# Patient Record
Sex: FEMALE | Race: BLACK OR AFRICAN AMERICAN | Employment: FULL TIME
[De-identification: names, ages, dates, MRNs, and addresses within clinical notes are randomized per-mention and may not be internally consistent; named-entity substitution may affect disease eponyms.]

---

## 2017-01-01 ENCOUNTER — SURGERY (OUTPATIENT)
Age: 67
End: 2017-01-01

## 2017-01-01 ENCOUNTER — INFUSION THERAPY VISIT (OUTPATIENT)
Dept: INFUSION THERAPY | Facility: CLINIC | Age: 67
End: 2017-01-01
Attending: INTERNAL MEDICINE
Payer: COMMERCIAL

## 2017-01-01 ENCOUNTER — OFFICE VISIT (OUTPATIENT)
Dept: SURGERY | Facility: PHYSICIAN GROUP | Age: 67
End: 2017-01-01
Payer: COMMERCIAL

## 2017-01-01 ENCOUNTER — APPOINTMENT (OUTPATIENT)
Dept: CT IMAGING | Facility: CLINIC | Age: 67
End: 2017-01-01
Attending: EMERGENCY MEDICINE
Payer: COMMERCIAL

## 2017-01-01 ENCOUNTER — HOME INFUSION (PRE-WILLOW HOME INFUSION) (OUTPATIENT)
Dept: PHARMACY | Facility: CLINIC | Age: 67
End: 2017-01-01

## 2017-01-01 ENCOUNTER — HOSPITAL ENCOUNTER (OUTPATIENT)
Dept: CT IMAGING | Facility: CLINIC | Age: 67
End: 2017-11-27
Attending: INTERNAL MEDICINE | Admitting: RADIOLOGY
Payer: COMMERCIAL

## 2017-01-01 ENCOUNTER — HOSPITAL ENCOUNTER (OUTPATIENT)
Facility: CLINIC | Age: 67
Setting detail: SPECIMEN
Discharge: HOME | End: 2017-12-27
Attending: INTERNAL MEDICINE | Admitting: INTERNAL MEDICINE
Payer: COMMERCIAL

## 2017-01-01 ENCOUNTER — APPOINTMENT (OUTPATIENT)
Dept: ULTRASOUND IMAGING | Facility: CLINIC | Age: 67
End: 2017-01-01
Attending: INTERNAL MEDICINE
Payer: COMMERCIAL

## 2017-01-01 ENCOUNTER — ONCOLOGY VISIT (OUTPATIENT)
Dept: ONCOLOGY | Facility: CLINIC | Age: 67
End: 2017-01-01
Attending: INTERNAL MEDICINE
Payer: COMMERCIAL

## 2017-01-01 ENCOUNTER — ALLIED HEALTH/NURSE VISIT (OUTPATIENT)
Dept: ONCOLOGY | Facility: CLINIC | Age: 67
End: 2017-01-01

## 2017-01-01 ENCOUNTER — TELEPHONE (OUTPATIENT)
Dept: ONCOLOGY | Facility: CLINIC | Age: 67
End: 2017-01-01

## 2017-01-01 ENCOUNTER — TELEPHONE (OUTPATIENT)
Dept: PHARMACY | Facility: CLINIC | Age: 67
End: 2017-01-01

## 2017-01-01 ENCOUNTER — ANESTHESIA EVENT (OUTPATIENT)
Dept: SURGERY | Facility: CLINIC | Age: 67
End: 2017-01-01
Payer: COMMERCIAL

## 2017-01-01 ENCOUNTER — APPOINTMENT (OUTPATIENT)
Dept: ULTRASOUND IMAGING | Facility: CLINIC | Age: 67
End: 2017-01-01
Attending: EMERGENCY MEDICINE
Payer: COMMERCIAL

## 2017-01-01 ENCOUNTER — HOSPITAL ENCOUNTER (OUTPATIENT)
Facility: CLINIC | Age: 67
Setting detail: OBSERVATION
Discharge: HOME | End: 2017-10-21
Attending: EMERGENCY MEDICINE | Admitting: INTERNAL MEDICINE
Payer: COMMERCIAL

## 2017-01-01 ENCOUNTER — HOSPITAL ENCOUNTER (OUTPATIENT)
Facility: CLINIC | Age: 67
Setting detail: SPECIMEN
Discharge: HOME | End: 2017-11-22
Attending: INTERNAL MEDICINE | Admitting: INTERNAL MEDICINE
Payer: COMMERCIAL

## 2017-01-01 ENCOUNTER — HOSPITAL ENCOUNTER (OUTPATIENT)
Dept: ULTRASOUND IMAGING | Facility: CLINIC | Age: 67
Discharge: HOME | End: 2017-11-29
Attending: INTERNAL MEDICINE | Admitting: INTERNAL MEDICINE
Payer: COMMERCIAL

## 2017-01-01 ENCOUNTER — HOSPITAL ENCOUNTER (OUTPATIENT)
Facility: CLINIC | Age: 67
Discharge: HOME | End: 2017-10-09
Attending: SURGERY | Admitting: SURGERY
Payer: COMMERCIAL

## 2017-01-01 ENCOUNTER — ANESTHESIA (OUTPATIENT)
Dept: SURGERY | Facility: CLINIC | Age: 67
End: 2017-01-01
Payer: COMMERCIAL

## 2017-01-01 ENCOUNTER — HOSPITAL ENCOUNTER (INPATIENT)
Facility: CLINIC | Age: 67
LOS: 3 days | Discharge: HOME | End: 2017-10-02
Attending: EMERGENCY MEDICINE | Admitting: INTERNAL MEDICINE
Payer: COMMERCIAL

## 2017-01-01 ENCOUNTER — HOSPITAL ENCOUNTER (OUTPATIENT)
Facility: CLINIC | Age: 67
Setting detail: SPECIMEN
Discharge: HOME | End: 2017-10-24
Attending: INTERNAL MEDICINE | Admitting: INTERNAL MEDICINE
Payer: COMMERCIAL

## 2017-01-01 ENCOUNTER — MEDICAL CORRESPONDENCE (OUTPATIENT)
Dept: HEALTH INFORMATION MANAGEMENT | Facility: CLINIC | Age: 67
End: 2017-01-01

## 2017-01-01 ENCOUNTER — APPOINTMENT (OUTPATIENT)
Dept: GENERAL RADIOLOGY | Facility: CLINIC | Age: 67
End: 2017-01-01
Attending: SURGERY
Payer: COMMERCIAL

## 2017-01-01 ENCOUNTER — HOSPITAL ENCOUNTER (OUTPATIENT)
Facility: CLINIC | Age: 67
Setting detail: SPECIMEN
Discharge: HOME | End: 2017-11-29
Attending: INTERNAL MEDICINE | Admitting: INTERNAL MEDICINE
Payer: COMMERCIAL

## 2017-01-01 ENCOUNTER — APPOINTMENT (OUTPATIENT)
Dept: GENERAL RADIOLOGY | Facility: CLINIC | Age: 67
End: 2017-01-01
Attending: EMERGENCY MEDICINE
Payer: COMMERCIAL

## 2017-01-01 ENCOUNTER — HOSPITAL ENCOUNTER (OUTPATIENT)
Facility: CLINIC | Age: 67
Discharge: HOME | End: 2017-11-27
Attending: RADIOLOGY | Admitting: RADIOLOGY
Payer: COMMERCIAL

## 2017-01-01 ENCOUNTER — HOSPITAL ENCOUNTER (OUTPATIENT)
Facility: CLINIC | Age: 67
Setting detail: SPECIMEN
Discharge: HOME | End: 2017-12-13
Attending: INTERNAL MEDICINE | Admitting: INTERNAL MEDICINE
Payer: COMMERCIAL

## 2017-01-01 ENCOUNTER — HOSPITAL ENCOUNTER (INPATIENT)
Facility: CLINIC | Age: 67
LOS: 3 days | Discharge: HOME | End: 2017-12-18
Attending: EMERGENCY MEDICINE | Admitting: INTERNAL MEDICINE
Payer: COMMERCIAL

## 2017-01-01 ENCOUNTER — HOSPITAL ENCOUNTER (OUTPATIENT)
Facility: CLINIC | Age: 67
Setting detail: SPECIMEN
Discharge: HOME | End: 2017-10-25
Attending: INTERNAL MEDICINE | Admitting: INTERNAL MEDICINE
Payer: COMMERCIAL

## 2017-01-01 ENCOUNTER — HOSPITAL ENCOUNTER (OUTPATIENT)
Facility: CLINIC | Age: 67
Setting detail: SPECIMEN
Discharge: HOME | End: 2017-10-04
Attending: INTERNAL MEDICINE | Admitting: INTERNAL MEDICINE
Payer: COMMERCIAL

## 2017-01-01 ENCOUNTER — HOSPITAL ENCOUNTER (OUTPATIENT)
Facility: CLINIC | Age: 67
Setting detail: SPECIMEN
Discharge: HOME | End: 2017-12-29
Attending: INTERNAL MEDICINE | Admitting: INTERNAL MEDICINE
Payer: COMMERCIAL

## 2017-01-01 ENCOUNTER — HOSPITAL ENCOUNTER (OUTPATIENT)
Facility: CLINIC | Age: 67
Setting detail: SPECIMEN
Discharge: HOME | End: 2017-10-11
Attending: INTERNAL MEDICINE | Admitting: INTERNAL MEDICINE
Payer: COMMERCIAL

## 2017-01-01 ENCOUNTER — HOSPITAL ENCOUNTER (EMERGENCY)
Facility: CLINIC | Age: 67
Discharge: HOME | End: 2017-10-29
Attending: EMERGENCY MEDICINE | Admitting: EMERGENCY MEDICINE
Payer: COMMERCIAL

## 2017-01-01 ENCOUNTER — HOSPITAL ENCOUNTER (OUTPATIENT)
Dept: CT IMAGING | Facility: CLINIC | Age: 67
Discharge: HOME | End: 2017-10-05
Attending: INTERNAL MEDICINE | Admitting: INTERNAL MEDICINE
Payer: COMMERCIAL

## 2017-01-01 ENCOUNTER — HOSPITAL ENCOUNTER (OUTPATIENT)
Facility: CLINIC | Age: 67
Setting detail: SPECIMEN
Discharge: HOME | End: 2017-11-08
Attending: INTERNAL MEDICINE | Admitting: INTERNAL MEDICINE
Payer: COMMERCIAL

## 2017-01-01 ENCOUNTER — DOCUMENTATION ONLY (OUTPATIENT)
Dept: PHARMACY | Facility: CLINIC | Age: 67
End: 2017-01-01

## 2017-01-01 VITALS
OXYGEN SATURATION: 99 % | HEART RATE: 86 BPM | DIASTOLIC BLOOD PRESSURE: 71 MMHG | SYSTOLIC BLOOD PRESSURE: 151 MMHG | TEMPERATURE: 97.6 F | RESPIRATION RATE: 16 BRPM

## 2017-01-01 VITALS
HEART RATE: 98 BPM | TEMPERATURE: 98.5 F | SYSTOLIC BLOOD PRESSURE: 160 MMHG | DIASTOLIC BLOOD PRESSURE: 83 MMHG | OXYGEN SATURATION: 99 % | RESPIRATION RATE: 16 BRPM

## 2017-01-01 VITALS
WEIGHT: 239.2 LBS | BODY MASS INDEX: 35.43 KG/M2 | TEMPERATURE: 98.1 F | RESPIRATION RATE: 18 BRPM | HEART RATE: 77 BPM | OXYGEN SATURATION: 99 % | DIASTOLIC BLOOD PRESSURE: 70 MMHG | HEIGHT: 69 IN | SYSTOLIC BLOOD PRESSURE: 144 MMHG

## 2017-01-01 VITALS
HEART RATE: 54 BPM | RESPIRATION RATE: 16 BRPM | TEMPERATURE: 98.7 F | DIASTOLIC BLOOD PRESSURE: 85 MMHG | WEIGHT: 224 LBS | BODY MASS INDEX: 33.06 KG/M2 | SYSTOLIC BLOOD PRESSURE: 132 MMHG | OXYGEN SATURATION: 98 %

## 2017-01-01 VITALS
OXYGEN SATURATION: 99 % | TEMPERATURE: 98.2 F | RESPIRATION RATE: 16 BRPM | HEART RATE: 110 BPM | WEIGHT: 229.94 LBS | SYSTOLIC BLOOD PRESSURE: 109 MMHG | DIASTOLIC BLOOD PRESSURE: 69 MMHG | BODY MASS INDEX: 33.94 KG/M2

## 2017-01-01 VITALS
SYSTOLIC BLOOD PRESSURE: 128 MMHG | RESPIRATION RATE: 20 BRPM | BODY MASS INDEX: 34.45 KG/M2 | WEIGHT: 233.4 LBS | TEMPERATURE: 97.5 F | HEART RATE: 97 BPM | DIASTOLIC BLOOD PRESSURE: 69 MMHG | OXYGEN SATURATION: 98 %

## 2017-01-01 VITALS
WEIGHT: 245.4 LBS | RESPIRATION RATE: 16 BRPM | SYSTOLIC BLOOD PRESSURE: 154 MMHG | HEART RATE: 71 BPM | DIASTOLIC BLOOD PRESSURE: 65 MMHG | HEIGHT: 70 IN | TEMPERATURE: 97.4 F | BODY MASS INDEX: 35.13 KG/M2 | OXYGEN SATURATION: 91 %

## 2017-01-01 VITALS
HEIGHT: 70 IN | BODY MASS INDEX: 34.56 KG/M2 | TEMPERATURE: 98.3 F | RESPIRATION RATE: 18 BRPM | SYSTOLIC BLOOD PRESSURE: 117 MMHG | WEIGHT: 241.4 LBS | OXYGEN SATURATION: 98 % | DIASTOLIC BLOOD PRESSURE: 60 MMHG

## 2017-01-01 VITALS
RESPIRATION RATE: 16 BRPM | HEART RATE: 93 BPM | HEIGHT: 69 IN | BODY MASS INDEX: 35.66 KG/M2 | SYSTOLIC BLOOD PRESSURE: 151 MMHG | OXYGEN SATURATION: 96 % | DIASTOLIC BLOOD PRESSURE: 67 MMHG | WEIGHT: 240.8 LBS | TEMPERATURE: 99 F

## 2017-01-01 VITALS
WEIGHT: 239.2 LBS | OXYGEN SATURATION: 99 % | DIASTOLIC BLOOD PRESSURE: 77 MMHG | HEART RATE: 85 BPM | BODY MASS INDEX: 35.43 KG/M2 | TEMPERATURE: 98.6 F | SYSTOLIC BLOOD PRESSURE: 130 MMHG | RESPIRATION RATE: 20 BRPM | HEIGHT: 69 IN

## 2017-01-01 VITALS
TEMPERATURE: 98.3 F | BODY MASS INDEX: 34.84 KG/M2 | HEART RATE: 115 BPM | RESPIRATION RATE: 18 BRPM | WEIGHT: 242.8 LBS | OXYGEN SATURATION: 95 % | SYSTOLIC BLOOD PRESSURE: 132 MMHG | DIASTOLIC BLOOD PRESSURE: 77 MMHG

## 2017-01-01 VITALS
SYSTOLIC BLOOD PRESSURE: 171 MMHG | OXYGEN SATURATION: 100 % | HEART RATE: 100 BPM | TEMPERATURE: 99.4 F | RESPIRATION RATE: 22 BRPM | DIASTOLIC BLOOD PRESSURE: 89 MMHG

## 2017-01-01 VITALS
HEIGHT: 69 IN | TEMPERATURE: 98.6 F | RESPIRATION RATE: 20 BRPM | DIASTOLIC BLOOD PRESSURE: 77 MMHG | OXYGEN SATURATION: 99 % | SYSTOLIC BLOOD PRESSURE: 130 MMHG | HEART RATE: 85 BPM | BODY MASS INDEX: 35.43 KG/M2 | WEIGHT: 239.2 LBS

## 2017-01-01 VITALS
SYSTOLIC BLOOD PRESSURE: 137 MMHG | WEIGHT: 241.4 LBS | RESPIRATION RATE: 18 BRPM | TEMPERATURE: 98.3 F | BODY MASS INDEX: 34.56 KG/M2 | OXYGEN SATURATION: 98 % | HEIGHT: 70 IN | DIASTOLIC BLOOD PRESSURE: 61 MMHG

## 2017-01-01 VITALS
TEMPERATURE: 98.2 F | DIASTOLIC BLOOD PRESSURE: 85 MMHG | RESPIRATION RATE: 20 BRPM | HEART RATE: 69 BPM | HEIGHT: 69 IN | SYSTOLIC BLOOD PRESSURE: 154 MMHG | BODY MASS INDEX: 36.08 KG/M2 | OXYGEN SATURATION: 98 % | WEIGHT: 243.6 LBS

## 2017-01-01 VITALS
HEART RATE: 97 BPM | RESPIRATION RATE: 20 BRPM | TEMPERATURE: 97.5 F | OXYGEN SATURATION: 98 % | SYSTOLIC BLOOD PRESSURE: 128 MMHG | DIASTOLIC BLOOD PRESSURE: 69 MMHG

## 2017-01-01 VITALS
RESPIRATION RATE: 20 BRPM | TEMPERATURE: 98.1 F | WEIGHT: 234.2 LBS | HEART RATE: 85 BPM | BODY MASS INDEX: 34.69 KG/M2 | DIASTOLIC BLOOD PRESSURE: 85 MMHG | HEIGHT: 69 IN | HEIGHT: 69 IN | BODY MASS INDEX: 36.08 KG/M2 | OXYGEN SATURATION: 98 % | WEIGHT: 243.6 LBS | TEMPERATURE: 98.2 F | DIASTOLIC BLOOD PRESSURE: 64 MMHG | HEART RATE: 69 BPM | SYSTOLIC BLOOD PRESSURE: 154 MMHG | SYSTOLIC BLOOD PRESSURE: 119 MMHG

## 2017-01-01 VITALS
SYSTOLIC BLOOD PRESSURE: 119 MMHG | OXYGEN SATURATION: 100 % | HEART RATE: 94 BPM | TEMPERATURE: 99 F | WEIGHT: 227.4 LBS | BODY MASS INDEX: 33.57 KG/M2 | DIASTOLIC BLOOD PRESSURE: 67 MMHG

## 2017-01-01 VITALS
RESPIRATION RATE: 22 BRPM | TEMPERATURE: 98.2 F | BODY MASS INDEX: 34.07 KG/M2 | SYSTOLIC BLOOD PRESSURE: 134 MMHG | HEART RATE: 79 BPM | WEIGHT: 230 LBS | DIASTOLIC BLOOD PRESSURE: 88 MMHG | HEIGHT: 69 IN

## 2017-01-01 VITALS
HEART RATE: 104 BPM | TEMPERATURE: 98.8 F | SYSTOLIC BLOOD PRESSURE: 132 MMHG | DIASTOLIC BLOOD PRESSURE: 66 MMHG | RESPIRATION RATE: 18 BRPM

## 2017-01-01 VITALS
RESPIRATION RATE: 24 BRPM | BODY MASS INDEX: 34.06 KG/M2 | WEIGHT: 237.9 LBS | HEIGHT: 70 IN | DIASTOLIC BLOOD PRESSURE: 77 MMHG | OXYGEN SATURATION: 97 % | SYSTOLIC BLOOD PRESSURE: 153 MMHG | TEMPERATURE: 97.7 F

## 2017-01-01 DIAGNOSIS — Z95.828 PORTACATH IN PLACE: ICD-10-CM

## 2017-01-01 DIAGNOSIS — C18.3 MALIGNANT NEOPLASM OF HEPATIC FLEXURE (H): Primary | ICD-10-CM

## 2017-01-01 DIAGNOSIS — K64.5 THROMBOSED EXTERNAL HEMORRHOIDS: ICD-10-CM

## 2017-01-01 DIAGNOSIS — T40.2X5A CONSTIPATION DUE TO OPIOID THERAPY: ICD-10-CM

## 2017-01-01 DIAGNOSIS — I26.99 PE (PULMONARY THROMBOEMBOLISM) (H): Primary | ICD-10-CM

## 2017-01-01 DIAGNOSIS — Z71.9 COUNSELING NOS(V65.40): Primary | ICD-10-CM

## 2017-01-01 DIAGNOSIS — C18.3 MALIGNANT NEOPLASM OF HEPATIC FLEXURE (H): ICD-10-CM

## 2017-01-01 DIAGNOSIS — K59.03 CONSTIPATION DUE TO OPIOID THERAPY: ICD-10-CM

## 2017-01-01 DIAGNOSIS — R10.11 RUQ ABDOMINAL PAIN: ICD-10-CM

## 2017-01-01 DIAGNOSIS — I26.99 PE (PULMONARY THROMBOEMBOLISM) (H): ICD-10-CM

## 2017-01-01 DIAGNOSIS — D50.0 IRON DEFICIENCY ANEMIA DUE TO CHRONIC BLOOD LOSS: Primary | ICD-10-CM

## 2017-01-01 DIAGNOSIS — R00.0 TACHYCARDIA: ICD-10-CM

## 2017-01-01 DIAGNOSIS — R53.0 NEOPLASTIC MALIGNANT RELATED FATIGUE: ICD-10-CM

## 2017-01-01 DIAGNOSIS — D50.0 IRON DEFICIENCY ANEMIA DUE TO CHRONIC BLOOD LOSS: ICD-10-CM

## 2017-01-01 DIAGNOSIS — I82.90 VTE (VENOUS THROMBOEMBOLISM): Primary | ICD-10-CM

## 2017-01-01 DIAGNOSIS — I26.99 OTHER ACUTE PULMONARY EMBOLISM WITHOUT ACUTE COR PULMONALE (H): ICD-10-CM

## 2017-01-01 DIAGNOSIS — Z79.899 MEDICATION MANAGEMENT: ICD-10-CM

## 2017-01-01 DIAGNOSIS — K63.89 COLONIC MASS: ICD-10-CM

## 2017-01-01 DIAGNOSIS — E87.6 HYPOKALEMIA: Primary | ICD-10-CM

## 2017-01-01 DIAGNOSIS — Z95.828 PORT CATHETER IN PLACE: ICD-10-CM

## 2017-01-01 DIAGNOSIS — R53.0 NEOPLASTIC MALIGNANT RELATED FATIGUE: Primary | ICD-10-CM

## 2017-01-01 DIAGNOSIS — R11.0 NAUSEA: ICD-10-CM

## 2017-01-01 DIAGNOSIS — J45.909 REACTIVE AIRWAY DISEASE WITHOUT COMPLICATION, UNSPECIFIED ASTHMA SEVERITY, UNSPECIFIED WHETHER PERSISTENT: ICD-10-CM

## 2017-01-01 DIAGNOSIS — I26.99 OTHER ACUTE PULMONARY EMBOLISM WITHOUT ACUTE COR PULMONALE (H): Primary | ICD-10-CM

## 2017-01-01 DIAGNOSIS — Z51.5 ENCOUNTER FOR PALLIATIVE CARE: ICD-10-CM

## 2017-01-01 DIAGNOSIS — I26.99 PULMONARY EMBOLISM (H): ICD-10-CM

## 2017-01-01 DIAGNOSIS — Z65.9 OTHER SOCIAL STRESSOR: ICD-10-CM

## 2017-01-01 DIAGNOSIS — C19 COLORECTAL CANCER (H): ICD-10-CM

## 2017-01-01 DIAGNOSIS — I26.99 OTHER PULMONARY EMBOLISM WITHOUT ACUTE COR PULMONALE, UNSPECIFIED CHRONICITY (H): ICD-10-CM

## 2017-01-01 DIAGNOSIS — R16.0 LIVER MASSES: Primary | ICD-10-CM

## 2017-01-01 LAB
ABO + RH BLD: NORMAL
ABO + RH BLD: NORMAL
ALBUMIN SERPL-MCNC: 2.4 G/DL (ref 3.4–5)
ALBUMIN SERPL-MCNC: 2.5 G/DL (ref 3.4–5)
ALBUMIN SERPL-MCNC: 2.5 G/DL (ref 3.4–5)
ALBUMIN SERPL-MCNC: 2.7 G/DL (ref 3.4–5)
ALBUMIN SERPL-MCNC: 2.8 G/DL (ref 3.4–5)
ALBUMIN SERPL-MCNC: 2.9 G/DL (ref 3.4–5)
ALBUMIN SERPL-MCNC: 3.3 G/DL (ref 3.4–5)
ALBUMIN SERPL-MCNC: 3.3 G/DL (ref 3.4–5)
ALBUMIN SERPL-MCNC: 3.5 G/DL (ref 3.4–5)
ALBUMIN UR-MCNC: NEGATIVE MG/DL
ALP SERPL-CCNC: 104 U/L (ref 40–150)
ALP SERPL-CCNC: 105 U/L (ref 40–150)
ALP SERPL-CCNC: 107 U/L (ref 40–150)
ALP SERPL-CCNC: 112 U/L (ref 40–150)
ALP SERPL-CCNC: 120 U/L (ref 40–150)
ALP SERPL-CCNC: 139 U/L (ref 40–150)
ALP SERPL-CCNC: 145 U/L (ref 40–150)
ALP SERPL-CCNC: 182 U/L (ref 40–150)
ALP SERPL-CCNC: 185 U/L (ref 40–150)
ALP SERPL-CCNC: 76 U/L (ref 40–150)
ALP SERPL-CCNC: 81 U/L (ref 40–150)
ALP SERPL-CCNC: 87 U/L (ref 40–150)
ALT SERPL W P-5'-P-CCNC: 18 U/L (ref 0–50)
ALT SERPL W P-5'-P-CCNC: 23 U/L (ref 0–50)
ALT SERPL W P-5'-P-CCNC: 24 U/L (ref 0–50)
ALT SERPL W P-5'-P-CCNC: 24 U/L (ref 0–50)
ALT SERPL W P-5'-P-CCNC: 26 U/L (ref 0–50)
ALT SERPL W P-5'-P-CCNC: 28 U/L (ref 0–50)
ALT SERPL W P-5'-P-CCNC: 30 U/L (ref 0–50)
ALT SERPL W P-5'-P-CCNC: 36 U/L (ref 0–50)
ALT SERPL W P-5'-P-CCNC: 37 U/L (ref 0–50)
ALT SERPL W P-5'-P-CCNC: 41 U/L (ref 0–50)
ANION GAP SERPL CALCULATED.3IONS-SCNC: 10 MMOL/L (ref 3–14)
ANION GAP SERPL CALCULATED.3IONS-SCNC: 5 MMOL/L (ref 3–14)
ANION GAP SERPL CALCULATED.3IONS-SCNC: 6 MMOL/L (ref 3–14)
ANION GAP SERPL CALCULATED.3IONS-SCNC: 8 MMOL/L (ref 3–14)
ANION GAP SERPL CALCULATED.3IONS-SCNC: 9 MMOL/L (ref 3–14)
APAP SERPL-MCNC: <2 MG/L (ref 10–20)
APPEARANCE UR: CLEAR
APTT PPP: 29 SEC (ref 22–37)
AST SERPL W P-5'-P-CCNC: 19 U/L (ref 0–45)
AST SERPL W P-5'-P-CCNC: 23 U/L (ref 0–45)
AST SERPL W P-5'-P-CCNC: 23 U/L (ref 0–45)
AST SERPL W P-5'-P-CCNC: 27 U/L (ref 0–45)
AST SERPL W P-5'-P-CCNC: 33 U/L (ref 0–45)
AST SERPL W P-5'-P-CCNC: 35 U/L (ref 0–45)
AST SERPL W P-5'-P-CCNC: 38 U/L (ref 0–45)
AST SERPL W P-5'-P-CCNC: 41 U/L (ref 0–45)
AST SERPL W P-5'-P-CCNC: 44 U/L (ref 0–45)
AST SERPL W P-5'-P-CCNC: 54 U/L (ref 0–45)
AST SERPL W P-5'-P-CCNC: 65 U/L (ref 0–45)
AST SERPL W P-5'-P-CCNC: 70 U/L (ref 0–45)
BACTERIA SPEC CULT: NO GROWTH
BACTERIA SPEC CULT: NO GROWTH
BASOPHILS # BLD AUTO: 0 10E9/L (ref 0–0.2)
BASOPHILS NFR BLD AUTO: 0.2 %
BASOPHILS NFR BLD AUTO: 0.3 %
BASOPHILS NFR BLD AUTO: 0.4 %
BASOPHILS NFR BLD AUTO: 0.4 %
BASOPHILS NFR BLD AUTO: 0.5 %
BASOPHILS NFR BLD AUTO: 0.7 %
BILIRUB DIRECT SERPL-MCNC: 0.2 MG/DL (ref 0–0.2)
BILIRUB SERPL-MCNC: 0.2 MG/DL (ref 0.2–1.3)
BILIRUB SERPL-MCNC: 0.3 MG/DL (ref 0.2–1.3)
BILIRUB SERPL-MCNC: 0.4 MG/DL (ref 0.2–1.3)
BILIRUB SERPL-MCNC: 0.4 MG/DL (ref 0.2–1.3)
BILIRUB SERPL-MCNC: 0.5 MG/DL (ref 0.2–1.3)
BILIRUB SERPL-MCNC: 0.5 MG/DL (ref 0.2–1.3)
BILIRUB SERPL-MCNC: 0.6 MG/DL (ref 0.2–1.3)
BILIRUB UR QL STRIP: NEGATIVE
BLD GP AB SCN SERPL QL: NORMAL
BLD PROD TYP BPU: NORMAL
BLD PROD TYP BPU: NORMAL
BLD UNIT ID BPU: 0
BLOOD BANK CMNT PATIENT-IMP: NORMAL
BLOOD PRODUCT CODE: NORMAL
BPU ID: NORMAL
BUN SERPL-MCNC: 10 MG/DL (ref 7–30)
BUN SERPL-MCNC: 10 MG/DL (ref 7–30)
BUN SERPL-MCNC: 12 MG/DL (ref 7–30)
BUN SERPL-MCNC: 12 MG/DL (ref 7–30)
BUN SERPL-MCNC: 13 MG/DL (ref 7–30)
BUN SERPL-MCNC: 14 MG/DL (ref 7–30)
BUN SERPL-MCNC: 16 MG/DL (ref 7–30)
BUN SERPL-MCNC: 18 MG/DL (ref 7–30)
BUN SERPL-MCNC: 18 MG/DL (ref 7–30)
BUN SERPL-MCNC: 19 MG/DL (ref 7–30)
BUN SERPL-MCNC: 19 MG/DL (ref 7–30)
BUN SERPL-MCNC: 9 MG/DL (ref 7–30)
BUN SERPL-MCNC: 9 MG/DL (ref 7–30)
CALCIUM SERPL-MCNC: 8.4 MG/DL (ref 8.5–10.1)
CALCIUM SERPL-MCNC: 8.5 MG/DL (ref 8.5–10.1)
CALCIUM SERPL-MCNC: 8.6 MG/DL (ref 8.5–10.1)
CALCIUM SERPL-MCNC: 8.7 MG/DL (ref 8.5–10.1)
CALCIUM SERPL-MCNC: 8.9 MG/DL (ref 8.5–10.1)
CALCIUM SERPL-MCNC: 9 MG/DL (ref 8.5–10.1)
CALCIUM SERPL-MCNC: 9 MG/DL (ref 8.5–10.1)
CALCIUM SERPL-MCNC: 9.1 MG/DL (ref 8.5–10.1)
CALCIUM SERPL-MCNC: 9.1 MG/DL (ref 8.5–10.1)
CALCIUM SERPL-MCNC: 9.4 MG/DL (ref 8.5–10.1)
CALCIUM SERPL-MCNC: 9.7 MG/DL (ref 8.5–10.1)
CALCIUM SERPL-MCNC: 9.7 MG/DL (ref 8.5–10.1)
CALCIUM SERPL-MCNC: 9.8 MG/DL (ref 8.5–10.1)
CANCER AG125 SERPL-ACNC: 42 U/ML (ref 0–30)
CEA SERPL-MCNC: 143.8 UG/L (ref 0–2.5)
CEA SERPL-MCNC: 95.5 UG/L (ref 0–2.5)
CHLORIDE SERPL-SCNC: 101 MMOL/L (ref 94–109)
CHLORIDE SERPL-SCNC: 101 MMOL/L (ref 94–109)
CHLORIDE SERPL-SCNC: 102 MMOL/L (ref 94–109)
CHLORIDE SERPL-SCNC: 102 MMOL/L (ref 94–109)
CHLORIDE SERPL-SCNC: 103 MMOL/L (ref 94–109)
CHLORIDE SERPL-SCNC: 104 MMOL/L (ref 94–109)
CHLORIDE SERPL-SCNC: 105 MMOL/L (ref 94–109)
CHLORIDE SERPL-SCNC: 106 MMOL/L (ref 94–109)
CHLORIDE SERPL-SCNC: 107 MMOL/L (ref 94–109)
CHLORIDE SERPL-SCNC: 109 MMOL/L (ref 94–109)
CO2 SERPL-SCNC: 26 MMOL/L (ref 20–32)
CO2 SERPL-SCNC: 26 MMOL/L (ref 20–32)
CO2 SERPL-SCNC: 27 MMOL/L (ref 20–32)
CO2 SERPL-SCNC: 28 MMOL/L (ref 20–32)
CO2 SERPL-SCNC: 28 MMOL/L (ref 20–32)
CO2 SERPL-SCNC: 29 MMOL/L (ref 20–32)
CO2 SERPL-SCNC: 29 MMOL/L (ref 20–32)
CO2 SERPL-SCNC: 33 MMOL/L (ref 20–32)
COLONOSCOPY: NORMAL
COLOR UR AUTO: ABNORMAL
COPATH REPORT: NORMAL
COPATH REPORT: NORMAL
CREAT SERPL-MCNC: 0.54 MG/DL (ref 0.52–1.04)
CREAT SERPL-MCNC: 0.58 MG/DL (ref 0.52–1.04)
CREAT SERPL-MCNC: 0.6 MG/DL (ref 0.52–1.04)
CREAT SERPL-MCNC: 0.61 MG/DL (ref 0.52–1.04)
CREAT SERPL-MCNC: 0.61 MG/DL (ref 0.52–1.04)
CREAT SERPL-MCNC: 0.66 MG/DL (ref 0.52–1.04)
CREAT SERPL-MCNC: 0.67 MG/DL (ref 0.52–1.04)
CREAT SERPL-MCNC: 0.68 MG/DL (ref 0.52–1.04)
CREAT SERPL-MCNC: 0.73 MG/DL (ref 0.52–1.04)
CREAT SERPL-MCNC: 0.75 MG/DL (ref 0.52–1.04)
CREAT SERPL-MCNC: 0.76 MG/DL (ref 0.52–1.04)
CREAT SERPL-MCNC: 0.78 MG/DL (ref 0.52–1.04)
CREAT SERPL-MCNC: 0.78 MG/DL (ref 0.52–1.04)
CREAT SERPL-MCNC: 0.9 MG/DL (ref 0.52–1.04)
D DIMER PPP FEU-MCNC: 8.3 UG/ML FEU (ref 0–0.5)
DIFFERENTIAL METHOD BLD: ABNORMAL
EOSINOPHIL # BLD AUTO: 0.1 10E9/L (ref 0–0.7)
EOSINOPHIL NFR BLD AUTO: 0.6 %
EOSINOPHIL NFR BLD AUTO: 1 %
EOSINOPHIL NFR BLD AUTO: 1.1 %
EOSINOPHIL NFR BLD AUTO: 1.2 %
EOSINOPHIL NFR BLD AUTO: 1.6 %
EOSINOPHIL NFR BLD AUTO: 1.8 %
EOSINOPHIL NFR BLD AUTO: 2.8 %
EOSINOPHIL NFR BLD AUTO: 2.9 %
EOSINOPHIL NFR BLD AUTO: 3.6 %
ERYTHROCYTE [DISTWIDTH] IN BLOOD BY AUTOMATED COUNT: 17.4 % (ref 10–15)
ERYTHROCYTE [DISTWIDTH] IN BLOOD BY AUTOMATED COUNT: 17.5 % (ref 10–15)
ERYTHROCYTE [DISTWIDTH] IN BLOOD BY AUTOMATED COUNT: 17.6 % (ref 10–15)
ERYTHROCYTE [DISTWIDTH] IN BLOOD BY AUTOMATED COUNT: 18.1 % (ref 10–15)
ERYTHROCYTE [DISTWIDTH] IN BLOOD BY AUTOMATED COUNT: 18.2 % (ref 10–15)
ERYTHROCYTE [DISTWIDTH] IN BLOOD BY AUTOMATED COUNT: 18.4 % (ref 10–15)
ERYTHROCYTE [DISTWIDTH] IN BLOOD BY AUTOMATED COUNT: 19.8 % (ref 10–15)
ERYTHROCYTE [DISTWIDTH] IN BLOOD BY AUTOMATED COUNT: 25.7 % (ref 10–15)
ERYTHROCYTE [DISTWIDTH] IN BLOOD BY AUTOMATED COUNT: 26.7 % (ref 10–15)
ERYTHROCYTE [DISTWIDTH] IN BLOOD BY AUTOMATED COUNT: 27 % (ref 10–15)
ERYTHROCYTE [DISTWIDTH] IN BLOOD BY AUTOMATED COUNT: 27.1 % (ref 10–15)
ERYTHROCYTE [DISTWIDTH] IN BLOOD BY AUTOMATED COUNT: 28.1 % (ref 10–15)
ERYTHROCYTE [DISTWIDTH] IN BLOOD BY AUTOMATED COUNT: 28.2 % (ref 10–15)
ERYTHROCYTE [DISTWIDTH] IN BLOOD BY AUTOMATED COUNT: 29.6 % (ref 10–15)
ERYTHROCYTE [DISTWIDTH] IN BLOOD BY AUTOMATED COUNT: ABNORMAL % (ref 10–15)
GFR SERPL CREATININE-BSD FRML MDRD: 62 ML/MIN/1.7M2
GFR SERPL CREATININE-BSD FRML MDRD: 73 ML/MIN/1.7M2
GFR SERPL CREATININE-BSD FRML MDRD: 74 ML/MIN/1.7M2
GFR SERPL CREATININE-BSD FRML MDRD: 76 ML/MIN/1.7M2
GFR SERPL CREATININE-BSD FRML MDRD: 76 ML/MIN/1.7M2
GFR SERPL CREATININE-BSD FRML MDRD: 79 ML/MIN/1.7M2
GFR SERPL CREATININE-BSD FRML MDRD: 86 ML/MIN/1.7M2
GFR SERPL CREATININE-BSD FRML MDRD: 88 ML/MIN/1.7M2
GFR SERPL CREATININE-BSD FRML MDRD: 88 ML/MIN/1.7M2
GFR SERPL CREATININE-BSD FRML MDRD: >90 ML/MIN/1.7M2
GLUCOSE SERPL-MCNC: 105 MG/DL (ref 70–99)
GLUCOSE SERPL-MCNC: 107 MG/DL (ref 70–99)
GLUCOSE SERPL-MCNC: 108 MG/DL (ref 70–99)
GLUCOSE SERPL-MCNC: 109 MG/DL (ref 70–99)
GLUCOSE SERPL-MCNC: 113 MG/DL (ref 70–99)
GLUCOSE SERPL-MCNC: 116 MG/DL (ref 70–99)
GLUCOSE SERPL-MCNC: 119 MG/DL (ref 70–99)
GLUCOSE SERPL-MCNC: 128 MG/DL (ref 70–99)
GLUCOSE SERPL-MCNC: 132 MG/DL (ref 70–99)
GLUCOSE SERPL-MCNC: 162 MG/DL (ref 70–99)
GLUCOSE SERPL-MCNC: 84 MG/DL (ref 70–99)
GLUCOSE SERPL-MCNC: 88 MG/DL (ref 70–99)
GLUCOSE SERPL-MCNC: 94 MG/DL (ref 70–99)
GLUCOSE UR STRIP-MCNC: NEGATIVE MG/DL
HCT VFR BLD AUTO: 22.7 % (ref 35–47)
HCT VFR BLD AUTO: 25 % (ref 35–47)
HCT VFR BLD AUTO: 25.4 % (ref 35–47)
HCT VFR BLD AUTO: 25.8 % (ref 35–47)
HCT VFR BLD AUTO: 26.6 % (ref 35–47)
HCT VFR BLD AUTO: 27.4 % (ref 35–47)
HCT VFR BLD AUTO: 28.3 % (ref 35–47)
HCT VFR BLD AUTO: 30.8 % (ref 35–47)
HCT VFR BLD AUTO: 31.5 % (ref 35–47)
HCT VFR BLD AUTO: 32.9 % (ref 35–47)
HCT VFR BLD AUTO: 33 % (ref 35–47)
HCT VFR BLD AUTO: 34.7 % (ref 35–47)
HCT VFR BLD AUTO: 35.3 % (ref 35–47)
HCT VFR BLD AUTO: 35.9 % (ref 35–47)
HCT VFR BLD AUTO: 39.3 % (ref 35–47)
HGB BLD-MCNC: 10.5 G/DL (ref 11.7–15.7)
HGB BLD-MCNC: 11 G/DL (ref 11.7–15.7)
HGB BLD-MCNC: 11.3 G/DL (ref 11.7–15.7)
HGB BLD-MCNC: 11.5 G/DL (ref 11.7–15.7)
HGB BLD-MCNC: 11.6 G/DL (ref 11.7–15.7)
HGB BLD-MCNC: 13 G/DL (ref 11.7–15.7)
HGB BLD-MCNC: 7.2 G/DL (ref 11.7–15.7)
HGB BLD-MCNC: 7.4 G/DL (ref 11.7–15.7)
HGB BLD-MCNC: 8 G/DL (ref 11.7–15.7)
HGB BLD-MCNC: 8.1 G/DL (ref 11.7–15.7)
HGB BLD-MCNC: 8.5 G/DL (ref 11.7–15.7)
HGB BLD-MCNC: 8.5 G/DL (ref 11.7–15.7)
HGB BLD-MCNC: 8.8 G/DL (ref 11.7–15.7)
HGB BLD-MCNC: 9.3 G/DL (ref 11.7–15.7)
HGB BLD-MCNC: 9.9 G/DL (ref 11.7–15.7)
HGB BLD-MCNC: 9.9 G/DL (ref 11.7–15.7)
HGB UR QL STRIP: ABNORMAL
IMM GRANULOCYTES # BLD: 0 10E9/L (ref 0–0.4)
IMM GRANULOCYTES NFR BLD: 0 %
IMM GRANULOCYTES NFR BLD: 0 %
IMM GRANULOCYTES NFR BLD: 0.2 %
IMM GRANULOCYTES NFR BLD: 0.3 %
IMM GRANULOCYTES NFR BLD: 0.3 %
IMM GRANULOCYTES NFR BLD: 0.4 %
IMM GRANULOCYTES NFR BLD: 0.4 %
IMM GRANULOCYTES NFR BLD: 0.5 %
INR PPP: 1.05 (ref 0.86–1.14)
INR PPP: 1.12 (ref 0.86–1.14)
INTERPRETATION ECG - MUSE: NORMAL
KETONES UR STRIP-MCNC: NEGATIVE MG/DL
LACTATE BLD-SCNC: 1.7 MMOL/L (ref 0.7–2)
LEUKOCYTE ESTERASE UR QL STRIP: NEGATIVE
LIPASE SERPL-CCNC: 74 U/L (ref 73–393)
LMWH PPP CHRO-ACNC: 0.34 IU/ML
LMWH PPP CHRO-ACNC: 0.44 IU/ML
LMWH PPP CHRO-ACNC: 0.65 IU/ML
LMWH PPP CHRO-ACNC: 0.74 IU/ML
LMWH PPP CHRO-ACNC: 0.76 IU/ML
LMWH PPP CHRO-ACNC: 0.9 IU/ML
LMWH PPP CHRO-ACNC: 1.1 IU/ML
LMWH PPP CHRO-ACNC: 1.24 IU/ML
LMWH PPP CHRO-ACNC: 1.47 IU/ML
LYMPHOCYTES # BLD AUTO: 0.8 10E9/L (ref 0.8–5.3)
LYMPHOCYTES # BLD AUTO: 1 10E9/L (ref 0.8–5.3)
LYMPHOCYTES # BLD AUTO: 1.2 10E9/L (ref 0.8–5.3)
LYMPHOCYTES # BLD AUTO: 1.2 10E9/L (ref 0.8–5.3)
LYMPHOCYTES # BLD AUTO: 1.3 10E9/L (ref 0.8–5.3)
LYMPHOCYTES # BLD AUTO: 1.3 10E9/L (ref 0.8–5.3)
LYMPHOCYTES # BLD AUTO: 1.4 10E9/L (ref 0.8–5.3)
LYMPHOCYTES # BLD AUTO: 1.6 10E9/L (ref 0.8–5.3)
LYMPHOCYTES # BLD AUTO: 1.8 10E9/L (ref 0.8–5.3)
LYMPHOCYTES NFR BLD AUTO: 15.8 %
LYMPHOCYTES NFR BLD AUTO: 16.2 %
LYMPHOCYTES NFR BLD AUTO: 16.6 %
LYMPHOCYTES NFR BLD AUTO: 21.9 %
LYMPHOCYTES NFR BLD AUTO: 24.4 %
LYMPHOCYTES NFR BLD AUTO: 24.8 %
LYMPHOCYTES NFR BLD AUTO: 25.7 %
LYMPHOCYTES NFR BLD AUTO: 28.9 %
LYMPHOCYTES NFR BLD AUTO: 34.2 %
LYMPHOCYTES NFR BLD AUTO: 43.1 %
LYMPHOCYTES NFR BLD AUTO: 47.4 %
Lab: NORMAL
Lab: NORMAL
MCH RBC QN AUTO: 22.9 PG (ref 26.5–33)
MCH RBC QN AUTO: 23.1 PG (ref 26.5–33)
MCH RBC QN AUTO: 23.3 PG (ref 26.5–33)
MCH RBC QN AUTO: 23.6 PG (ref 26.5–33)
MCH RBC QN AUTO: 23.7 PG (ref 26.5–33)
MCH RBC QN AUTO: 23.9 PG (ref 26.5–33)
MCH RBC QN AUTO: 24 PG (ref 26.5–33)
MCH RBC QN AUTO: 25.4 PG (ref 26.5–33)
MCH RBC QN AUTO: 26.1 PG (ref 26.5–33)
MCH RBC QN AUTO: 26.7 PG (ref 26.5–33)
MCH RBC QN AUTO: 27.6 PG (ref 26.5–33)
MCH RBC QN AUTO: 27.7 PG (ref 26.5–33)
MCH RBC QN AUTO: 27.8 PG (ref 26.5–33)
MCH RBC QN AUTO: 28.2 PG (ref 26.5–33)
MCH RBC QN AUTO: 29 PG (ref 26.5–33)
MCHC RBC AUTO-ENTMCNC: 31.4 G/DL (ref 31.5–36.5)
MCHC RBC AUTO-ENTMCNC: 31.7 G/DL (ref 31.5–36.5)
MCHC RBC AUTO-ENTMCNC: 31.9 G/DL (ref 31.5–36.5)
MCHC RBC AUTO-ENTMCNC: 31.9 G/DL (ref 31.5–36.5)
MCHC RBC AUTO-ENTMCNC: 32 G/DL (ref 31.5–36.5)
MCHC RBC AUTO-ENTMCNC: 32 G/DL (ref 31.5–36.5)
MCHC RBC AUTO-ENTMCNC: 32.1 G/DL (ref 31.5–36.5)
MCHC RBC AUTO-ENTMCNC: 32.1 G/DL (ref 31.5–36.5)
MCHC RBC AUTO-ENTMCNC: 32.3 G/DL (ref 31.5–36.5)
MCHC RBC AUTO-ENTMCNC: 32.6 G/DL (ref 31.5–36.5)
MCHC RBC AUTO-ENTMCNC: 32.6 G/DL (ref 31.5–36.5)
MCHC RBC AUTO-ENTMCNC: 32.9 G/DL (ref 31.5–36.5)
MCHC RBC AUTO-ENTMCNC: 32.9 G/DL (ref 31.5–36.5)
MCHC RBC AUTO-ENTMCNC: 33.1 G/DL (ref 31.5–36.5)
MCHC RBC AUTO-ENTMCNC: 33.3 G/DL (ref 31.5–36.5)
MCV RBC AUTO: 72 FL (ref 78–100)
MCV RBC AUTO: 73 FL (ref 78–100)
MCV RBC AUTO: 73 FL (ref 78–100)
MCV RBC AUTO: 74 FL (ref 78–100)
MCV RBC AUTO: 75 FL (ref 78–100)
MCV RBC AUTO: 80 FL (ref 78–100)
MCV RBC AUTO: 81 FL (ref 78–100)
MCV RBC AUTO: 85 FL (ref 78–100)
MCV RBC AUTO: 86 FL (ref 78–100)
MCV RBC AUTO: 86 FL (ref 78–100)
MCV RBC AUTO: 87 FL (ref 78–100)
MISCELLANEOUS TEST: NORMAL
MONOCYTES # BLD AUTO: 0.3 10E9/L (ref 0–1.3)
MONOCYTES # BLD AUTO: 0.3 10E9/L (ref 0–1.3)
MONOCYTES # BLD AUTO: 0.4 10E9/L (ref 0–1.3)
MONOCYTES # BLD AUTO: 0.5 10E9/L (ref 0–1.3)
MONOCYTES # BLD AUTO: 0.6 10E9/L (ref 0–1.3)
MONOCYTES # BLD AUTO: 0.7 10E9/L (ref 0–1.3)
MONOCYTES # BLD AUTO: 0.8 10E9/L (ref 0–1.3)
MONOCYTES NFR BLD AUTO: 11.6 %
MONOCYTES NFR BLD AUTO: 12 %
MONOCYTES NFR BLD AUTO: 12.5 %
MONOCYTES NFR BLD AUTO: 13 %
MONOCYTES NFR BLD AUTO: 6.3 %
MONOCYTES NFR BLD AUTO: 6.7 %
MONOCYTES NFR BLD AUTO: 7.4 %
MONOCYTES NFR BLD AUTO: 7.9 %
MONOCYTES NFR BLD AUTO: 8.4 %
MONOCYTES NFR BLD AUTO: 8.6 %
MONOCYTES NFR BLD AUTO: 9.9 %
NEUTROPHILS # BLD AUTO: 0.9 10E9/L (ref 1.6–8.3)
NEUTROPHILS # BLD AUTO: 1.3 10E9/L (ref 1.6–8.3)
NEUTROPHILS # BLD AUTO: 1.9 10E9/L (ref 1.6–8.3)
NEUTROPHILS # BLD AUTO: 2.2 10E9/L (ref 1.6–8.3)
NEUTROPHILS # BLD AUTO: 2.4 10E9/L (ref 1.6–8.3)
NEUTROPHILS # BLD AUTO: 3.7 10E9/L (ref 1.6–8.3)
NEUTROPHILS # BLD AUTO: 3.8 10E9/L (ref 1.6–8.3)
NEUTROPHILS # BLD AUTO: 4 10E9/L (ref 1.6–8.3)
NEUTROPHILS # BLD AUTO: 5.6 10E9/L (ref 1.6–8.3)
NEUTROPHILS # BLD AUTO: 6.2 10E9/L (ref 1.6–8.3)
NEUTROPHILS # BLD AUTO: 7.1 10E9/L (ref 1.6–8.3)
NEUTROPHILS NFR BLD AUTO: 37 %
NEUTROPHILS NFR BLD AUTO: 42.2 %
NEUTROPHILS NFR BLD AUTO: 49.2 %
NEUTROPHILS NFR BLD AUTO: 58.6 %
NEUTROPHILS NFR BLD AUTO: 59.3 %
NEUTROPHILS NFR BLD AUTO: 65.8 %
NEUTROPHILS NFR BLD AUTO: 67.9 %
NEUTROPHILS NFR BLD AUTO: 68.8 %
NEUTROPHILS NFR BLD AUTO: 73.8 %
NEUTROPHILS NFR BLD AUTO: 73.8 %
NEUTROPHILS NFR BLD AUTO: 75.3 %
NITRATE UR QL: NEGATIVE
NRBC # BLD AUTO: 0 10*3/UL
NRBC BLD AUTO-RTO: 0 /100
NUM BPU REQUESTED: 1
PH UR STRIP: 6 PH (ref 5–7)
PLATELET # BLD AUTO: 156 10E9/L (ref 150–450)
PLATELET # BLD AUTO: 172 10E9/L (ref 150–450)
PLATELET # BLD AUTO: 172 10E9/L (ref 150–450)
PLATELET # BLD AUTO: 173 10E9/L (ref 150–450)
PLATELET # BLD AUTO: 174 10E9/L (ref 150–450)
PLATELET # BLD AUTO: 184 10E9/L (ref 150–450)
PLATELET # BLD AUTO: 193 10E9/L (ref 150–450)
PLATELET # BLD AUTO: 225 10E9/L (ref 150–450)
PLATELET # BLD AUTO: 227 10E9/L (ref 150–450)
PLATELET # BLD AUTO: 239 10E9/L (ref 150–450)
PLATELET # BLD AUTO: 270 10E9/L (ref 150–450)
PLATELET # BLD AUTO: 395 10E9/L (ref 150–450)
PLATELET # BLD AUTO: 413 10E9/L (ref 150–450)
PLATELET # BLD AUTO: 420 10E9/L (ref 150–450)
PLATELET # BLD AUTO: 426 10E9/L (ref 150–450)
POTASSIUM SERPL-SCNC: 2.8 MMOL/L (ref 3.4–5.3)
POTASSIUM SERPL-SCNC: 3.1 MMOL/L (ref 3.4–5.3)
POTASSIUM SERPL-SCNC: 3.3 MMOL/L (ref 3.4–5.3)
POTASSIUM SERPL-SCNC: 3.3 MMOL/L (ref 3.4–5.3)
POTASSIUM SERPL-SCNC: 3.4 MMOL/L (ref 3.4–5.3)
POTASSIUM SERPL-SCNC: 3.4 MMOL/L (ref 3.4–5.3)
POTASSIUM SERPL-SCNC: 3.5 MMOL/L (ref 3.4–5.3)
POTASSIUM SERPL-SCNC: 3.6 MMOL/L (ref 3.4–5.3)
POTASSIUM SERPL-SCNC: 3.6 MMOL/L (ref 3.4–5.3)
POTASSIUM SERPL-SCNC: 3.7 MMOL/L (ref 3.4–5.3)
POTASSIUM SERPL-SCNC: 3.9 MMOL/L (ref 3.4–5.3)
PROT SERPL-MCNC: 6.6 G/DL (ref 6.8–8.8)
PROT SERPL-MCNC: 6.7 G/DL (ref 6.8–8.8)
PROT SERPL-MCNC: 6.7 G/DL (ref 6.8–8.8)
PROT SERPL-MCNC: 6.9 G/DL (ref 6.8–8.8)
PROT SERPL-MCNC: 7.1 G/DL (ref 6.8–8.8)
PROT SERPL-MCNC: 7.1 G/DL (ref 6.8–8.8)
PROT SERPL-MCNC: 7.4 G/DL (ref 6.8–8.8)
PROT SERPL-MCNC: 7.4 G/DL (ref 6.8–8.8)
PROT SERPL-MCNC: 7.7 G/DL (ref 6.8–8.8)
PROT SERPL-MCNC: 7.9 G/DL (ref 6.8–8.8)
PROT SERPL-MCNC: 8.1 G/DL (ref 6.8–8.8)
PROT SERPL-MCNC: 8.4 G/DL (ref 6.8–8.8)
RADIOLOGIST FLAGS: ABNORMAL
RADIOLOGIST FLAGS: ABNORMAL
RBC # BLD AUTO: 3.14 10E12/L (ref 3.8–5.2)
RBC # BLD AUTO: 3.46 10E12/L (ref 3.8–5.2)
RBC # BLD AUTO: 3.47 10E12/L (ref 3.8–5.2)
RBC # BLD AUTO: 3.54 10E12/L (ref 3.8–5.2)
RBC # BLD AUTO: 3.55 10E12/L (ref 3.8–5.2)
RBC # BLD AUTO: 3.71 10E12/L (ref 3.8–5.2)
RBC # BLD AUTO: 3.73 10E12/L (ref 3.8–5.2)
RBC # BLD AUTO: 3.79 10E12/L (ref 3.8–5.2)
RBC # BLD AUTO: 3.8 10E12/L (ref 3.8–5.2)
RBC # BLD AUTO: 3.93 10E12/L (ref 3.8–5.2)
RBC # BLD AUTO: 4.09 10E12/L (ref 3.8–5.2)
RBC # BLD AUTO: 4.13 10E12/L (ref 3.8–5.2)
RBC # BLD AUTO: 4.13 10E12/L (ref 3.8–5.2)
RBC # BLD AUTO: 4.19 10E12/L (ref 3.8–5.2)
RBC # BLD AUTO: 4.61 10E12/L (ref 3.8–5.2)
RBC #/AREA URNS AUTO: 4 /HPF (ref 0–2)
SODIUM SERPL-SCNC: 136 MMOL/L (ref 133–144)
SODIUM SERPL-SCNC: 137 MMOL/L (ref 133–144)
SODIUM SERPL-SCNC: 137 MMOL/L (ref 133–144)
SODIUM SERPL-SCNC: 138 MMOL/L (ref 133–144)
SODIUM SERPL-SCNC: 139 MMOL/L (ref 133–144)
SODIUM SERPL-SCNC: 140 MMOL/L (ref 133–144)
SODIUM SERPL-SCNC: 141 MMOL/L (ref 133–144)
SODIUM SERPL-SCNC: 143 MMOL/L (ref 133–144)
SODIUM SERPL-SCNC: 144 MMOL/L (ref 133–144)
SOURCE: ABNORMAL
SP GR UR STRIP: 1.02 (ref 1–1.03)
SPECIMEN EXP DATE BLD: NORMAL
SPECIMEN SOURCE: NORMAL
SPECIMEN SOURCE: NORMAL
SQUAMOUS #/AREA URNS AUTO: <1 /HPF (ref 0–1)
TRANSFUSION STATUS PATIENT QL: NORMAL
TRANSFUSION STATUS PATIENT QL: NORMAL
TROPONIN I SERPL-MCNC: <0.015 UG/L (ref 0–0.04)
UROBILINOGEN UR STRIP-MCNC: NORMAL MG/DL (ref 0–2)
WBC # BLD AUTO: 2.5 10E9/L (ref 4–11)
WBC # BLD AUTO: 2.9 10E9/L (ref 4–11)
WBC # BLD AUTO: 3 10E9/L (ref 4–11)
WBC # BLD AUTO: 3.8 10E9/L (ref 4–11)
WBC # BLD AUTO: 3.8 10E9/L (ref 4–11)
WBC # BLD AUTO: 3.9 10E9/L (ref 4–11)
WBC # BLD AUTO: 4.1 10E9/L (ref 4–11)
WBC # BLD AUTO: 5 10E9/L (ref 4–11)
WBC # BLD AUTO: 5.4 10E9/L (ref 4–11)
WBC # BLD AUTO: 5.8 10E9/L (ref 4–11)
WBC # BLD AUTO: 5.8 10E9/L (ref 4–11)
WBC # BLD AUTO: 7.4 10E9/L (ref 4–11)
WBC # BLD AUTO: 8.1 10E9/L (ref 4–11)
WBC # BLD AUTO: 8.3 10E9/L (ref 4–11)
WBC # BLD AUTO: 9.6 10E9/L (ref 4–11)
WBC #/AREA URNS AUTO: 3 /HPF (ref 0–2)

## 2017-01-01 PROCEDURE — 85520 HEPARIN ASSAY: CPT | Performed by: INTERNAL MEDICINE

## 2017-01-01 PROCEDURE — 12000000 ZZH R&B MED SURG/OB

## 2017-01-01 PROCEDURE — 25000128 H RX IP 250 OP 636: Performed by: EMERGENCY MEDICINE

## 2017-01-01 PROCEDURE — 25000128 H RX IP 250 OP 636: Performed by: INTERNAL MEDICINE

## 2017-01-01 PROCEDURE — 80076 HEPATIC FUNCTION PANEL: CPT | Performed by: INTERNAL MEDICINE

## 2017-01-01 PROCEDURE — 86900 BLOOD TYPING SEROLOGIC ABO: CPT | Performed by: INTERNAL MEDICINE

## 2017-01-01 PROCEDURE — P9016 RBC LEUKOCYTES REDUCED: HCPCS | Performed by: INTERNAL MEDICINE

## 2017-01-01 PROCEDURE — 85027 COMPLETE CBC AUTOMATED: CPT | Performed by: INTERNAL MEDICINE

## 2017-01-01 PROCEDURE — 85610 PROTHROMBIN TIME: CPT | Performed by: EMERGENCY MEDICINE

## 2017-01-01 PROCEDURE — 45381 COLONOSCOPY SUBMUCOUS NJX: CPT | Performed by: INTERNAL MEDICINE

## 2017-01-01 PROCEDURE — 40000986 XR CHEST PORT 1 VW

## 2017-01-01 PROCEDURE — 99232 SBSQ HOSP IP/OBS MODERATE 35: CPT | Performed by: INTERNAL MEDICINE

## 2017-01-01 PROCEDURE — 99233 SBSQ HOSP IP/OBS HIGH 50: CPT | Performed by: INTERNAL MEDICINE

## 2017-01-01 PROCEDURE — 88341 IMHCHEM/IMCYTCHM EA ADD ANTB: CPT | Mod: 26 | Performed by: INTERNAL MEDICINE

## 2017-01-01 PROCEDURE — 96417 CHEMO IV INFUS EACH ADDL SEQ: CPT

## 2017-01-01 PROCEDURE — 93005 ELECTROCARDIOGRAM TRACING: CPT

## 2017-01-01 PROCEDURE — 25000128 H RX IP 250 OP 636: Performed by: NURSE ANESTHETIST, CERTIFIED REGISTERED

## 2017-01-01 PROCEDURE — 99207 ZZC CDG-CODE CATEGORY CHANGED: CPT | Performed by: INTERNAL MEDICINE

## 2017-01-01 PROCEDURE — 81003 URINALYSIS AUTO W/O SCOPE: CPT | Performed by: INTERNAL MEDICINE

## 2017-01-01 PROCEDURE — 96413 CHEMO IV INFUSION 1 HR: CPT

## 2017-01-01 PROCEDURE — 85025 COMPLETE CBC W/AUTO DIFF WBC: CPT | Performed by: EMERGENCY MEDICINE

## 2017-01-01 PROCEDURE — 45380 COLONOSCOPY AND BIOPSY: CPT | Performed by: INTERNAL MEDICINE

## 2017-01-01 PROCEDURE — 99212 OFFICE O/P EST SF 10 MIN: CPT

## 2017-01-01 PROCEDURE — 99215 OFFICE O/P EST HI 40 MIN: CPT | Performed by: INTERNAL MEDICINE

## 2017-01-01 PROCEDURE — 96368 THER/DIAG CONCURRENT INF: CPT

## 2017-01-01 PROCEDURE — 36415 COLL VENOUS BLD VENIPUNCTURE: CPT | Performed by: INTERNAL MEDICINE

## 2017-01-01 PROCEDURE — 85025 COMPLETE CBC W/AUTO DIFF WBC: CPT | Performed by: INTERNAL MEDICINE

## 2017-01-01 PROCEDURE — 99225 ZZC SUBSEQUENT OBSERVATION CARE,LEVEL II: CPT | Performed by: INTERNAL MEDICINE

## 2017-01-01 PROCEDURE — 96411 CHEMO IV PUSH ADDL DRUG: CPT

## 2017-01-01 PROCEDURE — 88305 TISSUE EXAM BY PATHOLOGIST: CPT | Mod: 26 | Performed by: INTERNAL MEDICINE

## 2017-01-01 PROCEDURE — 71020 XR CHEST 2 VW: CPT

## 2017-01-01 PROCEDURE — 83605 ASSAY OF LACTIC ACID: CPT | Performed by: EMERGENCY MEDICINE

## 2017-01-01 PROCEDURE — 80329 ANALGESICS NON-OPIOID 1 OR 2: CPT | Performed by: EMERGENCY MEDICINE

## 2017-01-01 PROCEDURE — C1788 PORT, INDWELLING, IMP: HCPCS | Performed by: SURGERY

## 2017-01-01 PROCEDURE — 96375 TX/PRO/DX INJ NEW DRUG ADDON: CPT

## 2017-01-01 PROCEDURE — 25000128 H RX IP 250 OP 636: Performed by: SURGERY

## 2017-01-01 PROCEDURE — 96415 CHEMO IV INFUSION ADDL HR: CPT

## 2017-01-01 PROCEDURE — 36430 TRANSFUSION BLD/BLD COMPNT: CPT

## 2017-01-01 PROCEDURE — 82565 ASSAY OF CREATININE: CPT | Performed by: INTERNAL MEDICINE

## 2017-01-01 PROCEDURE — 81275 KRAS GENE VARIANTS EXON 2: CPT | Performed by: INTERNAL MEDICINE

## 2017-01-01 PROCEDURE — 86923 COMPATIBILITY TEST ELECTRIC: CPT | Performed by: INTERNAL MEDICINE

## 2017-01-01 PROCEDURE — 25000132 ZZH RX MED GY IP 250 OP 250 PS 637: Performed by: INTERNAL MEDICINE

## 2017-01-01 PROCEDURE — 36591 DRAW BLOOD OFF VENOUS DEVICE: CPT

## 2017-01-01 PROCEDURE — 96416 CHEMO PROLONG INFUSE W/PUMP: CPT

## 2017-01-01 PROCEDURE — 86304 IMMUNOASSAY TUMOR CA 125: CPT | Performed by: INTERNAL MEDICINE

## 2017-01-01 PROCEDURE — 36000054 ZZH SURGERY LEVEL 2 W FLUORO 1ST 30 MIN: Performed by: SURGERY

## 2017-01-01 PROCEDURE — 25000125 ZZHC RX 250: Performed by: SURGERY

## 2017-01-01 PROCEDURE — 85520 HEPARIN ASSAY: CPT | Performed by: EMERGENCY MEDICINE

## 2017-01-01 PROCEDURE — 25000125 ZZHC RX 250: Performed by: INTERNAL MEDICINE

## 2017-01-01 PROCEDURE — 85027 COMPLETE CBC AUTOMATED: CPT | Performed by: EMERGENCY MEDICINE

## 2017-01-01 PROCEDURE — 99214 OFFICE O/P EST MOD 30 MIN: CPT | Performed by: INTERNAL MEDICINE

## 2017-01-01 PROCEDURE — 82378 CARCINOEMBRYONIC ANTIGEN: CPT | Performed by: INTERNAL MEDICINE

## 2017-01-01 PROCEDURE — 80048 BASIC METABOLIC PNL TOTAL CA: CPT | Performed by: INTERNAL MEDICINE

## 2017-01-01 PROCEDURE — 74177 CT ABD & PELVIS W/CONTRAST: CPT

## 2017-01-01 PROCEDURE — 80053 COMPREHEN METABOLIC PANEL: CPT | Performed by: INTERNAL MEDICINE

## 2017-01-01 PROCEDURE — 99213 OFFICE O/P EST LOW 20 MIN: CPT

## 2017-01-01 PROCEDURE — 25000132 ZZH RX MED GY IP 250 OP 250 PS 637: Mod: GY | Performed by: INTERNAL MEDICINE

## 2017-01-01 PROCEDURE — 25000132 ZZH RX MED GY IP 250 OP 250 PS 637: Performed by: EMERGENCY MEDICINE

## 2017-01-01 PROCEDURE — 99238 HOSP IP/OBS DSCHRG MGMT 30/<: CPT | Performed by: INTERNAL MEDICINE

## 2017-01-01 PROCEDURE — 99220 ZZC INITIAL OBSERVATION CARE,LEVL III: CPT | Performed by: INTERNAL MEDICINE

## 2017-01-01 PROCEDURE — 96372 THER/PROPH/DIAG INJ SC/IM: CPT | Mod: XU

## 2017-01-01 PROCEDURE — 25000125 ZZHC RX 250: Performed by: EMERGENCY MEDICINE

## 2017-01-01 PROCEDURE — G0378 HOSPITAL OBSERVATION PER HR: HCPCS

## 2017-01-01 PROCEDURE — A9270 NON-COVERED ITEM OR SERVICE: HCPCS | Mod: GY | Performed by: INTERNAL MEDICINE

## 2017-01-01 PROCEDURE — 96376 TX/PRO/DX INJ SAME DRUG ADON: CPT

## 2017-01-01 PROCEDURE — 71260 CT THORAX DX C+: CPT

## 2017-01-01 PROCEDURE — 37000009 ZZH ANESTHESIA TECHNICAL FEE, EACH ADDTL 15 MIN: Performed by: SURGERY

## 2017-01-01 PROCEDURE — 36415 COLL VENOUS BLD VENIPUNCTURE: CPT | Performed by: EMERGENCY MEDICINE

## 2017-01-01 PROCEDURE — 71000012 ZZH RECOVERY PHASE 1 LEVEL 1 FIRST HR: Performed by: SURGERY

## 2017-01-01 PROCEDURE — 80053 COMPREHEN METABOLIC PANEL: CPT | Performed by: EMERGENCY MEDICINE

## 2017-01-01 PROCEDURE — 25000128 H RX IP 250 OP 636: Mod: JW | Performed by: INTERNAL MEDICINE

## 2017-01-01 PROCEDURE — 85379 FIBRIN DEGRADATION QUANT: CPT | Performed by: EMERGENCY MEDICINE

## 2017-01-01 PROCEDURE — 96372 THER/PROPH/DIAG INJ SC/IM: CPT

## 2017-01-01 PROCEDURE — 96360 HYDRATION IV INFUSION INIT: CPT

## 2017-01-01 PROCEDURE — 99211 OFF/OP EST MAY X REQ PHY/QHP: CPT

## 2017-01-01 PROCEDURE — 00000093 ZZHCL STATISTIC COURTESY CONSULT: Performed by: INTERNAL MEDICINE

## 2017-01-01 PROCEDURE — 40000863 ZZH STATISTIC RADIOLOGY XRAY, US, CT, MAR, NM

## 2017-01-01 PROCEDURE — 00000159 ZZHCL STATISTIC H-SEND OUTS PREP: Performed by: INTERNAL MEDICINE

## 2017-01-01 PROCEDURE — 86850 RBC ANTIBODY SCREEN: CPT | Performed by: INTERNAL MEDICINE

## 2017-01-01 PROCEDURE — 40000893 ZZH STATISTIC PT IP EVAL DEFER: Performed by: PHYSICAL THERAPIST

## 2017-01-01 PROCEDURE — 40000170 ZZH STATISTIC PRE-PROCEDURE ASSESSMENT II: Performed by: SURGERY

## 2017-01-01 PROCEDURE — 85730 THROMBOPLASTIN TIME PARTIAL: CPT | Performed by: EMERGENCY MEDICINE

## 2017-01-01 PROCEDURE — 96367 TX/PROPH/DG ADDL SEQ IV INF: CPT

## 2017-01-01 PROCEDURE — 87040 BLOOD CULTURE FOR BACTERIA: CPT | Performed by: EMERGENCY MEDICINE

## 2017-01-01 PROCEDURE — 36415 COLL VENOUS BLD VENIPUNCTURE: CPT

## 2017-01-01 PROCEDURE — 99285 EMERGENCY DEPT VISIT HI MDM: CPT | Mod: 25

## 2017-01-01 PROCEDURE — 96365 THER/PROPH/DIAG IV INF INIT: CPT

## 2017-01-01 PROCEDURE — 99231 SBSQ HOSP IP/OBS SF/LOW 25: CPT | Performed by: INTERNAL MEDICINE

## 2017-01-01 PROCEDURE — 96374 THER/PROPH/DIAG INJ IV PUSH: CPT

## 2017-01-01 PROCEDURE — 99217 ZZC OBSERVATION CARE DISCHARGE: CPT | Performed by: INTERNAL MEDICINE

## 2017-01-01 PROCEDURE — 86901 BLOOD TYPING SEROLOGIC RH(D): CPT | Performed by: INTERNAL MEDICINE

## 2017-01-01 PROCEDURE — 81276 KRAS GENE ADDL VARIANTS: CPT | Performed by: INTERNAL MEDICINE

## 2017-01-01 PROCEDURE — 99223 1ST HOSP IP/OBS HIGH 75: CPT | Mod: AI | Performed by: INTERNAL MEDICINE

## 2017-01-01 PROCEDURE — 37000008 ZZH ANESTHESIA TECHNICAL FEE, 1ST 30 MIN: Performed by: SURGERY

## 2017-01-01 PROCEDURE — 88341 IMHCHEM/IMCYTCHM EA ADD ANTB: CPT | Performed by: INTERNAL MEDICINE

## 2017-01-01 PROCEDURE — 71000027 ZZH RECOVERY PHASE 2 EACH 15 MINS: Performed by: SURGERY

## 2017-01-01 PROCEDURE — 71250 CT THORAX DX C-: CPT

## 2017-01-01 PROCEDURE — 36000052 ZZH SURGERY LEVEL 2 EA 15 ADDTL MIN: Performed by: SURGERY

## 2017-01-01 PROCEDURE — 96361 HYDRATE IV INFUSION ADD-ON: CPT

## 2017-01-01 PROCEDURE — 85018 HEMOGLOBIN: CPT | Performed by: INTERNAL MEDICINE

## 2017-01-01 PROCEDURE — 99283 EMERGENCY DEPT VISIT LOW MDM: CPT

## 2017-01-01 PROCEDURE — 76499 UNLISTED DX RADIOGRAPHIC PX: CPT

## 2017-01-01 PROCEDURE — 99204 OFFICE O/P NEW MOD 45 MIN: CPT | Performed by: INTERNAL MEDICINE

## 2017-01-01 PROCEDURE — 27210794 ZZH OR GENERAL SUPPLY STERILE: Performed by: SURGERY

## 2017-01-01 PROCEDURE — 40000277 XR SURGERY CARM FLUORO LESS THAN 5 MIN W STILLS

## 2017-01-01 PROCEDURE — 96365 THER/PROPH/DIAG IV INF INIT: CPT | Mod: 59

## 2017-01-01 PROCEDURE — 93970 EXTREMITY STUDY: CPT

## 2017-01-01 PROCEDURE — 99203 OFFICE O/P NEW LOW 30 MIN: CPT | Mod: 57 | Performed by: SURGERY

## 2017-01-01 PROCEDURE — 84484 ASSAY OF TROPONIN QUANT: CPT | Performed by: EMERGENCY MEDICINE

## 2017-01-01 PROCEDURE — 25000128 H RX IP 250 OP 636

## 2017-01-01 PROCEDURE — G0500 MOD SEDAT ENDO SERVICE >5YRS: HCPCS | Performed by: INTERNAL MEDICINE

## 2017-01-01 PROCEDURE — 88342 IMHCHEM/IMCYTCHM 1ST ANTB: CPT | Mod: 26 | Performed by: INTERNAL MEDICINE

## 2017-01-01 PROCEDURE — 83690 ASSAY OF LIPASE: CPT | Performed by: EMERGENCY MEDICINE

## 2017-01-01 PROCEDURE — 76705 ECHO EXAM OF ABDOMEN: CPT

## 2017-01-01 PROCEDURE — 0DBL8ZX EXCISION OF TRANSVERSE COLON, VIA NATURAL OR ARTIFICIAL OPENING ENDOSCOPIC, DIAGNOSTIC: ICD-10-PCS | Performed by: INTERNAL MEDICINE

## 2017-01-01 PROCEDURE — 81001 URINALYSIS AUTO W/SCOPE: CPT | Performed by: EMERGENCY MEDICINE

## 2017-01-01 PROCEDURE — 88342 IMHCHEM/IMCYTCHM 1ST ANTB: CPT | Performed by: INTERNAL MEDICINE

## 2017-01-01 PROCEDURE — 88305 TISSUE EXAM BY PATHOLOGIST: CPT | Performed by: INTERNAL MEDICINE

## 2017-01-01 PROCEDURE — 25000128 H RX IP 250 OP 636: Performed by: RADIOLOGY

## 2017-01-01 PROCEDURE — 99205 OFFICE O/P NEW HI 60 MIN: CPT | Performed by: INTERNAL MEDICINE

## 2017-01-01 PROCEDURE — 36561 INSERT TUNNELED CV CATH: CPT | Performed by: SURGERY

## 2017-01-01 DEVICE — CATH PORT POWERPORT CLEARVUE ISP 8FR 5608062: Type: IMPLANTABLE DEVICE | Site: CHEST  WALL | Status: FUNCTIONAL

## 2017-01-01 RX ORDER — PROCHLORPERAZINE MALEATE 10 MG
10 TABLET ORAL EVERY 6 HOURS PRN
Qty: 30 TABLET | Refills: 2 | Status: SHIPPED | OUTPATIENT
Start: 2017-01-01 | End: 2018-06-20

## 2017-01-01 RX ORDER — LIDOCAINE 40 MG/G
CREAM TOPICAL
Status: DISCONTINUED | OUTPATIENT
Start: 2017-01-01 | End: 2017-01-01 | Stop reason: HOSPADM

## 2017-01-01 RX ORDER — HEPARIN SODIUM (PORCINE) LOCK FLUSH IV SOLN 100 UNIT/ML 100 UNIT/ML
500 SOLUTION INTRAVENOUS EVERY 8 HOURS
Status: DISCONTINUED | OUTPATIENT
Start: 2017-01-01 | End: 2017-01-01 | Stop reason: HOSPADM

## 2017-01-01 RX ORDER — EPINEPHRINE 0.3 MG/.3ML
0.3 INJECTION SUBCUTANEOUS EVERY 5 MIN PRN
Status: CANCELLED | OUTPATIENT
Start: 2017-01-01

## 2017-01-01 RX ORDER — POLYETHYLENE GLYCOL 3350 17 G/17G
17 POWDER, FOR SOLUTION ORAL DAILY
Qty: 30 PACKET | Refills: 1 | Status: SHIPPED | OUTPATIENT
Start: 2017-01-01 | End: 2017-01-01

## 2017-01-01 RX ORDER — LORAZEPAM 2 MG/ML
0.5 INJECTION INTRAMUSCULAR EVERY 4 HOURS PRN
Status: CANCELLED
Start: 2017-01-01

## 2017-01-01 RX ORDER — EPINEPHRINE 1 MG/ML
0.3 INJECTION, SOLUTION INTRAMUSCULAR; SUBCUTANEOUS EVERY 5 MIN PRN
Status: CANCELLED | OUTPATIENT
Start: 2017-01-01

## 2017-01-01 RX ORDER — ACETAMINOPHEN 500 MG
1000 TABLET ORAL EVERY 4 HOURS PRN
Status: DISCONTINUED | OUTPATIENT
Start: 2017-01-01 | End: 2017-01-01 | Stop reason: HOSPADM

## 2017-01-01 RX ORDER — ALBUTEROL SULFATE 90 UG/1
1-2 AEROSOL, METERED RESPIRATORY (INHALATION)
Status: CANCELLED
Start: 2017-01-01

## 2017-01-01 RX ORDER — ACETAMINOPHEN 325 MG/1
650 TABLET ORAL EVERY 4 HOURS PRN
Status: DISCONTINUED | OUTPATIENT
Start: 2017-01-01 | End: 2017-01-01 | Stop reason: HOSPADM

## 2017-01-01 RX ORDER — HEPARIN SODIUM (PORCINE) LOCK FLUSH IV SOLN 100 UNIT/ML 100 UNIT/ML
500 SOLUTION INTRAVENOUS EVERY 8 HOURS
Status: CANCELLED
Start: 2017-01-01

## 2017-01-01 RX ORDER — HEPARIN SODIUM,PORCINE 10 UNIT/ML
5-10 VIAL (ML) INTRAVENOUS EVERY 24 HOURS
Status: DISCONTINUED | OUTPATIENT
Start: 2017-01-01 | End: 2017-01-01 | Stop reason: HOSPADM

## 2017-01-01 RX ORDER — METHYLPREDNISOLONE SODIUM SUCCINATE 125 MG/2ML
125 INJECTION, POWDER, LYOPHILIZED, FOR SOLUTION INTRAMUSCULAR; INTRAVENOUS
Status: CANCELLED
Start: 2017-01-01

## 2017-01-01 RX ORDER — HEPARIN SODIUM (PORCINE) LOCK FLUSH IV SOLN 100 UNIT/ML 100 UNIT/ML
SOLUTION INTRAVENOUS PRN
Status: DISCONTINUED | OUTPATIENT
Start: 2017-01-01 | End: 2017-01-01 | Stop reason: HOSPADM

## 2017-01-01 RX ORDER — HEPARIN SODIUM,PORCINE 10 UNIT/ML
5-10 VIAL (ML) INTRAVENOUS
Status: DISCONTINUED | OUTPATIENT
Start: 2017-01-01 | End: 2017-01-01 | Stop reason: HOSPADM

## 2017-01-01 RX ORDER — SODIUM CHLORIDE 9 MG/ML
INJECTION, SOLUTION INTRAVENOUS CONTINUOUS
Status: DISCONTINUED | OUTPATIENT
Start: 2017-01-01 | End: 2017-01-01

## 2017-01-01 RX ORDER — EPINEPHRINE 1 MG/ML
0.3 INJECTION INTRAMUSCULAR; INTRAVENOUS; SUBCUTANEOUS EVERY 5 MIN PRN
Status: CANCELLED | OUTPATIENT
Start: 2017-01-01

## 2017-01-01 RX ORDER — CHLORTHALIDONE 25 MG/1
25 TABLET ORAL DAILY PRN
Status: DISCONTINUED | OUTPATIENT
Start: 2017-01-01 | End: 2017-01-01 | Stop reason: HOSPADM

## 2017-01-01 RX ORDER — ENOXAPARIN SODIUM 100 MG/ML
1 INJECTION SUBCUTANEOUS EVERY 12 HOURS
Status: DISCONTINUED | OUTPATIENT
Start: 2017-01-01 | End: 2017-01-01

## 2017-01-01 RX ORDER — LIDOCAINE/PRILOCAINE 2.5 %-2.5%
CREAM (GRAM) TOPICAL
Status: DISCONTINUED | OUTPATIENT
Start: 2017-01-01 | End: 2017-01-01 | Stop reason: HOSPADM

## 2017-01-01 RX ORDER — AMOXICILLIN 250 MG
2 CAPSULE ORAL 2 TIMES DAILY PRN
Status: DISCONTINUED | OUTPATIENT
Start: 2017-01-01 | End: 2017-01-01 | Stop reason: HOSPADM

## 2017-01-01 RX ORDER — MORPHINE SULFATE 2 MG/ML
2-4 INJECTION, SOLUTION INTRAMUSCULAR; INTRAVENOUS
Status: DISCONTINUED | OUTPATIENT
Start: 2017-01-01 | End: 2017-01-01 | Stop reason: HOSPADM

## 2017-01-01 RX ORDER — FLUOROURACIL 50 MG/ML
400 INJECTION, SOLUTION INTRAVENOUS ONCE
Status: CANCELLED | OUTPATIENT
Start: 2017-01-01

## 2017-01-01 RX ORDER — PROCHLORPERAZINE 25 MG
12.5 SUPPOSITORY, RECTAL RECTAL EVERY 12 HOURS PRN
Status: DISCONTINUED | OUTPATIENT
Start: 2017-01-01 | End: 2017-01-01 | Stop reason: HOSPADM

## 2017-01-01 RX ORDER — POTASSIUM CHLORIDE 29.8 MG/ML
20 INJECTION INTRAVENOUS
Status: DISCONTINUED | OUTPATIENT
Start: 2017-01-01 | End: 2017-01-01 | Stop reason: RX

## 2017-01-01 RX ORDER — MEPERIDINE HYDROCHLORIDE 25 MG/ML
25 INJECTION INTRAMUSCULAR; INTRAVENOUS; SUBCUTANEOUS EVERY 30 MIN PRN
Status: CANCELLED | OUTPATIENT
Start: 2017-01-01

## 2017-01-01 RX ORDER — CHLORTHALIDONE 25 MG/1
25 TABLET ORAL DAILY
Status: DISCONTINUED | OUTPATIENT
Start: 2017-01-01 | End: 2017-01-01 | Stop reason: HOSPADM

## 2017-01-01 RX ORDER — NITROGLYCERIN 4 MG/G
1 OINTMENT RECTAL EVERY 12 HOURS
Qty: 30 G | Refills: 0 | Status: SHIPPED | OUTPATIENT
Start: 2017-01-01 | End: 2017-01-01

## 2017-01-01 RX ORDER — OXYCODONE HYDROCHLORIDE 5 MG/1
5-10 TABLET ORAL
Status: DISCONTINUED | OUTPATIENT
Start: 2017-01-01 | End: 2017-01-01 | Stop reason: HOSPADM

## 2017-01-01 RX ORDER — DIPHENHYDRAMINE HYDROCHLORIDE 50 MG/ML
50 INJECTION INTRAMUSCULAR; INTRAVENOUS
Status: CANCELLED
Start: 2017-01-01

## 2017-01-01 RX ORDER — ONDANSETRON 4 MG/1
4 TABLET, ORALLY DISINTEGRATING ORAL EVERY 6 HOURS PRN
Status: DISCONTINUED | OUTPATIENT
Start: 2017-01-01 | End: 2017-01-01 | Stop reason: HOSPADM

## 2017-01-01 RX ORDER — FENTANYL CITRATE 50 UG/ML
INJECTION, SOLUTION INTRAMUSCULAR; INTRAVENOUS PRN
Status: DISCONTINUED | OUTPATIENT
Start: 2017-01-01 | End: 2017-01-01 | Stop reason: HOSPADM

## 2017-01-01 RX ORDER — ALBUTEROL SULFATE 0.83 MG/ML
2.5 SOLUTION RESPIRATORY (INHALATION)
Status: CANCELLED | OUTPATIENT
Start: 2017-01-01

## 2017-01-01 RX ORDER — LORAZEPAM 0.5 MG/1
0.5 TABLET ORAL EVERY 4 HOURS PRN
Status: DISCONTINUED | OUTPATIENT
Start: 2017-01-01 | End: 2017-01-01 | Stop reason: HOSPADM

## 2017-01-01 RX ORDER — SODIUM CHLORIDE 9 MG/ML
1000 INJECTION, SOLUTION INTRAVENOUS CONTINUOUS PRN
Status: CANCELLED
Start: 2017-01-01

## 2017-01-01 RX ORDER — DEXAMETHASONE SODIUM PHOSPHATE 10 MG/ML
12 INJECTION, SOLUTION INTRAMUSCULAR; INTRAVENOUS ONCE
Status: COMPLETED | OUTPATIENT
Start: 2017-01-01 | End: 2017-01-01

## 2017-01-01 RX ORDER — FENTANYL CITRATE 50 UG/ML
INJECTION, SOLUTION INTRAMUSCULAR; INTRAVENOUS PRN
Status: DISCONTINUED | OUTPATIENT
Start: 2017-01-01 | End: 2017-01-01

## 2017-01-01 RX ORDER — HYDROMORPHONE HYDROCHLORIDE 1 MG/ML
0.5 INJECTION, SOLUTION INTRAMUSCULAR; INTRAVENOUS; SUBCUTANEOUS ONCE
Status: COMPLETED | OUTPATIENT
Start: 2017-01-01 | End: 2017-01-01

## 2017-01-01 RX ORDER — FLUOROURACIL 50 MG/ML
400 INJECTION, SOLUTION INTRAVENOUS ONCE
Status: COMPLETED | OUTPATIENT
Start: 2017-01-01 | End: 2017-01-01

## 2017-01-01 RX ORDER — ACETAMINOPHEN 325 MG/1
650 TABLET ORAL
Status: DISCONTINUED | OUTPATIENT
Start: 2017-01-01 | End: 2017-01-01 | Stop reason: HOSPADM

## 2017-01-01 RX ORDER — POTASSIUM CHLORIDE 1500 MG/1
20 TABLET, EXTENDED RELEASE ORAL ONCE
Status: COMPLETED | OUTPATIENT
Start: 2017-01-01 | End: 2017-01-01

## 2017-01-01 RX ORDER — FERROUS SULFATE 325(65) MG
325 TABLET ORAL 2 TIMES DAILY
Qty: 100 TABLET | Refills: 11 | Status: SHIPPED | OUTPATIENT
Start: 2017-01-01 | End: 2017-01-01 | Stop reason: ALTCHOICE

## 2017-01-01 RX ORDER — PROPOFOL 10 MG/ML
INJECTION, EMULSION INTRAVENOUS CONTINUOUS PRN
Status: DISCONTINUED | OUTPATIENT
Start: 2017-01-01 | End: 2017-01-01

## 2017-01-01 RX ORDER — POTASSIUM CHLORIDE 1500 MG/1
20 TABLET, EXTENDED RELEASE ORAL DAILY
Status: DISCONTINUED | OUTPATIENT
Start: 2017-01-01 | End: 2017-01-01 | Stop reason: HOSPADM

## 2017-01-01 RX ORDER — SENNOSIDES 8.6 MG
1 TABLET ORAL 2 TIMES DAILY
Status: DISCONTINUED | OUTPATIENT
Start: 2017-01-01 | End: 2017-01-01 | Stop reason: HOSPADM

## 2017-01-01 RX ORDER — ONDANSETRON 2 MG/ML
4 INJECTION INTRAMUSCULAR; INTRAVENOUS EVERY 6 HOURS PRN
Status: DISCONTINUED | OUTPATIENT
Start: 2017-01-01 | End: 2017-01-01 | Stop reason: HOSPADM

## 2017-01-01 RX ORDER — ONDANSETRON 8 MG/1
8 TABLET, FILM COATED ORAL EVERY 8 HOURS PRN
Status: DISCONTINUED | OUTPATIENT
Start: 2017-01-01 | End: 2017-01-01 | Stop reason: DRUGHIGH

## 2017-01-01 RX ORDER — HEPARIN SODIUM (PORCINE) LOCK FLUSH IV SOLN 100 UNIT/ML 100 UNIT/ML
SOLUTION INTRAVENOUS
Status: DISCONTINUED
Start: 2017-01-01 | End: 2017-01-01 | Stop reason: HOSPADM

## 2017-01-01 RX ORDER — LORAZEPAM 0.5 MG/1
0.5 TABLET ORAL EVERY 4 HOURS PRN
Qty: 30 TABLET | Refills: 2 | Status: SHIPPED | OUTPATIENT
Start: 2017-01-01 | End: 2018-06-20

## 2017-01-01 RX ORDER — HEPARIN SODIUM (PORCINE) LOCK FLUSH IV SOLN 100 UNIT/ML 100 UNIT/ML
5 SOLUTION INTRAVENOUS
Status: DISCONTINUED | OUTPATIENT
Start: 2017-01-01 | End: 2017-01-01 | Stop reason: HOSPADM

## 2017-01-01 RX ORDER — FENTANYL CITRATE 50 UG/ML
25-50 INJECTION, SOLUTION INTRAMUSCULAR; INTRAVENOUS
Status: DISCONTINUED | OUTPATIENT
Start: 2017-01-01 | End: 2017-01-01 | Stop reason: HOSPADM

## 2017-01-01 RX ORDER — ONDANSETRON 8 MG/1
8 TABLET, FILM COATED ORAL EVERY 8 HOURS PRN
Qty: 10 TABLET | Refills: 2 | Status: SHIPPED | OUTPATIENT
Start: 2017-01-01 | End: 2018-06-20

## 2017-01-01 RX ORDER — SENNOSIDES 8.6 MG
1 TABLET ORAL 2 TIMES DAILY
Qty: 60 TABLET | Refills: 1 | Status: ON HOLD | OUTPATIENT
Start: 2017-01-01 | End: 2017-01-01

## 2017-01-01 RX ORDER — SENNOSIDES 8.6 MG
1 TABLET ORAL 2 TIMES DAILY PRN
COMMUNITY

## 2017-01-01 RX ORDER — POTASSIUM CHLORIDE 7.45 MG/ML
10 INJECTION INTRAVENOUS
Status: DISCONTINUED | OUTPATIENT
Start: 2017-01-01 | End: 2017-01-01 | Stop reason: HOSPADM

## 2017-01-01 RX ORDER — PROCHLORPERAZINE MALEATE 10 MG
10 TABLET ORAL EVERY 6 HOURS PRN
Status: DISCONTINUED | OUTPATIENT
Start: 2017-01-01 | End: 2017-01-01

## 2017-01-01 RX ORDER — FENTANYL CITRATE 50 UG/ML
25-50 INJECTION, SOLUTION INTRAMUSCULAR; INTRAVENOUS EVERY 5 MIN PRN
Status: DISCONTINUED | OUTPATIENT
Start: 2017-01-01 | End: 2017-01-01 | Stop reason: HOSPADM

## 2017-01-01 RX ORDER — CEFAZOLIN SODIUM 2 G/100ML
2 INJECTION, SOLUTION INTRAVENOUS
Status: COMPLETED | OUTPATIENT
Start: 2017-01-01 | End: 2017-01-01

## 2017-01-01 RX ORDER — CHLORTHALIDONE 25 MG/1
25 TABLET ORAL DAILY
Status: COMPLETED | OUTPATIENT
Start: 2017-01-01 | End: 2017-01-01

## 2017-01-01 RX ORDER — AMOXICILLIN 250 MG
1-2 CAPSULE ORAL 2 TIMES DAILY PRN
Status: DISCONTINUED | OUTPATIENT
Start: 2017-01-01 | End: 2017-01-01 | Stop reason: HOSPADM

## 2017-01-01 RX ORDER — OXYCODONE HYDROCHLORIDE 5 MG/1
5 TABLET ORAL
Qty: 15 TABLET | Refills: 0 | Status: SHIPPED | OUTPATIENT
Start: 2017-01-01 | End: 2017-01-01

## 2017-01-01 RX ORDER — BISACODYL 10 MG
10 SUPPOSITORY, RECTAL RECTAL DAILY PRN
Status: DISCONTINUED | OUTPATIENT
Start: 2017-01-01 | End: 2017-01-01 | Stop reason: HOSPADM

## 2017-01-01 RX ORDER — EPINEPHRINE 1 MG/ML
0.3 INJECTION, SOLUTION, CONCENTRATE INTRAVENOUS EVERY 5 MIN PRN
Status: CANCELLED | OUTPATIENT
Start: 2017-01-01

## 2017-01-01 RX ORDER — DEXAMETHASONE SODIUM PHOSPHATE 4 MG/ML
INJECTION, SOLUTION INTRA-ARTICULAR; INTRALESIONAL; INTRAMUSCULAR; INTRAVENOUS; SOFT TISSUE PRN
Status: DISCONTINUED | OUTPATIENT
Start: 2017-01-01 | End: 2017-01-01

## 2017-01-01 RX ORDER — LISINOPRIL 10 MG/1
10 TABLET ORAL DAILY
Status: DISCONTINUED | OUTPATIENT
Start: 2017-01-01 | End: 2017-01-01 | Stop reason: HOSPADM

## 2017-01-01 RX ORDER — NALOXONE HYDROCHLORIDE 0.4 MG/ML
.1-.4 INJECTION, SOLUTION INTRAMUSCULAR; INTRAVENOUS; SUBCUTANEOUS
Status: DISCONTINUED | OUTPATIENT
Start: 2017-01-01 | End: 2017-01-01 | Stop reason: HOSPADM

## 2017-01-01 RX ORDER — FERROUS SULFATE 7.5 MG/0.5
15 SYRINGE (EA) ORAL 2 TIMES DAILY
Status: ON HOLD | COMMUNITY
End: 2017-01-01

## 2017-01-01 RX ORDER — HYDROMORPHONE HYDROCHLORIDE 1 MG/ML
.3-.5 INJECTION, SOLUTION INTRAMUSCULAR; INTRAVENOUS; SUBCUTANEOUS EVERY 10 MIN PRN
Status: DISCONTINUED | OUTPATIENT
Start: 2017-01-01 | End: 2017-01-01 | Stop reason: HOSPADM

## 2017-01-01 RX ORDER — ONDANSETRON 4 MG/1
4 TABLET, ORALLY DISINTEGRATING ORAL EVERY 30 MIN PRN
Status: DISCONTINUED | OUTPATIENT
Start: 2017-01-01 | End: 2017-01-01 | Stop reason: HOSPADM

## 2017-01-01 RX ORDER — POLYETHYLENE GLYCOL 3350 17 G/17G
17 POWDER, FOR SOLUTION ORAL DAILY
Status: DISCONTINUED | OUTPATIENT
Start: 2017-01-01 | End: 2017-01-01 | Stop reason: HOSPADM

## 2017-01-01 RX ORDER — MEPERIDINE HYDROCHLORIDE 50 MG/ML
25 INJECTION INTRAMUSCULAR; INTRAVENOUS; SUBCUTANEOUS EVERY 30 MIN PRN
Status: CANCELLED | OUTPATIENT
Start: 2017-01-01

## 2017-01-01 RX ORDER — OXYCODONE HYDROCHLORIDE 5 MG/1
5 TABLET ORAL EVERY 4 HOURS PRN
Qty: 40 TABLET | Refills: 0 | Status: SHIPPED | OUTPATIENT
Start: 2017-01-01 | End: 2017-01-01

## 2017-01-01 RX ORDER — POTASSIUM CHLORIDE 1500 MG/1
20-40 TABLET, EXTENDED RELEASE ORAL
Status: DISCONTINUED | OUTPATIENT
Start: 2017-01-01 | End: 2017-01-01 | Stop reason: HOSPADM

## 2017-01-01 RX ORDER — POTASSIUM CL/LIDO/0.9 % NACL 10MEQ/0.1L
10 INTRAVENOUS SOLUTION, PIGGYBACK (ML) INTRAVENOUS
Status: DISCONTINUED | OUTPATIENT
Start: 2017-01-01 | End: 2017-01-01 | Stop reason: HOSPADM

## 2017-01-01 RX ORDER — ACETAMINOPHEN 325 MG/1
650 TABLET ORAL 4 TIMES DAILY
Status: DISCONTINUED | OUTPATIENT
Start: 2017-01-01 | End: 2017-01-01 | Stop reason: HOSPADM

## 2017-01-01 RX ORDER — IOPAMIDOL 755 MG/ML
79 INJECTION, SOLUTION INTRAVASCULAR ONCE
Status: COMPLETED | OUTPATIENT
Start: 2017-01-01 | End: 2017-01-01

## 2017-01-01 RX ORDER — SODIUM CHLORIDE, SODIUM LACTATE, POTASSIUM CHLORIDE, CALCIUM CHLORIDE 600; 310; 30; 20 MG/100ML; MG/100ML; MG/100ML; MG/100ML
INJECTION, SOLUTION INTRAVENOUS CONTINUOUS
Status: DISCONTINUED | OUTPATIENT
Start: 2017-01-01 | End: 2017-01-01 | Stop reason: HOSPADM

## 2017-01-01 RX ORDER — LISINOPRIL 10 MG/1
10 TABLET ORAL DAILY
Status: DISCONTINUED | OUTPATIENT
Start: 2017-01-01 | End: 2017-01-01

## 2017-01-01 RX ORDER — IOPAMIDOL 755 MG/ML
114 INJECTION, SOLUTION INTRAVASCULAR ONCE
Status: COMPLETED | OUTPATIENT
Start: 2017-01-01 | End: 2017-01-01

## 2017-01-01 RX ORDER — ONDANSETRON 2 MG/ML
INJECTION INTRAMUSCULAR; INTRAVENOUS PRN
Status: DISCONTINUED | OUTPATIENT
Start: 2017-01-01 | End: 2017-01-01

## 2017-01-01 RX ORDER — AMOXICILLIN 250 MG
1 CAPSULE ORAL 2 TIMES DAILY PRN
Status: DISCONTINUED | OUTPATIENT
Start: 2017-01-01 | End: 2017-01-01 | Stop reason: HOSPADM

## 2017-01-01 RX ORDER — ACETAMINOPHEN 325 MG/1
650 TABLET ORAL EVERY 6 HOURS PRN
Status: DISCONTINUED | OUTPATIENT
Start: 2017-01-01 | End: 2017-01-01 | Stop reason: HOSPADM

## 2017-01-01 RX ORDER — ONDANSETRON 2 MG/ML
4 INJECTION INTRAMUSCULAR; INTRAVENOUS EVERY 30 MIN PRN
Status: DISCONTINUED | OUTPATIENT
Start: 2017-01-01 | End: 2017-01-01

## 2017-01-01 RX ORDER — LIDOCAINE/PRILOCAINE 2.5 %-2.5%
CREAM (GRAM) TOPICAL 2 TIMES DAILY
Status: DISCONTINUED | OUTPATIENT
Start: 2017-01-01 | End: 2017-01-01 | Stop reason: HOSPADM

## 2017-01-01 RX ORDER — MEPERIDINE HYDROCHLORIDE 25 MG/ML
12.5 INJECTION INTRAMUSCULAR; INTRAVENOUS; SUBCUTANEOUS
Status: DISCONTINUED | OUTPATIENT
Start: 2017-01-01 | End: 2017-01-01 | Stop reason: HOSPADM

## 2017-01-01 RX ORDER — LIDOCAINE 50 MG/G
0.5 OINTMENT TOPICAL 2 TIMES DAILY
Status: DISCONTINUED | OUTPATIENT
Start: 2017-01-01 | End: 2017-01-01 | Stop reason: HOSPADM

## 2017-01-01 RX ORDER — LIDOCAINE 50 MG/G
0.5 OINTMENT TOPICAL PRN
Qty: 5 G | Refills: 0 | Status: SHIPPED | OUTPATIENT
Start: 2017-01-01

## 2017-01-01 RX ORDER — FERROUS SULFATE 325(65) MG
325 TABLET ORAL 2 TIMES DAILY
Status: DISCONTINUED | OUTPATIENT
Start: 2017-01-01 | End: 2017-01-01 | Stop reason: HOSPADM

## 2017-01-01 RX ORDER — IOPAMIDOL 755 MG/ML
123 INJECTION, SOLUTION INTRAVASCULAR ONCE
Status: COMPLETED | OUTPATIENT
Start: 2017-01-01 | End: 2017-01-01

## 2017-01-01 RX ORDER — SENNOSIDES 8.6 MG
1 TABLET ORAL 2 TIMES DAILY
Qty: 60 TABLET | Refills: 1 | Status: SHIPPED | OUTPATIENT
Start: 2017-01-01 | End: 2017-01-01

## 2017-01-01 RX ORDER — POLYETHYLENE GLYCOL 3350 17 G/17G
17 POWDER, FOR SOLUTION ORAL DAILY PRN
Status: DISCONTINUED | OUTPATIENT
Start: 2017-01-01 | End: 2017-01-01 | Stop reason: HOSPADM

## 2017-01-01 RX ORDER — LIDOCAINE/PRILOCAINE 2.5 %-2.5%
CREAM (GRAM) TOPICAL PRN
Qty: 30 G | Refills: 1 | Status: SHIPPED | OUTPATIENT
Start: 2017-01-01

## 2017-01-01 RX ORDER — SODIUM CHLORIDE, SODIUM LACTATE, POTASSIUM CHLORIDE, CALCIUM CHLORIDE 600; 310; 30; 20 MG/100ML; MG/100ML; MG/100ML; MG/100ML
INJECTION, SOLUTION INTRAVENOUS CONTINUOUS PRN
Status: DISCONTINUED | OUTPATIENT
Start: 2017-01-01 | End: 2017-01-01

## 2017-01-01 RX ORDER — ACETAMINOPHEN 325 MG/1
650 TABLET ORAL EVERY 4 HOURS PRN
Status: DISCONTINUED | OUTPATIENT
Start: 2017-01-01 | End: 2017-01-01 | Stop reason: DRUGHIGH

## 2017-01-01 RX ORDER — POTASSIUM CHLORIDE 1500 MG/1
20 TABLET, EXTENDED RELEASE ORAL DAILY
Qty: 10 TABLET | Refills: 0 | Status: SHIPPED | OUTPATIENT
Start: 2017-01-01 | End: 2017-01-01

## 2017-01-01 RX ORDER — PROCHLORPERAZINE MALEATE 5 MG
5 TABLET ORAL EVERY 6 HOURS PRN
Status: DISCONTINUED | OUTPATIENT
Start: 2017-01-01 | End: 2017-01-01 | Stop reason: HOSPADM

## 2017-01-01 RX ORDER — POTASSIUM CHLORIDE 750 MG/1
40 TABLET, EXTENDED RELEASE ORAL DAILY
Status: DISCONTINUED | OUTPATIENT
Start: 2017-01-01 | End: 2017-01-01 | Stop reason: HOSPADM

## 2017-01-01 RX ORDER — POTASSIUM CHLORIDE 1.5 G/1.58G
20-40 POWDER, FOR SOLUTION ORAL
Status: DISCONTINUED | OUTPATIENT
Start: 2017-01-01 | End: 2017-01-01 | Stop reason: HOSPADM

## 2017-01-01 RX ORDER — IOPAMIDOL 755 MG/ML
115 INJECTION, SOLUTION INTRAVASCULAR ONCE
Status: COMPLETED | OUTPATIENT
Start: 2017-01-01 | End: 2017-01-01

## 2017-01-01 RX ORDER — LISINOPRIL 20 MG/1
20 TABLET ORAL DAILY
Status: DISCONTINUED | OUTPATIENT
Start: 2017-01-01 | End: 2017-01-01 | Stop reason: HOSPADM

## 2017-01-01 RX ORDER — ONDANSETRON 2 MG/ML
4 INJECTION INTRAMUSCULAR; INTRAVENOUS EVERY 30 MIN PRN
Status: DISCONTINUED | OUTPATIENT
Start: 2017-01-01 | End: 2017-01-01 | Stop reason: HOSPADM

## 2017-01-01 RX ORDER — CEFAZOLIN SODIUM 1 G/3ML
1 INJECTION, POWDER, FOR SOLUTION INTRAMUSCULAR; INTRAVENOUS SEE ADMIN INSTRUCTIONS
Status: DISCONTINUED | OUTPATIENT
Start: 2017-01-01 | End: 2017-01-01 | Stop reason: HOSPADM

## 2017-01-01 RX ORDER — ALBUTEROL SULFATE 90 UG/1
2 AEROSOL, METERED RESPIRATORY (INHALATION) EVERY 6 HOURS PRN
Status: DISCONTINUED | OUTPATIENT
Start: 2017-01-01 | End: 2017-01-01 | Stop reason: HOSPADM

## 2017-01-01 RX ORDER — ALBUTEROL SULFATE 90 UG/1
2 AEROSOL, METERED RESPIRATORY (INHALATION) EVERY 6 HOURS PRN
Qty: 1 INHALER | Refills: 3 | Status: SHIPPED | OUTPATIENT
Start: 2017-01-01 | End: 2018-01-01

## 2017-01-01 RX ORDER — POTASSIUM CHLORIDE 1500 MG/1
20 TABLET, EXTENDED RELEASE ORAL DAILY
Qty: 30 TABLET | Refills: 11 | Status: ON HOLD | OUTPATIENT
Start: 2017-01-01 | End: 2018-01-01

## 2017-01-01 RX ORDER — HYDROCODONE BITARTRATE AND ACETAMINOPHEN 5; 325 MG/1; MG/1
1-2 TABLET ORAL EVERY 4 HOURS PRN
Status: DISCONTINUED | OUTPATIENT
Start: 2017-01-01 | End: 2017-01-01 | Stop reason: HOSPADM

## 2017-01-01 RX ORDER — METHYLPHENIDATE HYDROCHLORIDE 5 MG/1
5 TABLET ORAL 2 TIMES DAILY
Qty: 60 TABLET | Refills: 0 | Status: SHIPPED | OUTPATIENT
Start: 2017-01-01 | End: 2018-01-01

## 2017-01-01 RX ADMIN — SODIUM CHLORIDE, PRESERVATIVE FREE 5 ML: 5 INJECTION INTRAVENOUS at 11:05

## 2017-01-01 RX ADMIN — IOPAMIDOL 123 ML: 755 INJECTION, SOLUTION INTRAVENOUS at 14:45

## 2017-01-01 RX ADMIN — DEXTROSE MONOHYDRATE 250 ML: 50 INJECTION, SOLUTION INTRAVENOUS at 12:13

## 2017-01-01 RX ADMIN — MORPHINE SULFATE 4 MG: 2 INJECTION, SOLUTION INTRAMUSCULAR; INTRAVENOUS at 19:36

## 2017-01-01 RX ADMIN — HEPARIN SODIUM 1050 UNITS/HR: 10000 INJECTION, SOLUTION INTRAVENOUS at 05:34

## 2017-01-01 RX ADMIN — MORPHINE SULFATE 4 MG: 2 INJECTION, SOLUTION INTRAMUSCULAR; INTRAVENOUS at 04:31

## 2017-01-01 RX ADMIN — SERTRALINE HYDROCHLORIDE 50 MG: 50 TABLET ORAL at 09:52

## 2017-01-01 RX ADMIN — SODIUM CHLORIDE, PRESERVATIVE FREE 73 ML: 5 INJECTION INTRAVENOUS at 11:09

## 2017-01-01 RX ADMIN — LEUCOVORIN CALCIUM 800 MG: 350 INJECTION, POWDER, LYOPHILIZED, FOR SOLUTION INTRAMUSCULAR; INTRAVENOUS at 11:55

## 2017-01-01 RX ADMIN — BEVACIZUMAB 550 MG: 400 INJECTION, SOLUTION INTRAVENOUS at 11:36

## 2017-01-01 RX ADMIN — OXALIPLATIN 200 MG: 100 INJECTION, SOLUTION, CONCENTRATE INTRAVENOUS at 11:45

## 2017-01-01 RX ADMIN — CHLORTHALIDONE 25 MG: 25 TABLET ORAL at 09:11

## 2017-01-01 RX ADMIN — SODIUM CHLORIDE 77 ML: 9 INJECTION, SOLUTION INTRAVENOUS at 14:45

## 2017-01-01 RX ADMIN — SODIUM CHLORIDE, PRESERVATIVE FREE 5 ML: 5 INJECTION INTRAVENOUS at 13:08

## 2017-01-01 RX ADMIN — FERRIC CARBOXYMALTOSE INJECTION 750 MG: 50 INJECTION, SOLUTION INTRAVENOUS at 10:35

## 2017-01-01 RX ADMIN — DEXTROSE MONOHYDRATE 250 ML: 50 INJECTION, SOLUTION INTRAVENOUS at 11:54

## 2017-01-01 RX ADMIN — MIDAZOLAM HYDROCHLORIDE 1 MG: 1 INJECTION, SOLUTION INTRAMUSCULAR; INTRAVENOUS at 12:06

## 2017-01-01 RX ADMIN — DEXTROSE MONOHYDRATE 250 ML: 50 INJECTION, SOLUTION INTRAVENOUS at 12:29

## 2017-01-01 RX ADMIN — SERTRALINE HYDROCHLORIDE 50 MG: 50 TABLET ORAL at 08:01

## 2017-01-01 RX ADMIN — SODIUM CHLORIDE 250 ML: 9 INJECTION, SOLUTION INTRAVENOUS at 11:44

## 2017-01-01 RX ADMIN — LISINOPRIL 10 MG: 10 TABLET ORAL at 17:57

## 2017-01-01 RX ADMIN — ONDANSETRON 4 MG: 4 TABLET, ORALLY DISINTEGRATING ORAL at 11:54

## 2017-01-01 RX ADMIN — LIDOCAINE: 40 CREAM TOPICAL at 19:06

## 2017-01-01 RX ADMIN — SERTRALINE HYDROCHLORIDE 50 MG: 50 TABLET ORAL at 17:57

## 2017-01-01 RX ADMIN — ENOXAPARIN SODIUM 110 MG: 120 INJECTION SUBCUTANEOUS at 12:11

## 2017-01-01 RX ADMIN — FLUOROURACIL 930 MG: 50 INJECTION, SOLUTION INTRAVENOUS at 13:28

## 2017-01-01 RX ADMIN — Medication 2 LOZENGE: at 17:17

## 2017-01-01 RX ADMIN — BEVACIZUMAB 550 MG: 400 INJECTION, SOLUTION INTRAVENOUS at 10:29

## 2017-01-01 RX ADMIN — SODIUM CHLORIDE 250 ML: 9 INJECTION, SOLUTION INTRAVENOUS at 10:29

## 2017-01-01 RX ADMIN — SODIUM CHLORIDE: 9 INJECTION, SOLUTION INTRAVENOUS at 14:27

## 2017-01-01 RX ADMIN — OXYCODONE HYDROCHLORIDE 5 MG: 5 TABLET ORAL at 03:25

## 2017-01-01 RX ADMIN — Medication 6700 UNITS: at 16:19

## 2017-01-01 RX ADMIN — IOPAMIDOL 117 ML: 755 INJECTION, SOLUTION INTRAVENOUS at 11:09

## 2017-01-01 RX ADMIN — POLYETHYLENE GLYCOL 3350 17 G: 17 POWDER, FOR SOLUTION ORAL at 10:59

## 2017-01-01 RX ADMIN — SERTRALINE HYDROCHLORIDE 50 MG: 50 TABLET ORAL at 09:11

## 2017-01-01 RX ADMIN — CHLORTHALIDONE 25 MG: 25 TABLET ORAL at 10:56

## 2017-01-01 RX ADMIN — OXYCODONE HYDROCHLORIDE 5 MG: 5 TABLET ORAL at 07:49

## 2017-01-01 RX ADMIN — OXALIPLATIN 200 MG: 100 INJECTION, SOLUTION, CONCENTRATE INTRAVENOUS at 11:58

## 2017-01-01 RX ADMIN — LISINOPRIL 10 MG: 10 TABLET ORAL at 08:10

## 2017-01-01 RX ADMIN — HEPARIN SODIUM: 1000 INJECTION, SOLUTION INTRAVENOUS; SUBCUTANEOUS at 12:45

## 2017-01-01 RX ADMIN — ONDANSETRON HYDROCHLORIDE: 2 INJECTION, SOLUTION INTRAVENOUS at 12:46

## 2017-01-01 RX ADMIN — POLYETHYLENE GLYCOL-3350 AND ELECTROLYTES 4000 ML: 236; 6.74; 5.86; 2.97; 22.74 POWDER, FOR SOLUTION ORAL at 19:36

## 2017-01-01 RX ADMIN — DEXTROSE MONOHYDRATE 250 ML: 50 INJECTION, SOLUTION INTRAVENOUS at 11:44

## 2017-01-01 RX ADMIN — DEXAMETHASONE SODIUM PHOSPHATE 12 MG: 10 INJECTION, SOLUTION INTRAMUSCULAR; INTRAVENOUS at 11:46

## 2017-01-01 RX ADMIN — SODIUM CHLORIDE, PRESERVATIVE FREE 500 UNITS: 5 INJECTION INTRAVENOUS at 11:30

## 2017-01-01 RX ADMIN — POTASSIUM CHLORIDE 20 MEQ: 1500 TABLET, EXTENDED RELEASE ORAL at 17:57

## 2017-01-01 RX ADMIN — OXYCODONE HYDROCHLORIDE 5 MG: 5 TABLET ORAL at 18:13

## 2017-01-01 RX ADMIN — ACETAMINOPHEN 650 MG: 325 TABLET, FILM COATED ORAL at 22:00

## 2017-01-01 RX ADMIN — ONDANSETRON HYDROCHLORIDE: 2 INJECTION, SOLUTION INTRAVENOUS at 11:50

## 2017-01-01 RX ADMIN — ACETAMINOPHEN 650 MG: 325 TABLET, FILM COATED ORAL at 18:13

## 2017-01-01 RX ADMIN — POTASSIUM CHLORIDE 20 MEQ: 1500 TABLET, EXTENDED RELEASE ORAL at 08:00

## 2017-01-01 RX ADMIN — ALTEPLASE 2 MG: 2.2 INJECTION, POWDER, LYOPHILIZED, FOR SOLUTION INTRAVENOUS at 11:26

## 2017-01-01 RX ADMIN — HEPARIN SODIUM 1450 UNITS/HR: 10000 INJECTION, SOLUTION INTRAVENOUS at 16:00

## 2017-01-01 RX ADMIN — FLUOROURACIL 930 MG: 50 INJECTION, SOLUTION INTRAVENOUS at 14:15

## 2017-01-01 RX ADMIN — POTASSIUM CHLORIDE 40 MEQ: 1500 TABLET, EXTENDED RELEASE ORAL at 20:44

## 2017-01-01 RX ADMIN — FLUOROURACIL 930 MG: 50 INJECTION, SOLUTION INTRAVENOUS at 13:48

## 2017-01-01 RX ADMIN — SODIUM CHLORIDE, PRESERVATIVE FREE 500 UNITS: 5 INJECTION INTRAVENOUS at 15:25

## 2017-01-01 RX ADMIN — SODIUM CHLORIDE 250 ML: 9 INJECTION, SOLUTION INTRAVENOUS at 10:34

## 2017-01-01 RX ADMIN — FENTANYL CITRATE 25 MCG: 50 INJECTION, SOLUTION INTRAMUSCULAR; INTRAVENOUS at 12:42

## 2017-01-01 RX ADMIN — SODIUM CHLORIDE 1000 ML: 9 INJECTION, SOLUTION INTRAVENOUS at 13:42

## 2017-01-01 RX ADMIN — POTASSIUM CHLORIDE 20 MEQ: 1500 TABLET, EXTENDED RELEASE ORAL at 14:26

## 2017-01-01 RX ADMIN — DEXTROSE MONOHYDRATE 250 ML: 50 INJECTION, SOLUTION INTRAVENOUS at 11:12

## 2017-01-01 RX ADMIN — POTASSIUM CHLORIDE 20 MEQ: 1500 TABLET, EXTENDED RELEASE ORAL at 22:54

## 2017-01-01 RX ADMIN — ONDANSETRON 4 MG: 2 INJECTION INTRAMUSCULAR; INTRAVENOUS at 12:07

## 2017-01-01 RX ADMIN — SODIUM CHLORIDE: 9 INJECTION, SOLUTION INTRAVENOUS at 18:35

## 2017-01-01 RX ADMIN — MIDAZOLAM HYDROCHLORIDE 2 MG: 1 INJECTION, SOLUTION INTRAMUSCULAR; INTRAVENOUS at 08:31

## 2017-01-01 RX ADMIN — ACETAMINOPHEN 650 MG: 325 TABLET, FILM COATED ORAL at 17:57

## 2017-01-01 RX ADMIN — ONDANSETRON HYDROCHLORIDE: 2 INJECTION, SOLUTION INTRAVENOUS at 11:06

## 2017-01-01 RX ADMIN — DEXAMETHASONE SODIUM PHOSPHATE: 10 INJECTION, SOLUTION INTRAMUSCULAR; INTRAVENOUS at 11:06

## 2017-01-01 RX ADMIN — LEUCOVORIN CALCIUM 800 MG: 350 INJECTION, POWDER, LYOPHILIZED, FOR SOLUTION INTRAMUSCULAR; INTRAVENOUS at 13:22

## 2017-01-01 RX ADMIN — OXYCODONE HYDROCHLORIDE 5 MG: 5 TABLET ORAL at 10:53

## 2017-01-01 RX ADMIN — POTASSIUM CHLORIDE 20 MEQ: 1500 TABLET, EXTENDED RELEASE ORAL at 09:11

## 2017-01-01 RX ADMIN — SERTRALINE HYDROCHLORIDE 50 MG: 50 TABLET ORAL at 16:22

## 2017-01-01 RX ADMIN — HEPARIN SODIUM 1150 UNITS/HR: 10000 INJECTION, SOLUTION INTRAVENOUS at 08:23

## 2017-01-01 RX ADMIN — SENNOSIDES AND DOCUSATE SODIUM 2 TABLET: 8.6; 5 TABLET ORAL at 08:09

## 2017-01-01 RX ADMIN — HEPARIN SODIUM 1500 UNITS/HR: 10000 INJECTION, SOLUTION INTRAVENOUS at 16:19

## 2017-01-01 RX ADMIN — CHLORTHALIDONE 25 MG: 25 TABLET ORAL at 17:40

## 2017-01-01 RX ADMIN — FERRIC CARBOXYMALTOSE INJECTION 750 MG: 50 INJECTION, SOLUTION INTRAVENOUS at 11:11

## 2017-01-01 RX ADMIN — CHLORTHALIDONE 25 MG: 25 TABLET ORAL at 08:10

## 2017-01-01 RX ADMIN — VITAMIN D, TAB 1000IU (100/BT) 1000 UNITS: 25 TAB at 08:01

## 2017-01-01 RX ADMIN — OXALIPLATIN 200 MG: 5 INJECTION, SOLUTION, CONCENTRATE INTRAVENOUS at 13:22

## 2017-01-01 RX ADMIN — LEUCOVORIN CALCIUM 800 MG: 350 INJECTION, POWDER, LYOPHILIZED, FOR SOLUTION INTRAMUSCULAR; INTRAVENOUS at 12:13

## 2017-01-01 RX ADMIN — ONDANSETRON 4 MG: 4 TABLET, ORALLY DISINTEGRATING ORAL at 09:59

## 2017-01-01 RX ADMIN — SODIUM CHLORIDE 500 ML: 9 INJECTION, SOLUTION INTRAVENOUS at 12:46

## 2017-01-01 RX ADMIN — LEUCOVORIN CALCIUM 800 MG: 350 INJECTION, POWDER, LYOPHILIZED, FOR SOLUTION INTRAMUSCULAR; INTRAVENOUS at 12:29

## 2017-01-01 RX ADMIN — PROPOFOL 50 MCG/KG/MIN: 10 INJECTION, EMULSION INTRAVENOUS at 12:04

## 2017-01-01 RX ADMIN — ENOXAPARIN SODIUM 100 MG: 100 INJECTION SUBCUTANEOUS at 09:11

## 2017-01-01 RX ADMIN — SODIUM CHLORIDE, PRESERVATIVE FREE 5 ML: 5 INJECTION INTRAVENOUS at 06:05

## 2017-01-01 RX ADMIN — SODIUM CHLORIDE, PRESERVATIVE FREE 500 UNITS: 5 INJECTION INTRAVENOUS at 10:58

## 2017-01-01 RX ADMIN — MIDAZOLAM HYDROCHLORIDE 1 MG: 1 INJECTION, SOLUTION INTRAMUSCULAR; INTRAVENOUS at 12:00

## 2017-01-01 RX ADMIN — LIDOCAINE HYDROCHLORIDE 20 ML: 10 INJECTION, SOLUTION EPIDURAL; INFILTRATION; INTRACAUDAL; PERINEURAL at 12:45

## 2017-01-01 RX ADMIN — SENNOSIDES 1 TABLET: 8.6 TABLET, FILM COATED ORAL at 08:09

## 2017-01-01 RX ADMIN — SODIUM CHLORIDE: 9 INJECTION, SOLUTION INTRAVENOUS at 22:00

## 2017-01-01 RX ADMIN — FLUOROURACIL 930 MG: 50 INJECTION, SOLUTION INTRAVENOUS at 15:53

## 2017-01-01 RX ADMIN — DEXAMETHASONE SODIUM PHOSPHATE: 10 INJECTION, SOLUTION INTRAMUSCULAR; INTRAVENOUS at 10:38

## 2017-01-01 RX ADMIN — SODIUM CHLORIDE, PRESERVATIVE FREE 5 ML: 5 INJECTION INTRAVENOUS at 10:45

## 2017-01-01 RX ADMIN — POTASSIUM CHLORIDE 40 MEQ: 750 TABLET, FILM COATED, EXTENDED RELEASE ORAL at 12:28

## 2017-01-01 RX ADMIN — LISINOPRIL 10 MG: 10 TABLET ORAL at 09:11

## 2017-01-01 RX ADMIN — DEXTROSE MONOHYDRATE 250 ML: 50 INJECTION, SOLUTION INTRAVENOUS at 13:21

## 2017-01-01 RX ADMIN — CEFAZOLIN SODIUM 2 G: 2 INJECTION, SOLUTION INTRAVENOUS at 12:15

## 2017-01-01 RX ADMIN — SODIUM CHLORIDE 250 ML: 900 INJECTION, SOLUTION INTRAVENOUS at 10:14

## 2017-01-01 RX ADMIN — SERTRALINE HYDROCHLORIDE 50 MG: 50 TABLET ORAL at 08:59

## 2017-01-01 RX ADMIN — OXYCODONE HYDROCHLORIDE 5 MG: 5 TABLET ORAL at 00:58

## 2017-01-01 RX ADMIN — LISINOPRIL 20 MG: 20 TABLET ORAL at 10:57

## 2017-01-01 RX ADMIN — LISINOPRIL 10 MG: 10 TABLET ORAL at 08:03

## 2017-01-01 RX ADMIN — FLUOROURACIL 930 MG: 50 INJECTION, SOLUTION INTRAVENOUS at 15:02

## 2017-01-01 RX ADMIN — POTASSIUM CHLORIDE 20 MEQ: 1500 TABLET, EXTENDED RELEASE ORAL at 09:00

## 2017-01-01 RX ADMIN — IOPAMIDOL 114 ML: 755 INJECTION, SOLUTION INTRAVENOUS at 14:19

## 2017-01-01 RX ADMIN — FERROUS SULFATE TAB 325 MG (65 MG ELEMENTAL FE) 325 MG: 325 (65 FE) TAB at 20:44

## 2017-01-01 RX ADMIN — SODIUM CHLORIDE, POTASSIUM CHLORIDE, SODIUM LACTATE AND CALCIUM CHLORIDE: 600; 310; 30; 20 INJECTION, SOLUTION INTRAVENOUS at 12:04

## 2017-01-01 RX ADMIN — Medication 6400 UNITS: at 16:00

## 2017-01-01 RX ADMIN — PEGFILGRASTIM 6 MG: 6 INJECTION SUBCUTANEOUS at 14:09

## 2017-01-01 RX ADMIN — VITAMIN D, TAB 1000IU (100/BT) 1000 UNITS: 25 TAB at 09:00

## 2017-01-01 RX ADMIN — ONDANSETRON 4 MG: 2 SOLUTION INTRAMUSCULAR; INTRAVENOUS at 21:56

## 2017-01-01 RX ADMIN — FLUOROURACIL 930 MG: 50 INJECTION, SOLUTION INTRAVENOUS at 14:05

## 2017-01-01 RX ADMIN — ACETAMINOPHEN 650 MG: 325 TABLET, FILM COATED ORAL at 13:32

## 2017-01-01 RX ADMIN — SODIUM CHLORIDE, PRESERVATIVE FREE 5 ML: 5 INJECTION INTRAVENOUS at 07:58

## 2017-01-01 RX ADMIN — IOPAMIDOL 79 ML: 755 INJECTION, SOLUTION INTRAVENOUS at 14:56

## 2017-01-01 RX ADMIN — CHLORTHALIDONE 25 MG: 25 TABLET ORAL at 09:52

## 2017-01-01 RX ADMIN — BEVACIZUMAB 550 MG: 400 INJECTION, SOLUTION INTRAVENOUS at 11:20

## 2017-01-01 RX ADMIN — SODIUM CHLORIDE 101 ML: 9 INJECTION, SOLUTION INTRAVENOUS at 14:56

## 2017-01-01 RX ADMIN — SODIUM CHLORIDE, PRESERVATIVE FREE 5 ML: 5 INJECTION INTRAVENOUS at 13:45

## 2017-01-01 RX ADMIN — BEVACIZUMAB 550 MG: 400 INJECTION, SOLUTION INTRAVENOUS at 11:57

## 2017-01-01 RX ADMIN — LISINOPRIL 10 MG: 10 TABLET ORAL at 08:59

## 2017-01-01 RX ADMIN — ACETAMINOPHEN 650 MG: 325 TABLET, FILM COATED ORAL at 10:53

## 2017-01-01 RX ADMIN — CHLORTHALIDONE 25 MG: 25 TABLET ORAL at 08:49

## 2017-01-01 RX ADMIN — OXYCODONE HYDROCHLORIDE 5 MG: 5 TABLET ORAL at 17:40

## 2017-01-01 RX ADMIN — HEPARIN SODIUM 1300 UNITS/HR: 10000 INJECTION, SOLUTION INTRAVENOUS at 05:18

## 2017-01-01 RX ADMIN — OXYCODONE HYDROCHLORIDE 5 MG: 5 TABLET ORAL at 13:21

## 2017-01-01 RX ADMIN — OXYCODONE HYDROCHLORIDE 5 MG: 5 TABLET ORAL at 22:00

## 2017-01-01 RX ADMIN — PEGFILGRASTIM 6 MG: 6 INJECTION SUBCUTANEOUS at 12:31

## 2017-01-01 RX ADMIN — OXALIPLATIN 200 MG: 5 INJECTION, SOLUTION, CONCENTRATE INTRAVENOUS at 12:30

## 2017-01-01 RX ADMIN — VITAMIN D, TAB 1000IU (100/BT) 1000 UNITS: 25 TAB at 09:11

## 2017-01-01 RX ADMIN — VITAMIN D, TAB 1000IU (100/BT) 1000 UNITS: 25 TAB at 17:57

## 2017-01-01 RX ADMIN — ONDANSETRON 4 MG: 4 TABLET, ORALLY DISINTEGRATING ORAL at 00:49

## 2017-01-01 RX ADMIN — LEUCOVORIN CALCIUM 800 MG: 350 INJECTION, POWDER, LYOPHILIZED, FOR SOLUTION INTRAMUSCULAR; INTRAVENOUS at 11:29

## 2017-01-01 RX ADMIN — LIDOCAINE: 40 CREAM TOPICAL at 01:07

## 2017-01-01 RX ADMIN — FENTANYL CITRATE 50 MCG: 50 INJECTION, SOLUTION INTRAMUSCULAR; INTRAVENOUS at 12:00

## 2017-01-01 RX ADMIN — FENTANYL CITRATE 100 MCG: 50 INJECTION, SOLUTION INTRAMUSCULAR; INTRAVENOUS at 08:31

## 2017-01-01 RX ADMIN — Medication 10 ML: at 12:45

## 2017-01-01 RX ADMIN — DEXAMETHASONE SODIUM PHOSPHATE 4 MG: 4 INJECTION, SOLUTION INTRA-ARTICULAR; INTRALESIONAL; INTRAMUSCULAR; INTRAVENOUS; SOFT TISSUE at 12:06

## 2017-01-01 RX ADMIN — SODIUM CHLORIDE 75 ML: 9 INJECTION, SOLUTION INTRAVENOUS at 14:19

## 2017-01-01 RX ADMIN — LISINOPRIL 10 MG: 10 TABLET ORAL at 09:52

## 2017-01-01 RX ADMIN — SODIUM CHLORIDE: 9 INJECTION, SOLUTION INTRAVENOUS at 00:48

## 2017-01-01 RX ADMIN — ALTEPLASE 2 MG: 2.2 INJECTION, POWDER, LYOPHILIZED, FOR SOLUTION INTRAVENOUS at 10:06

## 2017-01-01 RX ADMIN — SODIUM CHLORIDE 1000 ML: 9 INJECTION, SOLUTION INTRAVENOUS at 12:41

## 2017-01-01 RX ADMIN — TBO-FILGRASTIM 480 MCG: 480 INJECTION, SOLUTION SUBCUTANEOUS at 13:08

## 2017-01-01 RX ADMIN — SODIUM CHLORIDE 250 ML: 9 INJECTION, SOLUTION INTRAVENOUS at 11:11

## 2017-01-01 RX ADMIN — ENOXAPARIN SODIUM 100 MG: 100 INJECTION SUBCUTANEOUS at 20:09

## 2017-01-01 RX ADMIN — ALTEPLASE 2 MG: 2.2 INJECTION, POWDER, LYOPHILIZED, FOR SOLUTION INTRAVENOUS at 10:59

## 2017-01-01 RX ADMIN — LEUCOVORIN CALCIUM 800 MG: 350 INJECTION, POWDER, LYOPHILIZED, FOR SOLUTION INTRAMUSCULAR; INTRAVENOUS at 11:45

## 2017-01-01 RX ADMIN — OXALIPLATIN 200 MG: 100 INJECTION, SOLUTION, CONCENTRATE INTRAVENOUS at 11:24

## 2017-01-01 RX ADMIN — SODIUM CHLORIDE 250 ML: 9 INJECTION, SOLUTION INTRAVENOUS at 11:06

## 2017-01-01 RX ADMIN — HEPARIN SODIUM 1300 UNITS/HR: 10000 INJECTION, SOLUTION INTRAVENOUS at 00:30

## 2017-01-01 RX ADMIN — SODIUM CHLORIDE, PRESERVATIVE FREE 500 UNITS: 5 INJECTION INTRAVENOUS at 15:35

## 2017-01-01 RX ADMIN — BEVACIZUMAB 550 MG: 400 INJECTION, SOLUTION INTRAVENOUS at 11:10

## 2017-01-01 RX ADMIN — ONDANSETRON HYDROCHLORIDE: 2 INJECTION, SOLUTION INTRAVENOUS at 11:14

## 2017-01-01 RX ADMIN — SERTRALINE HYDROCHLORIDE 50 MG: 50 TABLET ORAL at 08:09

## 2017-01-01 RX ADMIN — SODIUM CHLORIDE: 9 INJECTION, SOLUTION INTRAVENOUS at 03:59

## 2017-01-01 RX ADMIN — SODIUM CHLORIDE 250 ML: 9 INJECTION, SOLUTION INTRAVENOUS at 11:14

## 2017-01-01 RX ADMIN — LISINOPRIL 10 MG: 10 TABLET ORAL at 16:22

## 2017-01-01 RX ADMIN — FENTANYL CITRATE 25 MCG: 50 INJECTION, SOLUTION INTRAMUSCULAR; INTRAVENOUS at 12:26

## 2017-01-01 RX ADMIN — BEVACIZUMAB 550 MG: 400 INJECTION, SOLUTION INTRAVENOUS at 12:51

## 2017-01-01 RX ADMIN — SENNOSIDES 1 TABLET: 8.6 TABLET, FILM COATED ORAL at 20:44

## 2017-01-01 RX ADMIN — ENOXAPARIN SODIUM 100 MG: 100 INJECTION SUBCUTANEOUS at 08:49

## 2017-01-01 RX ADMIN — ONDANSETRON 4 MG: 2 SOLUTION INTRAMUSCULAR; INTRAVENOUS at 05:54

## 2017-01-01 RX ADMIN — SERTRALINE HYDROCHLORIDE 50 MG: 50 TABLET ORAL at 10:55

## 2017-01-01 RX ADMIN — HEPARIN SODIUM 1300 UNITS/HR: 10000 INJECTION, SOLUTION INTRAVENOUS at 05:42

## 2017-01-01 RX ADMIN — HYDROMORPHONE HYDROCHLORIDE 0.5 MG: 1 INJECTION, SOLUTION INTRAMUSCULAR; INTRAVENOUS; SUBCUTANEOUS at 16:12

## 2017-01-01 RX ADMIN — OXALIPLATIN 200 MG: 5 INJECTION, SOLUTION, CONCENTRATE INTRAVENOUS at 12:13

## 2017-01-01 RX ADMIN — SODIUM CHLORIDE, PRESERVATIVE FREE 500 UNITS: 5 INJECTION INTRAVENOUS at 12:09

## 2017-01-01 RX ADMIN — SODIUM CHLORIDE: 9 INJECTION, SOLUTION INTRAVENOUS at 17:57

## 2017-01-01 RX ADMIN — PROPOFOL 40 MCG/KG/MIN: 10 INJECTION, EMULSION INTRAVENOUS at 12:49

## 2017-01-01 ASSESSMENT — ACTIVITIES OF DAILY LIVING (ADL)
COGNITION: 0 - NO COGNITION ISSUES REPORTED
TOILETING: 0-->INDEPENDENT
RETIRED_COMMUNICATION: 0-->UNDERSTANDS/COMMUNICATES WITHOUT DIFFICULTY
SWALLOWING: 0-->SWALLOWS FOODS/LIQUIDS WITHOUT DIFFICULTY
DRESS: 0-->INDEPENDENT
TOILETING: 0-->INDEPENDENT
BATHING: 0-->INDEPENDENT
RETIRED_EATING: 0-->INDEPENDENT
BATHING: 0-->INDEPENDENT
SWALLOWING: 0-->SWALLOWS FOODS/LIQUIDS WITHOUT DIFFICULTY
TRANSFERRING: 0-->INDEPENDENT
AMBULATION: 0-->INDEPENDENT
RETIRED_EATING: 0-->INDEPENDENT
RETIRED_COMMUNICATION: 0-->UNDERSTANDS/COMMUNICATES WITHOUT DIFFICULTY
FALL_HISTORY_WITHIN_LAST_SIX_MONTHS: NO
SWALLOWING: 0-->SWALLOWS FOODS/LIQUIDS WITHOUT DIFFICULTY
FALL_HISTORY_WITHIN_LAST_SIX_MONTHS: NO
TOILETING: 0-->INDEPENDENT
AMBULATION: 0-->INDEPENDENT
COGNITION: 0 - NO COGNITION ISSUES REPORTED
DRESS: 0-->INDEPENDENT
RETIRED_COMMUNICATION: 0-->UNDERSTANDS/COMMUNICATES WITHOUT DIFFICULTY
COGNITION: 0 - NO COGNITION ISSUES REPORTED
FALL_HISTORY_WITHIN_LAST_SIX_MONTHS: NO
AMBULATION: 0-->INDEPENDENT
RETIRED_EATING: 0-->INDEPENDENT
BATHING: 0-->INDEPENDENT
DRESS: 0-->INDEPENDENT
TRANSFERRING: 0-->INDEPENDENT
TRANSFERRING: 0-->INDEPENDENT

## 2017-01-01 ASSESSMENT — ENCOUNTER SYMPTOMS
VOMITING: 0
HEADACHES: 0
LIGHT-HEADEDNESS: 0
FEVER: 1
CONSTIPATION: 0
FEVER: 0
CHILLS: 0
HEMATURIA: 0
NAUSEA: 0
COUGH: 1
ABDOMINAL PAIN: 0
COLOR CHANGE: 0
DIARRHEA: 1
SHORTNESS OF BREATH: 0
COUGH: 0
DIAPHORESIS: 0
BACK PAIN: 0
DIZZINESS: 0
BLOOD IN STOOL: 0
VOMITING: 1
FREQUENCY: 0
SHORTNESS OF BREATH: 1
CHEST TIGHTNESS: 1
DIZZINESS: 0
CHEST TIGHTNESS: 0
ABDOMINAL PAIN: 1
SHORTNESS OF BREATH: 0
DIFFICULTY URINATING: 0
FLANK PAIN: 0
LIGHT-HEADEDNESS: 0
WEAKNESS: 1
NAUSEA: 1
DYSURIA: 0
DIARRHEA: 0
NAUSEA: 0

## 2017-01-01 ASSESSMENT — PAIN SCALES - GENERAL
PAINLEVEL: NO PAIN (0)

## 2017-01-01 ASSESSMENT — PAIN DESCRIPTION - DESCRIPTORS: DESCRIPTORS: HEADACHE

## 2017-09-29 PROBLEM — R16.0 LIVER MASSES: Status: ACTIVE | Noted: 2017-01-01

## 2017-09-29 NOTE — CONSULTS
Metastatic disease. H/O ovarian CA in the past.  Worrisome for metastatic colon CA to liver.  Plan for colonoscopy tomorrow.    Full consult dictated.    Kevin Celis MD FACP  [phone]

## 2017-09-29 NOTE — ED PROVIDER NOTES
History     Chief Complaint:  Abdominal Pain      HPI   Liv Sylvester is a 66 year old female who presents with abdominal pain. The patient states that she has had RUQ abdominal pain for the past 6 weeks. She has been taking 2000 mg of Tylenol 2x a day over this time for her pain, with her last dose yesterday, which alleviates her symptoms. She states that this pain is a sharp, stabbing pain that gets as high as a 9/10 and worsens after eating or while she is working. She states that this is a new pain for her as of 6 weeks ago. She states that the only abdominal surgery she has had in the past was a hysterectomy. She denies any chest pain, significant shortness of breath, urinary symptoms, headaches, lightheadedness, sore throat, fevers, or any other symptoms.    Allergies:  No known drug allergies.    Medications:    lisinopril (PRINIVIL,ZESTRIL) 10 MG tablet  aspirin 81 MG tablet  cyanocobalamin (B-12) 1000 MCG TBCR  ferrous gluconate (FERGON) 324 (38 FE) MG tablet     Past Medical History:    Flash pulmonary edema  Hypertension   Obesity    Past Surgical History:    Hysterectomy    Family History:    Father-cerebrovascular disease  Mother-heart disease, myocardial infarction, diabetes, hypertension    Social History:  Smoking status: Former smoker  Alcohol use: No  Marital Status:        Review of Systems   Constitutional: Negative for chills and fever.   Respiratory: Negative for cough, chest tightness and shortness of breath.    Cardiovascular: Negative for chest pain and leg swelling.   Gastrointestinal: Positive for abdominal pain. Negative for blood in stool, constipation, diarrhea, nausea and vomiting.   Genitourinary: Negative for difficulty urinating, dysuria, flank pain, frequency, hematuria and vaginal pain.   Musculoskeletal: Negative for back pain.   Skin: Negative for color change.   Neurological: Negative for dizziness, light-headedness and headaches.   All other systems reviewed and are  "negative.      Physical Exam     Patient Vitals for the past 24 hrs:   BP Temp Temp src Heart Rate Resp SpO2 Height Weight   09/29/17 1200 - - - 80 19 96 % - -   09/29/17 1122 133/62 - - 82 18 98 % - -   09/29/17 1037 151/67 99  F (37.2  C) Oral 101 16 98 % 1.778 m (5' 10\") 111.3 kg (245 lb 6.4 oz)       Physical Exam   General: Alert and cooperative with exam. Patient in mild to moderate distress. Normal mentation.  Head:  Scalp is NC/AT  Eyes:  No scleral icterus, PERRL  ENT:  The external nose and ears are normal. The oropharynx is normal and without erythema; mucus membranes are moist. Uvula midline, no evidence of deep space infection.  Neck:  Normal range of motion without rigidity.  CV:  Regular rate and rhythm    No pathologic murmur   Resp:  Breath sounds are clear bilaterally    Non-labored, no retractions or accessory muscle use  GI:  Abdomen is soft, Obese, RUQ tenderness with positive Bhardwaj's sign.  MS:  No lower extremity edema   Skin:  Warm and dry, No rash or lesions noted.  Neuro: Oriented x 3. No gross motor deficits.      Emergency Department Course   ECG:  @ 1131  Indication: Abdominal Pain  Vent. Rate 85 bpm. NM interval 138 ms. QRS duration 94 ms. QT/QTc 368/437 ms. P-R-T axis 53 26 26.   Normal sinus rhythm. Normal ECG.    Read @ 1132 by Dr. Ríos.    Imaging:  Abdomen US limited (RUQ only)  IMPRESSION:   1. Partially contracted gallbladder. Sludge ball versus polyp in the  gallbladder fundus. No definite gallstones.  2. Findings suspicious for liver metastases. Recommend CT abdomen and  pelvis with contrast.  3. Pancreatic tail is incompletely visualized.  4. 3.3 cm benign cyst right kidney, increased in size since 2013.  Report per radiology.    Abdomen/Pelvis CT with IV contrast:  Focal bowel wall thickening involving the colon at the  hepatic flexure, multiple masses in the liver, and enlarged mesenteric  and retroperitoneal lymph nodes as above. Findings would be worrisome  for a " primary colon cancer with metastatic disease to the liver and  mesenteric lymph nodes. The liver lesions would be amenable to  percutaneous biopsy.  Report per radiology.      Laboratory:  CBC:  WBC 8.3, HGB 8.5 (L), ,   CMP: Glucose 108 (H), Albumin 2.7 (L), AST 54 (H), otherwise WNL (Creatinine 0.76)  Lipase: 74    Interventions:  Medications   HYDROmorphone (PF) (DILAUDID) injection 0.5 mg (not administered)   sodium chloride 0.9 % bag 500mL for CT scan flush use (77 mLs Intravenous Given 9/29/17 1445)   iopamidol (ISOVUE-370) solution 123 mL (123 mLs Intravenous Given 9/29/17 1445)       Emergency Department Course:  Nursing notes and vitals reviewed.  (1128) I performed an exam of the patient as documented above.    EKG was done, interpretation as above.  The patient was sent for a ultrasound while in the emergency department, findings above.   Blood was drawn from the patient. This was sent for laboratory testing, findings above.      Case discussed with Dr. Olivares of the hospital service who will admit the patient for further evaluation and care and follow-up lab result.  Impression & Plan      Medical Decision Making:  Liv Sylvester is a 66 year old female who presents with 6 week history of intermittent RUQ pain; remote history of ovarian cancer. Patient's medical history and records were reviewed. Presentation concerning for, but not limited to, biliary pathology, gastritis, pancreatitis, neoplastic process, infectious process, among others. Labs, and imaging were obtained. Labs notable for anemia (Hemoglobin 8.5, with low MCV of 73; possible component of iron deficiency), without any other significant findings as noted above. RUQ ultrasound demonstrated partially contracted gallbladder with sludge versus polyp in the gallbladder fundus. Additional findings were suspicious for liver metastases. Given history of ovarian cancer CT of the abdomen/pelvis with IV contrast was obtained demonstrating focal  bowel wall thickening in the colon at the hepatic flexure as well as multiple masses in the liver and enlarged lymph nodes concerning for metastatic disease; possible primary colon cancer. Patient was provided 0.5 mg Dilaudid for pain control and notes that she has been having to take laxatives to have a bowel movement. Given diagnosis of new metastatic disease and the patient's symptoms she will be admitted to the hospitalist service for further evaluation and care. She remained stable throughout ED course. Tylenol level pending, hospitalist service will follow-up on results.    Diagnosis:    ICD-10-CM    1. RUQ abdominal pain R10.11        Disposition:  Admit to hospitalist    IShayan, am serving as a scribe on 9/29/2017 at 11:28 AM to personally document services performed by Dr. Ríos based on my observations and the provider's statements to me.      Shayan Mason  9/29/2017    EMERGENCY DEPARTMENT       Stas Ríos DO  09/29/17 1627

## 2017-09-29 NOTE — IP AVS SNAPSHOT
MRN:[id number]                      After Visit Summary   9/29/2017    Liv Sylvester    MRN: [id number]           Thank you!     Thank you for choosing [facility] for your care. Our goal is always to provide you with excellent care. Hearing back from our patients is one way we can continue to improve our services. Please take a few minutes to complete the written survey that you may receive in the mail after you visit with us. Thank you!        Patient Information     Date Of Birth          1950        Designated Caregiver       Most Recent Value    Caregiver    Will someone help with your care after discharge? no      About your hospital stay     You were admitted on:  September 29, 2017 You last received care in the:  [place] [address] [facility]    You were discharged on:  October 2, 2017        Reason for your hospital stay       You were admitted to the hospital due to abdominal pain and was found to have colon mass and liver involvement from the possible cancer.                  Who to Call     For medical emergencies, please call 911.  For non-urgent questions about your medical care, please call your primary care provider or clinic, [phone]  For questions related to your surgery, please call your surgery clinic        Attending Provider     Provider Specialty    Stas Ríos DO Emergency Medicine    Mary Ann Olivares MD Internal Medicine    Carolyn Berman MD Internal Medicine       Primary Care Provider Office Phone # Fax #    Amaya Howard [phone]      After Care Instructions     Activity       Your activity upon discharge: activity as tolerated            Diet       Follow this diet upon discharge: Orders Placed This Encounter      Mechanical/Dental Soft Diet            Discharge Instructions       You are discharged on Roxicodone for pain, you can continue to take tylenol but avoid ASA and Advil.  You will be contacted by oncology for  follow up appointment and biopsy results   Discuss with your PCP regarding Roxicodone refill.  You are also started on bowel regimen as Roxicodone can cause constipation , you should work on having soft bowel movements daily due to presence of mass in your colon and preventing obstruction and pain.                  Follow-up Appointments     Follow-up and recommended labs and tests        Follow up with primary care provider, RIGOBERTO SEN, within 7 days for hospital follow- up.  The following labs/tests are recommended: LFTs.                  Further instructions from your care team       1.Refer to the education attachment on Oxycodone for additiional information    2.Call 911 or go to the hospital right away if you:    Can t pass stool and are vomiting    Are vomiting blood or have bloody diarrhea or black, tarry diarrhea    Have chest, neck, or shoulder pain    Feel like you might pass out    Have pain in your shoulder blades with nausea    Have sudden, severe belly pain    Have new, severe pain unlike any you have felt before    Have a belly that is rigid, hard, and tender to touch  3. Call your healthcare provider if you have:    Pain for more than 5 days    Bloating for more than 2 days    Diarrhea for more than 5 days    A fever of 100.4 F (38.0 C) or higher, or as directed by your provider    Pain that gets worse    Weight loss for no reason    Continued lack of appetite    Blood in your stool    4. Also let your provider know if you have other symptoms such as:    Fever    Tiredness    Upset stomach (nausea)    Vomiting    Changes in bathroom habits            Pending Results     Date and Time Order Name Status Description    9/30/2017 0837 Surgical pathology exam In process             Statement of Approval     Ordered          10/02/17 1148  I have reviewed and agree with all the recommendations and orders detailed in this document.  EFFECTIVE NOW     Approved and electronically signed by:  Carolyn Berman  "MD             Admission Information     Date & Time Provider Department Dept. Phone    2017 Carolyn Berman MD [place] [address] [facility] [phone]      Your Vitals Were     Blood Pressure Pulse Temperature Respirations Height Weight    154/65 (BP Location: Left arm) 71 97.4  F (36.3  C) (Oral) 16 1.778 m (5' 10\") 111.3 kg (245 lb 6.4 oz)    Pulse Oximetry BMI (Body Mass Index)                91% 35.21 kg/m2          AGLOGIC Information     AGLOGIC lets you send messages to your doctor, view your test results, renew your prescriptions, schedule appointments and more. To sign up, go to [email or web address] . Click on \"Log in\" on the left side of the screen, which will take you to the Welcome page. Then click on \"Sign up Now\" on the right side of the page.     You will be asked to enter the access code listed below, as well as some personal information. Please follow the directions to create your username and password.     Your access code is: [email or web address]  Expires: 2017 12:39 PM     Your access code will  in 90 days. If you need help or a new code, please call your [place] clinic or [phone].        Care EveryWhere ID     This is your Care EveryWhere ID. This could be used by other organizations to access your [place] medical records  [id number]        Equal Access to Services     [facility] ZAYRA AH: Hadii anthony delaneyo Somoises, waaxda luqadaha, qaybta kaalmada ross, john cevallos . So [facility] [phone].    ATENCIÓN: Si habla español, tiene a schmitt disposición servicios gratuitos de asistencia lingüística. Llame al [phone].    We comply with applicable federal civil rights laws and [place] laws. We do not discriminate on the basis of race, color, national origin, age, disability, sex, sexual orientation, or gender identity.               Review of your medicines      START taking        Dose / Directions    oxyCODONE 5 MG IR tablet   Commonly known " as:  ROXICODONE   Used for:  Colonic mass   Notes to Patient:  Do not drive if taking this narcotic medication        Dose:  5 mg   Take 1 tablet (5 mg) by mouth every 4 hours as needed for moderate to severe pain   Quantity:  40 tablet   Refills:  0       polyethylene glycol Packet   Commonly known as:  MIRALAX/GLYCOLAX   Used for:  Colonic mass, Constipation due to opioid therapy        Dose:  17 g   Take 17 g by mouth daily   Quantity:  30 packet   Refills:  1       sennosides 8.6 MG tablet   Commonly known as:  SENOKOT   Used for:  Constipation due to opioid therapy   Notes to Patient:  Do not take if having increase diarrhea        Dose:  1 tablet   Take 1 tablet by mouth 2 times daily   Quantity:  60 tablet   Refills:  1         CONTINUE these medicines which have NOT CHANGED        Dose / Directions    ACETAMINOPHEN PO        Dose:  1000 mg   Take 1,000 mg by mouth every 4 hours as needed for pain ((Patient has been taking 4-12 tabs per day))   Refills:  0       CALCIUM-MAGNESIUM-ZINC PO        Dose:  1 tablet   Take 1 tablet by mouth daily   Refills:  0       CENTRUM SILVER per tablet   Notes to Patient:  Take with food        Dose:  1 tablet   Take 1 tablet by mouth daily.   Quantity:  100 tablet   Refills:  12       CHLORTHALIDONE PO        Dose:  25 mg   Take 25 mg by mouth daily   Refills:  0       ferrous sulfate 75 (15 FE) MG/ML oral drops   Commonly known as:  APRIL-IN-SOL   Notes to Patient:  Take with food        Dose:  15 mg   Take 15 mg by mouth 2 times daily   Refills:  0       lisinopril 10 MG tablet   Commonly known as:  PRINIVIL/ZESTRIL   Used for:  HTN (hypertension)        Dose:  10 mg   Take 1 tablet by mouth daily.   Quantity:  30 tablet   Refills:  11       SERTRALINE HCL PO        Dose:  50 mg   Take 50 mg by mouth daily (Takes half of a 100mg tablet for a total of 50mg daily)   Refills:  0       VITAMIN D (CHOLECALCIFEROL) PO   Notes to Patient:  Take with food        Dose:  1 tablet    Take 1 tablet by mouth daily (OTC: Pt unsure of strength)   Refills:  0         STOP taking     ASPIRIN EC PO                Where to get your medicines      These medications were sent to [facility] [address] [place] [address]  [address] [facility] [address]     Phone:  [phone]     polyethylene glycol Packet    sennosides 8.6 MG tablet         Some of these will need a paper prescription and others can be bought over the counter. Ask your nurse if you have questions.     Bring a paper prescription for each of these medications     oxyCODONE 5 MG IR tablet                Protect others around you: Learn how to safely use, store and throw away your medicines at www.disposemymeds.org.             Medication List: This is a list of all your medications and when to take them. Check marks below indicate your daily home schedule. Keep this list as a reference.      Medications           Morning Afternoon Evening Bedtime As Needed    ACETAMINOPHEN PO   Take 1,000 mg by mouth every 4 hours as needed for pain ((Patient has been taking 4-12 tabs per day))   Last time this was given:  650 mg on 10/2/2017 10:53 AM                                   CALCIUM-MAGNESIUM-ZINC PO   Take 1 tablet by mouth daily                                   CENTRUM SILVER per tablet   Take 1 tablet by mouth daily.   Notes to Patient:  Take with food                                   CHLORTHALIDONE PO   Take 25 mg by mouth daily   Last time this was given:  25 mg on 10/2/2017 10:56 AM   Next Dose Due:  Tomorrow 10-3-17                                   ferrous sulfate 75 (15 FE) MG/ML oral drops   Commonly known as:  APRIL-IN-SOL   Take 15 mg by mouth 2 times daily   Notes to Patient:  Take with food                                      lisinopril 10 MG tablet   Commonly known as:  PRINIVIL/ZESTRIL   Take 1 tablet by mouth daily.   Last time this was given:  20 mg on 10/2/2017 10:57 AM   Next Dose Due:   Tomorrow 10-3-17                                   oxyCODONE 5 MG IR tablet   Commonly known as:  ROXICODONE   Take 1 tablet (5 mg) by mouth every 4 hours as needed for moderate to severe pain   Last time this was given:  5 mg on 10/2/2017 10:53 AM   Notes to Patient:  Do not drive if taking this narcotic medication                                   polyethylene glycol Packet   Commonly known as:  MIRALAX/GLYCOLAX   Take 17 g by mouth daily   Last time this was given:  17 g on 10/2/2017 10:59 AM   Next Dose Due:  Tomorrow 10-3-17                                   sennosides 8.6 MG tablet   Commonly known as:  SENOKOT   Take 1 tablet by mouth 2 times daily   Next Dose Due:  Tonight 10-2-17   Notes to Patient:  Do not take if having increase diarrhea                                      SERTRALINE HCL PO   Take 50 mg by mouth daily (Takes half of a 100mg tablet for a total of 50mg daily)   Last time this was given:  50 mg on 10/2/2017 10:55 AM   Next Dose Due:  Tomorrow 10-3-17                                   VITAMIN D (CHOLECALCIFEROL) PO   Take 1 tablet by mouth daily (OTC: Pt unsure of strength)   Notes to Patient:  Take with food                                             More Information        Oxycodone tablets or capsules  What is this medicine?  OXYCODONE (ox i KOE done) is a pain reliever. It is used to treat moderate to severe pain.  How should I use this medicine?  Take this medicine by mouth with a glass of water. Follow the directions on the prescription label. You can take it with or without food. If it upsets your stomach, take it with food. Take your medicine at regular intervals. Do not take it more often than directed. Do not stop taking except on your doctor's advice.  Some brands of this medicine, like Oxecta, have special instructions. Ask your doctor or pharmacist if these directions are for you: Do not cut, crush or chew this medicine. Swallow only one tablet at a time. Do not wet, soak, or  lick the tablet before you take it.  A special MedGuide will be given to you by the pharmacist with each prescription and refill. Be sure to read this information carefully each time.  Talk to your pediatrician regarding the use of this medicine in children. Special care may be needed.  What side effects may I notice from receiving this medicine?  Side effects that you should report to your doctor or health care professional as soon as possible:    allergic reactions like skin rash, itching or hives, swelling of the face, lips, or tongue    breathing problems    confusion    signs and symptoms of low blood pressure like dizziness; feeling faint or lightheaded, falls; unusually weak or tired    trouble passing urine or change in the amount of urine    trouble swallowing  Side effects that usually do not require medical attention (report to your doctor or health care professional if they continue or are bothersome):    constipation    dry mouth    nausea, vomiting    tiredness  What may interact with this medicine?  This medicine may interact with the following medications:    alcohol    antihistamines for allergy, cough and cold    antiviral medicines for HIV or AIDS    atropine    certain antibiotics like clarithromycin, erythromycin, linezolid, rifampin    certain medicines for anxiety or sleep    certain medicines for bladder problems like oxybutynin, tolterodine    certain medicines for depression like amitriptyline, fluoxetine, sertraline    certain medicines for fungal infections like ketoconazole, itraconazole, voriconazole    certain medicines for migraine headache like almotriptan, eletriptan, frovatriptan, naratriptan, rizatriptan, sumatriptan, zolmitriptan    certain medicines for nausea or vomiting like dolasetron, ondansetron, palonosetron    certain medicines for Parkinson's disease like benztropine, trihexyphenidyl    certain medicines for seizures like phenobarbital, phenytoin, primidone    certain  medicines for stomach problems like dicyclomine, hyoscyamine    certain medicines for travel sickness like scopolamine    diuretics    general anesthetics like halothane, isoflurane, methoxyflurane, propofol    ipratropium    local anesthetics like lidocaine, pramoxine, tetracaine    MAOIs like Carbex, Eldepryl, Marplan, Nardil, and Parnate    medicines that relax muscles for surgery    methylene blue    nilotinib    other narcotic medicines for pain or cough    phenothiazines like chlorpromazine, mesoridazine, prochlorperazine, thioridazine  What if I miss a dose?  If you miss a dose, take it as soon as you can. If it is almost time for your next dose, take only that dose. Do not take double or extra doses.  Where should I keep my medicine?  Keep out of the reach of children. This medicine can be abused. Keep your medicine in a safe place to protect it from theft. Do not share this medicine with anyone. Selling or giving away this medicine is dangerous and against the law.  Store at room temperature between 15 and 30 degrees C (59 and 86 degrees F). Protect from light. Keep container tightly closed.  This medicine may cause accidental overdose and death if it is taken by other adults, children, or pets. Flush any unused medicine down the toilet to reduce the chance of harm. Do not use the medicine after the expiration date.  What should I tell my health care provider before I take this medicine?  They need to know if you have any of these conditions:    Justin's disease    brain tumor    head injury    heart disease    history of drug or alcohol abuse problem    if you often drink alcohol    kidney disease    liver disease    lung or breathing disease, like asthma    mental illness    pancreatic disease    seizures    thyroid disease    an unusual or allergic reaction to oxycodone, codeine, hydrocodone, morphine, other medicines, foods, dyes, or preservatives    pregnant or trying to get  pregnant    breast-feeding  What should I watch for while using this medicine?  Tell your doctor or health care professional if your pain does not go away, if it gets worse, or if you have new or a different type of pain. You may develop tolerance to the medicine. Tolerance means that you will need a higher dose of the medicine for pain relief. Tolerance is normal and is expected if you take this medicine for a long time.  Do not suddenly stop taking your medicine because you may develop a severe reaction. Your body becomes used to the medicine. This does NOT mean you are addicted. Addiction is a behavior related to getting and using a drug for a non-medical reason. If you have pain, you have a medical reason to take pain medicine. Your doctor will tell you how much medicine to take. If your doctor wants you to stop the medicine, the dose will be slowly lowered over time to avoid any side effects.  There are different types of narcotic medicines (opiates). If you take more than one type at the same time or if you are taking another medicine that also causes drowsiness, you may have more side effects. Give your health care provider a list of all medicines you use. Your doctor will tell you how much medicine to take. Do not take more medicine than directed. Call emergency for help if you have problems breathing or unusual sleepiness.  You may get drowsy or dizzy. Do not drive, use machinery, or do anything that needs mental alertness until you know how the medicine affects you. Do not stand or sit up quickly, especially if you are an older patient. This reduces the risk of dizzy or fainting spells. Alcohol may interfere with the effect of this medicine. Avoid alcoholic drinks.  This medicine will cause constipation. Try to have a bowel movement at least every 2 to 3 days. If you do not have a bowel movement for 3 days, call your doctor or health care professional.  Your mouth may get dry. Chewing sugarless gum or  sucking hard candy, and drinking plenty of water may help. Contact your doctor if the problem does not go away or is severe.  NOTE:This sheet is a summary. It may not cover all possible information. If you have questions about this medicine, talk to your doctor, pharmacist, or health care provider. Copyright  2017 [employer]

## 2017-09-29 NOTE — LETTER
[place] [address] [facility]  [address]  [facility] [address]  [phone]          October 2, 2017    RE:  Liv Sylvester                                                                                                                                                       [address]   [facility] [address]            To whom it may concern:    Liv Sylvester was admitted to the hospital and is discharged today.she has appointment with oncology on Thursday and would be able to return to work on Friday. Please don't hesitate to contact me if I can be of any further assistance       Sincerely,      Carolyn Berman MD.[facility] medicine

## 2017-09-29 NOTE — ED NOTES
"[facility]  ED Nurse Handoff Report    ED Chief complaint: Abdominal Pain (RUQ abdominal pain (sharp) >6 weeks. currently taking 2000 mg tylenol 2x daily, sometimes more for breakthrough pain.)      ED Diagnosis:   Final diagnoses:   RUQ abdominal pain       Code Status: TBD at admission    Allergies: No Known Allergies    Activity level - Baseline/Home:  Independent    Activity Level - Current:   Independent     Needed?: No    Isolation: No  Infection: Not Applicable    Bariatric?: No    Vital Signs:   Vitals:    09/29/17 1037 09/29/17 1122 09/29/17 1200   BP: 151/67 133/62    Resp: 16 18 19   Temp: 99  F (37.2  C)     TempSrc: Oral     SpO2: 98% 98% 96%   Weight: 111.3 kg (245 lb 6.4 oz)     Height: 1.778 m (5' 10\")         Cardiac Rhythm: ,        Pain level: 0-10 Pain Scale: 6    Is this patient confused?: No    Patient Report: Initial Complaint: Liv Sylvester is a 66 year old female who presents with abdominal pain. The patient states that she has had RUQ abdominal pain for the past 6 weeks. She has been taking 2000 mg of Tylenol 2x a day over this time for her pain, with her last dose yesterday, which alleviates her symptoms. She states that this pain is a sharp, stabbing pain that gets as high as a 9/10 and worsens after eating or while she is working. She states that this is a new pain for her as of 6 weeks ago. She states that the only abdominal surgery she has had in the past was a hysterectomy. She denies any chest pain, significant shortness of breath.  Newly diagnosed today with probable colon cancer with mets to the liver  Focused Assessment: RUQ pain, worse when lying around only a little better with movement, lungs clear, A/O x3  Tests Performed: labs, CT abdomen, US abdomen  Abnormal Results:   Results for orders placed or performed during the hospital encounter of 09/29/17 (from the past 24 hour(s))   EKG 12 lead   Result Value Ref Range    Interpretation ECG Click View " Image link to view waveform and result    CBC with platelets differential   Result Value Ref Range    WBC 8.3 4.0 - 11.0 10e9/L    RBC Count 3.54 (L) 3.8 - 5.2 10e12/L    Hemoglobin 8.5 (L) 11.7 - 15.7 g/dL    Hematocrit 25.8 (L) 35.0 - 47.0 %    MCV 73 (L) 78 - 100 fl    MCH 24.0 (L) 26.5 - 33.0 pg    MCHC 32.9 31.5 - 36.5 g/dL    RDW 17.4 (H) 10.0 - 15.0 %    Platelet Count 420 150 - 450 10e9/L    Diff Method Automated Method     % Neutrophils 73.8 %    % Lymphocytes 16.2 %    % Monocytes 8.6 %    % Eosinophils 1.0 %    % Basophils 0.2 %    % Immature Granulocytes 0.2 %    Nucleated RBCs 0 0 /100    Absolute Neutrophil 6.2 1.6 - 8.3 10e9/L    Absolute Lymphocytes 1.4 0.8 - 5.3 10e9/L    Absolute Monocytes 0.7 0.0 - 1.3 10e9/L    Absolute Eosinophils 0.1 0.0 - 0.7 10e9/L    Absolute Basophils 0.0 0.0 - 0.2 10e9/L    Abs Immature Granulocytes 0.0 0 - 0.4 10e9/L    Absolute Nucleated RBC 0.0    Comprehensive metabolic panel   Result Value Ref Range    Sodium 137 133 - 144 mmol/L    Potassium 3.7 3.4 - 5.3 mmol/L    Chloride 104 94 - 109 mmol/L    Carbon Dioxide 27 20 - 32 mmol/L    Anion Gap 6 3 - 14 mmol/L    Glucose 108 (H) 70 - 99 mg/dL    Urea Nitrogen 18 7 - 30 mg/dL    Creatinine 0.76 0.52 - 1.04 mg/dL    GFR Estimate 76 >60 mL/min/1.7m2    GFR Estimate If Black >90 >60 mL/min/1.7m2    Calcium 8.9 8.5 - 10.1 mg/dL    Bilirubin Total 0.3 0.2 - 1.3 mg/dL    Albumin 2.7 (L) 3.4 - 5.0 g/dL    Protein Total 7.9 6.8 - 8.8 g/dL    Alkaline Phosphatase 145 40 - 150 U/L    ALT 30 0 - 50 U/L    AST 54 (H) 0 - 45 U/L   Lipase   Result Value Ref Range    Lipase 74 73 - 393 U/L   Abdomen US, limited (RUQ only)    Narrative    ULTRASOUND ABDOMEN LIMITED 9/29/2017 12:55 PM    HISTORY: Right upper quadrant pain.    COMPARISON: CT abdomen and pelvis 3/12/2013.    FINDINGS:  Gallbladder: The gallbladder is partially contracted. There is a  medium level echo focus in the fundus of the gallbladder which may be  a sludge ball or a  polyp. No gallstones are seen.    Common bile duct: Normal at 0.5 cm.    Liver: There are multiple ill-defined hyperechoic foci scattered  throughout the liver, largest in the right lobe measuring 3.8 x 2.7 x  4.2 cm. The prior CT abdomen exam did not show any liver lesions. The  findings are suspicious for metastatic disease and CT of the abdomen  and pelvis is recommended.    Pancreas: Pancreatic tail is partially obscured by overlying bowel gas  and the visualized pancreas appears normal.    Right kidney: There is a 3.3 x 2.8 x 2.7 cm benign cyst in the mid to  lower pole of the right kidney. This was present on the prior CT in  2013 when it measured about 2 cm. The right kidney is otherwise  normal.      Impression    IMPRESSION:   1. Partially contracted gallbladder. Sludge ball versus polyp in the  gallbladder fundus. No definite gallstones.  2. Findings suspicious for liver metastases. Recommend CT abdomen and  pelvis with contrast.  3. Pancreatic tail is incompletely visualized.  4. 3.3 cm benign cyst right kidney, increased in size since 2013.   CT Abdomen Pelvis w Contrast    Narrative    CT ABDOMEN AND PELVIS WITH CONTRAST   9/29/2017 2:53 PM     HISTORY: Right upper quadrant pain.    TECHNIQUE:   CT of the abdomen and pelvis was performed following the  administration of 123 mL Isovue-370. Radiation dose for this scan was  reduced using automated exposure control, adjustment of the mA and/or  kV according to patient size, or iterative reconstruction technique.    COMPARISON: CT of the chest, abdomen and pelvis dated 9/18/2013.    FINDINGS:   There are multiple hypodense masses scattered throughout the liver.  The largest is in the right lobe near the dome and measures  approximately 6 cm in maximum dimension.    There are multiple enlarged mesenteric lymph nodes in the upper  abdomen just anterior to the superior mesenteric artery and superior  mesenteric vein. The largest of these measures 3.3 x 2.0 cm.  There are  also mildly prominent retroperitoneal para-aortic/paracaval lymph  nodes in the upper abdomen.    There is focal bowel wall thickening involving the colon at the level  of the hepatic flexure.    There are multiple cysts within the right kidney. Remaining solid  organs in the abdomen appear grossly unremarkable. There are a few  scattered colonic diverticuli. Remaining bowel including appendix  appear unremarkable. There is no free air or free fluid in the abdomen  or pelvis.      Impression    IMPRESSION: Focal bowel wall thickening involving the colon at the  hepatic flexure, multiple masses in the liver, and enlarged mesenteric  and retroperitoneal lymph nodes as above. Findings would be worrisome  for a primary colon cancer with metastatic disease to the liver and  mesenteric lymph nodes. The liver lesions would be amenable to  percutaneous biopsy.   Treatments provided: 0.5mg dilaudid for pain    Family Comments: family present in waiting room    OBS brochure/video discussed/provided to patient: No    ED Medications:   Medications   sodium chloride 0.9 % bag 500mL for CT scan flush use (77 mLs Intravenous Given 9/29/17 1445)   iopamidol (ISOVUE-370) solution 123 mL (123 mLs Intravenous Given 9/29/17 1445)   HYDROmorphone (PF) (DILAUDID) injection 0.5 mg (0.5 mg Intravenous Given 9/29/17 1612)       Drips infusing?:  No      ED NURSE PHONE NUMBER: *[id number]

## 2017-09-29 NOTE — ED NOTES
Pt.states that she has middle chest ( Epigastric area ) + RUQ pain for 6 weeks. Her PMD suggested her to come to ED.

## 2017-09-29 NOTE — H&P
[facility]  History and Physical  Hospitalist       Date of Admission:  9/29/2017    Assessment & Plan   Liv Sylvester is a delightful 66 year old female with h/o ovarian cancer who was admitted on 9/29/2017 for 6 weeks RUQ pain and finding of large liver masses, bowel wall thickening, microcytic anemia.     Liver masses with RUQ pain, intractable  Bowel Wall thickening  Microcytic Anemia  Patient with six weeks of progressive fatigue and right upper quadrant pain as well as finding of microcytic anemia without any gross blood loss observed. Now with ultrasound and CT imaging revealing multiple liver masses, focal bowel wall thickening involving the colon at the hepatic flexure, enlarged mesenteric and retroperitoneal lymph nodes concerning for a primary colon cancer with metastatic disease.  Does not have any dizziness, lightheadedness nor tachycardia and no indication for acute blood transfusion at this time.  Though her hemoglobin of 8.5 may be hemoconcentrated given her low oral intake.  Albumin is low at 2.7 with otherwise normal LFTs except for slightly elevated AST 54.  -NPO for probable EGD +/- colonoscopy  -GI consult (Dr. Celis)  -Oncology consultation ([facility])  -Discussed with the above consultants possibility of percutaneous biopsy of liver masses.  -IV morphine as needed for pain  -IV fluids given n.p.o. status.  -Hold iron supplementation for now as it may be adding to her nausea  -P.r.n. Zofran and Compazine  -Hold aspirin  -Recheck CBC, LFTs, BMP in a.m.    Ovarian Cancer History  -We'll need to obtain outside records from her previous oncology provider.  I believe this was done in [place].    Hypertension  Blood pressure at this time is suboptimal (150s/60s) but she is in considerable pain.  PTA medications include chlorthalidone 25 mg daily, lisinopril 10 mg daily.  -Hold chlorthalidone and lisinopril for now given n.p.o. Status  -P.r.n. IV hydralazine ordered      Depression  Chronic, stable.  PTA medication is sertraline 50 mg daily.  -Hold sertraline for now given n.p.o. Status.    Obesity  BMI 35    DVT prophylaxis: Pneumatic Compression Devices and Ambulate every shift  Code Status: DNR/DNI    Disposition: Expected discharge in 3-4 days for IV pain control, GI and oncology urgent evaluations.     Mary Ann Olivares MD  Text Page    ~~~~~~~~~~~~~~~~~~~~~~~~~~~~~~~~~~~~~~~~~~~~~~~~~~~~~  Primary Care Physician   RIGOBERTO SEN    Chief Complaint   RUQ pain x 6 weeks, fatigue    History is obtained from the patient and medical records.    History of Present Illness   Liv Sylvester is a 66 delightful year old female with history of hypertension, depression, ovarian cancer history (s/p DAPHNEY/BSO), obesity who presents with six weeks of worsening right upper quadrant pain associated with early satiety, weight loss, progressive fatigue.  She recently saw her primary care provider at [facility] and see on 9/20 for these symptoms and was found to have a low hemoglobin and guaiac positive stools.  At that time she was started on iron replacement and referred for colonoscopy which she has not yet had time to arrange.  She has been taking 2-4000 mg of acetaminophen daily to deal with the pain and it is still not controlled.  She works full-time in customer service at [employer] and has been unable to work due to this pain and extreme fatigue.  Endorses low-grade nausea, low appetite, early satiety.  Denies hematemesis, bright red blood per rectum, melena, fevers, chills, night sweats.  She reports a week ago her weight was 252 and today it is 245 pounds.  It has become increasingly difficult to have bowel movements and she has been taking over-the-counter stool softener and stimulants for this with minimal results.    Discussed with Dr. Ríos in the emergency department.  Routine labs revealed hemoglobin of 8.5 with an MCV of 73. An abdominal ultrasound revealed liver masses which prompted a  CT scan.  This showed focal bowel wall thickening involving the colon at the hepatic flexure, multiple liver masses, enlarged mesenteric and retroperitoneal lymph nodes.  Radiology read indicates findings are worrisome for primary colon cancer with metastatic disease.    She is a former smoker and quit 15 years ago.  Denies any alcohol use.  No recent travel or ill contacts.  She has a large family (17 brothers and sisters) and reports no known malignancies in any of them.    Past Medical History    I have reviewed this patient's medical history and updated it with pertinent information if needed.   Past Medical History:   Diagnosis Date     Flash pulmonary edema (H) 4/2013    With concurrent severe HTN, complicating DAPHNEY/BSO     HTN (hypertension)      Obesity        Past Surgical History   I have reviewed this patient's surgical history and updated it with pertinent information if needed.  Past Surgical History:   Procedure Laterality Date     HYSTERECTOMY, DAPHNEY  4/2013    with BSO       Prior to Admission Medications   Prior to Admission Medications   Prescriptions Last Dose Informant Patient Reported? Taking?   Multiple Vitamins-Minerals (CENTRUM SILVER) per tablet   Yes No   Sig: Take 1 tablet by mouth daily.   ascorbic acid (VITAMIN C) 500 MG tablet   Yes No   Sig: Take  by mouth daily.   aspirin 81 MG tablet   Yes No   Sig: Take  by mouth daily.   cyanocobalamin (B-12) 1000 MCG TBCR   Yes No   Sig: Take 1,000 mcg by mouth daily.   ferrous gluconate (FERGON) 324 (38 FE) MG tablet   No No   Sig: Take 1 tablet by mouth 3 times daily (with meals).   lisinopril (PRINIVIL,ZESTRIL) 10 MG tablet   No No   Sig: Take 1 tablet by mouth daily.      Facility-Administered Medications: None     Allergies   No Known Allergies    Social History   I have reviewed this patient's social history and updated it with pertinent information if needed. Liv Sylvester  reports that she quit smoking about 12 years ago. She does not have  any smokeless tobacco history on file. She reports that she does not drink alcohol or use illicit drugs.    Family History   I have reviewed this patient's family history and updated it with pertinent information if needed.   Family History   Problem Relation Age of Onset     CEREBROVASCULAR DISEASE Father       age 66     HEART DISEASE Mother       of MI, age 72     DIABETES Mother      Hypertension Mother        Review of Systems   The 10 point Review of Systems is negative other than noted in the HPI or here.     Physical Exam   Temp: 99  F (37.2  C) Temp src: Oral BP: 133/62   Heart Rate: 80 Resp: 19 SpO2: 96 % O2 Device: None (Room air)    Vital Signs with Ranges  Temp:  [99  F (37.2  C)] 99  F (37.2  C)  Heart Rate:  [] 80  Resp:  [16-19] 19  BP: (133-151)/(62-67) 133/62  SpO2:  [96 %-98 %] 96 %  245 lbs 6.4 oz    Constitutional: Obese, Alert, NAD, pleasant and interactive  Eyes: PERRL, sclerae anicteric  HEENT: mmm, atraumatic  Respiratory: Lungs CTAB, no wheezes or crackles  Cardiovascular: RRR, no murmurs  no LE edema  GI: Obese, soft, tender to palpation in the right upper quadrant and epigastric area, no rebound or guarding  Skin/Integument: warm, dry, no acute rashes  Lymph/Hematologic: no supra or infraclavicular lymphadenopathy, no petechiae   Genitourinary: not examined  Musc: BRADFORD, normal limb strength x 4  Neuro: AOx3, no focal deficits, no tremors  Psych: not anxious, not confused      Data   Data reviewed today:  I personally reviewed the abdominal CT image(s) showing Thickened colon bowel wall and multiple liver masses, retroperitoneal lymphadenopathy..    Recent Labs  Lab 17  1210   WBC 8.3   HGB 8.5*   MCV 73*         POTASSIUM 3.7   CHLORIDE 104   CO2 27   BUN 18   CR 0.76   ANIONGAP 6   JESÚS 8.9   *   ALBUMIN 2.7*   PROTTOTAL 7.9   BILITOTAL 0.3   ALKPHOS 145   ALT 30   AST 54*   LIPASE 74       Imaging:  Recent Results (from the past 24 hour(s))    Abdomen US, limited (RUQ only)    Narrative    ULTRASOUND ABDOMEN LIMITED 9/29/2017 12:55 PM    HISTORY: Right upper quadrant pain.    COMPARISON: CT abdomen and pelvis 3/12/2013.    FINDINGS:  Gallbladder: The gallbladder is partially contracted. There is a  medium level echo focus in the fundus of the gallbladder which may be  a sludge ball or a polyp. No gallstones are seen.    Common bile duct: Normal at 0.5 cm.    Liver: There are multiple ill-defined hyperechoic foci scattered  throughout the liver, largest in the right lobe measuring 3.8 x 2.7 x  4.2 cm. The prior CT abdomen exam did not show any liver lesions. The  findings are suspicious for metastatic disease and CT of the abdomen  and pelvis is recommended.    Pancreas: Pancreatic tail is partially obscured by overlying bowel gas  and the visualized pancreas appears normal.    Right kidney: There is a 3.3 x 2.8 x 2.7 cm benign cyst in the mid to  lower pole of the right kidney. This was present on the prior CT in  2013 when it measured about 2 cm. The right kidney is otherwise  normal.      Impression    IMPRESSION:   1. Partially contracted gallbladder. Sludge ball versus polyp in the  gallbladder fundus. No definite gallstones.  2. Findings suspicious for liver metastases. Recommend CT abdomen and  pelvis with contrast.  3. Pancreatic tail is incompletely visualized.  4. 3.3 cm benign cyst right kidney, increased in size since 2013.   CT Abdomen Pelvis w Contrast    Narrative    CT ABDOMEN AND PELVIS WITH CONTRAST   9/29/2017 2:53 PM     HISTORY: Right upper quadrant pain.    TECHNIQUE:   CT of the abdomen and pelvis was performed following the  administration of 123 mL Isovue-370. Radiation dose for this scan was  reduced using automated exposure control, adjustment of the mA and/or  kV according to patient size, or iterative reconstruction technique.    COMPARISON: CT of the chest, abdomen and pelvis dated 9/18/2013.    FINDINGS:   There are  multiple hypodense masses scattered throughout the liver.  The largest is in the right lobe near the dome and measures  approximately 6 cm in maximum dimension.    There are multiple enlarged mesenteric lymph nodes in the upper  abdomen just anterior to the superior mesenteric artery and superior  mesenteric vein. The largest of these measures 3.3 x 2.0 cm. There are  also mildly prominent retroperitoneal para-aortic/paracaval lymph  nodes in the upper abdomen.    There is focal bowel wall thickening involving the colon at the level  of the hepatic flexure.    There are multiple cysts within the right kidney. Remaining solid  organs in the abdomen appear grossly unremarkable. There are a few  scattered colonic diverticuli. Remaining bowel including appendix  appear unremarkable. There is no free air or free fluid in the abdomen  or pelvis.      Impression    IMPRESSION: Focal bowel wall thickening involving the colon at the  hepatic flexure, multiple masses in the liver, and enlarged mesenteric  and retroperitoneal lymph nodes as above. Findings would be worrisome  for a primary colon cancer with metastatic disease to the liver and  mesenteric lymph nodes. The liver lesions would be amenable to  percutaneous biopsy.

## 2017-09-29 NOTE — IP AVS SNAPSHOT
[address], [address]    [facility] [address]    Phone:  [phone]                                       After Visit Summary   9/29/2017    Liv Sylvester    MRN: [id number]           After Visit Summary Signature Page     I have received my discharge instructions, and my questions have been answered. I have discussed any challenges I see with this plan with the nurse or doctor.    ..........................................................................................................................................  Patient/Patient Representative Signature      ..........................................................................................................................................  Patient Representative Print Name and Relationship to Patient    ..................................................               ................................................  Date                                            Time    ..........................................................................................................................................  Reviewed by Signature/Title    ...................................................              ..............................................  Date                                                            Time

## 2017-09-29 NOTE — PHARMACY-ADMISSION MEDICATION HISTORY
Admission medication history interview status for the 9/29/2017  admission is complete. See EPIC admission navigator for prior to admission medications     Medication history source reliability:Good    Actions taken by pharmacist (provider contacted, etc):Verified all meds with patient's retail pharmacy - [facility]     Additional medication history information not noted on PTA med list :None    Medication reconciliation/reorder completed by provider prior to medication history? No    Time spent in this activity: 20 minutes    Prior to Admission medications    Medication Sig Last Dose Taking? Auth Provider   ASPIRIN EC PO Take 81 mg by mouth daily ~2 days ago Yes Unknown, Entered By History   ferrous sulfate (APRIL-IN-SOL) 75 (15 FE) MG/ML oral drops Take 15 mg by mouth 2 times daily ~2 days ago Yes Unknown, Entered By History   CHLORTHALIDONE PO Take 25 mg by mouth daily ~2 days ago Yes Unknown, Entered By History   SERTRALINE HCL PO Take 50 mg by mouth daily (Takes half of a 100mg tablet for a total of 50mg daily) ~2 days ago Yes Unknown, Entered By History   CALCIUM-MAGNESIUM-ZINC PO Take 1 tablet by mouth daily ~2 days ago Yes Unknown, Entered By History   VITAMIN D, CHOLECALCIFEROL, PO Take 1 tablet by mouth daily (OTC: Pt unsure of strength) ~2 days ago Yes Unknown, Entered By History   ACETAMINOPHEN PO Take 1,000 mg by mouth every 4 hours as needed for pain ((Patient has been taking 4-12 tabs per day)) ~2 days ago Yes Unknown, Entered By History   lisinopril (PRINIVIL,ZESTRIL) 10 MG tablet Take 1 tablet by mouth daily. ~2 days ago Yes Adrian Fonseca MD   Multiple Vitamins-Minerals (CENTRUM SILVER) per tablet Take 1 tablet by mouth daily. ~2 days ago Yes Adrian Fonseca MD

## 2017-09-30 NOTE — PLAN OF CARE
Problem: Pain, Acute (Adult)  Goal: Identify Related Risk Factors and Signs and Symptoms  Related risk factors and signs and symptoms are identified upon initiation of Human [employer] Clinical Practice Guideline (CPG).  Outcome: No Change  Pt is alert and oriented X4. Denied pain.  VSS.  Was NPO for Colonoscopy this morning, then showered.  Tolerating mechanical dental soft diet.  Family visited at bedside.  Ambulated independently within room.  Plan to discharge to home tomorrow afternoon after oncologist rounds to discuss plan with family.  Nursing will continue to monitor.

## 2017-09-30 NOTE — PLAN OF CARE
Problem: Patient Care Overview  Goal: Plan of Care/Patient Progress Review  Outcome: No Change  Pt is alert and oriented X4.  T100.2, other VSS.  Pt complained of 5/10 abdominal pain, morphine given with some relief.  Bowel prep this evening for colonoscopy tomorrow at 9am.  Pt had a large emesis during her bowel prep, zofran given X1. They will take her down tomorrow at 8am.  NPO at midnight.  SBA with ambulation.  TELE-NSRWill continue to monitor.

## 2017-09-30 NOTE — PROGRESS NOTES
"[facility] Progress Note  [facility]     visited pt on consult request. Pt says she has been told she probably has liver cancer although she is still waiting for full confirmation. Pt says she is hopeful and says \"If God wants me to beat this I will\". Pt reflected briefly on her spiritual history, growing up in a [employer] home and accepting [employer] at age 15. Pt now attends [employer] and is very happy that the granddaughter she is raising is \"very active\" in the [employer].    Pt started to drift off and  suggested that someone visit again when she was not so groggy.  provided reflective listening and encouragement. Pt welcomed prayer. Pt's daughter, Jessica, arrived with food just as  was leaving.     (unit ) will follow up.    Kashmir Mondragon M.Div.  Chaplain Resident  [phone] Pager  "

## 2017-09-30 NOTE — CONSULTS
[facility]  HEMATOLOGY ONCOLOGY    ONCOLOGY CONSULTATION      DATE OF SERVICE:  09/30/2017      REQUESTING PHYSICIAN:  Mary Ann Olivares MD       REASON FOR CONSULTATION:  New diagnosis of colon cancer based on endoscopy findings and imaging.  Final pathology results are pending at this point.      HISTORY OF PRESENT ILLNESS:  The patient is a 66-year-old lady who has a history of ovarian cancer which was treated approximately 5 years ago with surgery and radiation that was a stage IA, grade 3 endometrial adenocarcinoma.  She had bilateral transabdominal hysterectomy and bilateral salpingo-oophorectomy and pelvic and periaortic lymphadenectomy followed by brachytherapy.  On 09/29/2017, she presented with progressive fatigue and right upper quadrant pain.  There was an ultrasound and CT scan performed 09/29/2017.  The ultrasound showed partially contracted gallbladder.  Findings were suspicious for liver metastases.  A CT scan was recommended.  There were multiple ill-defined hyperechoic foci scattered throughout the liver, largest was in the right lobe measuring 3.8 x 2.7 x 4.2 cm.  CT scan 09/29/2017 showed focal bowel wall thickening involving the colon at the hepatic flexure.   Multiple masses were noted in the liver and enlarged mesenteric and retroperitoneal lymph nodes were noted as well.  On 09/30/2017  she underwent a colonoscopy which showed a fungating infiltrative and ulcerated partially obstructing large mass at the hepatic flexure.  The mass was circumferential.  It was 7 cm in length.  Biopsies were taken.      The patient was seen and examined by me at bedside.  She was lying on the bed.  She stated that she continues to have intermittent pain in her right upper quadrant which was at that moment in fairly good control due to the recent intake of pain medication.      PAST MEDICAL HISTORY:  History of ovarian cancer as discussed above, hypertension, depression, obesity.       MEDICATIONS:  Reviewed.      ALLERGIES:  Reviewed.      SOCIAL HISTORY:  She works at [employer].  She lives in [place].  Nonsmoker.  She is , 7 grown kids, 3 kids live locally.  She is currently living with 2 of her granddaughters who she is raising.        FAMILY HISTORY:  Reviewed.  There is family history of coronary artery disease and stroke, but she does not recall any significant cancer history in her family. Patient could not recall any additional details about cancers in other first/second degree relatives including ages of onset.     REVIEW OF SYSTEMS:  A complete review of systems was performed and found to be negative other than pertinent positives mentioned in history of present illness.      PHYSICAL EXAMINATION:   VITAL SIGNS:  Blood pressure was 97.6, pulse 100, blood pressure 149/52.   CONSTITUTIONAL:  Lying on bed.  Appeared tired.   HEENT:  Oral mucosa was normal.   NECK:  No supraclavicular or cervical lymphadenopathy.   RESPIRATORY:  Clear lungs bilaterally.   CARDIOVASCULAR:  S1, S2, regular.   GASTROINTESTINAL:  Abdomen was soft, nontender, nondistended.   MUSCULOSKELETAL:  Warm, well perfused extremities.   NEUROLOGIC:  Alert and awake.   INTEGUMENT:  No rash.   LYMPHATICS:  No dependent edema.   PSYCHIATRIC:  Appeared depressed.      LABORATORY DATA:  Her creatinine was 0.78.  Her LFTs were normal.  She had CA-125 elevated to 42.  Her white cell count was 7.4, hemoglobin 8.1, platelet count 426,000.  Her CEA was elevated to 143.8.  CT scan results and ultrasound results were reviewed in history of present illness.      ECOG performance status 1.      ASSESSMENT AND RECOMMENDATIONS:  This is a 66-year-old lady, presented with right upper quadrant pain, and imaging is consistent with multiple liver metastases.  A colonoscopy has revealed a large near obstructing right hepatic flexure mass which is biopsied.  The biopsy results are pending at this point, but based on the findings on  endoscopy and imaging, this appears to be a new diagnosis of stage IV colon cancer.  She also has a history of stage I endometrial cancer which was treated approximately 5 years ago.      I had a discussion with this patient about these findings.  I discussed that we would like to follow up on final pathology report which will be available in the next few days.  Her pain  appears to be in good control with the existing pain regimen, but she appears to be quite tired as well.  I have called and discussed with Dr. Carolyn Berman and our plan is to watch her today to assess her need of pain medication.  I also plan to see her again tomorrow and offered to have a detailed discussion with the family members if they are able to come tomorrow as well.  We will continue to follow during this hospitalization.         SIDRA NAILS MD             D: 2017 13:10   T: 2017 14:11   MT: [place]      Name:     JESSICA MOYA   MRN:      [id number]        Account:       [id number]   :      1950           Consult Date:  2017      Document: [id number]       cc: Kevin Olivares MD

## 2017-09-30 NOTE — PROGRESS NOTES
"Pt is alert and oriented X4. Denied pain overnite. Bowel prep this evening for colonoscopy tomorrow at 9am. Had emesis early on during bowel prep, was able to tolerate rest, except for about 300ml leftover after midnight. Bowel movents continue roughly q1hour at 400; Pt stated, \"Im cleared out!\" Bowel consistency is clear/cloudy \"like lemonade\" at 0510. They will take her down tomorrow at 8am.  NPO at midnight.  SBA/IND with ambulation.  TELE-NSRWill continue to monitor.    "

## 2017-09-30 NOTE — PROGRESS NOTES
[facility]    Hospitalist Progress Note :     Cumulative Summary: Liv Sylvester is a 66 year old female who was admitted on 9/29/2017.  Her past medical history is significant for hypertension, depression, ovarian cancer history status post total abdominal hysterectomy/BSO, and obesity who was admitted to the hospital from the emergency room when she presented with six weeks of worsening right upper quadrant pain associated with the satiety, weight loss and progressive fatigue.  He is also found to have anemia and guaiac-positive stools recently by her PCP.  She was also taking Tylenol due to significant discomfort.  Further evaluation in the ER her abdominal ultrasound was concerning for liver masses which prompted a CT scan which showed focal bowel wall thickening involving the colon at the hepatic flexure, multiple liver masses, enlargement mesenteric and retroperitoneal lymph nodes.  She was admitted for further evaluation and management.  Gastroenterology was also consulted and patient underwent colonoscopy this morning.    Assessment & Plan      Liver masses with RUQ pain, intractable: Most likely secondary to metastatic disease from colon cancer  Bowel Wall thickening: pt underwent colonoscopy this morning and was found to have large ulcerated mass at the hepatic flexure.  Microcytic Anemia: Likely secondary to above  Ovarian Cancer History: stable    Patient with six weeks of progressive fatigue and right upper quadrant pain as well as finding of microcytic anemia without any gross blood loss observed. Now with ultrasound and CT imaging revealing multiple liver masses, focal bowel wall thickening involving the colon at the hepatic flexure, enlarged mesenteric and retroperitoneal lymph nodes concerning for a primary colon cancer with metastatic disease.  Does not have any dizziness, lightheadedness nor tachycardia and no indication for acute blood transfusion at this time.  Though her  hemoglobin of 8.5 may be hemoconcentrated given her low oral intake.  Albumin is low at 2.7 with otherwise normal LFTs except for slightly elevated AST 54.    -- Continue to monitor patient closely.  Gastroenterology saw the patient and reviewed colonoscopy results with patient.  -- Start patient on diet  -- Oncology consultation, patient was seen in Dr. Piedra this afternoon, again concern for high probability of colon cancer with metastasis to liver mass was biopsied and this morning and it will be following up on final pathology report.  -- Long discussion with patient regarding plan for discharge, at this time it seems like her daughters are involved with her care and she was also asking me if she could go back to work and we discussed about probably meeting with oncology tomorrow afternoon so her family is also aware of her current diagnosis and they have a chance to ask questions with oncology for possible diagnosis, possible treatment plans and prognosis.  -- Continue to hold aspirin  -- We'll discontinue the IV fluids  -- Start patient on oral oxycodone while she can continue to take some oral Tylenol LFTs are not significantly higher at this point.      Hypertension  Blood pressure at this time is suboptimal (150s/60s) but she is in considerable pain.  PTA medications include chlorthalidone 25 mg daily, lisinopril 10 mg daily.  - will start her home medications including Chlorthalidone and Lisinopril for now given n.p.o. Status  -P.r.n. IV hydralazine ordered      Depression  Chronic, stable.  PTA medication is sertraline 50 mg daily.  - start her on home Sertraline for now given n.p.o. Status.     Obesity  BMI 35     DVT Prophylaxis: Pneumatic Compression Devices and Ambulate every shift  Code Status: DNR/DNI    Disposition: Expected discharge tomorrow once patient and his family is seen by oncology     Carolyn Berman MD, FACP  Text Page (7am - 6pm)      Interval History   Patient Was assumed this morning,  patient was seen and examined, patient is denying any chest pain, palpitations or shortness of breath, she does be tired and fatigued, underwent colonoscopy this morning and is aware that she was found to have a fungating mass in the colon which can be cancer.  She told me that she does not know what stage her cancer is, I did explain that there is concern regarding metastasis in the liver from the colon.  She seemed worried about this information and I encouraged her to meet with oncology tomorrow along with her family members to discuss possible diagnosis of cancer and what are the possible options in case if she is dealing with metastatic colon cancer.      -Data reviewed today: I reviewed all new labs and imaging results over the last 24 hours.  I personally reviewed no images or EKG's today.    Physical Exam   Temp: 97.6  F (36.4  C) Temp src: Oral BP: 149/52   Heart Rate: 100 Resp: 12 SpO2: 92 % O2 Device: None (Room air)    Vitals:    09/29/17 1037   Weight: 111.3 kg (245 lb 6.4 oz)     Vital Signs with Ranges  Temp:  [97.5  F (36.4  C)-100.2  F (37.9  C)] 97.6  F (36.4  C)  Heart Rate:  [] 100  Resp:  [8-47] 12  BP: (116-154)/(52-81) 149/52  SpO2:  [88 %-99 %] 92 %       GENERAL: Alert , awake and oriented. NAD. Conversational, appropriate.   HEENT: Normocephalic. EOMI. No icterus or injection. Nares normal.   LUNGS: Clear to auscultation. No dyspnea at rest.   HEART: Regular rate. Extremities perfused.   ABDOMEN: Soft, nontender, and nondistended. Positive bowel sounds.   EXTREMITIES: No LE edema noted.   NEUROLOGIC: Moves extremities x4 on command. No acute focal neurologic abnormalities noted.     Medications     NaCl 100 mL/hr at 09/29/17 1835          Data     Recent Labs  Lab 09/30/17  0739 09/29/17  1210   WBC 7.4 8.3   HGB 8.1* 8.5*   MCV 73* 73*    420   INR  --  1.05    137   POTASSIUM 3.5 3.7   CHLORIDE 104 104   CO2 27 27   BUN 13 18   CR 0.78 0.76   ANIONGAP 8 6   JESÚS 8.7 8.9    GLC 88 108*   ALBUMIN 2.5* 2.7*   PROTTOTAL 7.1 7.9   BILITOTAL 0.6 0.3   ALKPHOS 120 145   ALT 24 30   AST 41 54*   LIPASE  --  74       Imaging:   Recent Results (from the past 24 hour(s))   CT Abdomen Pelvis w Contrast    Narrative    CT ABDOMEN AND PELVIS WITH CONTRAST   9/29/2017 2:53 PM     HISTORY: Right upper quadrant pain.    TECHNIQUE:   CT of the abdomen and pelvis was performed following the  administration of 123 mL Isovue-370. Radiation dose for this scan was  reduced using automated exposure control, adjustment of the mA and/or  kV according to patient size, or iterative reconstruction technique.    COMPARISON: CT of the chest, abdomen and pelvis dated 9/18/2013.    FINDINGS:   There are multiple hypodense masses scattered throughout the liver.  The largest is in the right lobe near the dome and measures  approximately 6 cm in maximum dimension.    There are multiple enlarged mesenteric lymph nodes in the upper  abdomen just anterior to the superior mesenteric artery and superior  mesenteric vein. The largest of these measures 3.3 x 2.0 cm. There are  also mildly prominent retroperitoneal para-aortic/paracaval lymph  nodes in the upper abdomen.    There is focal bowel wall thickening involving the colon at the level  of the hepatic flexure.    There are multiple cysts within the right kidney. Remaining solid  organs in the abdomen appear grossly unremarkable. There are a few  scattered colonic diverticuli. Remaining bowel including appendix  appear unremarkable. There is no free air or free fluid in the abdomen  or pelvis.      Impression    IMPRESSION: Focal bowel wall thickening involving the colon at the  hepatic flexure, multiple masses in the liver, and enlarged mesenteric  and retroperitoneal lymph nodes as above. Findings would be worrisome  for a primary colon cancer with metastatic disease to the liver and  mesenteric lymph nodes. The liver lesions would be amenable to  percutaneous  biopsy.    KARLEE DERAS MD

## 2017-09-30 NOTE — PROGRESS NOTES
[facility]  Gastroenterology Progress Note     Liv Sylvester MRN# [id number]   YOB: 1950 Age: 66 year old          Assessment and Plan:     Liver masses  Suspected mass in colon with metastatic disease.  Anemia, likely due to chronic GI bleed  Patient did well with prep.  Plan for colonoscopy today.           RUQ abdominal pain      Interval History:   pain is controlled and doing well; no cp, sob, n/v/d, or abd pain.              Review of Systems:   C: NEGATIVE for fever, chills, change in weight  E/M: NEGATIVE for ear, mouth and throat problems  R: NEGATIVE for significant cough or SOB  CV: NEGATIVE for chest pain, palpitations or peripheral edema             Medications:   I have reviewed this patient's current medications    sodium chloride (PF)  3 mL Intravenous Q8H                  Physical Exam:   Vitals were reviewed  Vital Signs with Ranges  Temp:  [97.5  F (36.4  C)-100.2  F (37.9  C)] 97.7  F (36.5  C)  Heart Rate:  [] 67  Resp:  [8-47] 8  BP: (116-154)/(52-81) 116/66  SpO2:  [88 %-99 %] 99 %     Constitutional: healthy, alert and no distress   Cardiovascular: negative, PMI normal. No lifts, heaves, or thrills. RRR. No murmurs, clicks gallops or rub  Respiratory: negative, Percussion normal. Good diaphragmatic excursion. Lungs clear  Head: Normocephalic. No masses, lesions, tenderness or abnormalities  Neck: Neck supple. No adenopathy. Thyroid symmetric, normal size,, Carotids without bruits.  SKIN: no suspicious lesions or rashes           Data:   I reviewed the patient's new clinical lab test results.   Recent Labs   Lab Test  09/30/17   0739  09/29/17   1210  05/13/13   0837   WBC  7.4  8.3  4.8   HGB  8.1*  8.5*  10.2*   MCV  73*  73*  79   PLT  426  420  348   INR   --   1.05   --      Recent Labs   Lab Test  09/30/17   0739  09/29/17   1210  05/13/13   0837   POTASSIUM  3.5  3.7  4.7   CHLORIDE  104  104  107   CO2  27  27  26   BUN  13  18  17   ANIONGAP  8   6  12     Recent Labs   Lab Test  09/30/17   0739  09/29/17   1210  05/13/13   0837   ALBUMIN  2.5*  2.7*  3.9   BILITOTAL  0.6  0.3  0.5   ALT  24  30  29   AST  41  54*  14   LIPASE   --   74   --        I reviewed the patient's new imaging results.    All laboratory data reviewed  All imaging studies reviewed by me.    Kevin Celis MD,  9/30/2017  [facility]  Office : [phone]  Cell: [phone]

## 2017-09-30 NOTE — PROGRESS NOTES
Colonoscopy: Large Ulcerated mass at hepatic flexure. Malignant appearing.    A/P Oncology consult.    Kevin Celis MD FACP  [phone]

## 2017-10-01 NOTE — PROGRESS NOTES
[facility]  Gastroenterology Progress Note     Liv Sylvester MRN# [id number]   YOB: 1950 Age: 66 year old          Assessment and Plan:     Liver masses    RUQ abdominal pain   Feeling better somewhat with pain control. S/P colonoscopy.  Findings of colonoscopy d/w patient and her son and daughter in law.  No new Gi complaints.         RUQ abdominal pain      Interval History:   no new complaints, pain is controlled, up and ambulating, alert, oriented to person, place and time and has had a bowel movement in the last 24 hours              Review of Systems:   C: NEGATIVE for fever, chills, change in weight  E/M: NEGATIVE for ear, mouth and throat problems  R: NEGATIVE for significant cough or SOB  CV: NEGATIVE for chest pain, palpitations or peripheral edema             Medications:   I have reviewed this patient's current medications    acetaminophen  650 mg Oral 4x Daily     [START ON 10/2/2017] lisinopril  20 mg Oral Daily     [START ON 10/2/2017] polyethylene glycol  17 g Oral Daily     chlorthalidone (HYGROTON) tablet 25 mg  25 mg Oral Daily     sertraline (ZOLOFT) tablet 50 mg  50 mg Oral Daily                  Physical Exam:   Vitals were reviewed  Vital Signs with Ranges  Temp:  [97.7  F (36.5  C)-99  F (37.2  C)] 98.4  F (36.9  C)  Heart Rate:  [83-95] 89  Resp:  [16] 16  BP: (133-161)/(49-73) 140/49  SpO2:  [90 %-95 %] 91 %  I/O last 3 completed shifts:  In: 1000 [P.O.:1000]  Out: -   Constitutional: healthy, alert and no distress   Cardiovascular: negative, PMI normal. No lifts, heaves, or thrills. RRR. No murmurs, clicks gallops or rub  Respiratory: negative, Percussion normal. Good diaphragmatic excursion. Lungs clear  Abdomen: Abdomen soft, non-tender. BS normal. No masses, organomegaly           Data:   I reviewed the patient's new clinical lab test results.   Recent Labs   Lab Test  09/30/17   0739  09/29/17   1210  05/13/13   0837   WBC  7.4  8.3  4.8   HGB  8.1*   8.5*  10.2*   MCV  73*  73*  79   PLT  426  420  348   INR   --   1.05   --      Recent Labs   Lab Test  09/30/17   0739  09/29/17   1210  05/13/13   0837   POTASSIUM  3.5  3.7  4.7   CHLORIDE  104  104  107   CO2  27  27  26   BUN  13  18  17   ANIONGAP  8  6  12     Recent Labs   Lab Test  09/30/17   0739  09/29/17   1210  05/13/13   0837   ALBUMIN  2.5*  2.7*  3.9   BILITOTAL  0.6  0.3  0.5   ALT  24  30  29   AST  41  54*  14   LIPASE   --   74   --        I reviewed the patient's new imaging results.    All laboratory data reviewed  All imaging studies reviewed by me.    Kevin Celis MD,  10/1/2017  [facility]  Office : [phone]  Cell: [phone]

## 2017-10-01 NOTE — PROGRESS NOTES
[facility]  HEMATOLOGY ONCOLOGY    Patient was seen and examined at bedside. Her son  And daughter were present in the room.    She continues to have difficulty with pain control. Especially when she walks.     She has not used much of PRN oxycodone during last 24 hours.     I think we can have her take existing pain medicine more regularly. PT evaluation is reasonable as well.     If pain continues to be an issue then we can ask palliative care to help.    Discussed about the likely diagnosis of stage IV cancer of colon and incurable nature of disease.    Dr. Allen will see tomorrow if she is still in the hospital. Otherwise, our care coordinator will arrange for a follow up visit.     Radha Piedra MD  10/1/2017      Total time 25 minutes, more than 50% of which were spent in counselling and coordination of care.

## 2017-10-01 NOTE — PLAN OF CARE
Problem: Pain, Acute (Adult)  Goal: Identify Related Risk Factors and Signs and Symptoms  Related risk factors and signs and symptoms are identified upon initiation of Human Response Clinical Practice Guideline (CPG).   Outcome: No Change  Pt is alert and oriented X4. Sleeping between cares.  Denied pain at rest, but intense on right side of abdomen with activity.  Scheduled tylenol ordered.  Encouraging use of oxycodone q4-6 hours.  PT to assess.  PO Oxycodone given x2, effective.  VSS.  IV saline locked.  Mechanical soft diet, good appetite and tolerating well. Ambulated independently to bathroom, still having loose, watery diarrhea.  Shower this afternoon.  Family met with Oncologist today.  Plan to discharge to home tomorrow.  Nursing will continue to monitor.

## 2017-10-01 NOTE — PROGRESS NOTES
[facility]    Hospitalist Progress Note :     Cumulative Summary: Liv Sylvester is a 66 year old female who was admitted on 9/29/2017.  Her past medical history is significant for hypertension, depression, ovarian cancer history status post total abdominal hysterectomy/BSO, and obesity who was admitted to the hospital from the emergency room when she presented with six weeks of worsening right upper quadrant pain associated with the satiety, weight loss and progressive fatigue.  He is also found to have anemia and guaiac-positive stools recently by her PCP.  She was also taking Tylenol due to significant discomfort.  Further evaluation in the ER her abdominal ultrasound was concerning for liver masses which prompted a CT scan which showed focal bowel wall thickening involving the colon at the hepatic flexure, multiple liver masses, enlargement mesenteric and retroperitoneal lymph nodes.  She was admitted for further evaluation and management.  Gastroenterology was also consulted and patient underwent colonoscopy.  It showed villous, fungating, infiltrative , sassile and ulcerated partially obstructing large mass at the hepatic flexure bouts 7 cm in length. Biopsies were taken with cold forceps for pathology.Oncology reviewed the diagnosis for high probability of mass being colon cancer with liver metastasis with patient and family , biopsy is pending .    Assessment & Plan      Liver masses with RUQ pain, intractable: Most likely secondary to metastatic disease from colon cancer, found to have villous, fungating, infiltrative , sassile and ulcerated partially obstructing large mass at the hepatic flexure bouts 7 cm in length.Most likely her pain is sec to mass effect and also due to Liver involvement   Bowel Wall thickening: pt underwent colonoscopy this morning and was found to have large ulcerated mass at the hepatic flexure, highly concerning for colon cancer.  Microcytic Anemia: Likely secondary  to above  Ovarian Cancer History: stable      -- Continue to monitor patient closely.  Gastroenterology and oncology saw the patient and reviewed colonoscopy results with patient and family today   -- diet and activity orders  -- patient still seems to be in discomfort especially when she walks , she is started on Roxicodone but she has only used it 3 tablets yesterday .  -- encouraged her to take it more often.  -- will also start patient on Tylenol routine  -- As patient is started on narcotics , will start her on stool softeners from tomorrow as she does have non obstructing mass and its better to avoid constipation.  -- Appreciate GI and Oncology help  -- Continue to hold aspirin, will probably not start her on it due to risk of bleeding from Liver metastasis  -- will post the PT to evaluate patient for discharge as she is in discomfort when she walks.      Hypertension:  Blood pressure at this time is suboptimal (150s/60s) but she is in considerable pain.  PTA medications include chlorthalidone 25 mg daily, lisinopril 10 mg daily.    -- started her on Chlorthalidone and Lisinopril   -- will increase the dose of lisinopril to 20 mg po daily   -- P.r.n. IV hydralazine ordered      Depression  Chronic, stable.  PTA medication is sertraline 50 mg daily.  - continue on the current dose      Obesity  BMI 35     DVT Prophylaxis: Pneumatic Compression Devices and Ambulate every shift    Code Status: DNR/DNI    Disposition: Expected discharge tomorrow once patient is seen by the PT and her pain is better control    Carolyn Berman MD, FACP  Text Page (7am - 6pm)    Interval History   Patient was seen and examined, family present in the room today which was seen by oncology physician who discussed in detail regarding the high probable diagnosis of colon cancer with metastasis to the liver.  Family requested to keep patient comfortable and take care of her.  Her only complaint is discomfort and pain when she moves and walks.   She has only used oxycodone three times in the last day, but discussed about using it more frequently, patient is otherwise denying any nausea or vomiting is able to eat.      -Data reviewed today: I reviewed all new labs and imaging results over the last 24 hours.    I personally reviewed no images or EKG's today.    Physical Exam   Temp: 98.3  F (36.8  C) Temp src: Oral BP: 161/67   Heart Rate: 87 Resp: 16 SpO2: 95 % O2 Device: None (Room air)    Vitals:    09/29/17 1037   Weight: 111.3 kg (245 lb 6.4 oz)     Vital Signs with Ranges  Temp:  [97.7  F (36.5  C)-99  F (37.2  C)] 98.3  F (36.8  C)  Heart Rate:  [83-95] 87  Resp:  [16] 16  BP: (133-161)/(49-73) 161/67  SpO2:  [90 %-95 %] 95 %  I/O last 3 completed shifts:  In: 561 [P.O.:400; I.V.:161]  Out: -     GENERAL: Alert , awake and oriented. NAD. Conversational, appropriate. Sitting at the edge of the bed, family present in room   HEENT: Normocephalic. EOMI. No icterus or injection. Nares normal.   LUNGS: Clear to auscultation. No dyspnea at rest.   HEART: Regular rate. Extremities perfused.   ABDOMEN: Soft, positive tenderness on palpation of RUQ, no gaurding or rigidity , no rebound tenderness and nondistended. Positive bowel sounds.   EXTREMITIES: No LE edema noted.   NEUROLOGIC: Moves extremities x4 on command. No acute focal neurologic abnormalities noted.     Medications        acetaminophen  650 mg Oral 4x Daily     chlorthalidone (HYGROTON) tablet 25 mg  25 mg Oral Daily     lisinopril  10 mg Oral Daily     sertraline (ZOLOFT) tablet 50 mg  50 mg Oral Daily       Data     Recent Labs  Lab 09/30/17  0739 09/29/17  1210   WBC 7.4 8.3   HGB 8.1* 8.5*   MCV 73* 73*    420   INR  --  1.05    137   POTASSIUM 3.5 3.7   CHLORIDE 104 104   CO2 27 27   BUN 13 18   CR 0.78 0.76   ANIONGAP 8 6   JESÚS 8.7 8.9   GLC 88 108*   ALBUMIN 2.5* 2.7*   PROTTOTAL 7.1 7.9   BILITOTAL 0.6 0.3   ALKPHOS 120 145   ALT 24 30   AST 41 54*   LIPASE  --  74        Imaging:   No results found for this or any previous visit (from the past 24 hour(s)).

## 2017-10-01 NOTE — PLAN OF CARE
Problem: Patient Care Overview  Goal: Plan of Care/Patient Progress Review  Outcome: Improving   Pt is alert and oriented X4. C/o pain 7/10 in R side of abdomen, Oxycodone given x1, effective for relief.  VSS.  Colonoscopy this AM, mechanical soft diet, good appetite and tolerating well.  Family visited at bedside.  Ambulated independently in halls to lounge and in room.  Plan to discharge to home tomorrow afternoon after oncologist rounds to discuss plan with family.

## 2017-10-01 NOTE — PROGRESS NOTES
Pt is alert and oriented X4. C/o pain 5/10 in R side of abdomen, Oxycodone given x1, effective.  VSS.  Mechanical soft diet, good appetite and tolerating well. Up IND to BR. Still having loose, watery diarrhea from colonoscopy prep - less frequent this shift.  Plan to discharge to home tomorrow afternoon after oncologist rounds to discuss plan with family.  Nursing will continue to monitor.

## 2017-10-02 NOTE — PLAN OF CARE
Problem: Patient Care Overview  Goal: Plan of Care/Patient Progress Review  Outcome: Improving  Pt is alert and oriented X4. VSS.  Pt complained of 2/10 abdominal discomfort scheduled Tylenol given.  Writer encouraged patient to take oxycodone to help stay on top of pain.  Oxycodone given X2 (1800 and 2200).  Mechanical soft diet.  Independent with ambulation.  Will continue to monitor.

## 2017-10-02 NOTE — PLAN OF CARE
Problem: Patient Care Overview  Goal: Plan of Care/Patient Progress Review  Outcome: Adequate for Discharge Date Met:  10/02/17  Patient discharged today at [facility] 1330. Escorted in w/c by [employer]. Transportation via family. Discharge instructions given to patient in writing and reviewed verbally. Patient expressed understanding of all info received and had no further questions. All belongings sent home with patient. Pt was given the hard script for Oxycodone to take to her pharmacy to fill..     Pt is alert and oriented X4. VSS.  Right low rib cage discomfort controlled well with scheduled Tylenol given and prn Oxycodone.  Oxycodone given X1   Mechanical soft diet.  Independent with ambulation.

## 2017-10-02 NOTE — DISCHARGE SUMMARY
[facility]    Discharge Summary  Hospitalist    Date of Admission:  9/29/2017  Date of Discharge:  10/2/2017  Discharging Provider: Carolyn Berman MD,FACP    Discharge Diagnoses     Active Problems:    Liver masses , most likely secondary to metastatic disease    RUQ abdominal pain    Hepatic flexure ulcerated partial obstructing mass, high probability of colon cancer    Microcytic anemia    History of ovarian Cancer    History of Present Illness   Liv ySlvester is an 66 year old female who presented with right upper quadrant abdominal pain, please review history of present illness from H&P for further details regarding the admission.    Hospital Course   Liv Sylvester is a 66 year old female who was admitted on 9/29/2017. Her past medical history is significant for hypertension, depression, ovarian cancer history status post total abdominal hysterectomy/BSO, and obesity who was admitted to the hospital from the emergency room when she presented with six weeks of worsening right upper quadrant pain associated with the satiety, weight loss and progressive fatigue. She was also found to have anemia and guaiac-positive stools recently by her PCP.  She was also taking Tylenol due to significant discomfort.  Further evaluation in the ER her abdominal ultrasound was concerning for liver masses which prompted a CT scan which showed focal bowel wall thickening involving the colon at the hepatic flexure, multiple liver masses, enlargement mesenteric and retroperitoneal lymph nodes.  She was admitted for further evaluation and management.  Gastroenterology was also consulted and patient underwent colonoscopy.  It showed villous, fungating, infiltrative , sassile and ulcerated partially obstructing large mass at the hepatic flexure bouts 7 cm in length. Biopsies were taken with cold forceps for pathology.Oncology reviewed the diagnosis for high probability of mass being colon cancer with liver metastasis with  patient and family , biopsy is pending patient will be following up with oncology in the coming few days to review the results.  He was restarted on Roxicodone for better pain control and she was also started on laxatives to prevent constipation in the picture of colon mass.  Patient was seen and examined on the day of discharge, patient's pain is well controlled with the current medications I advised patient not to take aspirin or NSAIDs to decrease risk of bleeding from metastatic liver disease.  At this time oncology is okay with her taking small doses of Tylenol along with her Roxicodone.  She does not have any complaints and was in agreement with the discharge planning.    Carolyn Berman MD, FACP    Significant Results and Procedures   As below    Pending Results   These results will be followed up by Oncology    Unresulted Labs Ordered in the Past 30 Days of this Admission     Date and Time Order Name Status Description    9/30/2017 0837 Surgical pathology exam In process           Code Status   DNR       Primary Care Physician   RIGOBERTO SEN    Physical Exam   Temp: 97.4  F (36.3  C) Temp src: Oral BP: 154/65 Pulse: 71 Heart Rate: 86 Resp: 16 SpO2: 91 % O2 Device: None (Room air)    Vitals:    09/29/17 1037   Weight: 111.3 kg (245 lb 6.4 oz)     Vital Signs with Ranges  Temp:  [96.9  F (36.1  C)-98.4  F (36.9  C)] 97.4  F (36.3  C)  Pulse:  [71] 71  Heart Rate:  [86-89] 86  Resp:  [16] 16  BP: (140-161)/(49-67) 154/65  SpO2:  [90 %-95 %] 91 %  I/O last 3 completed shifts:  In: 600 [P.O.:600]  Out: -     Constitutional: Awake, alert, cooperative, no apparent distress, very pleasent  Respiratory: Clear to auscultation bilaterally, no crackles or wheezing  Cardiovascular: Regular rate and rhythm, normal S1 and S2, and no murmur noted  GI: Normal bowel sounds, soft, non-distended, non-tender  Skin/Integumen: No rashes, no cyanosis, no edema      Discharge Disposition   Discharged to home  Condition at discharge:  Stable    Consultations This Hospital Stay   GASTROENTEROLOGY IP CONSULT  HEMATOLOGY & ONCOLOGY IP CONSULT  GASTROENTEROLOGY IP CONSULT  SPIRITUAL HEALTH SERVICES IP CONSULT  HEMATOLOGY & ONCOLOGY IP CONSULT  PHYSICAL THERAPY ADULT IP CONSULT  SPIRITUAL HEALTH SERVICES IP CONSULT    Time Spent on this Encounter   I, Carolyn Fairbanksbal, personally saw the patient today and spent less than or equal to 30 minutes discharging this patient.    Discharge Orders     Reason for your hospital stay   You were admitted to the hospital due to abdominal pain and was found to have colon mass and liver involvement from the possible cancer.     Follow-up and recommended labs and tests    Follow up with primary care provider, RIGOBERTO SEN, within 7 days for hospital follow- up.  The following labs/tests are recommended: LFTs.     Activity   Your activity upon discharge: activity as tolerated     Discharge Instructions   You are discharged on Roxicodone for pain, you can continue to take tylenol but avoid ASA and Advil.  You will be contacted by oncology for follow up appointment and biopsy results   Discuss with your PCP regarding Roxicodone refill.  You are also started on bowel regimen as Roxicodone can cause constipation , you should work on having soft bowel movements daily due to presence of mass in your colon and preventing obstruction and pain.     DNR/DNI     Diet   Follow this diet upon discharge: Orders Placed This Encounter     Mechanical/Dental Soft Diet       Discharge Medications   Current Discharge Medication List      START taking these medications    Details   oxyCODONE (ROXICODONE) 5 MG IR tablet Take 1 tablet (5 mg) by mouth every 4 hours as needed for moderate to severe pain  Qty: 40 tablet, Refills: 0    Associated Diagnoses: Liver masses; Colonic mass      polyethylene glycol (MIRALAX/GLYCOLAX) Packet Take 17 g by mouth daily  Qty: 30 packet, Refills: 1    Associated Diagnoses: RUQ abdominal pain; Liver masses;  Colonic mass; Constipation due to opioid therapy      sennosides (SENOKOT) 8.6 MG tablet Take 1 tablet by mouth 2 times daily  Qty: 60 tablet, Refills: 1    Associated Diagnoses: Constipation due to opioid therapy         CONTINUE these medications which have NOT CHANGED    Details   ferrous sulfate (APRIL-IN-SOL) 75 (15 FE) MG/ML oral drops Take 15 mg by mouth 2 times daily      CHLORTHALIDONE PO Take 25 mg by mouth daily      SERTRALINE HCL PO Take 50 mg by mouth daily (Takes half of a 100mg tablet for a total of 50mg daily)      CALCIUM-MAGNESIUM-ZINC PO Take 1 tablet by mouth daily      VITAMIN D, CHOLECALCIFEROL, PO Take 1 tablet by mouth daily (OTC: Pt unsure of strength)      ACETAMINOPHEN PO Take 1,000 mg by mouth every 4 hours as needed for pain ((Patient has been taking 4-12 tabs per day))      lisinopril (PRINIVIL,ZESTRIL) 10 MG tablet Take 1 tablet by mouth daily.  Qty: 30 tablet, Refills: 11    Comments: Profile only, do not fill today  Associated Diagnoses: HTN (hypertension)      Multiple Vitamins-Minerals (CENTRUM SILVER) per tablet Take 1 tablet by mouth daily.  Qty: 100 tablet, Refills: 12         STOP taking these medications       ASPIRIN EC PO Comments:   Reason for Stopping:             Allergies   No Known Allergies  Data   Most Recent 3 CBC's:  Recent Labs   Lab Test  09/30/17   0739  09/29/17   1210  05/13/13   0837   WBC  7.4  8.3  4.8   HGB  8.1*  8.5*  10.2*   MCV  73*  73*  79   PLT  426  420  348      Most Recent 3 BMP's:  Recent Labs   Lab Test  09/30/17   0739  09/29/17   1210  05/13/13   0837   NA  139  137  144   POTASSIUM  3.5  3.7  4.7   CHLORIDE  104  104  107   CO2  27  27  26   BUN  13  18  17   CR  0.78  0.76  0.74   ANIONGAP  8  6  12   JESÚS  8.7  8.9  9.4   GLC  88  108*  109*     Most Recent 2 LFT's:  Recent Labs   Lab Test  09/30/17   0739  09/29/17   1210   AST  41  54*   ALT  24  30   ALKPHOS  120  145   BILITOTAL  0.6  0.3     Most Recent INR's and Anticoagulation Dosing  History:  Anticoagulation Dose History     Recent Dosing and Labs Latest Ref Rng & Units 9/29/2017    INR 0.86 - 1.14 1.05        Most Recent 3 Troponin's:  Recent Labs   Lab Test  06/14/10   1207   TROPI  <0.012     Most Recent Cholesterol Panel:  Recent Labs   Lab Test  05/13/13   0837   CHOL  162   LDL  115   HDL  28*   TRIG  95     Most Recent 6 Bacteria Isolates From Any Culture (See EPIC Reports for Culture Details):No lab results found.  Most Recent TSH, T4 and A1c Labs:  Recent Labs   Lab Test  05/13/13   0837   TSH  0.87     Results for orders placed or performed during the hospital encounter of 09/29/17   Abdomen US, limited (RUQ only)    Narrative    ULTRASOUND ABDOMEN LIMITED 9/29/2017 12:55 PM    HISTORY: Right upper quadrant pain.    COMPARISON: CT abdomen and pelvis 3/12/2013.    FINDINGS:  Gallbladder: The gallbladder is partially contracted. There is a  medium level echo focus in the fundus of the gallbladder which may be  a sludge ball or a polyp. No gallstones are seen.    Common bile duct: Normal at 0.5 cm.    Liver: There are multiple ill-defined hyperechoic foci scattered  throughout the liver, largest in the right lobe measuring 3.8 x 2.7 x  4.2 cm. The prior CT abdomen exam did not show any liver lesions. The  findings are suspicious for metastatic disease and CT of the abdomen  and pelvis is recommended.    Pancreas: Pancreatic tail is partially obscured by overlying bowel gas  and the visualized pancreas appears normal.    Right kidney: There is a 3.3 x 2.8 x 2.7 cm benign cyst in the mid to  lower pole of the right kidney. This was present on the prior CT in  2013 when it measured about 2 cm. The right kidney is otherwise  normal.      Impression    IMPRESSION:   1. Partially contracted gallbladder. Sludge ball versus polyp in the  gallbladder fundus. No definite gallstones.  2. Findings suspicious for liver metastases. Recommend CT abdomen and  pelvis with contrast.  3. Pancreatic tail is  incompletely visualized.  4. 3.3 cm benign cyst right kidney, increased in size since 2013.    CLARK SNEED MD   CT Abdomen Pelvis w Contrast    Narrative    CT ABDOMEN AND PELVIS WITH CONTRAST   9/29/2017 2:53 PM     HISTORY: Right upper quadrant pain.    TECHNIQUE:   CT of the abdomen and pelvis was performed following the  administration of 123 mL Isovue-370. Radiation dose for this scan was  reduced using automated exposure control, adjustment of the mA and/or  kV according to patient size, or iterative reconstruction technique.    COMPARISON: CT of the chest, abdomen and pelvis dated 9/18/2013.    FINDINGS:   There are multiple hypodense masses scattered throughout the liver.  The largest is in the right lobe near the dome and measures  approximately 6 cm in maximum dimension.    There are multiple enlarged mesenteric lymph nodes in the upper  abdomen just anterior to the superior mesenteric artery and superior  mesenteric vein. The largest of these measures 3.3 x 2.0 cm. There are  also mildly prominent retroperitoneal para-aortic/paracaval lymph  nodes in the upper abdomen.    There is focal bowel wall thickening involving the colon at the level  of the hepatic flexure.    There are multiple cysts within the right kidney. Remaining solid  organs in the abdomen appear grossly unremarkable. There are a few  scattered colonic diverticuli. Remaining bowel including appendix  appear unremarkable. There is no free air or free fluid in the abdomen  or pelvis.      Impression    IMPRESSION: Focal bowel wall thickening involving the colon at the  hepatic flexure, multiple masses in the liver, and enlarged mesenteric  and retroperitoneal lymph nodes as above. Findings would be worrisome  for a primary colon cancer with metastatic disease to the liver and  mesenteric lymph nodes. The liver lesions would be amenable to  percutaneous biopsy.    KARLEE DERAS MD

## 2017-10-02 NOTE — DISCHARGE INSTRUCTIONS
1.Refer to the education attachment on Oxycodone for additiional information    2.Call 911 or go to the hospital right away if you:    Can t pass stool and are vomiting    Are vomiting blood or have bloody diarrhea or black, tarry diarrhea    Have chest, neck, or shoulder pain    Feel like you might pass out    Have pain in your shoulder blades with nausea    Have sudden, severe belly pain    Have new, severe pain unlike any you have felt before    Have a belly that is rigid, hard, and tender to touch  3. Call your healthcare provider if you have:    Pain for more than 5 days    Bloating for more than 2 days    Diarrhea for more than 5 days    A fever of 100.4 F (38.0 C) or higher, or as directed by your provider    Pain that gets worse    Weight loss for no reason    Continued lack of appetite    Blood in your stool    4. Also let your provider know if you have other symptoms such as:    Fever    Tiredness    Upset stomach (nausea)    Vomiting    Changes in bathroom habits

## 2017-10-02 NOTE — PLAN OF CARE
"Problem: Patient Care Overview  Goal: Plan of Care/Patient Progress Review  Outcome: Improving  Pt slept well throughout shift. A/O x4. VSS.  Pt has consistent 2/10 abdominal discomfort, PRN oxycodone given x1 (0330) to stay on top of pain. Mechanical soft diet. Independent. Early this Am found patient getting sick, given IV zofran x1. Pt stated she felt sick since evening but \"didn't think much of it\". Nausea likely due to taking oxycodone on empty stomach. Plan for patient to discharge home today 10/2. Will continue to monitor.                 "

## 2017-10-02 NOTE — PROGRESS NOTES
SPIRITUAL HEALTH SERVICES Progress Note  [facility]    Visited with pt per request for Health Care Directive. Pt had been discharged and was waiting for her daughter to pick her up. A staff member had requested that we give pt the directive for her to consider. SH was not able to go through the directive with her at the time but pt said she'd look it over. SH prayed with pt.    Nela Lynn  Chaplain Resident

## 2017-10-04 NOTE — MR AVS SNAPSHOT
After Visit Summary   10/4/2017    Liv Sylvester    MRN: [id number]           Patient Information     Date Of Birth          1950        Visit Information        Provider Department      10/4/2017 1:00 PM Radha Piedra MD [facility]        Today's Diagnoses     Malignant neoplasm of hepatic flexure (H)    -  1    Iron deficiency anemia due to chronic blood loss        Reactive airway disease without complication, unspecified asthma severity, unspecified whether persistent          Care Instructions    1- Start Iron sulfate 325 mg BID  2- Use over the counter pericolcae 1-2 times daily for constipation  3- CT of chest without contrast   4- Schedule for port placement  5- Start FOLFOX with Avastin every two weeks- chemo education   6- RTC MD next week with infusion clinic appointment  7- Labs today- CBC diff, CMP   8- Please check with pathology about K-Mark status on biopsy specimen              Follow-ups after your visit        Your next 10 appointments already scheduled     Oct 05, 2017 10:15 AM CDT   CT CHEST W/O CONTRAST with SHCT1   [facility] CT ([facility])    [address] [place] [id number] [phone]           Please bring any scans or X-rays taken at other hospitals, if similar tests were done. Also bring a list of your medicines, including vitamins, minerals and over-the-counter drugs. It is safest to leave personal items at home.  Be sure to tell your doctor:   If you have any allergies.   If there s any chance you are pregnant.   If you are breastfeeding.   If you have any special needs.  You do not need to do anything special to prepare.  Please wear loose clothing, such as a sweat suit or jogging clothes. Avoid snaps, zippers and other metal. We may ask you to undress and put on a hospital gown.            Oct 11, 2017  9:30 AM CDT   Return Visit with Radha Piedra MD   [facility] ([facility])    [facility]  "[facility]  [address]  [place] [address]   [phone]              Future tests that were ordered for you today     Open Future Orders        Priority Expected Expires Ordered    IR Chest Port Placement > 5 Yrs of Age Routine  10/4/2018 10/4/2017    CT Chest w/o contrast Routine  10/4/2018 10/4/2017            Who to contact     If you have questions or need follow up information about today's clinic visit or your schedule please contact [facility] directly at [phone].  Normal or non-critical lab and imaging results will be communicated to you by JustFamilyhart, letter or phone within 4 business days after the clinic has received the results. If you do not hear from us within 7 days, please contact the clinic through diaDexust or phone. If you have a critical or abnormal lab result, we will notify you by phone as soon as possible.  Submit refill requests through [employer] or call your pharmacy and they will forward the refill request to us. Please allow 3 business days for your refill to be completed.          Additional Information About Your Visit        MyChart Information     [employer] lets you send messages to your doctor, view your test results, renew your prescriptions, schedule appointments and more. To sign up, go to [email or web address] . Click on \"Log in\" on the left side of the screen, which will take you to the Welcome page. Then click on \"Sign up Now\" on the right side of the page.     You will be asked to enter the access code listed below, as well as some personal information. Please follow the directions to create your username and password.     Your access code is: [email or web address]  Expires: 2017 12:39 PM     Your access code will  in 90 days. If you need help or a new code, please call your [facility] or [phone].        Care EveryWhere ID     This is your Care EveryWhere ID. This could be used by other organizations to access your " [facility] medical records  [id number]        Your Vitals Were     Pulse Temperature Respirations Pulse Oximetry BMI (Body Mass Index)       115 98.3  F (36.8  C) (Oral) 18 95% 34.84 kg/m2        Blood Pressure from Last 3 Encounters:   10/04/17 132/77   10/02/17 154/65   05/16/13 124/68    Weight from Last 3 Encounters:   10/04/17 110.1 kg (242 lb 12.8 oz)   09/29/17 111.3 kg (245 lb 6.4 oz)   05/16/13 108.6 kg (239 lb 8 oz)              We Performed the Following     CBC with platelets differential     Comprehensive metabolic panel          Today's Medication Changes          These changes are accurate as of: 10/4/17  2:13 PM.  If you have any questions, ask your nurse or doctor.               Start taking these medicines.        Dose/Directions    albuterol 108 (90 BASE) MCG/ACT Inhaler   Commonly known as:  PROAIR HFA/PROVENTIL HFA/VENTOLIN HFA   Used for:  Reactive airway disease without complication, unspecified asthma severity, unspecified whether persistent        Dose:  2 puff   Inhale 2 puffs into the lungs every 6 hours as needed for shortness of breath / dyspnea or wheezing   Quantity:  1 Inhaler   Refills:  3         These medicines have changed or have updated prescriptions.        Dose/Directions    * ferrous sulfate 75 (15 FE) MG/ML oral drops   Commonly known as:  APRIL-IN-SOL   This may have changed:  Another medication with the same name was added. Make sure you understand how and when to take each.        Dose:  15 mg   Take 15 mg by mouth 2 times daily   Refills:  0       * ferrous sulfate 325 (65 FE) MG tablet   Commonly known as:  IRON   This may have changed:  You were already taking a medication with the same name, and this prescription was added. Make sure you understand how and when to take each.   Used for:  Iron deficiency anemia due to chronic blood loss        Dose:  325 mg   Take 1 tablet (325 mg) by mouth 2 times daily   Quantity:  100 tablet   Refills:  11       * Notice:  This list  has 2 medication(s) that are the same as other medications prescribed for you. Read the directions carefully, and ask your doctor or other care provider to review them with you.      Stop taking these medicines if you haven't already. Please contact your care team if you have questions.     polyethylene glycol Packet   Commonly known as:  MIRALAX/GLYCOLAX                Where to get your medicines      These medications were sent to [facility] [address] [place] [address]  [facility] [address]     Phone:  [phone]     albuterol 108 (90 BASE) MCG/ACT Inhaler    ferrous sulfate 325 (65 FE) MG tablet                Primary Care Provider Office Phone # Fax #    Amaya Howard [phone]       [facility] [address] [place], [place]. \Hospital Has a New Name and Outlook.\"" [id number]        Equal Access to Services     HAYDEE IVERSON : Hadii aad ku hadasho Somoises, waaxda luqadaha, qaybta kaalmada ross, john maya. So [facility] [phone].    ATENCIÓN: Si habla español, tiene a schmitt disposición servicios gratuitos de asistencia lingüística. Wiley al [phone].    We comply with applicable federal civil rights laws and [place] laws. We do not discriminate on the basis of race, color, national origin, age, disability, sex, sexual orientation, or gender identity.            Thank you!     Thank you for choosing [facility]  for your care. Our goal is always to provide you with excellent care. Hearing back from our patients is one way we can continue to improve our services. Please take a few minutes to complete the written survey that you may receive in the mail after your visit with us. Thank you!             Your Updated Medication List - Protect others around you: Learn how to safely use, store and throw away your medicines at www.disposemymeds.org.          This list is accurate as of: 10/4/17  2:13 PM.  Always use your most recent med list.                    Brand Name Dispense Instructions for use Diagnosis    ACETAMINOPHEN PO      Take 1,000 mg by mouth every 4 hours as needed for pain ((Patient has been taking 4-12 tabs per day))        albuterol 108 (90 BASE) MCG/ACT Inhaler    PROAIR HFA/PROVENTIL HFA/VENTOLIN HFA    1 Inhaler    Inhale 2 puffs into the lungs every 6 hours as needed for shortness of breath / dyspnea or wheezing    Reactive airway disease without complication, unspecified asthma severity, unspecified whether persistent       CALCIUM-MAGNESIUM-ZINC PO      Take 1 tablet by mouth daily        CENTRUM SILVER per tablet     100 tablet    Take 1 tablet by mouth daily.        CHLORTHALIDONE PO      Take 25 mg by mouth daily        * ferrous sulfate 75 (15 FE) MG/ML oral drops    APRIL-IN-SOL     Take 15 mg by mouth 2 times daily        * ferrous sulfate 325 (65 FE) MG tablet    IRON    100 tablet    Take 1 tablet (325 mg) by mouth 2 times daily    Iron deficiency anemia due to chronic blood loss       lisinopril 10 MG tablet    PRINIVIL/ZESTRIL    30 tablet    Take 1 tablet by mouth daily.    HTN (hypertension)       oxyCODONE 5 MG IR tablet    ROXICODONE    40 tablet    Take 1 tablet (5 mg) by mouth every 4 hours as needed for moderate to severe pain    Liver masses, Colonic mass       sennosides 8.6 MG tablet    SENOKOT    60 tablet    Take 1 tablet by mouth 2 times daily    Constipation due to opioid therapy       SERTRALINE HCL PO      Take 50 mg by mouth daily (Takes half of a 100mg tablet for a total of 50mg daily)        VITAMIN D (CHOLECALCIFEROL) PO      Take 1 tablet by mouth daily (OTC: Pt unsure of strength)        * Notice:  This list has 2 medication(s) that are the same as other medications prescribed for you. Read the directions carefully, and ask your doctor or other care provider to review them with you.

## 2017-10-04 NOTE — PROGRESS NOTES
"Oncology Rooming Note    October 4, 2017 12:57 PM   Liv Sylvester is a 66 year old female who presents for:    Chief Complaint   Patient presents with     Oncology Clinic Visit     Colon cancer     Initial Vitals: /77 (BP Location: Left arm, Patient Position: Sitting, Cuff Size: Adult Large)  Pulse 115  Temp 98.3  F (36.8  C) (Oral)  Resp 18  Wt 110.1 kg (242 lb 12.8 oz)  SpO2 95%  BMI 34.84 kg/m2 Estimated body mass index is 34.84 kg/(m^2) as calculated from the following:    Height as of 9/29/17: 1.778 m (5' 10\").    Weight as of this encounter: 110.1 kg (242 lb 12.8 oz). Body surface area is 2.33 meters squared.  No Pain (0) Comment: Data Unavailable   No LMP recorded.  Allergies reviewed: Yes  Medications reviewed: Yes    Medications: Medication refills not needed today.  Pharmacy name entered into [employer]:    [facility] [id number] [place] [address] [address] [address] [facility] [address]  [facility] [address] [place], [place] - [address] [facility]    Clinical concerns: None                 4 minutes for nursing intake (face to face time)     Monserrat Alonso MA    Medical Assistant Note:  Liv Sylvester presents today for lab visit.    Patient seen by provider today: Yes: .   present during visit today: Not Applicable.    Concerns: No Concerns.    Procedure:  Lab draw site: LAC, Needle type: BF, Gauge: 21 g gauze and coban applied.    Post Assessment:  Labs drawn without difficulty: Yes.    Discharge Plan:  Departure Mode: Ambulatory.    Face to Face Time: 4.    Monserrat Alonso MA        DISCHARGE PLAN:    1.) Aware to start Iron   2.) Aware to use otc pericolace 1-2 times for constipation  3.) CT chest w/o contrast  4.) Port placement ( [facility] consultants per Dr. Piedra)  5.) FOLFOX teaching done/ Information and book provided  6.) MD appt with treatment  7.) Labs drawn / CRISTINA VALERO  8.) RN will check w/ Path on KRAS    Next " appointments: See patient instruction section  Departure Mode: Ambulatory  Accompanied by: self  25 minutes for nursing discharge (face to face time)   Laina Mondragon RN

## 2017-10-04 NOTE — PATIENT INSTRUCTIONS
1- Start Iron sulfate 325 mg BID  2- Use over the counter pericolcae 1-2 times daily for constipation  3- CT of chest without contrast   4- Schedule for port placement  5- Start FOLFOX with Avastin every two weeks- chemo education   6- RTC MD next week with infusion clinic appointment  7- Labs today- CBC diff, CMP   8- Please check with pathology about K-Mark status on biopsy specimen- Pathology requested and order faxed. AG

## 2017-10-04 NOTE — PROGRESS NOTES
[facility]  HEMATOLOGY ONCOLOGY    ONCOLOGY FOLLOWUP NOTE      DATE OF SERVICE:  10/04/2017.      DIAGNOSIS:  Metastatic colon cancer in a patient with history of stage 1A grade 3 endometrial carcinoma approximately around 2012.        On 09/29/2017, Liv Sylvester presented with progressive fatigue and right upper quadrant pain.  CT scan 09/29/2017 showed partially contracted bladder and findings suspicious for liver metastases.  There were multiple defined hypoechoic foci scoped scattered throughout the liver, the largest was in the right lobe.  CT was also showing enlargement of hepatic and retroperitoneal lymph nodes.  On 09/30/2017, she underwent a colonoscopy, which showed a fungating, infiltrative, ulcerated, partially obstructing mass at the hepatic flexure.  Pathology is consistent with moderately differentiated adenocarcinoma with normal mismatch repair protein expression.      Significant iron deficiency anemia.      TREATMENT:  The patient to initiate palliative intent treatment with FOLFOX and Avastin.      SUBJECTIVE:  The patient was seen as a followup today.  She states that her pain is in better control.  She continues to be fatigued and gets exertional dyspnea.  She came alone today.  She states that her oral intake is good.     REVIEW OF SYSTEMS:  A complete review of systems was performed and found to be negative other than pertinent positives mentioned in history of present illness.      Past medical, social histories reviewed.     Meds- Reviewed.     PHYSICAL EXAMINATION:   VITAL SIGNS:  Blood pressure is 132/77, pulse 115, respirations 18, temperature 98.3.    CONSTITUTIONAL: Sitting comfortably.   HEENT: Pupils are equal. Oropharynx is clear.   NECK: No cervical or supraclavicular lymphadenopathy.   RESPIRATORY: Clear bilaterally.   CARD/VASC: S1, S2, regular.   GI: Soft, nontender, nondistended, no hepatosplenomegaly.   MUSKULOSKELETAL: Warm, well perfused.    NEUROLOGIC: Alert, awake.   INTEGUMENT: No rash.   LYMPHATICS: She has trace bilateral dependent edema.   PSYCH: She appeared quite stressed and fatigued.      LABORATORY DATA AND IMAGING REVIEWED DURING THIS VISIT:  Recent Labs   Lab Test  10/04/17   1333  09/30/17   0739   NA  138  139   POTASSIUM  3.4  3.5   CHLORIDE  101  104   CO2  27  27   ANIONGAP  10  8   BUN  18  13   CR  0.90  0.78   GLC  107*  88   JESÚS  9.7  8.7     Recent Labs   Lab Test  10/04/17   1333  09/30/17   0739  09/29/17   1210   03/12/13   2231   WBC  9.6  7.4  8.3   < >  6.2   HGB  9.3*  8.1*  8.5*   < >  9.4*   PLT  413  426  420   < >  286   MCV  72*  73*  73*   < >  78   NEUTROPHIL  73.8   --   73.8   --   61.0    < > = values in this interval not displayed.     Recent Labs   Lab Test  10/04/17   1333  09/30/17   0739  09/29/17   1210   BILITOTAL  0.3  0.6  0.3   ALKPHOS  182*  120  145   ALT  37  24  30   AST  65*  41  54*   ALBUMIN  2.8*  2.5*  2.7*         Results for orders placed or performed during the hospital encounter of 09/29/17   Abdomen US, limited (RUQ only)    Narrative    ULTRASOUND ABDOMEN LIMITED 9/29/2017 12:55 PM    HISTORY: Right upper quadrant pain.    COMPARISON: CT abdomen and pelvis 3/12/2013.    FINDINGS:  Gallbladder: The gallbladder is partially contracted. There is a  medium level echo focus in the fundus of the gallbladder which may be  a sludge ball or a polyp. No gallstones are seen.    Common bile duct: Normal at 0.5 cm.    Liver: There are multiple ill-defined hyperechoic foci scattered  throughout the liver, largest in the right lobe measuring 3.8 x 2.7 x  4.2 cm. The prior CT abdomen exam did not show any liver lesions. The  findings are suspicious for metastatic disease and CT of the abdomen  and pelvis is recommended.    Pancreas: Pancreatic tail is partially obscured by overlying bowel gas  and the visualized pancreas appears normal.    Right kidney: There is a 3.3 x 2.8 x 2.7 cm benign cyst in the  mid to  lower pole of the right kidney. This was present on the prior CT in  2013 when it measured about 2 cm. The right kidney is otherwise  normal.      Impression    IMPRESSION:   1. Partially contracted gallbladder. Sludge ball versus polyp in the  gallbladder fundus. No definite gallstones.  2. Findings suspicious for liver metastases. Recommend CT abdomen and  pelvis with contrast.  3. Pancreatic tail is incompletely visualized.  4. 3.3 cm benign cyst right kidney, increased in size since 2013.    CLARK SNEED MD   CT Abdomen Pelvis w Contrast    Narrative    CT ABDOMEN AND PELVIS WITH CONTRAST   9/29/2017 2:53 PM     HISTORY: Right upper quadrant pain.    TECHNIQUE:   CT of the abdomen and pelvis was performed following the  administration of 123 mL Isovue-370. Radiation dose for this scan was  reduced using automated exposure control, adjustment of the mA and/or  kV according to patient size, or iterative reconstruction technique.    COMPARISON: CT of the chest, abdomen and pelvis dated 9/18/2013.    FINDINGS:   There are multiple hypodense masses scattered throughout the liver.  The largest is in the right lobe near the dome and measures  approximately 6 cm in maximum dimension.    There are multiple enlarged mesenteric lymph nodes in the upper  abdomen just anterior to the superior mesenteric artery and superior  mesenteric vein. The largest of these measures 3.3 x 2.0 cm. There are  also mildly prominent retroperitoneal para-aortic/paracaval lymph  nodes in the upper abdomen.    There is focal bowel wall thickening involving the colon at the level  of the hepatic flexure.    There are multiple cysts within the right kidney. Remaining solid  organs in the abdomen appear grossly unremarkable. There are a few  scattered colonic diverticuli. Remaining bowel including appendix  appear unremarkable. There is no free air or free fluid in the abdomen  or pelvis.      Impression    IMPRESSION: Focal bowel wall  thickening involving the colon at the  hepatic flexure, multiple masses in the liver, and enlarged mesenteric  and retroperitoneal lymph nodes as above. Findings would be worrisome  for a primary colon cancer with metastatic disease to the liver and  mesenteric lymph nodes. The liver lesions would be amenable to  percutaneous biopsy.    KARLEE DERAS MD          ECOG performance status 1.      ASSESSMENT:  This is a 66-year-old lady with stage IV metastatic colon cancer with normal mismatch repair protein expression.  She has multiple liver metastases and metastasis in the abdominal lymph nodes.      We need to complete the staging and I will proceed with a CT of the chest for that.  We will also contact pathology for the status of KRAS and BRAF mutation testing.      I had a discussion with this lady about metastatic colon cancer and palliative intent of treatment, which is given with aim of controlling the disease and palliation of symptoms and hopefully prolonging the survival.  Discussed about a combination of FOLFOX with Avastin which is given every 2 weeks intravenously.  Had a discussion about the potential risks, benefits and side effects.  She asks appropriate questions and is willing to proceed.      She also has iron deficiency anemia due to chronic blood loss from her cancer.  I discussed the possibility of IV iron given her significant symptoms.  At this point, she agrees to start on oral iron.  I will repeat her hemoglobin today.  If it has further dropped, then we may consider IV iron or red cell transfusion as well.      PLAN:   1.  Start iron sulfate 325 mg b.i.d.   2.  Use over-the-counter Beatriz-Colace 1-2 times daily for constipation.   3.  CT of the chest without contrast.   4.  Schedule a port placement.   5.  Start FOLFOX with Avastin every 2 weeks.  Chemo education.   6.  Return to clinic next week with Infusion Clinic appointment.   7.  Labs today, CBC, diff, CMP.   8.  We will contact  pathology about the KRAS status and BRAF status on biopsy specimen.      The patient had normal EKG.  Does not have any significant comorbidities including no cardiac history.  She should be considered low risk for a port placement.  This note can be used as a preoperative history and physical.         SIDRA NAILS MD             D: 10/04/2017 13:44   T: 10/04/2017 14:13   MT: [email or web address]      Name:     JESSICA MOYA   MRN:      [id number]        Account:      [id number]   :      1950           Visit Date:   10/04/2017      Document: [id number]

## 2017-10-05 NOTE — H&P (VIEW-ONLY)
[facility] PHYSICIANS  HEMATOLOGY ONCOLOGY    ONCOLOGY FOLLOWUP NOTE      DATE OF SERVICE:  10/04/2017.      DIAGNOSIS:  Metastatic colon cancer in a patient with history of stage 1A grade 3 endometrial carcinoma approximately around 2012.        On 09/29/2017, Liv Sylvester presented with progressive fatigue and right upper quadrant pain.  CT scan 09/29/2017 showed partially contracted bladder and findings suspicious for liver metastases.  There were multiple defined hypoechoic foci scoped scattered throughout the liver, the largest was in the right lobe.  CT was also showing enlargement of hepatic and retroperitoneal lymph nodes.  On 09/30/2017, she underwent a colonoscopy, which showed a fungating, infiltrative, ulcerated, partially obstructing mass at the hepatic flexure.  Pathology is consistent with moderately differentiated adenocarcinoma with normal mismatch repair protein expression.      Significant iron deficiency anemia.      TREATMENT:  The patient to initiate palliative intent treatment with FOLFOX and Avastin.      SUBJECTIVE:  The patient was seen as a followup today.  She states that her pain is in better control.  She continues to be fatigued and gets exertional dyspnea.  She came alone today.  She states that her oral intake is good.     REVIEW OF SYSTEMS:  A complete review of systems was performed and found to be negative other than pertinent positives mentioned in history of present illness.      Past medical, social histories reviewed.     Meds- Reviewed.     PHYSICAL EXAMINATION:   VITAL SIGNS:  Blood pressure is 132/77, pulse 115, respirations 18, temperature 98.3.    CONSTITUTIONAL: Sitting comfortably.   HEENT: Pupils are equal. Oropharynx is clear.   NECK: No cervical or supraclavicular lymphadenopathy.   RESPIRATORY: Clear bilaterally.   CARD/VASC: S1, S2, regular.   GI: Soft, nontender, nondistended, no hepatosplenomegaly.   MUSKULOSKELETAL: Warm, well perfused.    NEUROLOGIC: Alert, awake.   INTEGUMENT: No rash.   LYMPHATICS: She has trace bilateral dependent edema.   PSYCH: She appeared quite stressed and fatigued.      LABORATORY DATA AND IMAGING REVIEWED DURING THIS VISIT:  Recent Labs   Lab Test  10/04/17   1333  09/30/17   0739   NA  138  139   POTASSIUM  3.4  3.5   CHLORIDE  101  104   CO2  27  27   ANIONGAP  10  8   BUN  18  13   CR  0.90  0.78   GLC  107*  88   JESÚS  9.7  8.7     Recent Labs   Lab Test  10/04/17   1333  09/30/17   0739  09/29/17   1210   03/12/13   2231   WBC  9.6  7.4  8.3   < >  6.2   HGB  9.3*  8.1*  8.5*   < >  9.4*   PLT  413  426  420   < >  286   MCV  72*  73*  73*   < >  78   NEUTROPHIL  73.8   --   73.8   --   61.0    < > = values in this interval not displayed.     Recent Labs   Lab Test  10/04/17   1333  09/30/17   0739  09/29/17   1210   BILITOTAL  0.3  0.6  0.3   ALKPHOS  182*  120  145   ALT  37  24  30   AST  65*  41  54*   ALBUMIN  2.8*  2.5*  2.7*         Results for orders placed or performed during the hospital encounter of 09/29/17   Abdomen US, limited (RUQ only)    Narrative    ULTRASOUND ABDOMEN LIMITED 9/29/2017 12:55 PM    HISTORY: Right upper quadrant pain.    COMPARISON: CT abdomen and pelvis 3/12/2013.    FINDINGS:  Gallbladder: The gallbladder is partially contracted. There is a  medium level echo focus in the fundus of the gallbladder which may be  a sludge ball or a polyp. No gallstones are seen.    Common bile duct: Normal at 0.5 cm.    Liver: There are multiple ill-defined hyperechoic foci scattered  throughout the liver, largest in the right lobe measuring 3.8 x 2.7 x  4.2 cm. The prior CT abdomen exam did not show any liver lesions. The  findings are suspicious for metastatic disease and CT of the abdomen  and pelvis is recommended.    Pancreas: Pancreatic tail is partially obscured by overlying bowel gas  and the visualized pancreas appears normal.    Right kidney: There is a 3.3 x 2.8 x 2.7 cm benign cyst in the  mid to  lower pole of the right kidney. This was present on the prior CT in  2013 when it measured about 2 cm. The right kidney is otherwise  normal.      Impression    IMPRESSION:   1. Partially contracted gallbladder. Sludge ball versus polyp in the  gallbladder fundus. No definite gallstones.  2. Findings suspicious for liver metastases. Recommend CT abdomen and  pelvis with contrast.  3. Pancreatic tail is incompletely visualized.  4. 3.3 cm benign cyst right kidney, increased in size since 2013.    CLARK SNEED MD   CT Abdomen Pelvis w Contrast    Narrative    CT ABDOMEN AND PELVIS WITH CONTRAST   9/29/2017 2:53 PM     HISTORY: Right upper quadrant pain.    TECHNIQUE:   CT of the abdomen and pelvis was performed following the  administration of 123 mL Isovue-370. Radiation dose for this scan was  reduced using automated exposure control, adjustment of the mA and/or  kV according to patient size, or iterative reconstruction technique.    COMPARISON: CT of the chest, abdomen and pelvis dated 9/18/2013.    FINDINGS:   There are multiple hypodense masses scattered throughout the liver.  The largest is in the right lobe near the dome and measures  approximately 6 cm in maximum dimension.    There are multiple enlarged mesenteric lymph nodes in the upper  abdomen just anterior to the superior mesenteric artery and superior  mesenteric vein. The largest of these measures 3.3 x 2.0 cm. There are  also mildly prominent retroperitoneal para-aortic/paracaval lymph  nodes in the upper abdomen.    There is focal bowel wall thickening involving the colon at the level  of the hepatic flexure.    There are multiple cysts within the right kidney. Remaining solid  organs in the abdomen appear grossly unremarkable. There are a few  scattered colonic diverticuli. Remaining bowel including appendix  appear unremarkable. There is no free air or free fluid in the abdomen  or pelvis.      Impression    IMPRESSION: Focal bowel wall  thickening involving the colon at the  hepatic flexure, multiple masses in the liver, and enlarged mesenteric  and retroperitoneal lymph nodes as above. Findings would be worrisome  for a primary colon cancer with metastatic disease to the liver and  mesenteric lymph nodes. The liver lesions would be amenable to  percutaneous biopsy.    KARLEE DERAS MD          ECOG performance status 1.      ASSESSMENT:  This is a 66-year-old lady with stage IV metastatic colon cancer with normal mismatch repair protein expression.  She has multiple liver metastases and metastasis in the abdominal lymph nodes.      We need to complete the staging and I will proceed with a CT of the chest for that.  We will also contact pathology for the status of KRAS and BRAF mutation testing.      I had a discussion with this lady about metastatic colon cancer and palliative intent of treatment, which is given with aim of controlling the disease and palliation of symptoms and hopefully prolonging the survival.  Discussed about a combination of FOLFOX with Avastin which is given every 2 weeks intravenously.  Had a discussion about the potential risks, benefits and side effects.  She asks appropriate questions and is willing to proceed.      She also has iron deficiency anemia due to chronic blood loss from her cancer.  I discussed the possibility of IV iron given her significant symptoms.  At this point, she agrees to start on oral iron.  I will repeat her hemoglobin today.  If it has further dropped, then we may consider IV iron or red cell transfusion as well.      PLAN:   1.  Start iron sulfate 325 mg b.i.d.   2.  Use over-the-counter Beatriz-Colace 1-2 times daily for constipation.   3.  CT of the chest without contrast.   4.  Schedule a port placement.   5.  Start FOLFOX with Avastin every 2 weeks.  Chemo education.   6.  Return to clinic next week with Infusion Clinic appointment.   7.  Labs today, CBC, diff, CMP.   8.  We will contact  pathology about the KRAS status and BRAF status on biopsy specimen.      The patient had normal EKG.  Does not have any significant comorbidities including no cardiac history.  She should be considered low risk for a port placement.  This note can be used as a preoperative history and physical.         SIDRA NAILS MD             D: 10/04/2017 13:44   T: 10/04/2017 14:13   MT: [email or web address]      Name:     JESSICA MOYA   MRN:      [id number]        Account:      [id number]   :      1950           Visit Date:   10/04/2017      Document: [id number]

## 2017-10-09 NOTE — DISCHARGE INSTRUCTIONS
Same Day Surgery Discharge Instructions for  Sedation and General Anesthesia       It's not unusual to feel dizzy, light-headed or faint for up to 24 hours after surgery or while taking pain medication.  If you have these symptoms: sit for a few minutes before standing and have someone assist you when you get up to walk or use the bathroom.      You should rest and relax for the next 24 hours. We recommend you make arrangements to have an adult stay with you for at least 24 hours after your discharge.  Avoid hazardous and strenuous activity.      DO NOT DRIVE any vehicle or operate mechanical equipment for 24 hours following the end of your surgery.  Even though you may feel normal, your reactions may be affected by the medication you have received.      Do not drink alcoholic beverages for 24 hours following surgery.       Slowly progress to your regular diet as you feel able. It's not unusual to feel nauseated and/or vomit after receiving anesthesia.  If you develop these symptoms, drink clear liquids (apple juice, ginger ale, broth, 7-up, etc. ) until you feel better.  If your nausea and vomiting persists for 24 hours, please notify your surgeon.        All narcotic pain medications, along with inactivity and anesthesia, can cause constipation. Drinking plenty of liquids and increasing fiber intake will help.      For any questions of a medical nature, call your surgeon.      Do not make important decisions for 24 hours.      If you had general anesthesia, you may have a sore throat for a couple of days related to the breathing tube used during surgery.  You may use Cepacol lozenges to help with this discomfort.  If it worsens or if you develop a fever, contact your surgeon.       If you feel your pain is not well managed with the pain medications prescribed by your surgeon, please contact your surgeon's office to let them know so they can address your concerns.       Discharge Instructions for Power Port  Placement      General Instructions:    You will be given a card with information about your port.  You should carry this with you in your wallet/billfold. It provides information to clinicians about your port.  You should also carry the card in case your port triggers any security devices.     Activity:    Limit your activity for the first few days after your port is placed    Incision Care:     Unless otherwise directed by your surgeon, the dressing may removed tomorrow.      If a topical skin adhesive was used to close incision, you may shower tomorrow. Do not use soaps, lotions, or ointments on the wound area. Do not scrub the wound. After bathing, pat the wound dry with a soft towel.  Do not scratch, rub, or pick at the strips or film. Do not place tape directly over the strips or film.  Do not apply liquids (such as peroxide), ointments, or creams to the wound while the strips or film are in place.    Call your surgeon if you have:     Redness, swelling or drainage from incision     A fever of 101 F or greater.      Nausea or vomiting.     Pain that is not controlled by medications and/or rest.     Questions or concerns.                      **If you have questions or concerns about your procedure,   call Dr. Nolen at [phone]**

## 2017-10-09 NOTE — ANESTHESIA POSTPROCEDURE EVALUATION
Patient: Liv Sylvester    Procedure(s):  PORT PLACEMENT (MAC) - Wound Class: I-Clean    Diagnosis:COLON CANCER  Diagnosis Additional Information: No value filed.    Anesthesia Type:  MAC    Note:  Anesthesia Post Evaluation    Patient location during evaluation: PACU  Patient participation: Able to fully participate in evaluation  Level of consciousness: awake  Pain management: adequate  Airway patency: patent  Cardiovascular status: acceptable  Respiratory status: acceptable  Hydration status: acceptable  PONV: none     Anesthetic complications: None          Last vitals:  Vitals:    10/09/17 1330 10/09/17 1345 10/09/17 1400   BP: 138/68 143/71 153/77   Resp: 12 14 24   Temp:   36.5  C (97.7  F)   SpO2: 97% 93% 97%         Electronically Signed By: Alex Perez MD  October 9, 2017  3:03 PM

## 2017-10-09 NOTE — OR NURSING
Post Power Port placement Chest X-ray done.  Results reviewed per Francis Perez MD.  Proceed with discharge when criteria met.

## 2017-10-09 NOTE — BRIEF OP NOTE
[facility] Brief Operative Note    Pre-operative diagnosis: COLON CANCER   Post-operative diagnosis * No post-op diagnosis entered *  same   Procedure: Procedure(s):  PORT PLACEMENT (MAC) - Wound Class: I-Clean   Surgeon(s): Surgeon(s) and Role:     * Rigoberto Nolen MD - Primary   Estimated blood loss: * No values recorded between 10/9/2017 12:23 PM and 10/9/2017  1:05 PM *    Specimens: * No specimens in log *   Findings: S/p Right IJ Port-a-cath placement, verified with XR.

## 2017-10-09 NOTE — IP AVS SNAPSHOT
[facility] Same Day Surgery    [address]    [place] [address]    Phone:  [phone]    Fax:  [phone]                                       After Visit Summary   10/9/2017    Liv Sylvester    MRN: [id number]           After Visit Summary Signature Page     I have received my discharge instructions, and my questions have been answered. I have discussed any challenges I see with this plan with the nurse or doctor.    ..........................................................................................................................................  Patient/Patient Representative Signature      ..........................................................................................................................................  Patient Representative Print Name and Relationship to Patient    ..................................................               ................................................  Date                                            Time    ..........................................................................................................................................  Reviewed by Signature/Title    ...................................................              ..............................................  Date                                                            Time

## 2017-10-09 NOTE — CONSULTS
"[facility]  General Surgery Consultation         Rigoberto Brown Callum Sylvester MRN# [id number]   YOB: 1950 Age: 66 year old      Date of Admission:  10/9/2017  Date of Consult: 10/9/2017         Assessment and Plan:   Patient is a 66 year old female with colon cancer    PLAN:  Proceed with right IJ US guided port placement .I explained the risks, benefits, complications, including but not limited to bleeding, infection, postop hematoma, seroma, port malposition, pneumothorax and need for additional procedures.  The patient agreed and did sign consent.               Requesting Physician:      Dr. Piedra        Chief Complaint:   Colon cancer       History of Present Illness:   Patient is a 66 year old female who present for evaluation of colon cancer and need for port placement. Patient was recently hospitalized and was diagnoses with advanced colon cancer. She is set to start chemotherapy on wednesday. She has some SOB but is stable in nature. No new complaints.  Patient denies fevers, chills, nausea, vomiting, jaundice, changes in stool or urine, headache, SOB, chest pain.          Physical Exam:   Blood pressure 144/64, temperature 96  F (35.6  C), temperature source Oral, resp. rate 16, height 5' 10\" (1.778 m), weight 237 lb 14.4 oz (107.9 kg), SpO2 98 %, not currently breastfeeding.  237 lbs 14.4 oz  General: Generally appears well.  Psych: Alert and Oriented.  Normal affect  Neurological: grossly intact  Eyes: Sclera clear  Respiratory:  Lungs clear bilaterally with good air excursion  GI: Abdomen Soft Non-Tender   Lymphatic/Hematologic/Immune:  No femoral or cervical lymphadenopathy.  Integumentary:  No rashes       Past Medical History:     Past Medical History:   Diagnosis Date     Flash pulmonary edema (H) 4/2013    With concurrent severe HTN, complicating DAPHNEY/BSO     HTN (hypertension)      Obesity           Past Surgical History:     Past Surgical History:   Procedure " Laterality Date     COLONOSCOPY N/A 9/30/2017    Procedure: COMBINED COLONOSCOPY, SINGLE OR MULTIPLE BIOPSY/POLYPECTOMY BY BIOPSY;  colonoscopy;  Surgeon: Kevin Celis MD;  Location:  GI     HYSTERECTOMY, DAPHNEY  4/2013    with BSO          Current Medications:          fentaNYL, fentaNYL, HYDROmorphone, - MEDICATION INSTRUCTIONS -, ondansetron **OR** ondansetron, meperidine, naloxone, 250mL NaCl/2,000 Units heparin, bupivacaine 0.25/EPI 1:200,000 30 mL + lidocaine 1% 30 mL, heparin       Home Medications:     Prior to Admission medications    Medication Sig Last Dose Taking? Auth Provider   oxyCODONE (ROXICODONE) 5 MG IR tablet Take 1 tablet (5 mg) by mouth every 3 hours as needed for pain or other (Moderate to Severe)  Yes Rigoberto Nolen MD   ferrous sulfate (IRON) 325 (65 FE) MG tablet Take 1 tablet (325 mg) by mouth 2 times daily 10/8/2017 at 0800 Yes Radha Piedra MD   albuterol (PROAIR HFA/PROVENTIL HFA/VENTOLIN HFA) 108 (90 BASE) MCG/ACT Inhaler Inhale 2 puffs into the lungs every 6 hours as needed for shortness of breath / dyspnea or wheezing 10/9/2017 at 0800 Yes Radha Piedra MD   oxyCODONE (ROXICODONE) 5 MG IR tablet Take 1 tablet (5 mg) by mouth every 4 hours as needed for moderate to severe pain 10/8/2017 at 2000 Yes Carolyn Berman MD   sennosides (SENOKOT) 8.6 MG tablet Take 1 tablet by mouth 2 times daily 10/8/2017 at 0800 Yes Carolyn Berman MD   SERTRALINE HCL PO Take 50 mg by mouth daily (Takes half of a 100mg tablet for a total of 50mg daily) 10/8/2017 at 0800 Yes Unknown, Entered By History   CALCIUM-MAGNESIUM-ZINC PO Take 1 tablet by mouth daily 10/8/2017 at 0800 Yes Unknown, Entered By History   VITAMIN D, CHOLECALCIFEROL, PO Take 1 tablet by mouth daily (OTC: Pt unsure of strength) 10/8/2017 at 0800 Yes Unknown, Entered By History   ACETAMINOPHEN PO Take 1,000 mg by mouth every 4 hours as needed for pain ((Patient has been taking 4-12 tabs per day)) Past Week at Unknown time Yes  Unknown, Entered By History   lisinopril (PRINIVIL,ZESTRIL) 10 MG tablet Take 1 tablet by mouth daily. 10/8/2017 at 0800 Yes Adrian Fonseca MD   Multiple Vitamins-Minerals (CENTRUM SILVER) per tablet Take 1 tablet by mouth daily. 10/8/2017 at 0800 Yes Adrian Fonseca MD   CHLORTHALIDONE PO Take 25 mg by mouth daily Unknown at Unknown time  Unknown, Entered By History          Allergies:   No Known Allergies       Family History:     Family History   Problem Relation Age of Onset     CEREBROVASCULAR DISEASE Father       age 66     HEART DISEASE Mother       of MI, age 72     DIABETES Mother      Hypertension Mother          Social History:   Liv Sylvester  reports that she quit smoking about 12 years ago. She has never used smokeless tobacco. She reports that she does not drink alcohol or use illicit drugs.        Review of Systems:   The 10 point Review of Systems is negative other than noted in the HPI.       Labs/Imaging   All new lab and imaging data was reviewed.     Rigoberto Nolen M.D.  [facility]

## 2017-10-09 NOTE — OP NOTE
PREOPERATIVE DIAGNOSIS:  Colon Cancer      POSTOPERATIVE DIAGNOSIS:  Same.        PROCEDURE:  Ultrasound-guided right internal jugular Power Port-A-Cath placement with fluoroscopy and interpretation of imaging      SURGEON:  Rigoberto Nolen MD.        ASSISTANT:   Ismael Christian [facility] Resident      ANESTHESIA:  Local with MAC      COMPLICATIONS:  None.        BLOOD LOSS:  Minimal.        FINDINGS:  Final imaging confirmed catheter placement in SVC without signs of Pneumothorax      INDICATIONS: Mrs. Sylvester has a history of colon cancer and is presenting today for Port-A-Cath placement. I explained the risks, benefits, complications, including but not limited to bleeding, infection, postop hematoma, seroma, port malposition, pneumothorax and need for additional procedures.  The patient agreed and did sign consent.        DETAILS OF PROCEDURE:  The patient was brought to the operating per Anesthesia, placed in supine position and MAC was administered without difficulty.  A surgical timeout was then performed, verifying the correct surgeon, site, procedure, patient, and all in the room were in agreement.  The patient was prepped in the usual fashion using ChloraPrep.  1% lidocaine and 0.25% Marcaine were injected to the right neck area.  The ultrasound was used to locate the internal jugular vein. Under direct visualization the needle was placed into the internal jugular vein without difficulty. An immediate flash of venous blood was apsirated. The guidewire was then placed through the needle using the Seldinger technique. Fluoroscopy confirmed that the guidewire was in through the SVC junction.  A port pocket was then created with cautery. The dilator was then placed over the guidewire without difficulty. An 8 Omani catheter was then brought from the port pocket up through the neck incision. It was then placed through the dilator with ease.  Fluoroscopy was brought into the room, and confirmed the catheter was  located in the SVC junction. I interpreted the fluoroscopy imaging confirming the placement and confirming no obvious pneumothorax.  I aspirated and flushed the catheter multiple times with ease.  I then placed the Power Port on the catheter and anchored it on the chest wall with Prolene suture. The final fluoroscopy revealed that it was at the SVC junction. 7 cc of concentrated heparin was then injected into the catheter. Once this was done, the incision was closed with 3-0 Vicryl and a 4-0 Monocryl in subcuticular fashion.  Dermabond was applied to the wounds.  Pateint was awoken from anesthesia, transferred to PACU in stable condition.  All sponge, instrument and needle counts were correct. The Physicians Assistant was medically necessary for their expertise in retraction/exposure, suctioning, and suturing.           LINDSEY ABREU MD     Please cc note to Referring provider and PCP

## 2017-10-09 NOTE — ANESTHESIA CARE TRANSFER NOTE
Patient: Liv Sylvester    Procedure(s):  PORT PLACEMENT (MAC) - Wound Class: I-Clean    Diagnosis: COLON CANCER  Diagnosis Additional Information: No value filed.    Anesthesia Type:   MAC     Note:  Airway :Nasal Cannula  Patient transferred to:PACU    awake, talking report to RN    Vitals: (Last set prior to Anesthesia Care Transfer)    CRNA VITALS  10/9/2017 1246 - 10/9/2017 1316      10/9/2017             Resp Rate (set): 10         BP: 135/69 P: 82 SaO2 99        Electronically Signed By: TITO Arcos CRNA  October 9, 2017  1:16 PM

## 2017-10-09 NOTE — IP AVS SNAPSHOT
MRN:[id number]                      After Visit Summary   10/9/2017    Liv Sylvester    MRN: [id number]           Thank you!     Thank you for choosing [facility] for your care. Our goal is always to provide you with excellent care. Hearing back from our patients is one way we can continue to improve our services. Please take a few minutes to complete the written survey that you may receive in the mail after you visit with us. Thank you!        Patient Information     Date Of Birth          1950        About your hospital stay     You were admitted on:  October 9, 2017 You last received care in the:  [facility] Same Day Surgery    You were discharged on:  October 9, 2017       Who to Call     For medical emergencies, please call 911.  For non-urgent questions about your medical care, please call your primary care provider or clinic, [phone]  For questions related to your surgery, please call your surgery clinic        Attending Provider     Provider Specialty    Rigoberto Nolen MD Surgery       Primary Care Provider Office Phone # Fax #    Amaya Howard [phone]      After Care Instructions     Diet Instructions       Resume pre-procedure diet            Dressing       Keep dressing clean and dry.  Dressing / incisional care as instructed by RN and or Surgeon            Shower       No shower for 24 hours post procedure. May shower Postoperative Day (POD)  2                  Your next 10 appointments already scheduled     Oct 11, 2017  9:00 AM CDT   Level 6 with  INFUSION CHAIR 5   [facility]    [facility]  [address]  [place] [address]   [phone]            Oct 11, 2017  9:30 AM CDT   Return Visit with Radha Piedra MD   [facility]    [facility]  [address]  [place] [address]    [phone]            Oct 25, 2017  9:00 AM CDT   Level 6 with SH INFUSION CHAIR 13   [facility] ([facility])    [facility] [place] [address]  [place] [address]   [phone]            Nov 08, 2017  9:00 AM CST   Level 6 with SH INFUSION CHAIR 14   [facility] ([facility])    [facility] [address] [address]  [place] [address]   [phone]            Nov 22, 2017  9:00 AM CST   Level 6 with SH INFUSION CHAIR 17   [facility] ([facility])    [facility] [address]  [place] [address]   [phone]            Dec 06, 2017  9:00 AM CST   Level 6 with SH INFUSION CHAIR 2   [facility] ([facility])    [facility] [address]  [place] [address]   [phone]            Dec 20, 2017  9:00 AM CST   Level 6 with SH INFUSION CHAIR 12   [facility] ([facility])    [facility] [place] [address]  [place] [address]   [phone]              Further instructions from your care team       Same Day Surgery Discharge Instructions for  Sedation and General Anesthesia       It's not unusual to feel dizzy, light-headed or faint for up to 24 hours after surgery or while taking pain medication.  If you have these symptoms: sit for a few minutes before standing and have someone assist you when you get up to walk or use the bathroom.      You should rest and relax for the next 24 hours. We recommend you make arrangements to have an adult stay with you for at least 24 hours after your discharge.  Avoid hazardous and strenuous activity.      DO NOT DRIVE any vehicle or operate mechanical equipment for 24 hours following the end of  your surgery.  Even though you may feel normal, your reactions may be affected by the medication you have received.      Do not drink alcoholic beverages for 24 hours following surgery.       Slowly progress to your regular diet as you feel able. It's not unusual to feel nauseated and/or vomit after receiving anesthesia.  If you develop these symptoms, drink clear liquids (apple juice, ginger ale, broth, 7-up, etc. ) until you feel better.  If your nausea and vomiting persists for 24 hours, please notify your surgeon.        All narcotic pain medications, along with inactivity and anesthesia, can cause constipation. Drinking plenty of liquids and increasing fiber intake will help.      For any questions of a medical nature, call your surgeon.      Do not make important decisions for 24 hours.      If you had general anesthesia, you may have a sore throat for a couple of days related to the breathing tube used during surgery.  You may use Cepacol lozenges to help with this discomfort.  If it worsens or if you develop a fever, contact your surgeon.       If you feel your pain is not well managed with the pain medications prescribed by your surgeon, please contact your surgeon's office to let them know so they can address your concerns.       Discharge Instructions for Power Port Placement      General Instructions:    You will be given a card with information about your port.  You should carry this with you in your wallet/billfold. It provides information to clinicians about your port.  You should also carry the card in case your port triggers any security devices.     Activity:    Limit your activity for the first few days after your port is placed    Incision Care:     Unless otherwise directed by your surgeon, the dressing may removed tomorrow.      If a topical skin adhesive was used to close incision, you may shower tomorrow. Do not use soaps, lotions, or ointments on the wound area. Do not scrub the wound. After  "bathing, pat the wound dry with a soft towel.  Do not scratch, rub, or pick at the strips or film. Do not place tape directly over the strips or film.  Do not apply liquids (such as peroxide), ointments, or creams to the wound while the strips or film are in place.    Call your surgeon if you have:     Redness, swelling or drainage from incision     A fever of 101 F or greater.      Nausea or vomiting.     Pain that is not controlled by medications and/or rest.     Questions or concerns.                      **If you have questions or concerns about your procedure,   call Dr. Nolen at [phone]**      Pending Results     Date and Time Order Name Status Description    10/9/2017 1249 XR Surgery ANA MARIA L/T 5 Min Fluoro w Stills In process             Admission Information     Date & Time Provider Department Dept. Phone    10/9/2017 Rigoberto Nolen MD [facility] Same Day Surgery [phone]      Your Vitals Were     Blood Pressure Temperature Respirations Height Weight Pulse Oximetry    153/77 97.7  F (36.5  C) 24 1.778 m (5' 10\") 107.9 kg (237 lb 14.4 oz) 97%    BMI (Body Mass Index)                   34.14 kg/m2           MyChart Information     [employer] lets you send messages to your doctor, view your test results, renew your prescriptions, schedule appointments and more. To sign up, go to [email or web address] . Click on \"Log in\" on the left side of the screen, which will take you to the Welcome page. Then click on \"Sign up Now\" on the right side of the page.     You will be asked to enter the access code listed below, as well as some personal information. Please follow the directions to create your username and password.     Your access code is: [email or web address]  Expires: 2017 12:39 PM     Your access code will  in 90 days. If you need help or a new code, please call your [facility] clinic or [phone].        Care EveryWhere ID     This is your Care EveryWhere ID. This could be used by " other organizations to access your [facility] medical records  [id number]        Equal Access to Services     OMIDSARITHA ZAYRA : Jeffry Pierce, wiley michael, bhumika hawkins, john maya. So [facility] [phone].    ATENCIÓN: Si habla español, tiene a schmitt disposición servicios gratuitos de asistencia lingüística. Llame al [phone].    We comply with applicable federal civil rights laws and [place] laws. We do not discriminate on the basis of race, color, national origin, age, disability, sex, sexual orientation, or gender identity.               Review of your medicines      CONTINUE these medicines which may have CHANGED, or have new prescriptions. If we are uncertain of the size of tablets/capsules you have at home, strength may be listed as something that might have changed.        Dose / Directions    * oxyCODONE 5 MG IR tablet   Commonly known as:  ROXICODONE   This may have changed:  Another medication with the same name was added. Make sure you understand how and when to take each.   Used for:  Liver masses, Colonic mass        Dose:  5 mg   Take 1 tablet (5 mg) by mouth every 4 hours as needed for moderate to severe pain   Quantity:  40 tablet   Refills:  0       * oxyCODONE 5 MG IR tablet   Commonly known as:  ROXICODONE   This may have changed:  You were already taking a medication with the same name, and this prescription was added. Make sure you understand how and when to take each.   Used for:  Malignant neoplasm of hepatic flexure (H)        Dose:  5 mg   Take 1 tablet (5 mg) by mouth every 3 hours as needed for pain or other (Moderate to Severe)   Quantity:  15 tablet   Refills:  0       * Notice:  This list has 2 medication(s) that are the same as other medications prescribed for you. Read the directions carefully, and ask your doctor or other care provider to review them with you.      CONTINUE these medicines which have NOT CHANGED         Dose / Directions    ACETAMINOPHEN PO        Dose:  1000 mg   Take 1,000 mg by mouth every 4 hours as needed for pain ((Patient has been taking 4-12 tabs per day))   Refills:  0       albuterol 108 (90 BASE) MCG/ACT Inhaler   Commonly known as:  PROAIR HFA/PROVENTIL HFA/VENTOLIN HFA   Used for:  Reactive airway disease without complication, unspecified asthma severity, unspecified whether persistent        Dose:  2 puff   Inhale 2 puffs into the lungs every 6 hours as needed for shortness of breath / dyspnea or wheezing   Quantity:  1 Inhaler   Refills:  3       CALCIUM-MAGNESIUM-ZINC PO        Dose:  1 tablet   Take 1 tablet by mouth daily   Refills:  0       CENTRUM SILVER per tablet        Dose:  1 tablet   Take 1 tablet by mouth daily.   Quantity:  100 tablet   Refills:  12       CHLORTHALIDONE PO        Dose:  25 mg   Take 25 mg by mouth daily   Refills:  0       ferrous sulfate 325 (65 FE) MG tablet   Commonly known as:  IRON   Used for:  Iron deficiency anemia due to chronic blood loss        Dose:  325 mg   Take 1 tablet (325 mg) by mouth 2 times daily   Quantity:  100 tablet   Refills:  11       lisinopril 10 MG tablet   Commonly known as:  PRINIVIL/ZESTRIL   Used for:  HTN (hypertension)        Dose:  10 mg   Take 1 tablet by mouth daily.   Quantity:  30 tablet   Refills:  11       sennosides 8.6 MG tablet   Commonly known as:  SENOKOT   Used for:  Constipation due to opioid therapy        Dose:  1 tablet   Take 1 tablet by mouth 2 times daily   Quantity:  60 tablet   Refills:  1       SERTRALINE HCL PO        Dose:  50 mg   Take 50 mg by mouth daily (Takes half of a 100mg tablet for a total of 50mg daily)   Refills:  0       VITAMIN D (CHOLECALCIFEROL) PO        Dose:  1 tablet   Take 1 tablet by mouth daily (OTC: Pt unsure of strength)   Refills:  0            Where to get your medicines      Some of these will need a paper prescription and others can be bought over the counter. Ask your nurse if you  have questions.     Bring a paper prescription for each of these medications     oxyCODONE 5 MG IR tablet                Protect others around you: Learn how to safely use, store and throw away your medicines at www.disposemymeds.org.             Medication List: This is a list of all your medications and when to take them. Check marks below indicate your daily home schedule. Keep this list as a reference.      Medications           Morning Afternoon Evening Bedtime As Needed    ACETAMINOPHEN PO   Take 1,000 mg by mouth every 4 hours as needed for pain ((Patient has been taking 4-12 tabs per day))                                albuterol 108 (90 BASE) MCG/ACT Inhaler   Commonly known as:  PROAIR HFA/PROVENTIL HFA/VENTOLIN HFA   Inhale 2 puffs into the lungs every 6 hours as needed for shortness of breath / dyspnea or wheezing                                CALCIUM-MAGNESIUM-ZINC PO   Take 1 tablet by mouth daily                                CENTRUM SILVER per tablet   Take 1 tablet by mouth daily.                                CHLORTHALIDONE PO   Take 25 mg by mouth daily                                ferrous sulfate 325 (65 FE) MG tablet   Commonly known as:  IRON   Take 1 tablet (325 mg) by mouth 2 times daily                                lisinopril 10 MG tablet   Commonly known as:  PRINIVIL/ZESTRIL   Take 1 tablet by mouth daily.                                * oxyCODONE 5 MG IR tablet   Commonly known as:  ROXICODONE   Take 1 tablet (5 mg) by mouth every 4 hours as needed for moderate to severe pain                                * oxyCODONE 5 MG IR tablet   Commonly known as:  ROXICODONE   Take 1 tablet (5 mg) by mouth every 3 hours as needed for pain or other (Moderate to Severe)                                sennosides 8.6 MG tablet   Commonly known as:  SENOKOT   Take 1 tablet by mouth 2 times daily                                SERTRALINE HCL PO   Take 50 mg by mouth daily (Takes half of a 100mg  tablet for a total of 50mg daily)                                VITAMIN D (CHOLECALCIFEROL) PO   Take 1 tablet by mouth daily (OTC: Pt unsure of strength)                                * Notice:  This list has 2 medication(s) that are the same as other medications prescribed for you. Read the directions carefully, and ask your doctor or other care provider to review them with you.

## 2017-10-09 NOTE — ANESTHESIA PREPROCEDURE EVALUATION
Anesthesia Evaluation     . Pt has had prior anesthetic.            ROS/MED HX    ENT/Pulmonary: Comment: Flash pulmonary edema      Neurologic:       Cardiovascular:     (+) hypertension----. : . . . :. .       METS/Exercise Tolerance:     Hematologic:         Musculoskeletal:         GI/Hepatic: Comment: Liver masses        Renal/Genitourinary:         Endo:     (+) Obesity, .      Psychiatric:         Infectious Disease:         Malignancy:   (+) Malignancy History of GI  GI CA Active status post,         Other:                     Physical Exam  Normal systems: cardiovascular, pulmonary and dental    Airway   Mallampati: II  TM distance: >3 FB  Neck ROM: full    Dental     Cardiovascular   Rhythm and rate: regular and normal      Pulmonary    breath sounds clear to auscultation                    Anesthesia Plan      History & Physical Review  History and physical reviewed and following examination; no interval change.    ASA Status:  3 .    NPO Status:  > 8 hours    Plan for MAC with Intravenous induction.   PONV prophylaxis:  Ondansetron (or other 5HT-3) and Dexamethasone or Solumedrol       Postoperative Care  Postoperative pain management:  IV analgesics and Oral pain medications.      Consents  Anesthetic plan, risks, benefits and alternatives discussed with:  Patient..                          .

## 2017-10-11 NOTE — PROGRESS NOTES
"Oncology Rooming Note    October 11, 2017 9:35 AM   Liv Sylvester is a 66 year old female who presents for:    Chief Complaint   Patient presents with     Oncology Clinic Visit     Malignant neoplasm of hepatic flexure      Initial Vitals: /60  Temp 98.3  F (36.8  C) (Oral)  Resp 18  Ht 1.778 m (5' 10\")  SpO2 98% Estimated body mass index is 34.14 kg/(m^2) as calculated from the following:    Height as of 10/9/17: 1.778 m (5' 10\").    Weight as of 10/9/17: 107.9 kg (237 lb 14.4 oz). There is no height or weight on file to calculate BSA.  No Pain (0) Comment: Data Unavailable   No LMP recorded. Patient has had a hysterectomy.  Allergies reviewed: Yes  Medications reviewed: Yes    Medications:  MEDICATION REFILL NEEDED ON SENNA  Pharmacy name entered into [employer]:    [facility] [place], [place] - [address]    Clinical concerns: None                4 minutes for nursing intake (face to face time)     Monserrat Alonso MA    DISCHARGE PLAN:  Next appointments: See patient instruction section.  Brought the patient back to [facility].  JACKIE Marina will give report to JACKIE Davey, signed prescription for ativan given to the patient.  Departure Mode: Wheel chair  Accompanied by: self  4 minutes for nursing discharge (face to face time)   Monserrat Alonso MA      Rn Did arrange for home infusion pump takedown to be out at her house on Friday at 12 pm per JACKIE Davey/ Infusion.  Patient and daughter Jessica dhillon  Also arranged for Palliative care appointment and Home health for medication management.  Laina Mondragon RN    "

## 2017-10-11 NOTE — MR AVS SNAPSHOT
After Visit Summary   10/11/2017    Liv Sylvester    MRN: [id number]           Patient Information     Date Of Birth          1950        Visit Information        Provider Department      10/11/2017 9:00 AM  INFUSION CHAIR 5 [facility]        Today's Diagnoses     Malignant neoplasm of hepatic flexure (H)    -  1    Port catheter in place           Follow-ups after your visit        Your next 10 appointments already scheduled     Oct 23, 2017  9:00 AM CDT   New Visit with Nela Spaulding MD   [facility] ([facility])    [facility] [place] [id number]  [place] [address]   [phone]            Oct 25, 2017  9:00 AM CDT   Level 6 with  INFUSION CHAIR 13   [facility] ([facility])    [facility]  [address]  [place] [address]   [phone]            Oct 25, 2017 10:45 AM CDT   Return Visit with Radha Piedra MD   [facility] ([facility])    [facility]  [address]  [place] [address]   [phone]            Nov 08, 2017  9:00 AM CST   Level 6 with  INFUSION CHAIR 14   [facility] ([facility])    [facility]  [address]  [place] [address]   [phone]            Nov 22, 2017  9:00 AM CST   Level 6 with SH INFUSION CHAIR 17   [facility] ([facility])    [facility]  [address]  [place] [address]   [phone]            Dec 06, 2017  9:00 AM CST   Level 6 with  INFUSION CHAIR 2   [facility] ([facility])    [facility]  [address]  [place] [address]   [phone]            Dec 20, 2017   "9:00 AM CST   Level 6 with  INFUSION CHAIR 12   [facility] ([facility])    [facility] [place]  [address]  [place] [address] [phone]              Who to contact     If you have questions or need follow up information about today's clinic visit or your schedule please contact [facility] directly at [phone].  Normal or non-critical lab and imaging results will be communicated to you by [address], letter or phone within 4 business days after the clinic has received the results. If you do not hear from us within 7 days, please contact the clinic through [address] or phone. If you have a critical or abnormal lab result, we will notify you by phone as soon as possible.  Submit refill requests through [employer] or call your pharmacy and they will forward the refill request to us. Please allow 3 business days for your refill to be completed.          Additional Information About Your Visit        [facility] Information     [employer]It lets you send messages to your doctor, view your test results, renew your prescriptions, schedule appointments and more. To sign up, go to [email or web address] . Click on \"Log in\" on the left side of the screen, which will take you to the Welcome page. Then click on \"Sign up Now\" on the right side of the page.     You will be asked to enter the access code listed below, as well as some personal information. Please follow the directions to create your username and password.     Your access code is: [email or web address]  Expires: 2017 12:39 PM     Your access code will  in 90 days. If you need help or a new code, please call your [facility] clinic or [phone].        Care EveryWhere ID     This is your Care EveryWhere ID. This could be used by other organizations to access your [facility] medical records  [id number]        Your Vitals Were     Temperature Respirations Height " "Pulse Oximetry          98.3  F (36.8  C) (Oral) 18 1.778 m (5' 10\") 98%         Blood Pressure from Last 3 Encounters:   10/11/17 117/60   10/11/17 117/60   10/09/17 153/77    Weight from Last 3 Encounters:   10/11/17 109.5 kg (241 lb 6.4 oz)   10/09/17 107.9 kg (237 lb 14.4 oz)   10/04/17 110.1 kg (242 lb 12.8 oz)              We Performed the Following     CBC with platelets differential     Comprehensive metabolic panel     Treatment Conditions 2          Today's Medication Changes          These changes are accurate as of: 10/11/17 10:58 AM.  If you have any questions, ask your nurse or doctor.               Start taking these medicines.        Dose/Directions    lidocaine-prilocaine cream   Commonly known as:  EMLA   Used for:  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        Apply topically as needed for moderate pain Apply dollop size amount to port site 30-60 minutes prior to accessing.   Quantity:  30 g   Refills:  1       LORazepam 0.5 MG tablet   Commonly known as:  ATIVAN   Used for:  Malignant neoplasm of hepatic flexure (H)        Dose:  0.5 mg   Take 1 tablet (0.5 mg) by mouth every 4 hours as needed (Anxiety, Nausea/Vomiting or Sleep)   Quantity:  30 tablet   Refills:  2       ondansetron 8 MG tablet   Commonly known as:  ZOFRAN   Used for:  Malignant neoplasm of hepatic flexure (H)        Dose:  8 mg   Take 1 tablet (8 mg) by mouth every 8 hours as needed (Nausea/Vomiting)   Quantity:  10 tablet   Refills:  2       prochlorperazine 10 MG tablet   Commonly known as:  COMPAZINE   Used for:  Malignant neoplasm of hepatic flexure (H)        Dose:  10 mg   Take 1 tablet (10 mg) by mouth every 6 hours as needed (Nausea/Vomiting)   Quantity:  30 tablet   Refills:  2            Where to get your medicines      These medications were sent to [facility] - [address]  [address], [place] [address]     Phone:  [phone]     " lidocaine-prilocaine cream    ondansetron 8 MG tablet    prochlorperazine 10 MG tablet         Some of these will need a paper prescription and others can be bought over the counter.  Ask your nurse if you have questions.     Bring a paper prescription for each of these medications     LORazepam 0.5 MG tablet                Primary Care Provider Office Phone # Fax #    Amaya Howard [phone]       [place] [id number]  [address] [facility] [address]        Equal Access to Services     HAYDEE IVERSON : Hadii aad ku hadasho Soomaali, waaxda luqadaha, qaybta kaalmada adeegyada, waxay idiin hayaan adeeg dinh cevallos . So [facility] [phone].    ATENCIÓN: Si habla español, tiene a schmitt disposición servicios gratuitos de asistencia lingüística. Wiley al [phone].    We comply with applicable federal civil rights laws and [place] laws. We do not discriminate on the basis of race, color, national origin, age, disability, sex, sexual orientation, or gender identity.            Thank you!     Thank you for choosing [facility]  for your care. Our goal is always to provide you with excellent care. Hearing back from our patients is one way we can continue to improve our services. Please take a few minutes to complete the written survey that you may receive in the mail after your visit with us. Thank you!             Your Updated Medication List - Protect others around you: Learn how to safely use, store and throw away your medicines at www.disposemymeds.org.          This list is accurate as of: 10/11/17 10:58 AM.  Always use your most recent med list.                   Brand Name Dispense Instructions for use Diagnosis    ACETAMINOPHEN PO      Take 1,000 mg by mouth every 4 hours as needed for pain ((Patient has been taking 4-12 tabs per day))        albuterol 108 (90 BASE) MCG/ACT Inhaler    PROAIR HFA/PROVENTIL HFA/VENTOLIN HFA    1 Inhaler    Inhale 2 puffs into the lungs  every 6 hours as needed for shortness of breath / dyspnea or wheezing    Reactive airway disease without complication, unspecified asthma severity, unspecified whether persistent       CALCIUM-MAGNESIUM-ZINC PO      Take 1 tablet by mouth daily        CENTRUM SILVER per tablet     100 tablet    Take 1 tablet by mouth daily.        CHLORTHALIDONE PO      Take 25 mg by mouth daily        ferrous sulfate 325 (65 FE) MG tablet    IRON    100 tablet    Take 1 tablet (325 mg) by mouth 2 times daily    Iron deficiency anemia due to chronic blood loss       lidocaine-prilocaine cream    EMLA    30 g    Apply topically as needed for moderate pain Apply dollop size amount to port site 30-60 minutes prior to accessing.    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    HTN (hypertension)       LORazepam 0.5 MG tablet    ATIVAN    30 tablet    Take 1 tablet (0.5 mg) by mouth every 4 hours as needed (Anxiety, Nausea/Vomiting or Sleep)    Malignant neoplasm of hepatic flexure (H)       ondansetron 8 MG tablet    ZOFRAN    10 tablet    Take 1 tablet (8 mg) by mouth every 8 hours as needed (Nausea/Vomiting)    Malignant neoplasm of hepatic flexure (H)       * oxyCODONE 5 MG IR tablet    ROXICODONE    40 tablet    Take 1 tablet (5 mg) by mouth every 4 hours as needed for moderate to severe pain    Liver masses, Colonic mass       * oxyCODONE 5 MG IR tablet    ROXICODONE    15 tablet    Take 1 tablet (5 mg) by mouth every 3 hours as needed for pain or other (Moderate to Severe)    Malignant neoplasm of hepatic flexure (H)       prochlorperazine 10 MG tablet    COMPAZINE    30 tablet    Take 1 tablet (10 mg) by mouth every 6 hours as needed (Nausea/Vomiting)    Malignant neoplasm of hepatic flexure (H)       sennosides 8.6 MG tablet    SENOKOT    60 tablet    Take 1 tablet by mouth 2 times daily    Constipation due to  opioid therapy       SERTRALINE HCL PO      Take 50 mg by mouth daily (Takes half of a 100mg tablet for a total of 50mg daily)        VITAMIN D (CHOLECALCIFEROL) PO      Take 1 tablet by mouth daily (OTC: Pt unsure of strength)        * Notice:  This list has 2 medication(s) that are the same as other medications prescribed for you. Read the directions carefully, and ask your doctor or other care provider to review them with you.

## 2017-10-11 NOTE — PATIENT INSTRUCTIONS
1.  Continue sulfate 325 mg b.i.d.   2.  FOLFOX with Avastin every 2 weeks.  Chemo today.   3.  Return to clinic 2 weeks with Infusion Clinic appointment.  Unique - Charissa    AVS given to patient - Charissa    See Dr. Sandra Calhoun - Charissa  Home Health/ Will discuss with Stephanie  Social work/ Stephanie Dorado

## 2017-10-11 NOTE — PROGRESS NOTES
Infusion Nursing Note:  Liv Sylvester presents today for C1D1 Folfox/Avastin.    Patient seen by provider today: Yes: Dr. Piedra   present during visit today: Not Applicable.    Note: Patient thought she needed to be NPO for chemo today, therefore unable to leave urine specimen right away.  Chemo start time delayed due to not receiving urine protein prior to avastin.  1030 pt still unable to void.  Per Dr. Piedra, ok to give avastin today without urine protein.    Patient and daughter, Jessica, consulted today with chemo and drug information by pharmacy, Dr. Piedra and Laina MEJIA.  Patient also seen today by social work and research.     Intravenous Access:  Labs drawn without difficulty.  Implanted Port.    Treatment Conditions:  Lab Results   Component Value Date    HGB 8.8 10/11/2017     Lab Results   Component Value Date    WBC 8.1 10/11/2017      Lab Results   Component Value Date    ANEU 5.6 10/11/2017     Lab Results   Component Value Date     10/11/2017      Lab Results   Component Value Date     10/11/2017                   Lab Results   Component Value Date    POTASSIUM 3.6 10/11/2017           No results found for: MAG         Lab Results   Component Value Date    CR 0.78 10/11/2017                   Lab Results   Component Value Date    JESÚS 9.1 10/11/2017                Lab Results   Component Value Date    BILITOTAL 0.3 10/11/2017           Lab Results   Component Value Date    ALBUMIN 2.7 10/11/2017                    Lab Results   Component Value Date    ALT 30 10/11/2017           Lab Results   Component Value Date    AST 70 10/11/2017     Results reviewed, labs MET treatment parameters, ok to proceed with treatment.  Urine protein not needed today.      Post Infusion Assessment:  Patient tolerated infusion without incident.  Blood return noted pre and post infusion.  Site patent and intact, free from redness, edema or discomfort.  No evidence of extravasations.    Prior to discharge:  "Port is secured in place with tegaderm and flushed with 10cc NS with positive blood return noted.  Continuous home infusion Dosi-Fuser pump connected.    All connectors secured in place and clamps taped open.    Pump started, \"running\" noted on display (CADD): Not Applicable.  Patient instructed to call our clinic or [facility] with any questions or concerns at home.  Patient verbalized understanding.    Patient set up for pump disconnect at home with [facility] on 10/13/17 at 12:00pm.        Discharge Plan:   Prescription refills given for Ativan, Compazine, Zofran, Emla Cream.  Discharge instructions reviewed with: Patient and Family.  Patient and/or family verbalized understanding of discharge instructions and all questions answered.  Copy of AVS reviewed with patient and/or family.  Patient will return 10/25/17 for next appointment.  Patient discharged in stable condition accompanied by: daughter and granddaughter.  Departure Mode: Wheelchair.    Petar Beal RN                      "

## 2017-10-11 NOTE — PROGRESS NOTES
Therapy: Home Infusion  Insurance: BCBS  Ded: $2750  Met: $650  Co-Insurance: 75%  Max Out of Pocket: $6850  Met: $860  Pt does have home disconnect coverage and drug can be given on any device.    Please contact Intake with any questions, [phone] or In Basket pool,  Home Infusion ([phone]).    [facility]: in reference to referral made on 10/10/2017 to check IV home infusion coverage.

## 2017-10-11 NOTE — PROGRESS NOTES
"[facility]  HEMATOLOGY ONCOLOGY    ONCOLOGY FOLLOWUP NOTE      DIAGNOSIS:  Metastatic colon cancer in a patient with history of stage 1A grade 3 endometrial carcinoma approximately around 2012.        On 09/29/2017, Liv Sylvester presented with progressive fatigue and right upper quadrant pain.  CT scan 09/29/2017 showed partially contracted bladder and findings suspicious for liver metastases.  There were multiple defined hypoechoic foci scoped scattered throughout the liver, the largest was in the right lobe.  CT was also showing enlargement of hepatic and retroperitoneal lymph nodes.  On 09/30/2017, she underwent a colonoscopy, which showed a fungating, infiltrative, ulcerated, partially obstructing mass at the hepatic flexure.  Pathology is consistent with moderately differentiated adenocarcinoma with normal mismatch repair protein expression.      Significant iron deficiency anemia.      TREATMENT:  10/11/2017 FOLFOX and Avastin.      SUBJECTIVE:  The patient was seen as a followup today. She is feeling better. We discussed about the chemotherapy again.     REVIEW OF SYSTEMS:  A complete review of systems was performed and found to be negative other than pertinent positives mentioned in history of present illness.      Past medical, social histories reviewed.     Meds- Reviewed.     PHYSICAL EXAMINATION:   VITAL SIGNS: /60  Temp 98.3  F (36.8  C) (Oral)  Resp 18  Ht 1.778 m (5' 10\")  Wt 109.5 kg (241 lb 6.4 oz)  SpO2 98%  BMI 34.64 kg/m2  CONSTITUTIONAL: Appears tired.   HEENT: Pupils are equal. Oropharynx is clear.   NECK: No cervical or supraclavicular lymphadenopathy.   RESPIRATORY: Clear bilaterally.   CARD/VASC: S1, S2, regular.   GI: Soft, nontender, nondistended, no hepatosplenomegaly.   MUSKULOSKELETAL: Warm, well perfused.   NEUROLOGIC: Alert, awake.   INTEGUMENT: No rash.   LYMPHATICS: No edema.   PSYCH: Appears stressed.      LABORATORY DATA AND IMAGING REVIEWED " DURING THIS VISIT:  Recent Labs   Lab Test  10/04/17   1333  09/30/17   0739   NA  138  139   POTASSIUM  3.4  3.5   CHLORIDE  101  104   CO2  27  27   ANIONGAP  10  8   BUN  18  13   CR  0.90  0.78   GLC  107*  88   JESÚS  9.7  8.7     Recent Labs   Lab Test  10/04/17   1333  09/30/17   0739  09/29/17   1210   03/12/13   2231   WBC  9.6  7.4  8.3   < >  6.2   HGB  9.3*  8.1*  8.5*   < >  9.4*   PLT  413  426  420   < >  286   MCV  72*  73*  73*   < >  78   NEUTROPHIL  73.8   --   73.8   --   61.0    < > = values in this interval not displayed.     Recent Labs   Lab Test  10/04/17   1333  09/30/17   0739  09/29/17   1210   BILITOTAL  0.3  0.6  0.3   ALKPHOS  182*  120  145   ALT  37  24  30   AST  65*  41  54*   ALBUMIN  2.8*  2.5*  2.7*      ECOG performance status 1.      ASSESSMENT:  This is a 66-year-old lady with stage IV metastatic colon cancer with normal mismatch repair protein expression.  She has multiple liver metastases and metastasis in the abdominal lymph nodes.      She is on palliative intent combination of FOLFOX with Avastin which is given every 2 weeks intravenously.      - CT Scan chest 10/5/17 results were reviewed. She has lung mets as well.   - We discussed the potential side effects of treatment again and reminded her to call us if she is not feeling well.   - We will also have our  see her to assess her support system.      She also has iron deficiency anemia due to chronic blood loss from her cancer.  She is on oral Iron.      PLAN:   1.  Continue sulfate 325 mg b.i.d.   2.  FOLFOX with Avastin every 2 weeks.  Chemo today.   3.  Return to clinic 2 weeks with Infusion Clinic appointment.     SIDRA NAILS MD    10/11/2017

## 2017-10-11 NOTE — PROGRESS NOTES
Patient was educated on the following oral medications: Compazine, Zofran and Ativan on October 11, 2017. Teaching provided to patient included indication, dose, administration, adverse effects and side effect management. Written materials were provided and patient was given the opportunity to ask questions. Patient verbalized understanding of the information presented. I also verified with the patient that she has stopped using oxycodone.    Alonzo Molina PharmD.

## 2017-10-11 NOTE — MR AVS SNAPSHOT
After Visit Summary   10/11/2017    Liv Sylvester    MRN: [id number]           Patient Information     Date Of Birth          1950        Visit Information        Provider Department      10/11/2017 9:30 AM Radha Piedra MD [facility]        Today's Diagnoses     Malignant neoplasm of hepatic flexure (H)    -  1    Medication management          Care Instructions    1.  Continue sulfate 325 mg b.i.d.   2.  FOLFOX with Avastin every 2 weeks.  Chemo today.   3.  Return to clinic 2 weeks with Infusion Clinic appointment.  Done - Charissa    AVS given to patient - Charissa    See Dr. Flores  Done - Charissa  Home Health/ Will discuss with Stephanie  Social work/ Stephanie Dorado            Follow-ups after your visit        Additional Services     HOME CARE NURSING REFERRAL       **Order classes of: [facility], [facility] and [facility] will route to the Home Care and Hospice Referral Pool.  Home Care or Hospice will then contact the patient to schedule their appointment.**    If you do not hear from Home Care and Hospice, or you would like to call to schedule, please call the referring place of service that your provider has listed below.  ______________________________________________________________________    Your provider has referred you to: [facility]: [facility] [phone]   [email or web address]    Extended Emergency Contact Information  Primary Emergency Contact: brenden aguilar   [facility]  Home Phone: [phone]  Mobile Phone: [phone]  Relation: Daughter  Secondary Emergency Contact: Jayson Aguilar   [place]  Mobile Phone: [phone]  Relation: Son    Patient Anticipated Discharge Date: Today   RN, PT, HHA to begin 24 - 48 hours after discharge.  PLEASE EVALUATE AND TREAT (Evaluation timeline is 24 - 48 hrs. Please call if there is need for a variance to this timeline).    REASON FOR REFERRAL: Medication  management    ADDITIONAL SERVICES NEEDED:     OTHER PERTINENT INFORMATION: Patient was last seen by provider on     Current Outpatient Prescriptions:  prochlorperazine (COMPAZINE) 10 MG tablet, Take 1 tablet (10 mg) by mouth every 6 hours as needed (Nausea/Vomiting), Disp: 30 tablet, Rfl: 2  ondansetron (ZOFRAN) 8 MG tablet, Take 1 tablet (8 mg) by mouth every 8 hours as needed (Nausea/Vomiting), Disp: 10 tablet, Rfl: 2  ferrous sulfate (IRON) 325 (65 FE) MG tablet, Take 1 tablet (325 mg) by mouth 2 times daily, Disp: 100 tablet, Rfl: 11  albuterol (PROAIR HFA/PROVENTIL HFA/VENTOLIN HFA) 108 (90 BASE) MCG/ACT Inhaler, Inhale 2 puffs into the lungs every 6 hours as needed for shortness of breath / dyspnea or wheezing, Disp: 1 Inhaler, Rfl: 3  sennosides (SENOKOT) 8.6 MG tablet, Take 1 tablet by mouth 2 times daily, Disp: 60 tablet, Rfl: 1  CHLORTHALIDONE PO, Take 25 mg by mouth daily, Disp: , Rfl:   SERTRALINE HCL PO, Take 50 mg by mouth daily (Takes half of a 100mg tablet for a total of 50mg daily), Disp: , Rfl:   CALCIUM-MAGNESIUM-ZINC PO, Take 1 tablet by mouth daily, Disp: , Rfl:   ACETAMINOPHEN PO, Take 1,000 mg by mouth every 4 hours as needed for pain ((Patient has been taking 4-12 tabs per day)), Disp: , Rfl:   lisinopril (PRINIVIL,ZESTRIL) 10 MG tablet, Take 1 tablet by mouth daily., Disp: 30 tablet, Rfl: 11  Multiple Vitamins-Minerals (CENTRUM SILVER) per tablet, Take 1 tablet by mouth daily., Disp: 100 tablet, Rfl: 12  LORazepam (ATIVAN) 0.5 MG tablet, Take 1 tablet (0.5 mg) by mouth every 4 hours as needed (Anxiety, Nausea/Vomiting or Sleep) (Patient not taking: Reported on 10/11/2017), Disp: 30 tablet, Rfl: 2  lidocaine-prilocaine (EMLA) cream, Apply topically as needed for moderate pain Apply dollop size amount to port site 30-60 minutes prior to accessing. (Patient not taking: Reported on 10/11/2017), Disp: 30 g, Rfl: 1  VITAMIN D, CHOLECALCIFEROL, PO, Take 1 tablet by mouth daily (OTC: Pt unsure of  strength), Disp: , Rfl:       Patient Active Problem List:     Obesity     CARDIOVASCULAR SCREENING; LDL GOAL LESS THAN 160     Advanced directives, counseling/discussion     HTN (hypertension)     Iron deficiency anemia     Liver masses     RUQ abdominal pain     Malignant neoplasm of hepatic flexure (H)      Documentation of Face to Face and Certification for Home Health Services    I certify that patientLiv is under my care and that I, or a Nurse Practitioner or Physician's Assistant working with me, had a face-to-face encounter that meets the physician face-to-face encounter requirements with this patient on:     This encounter with the patient was in whole, or in part, for the following medical condition, which is the primary reason for Home Health Care:     I certify that, based on my findings, the following services are medically necessary Home Health Services: Medication mngmnt      My clinical findings support the need for the above services because: Medication management by nursing     Further, I certify that my clinical findings support that this patient is homebound (i.e. absences from home require considerable and taxing effort and are for medical reasons or Holiness services or infrequently or of short duration when for other reasons) because:     Based on the above findings, I certify that this patient is confined to the home and needs intermittent skilled nursing care, physical therapy and/or speech therapy.  The patient is under my care, and I have initiated the establishment of the plan of care.  This patient will be followed by a physician who will periodically review the plan of care.    Physician/Provider to provide follow up care:   Responsible [place] certified Physician at time of discharge: Dr. Piedra    Please be aware that coverage of these services is subject to the terms and limitations of your health insurance plan.  Call member services at your health plan with any benefit or  coverage questions.                  Your next 10 appointments already scheduled     Oct 23, 2017  9:00 AM CDT   New Visit with Nela Spaulding MD   [facility] ([facility])    [place] [id number]  [place] [address]   [phone]            Oct 25, 2017  9:00 AM CDT   Level 6 with SH INFUSION CHAIR 13   [facility] ([facility])    [place] [address]  [place] [address]   [phone]            Oct 25, 2017 10:45 AM CDT   Return Visit with Radha Piedra MD   [facility] ([facility])    [place] [address]  [place] [address]   [phone]            Nov 08, 2017  9:00 AM CST   Level 6 with SH INFUSION CHAIR 14   [facility] ([facility])    [facility]  [address]  [place] [address]   [phone]            Nov 22, 2017  9:00 AM CST   Level 6 with SH INFUSION CHAIR 17   [facility] ([facility])    [facility]  [address]  [place] [address]   [phone]            Dec 06, 2017  9:00 AM CST   Level 6 with SH INFUSION CHAIR 2   [facility] ([facility])    [facility]  [address]  [place] [address]   [phone]            Dec 20, 2017  9:00 AM CST   Level 6 with SH INFUSION CHAIR 12   [facility] ([facility])    [facility]  [address]  [place] [address]   [phone]              Who to contact     If you have questions or need follow up information about today's clinic visit or your schedule please contact [facility] directly at  "[phone].  Normal or non-critical lab and imaging results will be communicated to you by MyChart, letter or phone within 4 business days after the clinic has received the results. If you do not hear from us within 7 days, please contact the clinic through [employer] or phone. If you have a critical or abnormal lab result, we will notify you by phone as soon as possible.  Submit refill requests through [employer] or call your pharmacy and they will forward the refill request to us. Please allow 3 business days for your refill to be completed.          Additional Information About Your Visit        [employer] Information     [employer] lets you send messages to your doctor, view your test results, renew your prescriptions, schedule appointments and more. To sign up, go to [email or web address] . Click on \"Log in\" on the left side of the screen, which will take you to the Welcome page. Then click on \"Sign up Now\" on the right side of the page.     You will be asked to enter the access code listed below, as well as some personal information. Please follow the directions to create your username and password.     Your access code is: [email or web address]  Expires: 2017 12:39 PM     Your access code will  in 90 days. If you need help or a new code, please call your [place] clinic or [phone].        Care EveryWhere ID     This is your Care EveryWhere ID. This could be used by other organizations to access your [place] medical records  [id number]        Your Vitals Were     Temperature Respirations Height Pulse Oximetry BMI (Body Mass Index)       98.3  F (36.8  C) (Oral) 18 1.778 m (5' 10\") 98% 34.64 kg/m2        Blood Pressure from Last 3 Encounters:   No data found for BP    Weight from Last 3 Encounters:   No data found for Wt              Today, you had the following     No orders found for display         Today's Medication Changes          These changes are accurate as of: 10/11/17 11:59 PM.  If you have any " questions, ask your nurse or doctor.               Start taking these medicines.        Dose/Directions    lidocaine-prilocaine cream   Commonly known as:  EMLA   Used for:  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        Apply topically as needed for moderate pain Apply dollop size amount to port site 30-60 minutes prior to accessing.   Quantity:  30 g   Refills:  1       LORazepam 0.5 MG tablet   Commonly known as:  ATIVAN   Used for:  Malignant neoplasm of hepatic flexure (H)        Dose:  0.5 mg   Take 1 tablet (0.5 mg) by mouth every 4 hours as needed (Anxiety, Nausea/Vomiting or Sleep)   Quantity:  30 tablet   Refills:  2       ondansetron 8 MG tablet   Commonly known as:  ZOFRAN   Used for:  Malignant neoplasm of hepatic flexure (H)        Dose:  8 mg   Take 1 tablet (8 mg) by mouth every 8 hours as needed (Nausea/Vomiting)   Quantity:  10 tablet   Refills:  2       prochlorperazine 10 MG tablet   Commonly known as:  COMPAZINE   Used for:  Malignant neoplasm of hepatic flexure (H)        Dose:  10 mg   Take 1 tablet (10 mg) by mouth every 6 hours as needed (Nausea/Vomiting)   Quantity:  30 tablet   Refills:  2            Where to get your medicines      These medications were sent to [facility] - [place], [place] - [address]  [address] [place] [address], [facility] [address]     Phone:  [phone]     lidocaine-prilocaine cream    ondansetron 8 MG tablet    prochlorperazine 10 MG tablet         Some of these will need a paper prescription and others can be bought over the counter.  Ask your nurse if you have questions.     Bring a paper prescription for each of these medications     LORazepam 0.5 MG tablet                Primary Care Provider Office Phone # Fax #    Amaya Neema Howard [phone]       [facility] [address] [facility] [address]        Equal Access to Services     HAYDEE IVERSON AH: wiley Carreon, bhumika  john perkins hayeva benavidezslime cevallos ah. Meryl [facility] [phone].    ATENCIÓN: Si marleny zhong, tiene a schmitt disposición servicios gratuitos de asistencia lingüística. Wiley al [phone].    We comply with applicable federal civil rights laws and [place] laws. We do not discriminate on the basis of race, color, national origin, age, disability, sex, sexual orientation, or gender identity.            Thank you!     Thank you for choosing [facility]  for your care. Our goal is always to provide you with excellent care. Hearing back from our patients is one way we can continue to improve our services. Please take a few minutes to complete the written survey that you may receive in the mail after your visit with us. Thank you!             Your Updated Medication List - Protect others around you: Learn how to safely use, store and throw away your medicines at www.disposemymeds.org.          This list is accurate as of: 10/11/17 11:59 PM.  Always use your most recent med list.                   Brand Name Dispense Instructions for use Diagnosis    ACETAMINOPHEN PO      Take 1,000 mg by mouth every 4 hours as needed for pain ((Patient has been taking 4-12 tabs per day))        albuterol 108 (90 BASE) MCG/ACT Inhaler    PROAIR HFA/PROVENTIL HFA/VENTOLIN HFA    1 Inhaler    Inhale 2 puffs into the lungs every 6 hours as needed for shortness of breath / dyspnea or wheezing    Reactive airway disease without complication, unspecified asthma severity, unspecified whether persistent       CALCIUM-MAGNESIUM-ZINC PO      Take 1 tablet by mouth daily        CENTRUM SILVER per tablet     100 tablet    Take 1 tablet by mouth daily.        CHLORTHALIDONE PO      Take 25 mg by mouth daily        ferrous sulfate 325 (65 FE) MG tablet    IRON    100 tablet    Take 1 tablet (325 mg) by mouth 2 times daily    Iron deficiency anemia due to chronic blood loss       lidocaine-prilocaine cream    EMLA    30  g    Apply topically as needed for moderate pain Apply dollop size amount to port site 30-60 minutes prior to accessing.    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    HTN (hypertension)       LORazepam 0.5 MG tablet    ATIVAN    30 tablet    Take 1 tablet (0.5 mg) by mouth every 4 hours as needed (Anxiety, Nausea/Vomiting or Sleep)    Malignant neoplasm of hepatic flexure (H)       ondansetron 8 MG tablet    ZOFRAN    10 tablet    Take 1 tablet (8 mg) by mouth every 8 hours as needed (Nausea/Vomiting)    Malignant neoplasm of hepatic flexure (H)       prochlorperazine 10 MG tablet    COMPAZINE    30 tablet    Take 1 tablet (10 mg) by mouth every 6 hours as needed (Nausea/Vomiting)    Malignant neoplasm of hepatic flexure (H)       sennosides 8.6 MG tablet    SENOKOT    60 tablet    Take 1 tablet by mouth 2 times daily    Constipation due to opioid therapy       SERTRALINE HCL PO      Take 50 mg by mouth daily (Takes half of a 100mg tablet for a total of 50mg daily)        VITAMIN D (CHOLECALCIFEROL) PO      Take 1 tablet by mouth daily (OTC: Pt unsure of strength)

## 2017-10-12 NOTE — MR AVS SNAPSHOT
After Visit Summary   10/12/2017    Liv Sylvester    MRN: [id number]           Patient Information     Date Of Birth          1950        Visit Information        Provider Department      10/12/2017 3:31 PM Stephanie Awad LICSW [facility]        Today's Diagnoses     Counseling NOS(V65.40)    -  1       Follow-ups after your visit        Your next 10 appointments already scheduled     Oct 23, 2017  9:00 AM CDT   New Visit with Nela Spaulding MD   [facility] ([facility])    [facility] [place] [id number]  [place] [address]   [phone]            Oct 25, 2017  9:00 AM CDT   Level 6 with SH INFUSION CHAIR 13   [facility] ([facility])    [facility]  [address]  [place] [address]   [phone]            Oct 25, 2017 10:45 AM CDT   Return Visit with Radha Piedra MD   [facility] ([facility])    [facility]  [address]  [place] [address]   [phone]            Nov 08, 2017  9:00 AM CST   Level 6 with SH INFUSION CHAIR 14   [facility] ([facility])    [facility]  [address]  [place] [address]   [phone]            Nov 22, 2017  9:00 AM CST   Level 6 with SH INFUSION CHAIR 17   [facility] ([facility])    [facility]  [address]  [place] [address]   [phone]            Dec 06, 2017  9:00 AM CST   Level 6 with SH INFUSION CHAIR 2   [facility])    [facility]  [address]  [place] [address]   [phone]            Dec 20, 2017  9:00 AM CST   Level 6 with SH INFUSION CHAIR 12   [facility] ([facility])    [facility] [place]  [address]  [place] [address] [phone]              Who to contact     If you have questions or need follow up information about today's clinic visit or your schedule please contact [facility] directly at [phone].  Normal or non-critical lab and imaging results will be communicated to you by MyChart, letter or phone within 4 business days after the clinic has received the results. If you do not hear from us within 7 days, please contact the clinic through [employer] or phone. If you have a critical or abnormal lab result, we will notify you by phone as soon as possible.  Submit refill requests through [employer] or call your pharmacy and they will forward the refill request to us. Please allow 3 business days for your refill to be completed.          Additional Information About Your Visit        [employer] Information     [employer] lets you send messages to your doctor, view your test results, renew your prescriptions, schedule appointments and more. To sign up, go to [email or web address] . Click on \"Log in\" on the left side of the screen, which will take you to the Welcome page. Then click on \"Sign up Now\" on the right side of the page.     You will be asked to enter the access code listed below, as well as some personal information. Please follow the directions to create your username and password.     Your access code is: [email or web address]  Expires: 2017 12:39 PM     Your access code will  in 90 days. If you need help or a new code, please call your [place] clinic or [phone].        Care EveryWhere ID     This is your Care EveryWhere ID. This could be used by other organizations to access your [place] medical records  [id number]         Blood Pressure from Last 3 Encounters:   No data found for BP    Weight from Last 3 Encounters:   No data found for Wt              Today, " you had the following     No orders found for display       Primary Care Provider Office Phone # Fax #    Amaya Howard [phone]       [facility] [address] [place] [address]        Equal Access to Services     HAYDEE IVERSON : Hadii anthony kahn hadbhartio Soomaali, waaxda luqadaha, qaybta kaalmada adeegyada, john daliain hayaan reemaslime tao mart maya. So [facility] [phone].    ATENCIÓN: Si habla español, tiene a schmitt disposición servicios gratuitos de asistencia lingüística. Llame al [phone].    We comply with applicable federal civil rights laws and [place] laws. We do not discriminate on the basis of race, color, national origin, age, disability, sex, sexual orientation, or gender identity.            Thank you!     Thank you for choosing [facility]  for your care. Our goal is always to provide you with excellent care. Hearing back from our patients is one way we can continue to improve our services. Please take a few minutes to complete the written survey that you may receive in the mail after your visit with us. Thank you!             Your Updated Medication List - Protect others around you: Learn how to safely use, store and throw away your medicines at www.disposemymeds.org.          This list is accurate as of: 10/12/17 11:59 PM.  Always use your most recent med list.                   Brand Name Dispense Instructions for use Diagnosis    ACETAMINOPHEN PO      Take 1,000 mg by mouth every 4 hours as needed for pain ((Patient has been taking 4-12 tabs per day))        albuterol 108 (90 BASE) MCG/ACT Inhaler    PROAIR HFA/PROVENTIL HFA/VENTOLIN HFA    1 Inhaler    Inhale 2 puffs into the lungs every 6 hours as needed for shortness of breath / dyspnea or wheezing    Reactive airway disease without complication, unspecified asthma severity, unspecified whether persistent       CALCIUM-MAGNESIUM-ZINC PO      Take 1 tablet by mouth daily        CENTRUM SILVER per tablet     100  tablet    Take 1 tablet by mouth daily.        CHLORTHALIDONE PO      Take 25 mg by mouth daily        ferrous sulfate 325 (65 FE) MG tablet    IRON    100 tablet    Take 1 tablet (325 mg) by mouth 2 times daily    Iron deficiency anemia due to chronic blood loss       lidocaine-prilocaine cream    EMLA    30 g    Apply topically as needed for moderate pain Apply dollop size amount to port site 30-60 minutes prior to accessing.    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    HTN (hypertension)       LORazepam 0.5 MG tablet    ATIVAN    30 tablet    Take 1 tablet (0.5 mg) by mouth every 4 hours as needed (Anxiety, Nausea/Vomiting or Sleep)    Malignant neoplasm of hepatic flexure (H)       ondansetron 8 MG tablet    ZOFRAN    10 tablet    Take 1 tablet (8 mg) by mouth every 8 hours as needed (Nausea/Vomiting)    Malignant neoplasm of hepatic flexure (H)       prochlorperazine 10 MG tablet    COMPAZINE    30 tablet    Take 1 tablet (10 mg) by mouth every 6 hours as needed (Nausea/Vomiting)    Malignant neoplasm of hepatic flexure (H)       sennosides 8.6 MG tablet    SENOKOT    60 tablet    Take 1 tablet by mouth 2 times daily    Constipation due to opioid therapy       SERTRALINE HCL PO      Take 50 mg by mouth daily (Takes half of a 100mg tablet for a total of 50mg daily)        VITAMIN D (CHOLECALCIFEROL) PO      Take 1 tablet by mouth daily (OTC: Pt unsure of strength)

## 2017-10-13 NOTE — TELEPHONE ENCOUNTER
"  Spoke with Liv to see how she is feeling after receiving Cycle 1 chemotherapy on 10/11/17, she also has her 46 hour fluorouracil pump takedown today at 12pm.  She states she is \"fine\" had a little nausea which she took lorazepam for and it resolved.  Reviewed her take home anti- nausea medication with her and the side effects.   Advised her that the ativan can make her sleepy and to try the compazine or zofran first.  (I did place an order for home health/ medication management on 10/11/17)     She states that she is eating \"some\" and drinking \"some\" but not like she should.    Will review with Dr. Piedra and place order for prn fluids     Spoke with Dr. Piedra- Order placed for PRN fluids.  Spoke with [facility] intake- They state she is covered and she is already on the schedule so intake will get the fluid order over to nursing to get her on for fluids this weekend.      "

## 2017-10-13 NOTE — PROGRESS NOTES
ONCOLOGY SOCIAL WORK  INITIAL PSYCHOSOCIAL ASSESSMENT    Assessment completed of living situation, support system, financial status, functional status, coping, stressors, need for resources and social work intervention provided as needed.    Date of Assessment: 10/12/2017. Pt did not complete Distress Screen in clinic at time of visit.     Present at assessment: This clinician made multiple attempts to meet with pt in person on her first day of chemotherapy, introduced to daughter (Jessica), as well as patient. Majority of information gathered from patient happened on 10/12/17 via phone.    Diagnosis: This is a 66-year-old woman with stage IV metastatic colon cancer.  She has multiple liver metastases and metastasis in the abdominal lymph nodes.    Date of Diagnosis: Per chart review, pt with history of stage 1A grade 3 endometrial carcinoma that was diagnosed approximately around 2012    Physician: Dr. Piedra    Presenting Information: Pt in clinic 10/11/17 for C1D1 of palliative FOLFOX with Avastin chemotherapy. This clinician met with pt briefly on that day, but followed up 10/12/17 by phone.     Decision Making: Self    Health Care Directive: No health care directives at this time.     Relationship Status of Patient: Single    Family/Support System: Children and [employer] community members    Caregiver/Home Services: Children and Other (graddaughters providing significant care)    Transportation: Private Car. Pt's children have been driving to and from appointments. Prior to dx, pt driving self.     Insurance: No Insurance issues identified    Sources of Income: SSI ($1,259 monthly), pt also works at [employer] to supplement    Financial Concerns: Pt reported that she does not currently have any financial concerns but that she has some concerns about this if she were not able to return to work.     Mental Health: No mental health issues identified      Recreation/Leisure Interests: sewing, coloring, taking care of home  "chores    /Social Service Providers: none at current time    Spirituality: Patient identifies with darcie community, pt reports that she has belonged to [facility] ([place]) for past 6 years. Pt reports that she identifies as \"Episcopalian\" although Scientology is Buddhism.     Current Coping Strategies: approachable and responsive    Assessment and Recommendations for Team:   Liv is a davon 66-year-old woman who is the mother of 7, grandmother of 22. She lives in an apartment in [place] with her 18 year old granddaughter and 12 year old granddaughter. She resides on the 3rd floor, but her apartment is elevator accessible. Liv reports that she has always been a very active woman who prefers to spend her day out of home and engaging in pleasurable activities (homecare chores, working at [employer], organizing/caring for children). Liv acknowledged today that she has experienced significant physical changes and notices that it is harder for her to ambulate at home and that she does not feel as comfortable taking a bath by herself. She acknowledged that her understanding of her illness is \"I get the sense that there isn't a cure for this\" but acknowledges that she hopes that with chemotherapy they can \"control the cancer that has spread.\" Darcie appears to be a strong grounding principle in Liv's life, and she acknowledges that \"when God says it's my time, then it's my time.\" Liv acknowledges that she has an understandably difficult time coping with a new role of \" of care\" as opposed to \"giver of care.\"     Interventions: education, emotional support    Follow-Up Planned:   1) This clinician will follow-up with pt regarding HCP documentation at future visit  2) This clinician will ensure that HHA and PT referrals are placed on behalf of pt   3) Pt to explore VEAP on own. This clinician would also recommend support through [facility] as this clinician feels pt would be good candidate for " Alternative Care through the state. Will continue to refer pt to community resources as needed.   4) Ongoing psychosocial support will be provided to patient and family.     This clinician will continue to follow for ongoing psychosocial support. Please page in case of psychosocial distress or need.     DEANNE Watkins, LICSW  Phone: [phone]  Pager: [phone]    Eldorado Machelle: M, T  *every other Thursday, 8am-4:30pm  Eldorado Reed: W, F, *every other Thursday, 8am-4:30pm

## 2017-10-17 NOTE — TELEPHONE ENCOUNTER
RN spoke with [employer] ([phone]) about her Leave paperwork as I have several copies and duplications that have been faxed, mailed and dropped off over the past few days.  The patient dropped something off yesterday stating that her claims for leave are denied and if they don't have paperwork by 10/18/17 she will loose everything.    I called and spoke with customer service, they did acknowledge she has several claims open and that what is needed for her leave is the ATTENDING PHYSICIAN STATEMENT DUE BY 10/25/17.  This was filled out today and will be signed by Dr. Piedra tomorrow and faxed to them at [phone].  They were unable to tell me what was due by 10/18/17- From our end the mentioned above is due by 10/25/17.    Liv also dropped off several things she has received from [employer] stating she doesn't know what to do with them..    RN left her a message to call and review this with her.    1.) She needs to fill out and sign the patient release of information ( She dropped it off here)  2.) To call [employer] with any paperwork confusion as some is for her records and for her to fill out and sign    Reviewed with Liv over the phone.    Claim #[id number]    Associate WIN # [id number]

## 2017-10-18 NOTE — TELEPHONE ENCOUNTER
Paperwork  signed by MD and faxed to [facility] disability and Leave service center/ [employer]  at [phone]    Called [phone], Faxed received on their end: has NOT been reviewed by specialist yet. They will have 3-5 business days to review after received.      Placed in scan pile.

## 2017-10-20 PROBLEM — I26.99 PULMONARY EMBOLISM (H): Status: ACTIVE | Noted: 2017-01-01

## 2017-10-20 NOTE — H&P
PRIMARY CARE PHYSICIAN:  Dr. Amaya Howard.      PRIMARY ONCOLOGIST:  Dr. Radha Piedra.      CHIEF COMPLAINT:  Chest pain.      HISTORY OF PRESENT ILLNESS:  Ms. Liv Sylvester is a 67-year-old -American female with recent diagnosis of metastatic colon cancer with mets to the liver and lung, undergoing Folfox chemotherapy with Avastin with last chemo being mid last week, presented to the emergency room with complaints of chest pain on the right side of the chest around her port site which started at 11:30 a.m. this morning.  It lasted approximately for 10 minutes, so she was seen by Dr. Willis and her D-dimer was found to be elevated at 8.3, so a CT chest for PE was done and it showed a few small acute pulmonary emboli in the left lung, a linear filling defect in the right lower lobe pulmonary artery. most likely represents an embolus and it is not acute, a few tiny intermediate bilateral pulmonary nodules unchanged, so we contacted Oncology on-call due to her metastatic colon cancer.  If there is any contraindication for starting her on anticoagulation, but they recommended starting her on IV heparin drip so she is getting admitted under observation for acute PE, bilateral in the setting of metastatic colon cancer and chemotherapy.      PAST MEDICAL HISTORY:  Significant for:   1.  Metastatic colon cancer.   2.  Iron deficiency anemia due to the colon cancer.   3.  Hypertension.   4.  Depression.   5.  Obesity with BMI of 33.5.   6.  History of ovarian cancer.  She is status post total abdominal hysterectomy and bilateral salpingo-oophorectomy and a history of pulmonary edema status post hysterectomy, hypertension.       PRIOR TO ADMISSION MEDICATIONS:  Include:   1.  Tylenol p.r.n.   2.  Albuterol 2 puffs into the lungs every 6 hours as needed.   3.  Chlorthalidone 25 mg p.o. daily.   4.  Ferrous sulfate 325 mg p.o. 2 times daily    5.   p.r.n.   6.  Lisinopril 10 mg p.o. daily.   7.  Lorazepam 0.5 mg p.o.  every 4 hours as needed for anxiety.   8.  Senokot 1 tablet p.o. 2 times daily.   9.  Sertraline 50 mg p.o. daily.   10.  Vitamin D 1 tablet p.o. daily.      ALLERGIES:  No known drug allergies.      SOCIAL HISTORY:  She quit smoking about 12 years ago.  She does not drink alcohol.  She lives with her 2 granddaughters.      FAMILY HISTORY:  Father had heart disease,  at the age of 66.  Mother  of MI at the age of 72.  Mother also has hypertension and diabetes.      REVIEW OF SYSTEMS:  Ten-point review of systems done and is negative except occasional spotting, rectal bleeding associated with constipation.      PHYSICAL EXAMINATION:   GENERAL:  She is alert, oriented, pleasant female sitting comfortably in bed.   VITAL SIGNS:  Her temperature is 99.2 degrees Fahrenheit, heart rate is 99-89, blood pressure 145/69, respiratory rate is 18-21.  She is satting 99% on room air.   HEENT:  Head is atraumatic, normocephalic.   NECK:  Supple, right-sided Port-A-Cath in place on chest wall.   LUNGS:  Clear to auscultation otherwise.   HEART:  S1, S2 heard, no murmurs, rubs or gallops.   ABDOMEN:  Soft, nontender, nondistended, no hepatosplenomegaly.   EXTREMITIES:  There is trace edema of the bilateral lower extremities noticed.   NEUROLOGIC:  No focal weakness.   PSYCHIATRIC:  Mood and affect are normal.      LABORATORY AND DIAGNOSTIC DATA:  CBC showed WBC count of 5.8, hemoglobin 8, platelet count at 227 with MCV of 72.  All her CMPs reviewed normal except a low potassium at 3.3.  D-dimer is elevated at 8.3.  CT chest showed bilateral PEs, acute PE in the left lung, right lung PE does not seem to be acute on bilateral pulmonary nodules which are known for her.      ASSESSMENT AND PLAN:  A 67-year-old female with metastatic stage IV colon cancer undergoing chemotherapy Folfox plus Avastin presenting with:   1.  Acute left lung pulmonary embolus.  He will be hospitalized and started on heparin drip and will closely  follow her for any signs of bleeding.  With her history of colon cancer, I request oncology consultation at [facility] regarding further evaluation and management of her bilateral lower extremity edema, will continue the chlorthalidone.   2.  Hypokalemia.  Will put her on potassium replacement protocol.   3.  Chronic iron deficiency anemia.  Will continue the iron because of the bleeding colon cancer and her hemoglobin is stable at 8.   4.  Code status is discussed with the patient.  She wants to be do not resuscitate/do not intubate.  Will check a Doppler bilateral lower extremity ultrasound on her.     5.  She will be admitted as observation as I am anticipating a short stay.         YAA FRANCISCO MD             D: 10/20/2017 16:13   T: 10/20/2017 16:38   MT: [id number]      Name:     JESSICA MOYA   MRN:      [id number]        Account:      [id number]   :      1950           Admitted:     [id number]      Document: [id number]       cc: Amaya Howard MD

## 2017-10-20 NOTE — ED PROVIDER NOTES
History     Chief Complaint:  Chest pain     HPI   Liv Sylvester is a 67 year old female with a history of hypertension, severe GENE and colon cancer metastasized to liver and lung, currently undergoing Q2 week palliative FOLFOX + Avastin, who presents for evaluation of chest pain. The patient developed a chest pain around her port site around 1130 this morning, approximately 10 minutes are lifting a heavy object. EMS was called but the patient declined transport to the hospital. Here, the patient states that her pain is nearly resolved. The patient has no history of similar pain. She denies any nausea, shortness of breath, diaphoresis or any other symptoms. The patient had per port placed on 10/09/2017.     Cardiac/PE/DVT Risk Factors:  The patient has a history of hypertension and is a former smoker.  She reports a family history of CAD and MI.  She denies any personal or familial history of PE, DVT or clotting disorder.  She reports no recent travel, surgery or other immobilizations.  She is not on hormone therapy. The patient does have a history of metastatic colon cancer.    Allergies:  No known drug allergies.     Medications:    LORazepam (ATIVAN) 0.5 MG tablet  prochlorperazine (COMPAZINE) 10 MG tablet  ondansetron (ZOFRAN) 8 MG tablet  lidocaine-prilocaine (EMLA) cream  ferrous sulfate (IRON) 325 (65 FE) MG tablet  albuterol (PROAIR HFA/PROVENTIL HFA/VENTOLIN HFA) 108 (90 BASE) MCG/ACT Inhaler  sennosides (SENOKOT) 8.6 MG tablet  CHLORTHALIDONE PO  SERTRALINE HCL PO  CALCIUM-MAGNESIUM-ZINC PO  VITAMIN D, CHOLECALCIFEROL, PO  ACETAMINOPHEN PO  lisinopril (PRINIVIL,ZESTRIL) 10 MG tablet  Multiple Vitamins-Minerals (CENTRUM SILVER) per tablet    Past Medical History:    Metastatic colon cancer  Flash pulmonary edema  Hypertension   Obesity   GENE    Past Surgical History:    Hysterectomy   Port insertion     Family History:    Father - CVD  Mother - heart disease, hypertension, diabetes     Social  "History:  Marital Status:     Smoking status: Former smoker  Alcohol use: No      Review of Systems   Constitutional: Negative for diaphoresis.   Respiratory: Negative for shortness of breath.    Cardiovascular: Positive for chest pain.   Gastrointestinal: Negative for nausea.   Neurological: Negative for dizziness and light-headedness.   All other systems reviewed and are negative.      Physical Exam     Patient Vitals for the past 24 hrs:   BP Temp Temp src Heart Rate Resp SpO2 Height Weight   10/20/17 1630 129/68 - - 86 - - - -   10/20/17 1615 - - - 93 - - - -   10/20/17 1600 135/69 - - 87 - - - -   10/20/17 1530 145/69 - - 89 18 - - -   10/20/17 1415 - - - 96 21 - - -   10/20/17 1400 - - - 98 13 - - -   10/20/17 1323 - - - - - 99 % - -   10/20/17 1322 139/73 - - - - - - -   10/20/17 1254 123/60 99.2  F (37.3  C) Oral 99 20 99 % 1.778 m (5' 10\") 106.1 kg (234 lb)      Physical Exam  General: Appears well-developed and well-nourished.   Head: No signs of trauma.   Mouth/Throat: Oropharynx is clear and moist.   CV: Normal rate and regular rhythm.    Resp: Effort normal and breath sounds normal. No respiratory distress.   GI: Soft. There is no tenderness or guarding.  Normal bowel sounds.  No CVA tenderness.  MSK: Normal range of motion. no edema. No Calf tenderness.  Neuro: The patient is alert and oriented.  Speech normal.  GCS 15  Skin: Skin is warm and dry. No rash noted. Right subclavian port accessed.    Psych: normal mood and affect. behavior is normal.       Emergency Department Course   ECG:  @ 1300  Indication: Screening ECG  Vent. Rate 95 bpm. MO interval 130 ms. QRS duration 90 ms. QT/QTc 336/422 ms. P-R-T axis * 135 -22.   Normal sinus rhythm. Left posterior fascicular block. Cannot rule out inferior infarct, age undetermined. Anterior infarct, age undetermined. T wave abnormality, consider lateral ischemia. Abnormal ECG.    Read @ 1330 by Dr. Willis.     Imaging:  Radiographic findings were " communicated with the patient who voiced understanding of the findings.    CT Chest - PE Protocol   IMPRESSION:   1. A few small acute pulmonary emboli in the left lung. A linear  filling defect in the right lower lobe pulmonary artery most likely  represents an embolus and it is not acute.  2. A few tiny indeterminate bilateral pulmonary nodules, unchanged  since the recent study dated 10/5/2017.  3. Partial visualization of several hepatic metastases, unchanged.  Preliminary radiology read.       XR Chest PA & LAT  IMPRESSION: No acute cardiopulmonary abnormality.  Report per radiology.     Laboratory:  CBC:  WBC 5.8, HGB 8.0 (L),   CMP: K 3.3 (L), albumin 2.4 (L), otherwise WNL (Creatinine 0.54)  Troponin: <0.015    (1320) D dimer: 8.3 (H)    Interventions:  (1426) Potassium chloride 20 mEq PO   (1619) Heparin loading dose 6,700 units  (1619) Heparin infusion 1,500 units/hr IV    Emergency Department Course:  Nursing notes and vitals reviewed.  (1325) I performed an exam of the patient as documented above.    Blood was drawn from the patient. This was sent for laboratory testing, findings above.    The patient was sent for a the above imaging while in the emergency department, findings above.       Findings and plan explained to the patient who consents to admission.     (1545) I discussed the patient with Dr. Youngblood of the hospitalist service, who will admit the patient to a oncology bed for further monitoring, evaluation, and treatment.     (0786) I consulted with Dr. Ackerman, on call for the patient's oncologist Dr. Piedra.     Impression & Plan      Medical Decision Making:  Liv Sylvester is a 67 year old female who presents due to chest discomfort.  Began this afternoon, and is largely resolved upon my evaluation.  Patient does have recent diagnosis of metastatic GI cancer with large metastases to liver.  She is on chemotherapy.  EKG was obtained upon arrival which did show some flipped T waves in the  inferior lateral leads.  Troponin was negative. I did a D-dimer given her history and risk factors, this came back elevated.  Given this, I obtained a CT scan which did show signs of acute pulmonary embolism, which may very well be the etiology of her symptoms.  I did speak with Dr. Ackerman who is on call for oncology given the potential risks with anticoagulation in the liver mass.  Dr. Ackerman did recommend proceeding with anticoagulation and monitoring.  Patient will be admitted to the hospital service for monitoring to assure her stability and to monitor for any signs of potential short-term.    Diagnosis:    ICD-10-CM   1. Pulmonary embolism (H) I26.99       Disposition:  Patient is admitted to a oncology bed under the care of Dr. Youngblood.     10/20/2017    EMERGENCY DEPARTMENT    I, Roderick Lo, am serving as a scribe on 10/20/2017 at 1:25 PM to personally document services performed by Dr. Willis based on my observations and the provider's statements to me.        Edward Willis MD  10/20/17 173

## 2017-10-20 NOTE — PHARMACY-ADMISSION MEDICATION HISTORY
Admission medication history interview status for the 10/20/2017  admission is complete. See EPIC admission navigator for prior to admission medications     Medication history source reliability:Good    Actions taken by pharmacist (provider contacted, etc):Veriifed meds with patient's recent discharge summary 10/2 and verified newer Rx medications with [facility] records     Additional medication history information not noted on PTA med list :None    Medication reconciliation/reorder completed by provider prior to medication history? No    Time spent in this activity: 15 minutes    Prior to Admission medications    Medication Sig Last Dose Taking? Auth Provider   LORazepam (ATIVAN) 0.5 MG tablet Take 1 tablet (0.5 mg) by mouth every 4 hours as needed (Anxiety, Nausea/Vomiting or Sleep) prn med Yes Radha Piedra MD   prochlorperazine (COMPAZINE) 10 MG tablet Take 1 tablet (10 mg) by mouth every 6 hours as needed (Nausea/Vomiting) Past Week at Unknown time Yes Radha Pierda MD   ondansetron (ZOFRAN) 8 MG tablet Take 1 tablet (8 mg) by mouth every 8 hours as needed (Nausea/Vomiting) Past Week at Unknown time Yes Radha Piedra MD   lidocaine-prilocaine (EMLA) cream Apply topically as needed for moderate pain Apply dollop size amount to port site 30-60 minutes prior to accessing. Past Week at Unknown time Yes Radha Piedra MD   ferrous sulfate (IRON) 325 (65 FE) MG tablet Take 1 tablet (325 mg) by mouth 2 times daily 10/20/2017 at am x 1 dose Yes Radha Piedra MD   albuterol (PROAIR HFA/PROVENTIL HFA/VENTOLIN HFA) 108 (90 BASE) MCG/ACT Inhaler Inhale 2 puffs into the lungs every 6 hours as needed for shortness of breath / dyspnea or wheezing prn med Yes Radha Piedra MD   sennosides (SENOKOT) 8.6 MG tablet Take 1 tablet by mouth 2 times daily 10/20/2017 at am x 1 dose Yes Carolyn Berman MD   CHLORTHALIDONE PO Take 25 mg by mouth daily 10/20/2017 at am Yes Unknown, Entered By History   SERTRALINE HCL PO Take 50 mg  by mouth daily (Takes half of a 100mg tablet for a total of 50mg daily) 10/20/2017 at am Yes Unknown, Entered By History   CALCIUM-MAGNESIUM-ZINC PO Take 1 tablet by mouth daily 10/20/2017 at am Yes Unknown, Entered By History   VITAMIN D, CHOLECALCIFEROL, PO Take 1 tablet by mouth daily (OTC: Pt unsure of strength) 10/20/2017 at am Yes Unknown, Entered By History   ACETAMINOPHEN PO Take 1,000 mg by mouth every 4 hours as needed for pain ((Patient has been taking 4-12 tabs per day)) prn med Yes Unknown, Entered By History   lisinopril (PRINIVIL,ZESTRIL) 10 MG tablet Take 1 tablet by mouth daily. 10/20/2017 at am Yes Adrian Fonseca MD   Multiple Vitamins-Minerals (CENTRUM SILVER) per tablet Take 1 tablet by mouth daily. 10/20/2017 at am Yes Adrian Fonseca MD

## 2017-10-20 NOTE — IP AVS SNAPSHOT
[address], [address]    [facility] [address]    Phone:  [phone]                                       After Visit Summary   10/20/2017    Liv Sylvester    MRN: [id number]           After Visit Summary Signature Page     I have received my discharge instructions, and my questions have been answered. I have discussed any challenges I see with this plan with the nurse or doctor.    ..........................................................................................................................................  Patient/Patient Representative Signature      ..........................................................................................................................................  Patient Representative Print Name and Relationship to Patient    ..................................................               ................................................  Date                                            Time    ..........................................................................................................................................  Reviewed by Signature/Title    ...................................................              ..............................................  Date                                                            Time

## 2017-10-20 NOTE — IP AVS SNAPSHOT
"[place] [address] ONCOLOGY: [phone]                                              INTERAGENCY TRANSFER FORM - PHYSICIAN ORDERS   10/20/2017                    Hospital Admission Date: 10/20/2017  JESSICA MOYA   : 1950  Sex: Female        Attending Provider: Casandra Youngblood MD     Allergies:  No Known Allergies    Infection:  None   Service:  HOSPITALIST    Ht:  1.753 m (5' 9\")   Wt:  109.2 kg (240 lb 12.8 oz)   Admission Wt:  106.1 kg (234 lb)    BMI:  35.56 kg/m 2   BSA:  2.31 m 2            Patient PCP Information     Provider PCP Type    RIGOBERTO SEN [facility]      ED Clinical Impression     Diagnosis Description Comment Added By Time Added    Pulmonary embolism (H) [I26.99] Pulmonary embolism (H) [I26.99]  Kathe Jacob 10/20/2017  3:58 PM      Hospital Problems as of 10/21/2017              Priority Class Noted POA    Pulmonary embolism (H) Medium  10/20/2017 Yes      Non-Hospital Problems as of 10/21/2017              Priority Class Noted    Obesity Medium  2011    CARDIOVASCULAR SCREENING; LDL GOAL LESS THAN 160 Medium  2011    Advanced directives, counseling/discussion Medium  3/22/2013    HTN (hypertension) Medium  Unknown    Iron deficiency anemia Medium  2013    Liver masses Medium  2017    RUQ abdominal pain Medium  Unknown    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  10/4/2017      Code Status History     Date Active Date Inactive Code Status Order ID Comments User Context    10/21/2017 11:27 AM  DNR/DNI [id number]  Casandra Youngblood MD Outpatient    10/20/2017  5:27 PM 10/21/2017 11:27 AM DNR/DNI [id number]  Casandra Youngblood MD Inpatient    10/2/2017 11:44 AM 10/20/2017  5:27 PM DNR/DNI [id number]  Carolyn Berman MD Outpatient    2017  5:20 PM 10/2/2017 11:44 AM DNR/DNI [id number]  Mary Ann Olivares MD Inpatient         Medication Review      START taking        Dose / Directions Comments    enoxaparin 100 MG/ML injection   Commonly known as:  LOVENOX "   Used for:  Other acute pulmonary embolism without acute cor pulmonale (H)        Dose:  110 mg   Inject 1.1 mLs (110 mg) Subcutaneous every 12 hours   Quantity:  60 Syringe   Refills:  1          CONTINUE these medications which have NOT CHANGED        Dose / Directions Comments    ACETAMINOPHEN PO        Dose:  1000 mg   Take 1,000 mg by mouth every 4 hours as needed for pain ((Patient has been taking 4-12 tabs per day))   Refills:  0        albuterol 108 (90 BASE) MCG/ACT Inhaler   Commonly known as:  PROAIR HFA/PROVENTIL HFA/VENTOLIN HFA   Used for:  Reactive airway disease without complication, unspecified asthma severity, unspecified whether persistent        Dose:  2 puff   Inhale 2 puffs into the lungs every 6 hours as needed for shortness of breath / dyspnea or wheezing   Quantity:  1 Inhaler   Refills:  3        CALCIUM-MAGNESIUM-ZINC PO        Dose:  1 tablet   Take 1 tablet by mouth daily   Refills:  0        CENTRUM SILVER per tablet        Dose:  1 tablet   Take 1 tablet by mouth daily.   Quantity:  100 tablet   Refills:  12        CHLORTHALIDONE PO        Dose:  25 mg   Take 25 mg by mouth daily   Refills:  0        ferrous sulfate 325 (65 FE) MG tablet   Commonly known as:  IRON   Used for:  Iron deficiency anemia due to chronic blood loss        Dose:  325 mg   Take 1 tablet (325 mg) by mouth 2 times daily   Quantity:  100 tablet   Refills:  11        lidocaine-prilocaine cream   Commonly known as:  EMLA   Used for:  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        Apply topically as needed for moderate pain Apply dollop size amount to port site 30-60 minutes prior to accessing.   Quantity:  30 g   Refills:  1        lisinopril 10 MG tablet   Commonly known as:  PRINIVIL/ZESTRIL   Used for:  HTN (hypertension)        Dose:  10 mg   Take 1 tablet by mouth daily.   Quantity:  30 tablet   Refills:  11    Profile only, do not fill today       LORazepam 0.5 MG tablet   Commonly  known as:  ATIVAN   Used for:  Malignant neoplasm of hepatic flexure (H)        Dose:  0.5 mg   Take 1 tablet (0.5 mg) by mouth every 4 hours as needed (Anxiety, Nausea/Vomiting or Sleep)   Quantity:  30 tablet   Refills:  2        ondansetron 8 MG tablet   Commonly known as:  ZOFRAN   Used for:  Malignant neoplasm of hepatic flexure (H)        Dose:  8 mg   Take 1 tablet (8 mg) by mouth every 8 hours as needed (Nausea/Vomiting)   Quantity:  10 tablet   Refills:  2        prochlorperazine 10 MG tablet   Commonly known as:  COMPAZINE   Used for:  Malignant neoplasm of hepatic flexure (H)        Dose:  10 mg   Take 1 tablet (10 mg) by mouth every 6 hours as needed (Nausea/Vomiting)   Quantity:  30 tablet   Refills:  2        sennosides 8.6 MG tablet   Commonly known as:  SENOKOT   Used for:  Constipation due to opioid therapy        Dose:  1 tablet   Take 1 tablet by mouth 2 times daily   Quantity:  60 tablet   Refills:  1        SERTRALINE HCL PO        Dose:  50 mg   Take 50 mg by mouth daily (Takes half of a 100mg tablet for a total of 50mg daily)   Refills:  0        VITAMIN D (CHOLECALCIFEROL) PO        Dose:  1 tablet   Take 1 tablet by mouth daily (OTC: Pt unsure of strength)   Refills:  0                After Care     Activity       Your activity upon discharge: activity as tolerated       Diet       Follow this diet upon discharge: low salt, low fat             Your next 10 appointments already scheduled     Oct 23, 2017  9:00 AM CDT   New Visit with Nela Spaulding MD   [facility] ([facility])    [facility] [place] [id number]  [facility] [address]   [phone]            Oct 25, 2017  9:00 AM CDT   Level 6 with  INFUSION CHAIR 13   [facility] ([facility])    [facility]  [address]  [facility] [address]   [phone]            Oct 25, 2017 10:45 AM CDT    Return Visit with Radha Piedra MD   [facility])    [facility]  [address]  [place] [address]   [phone]            Nov 08, 2017  9:00 AM CST   Level 6 with SH INFUSION CHAIR 14   [facility] ([facility])    [facility] [place] [address]  [place] [address]   [phone]            Nov 22, 2017  9:00 AM CST   Level 6 with SH INFUSION CHAIR 17   [facility] ([facility])    [facility] [place]  [address]  [place] [address]   [phone]            Dec 06, 2017  9:00 AM CST   Level 6 with SH INFUSION CHAIR 2   [facility] ([facility])    [facility] [place]  [address]  [place] [address]   [phone]            Dec 20, 2017  9:00 AM CST   Level 6 with SH INFUSION CHAIR 12   [facility] ([facility])    [facility]  [address]  [place] [address]   [phone]              Follow-Up Appointment Instructions     Future Labs/Procedures    Follow-up and recommended labs and tests      Comments:    F/u with Dr.RAza Russell [facility] oncology in 4-5days with CBC results      Follow-Up Appointment Instructions     Follow-up and recommended labs and tests        F/u with Dr.RAza Russell [facility] in 4-5days with CBC results             Statement of Approval     Ordered          10/21/17 1128  I have reviewed and agree with all the recommendations and orders detailed in this document.  EFFECTIVE NOW     Approved and electronically signed by:  Casandra Youngblood MD

## 2017-10-20 NOTE — ED NOTES
[facility]  ED Nurse Handoff Report    ED Chief complaint: Chest Pain (seen by ems ay home. think it may be the pts port. has infusion going. pt had lifted something and then had the chest pain)      ED Diagnosis:   Final diagnoses:   None       Code Status: DNR / DNI - MD to determine    Allergies: No Known Allergies    Activity level - Baseline/Home:  Independent    Activity Level - Current:   Stand with Assist     Needed?: No    Isolation: No  Infection: Not Applicable    Bariatric?: No    Vital Signs:   Vitals:    10/20/17 1322 10/20/17 1323 10/20/17 1400 10/20/17 1415   BP: 139/73      Resp:   13 21   Temp:       TempSrc:       SpO2:  99%     Weight:       Height:           Cardiac Rhythm: ,   Cardiac  Cardiac Rhythm: Normal sinus rhythm    Pain level: 0-10 Pain Scale: 0    Is this patient confused?: No    Patient Report: Initial Complaint: Patient presents to ED from home with family after having sharp chest pain this morning after starting her daily infusion in her port. Patients granddaughter states that she picked up a heavy box and it started after doing this, then laid down and felt better. Patient is on chemo weekly and daily infusions of IVF for metastatic colon cancer. Port placed on 10/9 and homecare nurse accesses it weekly.   Focused Assessment: Patient has no c/o pain at this time.   Tests Performed: Labs, EKG, CXR, CT scan chest.   Abnormal Results: D-dimer 8.3, CT shows pulmonary embolism.   Treatments provided: IVF     Family Comments: Granddaugthers at bedside     OBS brochure/video discussed/provided to patient: N/A    ED Medications:   Medications   potassium chloride SA (K-DUR/KLOR-CON M) CR tablet 20 mEq (20 mEq Oral Given 10/20/17 1426)   iopamidol (ISOVUE-370) solution 79 mL (79 mLs Intravenous Given 10/20/17 1456)   Saline flush (101 mLs Intravenous Given 10/20/17 1456)       Drips infusing?:  No      ED NURSE PHONE NUMBER: [phone]

## 2017-10-20 NOTE — IP AVS SNAPSHOT
MRN:[id number]                      After Visit Summary   10/20/2017    Liv Sylvester    MRN: [id number]           Thank you!     Thank you for choosing [facility] for your care. Our goal is always to provide you with excellent care. Hearing back from our patients is one way we can continue to improve our services. Please take a few minutes to complete the written survey that you may receive in the mail after you visit with us. Thank you!        Patient Information     Date Of Birth          1950        About your hospital stay     You were admitted on:  October 20, 2017 You last received care in the:  [place] [address] Oncology    You were discharged on:  October 21, 2017       Who to Call     For medical emergencies, please call 911.  For non-urgent questions about your medical care, please call your primary care provider or clinic, [phone]          Attending Provider     Provider Specialty    Edward Willis MD --    Casandra Youngblood MD Internal Medicine       Primary Care Provider Office Phone # Fax #    Amaya Howard [phone]      After Care Instructions     Activity       Your activity upon discharge: activity as tolerated            Diet       Follow this diet upon discharge: low salt, low fat                  Follow-up Appointments     Follow-up and recommended labs and tests        F/u with Dr.RAza Russell [facility] in 4-5days with CBC results                  Your next 10 appointments already scheduled     Oct 23, 2017  9:00 AM CDT   New Visit with Nela Spaulding MD   [facility] ([facility] [place])    [facility]  [address]  [facility] [address] [phone]            Oct 25, 2017  9:00 AM CDT   Level 6 with  INFUSION CHAIR 13   [facility] ([facility] [place])    [facility]  [address]  [facility] [address]  "  [phone]            Oct 25, 2017 10:45 AM CDT   Return Visit with Radha Piedra MD   [facility] ([facility])    [facility] [place]  [address]  [place] [address]   [phone]            Nov 08, 2017  9:00 AM CST   Level 6 with SH INFUSION CHAIR 14   [facility] ([facility])    [facility] [place] [address]  [place] [address]   [phone]            Nov 22, 2017  9:00 AM CST   Level 6 with SH INFUSION CHAIR 17   [facility] ([facility])    [facility] [place]  [address]  [place] [address]   [phone]            Dec 06, 2017  9:00 AM CST   Level 6 with SH INFUSION CHAIR 2   [facility] ([facility])    [facility] [place] [address]  [place] [address]   [phone]            Dec 20, 2017  9:00 AM CST   Level 6 with SH INFUSION CHAIR 12   [facility] ([facility])    [facility] [place] [address]  [place] [address]   [phone]              Pending Results     No orders found for last 3 day(s).            Statement of Approval     Ordered          10/21/17 1128  I have reviewed and agree with all the recommendations and orders detailed in this document.  EFFECTIVE NOW     Approved and electronically signed by:  Casandra Youngblood MD             Admission Information     Date & Time Provider Department Dept. Phone    10/20/2017 Casandra Youngblood MD [place] [address] Oncology [phone]      Your Vitals Were     Blood Pressure Pulse Temperature Respirations Height Weight    151/67 (BP Location: Left leg) 93 99  F (37.2  C) (Oral) 16 1.753 m (5' 9\") 109.2 kg (240 lb 12.8 oz)    Pulse Oximetry BMI (Body Mass Index)                96% 35.56 kg/m2 " "         [facility] Information     [employer] lets you send messages to your doctor, view your test results, renew your prescriptions, schedule appointments and more. To sign up, go to [email or web address] . Click on \"Log in\" on the left side of the screen, which will take you to the Welcome page. Then click on \"Sign up Now\" on the right side of the page.     You will be asked to enter the access code listed below, as well as some personal information. Please follow the directions to create your username and password.     Your access code is: [email or web address]  Expires: 2017 12:39 PM     Your access code will  in 90 days. If you need help or a new code, please call your [facility] clinic or [phone].        Care EveryWhere ID     This is your Care EveryWhere ID. This could be used by other organizations to access your [facility] medical records  [id number]        Equal Access to Services     HAYDEE IVERSON AH: Jeffry Pierce, wawerner michael, qaybta kaalmamayur hawkins, john cevallos . So [facility] [phone].    ATENCIÓN: Si habla español, tiene a schmitt disposición servicios gratuitos de asistencia lingüística. Llame al [phone].    We comply with applicable federal civil rights laws and [place] laws. We do not discriminate on the basis of race, color, national origin, age, disability, sex, sexual orientation, or gender identity.               Review of your medicines      START taking        Dose / Directions    enoxaparin 100 MG/ML injection   Commonly known as:  LOVENOX   Used for:  Other acute pulmonary embolism without acute cor pulmonale (H)        Dose:  110 mg   Inject 1.1 mLs (110 mg) Subcutaneous every 12 hours   Quantity:  60 Syringe   Refills:  1         CONTINUE these medicines which have NOT CHANGED        Dose / Directions    ACETAMINOPHEN PO        Dose:  1000 mg   Take 1,000 mg by mouth every 4 hours as needed for pain ((Patient has been taking 4-12 tabs " per day))   Refills:  0       albuterol 108 (90 BASE) MCG/ACT Inhaler   Commonly known as:  PROAIR HFA/PROVENTIL HFA/VENTOLIN HFA   Used for:  Reactive airway disease without complication, unspecified asthma severity, unspecified whether persistent        Dose:  2 puff   Inhale 2 puffs into the lungs every 6 hours as needed for shortness of breath / dyspnea or wheezing   Quantity:  1 Inhaler   Refills:  3       CALCIUM-MAGNESIUM-ZINC PO        Dose:  1 tablet   Take 1 tablet by mouth daily   Refills:  0       CENTRUM SILVER per tablet        Dose:  1 tablet   Take 1 tablet by mouth daily.   Quantity:  100 tablet   Refills:  12       CHLORTHALIDONE PO        Dose:  25 mg   Take 25 mg by mouth daily   Refills:  0       ferrous sulfate 325 (65 FE) MG tablet   Commonly known as:  IRON   Used for:  Iron deficiency anemia due to chronic blood loss        Dose:  325 mg   Take 1 tablet (325 mg) by mouth 2 times daily   Quantity:  100 tablet   Refills:  11       lidocaine-prilocaine cream   Commonly known as:  EMLA   Used for:  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        Apply topically as needed for moderate pain Apply dollop size amount to port site 30-60 minutes prior to accessing.   Quantity:  30 g   Refills:  1       lisinopril 10 MG tablet   Commonly known as:  PRINIVIL/ZESTRIL   Used for:  HTN (hypertension)        Dose:  10 mg   Take 1 tablet by mouth daily.   Quantity:  30 tablet   Refills:  11       LORazepam 0.5 MG tablet   Commonly known as:  ATIVAN   Used for:  Malignant neoplasm of hepatic flexure (H)        Dose:  0.5 mg   Take 1 tablet (0.5 mg) by mouth every 4 hours as needed (Anxiety, Nausea/Vomiting or Sleep)   Quantity:  30 tablet   Refills:  2       ondansetron 8 MG tablet   Commonly known as:  ZOFRAN   Used for:  Malignant neoplasm of hepatic flexure (H)        Dose:  8 mg   Take 1 tablet (8 mg) by mouth every 8 hours as needed (Nausea/Vomiting)   Quantity:  10 tablet    Refills:  2       prochlorperazine 10 MG tablet   Commonly known as:  COMPAZINE   Used for:  Malignant neoplasm of hepatic flexure (H)        Dose:  10 mg   Take 1 tablet (10 mg) by mouth every 6 hours as needed (Nausea/Vomiting)   Quantity:  30 tablet   Refills:  2       sennosides 8.6 MG tablet   Commonly known as:  SENOKOT   Used for:  Constipation due to opioid therapy        Dose:  1 tablet   Take 1 tablet by mouth 2 times daily   Quantity:  60 tablet   Refills:  1       SERTRALINE HCL PO        Dose:  50 mg   Take 50 mg by mouth daily (Takes half of a 100mg tablet for a total of 50mg daily)   Refills:  0       VITAMIN D (CHOLECALCIFEROL) PO        Dose:  1 tablet   Take 1 tablet by mouth daily (OTC: Pt unsure of strength)   Refills:  0            Where to get your medicines      These medications were sent to [facility] - [address]  [address], [place] [address]     Phone:  [phone]     enoxaparin 100 MG/ML injection    sennosides 8.6 MG tablet                Protect others around you: Learn how to safely use, store and throw away your medicines at www.disposemymeds.org.             Medication List: This is a list of all your medications and when to take them. Check marks below indicate your daily home schedule. Keep this list as a reference.      Medications           Morning Afternoon Evening Bedtime As Needed    ACETAMINOPHEN PO   Take 1,000 mg by mouth every 4 hours as needed for pain ((Patient has been taking 4-12 tabs per day))                                albuterol 108 (90 BASE) MCG/ACT Inhaler   Commonly known as:  PROAIR HFA/PROVENTIL HFA/VENTOLIN HFA   Inhale 2 puffs into the lungs every 6 hours as needed for shortness of breath / dyspnea or wheezing                                CALCIUM-MAGNESIUM-ZINC PO   Take 1 tablet by mouth daily                                CENTRUM SILVER per tablet   Take 1 tablet by mouth daily.                                 CHLORTHALIDONE PO   Take 25 mg by mouth daily   Last time this was given:  25 mg on 10/21/2017  8:10 AM                                enoxaparin 100 MG/ML injection   Commonly known as:  LOVENOX   Inject 1.1 mLs (110 mg) Subcutaneous every 12 hours                                ferrous sulfate 325 (65 FE) MG tablet   Commonly known as:  IRON   Take 1 tablet (325 mg) by mouth 2 times daily   Last time this was given:  325 mg on 10/20/2017  8:44 PM                                lidocaine-prilocaine cream   Commonly known as:  EMLA   Apply topically as needed for moderate pain Apply dollop size amount to port site 30-60 minutes prior to accessing.                                lisinopril 10 MG tablet   Commonly known as:  PRINIVIL/ZESTRIL   Take 1 tablet by mouth daily.   Last time this was given:  10 mg on 10/21/2017  8:10 AM                                LORazepam 0.5 MG tablet   Commonly known as:  ATIVAN   Take 1 tablet (0.5 mg) by mouth every 4 hours as needed (Anxiety, Nausea/Vomiting or Sleep)                                ondansetron 8 MG tablet   Commonly known as:  ZOFRAN   Take 1 tablet (8 mg) by mouth every 8 hours as needed (Nausea/Vomiting)                                prochlorperazine 10 MG tablet   Commonly known as:  COMPAZINE   Take 1 tablet (10 mg) by mouth every 6 hours as needed (Nausea/Vomiting)                                sennosides 8.6 MG tablet   Commonly known as:  SENOKOT   Take 1 tablet by mouth 2 times daily   Last time this was given:  1 tablet on 10/21/2017  8:09 AM                                SERTRALINE HCL PO   Take 50 mg by mouth daily (Takes half of a 100mg tablet for a total of 50mg daily)   Last time this was given:  50 mg on 10/21/2017  8:09 AM                                VITAMIN D (CHOLECALCIFEROL) PO   Take 1 tablet by mouth daily (OTC: Pt unsure of strength)

## 2017-10-21 NOTE — PLAN OF CARE
Problem: Patient Care Overview  Goal: Plan of Care/Patient Progress Review  Outcome: No Change  Patient arrived to unit at about 1730. A&Ox4. VSS on room air. Denies pain. Up independently in room. Heparin drip infusing at 15mL/hr through port. Potassium replaced. Recheck in AM. US of BLE. See results. Will continue to monitor.

## 2017-10-21 NOTE — PROVIDER NOTIFICATION
MD Notification    Notified Person:  MD    Notified Persons Name:Mookie    Notification Date/Time:10/21/17 0631    Notification Interaction:  Talked with Physician    Purpose of Notification:  emerald FELICIANO 7.2     Orders Received:    Comments:

## 2017-10-21 NOTE — CONSULTS
Note dictated [id number]    - Agree with Lovenox 1mg/kg SQ Q 12 hours.  - OK to discharge home if Hb is stable.   - We will arrange for a repeat CBC diff on Monday or Tuesday and IV iron as out patient.     Radha Piedra MD

## 2017-10-21 NOTE — PROGRESS NOTES
SPIRITUAL HEALTH SERVICES Progress Note  [facility]    SH, referral visit. The patient shared that she was being discharged and happy about going home.      offered a blessing for patient's transition back home and continued healing.        Diaz Arcos  Chaplain Resident

## 2017-10-21 NOTE — DISCHARGE SUMMARY
[facility]  Discharge Summary        Liv Sylvester MRN# [id number]   YOB: 1950 Age: 67 year old     Date of Admission:  10/20/2017  Date of Discharge:  10/21/2017  Admitting Physician:  Casandra Youngblood MD  Discharge Physician: Casandra Youngblood MD  Discharging Service: Hospitalist     Primary Provider: Amaya Howard  Primary Care Physician Phone Number: [phone]         Discharge Diagnoses/Problem Oriented Hospital Course (Providers):    Liv Sylvester was admitted on 10/20/2017 by Casandra Youngblood MD and I would refer you to their history and physical.  The following problems were addressed during her hospitalization:  ASSESSMENT AND PLAN:  A 67-year-old female with metastatic stage IV colon cancer undergoing chemotherapy Folfox plus Avastin presenting with:   1.  Acute left lung pulmonary embolus.:  Started on heparin drip on admission   Oncology consulted   Recommended discharging with LOVenox 1mg/KG BID  hgb stable at 7.4   Pt to get out pt iron IV   2. MEtastatic colon cancer:  F/u with out pt Oncology   3. IRon def anemia:  Due to cancer. Stable   Dispo: home today         Code Status:      DNR / DNI        Brief Hospital Stay Summary Sent Home With Patient in AVS:               Important Results:      See below         Pending Results:        Unresulted Labs Ordered in the Past 30 Days of this Admission     No orders found for last 61 day(s).            Discharge Instructions and Follow-Up:      Follow-up Appointments     Follow-up and recommended labs and tests        F/u with Dr.RAza Russell [facility] oncology in 4-5days with CBC results                      Discharge Disposition:      Discharged to home        Discharge Medications:        Discharge Medication List as of 10/21/2017  2:09 PM      CONTINUE these medications which have CHANGED    Details   enoxaparin (LOVENOX) 100 MG/ML injection Inject 1.1 mLs (110 mg) Subcutaneous every 12 hours, Disp-60 Syringe, R-1, E-Prescribe       sennosides (SENOKOT) 8.6 MG tablet Take 1 tablet by mouth 2 times daily, Disp-60 tablet, R-1, E-Prescribe         CONTINUE these medications which have NOT CHANGED    Details   LORazepam (ATIVAN) 0.5 MG tablet Take 1 tablet (0.5 mg) by mouth every 4 hours as needed (Anxiety, Nausea/Vomiting or Sleep), Disp-30 tablet, R-2, Local Print      prochlorperazine (COMPAZINE) 10 MG tablet Take 1 tablet (10 mg) by mouth every 6 hours as needed (Nausea/Vomiting), Disp-30 tablet, R-2, E-Prescribe      ondansetron (ZOFRAN) 8 MG tablet Take 1 tablet (8 mg) by mouth every 8 hours as needed (Nausea/Vomiting), Disp-10 tablet, R-2, E-Prescribe      lidocaine-prilocaine (EMLA) cream Apply topically as needed for moderate pain Apply dollop size amount to port site 30-60 minutes prior to accessing.Disp-30 g, [id number]      ferrous sulfate (IRON) 325 (65 FE) MG tablet Take 1 tablet (325 mg) by mouth 2 times daily, Disp-100 tablet, R-11, E-Prescribe      albuterol (PROAIR HFA/PROVENTIL HFA/VENTOLIN HFA) 108 (90 BASE) MCG/ACT Inhaler Inhale 2 puffs into the lungs every 6 hours as needed for shortness of breath / dyspnea or wheezing, Disp-1 Inhaler, R-3, E-Prescribe      CHLORTHALIDONE PO Take 25 mg by mouth daily, Historical      SERTRALINE HCL PO Take 50 mg by mouth daily (Takes half of a 100mg tablet for a total of 50mg daily), Historical      CALCIUM-MAGNESIUM-ZINC PO Take 1 tablet by mouth daily, Historical      VITAMIN D, CHOLECALCIFEROL, PO Take 1 tablet by mouth daily (OTC: Pt unsure of strength), Historical      ACETAMINOPHEN PO Take 1,000 mg by mouth every 4 hours as needed for pain ((Patient has been taking 4-12 tabs per day)), Historical      lisinopril (PRINIVIL,ZESTRIL) 10 MG tablet Take 1 tablet by mouth daily., Disp-30 tablet, R-11, FaxProfile only, do not fill today      Multiple Vitamins-Minerals (CENTRUM SILVER) per tablet Take 1 tablet by mouth daily., Disp-100 tablet, R-12, Historical               Allergies:     "   No Known Allergies        Consultations This Hospital Stay:      Consultation during this admission received from oncology        Condition and Physical on Discharge:      Discharge condition: Stable   Vitals: Blood pressure 151/67, pulse 93, temperature 99  F (37.2  C), temperature source Oral, resp. rate 16, height 1.753 m (5' 9\"), weight 109.2 kg (240 lb 12.8 oz), SpO2 96 %, not currently breastfeeding.     Constitutional: Alert and awake    Lungs: CTA   Cardiovascular: RRR, no murmur    Abdomen: Soft, obese, NT, ND, BS+   Skin: Warm and dry    Other:          Discharge Time:      less than 30 minutes.        Image Results From This Hospital Stay (For Non-EPIC Providers):        Results for orders placed or performed during the hospital encounter of 10/20/17   Chest XR,  PA & LAT    Narrative    XR CHEST 2 VW 10/20/2017 1:38 PM    HISTORY: Chest pain. Recent port placement.    COMPARISON: 10/9/2017    FINDINGS: No airspace consolidation, pleural effusion or pneumothorax.  Normal heart size. Right port and catheter appear stable and  appropriately positioned.      Impression    IMPRESSION: No acute cardiopulmonary abnormality.    TIARRA MADSEN MD   Chest CT, IV contrast only - PE protocol     Value    Radiologist flags Pulmonary embolism (AA)    Narrative    CT CHEST WITH CONTRAST   10/20/2017 3:09 PM     HISTORY: History of cancer. Chest pain and elevated D-dimer.    COMPARISON: 10/5/2017.    TECHNIQUE: Following the uneventful administration of 79 mL Isovue-370  intravenous contrast, helical sections were acquired through the lungs  according to the pulmonary embolism protocol. Coronal reconstructions  were generated. Radiation dose for this scan was reduced using  automated exposure control, adjustment of the mA and/or kV according  to the patient's size, or iterative reconstruction technique.    FINDINGS: A filling defect within the anterior segmental pulmonary  artery of the left upper lobe (for example, " series 4 image 52) and  filling defects within segmental and subsegmental pulmonary arteries  in the left lower lobe (for example, series 4 image 88), consistent  with acute pulmonary emboli. A linear filling defect within the  descending right pulmonary artery (series 4 image 68). The thoracic  aorta is normal in caliber without dissection. Atherosclerotic  calcification in the thoracic aorta. Aberrant origin of the right  subclavian artery.    Mild emphysematous changes in the lungs. A few tiny nodules scattered  within the lungs, measuring up to 0.6 cm in diameter, unchanged. For  example, there is a 0.3 cm nodule in the anterior aspect of the mid  right lung (series 5 image 63). The lungs are otherwise clear. No  pleural or pericardial effusion. No enlarged lymph nodes in the chest.  A right anterior chest wall port is present with catheter entering the  right internal jugular vein and distal tip in the right atrium.    Scan through the upper abdomen is significant for several  hypoenhancing masses scattered within the liver that are suspicious  for metastases, unchanged.      Impression    IMPRESSION:   1. A few small acute pulmonary emboli in the left lung. A linear  filling defect in the right lower lobe pulmonary artery most likely  represents an embolus that is not acute.  2. A few tiny indeterminate bilateral pulmonary nodules, unchanged  since the recent study dated 10/5/2017.  3. Partial visualization of several hepatic metastases, unchanged.    [Critical Result: Pulmonary embolism]    Finding was identified on 10/20/2017 3:11 PM.     Dr. Willis was contacted by me on 10/20/2017 3:19 PM and  verbalized understanding of the critical result.     DU CHEN MD   US Lower Extremity Venous Duplex Bilateral    Narrative    ULTRASOUND VENOUS LOWER EXTREMITY BILATERAL 10/20/2017 9:39 PM     HISTORY: Rule out deep venous thrombosis.    COMPARISON: None.    TECHNIQUE: Ultrasound gray scale, Color Doppler  flow, and spectral  Doppler waveform analysis performed.    FINDINGS: There is deep venous thrombosis seen in the posterior tibial  veins in both legs slightly greater on the right than the left. No  other deep venous thrombosis identified.      Impression    IMPRESSION: Small bilateral posterior tibial vein deep venous  thromboses in the calf.    TIARRA RANGEL MD             Most Recent Lab Results In EPIC (For Non-EPIC Providers):    Most Recent 3 CBC's:  Recent Labs   Lab Test  10/21/17   1351  10/21/17   0520  10/20/17   1320  10/11/17   0925   WBC   --   5.4  5.8  8.1   HGB  7.4*  7.2*  8.0*  8.8*   MCV   --   72*  72*  74*   PLT   --   193  227  395      Most Recent 3 BMP's:  Recent Labs   Lab Test  10/21/17   0520  10/20/17   1320  10/11/17   0925   NA  139  139  137   POTASSIUM  3.6  3.3*  3.6   CHLORIDE  105  104  101   CO2  26  27  28   BUN  12  14  19   CR  0.61  0.54  0.78   ANIONGAP  8  8  8   JESÚS  8.4*  9.0  9.1   GLC  105*  84  94     Most Recent 3 Troponin's:  Recent Labs   Lab Test  10/20/17   1320  06/14/10   1207   TROPI  <0.015  <0.012     Most Recent 3 INR's:  Recent Labs   Lab Test  09/29/17   1210   INR  1.05     Most Recent 2 LFT's:  Recent Labs   Lab Test  10/20/17   1320  10/11/17   0925   AST  38  70*   ALT  23  30   ALKPHOS  139  185*   BILITOTAL  0.2  0.3     Most Recent Cholesterol Panel:  Recent Labs   Lab Test  05/13/13   0837   CHOL  162   LDL  115   HDL  28*   TRIG  95     Most Recent 6 Bacteria Isolates From Any Culture (See EPIC Reports for Culture Details):No lab results found.  Most Recent TSH, T4 and HgbA1c:   Recent Labs   Lab Test  05/13/13   0837   TSH  0.87

## 2017-10-21 NOTE — PROGRESS NOTES
[id number] Patient A/Ox4 flat affect. VSS on RA. LS clear. Patient denies pain, SOB, nausea, numbness/tingling. Hep infusing 13ml/h. Tele: NSR. Tolerating regular diet. Up with SBA. Will continue to monitor

## 2017-10-21 NOTE — PLAN OF CARE
Problem: Patient Care Overview  Goal: Plan of Care/Patient Progress Review  Outcome: Improving  Adequate for discharge. Pt successful for a BM, relief from abdominal discomfort. Heparin stopped and transitioned to Lovenox. Pt education complete via video, packet, and demonstration. Pt port de-accessed by pt, port hep locked with 5 mL 10 unit heparin only. VSS. Hgb: 7.2 recheck 7.4. No expression of pain or SOB. Pt will follow up Monday for routine labs. Pt discharged by NA to be transported home by family.

## 2017-10-21 NOTE — CONSULTS
[facility] PHYSICIANS  HEMATOLOGY ONCOLOGY    ONCOLOGY CONSULTATION       DATE OF CONSULTATION:  10/21/2017      REASON FOR CONSULTATION:  New diagnosis of pulmonary embolism in a patient with metastatic colon cancer.      REFERRING PROVIDER:  Casandra Youngblood MD.      DIAGNOSIS:  Metastatic colon cancer in a patient with history of stage IA grade 3 endometrial carcinoma in 2012.  On 09/29/2017, she had a CT which showed liver metastases and retroperitoneal adenopathy.  On 09/30/2017, she had a colonoscopy, fungating, infiltrative, ulcerated, partially obstructing mass at the hepatic flexure.  Pathology was consistent with moderately differentiated adenocarcinoma with normal mismatch repair protein expression.      TREATMENT:  Patient started FOLFOX with Avastin 10/11/2017.      SUBJECTIVE:  Patient was seen at bedside today.  She had presented to the ED with chest pain and chest imaging was consistent with pulmonary embolism.  Her breathing is better now and she physically feels better as well, is being discharged home today.      PHYSICAL EXAMINATION:   VITAL SIGNS:  Temperature is 98.4, heart rate 89, blood pressure 142/47, respirations 16.   CONSTITUTIONAL:  Lying on bed, appears somewhat tired.   HEENT:  Oral mucosa normal.   NECK:  No supraclavicular or cervical lymphadenopathy.   RESPIRATORY:  Lungs are clear bilaterally.   CARDIOVASCULAR:  S1, S2, regular.   GASTROINTESTINAL:  Soft abdomen nontender, nondistended.   MUSCULOSKELETAL:  Warm, well perfused.   NEUROLOGIC:  Alert, awake.   INTEGUMENTARY:  No rash.   LYMPHATICS:  No edema.   PSYCHIATRIC:  Mood and affect appear normal.      LABORATORY DATA:  Her creatinine is 0.6.  Her white cell count is 5.4, hemoglobin 7.2, platelet count 193.      DIAGNOSTIC DATA:  CT with PE protocol 10/20/2017 shows few small acute pulmonary emboli in the left lung and a linear filling defect in the right lower lobe of pulmonary artery as well.  A few indeterminate  bilateral pulmonary nodules, as well.  Ultrasound of the leg 10/20/2017:  Small bilateral posterior tibial venous thrombosis in the calf.      ASSESSMENT AND RECOMMENDATIONS:   A 67-year-old lady with stage IV metastatic colon cancer with normal mismatch repair    protein expression.  She has multiple liver metastases and metastases and the abdominal lymph nodes.  She is on palliative intent chemotherapy with FOLFOX and Avastin every 2 weeks.   She appears to tolerate treatment well and will continue the existing schedule of chemotherapy.   New diagnosis of pulmonary embolism in the left lung.  She has venous thrombosis related to malignancy and will need long-term anticoagulation.  At this time, she is being started on Lovenox 1 mg/kg subcutaneously every 12 hours, which she will continue after discharge.   Iron deficiency anemia.  She has been started on oral iron which she did not handle well.  It is causing significant constipation.  I plan to give her IV iron during this coming week to help with that.   I have discussed with the hospitalist, Dr. Casandra Youngblood, agree with plan of discharging home after a repeat CBC shows stability of hemoglobin.         SIDRA NAILS MD             D: 10/21/2017 13:45   T: 10/21/2017 14:39   MT: TD      Name:     JESSICA MOYA   MRN:      [id number]        Account:       [id number]   :      1950           Consult Date:  10/21/2017      Document: [id number]

## 2017-10-23 NOTE — PROGRESS NOTES
"Oncology Rooming Note    October 23, 2017 9:16 AM   Liv Sylvester is a 67 year old female who presents for:    Chief Complaint   Patient presents with     Oncology Clinic Visit     Initial Vitals: /67 (BP Location: Right arm, Patient Position: Chair, Cuff Size: Adult Large)  Pulse 94  Temp 99  F (37.2  C) (Oral)  SpO2 100% Estimated body mass index is 35.56 kg/(m^2) as calculated from the following:    Height as of 10/20/17: 1.753 m (5' 9\").    Weight as of 10/20/17: 109.2 kg (240 lb 12.8 oz). There is no height or weight on file to calculate BSA.  No Pain (0) Comment: Data Unavailable   No LMP recorded. Patient has had a hysterectomy.  Allergies reviewed: Yes  Medications reviewed: Yes    Medications: Medication refills not needed today.  Pharmacy name entered into [employer]:    [facility] [address] [facility] [address] AT [facility] [address]  [facility] [address] [place], [place] [facility] [address] [facility] [address]    Clinical concerns: None     5 minutes for nursing intake (face to face time)     Merle Lopez CMA              "

## 2017-10-23 NOTE — PATIENT INSTRUCTIONS
Thank you for coming into the Palliative Care Clinic today.     1. Nausea:   -Try using the ondansetron (Zofran) 1 tab every 8 hours as needed for nausea    2. Advance Care Planning:   -You will be given a healthcare directive today.  Please fill out who you would want to make medical decisions on your behalf should you not be able to speak for yourself.  Bring it back to clinic once completed.     3. Support:  We will refer you to our Palliative Counselor for additional support.     Return to clinic in 1 month for a follow up.     You can reach the Palliative Care Team during business hours at the following numbers:   -For the [facility] and [facility], call [phone]  -For the [facility], call [phone]  -For the [facility], call [phone]    To reach the Palliative Care Provider on-call After-hours or on holidays and weekends, call: [phone].  Please note that we are not able to provide pain medication refills on evenings or weekends.     ===================================================================

## 2017-10-23 NOTE — MR AVS SNAPSHOT
After Visit Summary   10/23/2017    Liv Sylvester    MRN: [id number]           Patient Information     Date Of Birth          1950        Visit Information        Provider Department      10/23/2017 9:00 AM Nela Spaulding MD [facility]        Care Instructions    Thank you for coming into the Palliative Care Clinic today.     1. Nausea:   -Try using the ondansetron (Zofran) 1 tab every 8 hours as needed for nausea    2. Advance Care Planning:   -You will be given a healthcare directive today.  Please fill out who you would want to make medical decisions on your behalf should you not be able to speak for yourself.  Bring it back to clinic once completed.     3. Support:  We will refer you to our Palliative Counselor for additional support.     Return to clinic in 1 month for a follow up.     You can reach the Palliative Care Team during business hours at the following numbers:   -For the [facility], call [phone]  -For the [facility], call [phone]  -For the [facility], call [phone]    To reach the Palliative Care Provider on-call After-hours or on holidays and weekends, call: [phone].  Please note that we are not able to provide pain medication refills on evenings or weekends.     ===================================================================            Follow-ups after your visit        Your next 10 appointments already scheduled     Oct 25, 2017  9:00 AM CDT   Level 6 with  INFUSION CHAIR 13   [facility] ([facility])    [facility]  [address]  [facility] [id number] [phone]            Oct 25, 2017 10:45 AM CDT   Return Visit with Radha Piedra MD   [facility] ([facility])    [facility]  [address]  [facility] [id number] [phone]            Nov  08, 2017  9:00 AM CST   Level 6 with SH INFUSION CHAIR 14   [facility] ([facility])    [facility] [place]  [address]  [place] [address]   [phone]            Nov 22, 2017  9:00 AM CST   Level 6 with SH INFUSION CHAIR 17   [facility] ([facility])    [facility] [place] [address]  [place] [address]   [phone]            Dec 06, 2017  9:00 AM CST   Level 6 with SH INFUSION CHAIR 2   [facility] ([facility])    [facility] [place] [address]  [place] [address]   [phone]            Dec 20, 2017  9:00 AM CST   Level 6 with SH INFUSION CHAIR 12   [facility] ([facility])    [facility] [place] [address]  [place] [address]   [phone]              Who to contact     If you have questions or need follow up information about today's clinic visit or your schedule please contact [facility] directly at [phone].  Normal or non-critical lab and imaging results will be communicated to you by iodinehart, letter or phone within 4 business days after the clinic has received the results. If you do not hear from us within 7 days, please contact the clinic through iodinehart or phone. If you have a critical or abnormal lab result, we will notify you by phone as soon as possible.  Submit refill requests through DirectLaw or call your pharmacy and they will forward the refill request to us. Please allow 3 business days for your refill to be completed.          Additional Information About Your Visit        DirectLaw Information     DirectLaw lets you send messages to your doctor, view your test results, renew your prescriptions, schedule appointments and more. To sign up, go to [email or web address]  "Click on \"Log in\" on the left side of the screen, which will take you to the Welcome page. Then click on \"Sign up Now\" on the right side of the page.     You will be asked to enter the access code listed below, as well as some personal information. Please follow the directions to create your username and password.     Your access code is: [email or web address]  Expires: 2017 12:39 PM     Your access code will  in 90 days. If you need help or a new code, please call your [facility] or [phone].        Care EveryWhere ID     This is your Care EveryWhere ID. This could be used by other organizations to access your [facility] medical records  [id number]        Your Vitals Were     Pulse Temperature Pulse Oximetry             94 99  F (37.2  C) (Oral) 100%          Blood Pressure from Last 3 Encounters:   10/23/17 119/67   10/21/17 151/67   10/11/17 117/60    Weight from Last 3 Encounters:   10/20/17 109.2 kg (240 lb 12.8 oz)   10/11/17 109.5 kg (241 lb 6.4 oz)   10/11/17 109.5 kg (241 lb 6.4 oz)              Today, you had the following     No orders found for display       Primary Care Provider Office Phone # Fax #    Amaya Howard [phone]       [facility] [address] [place] [address]        Equal Access to Services     SARITHA IVERSON AH: Hadii anthony ku hadasho Socelyali, waaxda luqadaha, qaybta kaalmada adeegyada, john maya. So [facility] [phone].    ATENCIÓN: Si habla español, tiene a schmitt disposición servicios gratuitos de asistencia lingüística. Wiley al [phone].    We comply with applicable federal civil rights laws and [place] laws. We do not discriminate on the basis of race, color, national origin, age, disability, sex, sexual orientation, or gender identity.            Thank you!     Thank you for choosing [facility]  for your care. Our goal is always to provide you with excellent care. Hearing back from our patients is one " way we can continue to improve our services. Please take a few minutes to complete the written survey that you may receive in the mail after your visit with us. Thank you!             Your Updated Medication List - Protect others around you: Learn how to safely use, store and throw away your medicines at www.disposemymeds.org.          This list is accurate as of: 10/23/17 10:06 AM.  Always use your most recent med list.                   Brand Name Dispense Instructions for use Diagnosis    ACETAMINOPHEN PO      Take 1,000 mg by mouth every 4 hours as needed for pain ((Patient has been taking 4-12 tabs per day))        albuterol 108 (90 BASE) MCG/ACT Inhaler    PROAIR HFA/PROVENTIL HFA/VENTOLIN HFA    1 Inhaler    Inhale 2 puffs into the lungs every 6 hours as needed for shortness of breath / dyspnea or wheezing    Reactive airway disease without complication, unspecified asthma severity, unspecified whether persistent       CALCIUM-MAGNESIUM-ZINC PO      Take 1 tablet by mouth daily        CENTRUM SILVER per tablet     100 tablet    Take 1 tablet by mouth daily.        CHLORTHALIDONE PO      Take 25 mg by mouth daily        enoxaparin 100 MG/ML injection    LOVENOX    60 Syringe    Inject 1.1 mLs (110 mg) Subcutaneous every 12 hours    Other acute pulmonary embolism without acute cor pulmonale (H)       ferrous sulfate 325 (65 FE) MG tablet    IRON    100 tablet    Take 1 tablet (325 mg) by mouth 2 times daily    Iron deficiency anemia due to chronic blood loss       lidocaine-prilocaine cream    EMLA    30 g    Apply topically as needed for moderate pain Apply dollop size amount to port site 30-60 minutes prior to accessing.    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    HTN (hypertension)       LORazepam 0.5 MG tablet    ATIVAN    30 tablet    Take 1 tablet (0.5 mg) by mouth every 4 hours as  needed (Anxiety, Nausea/Vomiting or Sleep)    Malignant neoplasm of hepatic flexure (H)       ondansetron 8 MG tablet    ZOFRAN    10 tablet    Take 1 tablet (8 mg) by mouth every 8 hours as needed (Nausea/Vomiting)    Malignant neoplasm of hepatic flexure (H)       prochlorperazine 10 MG tablet    COMPAZINE    30 tablet    Take 1 tablet (10 mg) by mouth every 6 hours as needed (Nausea/Vomiting)    Malignant neoplasm of hepatic flexure (H)       sennosides 8.6 MG tablet    SENOKOT    60 tablet    Take 1 tablet by mouth 2 times daily    Constipation due to opioid therapy       SERTRALINE HCL PO      Take 50 mg by mouth daily (Takes half of a 100mg tablet for a total of 50mg daily)        VITAMIN D (CHOLECALCIFEROL) PO      Take 1 tablet by mouth daily (OTC: Pt unsure of strength)

## 2017-10-23 NOTE — PROGRESS NOTES
Palliative Care Outpatient Clinic Consultation Note    Patient:  Liv Sylvester    Chief Complaint:   Liv Sylvester 67 year old female who is presenting to the palliative medicine clinic today unaccompanied at the request of Dr. Piedra for a palliative care consultation secondary to metastatic colorectal cancer.   The patient's primary care provider is:  Amaya Howard.     History of Present Illness:  HISTORY OF PRESENT ILLNESS:  Liv shared her understanding of her health in the recent past.  Approximately 1-2 months ago she did develop some hematochezia and was set up to undergo colonoscopy.  She reports that she had missed a physical with her primary care provider for a few years prior to this.  Before she could have the colonoscopy performed, she developed significant pain and weakness.  She was ultimately admitted to the hospital and found to have colorectal cancer.  Liv has now been determined to have stage IV colorectal cancer and has started FOLFOX chemotherapy.  Since she has started chemotherapy, she reports that her pain is actually resolved.  She did have some transient chest pain which brought her to the emergency room a few days ago.  She was found to have a small pulmonary embolism and has started enoxaparin for this.  She has been home now for 2 days.  Her appetite has also improved since starting chemotherapy, as has her taste alteration.  She has some persistent nausea with vomiting approximately 1-2 times per day.  She does feel like her nausea is also better with chemotherapy.  She has been managing her ongoing symptoms with crackers and ginger ale at home.  She has multiple medications at home but has some confusion around which medications to take at which time, so she has not used any of her antiemetics.  She otherwise has some significant anxiety around social stressors as noted below.       PATIENT'S DISEASE UNDERSTANDING:  Liv feels that she has heard from Dr. Piedra that her cancer is going to  "be controlled.  She has been scared to ask but now would like to know what her overall prognosis is with this type of disease.       COPING:  Liv feels like \"it's not the cancer that's going to get me, it's my struggles with my family.\"  She has had transient low mood since her cancer diagnosis but can identify multiple people and strategies that lift her spirits when she is feeling low.  She has anxiety around how she will care for her grandchildren who currently live with her in the time ahead.       SOCIAL HISTORY:  Liv lives alone in an apartment without a significant other.  She currently is also living with her two granddaughters, one who is 18 and one who is 14.  She has been struggling with the 14-year-old who for the past 2 years since she came to live with her.  The mothers of both children are actively using illicit substances per the patient.  She has a total of 7 children including 5 boys and 2 daughters.  Her sons live in [place] and [place] and one son does live locally near her.  She has recently received the support of in-home health services and will have a nurse come visit her closely following this appointment today.  She also has an occupational therapist and physical therapist who will be coming out to help support her given her concerns of a functional decline since her hospitalizations.  Liv has worked at [employer] for 20+ years and has always been a busy person.  It is difficult for her to not be able to work.  She finds great joe and comfort in her bible studies which she does daily and she has previously attended [employer].  Since she is not currently driving, she has considered engaging with a Rastafari down the street from her.  Liv reports feeling safe at home both from herself and her family members.         Social History   Substance Use Topics     Smoking status: Former Smoker     Quit date: 6/14/2005     Smokeless tobacco: Never Used     Alcohol use No       Advance Care " Planning:  Advance Directive:    Not previously completed  Health Care Agent Contact Information: Would want her 18 year old granddaughter Dameon to be her agent.   POLST:   Not on file, but has DNR/DNI code status requested previously.       REVIEW OF SYSTEMS:   ROS: 10 point ROS neg other than the symptoms noted above in the HPI and here:  Palliative Symptom Review (0=no symptom/no concern, 1=mild, 2=moderate, 3=severe):      Pain: 0 - RUQ, resolved       Fatigue: 2      Nausea: 2 - using crackers, electrolyte solutions,       Constipation: 2 - since on the iron pill, better on senna      Diarrhea: 0      Depressive Symptoms: 1      Anxiety: 2      Drowsiness: 0      Poor Appetite: 0      Shortness of Breath: 1 with exertion only, more of a physical fatigue than dyspnea      Insomnia: 0           Physical Exam:   Vitals were reviewed  /67 (BP Location: Right arm, Patient Position: Chair, Cuff Size: Adult Large)  Pulse 94  Temp 99  F (37.2  C) (Oral)  SpO2 100%  General: Alert, mildly nauseated but otherwise comfortable appearing female in no acute distress.   Eyes: Pupils equal, sclera clear.   ENT: MMM. No ulcerations or plaques.   Cardiac: Normal rate, regular rhythm, no murmurs.    Resp: CTAB, unlabored on room air.   Abd: Soft, protuberant, non-tender to palpation, active bowel sounds.   Ext: Warm and well perfused.  Trace bilateral pretibial edema.   Neuro: No facial asymmetry.  Spontaneous movements grossly non-focal.  Sitting in a wheelchair, gait not examined.   Neuropsych: Alert, appropriately interactive, full affect.         Data Reviewed:  LABS:   Lab Results   Component Value Date    WBC 5.4 10/21/2017    HGB 7.4 (L) 10/21/2017    HCT 22.7 (L) 10/21/2017     10/21/2017     10/21/2017    POTASSIUM 3.6 10/21/2017    CHLORIDE 105 10/21/2017    CO2 26 10/21/2017    BUN 12 10/21/2017    CR 0.61 10/21/2017     (H) 10/21/2017    DD 8.3 (H) 10/20/2017    TROPI <0.015 10/20/2017     "AST 38 10/20/2017    ALT 23 10/20/2017    ALKPHOS 139 10/20/2017    BILITOTAL 0.2 10/20/2017    INR 1.05 2017   EKG 10/20/17: QTc is 422ms      IMAGING:   CT Chest 10/20/17  \"IMPRESSION:   1. A few small acute pulmonary emboli in the left lung. A linear  filling defect in the right lower lobe pulmonary artery most likely  represents an embolus that is not acute.  2. A few tiny indeterminate bilateral pulmonary nodules, unchanged  since the recent study dated 10/5/2017.  3. Partial visualization of several hepatic metastases, unchanged.\"    CT A/P 17  \"Focal bowel wall thickening involving the colon at the  hepatic flexure, multiple masses in the liver, and enlarged mesenteric  and retroperitoneal lymph nodes as above. Findings would be worrisome  for a primary colon cancer with metastatic disease to the liver and  mesenteric lymph nodes. The liver lesions would be amenable to  percutaneous biopsy.\"    [place]  - Use of controlled substances consistent with history.     Impressions:    Liv is a 67 year old woman with prior history of Stage IA endometrial cancer, recently diagnosed with stage IV colorectal cancer, currently on palliative intent FOLFOX with Avastin. Her symptoms have improved with chemotherapy initiation, with nausea remaining.  Liv has not tried any medications to manage this symptom.  She has significant social stressors and would benefit from meeting with Palliative SW for ongoing coping support.     Recommendations & Counselin. Pain: Resolved. Given her children's active substance abuse, Liv should be given a lock box should she need to keep scheduled medications in the home.  Will address when she needs these.     2. Nausea: Has not tried any medications to manage this.  Encouraged to start with PRN ondansetron.     3. Disease Understanding: Liv and I reviewed the goal of her chemotherapy as palliative intent rather than curative intent.  She asked what her overall prognosis would be " with regard to her cancer, and I reviewed that likely more would become known as we understood how her cancer was responding to the chemotherapy.  I will plan to review overall prognosis further with Dr. Piedra and discuss this information with Liv at our follow up appointment, per her request.     4. Support: Liv is currently caring for her two granddaughters, which puts a significant strain on her physically and emotionally.  Both of the girls' mothers are using illicit substances (per patient) and she worries how to best care for her grandchildren moving forward.  This has caused anxiety and she would like to meet with Palliative SW for additional coping support.  Will work to arrange this.     5. ACP: Liv understandably would not want her two daughters making medical decisions on her behalf in an emergency given their substance abuse. A short form HCD was provided for Liv to complete.    -Would benefit from POLST completion at her next appointment.     RTC 1 month for a follow up.     Thank you for involving me in the care of this patient.     Nela Spaulding MD / Palliative Medicine / Pager [phone] / After-Hours Answering Service [phone] / Main Palliative Clinic - [facility] [phone] / [facility] Inpatient Team Consult Pager [phone] (answered 8am-430pm M-F)    Additional History Reviewed:   No Known Allergies  Current Outpatient Prescriptions   Medication Sig Dispense Refill     enoxaparin (LOVENOX) 100 MG/ML injection Inject 1.1 mLs (110 mg) Subcutaneous every 12 hours 60 Syringe 1     sennosides (SENOKOT) 8.6 MG tablet Take 1 tablet by mouth 2 times daily 60 tablet 1     LORazepam (ATIVAN) 0.5 MG tablet Take 1 tablet (0.5 mg) by mouth every 4 hours as needed (Anxiety, Nausea/Vomiting or Sleep) 30 tablet 2     prochlorperazine (COMPAZINE) 10 MG tablet Take 1 tablet (10 mg) by mouth every 6 hours as needed (Nausea/Vomiting) 30 tablet 2     ondansetron (ZOFRAN) 8 MG tablet Take 1  tablet (8 mg) by mouth every 8 hours as needed (Nausea/Vomiting) 10 tablet 2     lidocaine-prilocaine (EMLA) cream Apply topically as needed for moderate pain Apply dollop size amount to port site 30-60 minutes prior to accessing. 30 g 1     ferrous sulfate (IRON) 325 (65 FE) MG tablet Take 1 tablet (325 mg) by mouth 2 times daily 100 tablet 11     albuterol (PROAIR HFA/PROVENTIL HFA/VENTOLIN HFA) 108 (90 BASE) MCG/ACT Inhaler Inhale 2 puffs into the lungs every 6 hours as needed for shortness of breath / dyspnea or wheezing 1 Inhaler 3     CHLORTHALIDONE PO Take 25 mg by mouth daily       SERTRALINE HCL PO Take 50 mg by mouth daily (Takes half of a 100mg tablet for a total of 50mg daily)       CALCIUM-MAGNESIUM-ZINC PO Take 1 tablet by mouth daily       VITAMIN D, CHOLECALCIFEROL, PO Take 1 tablet by mouth daily (OTC: Pt unsure of strength)       ACETAMINOPHEN PO Take 1,000 mg by mouth every 4 hours as needed for pain ((Patient has been taking 4-12 tabs per day))       lisinopril (PRINIVIL,ZESTRIL) 10 MG tablet Take 1 tablet by mouth daily. 30 tablet 11     Multiple Vitamins-Minerals (CENTRUM SILVER) per tablet Take 1 tablet by mouth daily. 100 tablet 12     Past Medical History:   Diagnosis Date     Cancer (H)     colon     Flash pulmonary edema (H) 2013    With concurrent severe HTN, complicating DAPHNEY/BSO     HTN (hypertension)      Obesity      Past Surgical History:   Procedure Laterality Date     COLONOSCOPY N/A 2017    Procedure: COMBINED COLONOSCOPY, SINGLE OR MULTIPLE BIOPSY/POLYPECTOMY BY BIOPSY;  colonoscopy;  Surgeon: Kevin Celis MD;  Location:  GI     HYSTERECTOMY, DAPHNEY  2013    with BSO     INSERT PORT VASCULAR ACCESS N/A 10/9/2017    Procedure: INSERT PORT VASCULAR ACCESS;  PORT PLACEMENT (MAC);  Surgeon: Rigoberto Nolen MD;  [facility]     Family History   Problem Relation Age of Onset     CEREBROVASCULAR DISEASE Father       age 66     HEART DISEASE Mother        of MI, age 72     DIABETES Mother      Hypertension Mother        Face to face time: 45 minutes, with >50% of time devoted to patient counseling.

## 2017-10-23 NOTE — TELEPHONE ENCOUNTER
Esther from home infusion, called requesting order for nurse visit regarding Lovenox teaching. Telephone order given for home nurse visit. Also verify with Dr. Piedra regarding IV hydration for patient and call [facility]   at  home infusion back at [phone]. Joyce Betts RN

## 2017-10-24 NOTE — TELEPHONE ENCOUNTER
Agree with [facility] teaching orders. Will decide about IV hydration when I see her this week.

## 2017-10-24 NOTE — PROGRESS NOTES
Infusion Nursing Note:  Liv Sylvester presents today for Injectafer and one unit blood.    Patient seen by provider today: No   present during visit today: Not Applicable.    Note: Patient on schedule for Injectafer today at 2:30 but showed up at 10am. Not sure why but she says someone called her to come in early. In view of this, I will get blood transfusion done today instead of waiting til tomorrow before chemo.    Intravenous Access:  Labs drawn without difficulty.  Implanted Port.    Treatment Conditions:  Blood transfusion consent signed today.      Post Infusion Assessment:  Patient tolerated infusion without incident.  Blood return noted pre and post infusion.  Site patent and intact, free from redness, edema or discomfort.  No evidence of extravasations.  Port left accessed for chemo tomorrow.    Discharge Plan:   Discharge instructions reviewed with: Patient.  Patient and/or family verbalized understanding of discharge instructions and all questions answered.  Copy of AVS reviewed with patient and/or family.  Patient will return tomorrow for next appointment.  Patient discharged in stable condition accompanied by: self.  Departure Mode: Ambulatory.    Jaleesa Anne RN

## 2017-10-24 NOTE — MR AVS SNAPSHOT
After Visit Summary   10/24/2017    Liv Sylvester    MRN: [id number]           Patient Information     Date Of Birth          1950        Visit Information        Provider Department      10/24/2017 10:30 AM SH INFUSION CHAIR 13 [facility]        Today's Diagnoses     Iron deficiency anemia due to chronic blood loss    -  1    Malignant neoplasm of hepatic flexure (H)        Portacath in place           Follow-ups after your visit        Your next 10 appointments already scheduled     Oct 25, 2017  9:00 AM CDT   Level 6 with SH INFUSION CHAIR 13   [facility] ([facility])    [facility]  [address]  [place] [address]   [phone]            Oct 25, 2017 10:45 AM CDT   Return Visit with Radha Piedra MD   [facility] ([facility])    [facility]  [address]  [place] [address]   [phone]            Nov 08, 2017  9:00 AM CST   Level 6 with SH INFUSION CHAIR 14   [facility] ([facility])    [facility]  [address]  [place] [address]   [phone]            Nov 20, 2017 10:00 AM CST   New Visit with Nela Spaulding MD   [facility] ([facility])    [facility]  [address]  [place] [address]   [phone]            Nov 22, 2017  9:00 AM CST   Level 6 with SH INFUSION CHAIR 17   [facility] ([facility])    [facility]  [address]  [place] [address]   [phone]            Dec 06, 2017  9:00 AM CST   Level 6 with SH INFUSION CHAIR 2   [facility] ([facility])    [facility]  [address]  [place]  "[address]   [phone]            Dec 20, 2017  9:00 AM CST   Level 6 with SH INFUSION CHAIR 12   [facility] ([facility])    [facility]  [address] [place] [address]   [phone]              Future tests that were ordered for you today     Open Standing Orders        Priority Remaining Interval Expires Ordered    Red blood cell prepare order unit Routine 99/100 CONDITIONAL (SPECIFY) BLOOD  10/24/2017    Transfuse red blood cell unit Routine 99/100 TRANSFUSE 1 UNIT  10/24/2017            Who to contact     If you have questions or need follow up information about today's clinic visit or your schedule please contact [facility] directly at [phone].  Normal or non-critical lab and imaging results will be communicated to you by [email or web address], letter or phone within 4 business days after the clinic has received the results. If you do not hear from us within 7 days, please contact the clinic through [email or web address] or phone. If you have a critical or abnormal lab result, we will notify you by phone as soon as possible.  Submit refill requests through [email or web address] or call your pharmacy and they will forward the refill request to us. Please allow 3 business days for your refill to be completed.          Additional Information About Your Visit        [email or web address] Information     [email or web address] lets you send messages to your doctor, view your test results, renew your prescriptions, schedule appointments and more. To sign up, go to [email or web address] . Click on \"Log in\" on the left side of the screen, which will take you to the Welcome page. Then click on \"Sign up Now\" on the right side of the page.     You will be asked to enter the access code listed below, as well as some personal information. Please follow the directions to create your username and password.     Your access code is: [email or web address]  Expires: 12/31/2017 " "12:39 PM     Your access code will  in 90 days. If you need help or a new code, please call your [facility] or [phone].        Care EveryWhere ID     This is your Care EveryWhere ID. This could be used by other organizations to access your [place] medical records  [id number]        Your Vitals Were     Pulse Temperature Respirations Height Pulse Oximetry BMI (Body Mass Index)    77 98.1  F (36.7  C) (Oral) 18 1.753 m (5' 9\") 99% 35.32 kg/m2       Blood Pressure from Last 3 Encounters:   10/24/17 144/70   10/23/17 119/67   10/21/17 151/67    Weight from Last 3 Encounters:   10/24/17 108.5 kg (239 lb 3.2 oz)   10/20/17 109.2 kg (240 lb 12.8 oz)   10/11/17 109.5 kg (241 lb 6.4 oz)              We Performed the Following     ABO/Rh type and screen     Blood component     Transfuse red blood cell unit        Primary Care Provider Office Phone # Fax #    Amaya Bethea Anita [phone]       [facility] [address] [place] [address]        Equal Access to Services     SARITHA IVERSON : Hadii anthony ku hadasho Soomaali, waaxda luqadaha, qaybta kaalmada adeegyada, john maya. So [facility] [phone].    ATENCIÓN: Si habla español, tiene a schmitt disposición servicios gratuitos de asistencia lingüística. Llame al [phone].    We comply with applicable federal civil rights laws and [place] laws. We do not discriminate on the basis of race, color, national origin, age, disability, sex, sexual orientation, or gender identity.            Thank you!     Thank you for choosing [facility] [place] [facility]  for your care. Our goal is always to provide you with excellent care. Hearing back from our patients is one way we can continue to improve our services. Please take a few minutes to complete the written survey that you may receive in the mail after your visit with us. Thank you!             Your Updated Medication List - Protect others around you: " Learn how to safely use, store and throw away your medicines at www.disposemymeds.org.          This list is accurate as of: 10/24/17  3:37 PM.  Always use your most recent med list.                   Brand Name Dispense Instructions for use Diagnosis    ACETAMINOPHEN PO      Take 1,000 mg by mouth every 4 hours as needed for pain ((Patient has been taking 4-12 tabs per day))        albuterol 108 (90 BASE) MCG/ACT Inhaler    PROAIR HFA/PROVENTIL HFA/VENTOLIN HFA    1 Inhaler    Inhale 2 puffs into the lungs every 6 hours as needed for shortness of breath / dyspnea or wheezing    Reactive airway disease without complication, unspecified asthma severity, unspecified whether persistent       CALCIUM-MAGNESIUM-ZINC PO      Take 1 tablet by mouth daily        CENTRUM SILVER per tablet     100 tablet    Take 1 tablet by mouth daily.        CHLORTHALIDONE PO      Take 25 mg by mouth daily        enoxaparin 100 MG/ML injection    LOVENOX    60 Syringe    Inject 1.1 mLs (110 mg) Subcutaneous every 12 hours    Other acute pulmonary embolism without acute cor pulmonale (H)       ferrous sulfate 325 (65 FE) MG tablet    IRON    100 tablet    Take 1 tablet (325 mg) by mouth 2 times daily    Iron deficiency anemia due to chronic blood loss       lidocaine-prilocaine cream    EMLA    30 g    Apply topically as needed for moderate pain Apply dollop size amount to port site 30-60 minutes prior to accessing.    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    HTN (hypertension)       LORazepam 0.5 MG tablet    ATIVAN    30 tablet    Take 1 tablet (0.5 mg) by mouth every 4 hours as needed (Anxiety, Nausea/Vomiting or Sleep)    Malignant neoplasm of hepatic flexure (H)       ondansetron 8 MG tablet    ZOFRAN    10 tablet    Take 1 tablet (8 mg) by mouth every 8 hours as needed (Nausea/Vomiting)    Malignant neoplasm of  hepatic flexure (H)       prochlorperazine 10 MG tablet    COMPAZINE    30 tablet    Take 1 tablet (10 mg) by mouth every 6 hours as needed (Nausea/Vomiting)    Malignant neoplasm of hepatic flexure (H)       sennosides 8.6 MG tablet    SENOKOT    60 tablet    Take 1 tablet by mouth 2 times daily    Constipation due to opioid therapy       SERTRALINE HCL PO      Take 50 mg by mouth daily (Takes half of a 100mg tablet for a total of 50mg daily)        VITAMIN D (CHOLECALCIFEROL) PO      Take 1 tablet by mouth daily (OTC: Pt unsure of strength)

## 2017-10-25 NOTE — PROGRESS NOTES
"Oncology Rooming Note    October 25, 2017 11:07 AM   Liv Sylvester is a 67 year old female who presents for:    Chief Complaint   Patient presents with     Oncology Clinic Visit     Malignant neoplasm of hepatic flexure     Initial Vitals: /77 (BP Location: Left arm)  Pulse 85  Temp 98.6  F (37  C) (Oral)  Resp 20  Ht 1.753 m (5' 9.02\")  Wt 108.5 kg (239 lb 3.2 oz)  SpO2 99%  BMI 35.31 kg/m2 Estimated body mass index is 35.31 kg/(m^2) as calculated from the following:    Height as of this encounter: 1.753 m (5' 9.02\").    Weight as of this encounter: 108.5 kg (239 lb 3.2 oz). Body surface area is 2.3 meters squared.  Data Unavailable Comment: Data Unavailable   No LMP recorded. Patient has had a hysterectomy.  Allergies reviewed: Yes  Medications reviewed: Yes    Medications: Medication refills not needed today.  Pharmacy name entered into Muzy:    [facility] [address] [place] [address] [address] [address] [place]  AT [facility] & [address] [place]  [facility] [address] [place], [place] - [address] [facility] [address]    Clinical concerns: none                 4 minutes for nursing intake (face to face time)     Monserrat Alonso MA          DISCHARGE PLAN:    Folfox/ Avastin today/ Reported to JACKIE Solomon/ INfusion  2 week appt for 11/8/17 ( [facility]fusion and Clinic)  Aware of lovenox dose/ Frequency change- pharmacy will pick it ip. ( See phone encounter from 10/26/17)  Aware to STOP oral iron pills    Next appointments: See patient instruction section  Departure Mode: Ambulatory  Accompanied by: self  12 minutes for nursing discharge (face to face time)   Laina Mondragon RN      "

## 2017-10-25 NOTE — PROGRESS NOTES
Social Work Progress Note      Data/Intervention:  Patient Name:  Liv Sylvester  /Age:  1950 (67 year old)    Reason for Follow-Up:  Pt came to treatment today for C2D1 of palliative FOLFOX chemotherapy. This clinician followed-up with pt via phone per JACKIE Swain's request due to increasing concerns about pt's following instructions regarding medications in home.      Intervention:   This clinician spoke with Liv at home, who reported that she has been somewhat overwhelmed with all of the new information regarding treatment and medication need. Pt acknowledged that she is unable to inject her own Lovenox, and that she is hoping to have assistance with injection from granddaughter who works 10am-10pm. This clinician reviewed with pt the importance of choosing a few different family members who might be able to assist with different dosing times, and that those family members get teaching from homecare RN. Pt reported that she would inquire as to her daughter-in-law's availability to assist with evening dose as she ends work earlier in day. Pt agreeable to this clinician contacting [facility] and requesting nursing visit to provide additional teaching to pt and family surrounding administration of medication.     Plan:  1) This clinician will ask homecare team for RN referral for medication education and support, and would encourage that RN do teaching with granddaughter as well as daughter-in-law.  2) This clinician will continue to follow for psychosocial support and processing as pt continues to adjust to dx and impacts of treatment.     Previously provided patient/family with writer's contact information and availability.     Please call or page if needs or concerns arise.     DEANNE Watkins, [facility]  Direct Phone: [phone]  Pager: [phone]

## 2017-10-25 NOTE — MR AVS SNAPSHOT
After Visit Summary   10/25/2017    Liv Sylvester    MRN: [id number]           Patient Information     Date Of Birth          1950        Visit Information        Provider Department      10/25/2017 4:34 PM Stephanie Awad LICSW [facility]        Today's Diagnoses     Counseling NOS(V65.40)    -  1       Follow-ups after your visit        Your next 10 appointments already scheduled     Oct 31, 2017 10:30 AM CDT   Level 1 with SH INFUSION CHAIR 6   [facility] ([facility])    [facility]  [address]  [place] [address]   [phone]            Nov 08, 2017  9:30 AM CST   Level 6 with SH INFUSION CHAIR 9   [facility] ([facility])    [facility]  [address]  [place] [address]   [phone]            Nov 08, 2017 10:45 AM CST   Return Visit with Radha Piedra MD   [facility] ([facility])    [facility]  [address]  [place] [address]   [phone]            Nov 20, 2017 10:00 AM CST   New Visit with Nela Spaulding MD   [facility] ([facility])    [facility]  [address]  [place] [address]   [phone]            Nov 22, 2017  9:00 AM CST   Level 6 with SH INFUSION CHAIR 17   [facility] ([facility])    [facility]  [address]  [place] [address]   [phone]            Dec 06, 2017  9:00 AM CST   Level 6 with SH INFUSION CHAIR 2   [facility] ([facility])    [facility]  [address]  [place] [address]   [phone]            Dec 20, 2017  9:00 AM CST   Level 6 with SH INFUSION CHAIR 12   [facility]  "Clinic and Infusion Center ([facility])    [facility] [place]  [address]  [place] [address] [phone]              Who to contact     If you have questions or need follow up information about today's clinic visit or your schedule please contact [facility] directly at [phone].  Normal or non-critical lab and imaging results will be communicated to you by MyChart, letter or phone within 4 business days after the clinic has received the results. If you do not hear from us within 7 days, please contact the clinic through Wuxi Ada Softwarehart or phone. If you have a critical or abnormal lab result, we will notify you by phone as soon as possible.  Submit refill requests through Wercker or call your pharmacy and they will forward the refill request to us. Please allow 3 business days for your refill to be completed.          Additional Information About Your Visit        Wuxi Ada SoftwareharAptible Information     Wercker lets you send messages to your doctor, view your test results, renew your prescriptions, schedule appointments and more. To sign up, go to [email or web address] . Click on \"Log in\" on the left side of the screen, which will take you to the Welcome page. Then click on \"Sign up Now\" on the right side of the page.     You will be asked to enter the access code listed below, as well as some personal information. Please follow the directions to create your username and password.     Your access code is: [email or web address]  Expires: 2017 12:39 PM     Your access code will  in 90 days. If you need help or a new code, please call your [place] clinic or [phone].        Care EveryWhere ID     This is your Care EveryWhere ID. This could be used by other organizations to access your [place] medical records  [id number]         Blood Pressure from Last 3 Encounters:   10/29/17 160/83   10/25/17 130/77   10/25/17 130/77    Weight from Last 3 Encounters:   10/25/17 " 108.5 kg (239 lb 3.2 oz)   10/25/17 108.5 kg (239 lb 3.2 oz)   10/24/17 108.5 kg (239 lb 3.2 oz)              Today, you had the following     No orders found for display         Today's Medication Changes          These changes are accurate as of: 10/25/17 11:59 PM.  If you have any questions, ask your nurse or doctor.               Start taking these medicines.        Dose/Directions    Potassium Chloride ER 20 MEQ Tbcr   Used for:  Hypokalemia   Started by:  Radha Piedra MD        Dose:  20 mEq   Take 1 tablet (20 mEq) by mouth daily for 10 days   Quantity:  10 tablet   Refills:  0         These medicines have changed or have updated prescriptions.        Dose/Directions    * LOVENOX 120 MG/0.8ML injection   This may have changed:  Another medication with the same name was changed. Make sure you understand how and when to take each.   Used for:  Other acute pulmonary embolism without acute cor pulmonale (H)   Generic drug:  enoxaparin        Dose:  120 mg   Inject 0.8 mLs (120 mg) Subcutaneous every 24 hours   Quantity:  24 mL   Refills:  0       * enoxaparin 40 MG/0.4ML injection   Commonly known as:  LOVENOX   This may have changed:    - medication strength  - how much to take  - when to take this  - additional instructions   Used for:  Other acute pulmonary embolism without acute cor pulmonale (H)        Dose:  40 mg   Inject 0.4 mLs (40 mg) Subcutaneous every 24 hours Give with 120mg syringe for a total dose of 160mg every 24 hours.   Quantity:  12 mL   Refills:  0       * Notice:  This list has 2 medication(s) that are the same as other medications prescribed for you. Read the directions carefully, and ask your doctor or other care provider to review them with you.         Where to get your medicines      These medications were sent to [facility] - [address]  [address] [place] [address]     Phone:  [phone]     enoxaparin 40 MG/0.4ML injection     Potassium Chloride ER 20 MEQ cr                Primary Care Provider Office Phone # Fax #    Amaya Howard [phone]       [facility] [address] [facility] [address]        Equal Access to Services     HAYDEE IVERSON : Hadii anthony ku hadbhartio Soomaali, waaxda luqadaha, qaybta kaalmada adeegyada, waxcleopatra tellon reemaslime tao mart maya. So [facility] [phone].    ATENCIÓN: Si habla español, tiene a schmitt disposición servicios gratuitos de asistencia lingüística. Llame al [phone].    We comply with applicable federal civil rights laws and [place] laws. We do not discriminate on the basis of race, color, national origin, age, disability, sex, sexual orientation, or gender identity.            Thank you!     Thank you for choosing [facility] [place]  for your care. Our goal is always to provide you with excellent care. Hearing back from our patients is one way we can continue to improve our services. Please take a few minutes to complete the written survey that you may receive in the mail after your visit with us. Thank you!             Your Updated Medication List - Protect others around you: Learn how to safely use, store and throw away your medicines at www.disposemymeds.org.          This list is accurate as of: 10/25/17 11:59 PM.  Always use your most recent med list.                   Brand Name Dispense Instructions for use Diagnosis    ACETAMINOPHEN PO      Take 1,000 mg by mouth every 4 hours as needed for pain ((Patient has been taking 4-12 tabs per day))        albuterol 108 (90 BASE) MCG/ACT Inhaler    PROAIR HFA/PROVENTIL HFA/VENTOLIN HFA    1 Inhaler    Inhale 2 puffs into the lungs every 6 hours as needed for shortness of breath / dyspnea or wheezing    Reactive airway disease without complication, unspecified asthma severity, unspecified whether persistent       CALCIUM-MAGNESIUM-ZINC PO      Take 1 tablet by mouth daily        CENTRUM SILVER per tablet     100 tablet     Take 1 tablet by mouth daily.        CHLORTHALIDONE PO      Take 25 mg by mouth daily        ferrous sulfate 325 (65 FE) MG tablet    IRON    100 tablet    Take 1 tablet (325 mg) by mouth 2 times daily    Iron deficiency anemia due to chronic blood loss       lidocaine-prilocaine cream    EMLA    30 g    Apply topically as needed for moderate pain Apply dollop size amount to port site 30-60 minutes prior to accessing.    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    HTN (hypertension)       LORazepam 0.5 MG tablet    ATIVAN    30 tablet    Take 1 tablet (0.5 mg) by mouth every 4 hours as needed (Anxiety, Nausea/Vomiting or Sleep)    Malignant neoplasm of hepatic flexure (H)       * LOVENOX 120 MG/0.8ML injection   Generic drug:  enoxaparin     24 mL    Inject 0.8 mLs (120 mg) Subcutaneous every 24 hours    Other acute pulmonary embolism without acute cor pulmonale (H)       * enoxaparin 40 MG/0.4ML injection    LOVENOX    12 mL    Inject 0.4 mLs (40 mg) Subcutaneous every 24 hours Give with 120mg syringe for a total dose of 160mg every 24 hours.    Other acute pulmonary embolism without acute cor pulmonale (H)       ondansetron 8 MG tablet    ZOFRAN    10 tablet    Take 1 tablet (8 mg) by mouth every 8 hours as needed (Nausea/Vomiting)    Malignant neoplasm of hepatic flexure (H)       Potassium Chloride ER 20 MEQ Tbcr     10 tablet    Take 1 tablet (20 mEq) by mouth daily for 10 days    Hypokalemia       prochlorperazine 10 MG tablet    COMPAZINE    30 tablet    Take 1 tablet (10 mg) by mouth every 6 hours as needed (Nausea/Vomiting)    Malignant neoplasm of hepatic flexure (H)       sennosides 8.6 MG tablet    SENOKOT    60 tablet    Take 1 tablet by mouth 2 times daily    Constipation due to opioid therapy       SERTRALINE HCL PO      Take 50 mg by mouth daily (Takes half of a 100mg tablet for a  total of 50mg daily)        VITAMIN D (CHOLECALCIFEROL) PO      Take 1 tablet by mouth daily (OTC: Pt unsure of strength)        * Notice:  This list has 2 medication(s) that are the same as other medications prescribed for you. Read the directions carefully, and ask your doctor or other care provider to review them with you.

## 2017-10-25 NOTE — PROGRESS NOTES
"Infusion Nursing Note:  Liv Sylvester presents today for Cycle 2 Day 1 Avastin, Oxaliplatin, Leucovorin, Fluorouracil push and pump connect.    Patient seen by provider today: No   present during visit today: Not Applicable.    Note: N/A.    Intravenous Access:  Implanted Port.    Treatment Conditions:  Lab Results   Component Value Date    HGB 8.5 10/25/2017     Lab Results   Component Value Date    WBC 3.8 10/25/2017      Lab Results   Component Value Date    ANEU 2.2 10/25/2017     Lab Results   Component Value Date     10/25/2017      Lab Results   Component Value Date     10/25/2017                   Lab Results   Component Value Date    POTASSIUM 3.3 10/25/2017           No results found for: MAG         Lab Results   Component Value Date    CR 0.61 10/25/2017                   Lab Results   Component Value Date    JESÚS 9.0 10/25/2017                Lab Results   Component Value Date    BILITOTAL 0.5 10/25/2017           Lab Results   Component Value Date    ALBUMIN 2.5 10/25/2017                    Lab Results   Component Value Date    ALT 28 10/25/2017           Lab Results   Component Value Date    AST 33 10/25/2017     Results reviewed, labs MET treatment parameters, ok to proceed with treatment.          Post Infusion Assessment:  Patient tolerated infusion without incident.  Blood return noted pre and post infusion.  Site patent and intact, free from redness, edema or discomfort.  No evidence of extravasations.    Prior to discharge: Port is secured in place with tegaderm and flushed with 10cc NS with positive blood return noted.  Continuous home infusion Dosi-Fuser pump connected.    All connectors secured in place and clamps taped open.    Pump started, \"running\" noted on display (CADD): Not Applicable.  Patient instructed to call our clinic or [facility] with any questions or concerns at home.  Patient verbalized understanding.    Patient set up for pump disconnect " at home with [facility] on 10/27 at 12:15.          Discharge Plan:   Patient declined prescription refills.  Discharge instructions reviewed with: Patient.  Patient verbalized understanding of discharge instructions and all questions answered.  Copy of AVS reviewed with patient.  Patient will return 10/27/17 for next appointment.  Patient discharged in stable condition accompanied by: self.  Departure Mode: Walker.    Sabina Huddleston RN

## 2017-10-25 NOTE — PROGRESS NOTES
Patient was educated on the new dosing for Lovenox which will now be once a day on October 25, 2017. Teaching provided to patient indication, dose, administration, adverse effects and side effect management. Written materials were provided and patient was given the opportunity to ask questions. The new dose for the patient is 160mg every 24 hours. This will make it easier for the grand daughter to assist the patient in administering the dose. Patient verbalized understanding of the information presented.     Home care went to see the patient today and provided teaching on how to administer the dose. I talked to Gracy from home care and told her that the new dose is 160mg every 24 hours (120mg syringe + 40mg syringe). I also called the granddaughter Dameon and also gave her the same information. The grand daughter told me that she will be going to assist Liv with the dose administration every day.    Alonzo BlandD.

## 2017-10-25 NOTE — PATIENT INSTRUCTIONS
1.  FOLFOX with Avastin every 2 weeks.  Chemo today.   2.  Return to clinic 2 weeks with Infusion Clinic appointment.   3.  Change Lovenox 1.5 mg/kh Q 24 hours  4.  Stop oral iron

## 2017-10-25 NOTE — MR AVS SNAPSHOT
After Visit Summary   10/25/2017    Liv Sylvester    MRN: [id number]           Patient Information     Date Of Birth          1950        Visit Information        Provider Department      10/25/2017 10:45 AM Radha Piedra MD [facility]        Today's Diagnoses     Other acute pulmonary embolism without acute cor pulmonale (H)          Care Instructions    1.  FOLFOX with Avastin every 2 weeks.  Chemo today.   2.  Return to clinic 2 weeks with Infusion Clinic appointment.   3.  Change Lovenox 1.5 mg/kh Q 24 hours  4.  Stop oral iron          Follow-ups after your visit        Your next 10 appointments already scheduled     Nov 08, 2017  9:30 AM CST   Level 6 with SH INFUSION CHAIR 9   [facility] and Infusion Center ([facility])    [facility]  [address]  [place] [address]   [phone]            Nov 08, 2017 10:45 AM CST   Return Visit with Radha Piedra MD   [facility] ([facility])    [facility]  [address]  [place] [address]   [phone]            Nov 20, 2017 10:00 AM CST   New Visit with Nela Spaulding MD   [facility] ([facility] [place] [facility])    [facility]  [address]  [place] [address]   [phone]            Nov 22, 2017  9:00 AM CST   Level 6 with SH INFUSION CHAIR 17   [facility] ([facility] [place] [facility])    [facility]  [address]  [place] [address]   [phone]            Dec 06, 2017  9:00 AM CST   Level 6 with SH INFUSION CHAIR 2   [facility] ([facility])    [facility]  [address]  [place] [address]   [phone]            Dec 20, 2017  9:00 AM CST   Level 6 with SH INFUSION CHAIR 12   [facility] and  "Infusion Center ([facility])    [facility] [place]  [address]  [place] [address] [phone]              Who to contact     If you have questions or need follow up information about today's clinic visit or your schedule please contact [facility] directly at [phone].  Normal or non-critical lab and imaging results will be communicated to you by MyChart, letter or phone within 4 business days after the clinic has received the results. If you do not hear from us within 7 days, please contact the clinic through [employer] or phone. If you have a critical or abnormal lab result, we will notify you by phone as soon as possible.  Submit refill requests through [employer] or call your pharmacy and they will forward the refill request to us. Please allow 3 business days for your refill to be completed.          Additional Information About Your Visit        [employer] Information     [employer] lets you send messages to your doctor, view your test results, renew your prescriptions, schedule appointments and more. To sign up, go to [email or web address] . Click on \"Log in\" on the left side of the screen, which will take you to the Welcome page. Then click on \"Sign up Now\" on the right side of the page.     You will be asked to enter the access code listed below, as well as some personal information. Please follow the directions to create your username and password.     Your access code is: [email or web address]  Expires: 2017 12:39 PM     Your access code will  in 90 days. If you need help or a new code, please call your [facility] clinic or [phone].        Care EveryWhere ID     This is your Care EveryWhere ID. This could be used by other organizations to access your [facility] medical records  [id number]        Your Vitals Were     Pulse Temperature Respirations Height Pulse Oximetry BMI (Body Mass Index)    85 98.6  F (37  C) (Oral) 20 1.753 m (5' 9.02\") 99% " 35.31 kg/m2       Blood Pressure from Last 3 Encounters:   10/25/17 130/77   10/25/17 130/77   10/24/17 144/70    Weight from Last 3 Encounters:   10/25/17 108.5 kg (239 lb 3.2 oz)   10/25/17 108.5 kg (239 lb 3.2 oz)   10/24/17 108.5 kg (239 lb 3.2 oz)              Today, you had the following     No orders found for display         Today's Medication Changes          These changes are accurate as of: 10/25/17 11:31 AM.  If you have any questions, ask your nurse or doctor.               These medicines have changed or have updated prescriptions.        Dose/Directions    enoxaparin 100 MG/ML injection   Commonly known as:  LOVENOX   This may have changed:    - how much to take  - when to take this   Used for:  Other acute pulmonary embolism without acute cor pulmonale (H)   Changed by:  Radha Piedra MD        Dose:  1.5 mg/kg   Inject 1.63 mLs (163 mg) Subcutaneous every 24 hours   Quantity:  48.9 mL   Refills:  11            Where to get your medicines      These medications were sent to [facility] [place], [place] - [address]  [address] [facility] [address], [facility] [address]     Phone:  [phone]     enoxaparin 100 MG/ML injection                Primary Care Provider Office Phone # Fax #    Amaya Howard [phone]       [place] [address] [address] [place] [address]        Equal Access to Services     SARITHA IVERSON AH: Hadii anthony kahn hadasho Soomaali, waaxda luqadaha, qaybta kaalmada adeegyada, waxay odalis cevallos . So [facility] [phone].    ATENCIÓN: Si habla español, tiene a schmitt disposición servicios gratuitos de asistencia lingüística. Llame al [phone].    We comply with applicable federal civil rights laws and [place] laws. We do not discriminate on the basis of race, color, national origin, age, disability, sex, sexual orientation, or gender identity.            Thank you!     Thank you for choosing [facility]  for your care. Our goal  is always to provide you with excellent care. Hearing back from our patients is one way we can continue to improve our services. Please take a few minutes to complete the written survey that you may receive in the mail after your visit with us. Thank you!             Your Updated Medication List - Protect others around you: Learn how to safely use, store and throw away your medicines at www.disposemymeds.org.          This list is accurate as of: 10/25/17 11:31 AM.  Always use your most recent med list.                   Brand Name Dispense Instructions for use Diagnosis    ACETAMINOPHEN PO      Take 1,000 mg by mouth every 4 hours as needed for pain ((Patient has been taking 4-12 tabs per day))        albuterol 108 (90 BASE) MCG/ACT Inhaler    PROAIR HFA/PROVENTIL HFA/VENTOLIN HFA    1 Inhaler    Inhale 2 puffs into the lungs every 6 hours as needed for shortness of breath / dyspnea or wheezing    Reactive airway disease without complication, unspecified asthma severity, unspecified whether persistent       CALCIUM-MAGNESIUM-ZINC PO      Take 1 tablet by mouth daily        CENTRUM SILVER per tablet     100 tablet    Take 1 tablet by mouth daily.        CHLORTHALIDONE PO      Take 25 mg by mouth daily        enoxaparin 100 MG/ML injection    LOVENOX    48.9 mL    Inject 1.63 mLs (163 mg) Subcutaneous every 24 hours    Other acute pulmonary embolism without acute cor pulmonale (H)       ferrous sulfate 325 (65 FE) MG tablet    IRON    100 tablet    Take 1 tablet (325 mg) by mouth 2 times daily    Iron deficiency anemia due to chronic blood loss       lidocaine-prilocaine cream    EMLA    30 g    Apply topically as needed for moderate pain Apply dollop size amount to port site 30-60 minutes prior to accessing.    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    HTN (hypertension)       LORazepam 0.5 MG  tablet    ATIVAN    30 tablet    Take 1 tablet (0.5 mg) by mouth every 4 hours as needed (Anxiety, Nausea/Vomiting or Sleep)    Malignant neoplasm of hepatic flexure (H)       ondansetron 8 MG tablet    ZOFRAN    10 tablet    Take 1 tablet (8 mg) by mouth every 8 hours as needed (Nausea/Vomiting)    Malignant neoplasm of hepatic flexure (H)       prochlorperazine 10 MG tablet    COMPAZINE    30 tablet    Take 1 tablet (10 mg) by mouth every 6 hours as needed (Nausea/Vomiting)    Malignant neoplasm of hepatic flexure (H)       sennosides 8.6 MG tablet    SENOKOT    60 tablet    Take 1 tablet by mouth 2 times daily    Constipation due to opioid therapy       SERTRALINE HCL PO      Take 50 mg by mouth daily (Takes half of a 100mg tablet for a total of 50mg daily)        VITAMIN D (CHOLECALCIFEROL) PO      Take 1 tablet by mouth daily (OTC: Pt unsure of strength)

## 2017-10-25 NOTE — PROGRESS NOTES
"[facility]  HEMATOLOGY ONCOLOGY    ONCOLOGY FOLLOWUP NOTE      DIAGNOSIS:    1- Metastatic colon cancer in a patient with history of stage 1A grade 3 endometrial carcinoma approximately around 2012.     On 09/29/2017, Liv Sylvester presented with progressive fatigue and right upper quadrant pain.  CT scan 09/29/2017 showed partially contracted bladder and findings suspicious for liver metastases.  There were multiple defined hypoechoic foci scoped scattered throughout the liver, the largest was in the right lobe.  CT was also showing enlargement of hepatic and retroperitoneal lymph nodes.  On 09/30/2017, she underwent a colonoscopy, which showed a fungating, infiltrative, ulcerated, partially obstructing mass at the hepatic flexure.  Pathology is consistent with moderately differentiated adenocarcinoma with normal mismatch repair protein expression.   2- Significant iron deficiency anemia.   3- VTE- malignancy related     TREATMENT:  10/11/2017 FOLFOX and Avastin.      SUBJECTIVE:  The patient was seen as a followup today. She had red cell transfusion and injectafer yesterday. She feels better now.     REVIEW OF SYSTEMS:  A complete review of systems was performed and found to be negative other than pertinent positives mentioned in history of present illness.      Past medical, social histories reviewed.     Meds- Reviewed.     PHYSICAL EXAMINATION:   VITAL SIGNS: /77 (BP Location: Left arm)  Pulse 85  Temp 98.6  F (37  C) (Oral)  Resp 20  Ht 1.753 m (5' 9.02\")  Wt 108.5 kg (239 lb 3.2 oz)  SpO2 99%  BMI 35.31 kg/m2  CONSTITUTIONAL: Appears tired.   HEENT: Pupils are equal. Oropharynx is clear.   NECK: No cervical or supraclavicular lymphadenopathy.   RESPIRATORY: Clear bilaterally.   CARD/VASC: S1, S2, regular.   GI: Soft, nontender, nondistended, no hepatosplenomegaly.   MUSKULOSKELETAL: Warm, well perfused.   NEUROLOGIC: Alert, awake.   INTEGUMENT: No rash.   LYMPHATICS: No " edema.   PSYCH: Appears stressed.      LABORATORY DATA AND IMAGING REVIEWED DURING THIS VISIT:  Recent Labs   Lab Test  10/25/17   0915  10/21/17   0520   NA  141  139   POTASSIUM  3.3*  3.6   CHLORIDE  104  105   CO2  29  26   ANIONGAP  8  8   BUN  9  12   CR  0.61  0.61   GLC  119*  105*   JESÚS  9.0  8.4*     Recent Labs   Lab Test  10/25/17   0915  10/21/17   1351  10/21/17   0520  10/20/17   1320  10/11/17   0925   WBC  3.8*   --   5.4  5.8  8.1   HGB  8.5*  7.4*  7.2*  8.0*  8.8*   PLT  239   --   193  227  395   MCV  75*   --   72*  72*  74*   NEUTROPHIL  58.6   --    --   67.9  68.8     Recent Labs   Lab Test  10/25/17   0915  10/20/17   1320  10/11/17   0925   BILITOTAL  0.5  0.2  0.3   ALKPHOS  105  139  185*   ALT  28  23  30   AST  33  38  70*   ALBUMIN  2.5*  2.4*  2.7*        ECOG performance status 1.      ASSESSMENT:    1- This is a 66-year-old lady with stage IV metastatic colon cancer with normal mismatch repair protein expression.  She has multiple liver metastases and metastasis in the abdominal lymph nodes.   She is on palliative intent combination of FOLFOX with Avastin which is given every 2 weeks intravenously.      - Labs were reviewed with the patient, hypokalemia and microcytic anemia.      2- She also has iron deficiency anemia due to chronic blood loss from her cancer.  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 Unable to tolerate oral iron.     3- VTE- she is on therapeutic dose lovenox.   - dose needs to be switched to 1.5 mg/kg Q 24 hours. Patient has difficulty injecting herself due to tremors. Her grand daughter will help.      PLAN:   1.  FOLFOX with Avastin every 2 weeks.  Chemo today.   2.  Return to clinic 2 weeks with Infusion Clinic appointment.   3.  Change Lovenox 1.5 mg/kh Q 24 hours  4.  Stop oral iron  5- Start potassium chloride 20 meq daily for 10 days    SIDRA NAILS MD    10/25/2017

## 2017-10-25 NOTE — MR AVS SNAPSHOT
After Visit Summary   10/25/2017    Liv Sylvester    MRN: [id number]           Patient Information     Date Of Birth          1950        Visit Information        Provider Department      10/25/2017 9:00 AM SH INFUSION CHAIR 13 [facility]        Today's Diagnoses     Malignant neoplasm of hepatic flexure (H)    -  1    Other acute pulmonary embolism without acute cor pulmonale (H)           Follow-ups after your visit        Your next 10 appointments already scheduled     Oct 31, 2017 10:30 AM CDT   Level 1 with SH INFUSION CHAIR 6   [facility] ([facility])    [facility]  [address]  [place] [address]   [phone]            Nov 08, 2017  9:30 AM CST   Level 6 with SH INFUSION CHAIR 9   [facility] ([facility])    [facility]  [address]  [place] [address]   [phone]            Nov 08, 2017 10:45 AM CST   Return Visit with Radha Piedra MD   [facility] ([facility])    [facility]  [address]  [place] [address]   [phone]            Nov 20, 2017 10:00 AM CST   New Visit with Nela Spaulding MD   [facility] ([facility])    [facility]  [address]  [place] [address]   [phone]            Nov 22, 2017  9:00 AM CST   Level 6 with SH INFUSION CHAIR 17   [facility] ([facility])    [facility]  [address]  [place] [address]   [phone]            Dec 06, 2017  9:00 AM CST   Level 6 with SH INFUSION CHAIR 2   [facility] ([facility])    [facility]  [address]  [place] [address]  "  [phone]            Dec 20, 2017  9:00 AM CST   Level 6 with SH INFUSION CHAIR 12   [facility] ([facility])    [facility]  [address]  [place] [address] [phone]              Who to contact     If you have questions or need follow up information about today's clinic visit or your schedule please contact [facility] directly at [phone].  Normal or non-critical lab and imaging results will be communicated to you by Azumiohart, letter or phone within 4 business days after the clinic has received the results. If you do not hear from us within 7 days, please contact the clinic through [employer] or phone. If you have a critical or abnormal lab result, we will notify you by phone as soon as possible.  Submit refill requests through PhotoBox or call your pharmacy and they will forward the refill request to us. Please allow 3 business days for your refill to be completed.          Additional Information About Your Visit        PhotoBox Information     PhotoBox lets you send messages to your doctor, view your test results, renew your prescriptions, schedule appointments and more. To sign up, go to [email or web address] . Click on \"Log in\" on the left side of the screen, which will take you to the Welcome page. Then click on \"Sign up Now\" on the right side of the page.     You will be asked to enter the access code listed below, as well as some personal information. Please follow the directions to create your username and password.     Your access code is: [email or web address]  Expires: 2017 12:39 PM     Your access code will  in 90 days. If you need help or a new code, please call your [place] clinic or [phone].        Care EveryWhere ID     This is your Care EveryWhere ID. This could be used by other organizations to access your [place] medical records  [id number]        Your Vitals " "Were     Pulse Temperature Respirations Height Pulse Oximetry BMI (Body Mass Index)    85 98.6  F (37  C) (Oral) 20 1.753 m (5' 9.02\") 99% 35.31 kg/m2       Blood Pressure from Last 3 Encounters:   10/25/17 130/77   10/25/17 130/77   10/24/17 144/70    Weight from Last 3 Encounters:   10/25/17 108.5 kg (239 lb 3.2 oz)   10/25/17 108.5 kg (239 lb 3.2 oz)   10/24/17 108.5 kg (239 lb 3.2 oz)              We Performed the Following     CBC with platelets differential     Comprehensive metabolic panel     Protein qualitative urine          Today's Medication Changes          These changes are accurate as of: 10/25/17  2:46 PM.  If you have any questions, ask your nurse or doctor.               Start taking these medicines.        Dose/Directions    Potassium Chloride ER 20 MEQ Tbcr   Used for:  Hypokalemia   Started by:  Radha Piedra MD        Dose:  20 mEq   Take 1 tablet (20 mEq) by mouth daily for 10 days   Quantity:  10 tablet   Refills:  0         These medicines have changed or have updated prescriptions.        Dose/Directions    * LOVENOX 120 MG/0.8ML injection   This may have changed:  Another medication with the same name was changed. Make sure you understand how and when to take each.   Used for:  Other acute pulmonary embolism without acute cor pulmonale (H)   Generic drug:  enoxaparin        Dose:  120 mg   Inject 0.8 mLs (120 mg) Subcutaneous every 24 hours   Quantity:  24 mL   Refills:  0       * enoxaparin 40 MG/0.4ML injection   Commonly known as:  LOVENOX   This may have changed:    - medication strength  - how much to take  - when to take this  - additional instructions   Used for:  Other acute pulmonary embolism without acute cor pulmonale (H)        Dose:  40 mg   Inject 0.4 mLs (40 mg) Subcutaneous every 24 hours Give with 120mg syringe for a total dose of 160mg every 24 hours.   Quantity:  12 mL   Refills:  0       * Notice:  This list has 2 medication(s) that are the same as other medications " prescribed for you. Read the directions carefully, and ask your doctor or other care provider to review them with you.         Where to get your medicines      These medications were sent to [facility] [place], [place] - [address]  [address], [place] [address]     Phone:  [phone]     enoxaparin 40 MG/0.4ML injection    Potassium Chloride ER 20 MEQ [facility]                Primary Care Provider Office Phone # Fax #    Amaya Howard [phone]       [facility] [address] [facility] [address]        Equal Access to Services     [facility]: Hadii aad ku hadasho Soomaali, waaxda luqadaha, qaybta kaalmada adeegyada, john cevallos . So [facility] [phone].    ATENCIÓN: Si habla español, tiene a schmitt disposición servicios gratuitos de asistencia lingüística. [facility] [phone].    We comply with applicable federal civil rights laws and [place] laws. We do not discriminate on the basis of race, color, national origin, age, disability, sex, sexual orientation, or gender identity.            Thank you!     Thank you for choosing [facility]  for your care. Our goal is always to provide you with excellent care. Hearing back from our patients is one way we can continue to improve our services. Please take a few minutes to complete the written survey that you may receive in the mail after your visit with us. Thank you!             Your Updated Medication List - Protect others around you: Learn how to safely use, store and throw away your medicines at www.disposemymeds.org.          This list is accurate as of: 10/25/17  2:46 PM.  Always use your most recent med list.                   Brand Name Dispense Instructions for use Diagnosis    ACETAMINOPHEN PO      Take 1,000 mg by mouth every 4 hours as needed for pain ((Patient has been taking 4-12 tabs per day))        albuterol 108 (90 BASE) MCG/ACT Inhaler     PROAIR HFA/PROVENTIL HFA/VENTOLIN HFA    1 Inhaler    Inhale 2 puffs into the lungs every 6 hours as needed for shortness of breath / dyspnea or wheezing    Reactive airway disease without complication, unspecified asthma severity, unspecified whether persistent       CALCIUM-MAGNESIUM-ZINC PO      Take 1 tablet by mouth daily        CENTRUM SILVER per tablet     100 tablet    Take 1 tablet by mouth daily.        CHLORTHALIDONE PO      Take 25 mg by mouth daily        ferrous sulfate 325 (65 FE) MG tablet    IRON    100 tablet    Take 1 tablet (325 mg) by mouth 2 times daily    Iron deficiency anemia due to chronic blood loss       lidocaine-prilocaine cream    EMLA    30 g    Apply topically as needed for moderate pain Apply dollop size amount to port site 30-60 minutes prior to accessing.    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    HTN (hypertension)       LORazepam 0.5 MG tablet    ATIVAN    30 tablet    Take 1 tablet (0.5 mg) by mouth every 4 hours as needed (Anxiety, Nausea/Vomiting or Sleep)    Malignant neoplasm of hepatic flexure (H)       * LOVENOX 120 MG/0.8ML injection   Generic drug:  enoxaparin     24 mL    Inject 0.8 mLs (120 mg) Subcutaneous every 24 hours    Other acute pulmonary embolism without acute cor pulmonale (H)       * enoxaparin 40 MG/0.4ML injection    LOVENOX    12 mL    Inject 0.4 mLs (40 mg) Subcutaneous every 24 hours Give with 120mg syringe for a total dose of 160mg every 24 hours.    Other acute pulmonary embolism without acute cor pulmonale (H)       ondansetron 8 MG tablet    ZOFRAN    10 tablet    Take 1 tablet (8 mg) by mouth every 8 hours as needed (Nausea/Vomiting)    Malignant neoplasm of hepatic flexure (H)       Potassium Chloride ER 20 MEQ Tbcr     10 tablet    Take 1 tablet (20 mEq) by mouth daily for 10 days    Hypokalemia       prochlorperazine 10  MG tablet    COMPAZINE    30 tablet    Take 1 tablet (10 mg) by mouth every 6 hours as needed (Nausea/Vomiting)    Malignant neoplasm of hepatic flexure (H)       sennosides 8.6 MG tablet    SENOKOT    60 tablet    Take 1 tablet by mouth 2 times daily    Constipation due to opioid therapy       SERTRALINE HCL PO      Take 50 mg by mouth daily (Takes half of a 100mg tablet for a total of 50mg daily)        VITAMIN D (CHOLECALCIFEROL) PO      Take 1 tablet by mouth daily (OTC: Pt unsure of strength)        * Notice:  This list has 2 medication(s) that are the same as other medications prescribed for you. Read the directions carefully, and ask your doctor or other care provider to review them with you.

## 2017-10-26 NOTE — TELEPHONE ENCOUNTER
Spoke with Gracy from  home infusion today who stated the patient was unwilling to learn how to self inject with Lovenox.  Her granddaughter had been previously giving her the injections but she has missed 2 days.  She was seen in clinic yesterday, there was a lovenox dose and frequency change, Alonzo in pharmacy spoke with Gracy the home care nurse about the dose.  Order for Lovenox teaching and medication management faxed to Gracy at [phone].  Fax received per Gracy.  They will be able to get out there today.    I spoke with Liv who stated she is willing to learn how to give this herself, she understands why she needs it and the importance of staying consistent and compliant with the lovenox.    Follow up call at 410pm: Spoke with Liv who self administered her injection with home care nurse watching.  She states she is comfortable doing this herself.

## 2017-10-26 NOTE — TELEPHONE ENCOUNTER
I received a call from Victor M with [facility] wanting to verify that it is ok to resume IV Hydration orders. I returned call and gave the verbal ok. Debbie Dasilva

## 2017-10-27 NOTE — TELEPHONE ENCOUNTER
Pt's granddaughter Dameon called stating that Pt started having abdominal pain about an hour ago after having diarrhea.    Spoke directly with Pt & Pt stated that she had diarrhea this morning then has been having some discomfort externally since.  Writer asked if Pt had hemorrhoids & Pt confirmed that this is what the pain is like & that it is external.    Instructed Pt to try witch hazel generic Tucks pads and/or external soothing hemorrhoid cream or gel but nothing internally--no suppositories, etc.    Encouraged Pt to call back with update on how she is doing especially if this is not helping or she has worsening pain.  Pt verbalized understanding & agreement with this plan of care.

## 2017-10-29 NOTE — ED AVS SNAPSHOT
Emergency Department    [address] [place] [address]    Phone:  [phone]    Fax:  [phone]                                       Liv Sylvester   MRN: [id number]    Department:   Emergency Department   Date of Visit:  10/29/2017           After Visit Summary Signature Page     I have received my discharge instructions, and my questions have been answered. I have discussed any challenges I see with this plan with the nurse or doctor.    ..........................................................................................................................................  Patient/Patient Representative Signature      ..........................................................................................................................................  Patient Representative Print Name and Relationship to Patient    ..................................................               ................................................  Date                                            Time    ..........................................................................................................................................  Reviewed by Signature/Title    ...................................................              ..............................................  Date                                                            Time

## 2017-10-29 NOTE — ED AVS SNAPSHOT
Emergency Department    [address] [facility] [address]    Phone:  [phone]    Fax:  [phone]                                       Liv Sylvester   MRN: [id number]    Department:   Emergency Department   Date of Visit:  10/29/2017           Patient Information     Date Of Birth          1950        Your diagnoses for this visit were:     Thrombosed external hemorrhoids        You were seen by Rene Aragon MD.      Follow-up Information     Follow up with Amaya Howard.    Specialty:  Family Practice    Contact information:    [facility]  [address] [address]  [phone]          Follow up with Katey Costello MD.    Specialty:  Colon and Rectal Surgery    Contact information:    [address]  [facility] [address] [phone]          Discharge Instructions         Treating Hemorrhoids: Self-Care    Follow your healthcare provider s advice about caring for your hemorrhoids at home. Some treatments help relieve symptoms right away. Others involve making changes in your diet and exercise habits. These can help ease constipation and prevent hemorrhoid symptoms from coming back.  Relieving symptoms  Your healthcare provider may prescribe anti-inflammatory medicine to help ease your symptoms. The following tips will also help relieve pain and swelling.    Take sitz baths. Taking a sitz bath means sitting in a few inches of warm bath water. Soaking for 10 minutes twice a day can provide welcome relief from painful hemorrhoids. It can also help the area stay clean.    Develop good bowel habits. Use the bathroom when you need to. Don t ignore the urge to move your bowels. This can lead to constipation, hard stools, and straining. Also, don t read while on the toilet. Sit only as long as needed. Wipe gently with soft, unscented toilet tissue or baby wipes.    Use ice packs. Placing an ice pack on a thrombosed external hemorrhoid can help relieve  pain right away. It will also help reduce the blood clot. Use the ice for 15 to 20 minutes at a time. Keep a cloth between the ice and your skin to prevent skin damage.    Use other measures. Laxatives and enemas can help ease constipation. But use them only on your healthcare provider s advice. For symptom relief, try using cotton pads soaked in witch hazel. These are available at most drugstores. Over-the-counter hemorrhoid ointments and petroleum jelly can also provide relief.  Add fiber to your diet  Adding fiber to your diet can help relieve constipation by making stools softer and easier to pass. To increase your fiber intake, your healthcare provider may recommend a bulking agent, such as psyllium. This is a high-fiber supplement available at most grocery stores and drugstores. Eating more fiber-rich foods will also help. There are two types of fiber:    Insoluble fiber is the main ingredient in bulking agents. It s also found in foods such as wheat bran, whole-grain breads, fresh fruits, and vegetables.    Soluble fiber is found in foods such as oat bran. Although soluble fiber is good for you, it may not ease constipation as much as foods high in insoluble fiber.  Drink more water  Along with a high-fiber diet, drinking more water can help ease constipation. This is because insoluble fiber absorbs water, making stools soft and bulky. Be sure to drink plenty of water throughout the day. Drinking fruit juices, such as prune juice or apple juice, can also help prevent constipation.  Get more exercise  Regular exercise aids digestion and helps prevent constipation. It s also great for your health. So talk with your healthcare provider about starting an exercise program. Low-impact activities, such as swimming or walking, are good places to start. Take it easy at first. And remember to drink plenty of water when you exercise.  High-fiber foods  High-fiber foods offer many benefits. By making your stools softer,  they help heal and prevent swollen hemorrhoids. They may also help reduce the risk of colon and rectal cancer. Best of all, they re usually low in calories and taste great. Here are some examples of fiber-rich foods.    Whole grains, such as wheat bran, corn bran, and brown rice.    Vegetables, especially carrots, broccoli, cabbage, and peas.    Fruits, such as apples, bananas, raisins, peaches, and pears.    Nuts and legumes, especially peanuts, lentils, and kidney beans.  Easy ways to add fiber  The tips below offer some simple ways to add more high-fiber foods to your meals.    Start your day with a high-fiber breakfast. Eat a wheat bran cereal along with a sliced banana. Or, try peanut butter on whole-wheat toast.    Eat carrot sticks for snacks. They re easy to prepare, taste great, and are low in calories.    Use whole-grain breads instead of white bread for sandwiches.    Eat fruits for treats. Try an apple and some raisins instead of a candy bar.   Date Last Reviewed: 7/1/2016 2000-2017 [employer]. [address] [place] [address]. All rights reserved. This information is not intended as a substitute for professional medical care. Always follow your healthcare professional's instructions.          Future Appointments        Provider Department Dept Phone Center    10/31/2017 10:30 AM alonso Chair 1 [facility] [phone] [facility]    11/8/2017 9:30 AM lily Chair 1 [facility] [place] [address] [phone] [facility]    11/8/2017 10:45 AM Radha Piedra MD [facility] [place] [address] [phone] [facility]    11/20/2017 10:00 AM Nela Spaulding MD [facility] [place] [address] [phone] [facility]    11/22/2017 9:00 AM [facility] [phone] [facility]    12/6/2017 9:00 AM Lab 2 [facility] [place] [address] [phone] [facility]    12/20/2017 9:00 AM  [facility] chair 1 [facility] [phone] [facility]      24 Hour Appointment Hotline       To make an appointment at any [facility], call [phone] ([phone]). If you don't have a family doctor or clinic, we will help you find one. [facility] clinics are conveniently located to serve the needs of you and your family.             Review of your medicines      START taking        Dose / Directions Last dose taken    lidocaine 5 % ointment   Commonly known as:  XYLOCAINE   Dose:  0.5 inch   Quantity:  5 g        Apply 0.5 g topically as needed for moderate pain Apply pea size amount to rectal area for pain   Refills:  0        nitroGLYcerin 0.4 % Oint rectal ointment   Commonly known as:  RECTIV   Dose:  1 inch   Quantity:  30 g        Place 1 inch (1.5 mg) rectally every 12 hours for 14 days   Refills:  0          Our records show that you are taking the medicines listed below. If these are incorrect, please call your family doctor or clinic.        Dose / Directions Last dose taken    ACETAMINOPHEN PO   Dose:  1000 mg        Take 1,000 mg by mouth every 4 hours as needed for pain ((Patient has been taking 4-12 tabs per day))   Refills:  0        albuterol 108 (90 BASE) MCG/ACT Inhaler   Commonly known as:  PROAIR HFA/PROVENTIL HFA/VENTOLIN HFA   Dose:  2 puff   Quantity:  1 Inhaler        Inhale 2 puffs into the lungs every 6 hours as needed for shortness of breath / dyspnea or wheezing   Refills:  3        CALCIUM-MAGNESIUM-ZINC PO   Dose:  1 tablet        Take 1 tablet by mouth daily   Refills:  0        CENTRUM SILVER per tablet   Dose:  1 tablet   Quantity:  100 tablet        Take 1 tablet by mouth daily.   Refills:  12        CHLORTHALIDONE PO   Dose:  25 mg        Take 25 mg by mouth daily   Refills:  0        ferrous sulfate 325 (65 FE) MG tablet   Commonly known as:  IRON   Dose:  325 mg   Quantity:  100 tablet        Take 1 tablet (325 mg) by mouth 2 times daily    Refills:  11        lidocaine-prilocaine cream   Commonly known as:  EMLA   Quantity:  30 g        Apply topically as needed for moderate pain Apply dollop size amount to port site 30-60 minutes prior to accessing.   Refills:  1        lisinopril 10 MG tablet   Commonly known as:  PRINIVIL/ZESTRIL   Dose:  10 mg   Quantity:  30 tablet        Take 1 tablet by mouth daily.   Refills:  11        LORazepam 0.5 MG tablet   Commonly known as:  ATIVAN   Dose:  0.5 mg   Quantity:  30 tablet        Take 1 tablet (0.5 mg) by mouth every 4 hours as needed (Anxiety, Nausea/Vomiting or Sleep)   Refills:  2        * LOVENOX 120 MG/0.8ML injection   Dose:  120 mg   Quantity:  24 mL   Generic drug:  enoxaparin        Inject 0.8 mLs (120 mg) Subcutaneous every 24 hours   Refills:  0        * enoxaparin 40 MG/0.4ML injection   Commonly known as:  LOVENOX   Dose:  40 mg   Quantity:  12 mL        Inject 0.4 mLs (40 mg) Subcutaneous every 24 hours Give with 120mg syringe for a total dose of 160mg every 24 hours.   Refills:  0        ondansetron 8 MG tablet   Commonly known as:  ZOFRAN   Dose:  8 mg   Quantity:  10 tablet        Take 1 tablet (8 mg) by mouth every 8 hours as needed (Nausea/Vomiting)   Refills:  2        Potassium Chloride ER 20 MEQ Tbcr   Dose:  20 mEq   Quantity:  10 tablet        Take 1 tablet (20 mEq) by mouth daily for 10 days   Refills:  0        prochlorperazine 10 MG tablet   Commonly known as:  COMPAZINE   Dose:  10 mg   Quantity:  30 tablet        Take 1 tablet (10 mg) by mouth every 6 hours as needed (Nausea/Vomiting)   Refills:  2        sennosides 8.6 MG tablet   Commonly known as:  SENOKOT   Dose:  1 tablet   Quantity:  60 tablet        Take 1 tablet by mouth 2 times daily   Refills:  1        SERTRALINE HCL PO   Dose:  50 mg        Take 50 mg by mouth daily (Takes half of a 100mg tablet for a total of 50mg daily)   Refills:  0        VITAMIN D (CHOLECALCIFEROL) PO   Dose:  1 tablet        Take 1 tablet by  mouth daily (OTC: Pt unsure of strength)   Refills:  0        * Notice:  This list has 2 medication(s) that are the same as other medications prescribed for you. Read the directions carefully, and ask your doctor or other care provider to review them with you.            Prescriptions were sent or printed at these locations (2 Prescriptions)                   Other Prescriptions                Printed at Department/Unit printer (2 of 2)         nitroGLYcerin (RECTIV) 0.4 % OINT rectal ointment               lidocaine (XYLOCAINE) 5 % ointment                Orders Needing Specimen Collection     None      Pending Results     No orders found from 10/27/2017 to 10/30/2017.            Pending Culture Results     No orders found from 10/27/2017 to 10/30/2017.            Pending Results Instructions     If you had any lab results that were not finalized at the time of your Discharge, you can call the ED Lab Result RN at [phone]. You will be contacted by this team for any positive Lab results or changes in treatment. The nurses are available 7 days a week from 10A to 6:30P.  You can leave a message 24 hours per day and they will return your call.        Test Results From Your Hospital Stay               Clinical Quality Measure: Blood Pressure Screening     Your blood pressure was checked while you were in the emergency department today. The last reading we obtained was  BP: 160/83 . Please read the guidelines below about what these numbers mean and what you should do about them.  If your systolic blood pressure (the top number) is less than 120 and your diastolic blood pressure (the bottom number) is less than 80, then your blood pressure is normal. There is nothing more that you need to do about it.  If your systolic blood pressure (the top number) is 120-139 or your diastolic blood pressure (the bottom number) is 80-89, your blood pressure may be higher than it should be. You should have your blood pressure  "rechecked within a year by a primary care provider.  If your systolic blood pressure (the top number) is 140 or greater or your diastolic blood pressure (the bottom number) is 90 or greater, you may have high blood pressure. High blood pressure is treatable, but if left untreated over time it can put you at risk for heart attack, stroke, or kidney failure. You should have your blood pressure rechecked by a primary care provider within the next 4 weeks.  If your provider in the emergency department today gave you specific instructions to follow-up with your doctor or provider even sooner than that, you should follow that instruction and not wait for up to 4 weeks for your follow-up visit.        Thank you for choosing [facility]       Thank you for choosing [facility] for your care. Our goal is always to provide you with excellent care. Hearing back from our patients is one way we can continue to improve our services. Please take a few minutes to complete the written survey that you may receive in the mail after you visit with us. Thank you!        [employer] Information     [employer] lets you send messages to your doctor, view your test results, renew your prescriptions, schedule appointments and more. To sign up, go to [email or web address] . Click on \"Log in\" on the left side of the screen, which will take you to the Welcome page. Then click on \"Sign up Now\" on the right side of the page.     You will be asked to enter the access code listed below, as well as some personal information. Please follow the directions to create your username and password.     Your access code is: [email or web address]  Expires: 2017 12:39 PM     Your access code will  in 90 days. If you need help or a new code, please call your [facility] clinic or [phone].        Care EveryWhere ID     This is your Care EveryWhere ID. This could be used by other organizations to access your [facility] medical records  [id number]        Equal Access to " Services     [facility]: Jeffry Pierce, wavanessada luqadaha, qaybta kajohn goetz. So [facility] [phone].    ATENCIÓN: Si habla español, tiene a schmitt disposición servicios gratuitos de asistencia lingüística. Llame al [phone].    We comply with applicable federal civil rights laws and [place] laws. We do not discriminate on the basis of race, color, national origin, age, disability, sex, sexual orientation, or gender identity.            After Visit Summary       This is your record. Keep this with you and show to your community pharmacist(s) and doctor(s) at your next visit.

## 2017-10-30 NOTE — ED PROVIDER NOTES
History     Chief Complaint:    Hemorrhoids (hemorrhoid pain started a couple days ago per pt)       KATHE Sylvester is a 67 year old female who presents with hemorrhoid pain that started yesterday.  She had diarrhea prior to it starting.  She has a complex history, namely PE treated with lovenox, stage IV colon cancer with mets to liver and abdominal cavity.  She has rectal pain, denies other symptoms.    Allergies:        Medications:      nitroGLYcerin (RECTIV) 0.4 % OINT rectal ointment   lidocaine (XYLOCAINE) 5 % ointment   enoxaparin (LOVENOX) 120 MG/0.8ML injection   enoxaparin (LOVENOX) 40 MG/0.4ML injection   Potassium Chloride ER 20 MEQ TBCR   sennosides (SENOKOT) 8.6 MG tablet   LORazepam (ATIVAN) 0.5 MG tablet   prochlorperazine (COMPAZINE) 10 MG tablet   ondansetron (ZOFRAN) 8 MG tablet   lidocaine-prilocaine (EMLA) cream   ferrous sulfate (IRON) 325 (65 FE) MG tablet   albuterol (PROAIR HFA/PROVENTIL HFA/VENTOLIN HFA) 108 (90 BASE) MCG/ACT Inhaler   CHLORTHALIDONE PO   SERTRALINE HCL PO   CALCIUM-MAGNESIUM-ZINC PO   VITAMIN D, CHOLECALCIFEROL, PO   ACETAMINOPHEN PO   lisinopril (PRINIVIL,ZESTRIL) 10 MG tablet   Multiple Vitamins-Minerals (CENTRUM SILVER) per tablet       Past Medical History:    Past Medical History:   Diagnosis Date     Cancer (H)      Flash pulmonary edema (H) 4/2013     Hemorrhoid      HTN (hypertension)      Obesity        Patient Active Problem List    Diagnosis Date Noted     Portacath in place 10/24/2017     Priority: Medium     Pulmonary embolism (H) 10/20/2017     Priority: Medium     Malignant neoplasm of hepatic flexure (H) 10/04/2017     Priority: Medium     RUQ abdominal pain      Priority: Medium     Liver masses 09/29/2017     Priority: Medium     Iron deficiency anemia 05/16/2013     Priority: Medium     HTN (hypertension)      Priority: Medium     Advanced directives, counseling/discussion 03/22/2013     Priority: Medium     Pt not interested at this time        Obesity 07/07/2011     Priority: Medium     CARDIOVASCULAR SCREENING; LDL GOAL LESS THAN 160 07/07/2011     Priority: Medium        Past Surgical History:    Past Surgical History:   Procedure Laterality Date     COLONOSCOPY N/A 9/30/2017    Procedure: COMBINED COLONOSCOPY, SINGLE OR MULTIPLE BIOPSY/POLYPECTOMY BY BIOPSY;  colonoscopy;  Surgeon: Kevin Celis MD;  Location:  GI     HYSTERECTOMY, DAPHNEY  4/2013    with BSO     INSERT PORT VASCULAR ACCESS N/A 10/9/2017    Procedure: INSERT PORT VASCULAR ACCESS;  PORT PLACEMENT (MAC);  Surgeon: Rigoberto Nolen MD;  Location: [facility]        Family History:    family history includes CEREBROVASCULAR DISEASE in her father; DIABETES in her mother; HEART DISEASE in her mother; Hypertension in her mother.    Social History:   reports that she quit smoking about 12 years ago. She has never used smokeless tobacco. She reports that she does not drink alcohol or use illicit drugs.    PCP: Amaya Howard     Review of Systems     Denies fever, positive for minor bleeding, rectal pain, all other systems negative      Physical Exam     Patient Vitals for the past 24 hrs:   BP Temp Temp src Pulse Resp SpO2   10/29/17 2026 160/83 - - - - 99 %   10/29/17 2019 173/87 98.5  F (36.9  C) Oral 98 16 99 %        Physical Exam     GENERAL: well developed, pleasant  HEAD: atraumatic  EYES: pupils reactive, extraocular muscles intact, conjunctivae normal  ENT:  mucus membranes moist  NECK:  trachea midline, normal range of motion  RESPIRATORY: no tachypnea, breath sounds clear to auscultation   CVS: normal S1/S2, no murmurs, intact distal pulses  ABDOMEN: soft, nontender, nondistention  Rectal:  Multiple small skin tags, couple small hemorrhoids, 1 larger thrombosed tender hemorrhoid at 9 o'clock position   MUSCULOSKELETAL: no deformities  SKIN: warm and dry, no acute rashes or ulceration  NEURO: GCS 15, cranial nerves intact, alert and oriented x3  PSYCH:  Mood/affect  normal        Emergency Department Course         Interventions:    Medications - No data to display     Emergency Department Course:  Past medical records, nursing notes, and vitals reviewed.  I performed an exam of the patient and obtained history, as documented above.    I rechecked the patient. Findings and plan explained to the Patient and. Patient was understanding.    Impression & Plan          CMS Diagnoses:       Medical Decision Making:  Patient presents with painful thrombosis external hemorrhoid.  She is on Lovenox and has metastatic colon cancer.  I spoke with colorectal surgery who suggested conservative management, namely Lidocaine ointment, nitro ointment, and typical hemorrhoid treatment, follow up if no improvement.  I discussed this all with the patient      Diagnosis:    ICD-10-CM    1. Thrombosed external hemorrhoids K64.5         Discharge Medications:  New Prescriptions    LIDOCAINE (XYLOCAINE) 5 % OINTMENT    Apply 0.5 g topically as needed for moderate pain Apply pea size amount to rectal area for pain    NITROGLYCERIN (RECTIV) 0.4 % OINT RECTAL OINTMENT    Place 1 inch (1.5 mg) rectally every 12 hours for 14 days        10/29/2017   Rene Aragon MD Adams, Shaun L, MD  10/29/17 2109       Rene Aragon MD  10/29/17 2110

## 2017-10-30 NOTE — DISCHARGE INSTRUCTIONS
Treating Hemorrhoids: Self-Care    Follow your healthcare provider s advice about caring for your hemorrhoids at home. Some treatments help relieve symptoms right away. Others involve making changes in your diet and exercise habits. These can help ease constipation and prevent hemorrhoid symptoms from coming back.  Relieving symptoms  Your healthcare provider may prescribe anti-inflammatory medicine to help ease your symptoms. The following tips will also help relieve pain and swelling.    Take sitz baths. Taking a sitz bath means sitting in a few inches of warm bath water. Soaking for 10 minutes twice a day can provide welcome relief from painful hemorrhoids. It can also help the area stay clean.    Develop good bowel habits. Use the bathroom when you need to. Don t ignore the urge to move your bowels. This can lead to constipation, hard stools, and straining. Also, don t read while on the toilet. Sit only as long as needed. Wipe gently with soft, unscented toilet tissue or baby wipes.    Use ice packs. Placing an ice pack on a thrombosed external hemorrhoid can help relieve pain right away. It will also help reduce the blood clot. Use the ice for 15 to 20 minutes at a time. Keep a cloth between the ice and your skin to prevent skin damage.    Use other measures. Laxatives and enemas can help ease constipation. But use them only on your healthcare provider s advice. For symptom relief, try using cotton pads soaked in witch hazel. These are available at most drugstores. Over-the-counter hemorrhoid ointments and petroleum jelly can also provide relief.  Add fiber to your diet  Adding fiber to your diet can help relieve constipation by making stools softer and easier to pass. To increase your fiber intake, your healthcare provider may recommend a bulking agent, such as psyllium. This is a high-fiber supplement available at most grocery stores and drugstores. Eating more fiber-rich foods will also help. There are two  types of fiber:    Insoluble fiber is the main ingredient in bulking agents. It s also found in foods such as wheat bran, whole-grain breads, fresh fruits, and vegetables.    Soluble fiber is found in foods such as oat bran. Although soluble fiber is good for you, it may not ease constipation as much as foods high in insoluble fiber.  Drink more water  Along with a high-fiber diet, drinking more water can help ease constipation. This is because insoluble fiber absorbs water, making stools soft and bulky. Be sure to drink plenty of water throughout the day. Drinking fruit juices, such as prune juice or apple juice, can also help prevent constipation.  Get more exercise  Regular exercise aids digestion and helps prevent constipation. It s also great for your health. So talk with your healthcare provider about starting an exercise program. Low-impact activities, such as swimming or walking, are good places to start. Take it easy at first. And remember to drink plenty of water when you exercise.  High-fiber foods  High-fiber foods offer many benefits. By making your stools softer, they help heal and prevent swollen hemorrhoids. They may also help reduce the risk of colon and rectal cancer. Best of all, they re usually low in calories and taste great. Here are some examples of fiber-rich foods.    Whole grains, such as wheat bran, corn bran, and brown rice.    Vegetables, especially carrots, broccoli, cabbage, and peas.    Fruits, such as apples, bananas, raisins, peaches, and pears.    Nuts and legumes, especially peanuts, lentils, and kidney beans.  Easy ways to add fiber  The tips below offer some simple ways to add more high-fiber foods to your meals.    Start your day with a high-fiber breakfast. Eat a wheat bran cereal along with a sliced banana. Or, try peanut butter on whole-wheat toast.    Eat carrot sticks for snacks. They re easy to prepare, taste great, and are low in calories.    Use whole-grain breads  instead of white bread for sandwiches.    Eat fruits for treats. Try an apple and some raisins instead of a candy bar.   Date Last Reviewed: 7/1/2016 2000-2017 [employer]. [address] [place] [address], [place] [address]. All rights reserved. This information is not intended as a substitute for professional medical care. Always follow your healthcare professional's instructions.

## 2017-10-31 NOTE — MR AVS SNAPSHOT
After Visit Summary   10/31/2017    Liv Sylvester    MRN: [id number]           Patient Information     Date Of Birth          1950        Visit Information        Provider Department      10/31/2017 10:30 AM SH INFUSION CHAIR 6 [facility]        Today's Diagnoses     Iron deficiency anemia due to chronic blood loss    -  1    Portacath in place        Malignant neoplasm of hepatic flexure (H)           Follow-ups after your visit        Your next 10 appointments already scheduled     Nov 08, 2017  9:30 AM CST   Level 6 with SH INFUSION CHAIR 9   [facility] ([facility])    [facility]  [address]  [place] [address]   [phone]            Nov 08, 2017 10:45 AM CST   Return Visit with Radha Piedra MD   [facility] ([facility])    [facility]  [address]  [place] [address]   [phone]            Nov 20, 2017 10:00 AM CST   New Visit with Nela Spaulding MD   [facility] ([facility])    [facility]  [address]  [place] [address]   [phone]            Nov 22, 2017  9:00 AM CST   Level 6 with SH INFUSION CHAIR 17   [facility] ([facility])    [facility]  [address]  [place] [address]   [phone]            Dec 06, 2017  9:00 AM CST   Level 6 with SH INFUSION CHAIR 2   [facility] ([facility])    [facility]  [address]  [place] [address]   [phone]            Dec 20, 2017  9:00 AM CST   Level 6 with SH INFUSION CHAIR 12   [facility] ([facility])    [facility]  [address]  [place]  [id number] [phone]              Who to contact     If you have questions or need follow up information about today's clinic visit or your schedule please contact [facility] directly at [phone].  Normal or non-critical lab and imaging results will be communicated to you by MyChart, letter or phone within 4 business days after the clinic has received the results. If you do not hear from us within 7 days, please contact the clinic through [employer] or phone. If you have a critical or abnormal lab result, we will notify you by phone as soon as possible.  Submit refill requests through [employer] or call your pharmacy and they will forward the refill request to us. Please allow 3 business days for your refill to be completed.          Additional Information About Your Visit        [employer] Information     [employer] lets you send messages to your doctor, view your test results, renew your prescriptions, schedule appointments and more. To sign up, go to [email or web address] . Click on \"Log in\" on the left side of the screen, which will take you to the Welcome page. Then click on \"Sign up Now\" on the right side of the page.     You will be asked to enter the access code listed below, as well as some personal information. Please follow the directions to create your username and password.     Your access code is: [email or web address]  Expires: 2017 12:39 PM     Your access code will  in 90 days. If you need help or a new code, please call your [place] clinic or [phone].        Care EveryWhere ID     This is your Care EveryWhere ID. This could be used by other organizations to access your [place] medical records  [id number]        Your Vitals Were     Pulse Temperature Respirations Pulse Oximetry          86 97.6  F (36.4  C) (Oral) 16 99%         Blood Pressure from Last 3 Encounters:   10/31/17 151/71   10/29/17 160/83   10/25/17 130/77    Weight from Last 3 Encounters: "   10/25/17 108.5 kg (239 lb 3.2 oz)   10/25/17 108.5 kg (239 lb 3.2 oz)   10/24/17 108.5 kg (239 lb 3.2 oz)              Today, you had the following     No orders found for display         Today's Medication Changes          These changes are accurate as of: 10/31/17 11:42 AM.  If you have any questions, ask your nurse or doctor.               Stop taking these medicines if you haven't already. Please contact your care team if you have questions.     ferrous sulfate 325 (65 FE) MG tablet   Commonly known as:  IRON                    Primary Care Provider Office Phone # Fax #    Amaya Howard [phone]       [place] [address] [place] [address]        Equal Access to Services     HAYDEE IVERSON : Jeffry Pierce, wawerner valenzuelaqdarlin, qaybta kaalmamayur hawkins, john cevallos . So [facility] [phone].    ATENCIÓN: Si habla español, tiene a schmitt disposición servicios gratuitos de asistencia lingüística. Llame al [phone].    We comply with applicable federal civil rights laws and [place] laws. We do not discriminate on the basis of race, color, national origin, age, disability, sex, sexual orientation, or gender identity.            Thank you!     Thank you for choosing [facility] AND [facility]  for your care. Our goal is always to provide you with excellent care. Hearing back from our patients is one way we can continue to improve our services. Please take a few minutes to complete the written survey that you may receive in the mail after your visit with us. Thank you!             Your Updated Medication List - Protect others around you: Learn how to safely use, store and throw away your medicines at www.disposemymeds.org.          This list is accurate as of: 10/31/17 11:42 AM.  Always use your most recent med list.                   Brand Name Dispense Instructions for use Diagnosis    ACETAMINOPHEN PO      Take 1,000 mg by  mouth every 4 hours as needed for pain ((Patient has been taking 4-12 tabs per day))        albuterol 108 (90 BASE) MCG/ACT Inhaler    PROAIR HFA/PROVENTIL HFA/VENTOLIN HFA    1 Inhaler    Inhale 2 puffs into the lungs every 6 hours as needed for shortness of breath / dyspnea or wheezing    Reactive airway disease without complication, unspecified asthma severity, unspecified whether persistent       CALCIUM-MAGNESIUM-ZINC PO      Take 1 tablet by mouth daily        CENTRUM SILVER per tablet     100 tablet    Take 1 tablet by mouth daily.        CHLORTHALIDONE PO      Take 25 mg by mouth daily        lidocaine 5 % ointment    XYLOCAINE    5 g    Apply 0.5 g topically as needed for moderate pain Apply pea size amount to rectal area for pain        lidocaine-prilocaine cream    EMLA    30 g    Apply topically as needed for moderate pain Apply dollop size amount to port site 30-60 minutes prior to accessing.    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    HTN (hypertension)       LORazepam 0.5 MG tablet    ATIVAN    30 tablet    Take 1 tablet (0.5 mg) by mouth every 4 hours as needed (Anxiety, Nausea/Vomiting or Sleep)    Malignant neoplasm of hepatic flexure (H)       * LOVENOX 120 MG/0.8ML injection   Generic drug:  enoxaparin     24 mL    Inject 0.8 mLs (120 mg) Subcutaneous every 24 hours    Other acute pulmonary embolism without acute cor pulmonale (H)       * enoxaparin 40 MG/0.4ML injection    LOVENOX    12 mL    Inject 0.4 mLs (40 mg) Subcutaneous every 24 hours Give with 120mg syringe for a total dose of 160mg every 24 hours.    Other acute pulmonary embolism without acute cor pulmonale (H)       nitroGLYcerin 0.4 % Oint rectal ointment    RECTIV    30 g    Place 1 inch (1.5 mg) rectally every 12 hours for 14 days        ondansetron 8 MG tablet    ZOFRAN    10 tablet    Take 1 tablet (8 mg)  by mouth every 8 hours as needed (Nausea/Vomiting)    Malignant neoplasm of hepatic flexure (H)       Potassium Chloride ER 20 MEQ Tbcr     10 tablet    Take 1 tablet (20 mEq) by mouth daily for 10 days    Hypokalemia       prochlorperazine 10 MG tablet    COMPAZINE    30 tablet    Take 1 tablet (10 mg) by mouth every 6 hours as needed (Nausea/Vomiting)    Malignant neoplasm of hepatic flexure (H)       sennosides 8.6 MG tablet    SENOKOT    60 tablet    Take 1 tablet by mouth 2 times daily    Constipation due to opioid therapy       SERTRALINE HCL PO      Take 50 mg by mouth daily (Takes half of a 100mg tablet for a total of 50mg daily)        VITAMIN D (CHOLECALCIFEROL) PO      Take 1 tablet by mouth daily (OTC: Pt unsure of strength)        * Notice:  This list has 2 medication(s) that are the same as other medications prescribed for you. Read the directions carefully, and ask your doctor or other care provider to review them with you.

## 2017-10-31 NOTE — PROGRESS NOTES
Infusion Nursing Note:  Liv Sylvester presents today for Injectafer.    Patient seen by provider today: No   present during visit today: Not Applicable.    Note: Only change to report is trip to the ER for hemorrhoids and started on new rectal cream to treat the pain. Reports no pain today and improved..    Intravenous Access:  Implanted Port.    Treatment Conditions:  Results reviewed, labs MET treatment parameters, ok to proceed with treatment.      Post Infusion Assessment:  Patient tolerated infusion without incident. Some nausea but passed quickly.  Patient observed for 30 minutes post Injectafer per protocol.  Blood return noted pre and post infusion.  Site patent and intact, free from redness, edema or discomfort.  No evidence of extravasations.  Port deaccessed and reaccessed with new needle and biopatch on site. Needle has been in for 7 days and patient is administering IV fluids at home via port..    Discharge Plan:   Discharge instructions reviewed with: Patient.  Patient and/or family verbalized understanding of discharge instructions and all questions answered.  Copy of AVS reviewed with patient and/or family.  Patient will return 11/8 for next appointment.  Patient discharged in stable condition accompanied by: self.  Departure Mode: Ambulatory.    Jaleesa Anne RN

## 2017-10-31 NOTE — TELEPHONE ENCOUNTER
Call received from [facility] care today( Addy) that they were informed by Physical therapy who saw Liv today that she did not give herself her lovenox injections on Saturday or Sunday .  She was in the ER( See notes), states she did not feel well enough to give herself the injections but did start them again on Monday and gave herself today's injection as well.  She states that she is only giving herself the 140mg injection and was confused as to why she need to take 2 injections ( total dose is noted as 160mg)   She says she was told by home care not to use a certain dose and placed it aside.    Will clarify dose with Dr. Piedra tomorrow.

## 2017-11-02 NOTE — TELEPHONE ENCOUNTER
Spoke with Dr. Piedra who would like Liv to take the last dose of Lovenox today.  She will start xarelto tomorrow.   This was sent to [facility] pharmacy in [place]- She is aware of this.  She verbalized understanding to Stop lovenox today and start xarelto tomorrow.    Alonzo our pharmacist is also going to touch base with Liv about this.    I did speak with Sabrina-  at [facility] [phone] to update her.  Currently they are visiting her once weekly.  Dr. Piedra ok'd three times weekly for medication management.

## 2017-11-02 NOTE — TELEPHONE ENCOUNTER
I called the patient about the switch from Lovenox to Xarelto. The patient took the last Lovenox dose this morning at 9am. She has been getting Lovenox 160mg every 24 hours, but the therapy is being switched to Xarelto because the patient does not like the Lovenox injections and compliance is questionable. The patient will start Xarelto tomorrow morning and has stopped Lovenox (last dose this morning). A prescription for Xarelto was sent to [facility] pharmacy. I reviewed the instructions with the patient and the pharmacist at [facility] will also review the instructions with her again. The patient verbalized understanding.    Alonzo BlandD.

## 2017-11-08 NOTE — PATIENT INSTRUCTIONS
1.  FOLFOX with Avastin every 2 weeks.  Chemo today.  Scheduled/janice  2.  Return to clinic 4 weeks with Infusion Clinic appointment.  Scheduled/janice  3-  Replace potassium per protocol today. Start potassium chloride 20 meq daily.  4- Monitor the area of bruising closely. If getting worse or redness then call us.  5- CT scan chest abdomen and pelvis before next visit  Scheduled/janice      AVS printed & given to patient with oral contrast/janice

## 2017-11-08 NOTE — LETTER
"    11/8/2017         RE: Liv Sylvester  [address]  [place] [address]        Dear Colleague,    Thank you for referring your patient, Liv Sylvester, to the [facility]. Please see a copy of my visit note below.    Oncology Rooming Note    November 8, 2017 10:06 AM   Liv Sylvester is a 67 year old female who presents for:    Chief Complaint   Patient presents with     Oncology Clinic Visit     Initial Vitals: /69 (BP Location: Left arm)  Pulse 97  Temp 97.5  F (36.4  C) (Oral)  Resp 20  Wt 105.9 kg (233 lb 6.4 oz)  SpO2 98%  BMI 34.45 kg/m2 Estimated body mass index is 34.45 kg/(m^2) as calculated from the following:    Height as of 10/25/17: 1.753 m (5' 9.02\").    Weight as of this encounter: 105.9 kg (233 lb 6.4 oz). Body surface area is 2.27 meters squared.  No Pain (0) Comment: Data Unavailable   No LMP recorded. Patient has had a hysterectomy.  Allergies reviewed: Yes  Medications reviewed: Yes    Medications: Medication refills not needed today.  Pharmacy name entered into [employer]:    [facility] [address] [address] AT [facility] [address]  [facility] [address] [place], [place] - [address] [facility] [place], [place] - [address]    Clinical concerns: None     5 minutes for nursing intake (face to face time)     [facility]  HEMATOLOGY ONCOLOGY    ONCOLOGY FOLLOWUP NOTE      DIAGNOSIS:    1- Metastatic colon cancer in a patient with history of stage 1A grade 3 endometrial carcinoma approximately around 2012.     On 09/29/2017, Liv Sylvester presented with progressive fatigue and right upper quadrant pain.  CT scan 09/29/2017 showed partially contracted bladder and findings suspicious for liver metastases.  There were multiple defined hypoechoic foci scoped scattered throughout the liver, the largest was in " the right lobe.  CT was also showing enlargement of hepatic and retroperitoneal lymph nodes.  On 09/30/2017, she underwent a colonoscopy, which showed a fungating, infiltrative, ulcerated, partially obstructing mass at the hepatic flexure.  Pathology is consistent with moderately differentiated adenocarcinoma with normal mismatch repair protein expression.   2- Significant iron deficiency anemia.   3- VTE- malignancy related     TREATMENT:  10/11/2017 FOLFOX and Avastin.      SUBJECTIVE:  The patient was seen as a followup today. She is tolerating treatment well. No significant side effects. No newuropathy. Her abdominal pain is better.     REVIEW OF SYSTEMS:  A complete review of systems was performed and found to be negative other than pertinent positives mentioned in history of present illness.      Past medical, social histories reviewed.     Meds- Reviewed.     PHYSICAL EXAMINATION:   VITAL SIGNS: /69 (BP Location: Left arm)  Pulse 97  Temp 97.5  F (36.4  C) (Oral)  Resp 20  Wt 105.9 kg (233 lb 6.4 oz)  SpO2 98%  BMI 34.45 kg/m2  CONSTITUTIONAL: Sitting comfortably.   HEENT: Pupils are equal. Oropharynx is clear.   NECK: No cervical or supraclavicular lymphadenopathy.   RESPIRATORY: Clear bilaterally.   CARD/VASC: S1, S2, regular.   GI: Soft, nontender, nondistended, no hepatosplenomegaly.   MUSKULOSKELETAL: Warm, well perfused.   NEUROLOGIC: Alert, awake.   INTEGUMENT: No rash. Bruising over the port site.   LYMPHATICS: Bilateral LE edema.   PSYCH: Mood and affect was normal.     LABORATORY DATA AND IMAGING REVIEWED DURING THIS VISIT:  Recent Labs   Lab Test  11/08/17   0905  10/25/17   0915   NA  138  141   POTASSIUM  3.1*  3.3*   CHLORIDE  102  104   CO2  27  29   ANIONGAP  9  8   BUN  16  9   CR  0.75  0.61   GLC  162*  119*   JESÚS  9.1  9.0     Recent Labs   Lab Test  11/08/17   0905  10/25/17   0915  10/21/17   1351  10/21/17   0520  10/20/17   1320   WBC  4.1  3.8*   --   5.4  5.8   HGB  10.5*   8.5*  7.4*  7.2*  8.0*   PLT  270  239   --   193  227   MCV  80  75*   --   72*  72*   NEUTROPHIL  59.3  58.6   --    --   67.9     Recent Labs   Lab Test  11/08/17   0905  10/25/17   0915  10/20/17   1320   BILITOTAL  0.5  0.5  0.2   ALKPHOS  104  105  139   ALT  36  28  23   AST  23  33  38   ALBUMIN  2.9*  2.5*  2.4*      ECOG performance status 1.      ASSESSMENT:    1- This is a 66-year-old lady with stage IV metastatic colon cancer with normal mismatch repair protein expression.  She has multiple liver metastases and metastasis in the abdominal lymph nodes.   She is on palliative intent combination of FOLFOX with Avastin which is given every 2 weeks intravenously.      - Labs were reviewed with the patient, normocytic anemia is improving. She is still hypokalemic. She will be started on routine replacement.   - Bruising over the port site needs to be monitored closely.      2- She also has iron deficiency anemia due to chronic blood loss from her cancer.  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     3- VTE- she was started on therapeutic dose lovenox. She was not able to self inject. She was switched to xarelto.      PLAN:   1.  FOLFOX with Avastin every 2 weeks.  Chemo today.   2.  Return to clinic 4 weeks with Infusion Clinic appointment.   3-  Replace potassium per protocol today. Start potassium chloride 20 meq daily.  4- Monitor the area of bruising closely. If getting worse or redness then call us.  5- CT scan chest abdomen and pelvis before next visit    RADHA PIEDRA MD    11/8/2017      Again, thank you for allowing me to participate in the care of your patient.        Sincerely,        Radha Piedra MD

## 2017-11-08 NOTE — PROGRESS NOTES
"Oncology Rooming Note    November 8, 2017 10:06 AM   Liv Sylvester is a 67 year old female who presents for:    Chief Complaint   Patient presents with     Oncology Clinic Visit     Initial Vitals: /69 (BP Location: Left arm)  Pulse 97  Temp 97.5  F (36.4  C) (Oral)  Resp 20  Wt 105.9 kg (233 lb 6.4 oz)  SpO2 98%  BMI 34.45 kg/m2 Estimated body mass index is 34.45 kg/(m^2) as calculated from the following:    Height as of 10/25/17: 1.753 m (5' 9.02\").    Weight as of this encounter: 105.9 kg (233 lb 6.4 oz). Body surface area is 2.27 meters squared.  No Pain (0) Comment: Data Unavailable   No LMP recorded. Patient has had a hysterectomy.  Allergies reviewed: Yes  Medications reviewed: Yes    Medications: Medication refills not needed today.  Pharmacy name entered into [employer]:    [facility] [id number] [place] [address] [address] [address] AT [facility] [address]  [facility] [address] [facility] [address] [facility] [address]    Clinical concerns: None     5 minutes for nursing intake (face to face time)     Merle Lopez CMA          DISCHARGE PLAN:  Reported to infusion RN  Scheduling to arrange  K will be replaced today and she will start a daily maintenance dose  Will monitor and update us  Scheduling to arrange    Next appointments: See patient instruction section  Departure Mode: Ambulatory  Accompanied by: self  5 minutes for nursing discharge (face to face time)   Laina Mondragon RN      "

## 2017-11-08 NOTE — MR AVS SNAPSHOT
After Visit Summary   11/8/2017    Liv Sylvester    MRN: [id number]           Patient Information     Date Of Birth          1950        Visit Information        Provider Department      11/8/2017 10:45 AM Radha Piedra MD [facility]        Today's Diagnoses     Hypokalemia    -  1    Malignant neoplasm of hepatic flexure (H)          Care Instructions    1.  FOLFOX with Avastin every 2 weeks.  Chemo today.  Scheduled/janice  2.  Return to clinic 4 weeks with Infusion Clinic appointment.  Scheduled/janice  3-  Replace potassium per protocol today. Start potassium chloride 20 meq daily.  4- Monitor the area of bruising closely. If getting worse or redness then call us.  5- CT scan chest abdomen and pelvis before next visit  Scheduled/janice      AVS printed & given to patient with oral contrast/janice          Follow-ups after your visit        Your next 10 appointments already scheduled     Nov 20, 2017 10:00 AM CST   New Visit with Nela Spaulding MD   [facility] ([facility])    [facility]  [address]  [facility] [address]   [phone]            Nov 22, 2017  9:00 AM CST   Level 6 with  INFUSION CHAIR 17   [facility] ([facility])    [facility]  [address]  [facility] [address]   [phone]            Nov 27, 2017 11:15 AM CST   CT CHEST/ABDOMEN/PELVIS W CONTRAST with SHCT1   [facility] ([facility])    [address] [facility] [address]   [phone]           Please bring any scans or X-rays taken at other hospitals, if similar tests were done. Also bring a list of your medicines, including vitamins, minerals and over-the-counter drugs. It is safest to leave personal items at home.  Be sure to tell your doctor:   If you have any allergies.   If there s any chance you are pregnant.    If you are breastfeeding.   If you have any special needs.  You may have contrast for this exam. To prepare:   Do not eat or drink for 2 hours before your exam. If you need to take medicine, you may take it with small sips of water. (We may ask you to take liquid medicine as well.)   The day before your exam, drink extra fluids at least six 8-ounce glasses (unless your doctor tells you to restrict your fluids).  Patients over 70 or patients with diabetes or kidney problems:   If you haven t had a blood test (creatinine test) within the last 30 days, go to your clinic or Diagnostic Imaging Department for this test.  If you have diabetes:   If your kidney function is normal, continue taking your metformin (Avandamet, Glucophage, Glucovance, Metaglip) on the day of your exam.   If your kidney function is abnormal, wait 48 hours before restarting this medicine.  You will have oral contrast for this exam:   You will drink the contrast at home. Get this from your clinic or Diagnostic Imaging Department. Please follow the directions given.  Please wear loose clothing, such as a sweat suit or jogging clothes. Avoid snaps, zippers and other metal. We may ask you to undress and put on a hospital gown.  If you have any questions, please call the Imaging Department where you will have your exam.            Dec 06, 2017  9:00 AM CST   Level 6 with  INFUSION CHAIR 2   [facility]    [facility]  [address]  [facility] [address]   [phone]            Dec 06, 2017  9:45 AM CST   Return Visit with Radha Piedra MD   [facility] ([facility])    [facility]  [address]  [facility] [address]   [phone]            Dec 20, 2017  9:00 AM CST   Level 6 with  INFUSION CHAIR 12   [facility] ([facility])    [facility]  "[facility]  [address]  [place] [address]   [phone]              Future tests that were ordered for you today     Open Future Orders        Priority Expected Expires Ordered    CT Chest/Abdomen/Pelvis w Contrast Routine  2018            Who to contact     If you have questions or need follow up information about today's clinic visit or your schedule please contact [facility] directly at [phone].  Normal or non-critical lab and imaging results will be communicated to you by [employer], letter or phone within 4 business days after the clinic has received the results. If you do not hear from us within 7 days, please contact the clinic through [employer] or phone. If you have a critical or abnormal lab result, we will notify you by phone as soon as possible.  Submit refill requests through [employer] or call your pharmacy and they will forward the refill request to us. Please allow 3 business days for your refill to be completed.          Additional Information About Your Visit        [employer] Information     [employer] lets you send messages to your doctor, view your test results, renew your prescriptions, schedule appointments and more. To sign up, go to [email or web address] . Click on \"Log in\" on the left side of the screen, which will take you to the Welcome page. Then click on \"Sign up Now\" on the right side of the page.     You will be asked to enter the access code listed below, as well as some personal information. Please follow the directions to create your username and password.     Your access code is: [email or web address]  Expires: 2017 11:39 AM     Your access code will  in 90 days. If you need help or a new code, please call your [facility] clinic or [phone].        Care EveryWhere ID     This is your Care EveryWhere ID. This could be used by other organizations to access your [facility] medical records  [id number]        Your Vitals Were     Pulse " Temperature Respirations Pulse Oximetry BMI (Body Mass Index)       97 97.5  F (36.4  C) (Oral) 20 98% 34.45 kg/m2        Blood Pressure from Last 3 Encounters:   11/08/17 128/69   11/08/17 110/72   10/31/17 151/71    Weight from Last 3 Encounters:   11/08/17 105.9 kg (233 lb 6.4 oz)   10/25/17 108.5 kg (239 lb 3.2 oz)   10/25/17 108.5 kg (239 lb 3.2 oz)                 Today's Medication Changes          These changes are accurate as of: 11/8/17 12:44 PM.  If you have any questions, ask your nurse or doctor.               Start taking these medicines.        Dose/Directions    Potassium Chloride ER 20 MEQ Tbcr   Used for:  Hypokalemia   Started by:  Radha Piedra MD        Dose:  20 mEq   Take 1 tablet (20 mEq) by mouth daily   Quantity:  30 tablet   Refills:  11            Where to get your medicines      These medications [facility] [address]  [address] [facility] [address], [facility] [address]     Phone:  [phone]     Potassium Chloride ER 20 MEQ Tbcr                Primary Care Provider Office Phone # Fax #    Amaya Bethea Anita [phone]       [address] [address]        Equal Access to Services     HAYDEE IVERSON AH: Hadjere delaneyo Somoises, waaxda luqadaha, qaybta kaalmada ross, john maya. So [facility] [phone].    ATENCIÓN: Si habla español, tiene a schmitt disposición servicios gratuitos de asistencia lingüística. Wiley al [phone].    We comply with applicable federal civil rights laws and [place] laws. We do not discriminate on the basis of race, color, national origin, age, disability, sex, sexual orientation, or gender identity.            Thank you!     Thank you for choosing [facility]  for your care. Our goal is always to provide you with excellent care. Hearing back from our patients is one way we can continue to improve our services. Please take a few  minutes to complete the written survey that you may receive in the mail after your visit with us. Thank you!             Your Updated Medication List - Protect others around you: Learn how to safely use, store and throw away your medicines at www.disposemymeds.org.          This list is accurate as of: 11/8/17 12:44 PM.  Always use your most recent med list.                   Brand Name Dispense Instructions for use Diagnosis    ACETAMINOPHEN PO      Take 1,000 mg by mouth every 4 hours as needed for pain ((Patient has been taking 4-12 tabs per day))        albuterol 108 (90 BASE) MCG/ACT Inhaler    PROAIR HFA/PROVENTIL HFA/VENTOLIN HFA    1 Inhaler    Inhale 2 puffs into the lungs every 6 hours as needed for shortness of breath / dyspnea or wheezing    Reactive airway disease without complication, unspecified asthma severity, unspecified whether persistent       CALCIUM-MAGNESIUM-ZINC PO      Take 1 tablet by mouth daily        CENTRUM SILVER per tablet     100 tablet    Take 1 tablet by mouth daily.        CHLORTHALIDONE PO      Take 25 mg by mouth daily        lidocaine 5 % ointment    XYLOCAINE    5 g    Apply 0.5 g topically as needed for moderate pain Apply pea size amount to rectal area for pain        lidocaine-prilocaine cream    EMLA    30 g    Apply topically as needed for moderate pain Apply dollop size amount to port site 30-60 minutes prior to accessing.    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    HTN (hypertension)       LORazepam 0.5 MG tablet    ATIVAN    30 tablet    Take 1 tablet (0.5 mg) by mouth every 4 hours as needed (Anxiety, Nausea/Vomiting or Sleep)    Malignant neoplasm of hepatic flexure (H)       nitroGLYcerin 0.4 % Oint rectal ointment    RECTIV    30 g    Place 1 inch (1.5 mg) rectally every 12 hours for 14 days        ondansetron 8 MG tablet    ZOFRAN    10  tablet    Take 1 tablet (8 mg) by mouth every 8 hours as needed (Nausea/Vomiting)    Malignant neoplasm of hepatic flexure (H)       Potassium Chloride ER 20 MEQ Tbcr     30 tablet    Take 1 tablet (20 mEq) by mouth daily    Hypokalemia       prochlorperazine 10 MG tablet    COMPAZINE    30 tablet    Take 1 tablet (10 mg) by mouth every 6 hours as needed (Nausea/Vomiting)    Malignant neoplasm of hepatic flexure (H)       rivaroxaban ANTICOAGULANT 20 MG Tabs tablet    XARELTO    60 tablet    15 mg twice daily with food for 3 weeks, then 20 mg once daily with food (NOTE: will need 42 15 mg tablets the first time this is filled)    VTE (venous thromboembolism)       sennosides 8.6 MG tablet    SENOKOT    60 tablet    Take 1 tablet by mouth 2 times daily    Constipation due to opioid therapy       SERTRALINE HCL PO      Take 50 mg by mouth daily (Takes half of a 100mg tablet for a total of 50mg daily)        VITAMIN D (CHOLECALCIFEROL) PO      Take 1 tablet by mouth daily (OTC: Pt unsure of strength)

## 2017-11-08 NOTE — MR AVS SNAPSHOT
After Visit Summary   11/8/2017    Liv Sylvester    MRN: [id number]           Patient Information     Date Of Birth          1950        Visit Information        Provider Department      11/8/2017 9:30 AM  INFUSION CHAIR 9 [facility]        Today's Diagnoses     Malignant neoplasm of hepatic flexure (H)    -  1       Follow-ups after your visit        Your next 10 appointments already scheduled     Nov 20, 2017 10:00 AM CST   New Visit with Nela Spaulding MD   [facility] ([facility])    [facility]  [address]  [place] [address]   [phone]            Nov 22, 2017  9:00 AM CST   Level 6 with  INFUSION CHAIR 17   [facility] ([facility])    [facility]  [address]  [place] [address]   [phone]            Nov 27, 2017 11:15 AM CST   CT CHEST/ABDOMEN/PELVIS W CONTRAST with SHCT1   [facility] ([facility])    [address] [facility] [address]   [phone]           Please bring any scans or X-rays taken at other hospitals, if similar tests were done. Also bring a list of your medicines, including vitamins, minerals and over-the-counter drugs. It is safest to leave personal items at home.  Be sure to tell your doctor:   If you have any allergies.   If there s any chance you are pregnant.   If you are breastfeeding.   If you have any special needs.  You may have contrast for this exam. To prepare:   Do not eat or drink for 2 hours before your exam. If you need to take medicine, you may take it with small sips of water. (We may ask you to take liquid medicine as well.)   The day before your exam, drink extra fluids at least six 8-ounce glasses (unless your doctor tells you to restrict your fluids).  Patients over 70 or patients with diabetes or kidney problems:    If you haven t had a blood test (creatinine test) within the last 30 days, go to your clinic or Diagnostic Imaging Department for this test.  If you have diabetes:   If your kidney function is normal, continue taking your metformin (Avandamet, Glucophage, Glucovance, Metaglip) on the day of your exam.   If your kidney function is abnormal, wait 48 hours before restarting this medicine.  You will have oral contrast for this exam:   You will drink the contrast at home. Get this from your clinic or Diagnostic Imaging Department. Please follow the directions given.  Please wear loose clothing, such as a sweat suit or jogging clothes. Avoid snaps, zippers and other metal. We may ask you to undress and put on a hospital gown.  If you have any questions, please call the Imaging Department where you will have your exam.            Dec 06, 2017  9:00 AM CST   Level 6 with  INFUSION CHAIR 2   [facility] [place] ([facility])    [facility]  [address]  [facility] [address] [phone]            Dec 06, 2017  9:45 AM CST   Return Visit with Radha Piedra MD   [facility] ([facility])    [facility]  [address]  [facility] [address] [phone]            Dec 20, 2017  9:00 AM CST   Level 6 with  INFUSION CHAIR 12   [facility] [place] ([facility])    [facility]  [address]  [facility] [address] [phone]              Future tests that were ordered for you today     Open Future Orders        Priority Expected Expires Ordered    CT Chest/Abdomen/Pelvis w Contrast Routine  11/8/2018 11/8/2017            Who to contact     If you have questions or need follow up information about today's clinic visit or your schedule please contact [facility] directly at [phone].  Normal  "or non-critical lab and imaging results will be communicated to you by [employer], letter or phone within 4 business days after the clinic has received the results. If you do not hear from us within 7 days, please contact the clinic through [employer] or phone. If you have a critical or abnormal lab result, we will notify you by phone as soon as possible.  Submit refill requests through [email or web address] or call your pharmacy and they will forward the refill request to us. Please allow 3 business days for your refill to be completed.          Additional Information About Your Visit        [employer] Information     [email or web address] lets you send messages to your doctor, view your test results, renew your prescriptions, schedule appointments and more. To sign up, go to [email or web address] . Click on \"Log in\" on the left side of the screen, which will take you to the Welcome page. Then click on \"Sign up Now\" on the right side of the page.     You will be asked to enter the access code listed below, as well as some personal information. Please follow the directions to create your username and password.     Your access code is: [email or web address]  Expires: 2017 11:39 AM     Your access code will  in 90 days. If you need help or a new code, please call your [place] clinic or [phone].        Care EveryWhere ID     This is your Care EveryWhere ID. This could be used by other organizations to access your [place] medical records  [id number]        Your Vitals Were     Pulse Temperature Respirations Pulse Oximetry          97 97.5  F (36.4  C) (Oral) 20 98%         Blood Pressure from Last 3 Encounters:   17 128/69   17 128/69   10/31/17 151/71    Weight from Last 3 Encounters:   17 105.9 kg (233 lb 6.4 oz)   10/25/17 108.5 kg (239 lb 3.2 oz)   10/25/17 108.5 kg (239 lb 3.2 oz)              We Performed the Following     CBC with platelets differential     Comprehensive metabolic panel     Protein qualitative urine  "         Today's Medication Changes          These changes are accurate as of: 11/8/17  4:37 PM.  If you have any questions, ask your nurse or doctor.               Start taking these medicines.        Dose/Directions    Potassium Chloride ER 20 MEQ Tbcr   Used for:  Hypokalemia   Started by:  Radha Piedra MD        Dose:  20 mEq   Take 1 tablet (20 mEq) by mouth daily   Quantity:  30 tablet   Refills:  11            Where to get your medicines      These medications were sent to [facility] [place] - [address]  [address], [place] [address]     Phone:  [phone]     Potassium Chloride ER 20 MEQ Tbcr                Primary Care Provider Office Phone # Fax #    Amaya Bethea Howard [phone]       [facility] [address] [facility] [address]        Equal Access to Services     [facility] HospitalOMAYRA : Hadii anthony kahn hadasho Soomaali, waaxda luqadaha, qaybta kaalmada adeegyada, john cevallos . So [facility] [phone].    ATENCIÓN: Si habla español, tiene a schmitt disposición servicios gratuitos de asistencia lingüística. Llame al [phone].    We comply with applicable federal civil rights laws and [place] laws. We do not discriminate on the basis of race, color, national origin, age, disability, sex, sexual orientation, or gender identity.            Thank you!     Thank you for choosing [facility]  for your care. Our goal is always to provide you with excellent care. Hearing back from our patients is one way we can continue to improve our services. Please take a few minutes to complete the written survey that you may receive in the mail after your visit with us. Thank you!             Your Updated Medication List - Protect others around you: Learn how to safely use, store and throw away your medicines at www.disposemymeds.org.          This list is accurate as of: 11/8/17  4:37 PM.  Always use your most recent  med list.                   Brand Name Dispense Instructions for use Diagnosis    ACETAMINOPHEN PO      Take 1,000 mg by mouth every 4 hours as needed for pain ((Patient has been taking 4-12 tabs per day))        albuterol 108 (90 BASE) MCG/ACT Inhaler    PROAIR HFA/PROVENTIL HFA/VENTOLIN HFA    1 Inhaler    Inhale 2 puffs into the lungs every 6 hours as needed for shortness of breath / dyspnea or wheezing    Reactive airway disease without complication, unspecified asthma severity, unspecified whether persistent       CALCIUM-MAGNESIUM-ZINC PO      Take 1 tablet by mouth daily        CENTRUM SILVER per tablet     100 tablet    Take 1 tablet by mouth daily.        CHLORTHALIDONE PO      Take 25 mg by mouth daily        lidocaine 5 % ointment    XYLOCAINE    5 g    Apply 0.5 g topically as needed for moderate pain Apply pea size amount to rectal area for pain        lidocaine-prilocaine cream    EMLA    30 g    Apply topically as needed for moderate pain Apply dollop size amount to port site 30-60 minutes prior to accessing.    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    HTN (hypertension)       LORazepam 0.5 MG tablet    ATIVAN    30 tablet    Take 1 tablet (0.5 mg) by mouth every 4 hours as needed (Anxiety, Nausea/Vomiting or Sleep)    Malignant neoplasm of hepatic flexure (H)       nitroGLYcerin 0.4 % Oint rectal ointment    RECTIV    30 g    Place 1 inch (1.5 mg) rectally every 12 hours for 14 days        ondansetron 8 MG tablet    ZOFRAN    10 tablet    Take 1 tablet (8 mg) by mouth every 8 hours as needed (Nausea/Vomiting)    Malignant neoplasm of hepatic flexure (H)       Potassium Chloride ER 20 MEQ Tbcr     30 tablet    Take 1 tablet (20 mEq) by mouth daily    Hypokalemia       prochlorperazine 10 MG tablet    COMPAZINE    30 tablet    Take 1 tablet (10 mg) by mouth every 6 hours as needed  (Nausea/Vomiting)    Malignant neoplasm of hepatic flexure (H)       rivaroxaban ANTICOAGULANT 20 MG Tabs tablet    XARELTO    60 tablet    15 mg twice daily with food for 3 weeks, then 20 mg once daily with food (NOTE: will need 42 15 mg tablets the first time this is filled)    VTE (venous thromboembolism)       sennosides 8.6 MG tablet    SENOKOT    60 tablet    Take 1 tablet by mouth 2 times daily    Constipation due to opioid therapy       SERTRALINE HCL PO      Take 50 mg by mouth daily (Takes half of a 100mg tablet for a total of 50mg daily)        VITAMIN D (CHOLECALCIFEROL) PO      Take 1 tablet by mouth daily (OTC: Pt unsure of strength)

## 2017-11-08 NOTE — PROGRESS NOTES
Infusion Nursing Note:  Liv Sylvester presents today for C3 FOLFOX + Avastin.    Patient seen by provider today: Yes: Dr. Piedra   present during visit today: Not Applicable.    Note: Pt tolerating treatment well overall. Cold sensitivities lasted 1 week. Fatigue manageable. Minimal nausea. Port already accessed upon arrival. Pt has home care RN visits and home hydration. Dressing was quite loose. Educated patient on importance of keeping port dressing clean, dry, and covered/secured with another clear dressing ( ie saran wrap and tape) while showering. Informed to avoid direct contact at the port site with showerhead stream. Verbalized understanding. Potassium level 3.1 today. 40 mEq of oral replacement given. Pt has prescription for 20 mEq K+ Cl daily.    Intravenous Access:  Implanted Port. Labs drawn. Port re-accessed. No blood return after re-access. Alteplase 2 mg instilled. Brisk blood return present after 40 minutes.     Treatment Conditions:  Lab Results   Component Value Date    HGB 10.5 11/08/2017     Lab Results   Component Value Date    WBC 4.1 11/08/2017      Lab Results   Component Value Date    ANEU 2.4 11/08/2017     Lab Results   Component Value Date     11/08/2017      Lab Results   Component Value Date     11/08/2017                   Lab Results   Component Value Date    POTASSIUM 3.1 11/08/2017           No results found for: MAG         Lab Results   Component Value Date    CR 0.75 11/08/2017                   Lab Results   Component Value Date    JESÚS 9.1 11/08/2017                Lab Results   Component Value Date    BILITOTAL 0.5 11/08/2017           Lab Results   Component Value Date    ALBUMIN 2.9 11/08/2017                    Lab Results   Component Value Date    ALT 36 11/08/2017           Lab Results   Component Value Date    AST 23 11/08/2017     Results reviewed, labs MET treatment parameters, ok to proceed with treatment.  Able to obtain urine specimen. Urine  "negative for protein.      Post Infusion Assessment:  Patient tolerated Avastin infusion without incident. Oxaliplatin and leuocovorin infused over 2 hours. Adrucil IVP given prior to Adrucil home pump hook-up.   Blood return noted pre and post infusion.  Site patent and intact, free from redness, edema or discomfort.  No evidence of extravasations.      Prior to discharge: Port is secured in place with tegaderm and flushed with 10cc NS with positive blood return noted. Continuous Adrucil home infusion Dosi-Fuser pump connected.    All connectors secured in place and clamps taped open.    Pump started, \"running\" noted on display (CADD): Not Applicable.  Patient instructed to call our clinic or [facility] with any questions or concerns at home.  Patient verbalized understanding.    Patient set up for pump disconnect at home with [facility] on 11/10/17.        Discharge Plan:   Copy of AVS reviewed with patient and/or family.  Patient will return 11/22/2017 for next appointment.  Patient discharged in stable condition accompanied by: self. Daughter picking her up downstairs.  Departure Mode: Ambulatory with walker.    Jyothi Torres RN                    "

## 2017-11-08 NOTE — PROGRESS NOTES
[facility] PHYSICIANS  HEMATOLOGY ONCOLOGY    ONCOLOGY FOLLOWUP NOTE      DIAGNOSIS:    1- Metastatic colon cancer in a patient with history of stage 1A grade 3 endometrial carcinoma approximately around 2012.     On 09/29/2017, Liv Sylvester presented with progressive fatigue and right upper quadrant pain.  CT scan 09/29/2017 showed partially contracted bladder and findings suspicious for liver metastases.  There were multiple defined hypoechoic foci scoped scattered throughout the liver, the largest was in the right lobe.  CT was also showing enlargement of hepatic and retroperitoneal lymph nodes.  On 09/30/2017, she underwent a colonoscopy, which showed a fungating, infiltrative, ulcerated, partially obstructing mass at the hepatic flexure.  Pathology is consistent with moderately differentiated adenocarcinoma with normal mismatch repair protein expression.   2- Significant iron deficiency anemia.   3- VTE- malignancy related     TREATMENT:  10/11/2017 FOLFOX and Avastin.      SUBJECTIVE:  The patient was seen as a followup today. She is tolerating treatment well. No significant side effects. No newuropathy. Her abdominal pain is better.     REVIEW OF SYSTEMS:  A complete review of systems was performed and found to be negative other than pertinent positives mentioned in history of present illness.      Past medical, social histories reviewed.     Meds- Reviewed.     PHYSICAL EXAMINATION:   VITAL SIGNS: /69 (BP Location: Left arm)  Pulse 97  Temp 97.5  F (36.4  C) (Oral)  Resp 20  Wt 105.9 kg (233 lb 6.4 oz)  SpO2 98%  BMI 34.45 kg/m2  CONSTITUTIONAL: Sitting comfortably.   HEENT: Pupils are equal. Oropharynx is clear.   NECK: No cervical or supraclavicular lymphadenopathy.   RESPIRATORY: Clear bilaterally.   CARD/VASC: S1, S2, regular.   GI: Soft, nontender, nondistended, no hepatosplenomegaly.   MUSKULOSKELETAL: Warm, well perfused.   NEUROLOGIC: Alert, awake.   INTEGUMENT: No rash.  Bruising over the port site.   LYMPHATICS: Bilateral LE edema.   PSYCH: Mood and affect was normal.     LABORATORY DATA AND IMAGING REVIEWED DURING THIS VISIT:  Recent Labs   Lab Test  11/08/17   0905  10/25/17   0915   NA  138  141   POTASSIUM  3.1*  3.3*   CHLORIDE  102  104   CO2  27  29   ANIONGAP  9  8   BUN  16  9   CR  0.75  0.61   GLC  162*  119*   JESÚS  9.1  9.0     Recent Labs   Lab Test  11/08/17   0905  10/25/17   0915  10/21/17   1351  10/21/17   0520  10/20/17   1320   WBC  4.1  3.8*   --   5.4  5.8   HGB  10.5*  8.5*  7.4*  7.2*  8.0*   PLT  270  239   --   193  227   MCV  80  75*   --   72*  72*   NEUTROPHIL  59.3  58.6   --    --   67.9     Recent Labs   Lab Test  11/08/17   0905  10/25/17   0915  10/20/17   1320   BILITOTAL  0.5  0.5  0.2   ALKPHOS  104  105  139   ALT  36  28  23   AST  23  33  38   ALBUMIN  2.9*  2.5*  2.4*      ECOG performance status 1.      ASSESSMENT:    1- This is a 66-year-old lady with stage IV metastatic colon cancer with normal mismatch repair protein expression.  She has multiple liver metastases and metastasis in the abdominal lymph nodes.   She is on palliative intent combination of FOLFOX with Avastin which is given every 2 weeks intravenously.      - Labs were reviewed with the patient, normocytic anemia is improving. She is still hypokalemic. She will be started on routine replacement.   - Bruising over the port site needs to be monitored closely.      2- She also has iron deficiency anemia due to chronic blood loss from her cancer.  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     3- VTE- she was started on therapeutic dose lovenox. She was not able to self inject. She was switched to xarelto.      PLAN:   1.  FOLFOX with Avastin every 2 weeks.  Chemo today.   2.  Return to clinic 4 weeks with Infusion Clinic appointment.   3-  Replace potassium per protocol today. Start potassium chloride 20 meq daily.  4- Monitor the area of bruising closely. If getting worse or redness then call  us.  5- CT scan chest abdomen and pelvis before next visit    SIDRA NAILS MD    11/8/2017

## 2017-11-09 NOTE — PROGRESS NOTES
This is a recent snapshot of the patient's [facility] medical record.  For current drug dose and complete information and questions, call [phone] or In Basket pool, [facility] home infusion ([id number])  CSN Number:  [id number]

## 2017-11-10 NOTE — TELEPHONE ENCOUNTER
RN spoke with Dr. Piedra about her arm/ leg cramping - potassium was low and she was placed on a daily maintenance dose of potassium that she is taking.  Liv states the cramping is improving and she is feeling better.  Advised her to call with any questions/ concerns.

## 2017-11-10 NOTE — TELEPHONE ENCOUNTER
"Follow up call made to Liv after her chemo on 11/8/17- She will have a pump disconnect today at 1pm.  She states she is doing \"pretty good.\"  No nausea how ever has continuous  arm and leg cramping that started at 2am.  Will review this w/ Dr. Piedra and connect w/ Liv again.    RN also spoke with Sabrina home care RN that visits with Liv for medication management, she states Liv is doing well with the Xarelto, verbalized understanding of when to change the dose.  Sabrina states the pull counts match up in her bottle.    She requested to go back to flexibility visits with 1 visit per week with 2 prn visit/ Verbal given for this.    "

## 2017-11-13 NOTE — PROGRESS NOTES
This is a recent snapshot of the patient's [facility] medical record.  For current drug dose and complete information and questions, call [phone] or In Basket pool, [facility]v home infusion ([id number])  CSN Number:  [id number]

## 2017-11-16 NOTE — PROGRESS NOTES
This is a recent snapshot of the patient's [facility] medical record.  For current drug dose and complete information and questions, call [phone] or In [facility] pool, fv home infusion ([id number])  CSN Number:  [id number]

## 2017-11-22 NOTE — MR AVS SNAPSHOT
After Visit Summary   11/22/2017    Liv Sylvester    MRN: [id number]           Patient Information     Date Of Birth          1950        Visit Information        Provider Department      11/22/2017 9:00 AM  INFUSION CHAIR 17 [facility]        Today's Diagnoses     Malignant neoplasm of hepatic flexure (H)    -  1       Follow-ups after your visit        Your next 10 appointments already scheduled     Nov 27, 2017 11:15 AM CST   CT CHEST/ABDOMEN/PELVIS W CONTRAST with SHCT1   [facility] CT ([facility])    [address] [facility] [address]   [phone]           Please bring any scans or X-rays taken at other hospitals, if similar tests were done. Also bring a list of your medicines, including vitamins, minerals and over-the-counter drugs. It is safest to leave personal items at home.  Be sure to tell your doctor:   If you have any allergies.   If there s any chance you are pregnant.   If you are breastfeeding.   If you have any special needs.  You may have contrast for this exam. To prepare:   Do not eat or drink for 2 hours before your exam. If you need to take medicine, you may take it with small sips of water. (We may ask you to take liquid medicine as well.)   The day before your exam, drink extra fluids at least six 8-ounce glasses (unless your doctor tells you to restrict your fluids).  Patients over 70 or patients with diabetes or kidney problems:   If you haven t had a blood test (creatinine test) within the last 30 days, go to your clinic or Diagnostic Imaging Department for this test.  If you have diabetes:   If your kidney function is normal, continue taking your metformin (Avandamet, Glucophage, Glucovance, Metaglip) on the day of your exam.   If your kidney function is abnormal, wait 48 hours before restarting this medicine.  You will have oral contrast for this exam:   You will drink the contrast at home. Get  this from your clinic or Diagnostic Imaging Department. Please follow the directions given.  Please wear loose clothing, such as a sweat suit or jogging clothes. Avoid snaps, zippers and other metal. We may ask you to undress and put on a hospital gown.  If you have any questions, please call the Imaging Department where you will have your exam.            Nov 29, 2017  9:00 AM CST   Level 6 with SH INFUSION CHAIR 5   [facility] ([facility])    [facility]  [address]  [place] [address]   [phone]            Dec 06, 2017  9:00 AM CST   Level 6 with SH INFUSION CHAIR 2   [facility] ([facility])    [facility]  [address]  [place] [address]   [phone]            Dec 06, 2017  9:45 AM CST   Return Visit with Radha Piedra MD   [facility] ([facility])    [facility]  [address]  [place] [address]   [phone]            Dec 20, 2017  9:00 AM CST   Level 6 with SH INFUSION CHAIR 12   [facility] ([facility])    [facility]  [address]  [place] [address]   [phone]              Who to contact     If you have questions or need follow up information about today's clinic visit or your schedule please contact [facility] [place] directly at [phone].  Normal or non-critical lab and imaging results will be communicated to you by MyChart, letter or phone within 4 business days after the clinic has received the results. If you do not hear from us within 7 days, please contact the clinic through [employer] or phone. If you have a critical or abnormal lab result, we will notify you by phone as soon as possible.  Submit refill requests through [employer] or call your pharmacy and  "they will forward the refill request to us. Please allow 3 business days for your refill to be completed.          Additional Information About Your Visit        MyChart Information     [employer] lets you send messages to your doctor, view your test results, renew your prescriptions, schedule appointments and more. To sign up, go to [email or web address] . Click on \"Log in\" on the left side of the screen, which will take you to the Welcome page. Then click on \"Sign up Now\" on the right side of the page.     You will be asked to enter the access code listed below, as well as some personal information. Please follow the directions to create your username and password.     Your access code is: [email or web address]  Expires: 2017 11:39 AM     Your access code will  in 90 days. If you need help or a new code, please call your [place] clinic or [phone].        Care EveryWhere ID     This is your [facility] EveryWhere ID. This could be used by other organizations to access your [place] medical records  [id number]        Your Vitals Were     Pulse Temperature Height BMI (Body Mass Index)          85 98.1  F (36.7  C) (Oral) 1.753 m (5' 9.02\") 34.57 kg/m2         Blood Pressure from Last 3 Encounters:   17 119/64   17 128/69   17 128/69    Weight from Last 3 Encounters:   17 106.2 kg (234 lb 3.2 oz)   17 105.9 kg (233 lb 6.4 oz)   10/25/17 108.5 kg (239 lb 3.2 oz)              We Performed the Following     CBC with platelets differential     Comprehensive metabolic panel     Protein qualitative urine        Primary Care Provider Office Phone # Fax #    Amaya Bethea Anita [phone]       [facility] [address] [place] [address]        Equal Access to Services     [facility] : Jeffry Pierce, wiley michael, john greenberg . [facility] [phone].    ATENCIÓN: Si lexa briceno " disposición servicios gratuitos de asistencia lingüística. Wiley hay [phone].    We comply with applicable federal civil rights laws and [place] laws. We do not discriminate on the basis of race, color, national origin, age, disability, sex, sexual orientation, or gender identity.            Thank you!     Thank you for choosing [facility]  for your care. Our goal is always to provide you with excellent care. Hearing back from our patients is one way we can continue to improve our services. Please take a few minutes to complete the written survey that you may receive in the mail after your visit with us. Thank you!             Your Updated Medication List - Protect others around you: Learn how to safely use, store and throw away your medicines at www.disposemymeds.org.          This list is accurate as of: 11/22/17 10:49 AM.  Always use your most recent med list.                   Brand Name Dispense Instructions for use Diagnosis    ACETAMINOPHEN PO      Take 1,000 mg by mouth every 4 hours as needed for pain ((Patient has been taking 4-12 tabs per day))        albuterol 108 (90 BASE) MCG/ACT Inhaler    PROAIR HFA/PROVENTIL HFA/VENTOLIN HFA    1 Inhaler    Inhale 2 puffs into the lungs every 6 hours as needed for shortness of breath / dyspnea or wheezing    Reactive airway disease without complication, unspecified asthma severity, unspecified whether persistent       CALCIUM-MAGNESIUM-ZINC PO      Take 1 tablet by mouth daily        CENTRUM SILVER per tablet     100 tablet    Take 1 tablet by mouth daily.        CHLORTHALIDONE PO      Take 25 mg by mouth daily        lidocaine 5 % ointment    XYLOCAINE    5 g    Apply 0.5 g topically as needed for moderate pain Apply pea size amount to rectal area for pain        lidocaine-prilocaine cream    EMLA    30 g    Apply topically as needed for moderate pain Apply dollop size amount to port site 30-60 minutes prior to accessing.     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    HTN (hypertension)       LORazepam 0.5 MG tablet    ATIVAN    30 tablet    Take 1 tablet (0.5 mg) by mouth every 4 hours as needed (Anxiety, Nausea/Vomiting or Sleep)    Malignant neoplasm of hepatic flexure (H)       ondansetron 8 MG tablet    ZOFRAN    10 tablet    Take 1 tablet (8 mg) by mouth every 8 hours as needed (Nausea/Vomiting)    Malignant neoplasm of hepatic flexure (H)       Potassium Chloride ER 20 MEQ Tbcr     30 tablet    Take 1 tablet (20 mEq) by mouth daily    Hypokalemia       prochlorperazine 10 MG tablet    COMPAZINE    30 tablet    Take 1 tablet (10 mg) by mouth every 6 hours as needed (Nausea/Vomiting)    Malignant neoplasm of hepatic flexure (H)       rivaroxaban ANTICOAGULANT 20 MG Tabs tablet    XARELTO    60 tablet    15 mg twice daily with food for 3 weeks, then 20 mg once daily with food (NOTE: will need 42 15 mg tablets the first time this is filled)    VTE (venous thromboembolism)       sennosides 8.6 MG tablet    SENOKOT    60 tablet    Take 1 tablet by mouth 2 times daily    Constipation due to opioid therapy       SERTRALINE HCL PO      Take 50 mg by mouth daily (Takes half of a 100mg tablet for a total of 50mg daily)        VITAMIN D (CHOLECALCIFEROL) PO      Take 1 tablet by mouth daily (OTC: Pt unsure of strength)

## 2017-11-23 NOTE — PROGRESS NOTES
I have called and asked the patient to take potassium chloride 20 meq three times daily for 2 days and then continue 20 meq twice daily (for hypokalemia). She expressed good understanding. Radha Piedra MD

## 2017-11-29 NOTE — MR AVS SNAPSHOT
After Visit Summary   11/29/2017    Liv Sylvester    MRN: [id number]           Patient Information     Date Of Birth          1950        Visit Information        Provider Department      11/29/2017 9:08 AM Stephanie Awad LICSW [facility]        Today's Diagnoses     Counseling NOS(V65.40)    -  1       Follow-ups after your visit        Your next 10 appointments already scheduled     Dec 01, 2017  2:00 PM CST   Level 3 with SH INFUSION CHAIR 3   [facility] ([facility])    [facility]  [address]  [place] [address]   [phone]            Dec 13, 2017  9:30 AM CST   Level 6 with SH INFUSION CHAIR 8   [facility] ([facility])    [facility]  [address]  [place] [address]   [phone]            Dec 27, 2017  9:00 AM CST   Level 6 with SH INFUSION CHAIR 8   [facility] ([facility])    [facility]  [address]  [place] [address]   [phone]            Dec 27, 2017  9:30 AM CST   Return Visit with Radha Piedra MD   [facility] ([facility])    [facility]  [address]  [place] [address]   [phone]              Future tests that were ordered for you today     Open Future Orders        Priority Expected Expires Ordered    US Venous extremity lower bilateral STAT  11/29/2018 11/29/2017            Who to contact     If you have questions or need follow up information about today's clinic visit or your schedule please contact [facility] directly at [phone].  Normal or non-critical lab and imaging results will be communicated to you by MyChart, letter or phone within 4 business days after the clinic has received the results. If you do not hear from  "us within 7 days, please contact the clinic through [employer] or phone. If you have a critical or abnormal lab result, we will notify you by phone as soon as possible.  Submit refill requests through [employer] or call your pharmacy and they will forward the refill request to us. Please allow 3 business days for your refill to be completed.          Additional Information About Your Visit        [employer] Information     [employer] lets you send messages to your doctor, view your test results, renew your prescriptions, schedule appointments and more. To sign up, go to [email or web address] . Click on \"Log in\" on the left side of the screen, which will take you to the Welcome page. Then click on \"Sign up Now\" on the right side of the page.     You will be asked to enter the access code listed below, as well as some personal information. Please follow the directions to create your username and password.     Your access code is: [email or web address]  Expires: 2017 11:39 AM     Your access code will  in 90 days. If you need help or a new code, please call your [place] clinic or [phone].        Care EveryWhere ID     This is your Care EveryWhere ID. This could be used by other organizations to access your [place] medical records  [id number]         Blood Pressure from Last 3 Encounters:   17 154/85   17 154/85   17 119/64    Weight from Last 3 Encounters:   17 110.5 kg (243 lb 9.6 oz)   17 110.5 kg (243 lb 9.6 oz)   17 106.2 kg (234 lb 3.2 oz)              Today, you had the following     No orders found for display         Today's Medication Changes          These changes are accurate as of: 17 11:59 PM.  If you have any questions, ask your nurse or doctor.               Start taking these medicines.        Dose/Directions    * enoxaparin 100 MG/ML injection   Commonly known as:  LOVENOX   Used for:  Other acute pulmonary embolism without acute cor pulmonale (H)   Started " by:  Radha Piedra MD        Dose:  1.5 mg/kg   Inject 1.66 mLs (166 mg) Subcutaneous every 24 hours   Quantity:  49.8 mL   Refills:  11       * enoxaparin 80 MG/0.8ML injection   Commonly known as:  LOVENOX   Used for:  Other acute pulmonary embolism without acute cor pulmonale (H)   Started by:  Radha Piedra MD        Dose:  160 mg   Inject 1.6 mLs (160 mg) Subcutaneous every 24 hours   Quantity:  60 Syringe   Refills:  11       * Notice:  This list has 2 medication(s) that are the same as other medications prescribed for you. Read the directions carefully, and ask your doctor or other care provider to review them with you.      Stop taking these medicines if you haven't already. Please contact your care team if you have questions.     rivaroxaban ANTICOAGULANT 20 MG Tabs tablet   Commonly known as:  XARELTO   Stopped by:  Radha Piedra MD                Where to get your medicines      These medications were sent to [facility] [address]  [address] [address]     Phone:  [phone]     enoxaparin 80 MG/0.8ML injection         These medications were sent to [facility] [address] [place] [address] [address] [facility]  [address] [facility] [address]     Phone:  [phone]     enoxaparin 100 MG/ML injection                Primary Care Provider Office Phone # Fax #    Amaya Bethea Anita [phone]       [place] [address] [place] [address]        Equal Access to Services     [facility]: Hadii anthony ku hadasho Soomaali, waaxda luqadaha, qaybta kaalmada adeegyamayur, john cevallos . So [facility] [phone].    ATENCIÓN: Si habla español, tiene a schmitt disposición servicios gratuitos de asistencia lingüística. Llame al [phone].    We comply with applicable federal civil rights laws and [place] laws. We do not discriminate on the basis of race, color, national origin, age, disability, sex,  sexual orientation, or gender identity.            Thank you!     Thank you for choosing [facility]  for your care. Our goal is always to provide you with excellent care. Hearing back from our patients is one way we can continue to improve our services. Please take a few minutes to complete the written survey that you may receive in the mail after your visit with us. Thank you!             Your Updated Medication List - Protect others around you: Learn how to safely use, store and throw away your medicines at www.disposemymeds.org.          This list is accurate as of: 11/29/17 11:59 PM.  Always use your most recent med list.                   Brand Name Dispense Instructions for use Diagnosis    ACETAMINOPHEN PO      Take 1,000 mg by mouth every 4 hours as needed for pain ((Patient has been taking 4-12 tabs per day))        albuterol 108 (90 BASE) MCG/ACT Inhaler    PROAIR HFA/PROVENTIL HFA/VENTOLIN HFA    1 Inhaler    Inhale 2 puffs into the lungs every 6 hours as needed for shortness of breath / dyspnea or wheezing    Reactive airway disease without complication, unspecified asthma severity, unspecified whether persistent       CALCIUM-MAGNESIUM-ZINC PO      Take 1 tablet by mouth daily        CENTRUM SILVER per tablet     100 tablet    Take 1 tablet by mouth daily.        CHLORTHALIDONE PO      Take 25 mg by mouth daily        * enoxaparin 100 MG/ML injection    LOVENOX    49.8 mL    Inject 1.66 mLs (166 mg) Subcutaneous every 24 hours    Other acute pulmonary embolism without acute cor pulmonale (H)       * enoxaparin 80 MG/0.8ML injection    LOVENOX    60 Syringe    Inject 1.6 mLs (160 mg) Subcutaneous every 24 hours    Other acute pulmonary embolism without acute cor pulmonale (H)       lidocaine 5 % ointment    XYLOCAINE    5 g    Apply 0.5 g topically as needed for moderate pain Apply pea size amount to rectal area for pain        lidocaine-prilocaine cream    EMLA    30 g    Apply topically as  needed for moderate pain Apply dollop size amount to port site 30-60 minutes prior to accessing.    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    HTN (hypertension)       LORazepam 0.5 MG tablet    ATIVAN    30 tablet    Take 1 tablet (0.5 mg) by mouth every 4 hours as needed (Anxiety, Nausea/Vomiting or Sleep)    Malignant neoplasm of hepatic flexure (H)       NYQUIL PO           ondansetron 8 MG tablet    ZOFRAN    10 tablet    Take 1 tablet (8 mg) by mouth every 8 hours as needed (Nausea/Vomiting)    Malignant neoplasm of hepatic flexure (H)       Potassium Chloride ER 20 MEQ Tbcr     30 tablet    Take 1 tablet (20 mEq) by mouth daily    Hypokalemia       prochlorperazine 10 MG tablet    COMPAZINE    30 tablet    Take 1 tablet (10 mg) by mouth every 6 hours as needed (Nausea/Vomiting)    Malignant neoplasm of hepatic flexure (H)       sennosides 8.6 MG tablet    SENOKOT    60 tablet    Take 1 tablet by mouth 2 times daily    Constipation due to opioid therapy       SERTRALINE HCL PO      Take 50 mg by mouth daily (Takes half of a 100mg tablet for a total of 50mg daily)        VITAMIN D (CHOLECALCIFEROL) PO      Take 1 tablet by mouth daily (OTC: Pt unsure of strength)        * Notice:  This list has 2 medication(s) that are the same as other medications prescribed for you. Read the directions carefully, and ask your doctor or other care provider to review them with you.

## 2017-11-29 NOTE — LETTER
"    11/29/2017         RE: Liv Sylvester  [address]  [place] [address]        Dear Colleague,    Thank you for referring your patient, Liv Sylvester, to the [facility]. Please see a copy of my visit note below.    Oncology Rooming Note    November 29, 2017 9:31 AM   Liv Sylvester is a 67 year old female who presents for:    Chief Complaint   Patient presents with     Oncology Clinic Visit     Malignant neoplasm of hepatic flexure      Initial Vitals: /85  Pulse 69  Temp 98.2  F (36.8  C) (Oral)  Resp 20  Ht 1.753 m (5' 9.02\")  Wt 110.5 kg (243 lb 9.6 oz)  SpO2 98%  BMI 35.96 kg/m2 Estimated body mass index is 35.96 kg/(m^2) as calculated from the following:    Height as of this encounter: 1.753 m (5' 9.02\").    Weight as of this encounter: 110.5 kg (243 lb 9.6 oz). Body surface area is 2.32 meters squared.  Data Unavailable Comment: Data Unavailable   No LMP recorded. Patient has had a hysterectomy.  Allergies reviewed: Yes  Medications reviewed: Yes    Medications: Medication refills not needed today.  Pharmacy name entered into [employer]:    [facility] [address] [facility] [address] AT [facility] [address]  [facility] [address] [place], [place] - [address] [facility] [address]    Clinical concerns: None                     4 minutes for nursing intake (face to face time)     Monserrat Alonso MA          DISCHARGE PLAN:    Chemo today  Future appointments to be arranged by schedulers   NO electrolyte replacement today  CEA drawn  SHE is aware to STOP taking xarelto  BLE us done today- Lovenox today do to results- she is aware    PLAN for LOVENOX COMPLIANCE/ social work involved.  She verbalizes importance of staying compliant with daily lovenox injections  She will place emla cream on injection site prior to administering  Leave emla on for 20-30- min  Clean area " with alcohol swab  And will administer the lovenox.    She self administered on 11/29/17, Technique was correct.  She administers slowly to avoid burning sensation.    [facility] will observe her self administering on TH 11/30/17     She will come in Friday for Fluids, neulasta and will bring her Lovenox and self administer here.      Next appointments: See patient instruction section  Departure Mode: Ambulatory  Accompanied by: self  30 minutes for nursing discharge (face to face time)   Laina Mondragon RN        [facility]  HEMATOLOGY ONCOLOGY    ONCOLOGY FOLLOWUP NOTE      DIAGNOSIS:    1- Metastatic colon cancer in a patient with history of stage 1A grade 3 endometrial carcinoma approximately around 2012.     On 09/29/2017, Liv Sylvester presented with progressive fatigue and right upper quadrant pain.  CT scan 09/29/2017 showed partially contracted bladder and findings suspicious for liver metastases.  There were multiple defined hypoechoic foci scoped scattered throughout the liver, the largest was in the right lobe.  CT was also showing enlargement of hepatic and retroperitoneal lymph nodes.  On 09/30/2017, she underwent a colonoscopy, which showed a fungating, infiltrative, ulcerated, partially obstructing mass at the hepatic flexure.  Pathology is consistent with moderately differentiated adenocarcinoma with normal mismatch repair protein expression.   2- Significant iron deficiency anemia.   3- VTE- malignancy related     TREATMENT:  10/11/2017 FOLFOX and Avastin.      SUBJECTIVE:  The patient was seen as a followup today. She is tolerating treatment well. She states that her legs are more swollen. No neuropathy.     REVIEW OF SYSTEMS:  A complete review of systems was performed and found to be negative other than pertinent positives mentioned in history of present illness.      Past medical, social histories reviewed.     Meds- Reviewed.     PHYSICAL EXAMINATION:   VITAL  "SIGNS: /85  Pulse 69  Temp 98.2  F (36.8  C) (Oral)  Resp 20  Ht 1.753 m (5' 9.02\")  Wt 110.5 kg (243 lb 9.6 oz)  SpO2 98%  BMI 35.96 kg/m2  CONSTITUTIONAL: Sitting comfortably.   HEENT: Pupils are equal. Oropharynx is clear.   NECK: No cervical or supraclavicular lymphadenopathy.   RESPIRATORY: Clear bilaterally.   CARD/VASC: S1, S2, regular.   GI: Soft, nontender, nondistended, no hepatosplenomegaly.   MUSKULOSKELETAL: Warm, well perfused.   NEUROLOGIC: Alert, awake.   INTEGUMENT: No rash.   LYMPHATICS: Bilateral edema.   PSYCH: Mood and affect was normal.     LABORATORY DATA AND IMAGING REVIEWED DURING THIS VISIT:  Recent Labs   Lab Test  11/22/17 0920 11/08/17   0905   NA  144  138   POTASSIUM  2.8*  3.1*   CHLORIDE  106  102   CO2  33*  27   ANIONGAP  5  9   BUN  10  16   CR  0.60  0.75   GLC  109*  162*   JESÚS  8.6  9.1     Recent Labs   Lab Test  11/22/17   0920  11/08/17   0905  10/25/17   0915   WBC  2.5*  4.1  3.8*   HGB  9.9*  10.5*  8.5*   PLT  156  270  239   MCV  81  80  75*   NEUTROPHIL  37.0  59.3  58.6     Recent Labs   Lab Test  11/22/17   0920  11/08/17   0905  10/25/17   0915   BILITOTAL  0.3  0.5  0.5   ALKPHOS  76  104  105   ALT  18  36  28   AST  19  23  33   ALBUMIN  2.7*  2.9*  2.5*          Results for orders placed or performed during the hospital encounter of 11/27/17   CT Chest/Abdomen/Pelvis w Contrast    Narrative    CT CHEST/ABDOMEN/PELVIS WITH CONTRAST November 27, 2017 11:13 AM     HISTORY: Follow up colon cancer. Malignant neoplasm of hepatic flexure  (H).    TECHNIQUE: CT abdomen of chest, and pelvis with 117mL Isovue-370 IV.  Radiation dose for this scan was reduced using automated exposure  control, adjustment of the mA and/or kV according to patient size, or  iterative reconstruction technique.     COMPARISON: 10/20/2017, 10/5/2017.    FINDINGS:     Chest: Previously seen filling defect in the anterior segment of the  left upper lobe pulmonary artery is not well " visualized on today's  exam. Improving pulmonary emboli in the left lower lobe pulmonary  arteries. New small pulmonary emboli in the right lower lobe pulmonary  artery (series 2, image 27). No change in linear defect within the  descending right pulmonary artery (series 2, image 28). No pericardial  effusion, pleural effusion or pneumothorax. Incidentally noted there  is an aberrant right subclavian artery, which is a normal anatomic  variant. Atherosclerotic disease is seen throughout the abdominal  aorta. Previously seen 3 mm pulmonary nodule in the anterior right  midlung is not visualized on today's exam. Scattered sub-4 mm  pulmonary nodules bilaterally are unchanged. Central airways are  patent. Right IJ Port-A-Cath with the tip in the superior vena cava is  again noted. Hypodensity is seen around the port catheter in the  superior vena cava for example series 2, image 18 and series 4, image  31), consistent with a small amount of thrombus around the port  catheter.    Abdomen: Gadolinium noted there are multiple lesions throughout the  liver, consistent with hepatic metastasis. Lesions are not  significantly changed when compared to the study dating back to  9/29/2017. Multiple right renal cysts are noted. There is new mild  right hydronephrosis and hydroureter. No filling defects are noted.  There is mild urothelial enhancement of the mid ureter (series 2,  image 85). Multiple right renal cysts are again noted. Left kidney is  normal. Spleen, adrenal glands, gallbladder and pancreas show no focal  abnormality. Hepatic and portal veins are patent. No intraperitoneal  free air or free fluid.    Small and large bowel are normal in caliber without evidence of  obstruction. Masslike thickening of the colon at the hepatic flexure  likely represents patient's primary tumor. No dilated loops of small  or large bowel. No change in multiple enlarged mesenteric lymph nodes.  Periaortic and retroperitoneal lymph nodes  are not significantly  changed. No abdominal aortic aneurysm. Atherosclerotic disease is seen  throughout the infrarenal abdominal aorta. Bladder is normal.    Bones: No suspicious bony lesions.      Impression    IMPRESSION:  1. New small pulmonary embolus in the descending right pulmonary  artery. Additional right-sided pulmonary emboli are unchanged when  compared to the prior exam. Previously seen left-sided pulmonary  emboli are not well visualized on today's exam.  2. Thrombus around the port catheter in the superior vena cava.  3. Improved bilateral pulmonary nodules.  4. No change in mass at the hepatic flexure, abdominal lymphadenopathy  and multiple hepatic masses.  5. New mild right hydronephrosis and hydroureter. No definite filling  defect is noted to suggest an obstructing stone. There is mild  associated urothelial enhancement of the mid ureter.    SAUL FU DO        ECOG performance status 1.      ASSESSMENT:    1- This is a 66-year-old lady with stage IV metastatic colon cancer with normal mismatch repair protein expression.  She has multiple liver metastases and metastasis in the abdominal lymph nodes.   She is on palliative intent combination of FOLFOX with Avastin which is given every 2 weeks intravenously.      - Labs were reviewed with the patient, neutropenia due to chemotherapy. Chemotherapy was held yesterday and will resumed today is neutrophil count is better.   - I will add Neulasta to chemotherapy starting today.   Normocytic anemia due to chemotherapy with concurrent iron deficiency.   - CT Scan results were reviewed with the patient. I reviewed the images myself. There is a new right sided small pulmonary embolus. There is a thrombus associated with the port as well. The liver lesions look stable. I was expecting some disease shrinkage as well. I will order CEA today.   - The findings are raising the concern for continued thrombosis despite being on Xarelto- this is  theoretically possible. She also has more swelling of the legs and I will order a leg US today. I will switch her from Xarelto to Lovenox. We will arrange for home health service for this.   - Hypokalemia she will continue replacement, dose increased to 20 meq BID.      2- She also has iron deficiency anemia due to chronic blood loss from her cancer.  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     3- VTE- she was started on therapeutic dose lovenox. She was not able to self inject. She was switched to xarelto. Now switching back to Lovenox.      PLAN:   1.  FOLFOX with Avastin every 2 weeks with neulasta support.  Chemo today.   2.  Return to clinic 4 weeks with Infusion Clinic appointment.   3-  Replace electrolytes per protocol today.Potassium chloride 20 meq twice daily on regular basis.   4- Labs today- CEA.   5- Discontinue Xarelto and start Lovenox 1.5 mg/kg SQ daily  6- B/LE US today    RADHA PIEDRA MD    11/29/2017      Again, thank you for allowing me to participate in the care of your patient.        Sincerely,        Radha Piedra MD

## 2017-11-29 NOTE — PROGRESS NOTES
"[facility]  HEMATOLOGY ONCOLOGY    ONCOLOGY FOLLOWUP NOTE      DIAGNOSIS:    1- Metastatic colon cancer in a patient with history of stage 1A grade 3 endometrial carcinoma approximately around 2012.     On 09/29/2017, Liv Sylvester presented with progressive fatigue and right upper quadrant pain.  CT scan 09/29/2017 showed partially contracted bladder and findings suspicious for liver metastases.  There were multiple defined hypoechoic foci scoped scattered throughout the liver, the largest was in the right lobe.  CT was also showing enlargement of hepatic and retroperitoneal lymph nodes.  On 09/30/2017, she underwent a colonoscopy, which showed a fungating, infiltrative, ulcerated, partially obstructing mass at the hepatic flexure.  Pathology is consistent with moderately differentiated adenocarcinoma with normal mismatch repair protein expression.   2- Significant iron deficiency anemia.   3- VTE- malignancy related     TREATMENT:  10/11/2017 FOLFOX and Avastin.      SUBJECTIVE:  The patient was seen as a followup today. She is tolerating treatment well. She states that her legs are more swollen. No neuropathy.     REVIEW OF SYSTEMS:  A complete review of systems was performed and found to be negative other than pertinent positives mentioned in history of present illness.      Past medical, social histories reviewed.     Meds- Reviewed.     PHYSICAL EXAMINATION:   VITAL SIGNS: /85  Pulse 69  Temp 98.2  F (36.8  C) (Oral)  Resp 20  Ht 1.753 m (5' 9.02\")  Wt 110.5 kg (243 lb 9.6 oz)  SpO2 98%  BMI 35.96 kg/m2  CONSTITUTIONAL: Sitting comfortably.   HEENT: Pupils are equal. Oropharynx is clear.   NECK: No cervical or supraclavicular lymphadenopathy.   RESPIRATORY: Clear bilaterally.   CARD/VASC: S1, S2, regular.   GI: Soft, nontender, nondistended, no hepatosplenomegaly.   MUSKULOSKELETAL: Warm, well perfused.   NEUROLOGIC: Alert, awake.   INTEGUMENT: No rash.   LYMPHATICS: " Bilateral edema.   PSYCH: Mood and affect was normal.     LABORATORY DATA AND IMAGING REVIEWED DURING THIS VISIT:  Recent Labs   Lab Test  11/22/17 0920 11/08/17   0905   NA  144  138   POTASSIUM  2.8*  3.1*   CHLORIDE  106  102   CO2  33*  27   ANIONGAP  5  9   BUN  10  16   CR  0.60  0.75   GLC  109*  162*   JESÚS  8.6  9.1     Recent Labs   Lab Test  11/22/17 0920 11/08/17   0905  10/25/17   0915   WBC  2.5*  4.1  3.8*   HGB  9.9*  10.5*  8.5*   PLT  156  270  239   MCV  81  80  75*   NEUTROPHIL  37.0  59.3  58.6     Recent Labs   Lab Test  11/22/17   0920  11/08/17   0905  10/25/17   0915   BILITOTAL  0.3  0.5  0.5   ALKPHOS  76  104  105   ALT  18  36  28   AST  19  23  33   ALBUMIN  2.7*  2.9*  2.5*          Results for orders placed or performed during the hospital encounter of 11/27/17   CT Chest/Abdomen/Pelvis w Contrast    Narrative    CT CHEST/ABDOMEN/PELVIS WITH CONTRAST November 27, 2017 11:13 AM     HISTORY: Follow up colon cancer. Malignant neoplasm of hepatic flexure  (H).    TECHNIQUE: CT abdomen of chest, and pelvis with 117mL Isovue-370 IV.  Radiation dose for this scan was reduced using automated exposure  control, adjustment of the mA and/or kV according to patient size, or  iterative reconstruction technique.     COMPARISON: 10/20/2017, 10/5/2017.    FINDINGS:     Chest: Previously seen filling defect in the anterior segment of the  left upper lobe pulmonary artery is not well visualized on today's  exam. Improving pulmonary emboli in the left lower lobe pulmonary  arteries. New small pulmonary emboli in the right lower lobe pulmonary  artery (series 2, image 27). No change in linear defect within the  descending right pulmonary artery (series 2, image 28). No pericardial  effusion, pleural effusion or pneumothorax. Incidentally noted there  is an aberrant right subclavian artery, which is a normal anatomic  variant. Atherosclerotic disease is seen throughout the abdominal  aorta.  Previously seen 3 mm pulmonary nodule in the anterior right  midlung is not visualized on today's exam. Scattered sub-4 mm  pulmonary nodules bilaterally are unchanged. Central airways are  patent. Right IJ Port-A-Cath with the tip in the superior vena cava is  again noted. Hypodensity is seen around the port catheter in the  superior vena cava for example series 2, image 18 and series 4, image  31), consistent with a small amount of thrombus around the port  catheter.    Abdomen: Gadolinium noted there are multiple lesions throughout the  liver, consistent with hepatic metastasis. Lesions are not  significantly changed when compared to the study dating back to  9/29/2017. Multiple right renal cysts are noted. There is new mild  right hydronephrosis and hydroureter. No filling defects are noted.  There is mild urothelial enhancement of the mid ureter (series 2,  image 85). Multiple right renal cysts are again noted. Left kidney is  normal. Spleen, adrenal glands, gallbladder and pancreas show no focal  abnormality. Hepatic and portal veins are patent. No intraperitoneal  free air or free fluid.    Small and large bowel are normal in caliber without evidence of  obstruction. Masslike thickening of the colon at the hepatic flexure  likely represents patient's primary tumor. No dilated loops of small  or large bowel. No change in multiple enlarged mesenteric lymph nodes.  Periaortic and retroperitoneal lymph nodes are not significantly  changed. No abdominal aortic aneurysm. Atherosclerotic disease is seen  throughout the infrarenal abdominal aorta. Bladder is normal.    Bones: No suspicious bony lesions.      Impression    IMPRESSION:  1. New small pulmonary embolus in the descending right pulmonary  artery. Additional right-sided pulmonary emboli are unchanged when  compared to the prior exam. Previously seen left-sided pulmonary  emboli are not well visualized on today's exam.  2. Thrombus around the port catheter  in the superior vena cava.  3. Improved bilateral pulmonary nodules.  4. No change in mass at the hepatic flexure, abdominal lymphadenopathy  and multiple hepatic masses.  5. New mild right hydronephrosis and hydroureter. No definite filling  defect is noted to suggest an obstructing stone. There is mild  associated urothelial enhancement of the mid ureter.    SAUL FU DO        ECOG performance status 1.      ASSESSMENT:    1- This is a 66-year-old lady with stage IV metastatic colon cancer with normal mismatch repair protein expression.  She has multiple liver metastases and metastasis in the abdominal lymph nodes.   She is on palliative intent combination of FOLFOX with Avastin which is given every 2 weeks intravenously.      - Labs were reviewed with the patient, neutropenia due to chemotherapy. Chemotherapy was held yesterday and will resumed today is neutrophil count is better.   - I will add Neulasta to chemotherapy starting today.   Normocytic anemia due to chemotherapy with concurrent iron deficiency.   - CT Scan results were reviewed with the patient. I reviewed the images myself. There is a new right sided small pulmonary embolus. There is a thrombus associated with the port as well. The liver lesions look stable. I was expecting some disease shrinkage as well. I will order CEA today.   - The findings are raising the concern for continued thrombosis despite being on Xarelto- this is theoretically possible. She also has more swelling of the legs and I will order a leg US today. I will switch her from Xarelto to Lovenox. We will arrange for home health service for this.   - Hypokalemia she will continue replacement, dose increased to 20 meq BID.      2- She also has iron deficiency anemia due to chronic blood loss from her cancer.  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     3- VTE- she was started on therapeutic dose lovenox. She was not able to self inject. She was switched to xarelto. Now switching back to  Lovenox.      PLAN:   1.  FOLFOX with Avastin every 2 weeks with neulasta support.  Chemo today.   2.  Return to clinic 4 weeks with Infusion Clinic appointment.   3-  Replace electrolytes per protocol today.Potassium chloride 20 meq twice daily on regular basis.   4- Labs today- CEA.   5- Discontinue Xarelto and start Lovenox 1.5 mg/kg SQ daily  6- B/LE US today    Addendum:     OK to extend leave for 6 months.     SIDRA NAILS MD    11/29/2017

## 2017-11-29 NOTE — PROGRESS NOTES
"Infusion Nursing Note:  Liv Sylvester presents today for C4D1 Avastin/Folfox  Patient seen by provider today: Yes: exam with Dr. Piedra   present during visit today: Not Applicable.    Note: treatment held last week due to ANC.    Intravenous Access:  Implanted Port.    Treatment Conditions:  Lab Results   Component Value Date    HGB 9.9 11/29/2017     Lab Results   Component Value Date    WBC 3.9 11/29/2017      Lab Results   Component Value Date    ANEU 1.9 11/29/2017     Lab Results   Component Value Date     11/29/2017      Lab Results   Component Value Date     11/22/2017                   Lab Results   Component Value Date    POTASSIUM 2.8 11/22/2017           No results found for: MAG         Lab Results   Component Value Date    CR 0.60 11/22/2017                   Lab Results   Component Value Date    JESÚS 8.6 11/22/2017                Lab Results   Component Value Date    BILITOTAL 0.3 11/22/2017           Lab Results   Component Value Date    ALBUMIN 2.7 11/22/2017                    Lab Results   Component Value Date    ALT 18 11/22/2017           Lab Results   Component Value Date    AST 19 11/22/2017     Results reviewed, labs MET treatment parameters, ok to proceed with treatment.  Urine protein negative.          Post Infusion Assessment:  Patient tolerated infusion without incident.  Site patent and intact, free from redness, edema or discomfort.  No evidence of extravasations.  Prior to discharge: Port is secured in place with tegaderm and flushed with 10cc NS with positive blood return noted.  Continuous home infusion Dosi-Fuser pump connected.    All connectors secured in place and clamps taped open.    Pump started, \"running\" noted on display (CADD): Not Applicable.  Patient instructed to call our clinic or [facility] with any questions or concerns at home.  Patient verbalized understanding.    Patient set up for pump disconnect at our clinic on 12/1.  "         Discharge Plan:   Discharge instructions reviewed with: Patient.  Patient and/or family verbalized understanding of discharge instructions and all questions answered.  Copy of AVS reviewed with patient and/or family.  Patient will return Friday for next appointment.  Patient discharged in stable condition accompanied by: self.  Departure Mode: Ambulatory with walker.    Anushka Miller RN

## 2017-11-29 NOTE — MR AVS SNAPSHOT
After Visit Summary   11/29/2017    Liv Sylvester    MRN: [id number]           Patient Information     Date Of Birth          1950        Visit Information        Provider Department      11/29/2017 9:45 AM Radha Piedra MD [facility]        Today's Diagnoses     Other acute pulmonary embolism without acute cor pulmonale (H)    -  1    Malignant neoplasm of hepatic flexure (H)          Care Instructions    1.  FOLFOX with Avastin every 2 weeks with neulasta support.  Chemo today.   2.  Return to clinic 4 weeks with Infusion Clinic appointment.   3-  Replace electrolytes per protocol today.Potassium chloride 20 meq twice daily on regular basis.   4- Labs today- CEA. /done  5- Discontinue Xarelto. Start Lovenox 1.5 mg/kg SQ daily from tomorrow.   6- B/LE US today/done      Addendum to plan after us results are back:  1.) Lovenox today in clinic            Follow-ups after your visit        Your next 10 appointments already scheduled     Dec 13, 2017  9:30 AM CST   Level 6 with  INFUSION CHAIR 8   [facility] ([facility])    [facility]  [address]  [place] [address]   [phone]            Dec 27, 2017  9:00 AM CST   Level 6 with  INFUSION CHAIR 8   [facility] ([facility])    [facility]  [address]  [place] [address]   [phone]            Dec 27, 2017  9:30 AM CST   Return Visit with Radha Piedra MD   [facility] ([facility])    [facility]  [address]  [place] [address]   [phone]              Who to contact     If you have questions or need follow up information about today's clinic visit or your schedule please contact [facility] directly at [phone].  Normal or non-critical lab and imaging results will be communicated to  "you by MyChart, letter or phone within 4 business days after the clinic has received the results. If you do not hear from us within 7 days, please contact the clinic through Re[employer] Pow[employer] or phone. If you have a critical or abnormal lab result, we will notify you by phone as soon as possible.  Submit refill requests through [employer] or call your pharmacy and they will forward the refill request to us. Please allow 3 business days for your refill to be completed.          Additional Information About Your Visit        amSTATZharNeuroQuest Information     m-Care Technology lets you send messages to your doctor, view your test results, renew your prescriptions, schedule appointments and more. To sign up, go to [email or web address] . Click on \"Log in\" on the left side of the screen, which will take you to the Welcome page. Then click on \"Sign up Now\" on the right side of the page.     You will be asked to enter the access code listed below, as well as some personal information. Please follow the directions to create your username and password.     Your access code is: [email or web address]  Expires: 2017 11:39 AM     Your access code will  in 90 days. If you need help or a new code, please call your [place] clinic or [phone].        Care EveryWhere ID     This is your Care EveryWhere ID. This could be used by other organizations to access your [place] medical records  [id number]        Your Vitals Were     Pulse Temperature Respirations Height Pulse Oximetry BMI (Body Mass Index)    69 98.2  F (36.8  C) (Oral) 20 1.753 m (5' 9.02\") 98% 35.96 kg/m2       Blood Pressure from Last 3 Encounters:   17 171/89   17 154/85   17 154/85    Weight from Last 3 Encounters:   17 110.5 kg (243 lb 9.6 oz)   17 110.5 kg (243 lb 9.6 oz)   17 106.2 kg (234 lb 3.2 oz)              We Performed the Following     Carcinoembryonic Antigen          Today's Medication Changes          These changes are accurate as of: " 11/29/17 11:59 PM.  If you have any questions, ask your nurse or doctor.               Start taking these medicines.        Dose/Directions    * enoxaparin 100 MG/ML injection   Commonly known as:  LOVENOX   Used for:  Other acute pulmonary embolism without acute cor pulmonale (H)   Started by:  Radha Piedra MD        Dose:  1.5 mg/kg   Inject 1.66 mLs (166 mg) Subcutaneous every 24 hours   Quantity:  49.8 mL   Refills:  11       * enoxaparin 80 MG/0.8ML injection   Commonly known as:  LOVENOX   Used for:  Other acute pulmonary embolism without acute cor pulmonale (H)   Started by:  Radha Piedra MD        Dose:  160 mg   Inject 1.6 mLs (160 mg) Subcutaneous every 24 hours   Quantity:  60 Syringe   Refills:  11       * Notice:  This list has 2 medication(s) that are the same as other medications prescribed for you. Read the directions carefully, and ask your doctor or other care provider to review them with you.      Stop taking these medicines if you haven't already. Please contact your care team if you have questions.     rivaroxaban ANTICOAGULANT 20 MG Tabs tablet   Commonly known as:  XARELTO   Stopped by:  Radha Piedra MD                Where to get your medicines      These medications were sent to [facility] [address]  [address] [facility] [address], [facility] [address]     Phone:  [phone]     enoxaparin 80 MG/0.8ML injection         These medications were sent to [facility] [address] [place] [address] [address] [facility]  [address] [facility] [address]     Phone:  [phone]     enoxaparin 100 MG/ML injection                Primary Care Provider Office Phone # Fax #    Amaya Bethea Anita [phone]       [facility] [address] [facility] [address]        Equal Access to Services     HAYDEE IVERSON AH: Jeffry vallejo Somoises, waaxda luqadaha, qaybta kaalmada adeegyada, john maya. So [facility]  [phone].    ATENCIÓN: Si marleny zhong, tiene a schmitt disposición servicios gratuitos de asistencia lingüística. Wiley hay [phone].    We comply with applicable federal civil rights laws and [place] laws. We do not discriminate on the basis of race, color, national origin, age, disability, sex, sexual orientation, or gender identity.            Thank you!     Thank you for choosing [facility]  for your care. Our goal is always to provide you with excellent care. Hearing back from our patients is one way we can continue to improve our services. Please take a few minutes to complete the written survey that you may receive in the mail after your visit with us. Thank you!             Your Updated Medication List - Protect others around you: Learn how to safely use, store and throw away your medicines at www.disposemymeds.org.          This list is accurate as of: 11/29/17 11:59 PM.  Always use your most recent med list.                   Brand Name Dispense Instructions for use Diagnosis    ACETAMINOPHEN PO      Take 1,000 mg by mouth every 4 hours as needed for pain ((Patient has been taking 4-12 tabs per day))        albuterol 108 (90 BASE) MCG/ACT Inhaler    PROAIR HFA/PROVENTIL HFA/VENTOLIN HFA    1 Inhaler    Inhale 2 puffs into the lungs every 6 hours as needed for shortness of breath / dyspnea or wheezing    Reactive airway disease without complication, unspecified asthma severity, unspecified whether persistent       CALCIUM-MAGNESIUM-ZINC PO      Take 1 tablet by mouth daily        CENTRUM SILVER per tablet     100 tablet    Take 1 tablet by mouth daily.        CHLORTHALIDONE PO      Take 25 mg by mouth daily        * enoxaparin 100 MG/ML injection    LOVENOX    49.8 mL    Inject 1.66 mLs (166 mg) Subcutaneous every 24 hours    Other acute pulmonary embolism without acute cor pulmonale (H)       * enoxaparin 80 MG/0.8ML injection    LOVENOX    60 Syringe    Inject 1.6 mLs (160 mg)  Subcutaneous every 24 hours    Other acute pulmonary embolism without acute cor pulmonale (H)       lidocaine 5 % ointment    XYLOCAINE    5 g    Apply 0.5 g topically as needed for moderate pain Apply pea size amount to rectal area for pain        lidocaine-prilocaine cream    EMLA    30 g    Apply topically as needed for moderate pain Apply dollop size amount to port site 30-60 minutes prior to accessing.    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    HTN (hypertension)       LORazepam 0.5 MG tablet    ATIVAN    30 tablet    Take 1 tablet (0.5 mg) by mouth every 4 hours as needed (Anxiety, Nausea/Vomiting or Sleep)    Malignant neoplasm of hepatic flexure (H)       NYQUIL PO           ondansetron 8 MG tablet    ZOFRAN    10 tablet    Take 1 tablet (8 mg) by mouth every 8 hours as needed (Nausea/Vomiting)    Malignant neoplasm of hepatic flexure (H)       Potassium Chloride ER 20 MEQ Tbcr     30 tablet    Take 1 tablet (20 mEq) by mouth daily    Hypokalemia       prochlorperazine 10 MG tablet    COMPAZINE    30 tablet    Take 1 tablet (10 mg) by mouth every 6 hours as needed (Nausea/Vomiting)    Malignant neoplasm of hepatic flexure (H)       sennosides 8.6 MG tablet    SENOKOT    60 tablet    Take 1 tablet by mouth 2 times daily    Constipation due to opioid therapy       SERTRALINE HCL PO      Take 50 mg by mouth daily (Takes half of a 100mg tablet for a total of 50mg daily)        VITAMIN D (CHOLECALCIFEROL) PO      Take 1 tablet by mouth daily (OTC: Pt unsure of strength)        * Notice:  This list has 2 medication(s) that are the same as other medications prescribed for you. Read the directions carefully, and ask your doctor or other care provider to review them with you.

## 2017-11-29 NOTE — PROGRESS NOTES
"Oncology Rooming Note    November 29, 2017 9:31 AM   Liv Sylvester is a 67 year old female who presents for:    Chief Complaint   Patient presents with     Oncology Clinic Visit     Malignant neoplasm of hepatic flexure      Initial Vitals: /85  Pulse 69  Temp 98.2  F (36.8  C) (Oral)  Resp 20  Ht 1.753 m (5' 9.02\")  Wt 110.5 kg (243 lb 9.6 oz)  SpO2 98%  BMI 35.96 kg/m2 Estimated body mass index is 35.96 kg/(m^2) as calculated from the following:    Height as of this encounter: 1.753 m (5' 9.02\").    Weight as of this encounter: 110.5 kg (243 lb 9.6 oz). Body surface area is 2.32 meters squared.  Data Unavailable Comment: Data Unavailable   No LMP recorded. Patient has had a hysterectomy.  Allergies reviewed: Yes  Medications reviewed: Yes    Medications: Medication refills not needed today.  Pharmacy name entered into Shout:    [facility] [id number] [place] [address] [address] [address] [place] [facility]  AT [facility] [address] [place] [facility]  [facility] [address] [place], [place] - [address] [facility] [address]    Clinical concerns: None                     4 minutes for nursing intake (face to face time)     Monserrat Alonso MA          DISCHARGE PLAN:    Chemo today  Future appointments to be arranged by schedulers   NO electrolyte replacement today  CEA drawn  SHE is aware to STOP taking xarelto  BLE us done today- Lovenox today do to results- she is aware    PLAN for LOVENOX COMPLIANCE/ social work involved.  She verbalizes importance of staying compliant with daily lovenox injections  She will place emla cream on injection site prior to administering  Leave emla on for 20-30- min  Clean area with alcohol swab  And will administer the lovenox.    She self administered on 11/29/17, Technique was correct.  She administers slowly to avoid burning sensation.    [facility] care will observe her self administering on TH 11/30/17     She " will come in Friday for Fluids, neulasta and will bring her Lovenox and self administer here.      Next appointments: See patient instruction section  Departure Mode: Ambulatory  Accompanied by: self  30 minutes for nursing discharge (face to face time)   Laina Mondragon RN

## 2017-11-29 NOTE — MR AVS SNAPSHOT
After Visit Summary   11/29/2017    Liv Sylvester    MRN: [id number]           Patient Information     Date Of Birth          1950        Visit Information        Provider Department      11/29/2017 9:00 AM  INFUSION CHAIR 5 [facility]        Today's Diagnoses     Malignant neoplasm of hepatic flexure (H)    -  1       Follow-ups after your visit        Your next 10 appointments already scheduled     Dec 01, 2017  2:00 PM CST   Level 3 with SH INFUSION CHAIR 3   [facility] ([facility])    [facility]  [address]  [place] [address]   [phone]            Dec 13, 2017  9:30 AM CST   Level 6 with SH INFUSION CHAIR 8   [facility] ([facility])    [facility]  [address]  [place] [address]   [phone]            Dec 27, 2017  9:00 AM CST   Level 6 with SH INFUSION CHAIR 8   [facility] ([facility])    [facility]  [address]  [place] [address]   [phone]            Dec 27, 2017  9:30 AM CST   Return Visit with Radha Piedra MD   [facility] ([facility])    [facility]  [address]  [place] [address]   [phone]              Future tests that were ordered for you today     Open Future Orders        Priority Expected Expires Ordered    US Venous extremity lower bilateral STAT  11/29/2018 11/29/2017            Who to contact     If you have questions or need follow up information about today's clinic visit or your schedule please contact [facility] INF[facility] directly at [phone].  Normal or non-critical lab and imaging results will be communicated to you by MyChart, letter or phone within 4 business days after the  "clinic has received the results. If you do not hear from us within 7 days, please contact the clinic through Appiphany or phone. If you have a critical or abnormal lab result, we will notify you by phone as soon as possible.  Submit refill requests through Appiphany or call your pharmacy and they will forward the refill request to us. Please allow 3 business days for your refill to be completed.          Additional Information About Your Visit        Prodigo SolutionsharVastari Information     Appiphany lets you send messages to your doctor, view your test results, renew your prescriptions, schedule appointments and more. To sign up, go to [email or web address] . Click on \"Log in\" on the left side of the screen, which will take you to the Welcome page. Then click on \"Sign up Now\" on the right side of the page.     You will be asked to enter the access code listed below, as well as some personal information. Please follow the directions to create your username and password.     Your access code is: [email or web address]  Expires: 2017 11:39 AM     Your access code will  in 90 days. If you need help or a new code, please call your [place] clinic or [phone].        Care EveryWhere ID     This is your Care EveryWhere ID. This could be used by other organizations to access your [place] medical records  [id number]        Your Vitals Were     Pulse Temperature Respirations Height Pulse Oximetry BMI (Body Mass Index)    69 98.2  F (36.8  C) (Oral) 20 1.753 m (5' 9.02\") 98% 35.96 kg/m2       Blood Pressure from Last 3 Encounters:   17 154/85   17 154/85   17 119/64    Weight from Last 3 Encounters:   17 110.5 kg (243 lb 9.6 oz)   17 110.5 kg (243 lb 9.6 oz)   17 106.2 kg (234 lb 3.2 oz)              We Performed the Following     CBC with platelets differential     Comprehensive metabolic panel     Protein qualitative urine          Today's Medication Changes          These changes are accurate as of: " 11/29/17  4:01 PM.  If you have any questions, ask your nurse or doctor.               Start taking these medicines.        Dose/Directions    * enoxaparin 100 MG/ML injection   Commonly known as:  LOVENOX   Used for:  Other acute pulmonary embolism without acute cor pulmonale (H)   Started by:  Radha Piedra MD        Dose:  1.5 mg/kg   Inject 1.66 mLs (166 mg) Subcutaneous every 24 hours   Quantity:  49.8 mL   Refills:  11       * enoxaparin 80 MG/0.8ML injection   Commonly known as:  LOVENOX   Used for:  Other acute pulmonary embolism without acute cor pulmonale (H)   Started by:  Radha Piedra MD        Dose:  160 mg   Inject 1.6 mLs (160 mg) Subcutaneous every 24 hours   Quantity:  60 Syringe   Refills:  11       * Notice:  This list has 2 medication(s) that are the same as other medications prescribed for you. Read the directions carefully, and ask your doctor or other care provider to review them with you.      Stop taking these medicines if you haven't already. Please contact your care team if you have questions.     rivaroxaban ANTICOAGULANT 20 MG Tabs tablet   Commonly known as:  XARELTO   Stopped by:  Radha Piedra MD                Where to get your medicines      These medications were sent to [facility] [address]  [address] [facility] [address], [facility] [address]     Phone:  [phone]     enoxaparin 80 MG/0.8ML injection         These medications were sent to [facility] [address] [place] [address] [address] [facility]  [address] [facility] [address]     Phone:  [phone]     enoxaparin 100 MG/ML injection                Primary Care Provider Office Phone # Fax #    Aamya Bethea Anita [phone]       [facility] [address] [facility] [address]        Equal Access to Services     HAYDEE IVERSON AH: Jeffry vallejo Somoises, waaxda luqadaha, qaybta kaalmada adeegyada, john maya. So [facility]  [phone].    ATENCIÓN: Si marleny zhong, tiene a schmitt disposición servicios gratuitos de asistencia lingüística. Wiley hay [phone].    We comply with applicable federal civil rights laws and [place] laws. We do not discriminate on the basis of race, color, national origin, age, disability, sex, sexual orientation, or gender identity.            Thank you!     Thank you for choosing [facility]  for your care. Our goal is always to provide you with excellent care. Hearing back from our patients is one way we can continue to improve our services. Please take a few minutes to complete the written survey that you may receive in the mail after your visit with us. Thank you!             Your Updated Medication List - Protect others around you: Learn how to safely use, store and throw away your medicines at www.disposemymeds.org.          This list is accurate as of: 11/29/17  4:01 PM.  Always use your most recent med list.                   Brand Name Dispense Instructions for use Diagnosis    ACETAMINOPHEN PO      Take 1,000 mg by mouth every 4 hours as needed for pain ((Patient has been taking 4-12 tabs per day))        albuterol 108 (90 BASE) MCG/ACT Inhaler    PROAIR HFA/PROVENTIL HFA/VENTOLIN HFA    1 Inhaler    Inhale 2 puffs into the lungs every 6 hours as needed for shortness of breath / dyspnea or wheezing    Reactive airway disease without complication, unspecified asthma severity, unspecified whether persistent       CALCIUM-MAGNESIUM-ZINC PO      Take 1 tablet by mouth daily        CENTRUM SILVER per tablet     100 tablet    Take 1 tablet by mouth daily.        CHLORTHALIDONE PO      Take 25 mg by mouth daily        * enoxaparin 100 MG/ML injection    LOVENOX    49.8 mL    Inject 1.66 mLs (166 mg) Subcutaneous every 24 hours    Other acute pulmonary embolism without acute cor pulmonale (H)       * enoxaparin 80 MG/0.8ML injection    LOVENOX    60 Syringe    Inject 1.6  mLs (160 mg) Subcutaneous every 24 hours    Other acute pulmonary embolism without acute cor pulmonale (H)       lidocaine 5 % ointment    XYLOCAINE    5 g    Apply 0.5 g topically as needed for moderate pain Apply pea size amount to rectal area for pain        lidocaine-prilocaine cream    EMLA    30 g    Apply topically as needed for moderate pain Apply dollop size amount to port site 30-60 minutes prior to accessing.    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    HTN (hypertension)       LORazepam 0.5 MG tablet    ATIVAN    30 tablet    Take 1 tablet (0.5 mg) by mouth every 4 hours as needed (Anxiety, Nausea/Vomiting or Sleep)    Malignant neoplasm of hepatic flexure (H)       NYQUIL PO           ondansetron 8 MG tablet    ZOFRAN    10 tablet    Take 1 tablet (8 mg) by mouth every 8 hours as needed (Nausea/Vomiting)    Malignant neoplasm of hepatic flexure (H)       Potassium Chloride ER 20 MEQ Tbcr     30 tablet    Take 1 tablet (20 mEq) by mouth daily    Hypokalemia       prochlorperazine 10 MG tablet    COMPAZINE    30 tablet    Take 1 tablet (10 mg) by mouth every 6 hours as needed (Nausea/Vomiting)    Malignant neoplasm of hepatic flexure (H)       sennosides 8.6 MG tablet    SENOKOT    60 tablet    Take 1 tablet by mouth 2 times daily    Constipation due to opioid therapy       SERTRALINE HCL PO      Take 50 mg by mouth daily (Takes half of a 100mg tablet for a total of 50mg daily)        VITAMIN D (CHOLECALCIFEROL) PO      Take 1 tablet by mouth daily (OTC: Pt unsure of strength)        * Notice:  This list has 2 medication(s) that are the same as other medications prescribed for you. Read the directions carefully, and ask your doctor or other care provider to review them with you.

## 2017-11-30 NOTE — PROGRESS NOTES
This is a recent snapshot of the patient's [facility] medical record.  For current drug dose and complete information and questions, call [phone] or In Basket pool, [facility] home infusion ([id number])  CSN Number:  [id number]

## 2017-11-30 NOTE — PROGRESS NOTES
"Social Work Progress Note      Data/Intervention:  Patient Name:  Liv Sylvester  /Age:  1950 (67 year old)    Reason for Follow-Up:  Liv comes to clinic alone today for C4D1 Avastin/FOLFOX as pt was delayed last week due to her counts being too low. This clinician met with pt for routine psychosocial check-in and support, and later met with pt and Laina Mondragon RN for larger discussion surrounding strategies to support pt's medication compliance.     Intervention:   Liv reported today that she is relieved that she has not been in any physical pain since starting treatment at [facility], and is pleased by strides she has been making in her mobility. Pt reflected that she is coming to clinic today in walker and first came to clinic using a wheelchair. Liv acknowledges that she was pleased to learn this morning that her treatment is \"controlling the cancer\" although she is somewhat discouraged by continued risk of PEs. Provided safe place for Liv to express frustrations regarding how physically limited she has become as a result of her diagnosis and treatment, and how difficult it has been to be away from work, and to change habits surrounding caretaking for 14-year-old granddaughter. Liv acknowledged that she has had larger discussion with daughter about prognosis of \"2 years\" and her need to focus on taking care of her own physical health. Liv reports that granddaughter is slowly transitioning back to living with pt's daughter, and that she has supported granddaughter in discussions with school about how they will continue to support her education and emotional health with transition.     Liv reports today that she is somewhat distressed by changes she has noticed in memory, reporting that she realized that she did not take her morning medication despite using pill box at home. Discussed with pt alternative approaches for reminding pt of need to take medication, as well as having family support her with reminders. " Liv acknowledged that greatest concern is taking Lovonox medication. Liv also indicated that daughter-in-law has been very helpful going grocery shopping and assisting with cleaning in home, laundry, and light meal prep as needed. Discussed pt's contacting [facility] to explore options for additional assistance in home if needed, as well as benefit of engaging with [facility]. Pt in agreement with [facility] referral and indicated that she would like to continue to consider options at [facility].     Met with Liv, Laina Mondragon RN, and this clinician to discuss with pt strategies for supporting pt in taking Lovonox medication in home. Laina reviewed with pt that insurance would not allow home care RN visits daily to administer medication, and that clinic RNs are not able to directly administer medication. Pt reiterated that family could not be involved to support as they work or they are caring for other family members in need. Laina and this clinician reviewed with pt that not adhering to medication regimen could be life-threatening. Pt reported understanding and indicated that taking medication via injection is difficult for her as in early life she had been exposed to others injecting harmful substances, which developed an early fear of needles. Provided psycheducation about literature of reducing anxiety through exposure and repetition. Pt reported that she feels that she would be able to administer medication if she could put Emla cream on site prior to administration. Made plan with pt that she will have teaching with Laina Mondragon 11/29/17, and that homecare RN will visit pt in home and watch her administer herself. Pt to return to clinic on 12/1/17, at which time this clinician will meet with pt and inquire as to how pt's adherence has gone and make plan for weekend doses. This clinician will call pt by phone 1/4/17, and will involve Amy Dasilva RN if concerns should arise. Laina Mondragon to call Tue,  Joleen Chris of following week for additional education and support. Laina reviewed with pt today that administering medication more slowly can also assist with sting of medication. Extensive discussion with pt and Laina about pt's many strengths and her ability to adjust to understandably difficult administration.     Plan:  1) If pt requires pain medication in home for the future, will need to consider lock box  2) This clinician will meet with pt 12/1/17 and call pt 12/4/17 for check-in as to pt's adjustment to taking medication with Emla cream  3) This clinician will fax in [facility] application  4) This clinician will continue to follow pt for ongoing psychsocial support as pt continues to adjust to treatment and realize its impacts.     Please call or page if needs or concerns arise.     DEANNE Watkins, LICSW  Direct Phone: [phone]  Pager: [phone]

## 2017-12-01 NOTE — MR AVS SNAPSHOT
After Visit Summary   12/1/2017    Liv Sylvester    MRN: [id number]           Patient Information     Date Of Birth          1950        Visit Information        Provider Department      12/1/2017 3:25 PM Stephanie Awad LICSW [facility]        Today's Diagnoses     Counseling NOS(V65.40)    -  1       Follow-ups after your visit        Your next 10 appointments already scheduled     Dec 13, 2017  9:30 AM CST   Level 6 with SH INFUSION CHAIR 8   [facility] ([facility])    [facility]  [address]  [place] [address]   [phone]            Dec 27, 2017  9:00 AM CST   Level 6 with SH INFUSION CHAIR 8   [facility] ([facility])    [facility]  [address]  [place] [address]   [phone]            Dec 27, 2017  9:30 AM CST   Return Visit with Radha Piedra MD   [facility] ([facility])    [facility]  [address]  [place] [address]   [phone]              Who to contact     If you have questions or need follow up information about today's clinic visit or your schedule please contact [facility] directly at [phone].  Normal or non-critical lab and imaging results will be communicated to you by MyChart, letter or phone within 4 business days after the clinic has received the results. If you do not hear from us within 7 days, please contact the clinic through Collplant[employer] or phone. If you have a critical or abnormal lab result, we will notify you by phone as soon as possible.  Submit refill requests through Bec[employer]ill[employer] or call your pharmacy and they will forward the refill request to us. Please allow 3 business days for your refill to be completed.          Additional Information About Your Visit        MyChart Information     Bec[employer]illage lets you  "send messages to your doctor, view your test results, renew your prescriptions, schedule appointments and more. To sign up, go to [email or web address] . Click on \"Log in\" on the left side of the screen, which will take you to the Welcome page. Then click on \"Sign up Now\" on the right side of the page.     You will be asked to enter the access code listed below, as well as some personal information. Please follow the directions to create your username and password.     Your access code is: [email or web address]  Expires: 2017 11:39 AM     Your access code will  in 90 days. If you need help or a new code, please call your [place] clinic or [phone].        Care EveryWhere ID     This is your Care EveryWhere ID. This could be used by other organizations to access your [facility] medical records  [id number]         Blood Pressure from Last 3 Encounters:   17 145/80   17 154/85   17 154/85    Weight from Last 3 Encounters:   17 110.5 kg (243 lb 9.6 oz)   17 110.5 kg (243 lb 9.6 oz)   17 106.2 kg (234 lb 3.2 oz)              Today, you had the following     No orders found for display       Primary Care Provider Office Phone # Fax #    Amaya Howard [phone]       [place] [address]        Equal Access to Services     [facility] : Hadii aad ku hadasho Soomaali, waaxda luqadaha, qaybta kaalmada adeegyada, waxay odalis cevallos . So [facility] [phone].    ATENCIÓN: Si habla español, tiene a schmitt disposición servicios gratuitos de asistencia lingüística. Llame al [phone].    We comply with applicable federal civil rights laws and [place] laws. We do not discriminate on the basis of race, color, national origin, age, disability, sex, sexual orientation, or gender identity.            Thank you!     Thank you for choosing [facility]  for your care. Our goal is always to provide you with " excellent care. Hearing back from our patients is one way we can continue to improve our services. Please take a few minutes to complete the written survey that you may receive in the mail after your visit with us. Thank you!             Your Updated Medication List - Protect others around you: Learn how to safely use, store and throw away your medicines at www.disposemymeds.org.          This list is accurate as of: 12/1/17  3:42 PM.  Always use your most recent med list.                   Brand Name Dispense Instructions for use Diagnosis    ACETAMINOPHEN PO      Take 1,000 mg by mouth every 4 hours as needed for pain ((Patient has been taking 4-12 tabs per day))        albuterol 108 (90 BASE) MCG/ACT Inhaler    PROAIR HFA/PROVENTIL HFA/VENTOLIN HFA    1 Inhaler    Inhale 2 puffs into the lungs every 6 hours as needed for shortness of breath / dyspnea or wheezing    Reactive airway disease without complication, unspecified asthma severity, unspecified whether persistent       CALCIUM-MAGNESIUM-ZINC PO      Take 1 tablet by mouth daily        CENTRUM SILVER per tablet     100 tablet    Take 1 tablet by mouth daily.        CHLORTHALIDONE PO      Take 25 mg by mouth daily        * enoxaparin 100 MG/ML injection    LOVENOX    49.8 mL    Inject 1.66 mLs (166 mg) Subcutaneous every 24 hours    Other acute pulmonary embolism without acute cor pulmonale (H)       * enoxaparin 80 MG/0.8ML injection    LOVENOX    60 Syringe    Inject 1.6 mLs (160 mg) Subcutaneous every 24 hours    Other acute pulmonary embolism without acute cor pulmonale (H)       lidocaine 5 % ointment    XYLOCAINE    5 g    Apply 0.5 g topically as needed for moderate pain Apply pea size amount to rectal area for pain        lidocaine-prilocaine cream    EMLA    30 g    Apply topically as needed for moderate pain Apply dollop size amount to port site 30-60 minutes prior to accessing.    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        lisinopril 10 MG tablet    PRINIVIL/ZESTRIL    30 tablet    Take 1 tablet by mouth daily.    HTN (hypertension)       LORazepam 0.5 MG tablet    ATIVAN    30 tablet    Take 1 tablet (0.5 mg) by mouth every 4 hours as needed (Anxiety, Nausea/Vomiting or Sleep)    Malignant neoplasm of hepatic flexure (H)       NYQUIL PO           ondansetron 8 MG tablet    ZOFRAN    10 tablet    Take 1 tablet (8 mg) by mouth every 8 hours as needed (Nausea/Vomiting)    Malignant neoplasm of hepatic flexure (H)       Potassium Chloride ER 20 MEQ Tbcr     30 tablet    Take 1 tablet (20 mEq) by mouth daily    Hypokalemia       prochlorperazine 10 MG tablet    COMPAZINE    30 tablet    Take 1 tablet (10 mg) by mouth every 6 hours as needed (Nausea/Vomiting)    Malignant neoplasm of hepatic flexure (H)       sennosides 8.6 MG tablet    SENOKOT    60 tablet    Take 1 tablet by mouth 2 times daily    Constipation due to opioid therapy       SERTRALINE HCL PO      Take 50 mg by mouth daily (Takes half of a 100mg tablet for a total of 50mg daily)        VITAMIN D (CHOLECALCIFEROL) PO      Take 1 tablet by mouth daily (OTC: Pt unsure of strength)        * Notice:  This list has 2 medication(s) that are the same as other medications prescribed for you. Read the directions carefully, and ask your doctor or other care provider to review them with you.

## 2017-12-01 NOTE — PROGRESS NOTES
Social Work Progress Note      Data/Intervention:  Patient Name:  Liv Sylvester  /Age:  1950 (67 year old)    Reason for Follow-Up:  Liv comes to clinic today for pump disconnect, IVF, and neulasta. This clinician met with pt for routine psychosocial check-in and support.     Intervention:   Pt reported today that she felt confident in administering lovenox 17 at 3:00pm. Pt reported that using the Emla cream helped significantly with her discomfort, as well as administering the medication slowly. Pt reported that she was proud and surprised by herself, and feels that she has the support she needs to stay compliant with medication compliance. Pt reported today feeling encouraged by noticing that the swelling has already gone down in her legs, which has been a great relief of pt. Pt reports that she wants to keep her administration time of 3pm the same daily, as she believes this will help her to remember taking it. Pt acknowledged some frustration regarding costs of medication, this clinician provided pt with coupon for lovenox assistance, and will inquire as to if there are other sources available for funding from prior authorization specialist when she returns 17.     Liv reported that her home care services are discontinuing, which she reports that she feels optimistic about. Pt acknowledges that she feels better able to care for herself at home, and is feeling more optimistic about the idea of returning to work in the coming months. 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 Pt acknowledged that she has been getting great madelyn from putting decorations on the [place] tree with grandchildren, and is hopeful that she will be alive for another [place]. Pt appears to have good understanding as to the extent of her disease, and is feeling optimistic about how she has  been responding to treatment thus far. Pt indicated that she would welcome a call from this clinician on Monday to check-in regarding lovenox application, although she feels more confident now, and brought injections into clinic today with Emla cream already on, in anticipation of application. Pt appears to be coping as well as can be expected.     Plan:  This clinician will follow-up with pt 12/4/17 by phone to check-in on pt's adjustment to lovenox administration. Pt knows to reach out to this clinician in the meantime if in the case of psychosocial distress or concern.     Please call or page if needs or concerns arise.     DEANNE Watkins, LICSW  Direct Phone: [phone]  Pager: [phone]

## 2017-12-01 NOTE — PROGRESS NOTES
Infusion Nursing Note:  Liv Sylvester presents today for unhook chemo pump, IVF and neulasta.    Patient seen by provider today: No   present during visit today: Not Applicable.    Note: N/A.    Intravenous Access:  Implanted Port.    Treatment Conditions:  Not Applicable.      Post Infusion Assessment:  Patient tolerated infusion without incident.  Blood return noted pre and post infusion.  Site patent and intact, free from redness, edema or discomfort.  No evidence of extravasations.  POWER PORT NEEDLE REINSERTED AS PATIENT RECEIVES DAILY IVF AT HOME (UNAWARE OF THIS WHEN FIRST NEEDLE WAS REMOVED)    Discharge Plan:   [facility]  Patient will return 12/13/17 for next appointment.   Patient discharged in stable condition accompanied by: self.  Departure Mode: Ambulatory with walker.    Luisito Guerra RN

## 2017-12-01 NOTE — MR AVS SNAPSHOT
After Visit Summary   12/1/2017    Liv Sylvester    MRN: [id number]           Patient Information     Date Of Birth          1950        Visit Information        Provider Department      12/1/2017 2:00 PM  INFUSION CHAIR 3 [facility]        Today's Diagnoses     Malignant neoplasm of hepatic flexure (H)    -  1    Portacath in place           Follow-ups after your visit        Follow-up notes from your care team     Return in 12 days (on 12/13/2017).      Your next 10 appointments already scheduled     Dec 13, 2017  9:30 AM CST   Level 6 with  INFUSION CHAIR 8   [facility] ([facility])    [facility]  [address]  [place] [address]   [phone]            Dec 27, 2017  9:00 AM CST   Level 6 with  INFUSION CHAIR 8   [facility] ([facility])    [facility]  [address]  [place] [address]   [phone]            Dec 27, 2017  9:30 AM CST   Return Visit with Radha Piedra MD   [facility] ([facility])    [facility]  [address]  [place] [address]   [phone]              Who to contact     If you have questions or need follow up information about today's clinic visit or your schedule please contact [facility] directly at [phone].  Normal or non-critical lab and imaging results will be communicated to you by Eventure Interactivehart, letter or phone within 4 business days after the clinic has received the results. If you do not hear from us within 7 days, please contact the clinic through Event[employer] Interactivehart or phone. If you have a critical or abnormal lab result, we will notify you by phone as soon as possible.  Submit refill requests through [employer] or call your pharmacy and they will forward the refill request to us.  "Please allow 3 business days for your refill to be completed.          Additional Information About Your Visit        MyChart Information     Webvantahart lets you send messages to your doctor, view your test results, renew your prescriptions, schedule appointments and more. To sign up, go to [email or web address] . Click on \"Log in\" on the left side of the screen, which will take you to the Welcome page. Then click on \"Sign up Now\" on the right side of the page.     You will be asked to enter the access code listed below, as well as some personal information. Please follow the directions to create your username and password.     Your access code is: [email or web address]  Expires: 2017 11:39 AM     Your access code will  in 90 days. If you need help or a new code, please call your [place] clinic or [phone].        Care EveryWhere ID     This is your Care EveryWhere ID. This could be used by other organizations to access your [place] medical records  [id number]        Your Vitals Were     Pulse Temperature Respirations Pulse Oximetry          100 99.4  F (37.4  C) (Oral) 22 100%         Blood Pressure from Last 3 Encounters:   17 171/89   17 154/85   17 154/85    Weight from Last 3 Encounters:   17 110.5 kg (243 lb 9.6 oz)   17 110.5 kg (243 lb 9.6 oz)   17 106.2 kg (234 lb 3.2 oz)              Today, you had the following     No orders found for display       Primary Care Provider Office Phone # Fax #    Amaya Bethea Anita [phone]       [facility] [address] [place] [address]        Equal Access to Services     SARITHA IVERSON : Jeffry Pierce, wiley michael, bhumika kaalmada ross, john maya. So [facility] [place] [facility] [phone].    ATENCIÓN: Si habla español, tiene a schmitt disposición servicios gratuitos de asistencia lingüística. Llame al [phone].    We comply with applicable federal civil rights laws and " [place] laws. We do not discriminate on the basis of race, color, national origin, age, disability, sex, sexual orientation, or gender identity.            Thank you!     Thank you for choosing [facility]  for your care. Our goal is always to provide you with excellent care. Hearing back from our patients is one way we can continue to improve our services. Please take a few minutes to complete the written survey that you may receive in the mail after your visit with us. Thank you!             Your Updated Medication List - Protect others around you: Learn how to safely use, store and throw away your medicines at www.disposemymeds.org.          This list is accurate as of: 12/1/17  4:10 PM.  Always use your most recent med list.                   Brand Name Dispense Instructions for use Diagnosis    ACETAMINOPHEN PO      Take 1,000 mg by mouth every 4 hours as needed for pain ((Patient has been taking 4-12 tabs per day))        albuterol 108 (90 BASE) MCG/ACT Inhaler    PROAIR HFA/PROVENTIL HFA/VENTOLIN HFA    1 Inhaler    Inhale 2 puffs into the lungs every 6 hours as needed for shortness of breath / dyspnea or wheezing    Reactive airway disease without complication, unspecified asthma severity, unspecified whether persistent       CALCIUM-MAGNESIUM-ZINC PO      Take 1 tablet by mouth daily        CENTRUM SILVER per tablet     100 tablet    Take 1 tablet by mouth daily.        CHLORTHALIDONE PO      Take 25 mg by mouth daily        * enoxaparin 100 MG/ML injection    LOVENOX    49.8 mL    Inject 1.66 mLs (166 mg) Subcutaneous every 24 hours    Other acute pulmonary embolism without acute cor pulmonale (H)       * enoxaparin 80 MG/0.8ML injection    LOVENOX    60 Syringe    Inject 1.6 mLs (160 mg) Subcutaneous every 24 hours    Other acute pulmonary embolism without acute cor pulmonale (H)       lidocaine 5 % ointment    XYLOCAINE    5 g    Apply 0.5 g topically as needed for  moderate pain Apply pea size amount to rectal area for pain        lidocaine-prilocaine cream    EMLA    30 g    Apply topically as needed for moderate pain Apply dollop size amount to port site 30-60 minutes prior to accessing.    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    HTN (hypertension)       LORazepam 0.5 MG tablet    ATIVAN    30 tablet    Take 1 tablet (0.5 mg) by mouth every 4 hours as needed (Anxiety, Nausea/Vomiting or Sleep)    Malignant neoplasm of hepatic flexure (H)       NYQUIL PO           ondansetron 8 MG tablet    ZOFRAN    10 tablet    Take 1 tablet (8 mg) by mouth every 8 hours as needed (Nausea/Vomiting)    Malignant neoplasm of hepatic flexure (H)       Potassium Chloride ER 20 MEQ Tbcr     30 tablet    Take 1 tablet (20 mEq) by mouth daily    Hypokalemia       prochlorperazine 10 MG tablet    COMPAZINE    30 tablet    Take 1 tablet (10 mg) by mouth every 6 hours as needed (Nausea/Vomiting)    Malignant neoplasm of hepatic flexure (H)       sennosides 8.6 MG tablet    SENOKOT    60 tablet    Take 1 tablet by mouth 2 times daily    Constipation due to opioid therapy       SERTRALINE HCL PO      Take 50 mg by mouth daily (Takes half of a 100mg tablet for a total of 50mg daily)        VITAMIN D (CHOLECALCIFEROL) PO      Take 1 tablet by mouth daily (OTC: Pt unsure of strength)        * Notice:  This list has 2 medication(s) that are the same as other medications prescribed for you. Read the directions carefully, and ask your doctor or other care provider to review them with you.

## 2017-12-04 NOTE — PROGRESS NOTES
This is a recent snapshot of the patient's [facility] medical record.  For current drug dose and complete information and questions, call [phone] or In Basket pool, fv home infusion ([id number])  CSN Number:  [id number]

## 2017-12-04 NOTE — CONSULTS
[facility]  Gastroenterology Consultation         Liv Sylvester  [address]   [facility] [address]  67 year old female    Admission Date/Time: 9/29/2017  Primary Care Provider: Amaya Howard  Referring / Attending Physician:  Dr. Olivares    We were asked to see the patient in consultation by Dr. Olivares for evaluation of metastatic cancer..      CC: abnormal CT scan    HPI:  Liv Sylvester is a 67 year old female with history of hypertension, depression, ovarian cancer history (s/p DAPHNEY/BSO), obesity who presents with six weeks of worsening right upper quadrant pain associated with early satiety, weight loss, progressive fatigue.  She recently saw her primary care provider at [facility] and see on 9/20 for these symptoms and was found to have a low hemoglobin and guaiac positive stools.  At that time she was started on iron replacement and referred for colonoscopy which she has not yet had time to arrange.  She has been taking 2-4000 mg of acetaminophen daily to deal with the pain and it is still not controlled.  She works full-time in customer service at [employer] and has been unable to work due to this pain and extreme fatigue.  Endorses low-grade nausea, low appetite, early satiety.  Denies hematemesis, bright red blood per rectum, melena, fevers, chills, night sweats.  She reports a week ago her weight was 252 and today it is 245 pounds.  It has become increasingly difficult to have bowel movements and she has been taking over-the-counter stool softener and stimulants for this with minimal results. Routine labs revealed hemoglobin of 8.5 with an MCV of 73. An abdominal ultrasound revealed liver masses which prompted a CT scan.  This showed focal bowel wall thickening involving the colon at the hepatic flexure, multiple liver masses, enlarged mesenteric and retroperitoneal lymph nodes.  Radiology read indicates findings are worrisome for primary colon cancer with metastatic disease.  Patient is  denying any significant abdominal pain or discomfort now. No nausea or vomiting.       ROS: A comprehensive ten point review of systems was negative aside from those in mentioned in the HPI.      PAST MED HX:  I have reviewed this patient's medical history and updated it with pertinent information if needed.   Past Medical History:   Diagnosis Date     Cancer (H)     colon     Flash pulmonary edema (H) 2013    With concurrent severe HTN, complicating DAPHNEY/BSO     Hemorrhoid      HTN (hypertension)      Obesity        MEDICATIONS:   Prior to Admission Medications   Prescriptions Last Dose Informant Patient Reported? Taking?   ACETAMINOPHEN PO ~2 days ago Self Yes Yes   Sig: Take 1,000 mg by mouth every 4 hours as needed for pain ((Patient has been taking 4-12 tabs per day))   ASPIRIN EC PO ~2 days ago Self Yes No   Sig: Take 81 mg by mouth daily   CALCIUM-MAGNESIUM-ZINC PO ~2 days ago Self Yes Yes   Sig: Take 1 tablet by mouth daily   CHLORTHALIDONE PO ~2 days ago Pharmacy Yes Yes   Sig: Take 25 mg by mouth daily   Multiple Vitamins-Minerals (CENTRUM SILVER) per tablet ~2 days ago Self Yes Yes   Sig: Take 1 tablet by mouth daily.   SERTRALINE HCL PO ~2 days ago Pharmacy Yes Yes   Sig: Take 50 mg by mouth daily (Takes half of a 100mg tablet for a total of 50mg daily)   VITAMIN D, CHOLECALCIFEROL, PO ~2 days ago Self Yes Yes   Sig: Take 1 tablet by mouth daily (OTC: Pt unsure of strength)   lisinopril (PRINIVIL,ZESTRIL) 10 MG tablet ~2 days ago Pharmacy No Yes   Sig: Take 1 tablet by mouth daily.      Facility-Administered Medications: None       ALLERGIES: No Known Allergies    SOCIAL HISTORY:  Social History   Substance Use Topics     Smoking status: Former Smoker     Quit date: 2005     Smokeless tobacco: Never Used     Alcohol use No       FAMILY HISTORY:  Family History   Problem Relation Age of Onset     CEREBROVASCULAR DISEASE Father       age 66     HEART DISEASE Mother       of MI, age 72      DIABETES Mother      Hypertension Mother        PHYSICAL EXAM:   General  Awake, alert, oriented  Vital Signs with Ranges                          Constitutional: healthy, alert and no distress   Cardiovascular: negative, PMI normal. No lifts, heaves, or thrills. RRR. No murmurs, clicks gallops or rub  Respiratory: negative, Percussion normal. Good diaphragmatic excursion. Lungs clear  Head: Normocephalic. No masses, lesions, tenderness or abnormalities  Neck: Neck supple. No adenopathy. Thyroid symmetric, normal size,, Carotids without bruits.  Abdomen: Abdomen soft, non-tender. BS normal. No masses, organomegaly          ADDITIONAL COMMENTS:   I reviewed the patient's new clinical lab test results.   Recent Labs   Lab Test  11/29/17   0915  11/22/17   0920 11/08/17   0905 09/29/17   1210   WBC  3.9*  2.5*  4.1   < >  8.3   HGB  9.9*  9.9*  10.5*   < >  8.5*   MCV  85  81  80   < >  73*   PLT  172  156  270   < >  420   INR   --    --    --    --   1.05    < > = values in this interval not displayed.     Recent Labs   Lab Test  11/29/17   0915  11/22/17   0920  11/08/17   0905   POTASSIUM  3.7  2.8*  3.1*   CHLORIDE  109  106  102   CO2  28  33*  27   BUN  9  10  16   ANIONGAP  6  5  9     Recent Labs   Lab Test  11/29/17   0915  11/22/17   0920  11/08/17   1124  11/08/17   0905  10/25/17   1337   09/29/17   1210   ALBUMIN  2.7*  2.7*   --   2.9*   --    < >  2.7*   BILITOTAL  0.3  0.3   --   0.5   --    < >  0.3   ALT  41  18   --   36   --    < >  30   AST  35  19   --   23   --    < >  54*   PROTEIN  Negative   --   Negative   --   Negative   --    --    LIPASE   --    --    --    --    --    --   74    < > = values in this interval not displayed.       I reviewed the patient's new imaging results.        CONSULTATION ASSESSMENT AND PLAN:    Active Problems:    Liver masses  67 year old female with H/O abdominal discomfort and abnormal CT scan findings along with anemia.  Metastatic disease. H/O ovarian CA in  the past.  Worrisome for metastatic colon CA to liver.  Plan for colonoscopy tomorrow.with biopsies.           Kevin Celis MD, FACP  [facility].  Office: [phone]  Cell : [phone]

## 2017-12-04 NOTE — PROGRESS NOTES
This is a recent snapshot of the patient's [facility] medical record.  For current drug dose and complete information and questions, call [phone] or In Basket pool, fv home infusion ([id number])  CSN Number:  [id number]

## 2017-12-04 NOTE — PROGRESS NOTES
This is a recent snapshot of the patient's [facility] medical record.  For current drug dose and complete information and questions, call [phone] or In Basket pool, fv home infusion ([id number])  CSN Number:  [id number]

## 2017-12-04 NOTE — TELEPHONE ENCOUNTER
Social Work Progress Note      Data/Intervention:  Patient Name:  Liv Sylvester  /Age:  1950 (67 year old)    Reason for Follow-Up:  This clinician called pt on phone this morning for planned psychosocial check-in and support.     Intervention:   Pt reports that she is coping well this morning, reporting that she is feeling very fatigued from treatment last week, and acknowledges that she has been spending much of her time in bed. Liv reports that she has been compliant with lovenox medication administration having taken it independently on Saturday and , reporting that it is easier with emla cream prior to administration. Liv continues to report that she has seen a difference in swelling in her feet, and plans to take next dose this afternoon. Liv reports that her only concern today is that she is starting to run low on medications in home, although she reports having 1 week in home at present.     Plan:  1) RN to follow-up with pt about medications pt requires refills for, and will help pt to determine whether they are to be prescribed by oncologist or PCP  2) This clinician will continue to follow pt for ongoing psychosocial support as pt continues to adjust to treatment.   3) Pt has demonstrated that she has been compliant with lovenox administration independently for four days.      Please call or page if needs or concerns arise.     DEANNE Watkins, [facility] Center[facility]  Direct Phone: [phone]  Pager: [phone]

## 2017-12-05 NOTE — PROGRESS NOTES
This is a recent snapshot of the patient's [facility] medical record.  For current drug dose and complete information and questions, call [phone] or In Basket pool, fv home infusion ([id number])  CSN Number:  [id number]

## 2017-12-06 NOTE — TELEPHONE ENCOUNTER
[facility] called requesting a telephone order for port deaccess for patient. Telephone order given for port deaccess. Joyce Betts RN

## 2017-12-08 NOTE — PROGRESS NOTES
This is a recent snapshot of the patient's [facility] medical record.  For current drug dose and complete information and questions, call [phone] or In Basket pool, fv home infusion ([id number])  CSN Number:  [id number]

## 2017-12-08 NOTE — PROGRESS NOTES
This is a recent snapshot of the patient's [facility] medical record.  For current drug dose and complete information and questions, call [phone] or In Basket pool, fv home infusion ([id number])  CSN Number:  [id number]

## 2017-12-08 NOTE — TELEPHONE ENCOUNTER
Call received from JACKIE Bennett with [facility].  She states that Liv told her 2 days ago that she experienced a few episodes of tachycardia that resolved and 1 episode of chest pain that resolved.  Writer called Liv to see how her lovenox injections are going and asked about this.  Advised her to call 911 or go to the ER if this happens again- She verbalized understanding of this.    She stated the lovenox injections are going well since she has been using the emla cream prior to the shots.  Will follow up with Oscar on 12/13/17 when she comes in for treatment next.

## 2017-12-11 NOTE — PROGRESS NOTES
This is a recent snapshot of the patient's [facility] medical record.  For current drug dose and complete information and questions, call [phone] or In Basket pool, fv home infusion ([id number])  CSN Number:  [id number]

## 2017-12-11 NOTE — PROGRESS NOTES
This is a recent snapshot of the patient's [facility] medical record.  For current drug dose and complete information and questions, call [phone] or In Basket pool, fv home infusion ([id number])  CSN Number:  [id number]

## 2017-12-12 NOTE — PROGRESS NOTES
This is a recent snapshot of the patient's [facility] medical record.  For current drug dose and complete information and questions, call [phone] or In Basket pool, fv home infusion ([id number])  CSN Number:  [id number]

## 2017-12-13 NOTE — PROGRESS NOTES
"Infusion Nursing Note:  Liv Sylvester presents today for C5D1 folfox/avastin    Patient seen by provider today: No   present during visit today: Not Applicable.    Note: N/A.    Intravenous Access:  Labs drawn without difficulty.  Implanted Port already accessed.    Treatment Conditions:  Lab Results   Component Value Date    HGB 11.6 12/13/2017     Lab Results   Component Value Date    WBC 5.8 12/13/2017      Lab Results   Component Value Date    ANEU 3.8 12/13/2017     Lab Results   Component Value Date     12/13/2017      Lab Results   Component Value Date     12/13/2017                   Lab Results   Component Value Date    POTASSIUM 3.7 12/13/2017           No results found for: MAG         Lab Results   Component Value Date    CR 0.66 12/13/2017                   Lab Results   Component Value Date    JESÚS 9.7 12/13/2017                Lab Results   Component Value Date    BILITOTAL 0.3 12/13/2017           Lab Results   Component Value Date    ALBUMIN 3.3 12/13/2017                    Lab Results   Component Value Date    ALT 24 12/13/2017           Lab Results   Component Value Date    AST 23 12/13/2017     Results reviewed, labs MET treatment parameters, ok to proceed with treatment.  Urine negative.    Prior to discharge: Port is secured in place with tegaderm and flushed with 10cc NS with positive blood return noted.  Continuous home infusion Dosi-Fuser pump connected.    All connectors secured in place and clamps taped open.    Pump started, \"running\" noted on display (CADD): Not Applicable.  Patient instructed to call our clinic or [facility] with any questions or concerns at home.  Patient verbalized understanding.    Patient set up for pump disconnect at home with [facility] on 12/15 at 11:30am..                  Post Infusion Assessment:  Patient tolerated infusion without incident.  Blood return noted pre and post infusion.  Blood return noted during " administration every 2-3 cc's with 5Fu push.  Site patent and intact, free from redness, edema or discomfort.  No evidence of extravasations.    Discharge Plan:   AVS to patient via MYCHART.  Patient will return 12/27/17 for next appointment.   Patient discharged in stable condition accompanied by: self.  Departure Mode: Ambulatory with walker.    Luisito Guerra RN

## 2017-12-13 NOTE — MR AVS SNAPSHOT
After Visit Summary   12/13/2017    Liv Sylvester    MRN: [id number]           Patient Information     Date Of Birth          1950        Visit Information        Provider Department      12/13/2017 9:30 AM  INFUSION CHAIR 8 [facility]        Today's Diagnoses     Malignant neoplasm of hepatic flexure (H)    -  1       Follow-ups after your visit        Follow-up notes from your care team     Return in 2 days (on 12/15/2017).      Your next 10 appointments already scheduled     Dec 27, 2017  9:00 AM CST   Level 6 with  INFUSION CHAIR 8   [facility] ([facility])    [facility]  [address]  [place] [address] [phone]            Dec 27, 2017  9:30 AM CST   Return Visit with Radha Piedra MD   [facility] ([facility])    [facility]  [address]  [facility] [id number] [phone]              Who to contact     If you have questions or need follow up information about today's clinic visit or your schedule please contact [facility] [place] [facility] directly at [phone].  Normal or non-critical lab and imaging results will be communicated to you by [employer] Systemshart, letter or phone within 4 business days after the clinic has received the results. If you do not hear from us within 7 days, please contact the clinic through [employer] Systemshart or phone. If you have a critical or abnormal lab result, we will notify you by phone as soon as possible.  Submit refill requests through [employer] or call your pharmacy and they will forward the refill request to us. Please allow 3 business days for your refill to be completed.          Additional Information About Your Visit        [employer] Information     WorldAPP lets you send messages to your doctor, view your test results, renew your prescriptions, schedule appointments and  "more. To sign up, go to [email or web address] . Click on \"Log in\" on the left side of the screen, which will take you to the Welcome page. Then click on \"Sign up Now\" on the right side of the page.     You will be asked to enter the access code listed below, as well as some personal information. Please follow the directions to create your username and password.     Your access code is: [email or web address]  Expires: 2017 11:39 AM     Your access code will  in 90 days. If you need help or a new code, please call your [facility] clinic or [phone].        Care EveryWhere ID     This is your Care EveryWhere ID. This could be used by other organizations to access your [facility] medical records  [id number]        Your Vitals Were     Pulse Temperature Respirations Height BMI (Body Mass Index)       79 98.2  F (36.8  C) (Oral) 22 1.753 m (5' 9.02\") 33.95 kg/m2        Blood Pressure from Last 3 Encounters:   17 134/88   17 171/89   17 154/85    Weight from Last 3 Encounters:   17 104.3 kg (230 lb)   17 110.5 kg (243 lb 9.6 oz)   17 110.5 kg (243 lb 9.6 oz)              We Performed the Following     CBC with platelets differential     Comprehensive metabolic panel     Protein qualitative urine        Primary Care Provider Office Phone # Fax #    Amaya Bethea Anita [phone]       [facility] [address] [place] [address]        Equal Access to Services     [facility] Hospital[facility] : Hadii anthony Pierce, wavanessada alec, qaybta kaaljohn malik. So [facility] [phone].    ATENCIÓN: Si habla español, tiene a schmitt disposición servicios gratuitos de asistencia lingüística. Wiley al [phone].    We comply with applicable federal civil rights laws and [place] laws. We do not discriminate on the basis of race, color, national origin, age, disability, sex, sexual orientation, or gender identity.            Thank you!  "    Thank you for choosing [facility] C[facility]  for your care. Our goal is always to provide you with excellent care. Hearing back from our patients is one way we can continue to improve our services. Please take a few minutes to complete the written survey that you may receive in the mail after your visit with us. Thank you!             Your Updated Medication List - Protect others around you: Learn how to safely use, store and throw away your medicines at www.disposemymeds.org.          This list is accurate as of: 12/13/17  2:08 PM.  Always use your most recent med list.                   Brand Name Dispense Instructions for use Diagnosis    ACETAMINOPHEN PO      Take 1,000 mg by mouth every 4 hours as needed for pain ((Patient has been taking 4-12 tabs per day))        albuterol 108 (90 BASE) MCG/ACT Inhaler    PROAIR HFA/PROVENTIL HFA/VENTOLIN HFA    1 Inhaler    Inhale 2 puffs into the lungs every 6 hours as needed for shortness of breath / dyspnea or wheezing    Reactive airway disease without complication, unspecified asthma severity, unspecified whether persistent       CALCIUM-MAGNESIUM-ZINC PO      Take 1 tablet by mouth daily        CENTRUM SILVER per tablet     100 tablet    Take 1 tablet by mouth daily.        CHLORTHALIDONE PO      Take 25 mg by mouth daily        * enoxaparin 100 MG/ML injection    LOVENOX    49.8 mL    Inject 1.66 mLs (166 mg) Subcutaneous every 24 hours    Other acute pulmonary embolism without acute cor pulmonale (H)       * enoxaparin 80 MG/0.8ML injection    LOVENOX    60 Syringe    Inject 1.6 mLs (160 mg) Subcutaneous every 24 hours    Other acute pulmonary embolism without acute cor pulmonale (H)       lidocaine 5 % ointment    XYLOCAINE    5 g    Apply 0.5 g topically as needed for moderate pain Apply pea size amount to rectal area for pain        lidocaine-prilocaine cream    EMLA    30 g    Apply topically as needed for moderate pain Apply dollop size  amount to port site 30-60 minutes prior to accessing.    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    HTN (hypertension)       LORazepam 0.5 MG tablet    ATIVAN    30 tablet    Take 1 tablet (0.5 mg) by mouth every 4 hours as needed (Anxiety, Nausea/Vomiting or Sleep)    Malignant neoplasm of hepatic flexure (H)       NYQUIL PO           ondansetron 8 MG tablet    ZOFRAN    10 tablet    Take 1 tablet (8 mg) by mouth every 8 hours as needed (Nausea/Vomiting)    Malignant neoplasm of hepatic flexure (H)       Potassium Chloride ER 20 MEQ Tbcr     30 tablet    Take 1 tablet (20 mEq) by mouth daily    Hypokalemia       prochlorperazine 10 MG tablet    COMPAZINE    30 tablet    Take 1 tablet (10 mg) by mouth every 6 hours as needed (Nausea/Vomiting)    Malignant neoplasm of hepatic flexure (H)       sennosides 8.6 MG tablet    SENOKOT    60 tablet    Take 1 tablet by mouth 2 times daily    Constipation due to opioid therapy       SERTRALINE HCL PO      Take 50 mg by mouth daily (Takes half of a 100mg tablet for a total of 50mg daily)        VITAMIN D (CHOLECALCIFEROL) PO      Take 1 tablet by mouth daily (OTC: Pt unsure of strength)        * Notice:  This list has 2 medication(s) that are the same as other medications prescribed for you. Read the directions carefully, and ask your doctor or other care provider to review them with you.

## 2017-12-13 NOTE — PROGRESS NOTES
This is a recent snapshot of the patient's [facility] medical record.  For current drug dose and complete information and questions, call [phone] or In Basket pool, fv home infusion ([id number])  CSN Number:  [id number]

## 2017-12-14 NOTE — PROGRESS NOTES
This is a recent snapshot of the patient's [facility] medical record.  For current drug dose and complete information and questions, call [phone] or In Basket pool, fv home infusion ([id number])  CSN Number:  [id number]

## 2017-12-15 PROBLEM — I26.99 PE (PULMONARY THROMBOEMBOLISM) (H): Status: ACTIVE | Noted: 2017-01-01

## 2017-12-15 NOTE — ED NOTES
RN called [facility] @ [phone] about pt's arrival. RN had instructed pt to go to infusion therapy and not to ED. Chemo is currently running through port and pt refuses to be stuck with needles. Rn asked infusion RN come over to disconnect Chemo so we can give care

## 2017-12-15 NOTE — IP AVS SNAPSHOT
[address], [address]    [facility] [address]    Phone:  [phone]                                       After Visit Summary   12/15/2017    Liv Sylvester    MRN: [id number]           After Visit Summary Signature Page     I have received my discharge instructions, and my questions have been answered. I have discussed any challenges I see with this plan with the nurse or doctor.    ..........................................................................................................................................  Patient/Patient Representative Signature      ..........................................................................................................................................  Patient Representative Print Name and Relationship to Patient    ..................................................               ................................................  Date                                            Time    ..........................................................................................................................................  Reviewed by Signature/Title    ...................................................              ..............................................  Date                                                            Time

## 2017-12-15 NOTE — TELEPHONE ENCOUNTER
"I called patient at approximately 9:45am and assessed what symptoms she was having that made her \"to sick\" to come in? She informed me that she has been having diarrhea, nausea and vomiting and it has been going on for the last few days or so. She states she did have a temperature yesterday of 101 but does not have one today.   I informed her that she needs to come in today at her scheduled 11:30 time slot and we can assess, draw labs for possible electrolyte replacement, give IV hydration, and antiemetics through her port, and MD can see her and advise if hospital admission is indicated.. Patient verbalized understanding. .          "

## 2017-12-15 NOTE — TELEPHONE ENCOUNTER
Patient is stating cost of enoxparin is unaffordable.    I made a call to the patients insurance to investigate possible options for copay reductions. I attempted to process a tier exemption but it was denied. An appeal can be made but I would need to call the insurance back to discuss this on Monday. Case id#[id number]    After further questioning the representative found out that that the patients plan contracts with [facility]. If the patient fills at [facility] her monthly copay for enoxaparin should be $10. This is an estimate so we would have to send the prescription to [facility] to verify this.

## 2017-12-15 NOTE — H&P
[facility]    History and Physical  Hospitalist       Date of Admission:  12/15/2017    Assessment & Plan   Liv Sylvester is a 67 year old female with known stage IV metastatic colon cancer with metastasis to the liver and lung, recurrent DVTs/PEs currently on Lovenox and ovarian cancer s/p DAPHNEY w/BSO 5 years ago who presented with generalized weakness.  CT scan of the abdomen showed probably new pulmonary embolism in left lower lobe.    PE left lower lobe  H/o DVTs/PEs  CT scan today showed probable new left lower lobe PE.  Patient was actually already on Lovenox therapy for DVTs/PEs that were first found in October.  It sounds like she was initially on Lovenox injections, switched to Xarelto at the beginning of November and then switched back to Lovenox 2 weeks ago after they found her bilateral DVTs had progressed in size.  In the ED the patient was switched to a Heparin drip for further therapy.    - Admission to medical floor  - Continue Heparin drip  - Will consult hem/onc to help with anticoagulation management in this patient as she has already failed Xarelto and Lovenox therapy    Colon cancer stage IV w/mets to the liver and lung  Chemotherapy side effects  Patient is followed by Dr. Piedra and she states that she undergoes chemotherapy every 2-3 weeks and has comleted 6-7 rounds.  Her last round was 3 days ago.  Since then she has had generalized weakness, and N/V/D which she states is normal for her after a round of chemotherapy  - Symptomatic management  - IVF  - Zofran PRN     Sinus tachycardia  Likely due to PE and possible dehydration from her N/V/D  - Will continue to monitor with vitals.  Do not feel patient warrants telemetry at this time    Reported fever  Supposedly had a fever of 101 degrees at home last night but has been afebrile since without intervention.  No infectious symptoms here other than her normal side effects from chemo.  A CT scan did not show any evidence of  pneumonia.  UA did not show any signs of infection.  Her WBC is normal.  May just be due to the PE and/or reaction to chemotherapy  - At this time will hold off on antibiotics as she has been afebrile  - Continue to monitor     HTN   Well controlled at this time.  - Continue PTA Chlorthalidone and Lisinopril       DVT Prophylaxis: Heparin drip  Code Status: DNR / DNI    Disposition: Expected discharge in 2-3 days once anticoagulation regimen determined.    Jerry Strange DO    Primary Care Physician   RIGOBERTO SEN    Chief Complaint   Generalize weakness    History is obtained from the patient    History of Present Illness   Liv Sylvester is a 67 year old female with a history of recurrent DVTs/PEs, stage IV colon cancer and hypertension who presents with generalized weakness.  Patient received her last chemotherapy treatment 3 days ago and since then has been having issues with generalized weakness.  She has also had SOB, diarrhea, nausea and vomiting which she states is normal for her after her chemotherapy treatments.  She then became concerned yesterday evening as she had a fever of 101 degrees.  She rechecked it later in the evening and it had come down to 99 degrees and she has been afebrile since.  She denies any cough, dysuria, increased urinary frequency or rashes.  She also denies any chest pain, light headedness, leg swelling, leg pain, headaches or focal weakness.      In the ED labs and imaging were obtained.  A CT scan showed a probably left lower lobe PE and the patient was started on Heparin drip.  She was also given a 1000 mL fluid bolus.      Past Medical History    I have reviewed this patient's medical history and updated it with pertinent information if needed.   Past Medical History:   Diagnosis Date     Cancer (H)     colon     Flash pulmonary edema (H) 4/2013    With concurrent severe HTN, complicating DAPHNEY/BSO     Hemorrhoid      HTN (hypertension)      Obesity    Ovarian cancer s/p DAPHNEY  w/BSO    Past Surgical History   I have reviewed this patient's surgical history and updated it with pertinent information if needed.  Past Surgical History:   Procedure Laterality Date     COLONOSCOPY N/A 9/30/2017    Procedure: COMBINED COLONOSCOPY, SINGLE OR MULTIPLE BIOPSY/POLYPECTOMY BY BIOPSY;  colonoscopy;  Surgeon: Kevin Celis MD;  Location:  GI     HYSTERECTOMY, DAPHNEY  4/2013    with BSO     INSERT PORT VASCULAR ACCESS N/A 10/9/2017    Procedure: INSERT PORT VASCULAR ACCESS;  PORT PLACEMENT (MAC);  Surgeon: Rigoberto Nolen MD;  [facility]       Prior to Admission Medications   Prior to Admission Medications   Prescriptions Last Dose Informant Patient Reported? Taking?   ACETAMINOPHEN PO  Self Yes No   Sig: Take 1,000 mg by mouth every 4 hours as needed for pain ((Patient has been taking 4-12 tabs per day))   CALCIUM-MAGNESIUM-ZINC PO  Self Yes No   Sig: Take 1 tablet by mouth daily   CHLORTHALIDONE PO  Pharmacy Yes No   Sig: Take 25 mg by mouth daily   LORazepam (ATIVAN) 0.5 MG tablet  Pharmacy No No   Sig: Take 1 tablet (0.5 mg) by mouth every 4 hours as needed (Anxiety, Nausea/Vomiting or Sleep)   Multiple Vitamins-Minerals (CENTRUM SILVER) per tablet  Self Yes No   Sig: Take 1 tablet by mouth daily.   Potassium Chloride ER 20 MEQ TBCR   No No   Sig: Take 1 tablet (20 mEq) by mouth daily   Pseudoeph-Doxylamine-DM-APAP (NYQUIL PO)   Yes No   SERTRALINE HCL PO  Pharmacy Yes No   Sig: Take 50 mg by mouth daily (Takes half of a 100mg tablet for a total of 50mg daily)   VITAMIN D, CHOLECALCIFEROL, PO  Self Yes No   Sig: Take 1 tablet by mouth daily (OTC: Pt unsure of strength)   albuterol (PROAIR HFA/PROVENTIL HFA/VENTOLIN HFA) 108 (90 BASE) MCG/ACT Inhaler  Pharmacy No No   Sig: Inhale 2 puffs into the lungs every 6 hours as needed for shortness of breath / dyspnea or wheezing   enoxaparin (LOVENOX) 100 MG/ML injection   No No   Sig: Inject 1.66 mLs (166 mg) Subcutaneous every 24 hours    enoxaparin (LOVENOX) 80 MG/0.8ML injection   No No   Sig: Inject 1.6 mLs (160 mg) Subcutaneous every 24 hours   lidocaine (XYLOCAINE) 5 % ointment   No No   Sig: Apply 0.5 g topically as needed for moderate pain Apply pea size amount to rectal area for pain   lidocaine-prilocaine (EMLA) cream  Pharmacy No No   Sig: Apply topically as needed for moderate pain Apply dollop size amount to port site 30-60 minutes prior to accessing.   lisinopril (PRINIVIL,ZESTRIL) 10 MG tablet  Pharmacy No No   Sig: Take 1 tablet by mouth daily.   ondansetron (ZOFRAN) 8 MG tablet  Pharmacy No No   Sig: Take 1 tablet (8 mg) by mouth every 8 hours as needed (Nausea/Vomiting)   prochlorperazine (COMPAZINE) 10 MG tablet  Pharmacy No No   Sig: Take 1 tablet (10 mg) by mouth every 6 hours as needed (Nausea/Vomiting)   sennosides (SENOKOT) 8.6 MG tablet   No No   Sig: Take 1 tablet by mouth 2 times daily      Facility-Administered Medications: None     Allergies   No Known Allergies    Social History   I have reviewed this patient's social history and updated it with pertinent information if needed. Liv Sylvester  reports that she quit smoking about 12 years ago. She has never used smokeless tobacco. She reports that she does not drink alcohol or use illicit drugs.    Family History   I have reviewed this patient's family history and updated it with pertinent information if needed.   Family History   Problem Relation Age of Onset     CEREBROVASCULAR DISEASE Father       age 66     HEART DISEASE Mother       of MI, age 72     DIABETES Mother      Hypertension Mother        Review of Systems   The 10 point Review of Systems is negative other than noted in the HPI    Physical Exam   Temp: 99  F (37.2  C) Temp src: Oral BP: (!) 152/97   Heart Rate: 130 Resp: 17 SpO2: 96 % O2 Device: None (Room air)    Vital Signs with Ranges  Temp:  [99  F (37.2  C)] 99  F (37.2  C)  Heart Rate:  [124-144] 130  Resp:  [17-25] 17  BP: (118-167)/()  152/97  SpO2:  [92 %-100 %] 96 %  229 lbs 15.04 oz    Constitutional: Awake, alert, cooperative, no apparent distress.  Eyes: Conjunctiva and pupils examined and normal.  HEENT: Moist mucous membranes, normal dentition.  Respiratory: Clear to auscultation bilaterally, no crackles or wheezing.  Cardiovascular: Tachycardiac, normal S1 and S2, and no murmur noted.  GI:  Tenderness to palpation in the RUQ.  Soft, non-distended, normal bowel sounds.  Lymph/Hematologic: No abnormal bruising  Skin: No rashes, no cyanosis, no edema.  Musculoskeletal: No joint swelling, erythema or tenderness.  Neurologic: Cranial nerves 2-12 intact, normal strength and sensation.  Psychiatric: Alert, oriented to person, place and time, no obvious anxiety or depression.    Data   Data reviewed today:  I personally reviewed   EKG:  Sinus tachycardia.  No concerning ST or T wave changes to suggest ischemia.    CXR:  Port in place.  No obvious infiltrates.  No cardiomegaly.    Recent Labs  Lab 12/15/17  1238 12/15/17  1230 12/13/17  0915   WBC 5.0  --  5.8   HGB 13.0  --  11.6*   MCV 85  --  86     --  225   INR  --  1.12  --      --  140   POTASSIUM 3.9  --  3.7   CHLORIDE 102  --  107   CO2 26  --  27   BUN 19  --  12   CR 0.73  --  0.66   ANIONGAP 8  --  6   JEÚSS 9.8  --  9.7   *  --  128*   ALBUMIN 3.5  --  3.3*   PROTTOTAL 8.4  --  7.7   BILITOTAL 0.4  --  0.3   ALKPHOS 112  --  107   ALT 30  --  24   AST 44  --  23       Imaging:  Recent Results (from the past 24 hour(s))   XR Chest 2 Views    Narrative    CHEST TWO VIEW 12/15/2017 1:53 PM     HISTORY: Cough and cold symptoms, on chemo.    COMPARISON: 10/20/2017      Impression    IMPRESSION: Right internal jugular chest port in place with catheter  tip in the SVC. The cardiac silhouette and pulmonary vasculature are  normal. There are no acute infiltrates or pleural effusions.   CT Abdomen Pelvis w Contrast   Result Value    Radiologist flags Pulmonary embolism (AA)     Narrative    CT ABDOMEN AND PELVIS WITH CONTRAST   12/15/2017 2:24 PM      HISTORY: Abdominal pain and vomiting. History of cancer with  chemotherapy.    TECHNIQUE:  CT abdomen and pelvis with intravenous contrast. Radiation  dose for this scan was reduced using automated exposure control,  adjustment of the mA and/or kV according to patient size, or iterative  reconstruction technique. 114 mL Isovue-370     COMPARISON:  11/27/2017    FINDINGS:  Abdomen:  There appear to be emboli in branches of the left lower lobe  pulmonary artery, incompletely imaged on this abdomen CT. Minimal  dependent atelectasis bilaterally. The heart size is normal. There are  multiple masses in the liver consistent with metastases. These have  overall decreased in size since the previous exam. An anterior liver  dome mass measures 5.7 cm transversely (previously 6.1 cm). A  posterior dome lesion measures 3.1 cm transverse (previously 3.5 cm).  The portal vein is patent. The spleen, gallbladder, pancreas and  adrenal glands are normal in appearance. There are two right renal  cortical cysts and a few tiny probable cortical cysts bilaterally. A  few small erwin hepatis region lymph nodes are similar to the previous  exam. No abdominal or pelvic lymph node enlargement. There is  atherosclerotic calcification of aorta and its branches. No aneurysm.    Pelvis: Bowel appears normal without obstruction or inflammation. The  appendix is at the upper limits of normal in size but without  surrounding inflammation or evidence of acute appendicitis. No free  intraperitoneal gas or fluid. Uterus is absent. No adnexal mass.  Degenerative disease in the spine.      Impression    IMPRESSION:  1. Probable left lower lobe pulmonary emboli. These appear to be new  since 11/27/2017.  2. Interval decrease in size of multiple hepatic metastases.     [Critical Result: Pulmonary embolism]    Finding was identified on 12/15/2017 2:32 PM.     Dr. Cash was  contacted by me on 12/15/2017 2:39 PM and  verbalized understanding of the critical result.     MIGUELINA HERRING MD

## 2017-12-15 NOTE — ED PROVIDER NOTES
"  History     Chief Complaint:  Shortness of breath    HPI   Liv Sylvester is a 67 year old female who presents to the ED for the evaluation of generalized weakness as well as low grade fevers. Pt states also has some epigastric pain, mild, intermittent, non radiating.  Lastly, pt states she has some \"chest tightness\".  When asked main reason for visit, pt states she does not feel well and global fatigue, weakness with shortness of breath.    Briefly, pt has hx of colon ca, on current Chemo, known hx of PEs on lovenox qday, states med compliant apart from yesterday when she missed one dose. Denies any vomiting, diarrhea, pain with urination, cough or trauma.       Allergies:  No known drug allergies     Medications:    Nyquil  Lovenox  Potassium Chloride  Senokot  Ativan  Compazine  Zofran  Albuterol  Chlorthalidone  Sertraline  Tylenol  Prinivil    Past Medical History:    Pulmonary edema  Hemorrhoid  Hypertension  Obesity  Cancer    Past Surgical History:    Hysterectomy  Port Vascular access insert    Family History:    Cerebrovascular Disease  MI  Diabetes  Hypertension    Social History:  Smoking status: Former, quit 2005  Alcohol use: No  Marital Status:   [4]     Review of Systems   Constitutional: Positive for fever.   Respiratory: Positive for cough, chest tightness and shortness of breath.    Cardiovascular: Negative for chest pain.   Gastrointestinal: Positive for diarrhea, nausea and vomiting. Negative for abdominal pain.   Neurological: Positive for weakness.   All other systems reviewed and are negative.      Physical Exam     Patient Vitals for the past 24 hrs:   BP Temp Temp src Heart Rate Resp SpO2 Weight   12/15/17 1300 (!) 167/108 - - 124 19 100 % -   12/15/17 1245 (!) 152/101 - - 130 22 97 % -   12/15/17 1230 (!) 154/92 - - 134 25 - -   12/15/17 1218 118/77 99  F (37.2  C) Oral 144 18 96 % 103 kg (227 lb 1.2 oz)   12/15/17 1215 118/77 - - 138 23 - -         Physical Exam  Vitals: reviewed " by me  General: Pt seen on hospital [facility], pleasant, cooperative, and alert to conversation, non ill appearing  Eyes: Tracking well, clear conj BL  ENT: MMM, O/P clear of lesions  Neck: Midline trachea, FROM to neck.  R port o cath in place with chemo reservoir, normal skin exam, no e/o infection.   Lungs:   No tachypnea, no accessory m use, speaking in full sentences.  Sating well, no respiratory distress. No pleurisy.   CV: tachycardic to 140s   Abd: Soft, but with + ttp to epigastrum, neg murpheys.  No lower abd ttp.   Extremities: no peripheral edema or joint effusion  Skin: No rash, normal turgor and temperature  Neuro: Clear speech and no facial droop.  Psych: Not RIS, no e/o AH/VH        Emergency Department Course   ECG (14:08:43):  Rate 132 bpm. IL interval 136. QRS duration 84. QT/QTc 300/444. P-R-T axes 68 58 52. Sinus tachycardia. Otherwise normal ECG. Interpreted at 2:10pm by Cristobal Cash MD.    Imaging:  Radiographic findings were communicated with the patient who voiced understanding of the findings.    X-ray Chest, 2 views:  Right internal jugular chest port in place with catheter  tip in the SVC. The cardiac silhouette and pulmonary vasculature are  normal. There are no acute infiltrates or pleural effusions.  As read by Radiology.    CT: Abdomen Pelvis, w/ Contrast:  1. Probable left lower lobe pulmonary emboli. These appear to be new  since 11/27/2017.  2. Interval decrease in size in multiple hepatic metastases.     [Critical Result: Pulmonary embolism]  As read by Radiology.    Laboratory:  CBC: WNL (WBC 5.0, HGB 13.0, )   CMP: Glucose 116(H), o/w WNL (Creatinine 0.73)  Lactic Acid: 1.7      Interventions:  Medications   ondansetron (ZOFRAN) injection 4 mg (not administered)   0.9% sodium chloride BOLUS (1,000 mLs Intravenous New Bag 12/15/17 1241)   iopamidol (ISOVUE-370) solution 114 mL (114 mLs Intravenous Given 12/15/17 1419)   Saline (75 mLs Intravenous Given 12/15/17 1419)    1241: NS 1L IV Bolus  1419: Saline 75 mL, IV      Emergency Department Course:  The patient arrived in the emergency department via EMS.  Past medical records, nursing notes, and vitals reviewed.  12:25pm: I performed an exam of the patient and obtained history, as documented above.   IV inserted and blood drawn.  The patient was sent for a xray and CT while in the emergency department, findings above.    2:53pm: I rechecked the patient. Explained findings to the patient.   I spoke to Dr. Strange of the hospitalist service who accepts the patient for admission.     Findings and plan explained to the Patient who consents to admission.    Impression & Plan    Medical Decision Makin year old female who presents with generalized weakness in the setting of chemotherapy and cancer, and while being on anticoagulation for known PEs. Unclear cause exactly of her generalized weakness, although her tachycardia, CT scan findings, and chest tightness do point towards a worsening clot despite her taking her Lovenox as directed. With this in mind I do feel it indicated to admit the patient primarily due to her abnormal vital signs, but also due to possible failure of her Lovenox.  I will start a Heparin drip here in the emergency room and admit patient for cares of her PE. Patient has no evidence of sepsis at this point, chest xray with no evidence of pneumonia or infiltrates. Currently urine is pending, but she does not complain of any dysuria. There are no other skin changes to evidence an infection, and patient may have low grade fevers subjectively, but is afebrile here with a normal lactate and normal white count. Blood cx sent out of an abundance of caution given indwelling line and subj fevers.  I have spoke to the hospitalist Dr. Strange who has generously agreed to accept care of the patient and will continue previous cares.    Pt is okay with this plan and all questions answered.     Diagnosis:    ICD-10-CM    1.  Other pulmonary embolism without acute cor pulmonale, unspecified chronicity (H) I26.99 CANCELED: CBC with platelets   2. Tachycardia R00.0        Disposition:  Admitted to Dr Strange     Discharge Medications:  New Prescriptions    No medications on file         Sabina Champagne  12/15/2017    EMERGENCY DEPARTMENT  I, Sabina Champagne, am serving as a scribe at 12:25 PM on 12/15/2017 to document services personally performed by Cristobal Cash MD based on my observations and the provider's statements to me.        Cristobal Cash MD  12/15/17 [id number]

## 2017-12-15 NOTE — TELEPHONE ENCOUNTER
Update: I noted at 12:20 bonnie did not show up for 11:30 pump takedown and was called by ED at this time stating patient had been triaged and Jolene ED RN needed to be advised regarding the pump that needs to be taken down.    I provided Jolene's contact # to infusion charge RN Mary Ann to advise. Debbie Dasilva

## 2017-12-15 NOTE — ED NOTES
[facility]  ED Nurse Handoff Report    ED Chief complaint: Generalized Weakness (pt on Chemo with low grade temp and gen weakness )      ED Diagnosis:   Final diagnoses:   Other pulmonary embolism without acute cor pulmonale, unspecified chronicity (H)   Tachycardia       Code Status: DNR / DNI    Allergies: No Known Allergies    Activity level - Baseline/Home:  Independent    Activity Level - Current:   Unable to Assess, did not ambulate in the ED, here for weakness today.     Needed?: No    Isolation: No  Infection: Not Applicable    Bariatric?: No    Vital Signs:   Vitals:    12/15/17 1300 12/15/17 1400 12/15/17 1415 12/15/17 1523   BP: (!) 167/108 (!) 140/97 (!) 152/97    Resp: 19 18 17    Temp:       TempSrc:       SpO2: 100% 92% 96%    Weight:    104.3 kg (229 lb 15 oz)       Cardiac Rhythm: ,        Pain level:      Is this patient confused?: No    Patient Report: Initial Complaint: Patient presented to the ED for generalized weakness, low grade temp, on chemo.  Focused Assessment: Patient experienced some chest tightness, SOB and generalized weakness when attempting to ambulate today.  History of colon CA, on chemo.  History of PEs, on lovenox, reports her last dose was on Wednesday.    Tests Performed: blood labs, UA, CT  Abnormal Results: PE on CT  Treatments provided: IV fluids, heparin    Family Comments: none present    OBS brochure/video discussed/provided to patient: N/A    ED Medications:   Medications   ondansetron (ZOFRAN) injection 4 mg (not administered)   heparin infusion 25,000 units in 0.45% NaCl 250 mL (not administered)   heparin Loading Dose bolus dose from infusion pump 6,400 Units (not administered)   0.9% sodium chloride BOLUS (1,000 mLs Intravenous New Bag 12/15/17 1241)   iopamidol (ISOVUE-370) solution 114 mL (114 mLs Intravenous Given 12/15/17 1419)   Saline (75 mLs Intravenous Given 12/15/17 1419)       Drips infusing?:  Yes      ED NURSE PHONE NUMBER:  *[phone]

## 2017-12-15 NOTE — PROVIDER NOTIFICATION
MD Notification    Notified Person:  MD    Notified Persons Name: Dr. Monreal     Notification Date/Time: 5:24 PM 12/15/17    Notification Interaction:  Talked with Physician    Purpose of Notification:  when pt arrived on floor. Pt asymptomatic     Orders Received: increase IVF to 150ml/hr for the time being.     Comments:

## 2017-12-15 NOTE — IP AVS SNAPSHOT
MRN:[id number]                      After Visit Summary   12/15/2017    Liv Sylvester    MRN: [id number]           Thank you!     Thank you for choosing [facility] for your care. Our goal is always to provide you with excellent care. Hearing back from our patients is one way we can continue to improve our services. Please take a few minutes to complete the written survey that you may receive in the mail after you visit with us. Thank you!        Patient Information     Date Of Birth          1950        About your hospital stay     You were admitted on:  December 15, 2017 You last received care in the:  [place] [address] [facility]    You were discharged on:  December 18, 2017        Reason for your hospital stay       You were admitted to the hospital secondary to blood clot                  Who to Call     For medical emergencies, please call 911.  For non-urgent questions about your medical care, please call your primary care provider or clinic, [phone]          Attending Provider     Provider Specialty    Cristobal Cash MD Emergency Medicine    Bergholz, Jerry Reyes DO Internal Medicine    [facility] Internal Medicine       Primary Care Provider Office Phone # Fax #    Amaya Howard [phone]      After Care Instructions     Activity       Your activity upon discharge: activity as tolerated            Diet       Follow this diet upon discharge: Orders Placed This Encounter      Combination Diet Regular Diet Adult            Discharge Instructions       Please take Lovenox 100 mg Subcutaneous shot every 12 hours , you are given two weeks supply , you will be given further refill by .thanks                  Follow-up Appointments     Follow-up and recommended labs and tests        Follow up with primary care provider, AMAYA HOWARD, within 7 days for hospital follow- up.  No follow up labs or test are needed.  Please also keep up  "your appointment with  as already scheduled on Dec 27th.                  Your next 10 appointments already scheduled     Dec 27, 2017  9:00 AM CST   Level 6 with  INFUSION CHAIR 8   [facility] ([facility])    [facility]  [address]  [place] [address]   [phone]            Dec 27, 2017  9:30 AM CST   Return Visit with Radha Piedra MD   [facility] ([facility])    [facility] [place]  [address]  [place] [address]   [phone]              Pending Results     Date and Time Order Name Status Description    12/15/2017 1509 Blood culture Preliminary     12/15/2017 1509 Blood culture Preliminary             Statement of Approval     Ordered          12/18/17 1225  I have reviewed and agree with all the recommendations and orders detailed in this document.  EFFECTIVE NOW     Approved and electronically signed by:  Carolyn Berman MD             Admission Information     Date & Time Provider Department Dept. Phone    12/15/2017 Carolyn Berman MD [place] [address] [facility] [phone]      Your Vitals Were     Blood Pressure Pulse Temperature Respirations Weight Pulse Oximetry    109/69 (BP Location: Right arm) 110 98.2  F (36.8  C) (Oral) 16 104.3 kg (229 lb 15 oz) 99%    BMI (Body Mass Index)                   33.94 kg/m2           MyChart Information     [employer] lets you send messages to your doctor, view your test results, renew your prescriptions, schedule appointments and more. To sign up, go to [email or web address] . Click on \"Log in\" on the left side of the screen, which will take you to the Welcome page. Then click on \"Sign up Now\" on the right side of the page.     You will be asked to enter the access code listed below, as well as some personal information. Please follow the directions to create your username and password.     Your access code is: " [email or web address]  Expires: 2017 11:39 AM     Your access code will  in 90 days. If you need help or a new code, please call your [facility] clinic or [phone].        Care EveryWhere ID     This is your Care EveryWhere ID. This could be used by other organizations to access your [facility] medical records  [id number]        Equal Access to Services     [facility] HospitalOMAYRA : Hadii aad ku hadasho Soomaali, waaxda luqadaha, qaybta kaalmada adeegyada, john gómezin haywaltn soniya dinh mart . So [facility] [phone].    ATENCIÓN: Si habla español, tiene a schmitt disposición servicios gratuitos de asistencia lingüística. Llame al [phone].    We comply with applicable federal civil rights laws and [place] laws. We do not discriminate on the basis of race, color, national origin, age, disability, sex, sexual orientation, or gender identity.               Review of your medicines      CONTINUE these medicines which may have CHANGED, or have new prescriptions. If we are uncertain of the size of tablets/capsules you have at home, strength may be listed as something that might have changed.        Dose / Directions    enoxaparin 100 MG/ML injection   Commonly known as:  LOVENOX   This may have changed:    - medication strength  - how much to take  - when to take this   Used for:  Other acute pulmonary embolism without acute cor pulmonale (H)        Dose:  1 mg/kg   Inject 1.04 mLs (104 mg) Subcutaneous every 12 hours for 14 days   Quantity:  29.12 mL   Refills:  0         CONTINUE these medicines which have NOT CHANGED        Dose / Directions    ACETAMINOPHEN PO        Dose:  1000 mg   Take 1,000 mg by mouth every 4 hours as needed for pain ((Patient has been taking 4-12 tabs per day))   Refills:  0       albuterol 108 (90 BASE) MCG/ACT Inhaler   Commonly known as:  PROAIR HFA/PROVENTIL HFA/VENTOLIN HFA   Used for:  Reactive airway disease without complication, unspecified asthma severity, unspecified whether persistent         Dose:  2 puff   Inhale 2 puffs into the lungs every 6 hours as needed for shortness of breath / dyspnea or wheezing   Quantity:  1 Inhaler   Refills:  3       CALCIUM-MAGNESIUM-ZINC PO        Dose:  1 tablet   Take 1 tablet by mouth daily   Refills:  0       CENTRUM SILVER per tablet        Dose:  1 tablet   Take 1 tablet by mouth daily.   Quantity:  100 tablet   Refills:  12       CHLORTHALIDONE PO        Dose:  25 mg   Take 25 mg by mouth daily as needed ((Patient is supposed to take 25mg daily but states she only takes as needed))   Refills:  0       lidocaine 5 % ointment   Commonly known as:  XYLOCAINE        Dose:  0.5 inch   Apply 0.5 g topically as needed for moderate pain Apply pea size amount to rectal area for pain   Quantity:  5 g   Refills:  0       lidocaine-prilocaine cream   Commonly known as:  EMLA   Used for:  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        Apply topically as needed for moderate pain Apply dollop size amount to port site 30-60 minutes prior to accessing.   Quantity:  30 g   Refills:  1       lisinopril 10 MG tablet   Commonly known as:  PRINIVIL/ZESTRIL   Used for:  HTN (hypertension)        Dose:  10 mg   Take 1 tablet by mouth daily.   Quantity:  30 tablet   Refills:  11       LORazepam 0.5 MG tablet   Commonly known as:  ATIVAN   Used for:  Malignant neoplasm of hepatic flexure (H)        Dose:  0.5 mg   Take 1 tablet (0.5 mg) by mouth every 4 hours as needed (Anxiety, Nausea/Vomiting or Sleep)   Quantity:  30 tablet   Refills:  2       ondansetron 8 MG tablet   Commonly known as:  ZOFRAN   Used for:  Malignant neoplasm of hepatic flexure (H)        Dose:  8 mg   Take 1 tablet (8 mg) by mouth every 8 hours as needed (Nausea/Vomiting)   Quantity:  10 tablet   Refills:  2       Potassium Chloride ER 20 MEQ Tbcr   Used for:  Hypokalemia        Dose:  20 mEq   Take 1 tablet (20 mEq) by mouth daily   Quantity:  30 tablet   Refills:  11        prochlorperazine 10 MG tablet   Commonly known as:  COMPAZINE   Used for:  Malignant neoplasm of hepatic flexure (H)        Dose:  10 mg   Take 1 tablet (10 mg) by mouth every 6 hours as needed (Nausea/Vomiting)   Quantity:  30 tablet   Refills:  2       sennosides 8.6 MG tablet   Commonly known as:  SENOKOT        Dose:  1 tablet   Take 1 tablet by mouth 2 times daily as needed for constipation   Refills:  0       SERTRALINE HCL PO        Dose:  50 mg   Take 50 mg by mouth daily (Takes half of a 100mg tablet for a total of 50mg daily)   Refills:  0       VICKS NYQUIL COLD & FLU 15-6. MG/15ML Liqd   Generic drug:  DM-Doxylamine-Acetaminophen        Dose:  15 mL   Take 15 mLs by mouth nightly as needed   Refills:  0       VITAMIN D (CHOLECALCIFEROL) PO        Dose:  1 tablet   Take 1 tablet by mouth daily (OTC: Pt unsure of strength)   Refills:  0            Where to get your medicines      These medications were sent to [facility] - [address]  [address] [facility] [address], [place] [address]     Phone:  [phone]     enoxaparin 100 MG/ML injection                Protect others around you: Learn how to safely use, store and throw away your medicines at www.disposemymeds.org.             Medication List: This is a list of all your medications and when to take them. Check marks below indicate your daily home schedule. Keep this list as a reference.      Medications           Morning Afternoon Evening Bedtime As Needed    ACETAMINOPHEN PO   Take 1,000 mg by mouth every 4 hours as needed for pain ((Patient has been taking 4-12 tabs per day))   Last time this was given:  650 mg on 12/15/2017  5:57 PM                                albuterol 108 (90 BASE) MCG/ACT Inhaler   Commonly known as:  PROAIR HFA/PROVENTIL HFA/VENTOLIN HFA   Inhale 2 puffs into the lungs every 6 hours as needed for shortness of breath / dyspnea or wheezing                                CALCIUM-MAGNESIUM-ZINC  PO   Take 1 tablet by mouth daily                                CENTRUM SILVER per tablet   Take 1 tablet by mouth daily.                                CHLORTHALIDONE PO   Take 25 mg by mouth daily as needed ((Patient is supposed to take 25mg daily but states she only takes as needed))   Last time this was given:  25 mg on 12/18/2017  9:11 AM                                enoxaparin 100 MG/ML injection   Commonly known as:  LOVENOX   Inject 1.04 mLs (104 mg) Subcutaneous every 12 hours for 14 days   Last time this was given:  100 mg on 12/18/2017  9:11 AM                                lidocaine 5 % ointment   Commonly known as:  XYLOCAINE   Apply 0.5 g topically as needed for moderate pain Apply pea size amount to rectal area for pain                                lidocaine-prilocaine cream   Commonly known as:  EMLA   Apply topically as needed for moderate pain Apply dollop size amount to port site 30-60 minutes prior to accessing.                                lisinopril 10 MG tablet   Commonly known as:  PRINIVIL/ZESTRIL   Take 1 tablet by mouth daily.   Last time this was given:  10 mg on 12/18/2017  9:11 AM                                LORazepam 0.5 MG tablet   Commonly known as:  ATIVAN   Take 1 tablet (0.5 mg) by mouth every 4 hours as needed (Anxiety, Nausea/Vomiting or Sleep)                                ondansetron 8 MG tablet   Commonly known as:  ZOFRAN   Take 1 tablet (8 mg) by mouth every 8 hours as needed (Nausea/Vomiting)                                Potassium Chloride ER 20 MEQ Tbcr   Take 1 tablet (20 mEq) by mouth daily                                prochlorperazine 10 MG tablet   Commonly known as:  COMPAZINE   Take 1 tablet (10 mg) by mouth every 6 hours as needed (Nausea/Vomiting)                                sennosides 8.6 MG tablet   Commonly known as:  SENOKOT   Take 1 tablet by mouth 2 times daily as needed for constipation                                SERTRALINE HCL PO    Take 50 mg by mouth daily (Takes half of a 100mg tablet for a total of 50mg daily)   Last time this was given:  50 mg on 12/18/2017  9:11 AM                                VICKS NYQUIL COLD & FLU 15-6. MG/15ML Liqd   Take 15 mLs by mouth nightly as needed   Generic drug:  DM-Doxylamine-Acetaminophen                                VITAMIN D (CHOLECALCIFEROL) PO   Take 1 tablet by mouth daily (OTC: Pt unsure of strength)   Last time this was given:  1,000 Units on 12/18/2017  9:11 AM

## 2017-12-15 NOTE — TELEPHONE ENCOUNTER
"Patient called the clinic to cancel her appointment for 12/15/17 stating she did not feel well enough to come in.  Patient states she will have a home care nurse disconnect her chemotherapy pump today, but she is unsure of the \"shot\" that she may need.   "

## 2017-12-15 NOTE — PHARMACY-ADMISSION MEDICATION HISTORY
Admission medication history interview status for the 12/15/2017  admission is complete. See EPIC admission navigator for prior to admission medications     Medication history source reliability:Moderate - Patient was a bit confused about medications she takes at home but I was able to verify medications through [facility] in [facility] (patient also fills with [facility] and has multiple encounters). Patient agreed upon information that I had found and these retail Rx records matched her PTA medication list.     Actions taken by pharmacist (provider contacted, etc):None     Additional medication history information not noted on PTA med list :None    Medication reconciliation/reorder completed by provider prior to medication history? No    Time spent in this activity: 20 minutes    Prior to Admission medications    Medication Sig Last Dose Taking? Auth Provider   DM-Doxylamine-Acetaminophen (VICKS NYQUIL COLD & FLU) 15-6. MG/15ML LIQD Take 15 mLs by mouth nightly as needed Past Week at Unknown time Yes Unknown, Entered By History   sennosides (SENOKOT) 8.6 MG tablet Take 1 tablet by mouth 2 times daily as needed for constipation prn med Yes Unknown, Entered By History   enoxaparin (LOVENOX) 80 MG/0.8ML injection Inject 1.6 mLs (160 mg) Subcutaneous every 24 hours 12/13/2017 Yes Radha Piedra MD   Potassium Chloride ER 20 MEQ TBCR Take 1 tablet (20 mEq) by mouth daily 12/13/2017 Yes Radha Piedra MD   lidocaine (XYLOCAINE) 5 % ointment Apply 0.5 g topically as needed for moderate pain Apply pea size amount to rectal area for pain prn med Yes Rene Aragon MD   LORazepam (ATIVAN) 0.5 MG tablet Take 1 tablet (0.5 mg) by mouth every 4 hours as needed (Anxiety, Nausea/Vomiting or Sleep) prn med Yes Radha Piedra MD   prochlorperazine (COMPAZINE) 10 MG tablet Take 1 tablet (10 mg) by mouth every 6 hours as needed (Nausea/Vomiting) prn med Yes Radha Piedra MD   ondansetron (ZOFRAN) 8 MG tablet Take 1 tablet  (8 mg) by mouth every 8 hours as needed (Nausea/Vomiting) prn med Yes Radha Piedra MD   lidocaine-prilocaine (EMLA) cream Apply topically as needed for moderate pain Apply dollop size amount to port site 30-60 minutes prior to accessing. prn med Yes Radha Piedra MD   albuterol (PROAIR HFA/PROVENTIL HFA/VENTOLIN HFA) 108 (90 BASE) MCG/ACT Inhaler Inhale 2 puffs into the lungs every 6 hours as needed for shortness of breath / dyspnea or wheezing prn med Yes Radha Piedra MD   CHLORTHALIDONE PO Take 25 mg by mouth daily as needed ((Patient is supposed to take 25mg daily but states she only takes as needed))  Past Week at Unknown time Yes Unknown, Entered By History   SERTRALINE HCL PO Take 50 mg by mouth daily (Takes half of a 100mg tablet for a total of 50mg daily) 12/13/2017 Yes Unknown, Entered By History   CALCIUM-MAGNESIUM-ZINC PO Take 1 tablet by mouth daily 12/13/2017 Yes Unknown, Entered By History   VITAMIN D, CHOLECALCIFEROL, PO Take 1 tablet by mouth daily (OTC: Pt unsure of strength) 12/13/2017 Yes Unknown, Entered By History   ACETAMINOPHEN PO Take 1,000 mg by mouth every 4 hours as needed for pain ((Patient has been taking 4-12 tabs per day)) 12/14/2017 at Unknown time Yes Unknown, Entered By History   lisinopril (PRINIVIL,ZESTRIL) 10 MG tablet Take 1 tablet by mouth daily. 12/13/2017 Yes Adrian Fonseca MD   Multiple Vitamins-Minerals (CENTRUM SILVER) per tablet Take 1 tablet by mouth daily. 12/13/2017 Yes Adrian Fonseca MD

## 2017-12-15 NOTE — ED NOTES
Bed: ED23  Expected date:   Expected time:   Means of arrival:   Comments:  [facility] - 516 - 67F SOB eta 8635

## 2017-12-16 NOTE — CONSULTS
[facility]    Hematology / Oncology Consultation     Date of Admission:  12/15/2017  Date of Consult (When I saw the patient): 12/16/17    Assessment & Plan   Liv Sylvester is a 67 year old female who was admitted on 12/15/2017. I was asked to see the patient for recurrent PE in patient with metastatic colon cancer on chemotherapy.     Assessment:   Pulmonary embolism  Metastatic colon cancer in a patient with history of stage 1A grade 3 endometrial carcinoma approximately around 2012  Iron deficiency anemia   ECOG PS 2    Liv Sylvester presented with progressive fatigue and right upper quadrant pain.  CT scan 09/29/2017 showed partially contracted bladder and findings suspicious for liver metastases.  There were multiple defined hypoechoic foci scoped scattered throughout the liver, the largest was in the right lobe.  CT was also showing enlargement of hepatic and retroperitoneal lymph nodes.  On 09/30/2017, she underwent a colonoscopy, which showed a fungating, infiltrative, ulcerated, partially obstructing mass at the hepatic flexure.  Pathology was consistent with moderately differentiated adenocarcinoma with normal mismatch repair protein expression. She has been started on mFOLFOX regimen as an outpatient every 2 weeks with GCSF support.     This time around she was not feeling well right after receiving chemotherapy. She complains of nausea, vomiting and diarrhea starting Tuesday night. She had SOB and Thursday night had a hard time using the rest room which was attached to her bed room. She had to request EMT to help her to hospital. She also noted low grade fevers. Her work up which revealed CT PE protocol has revealed new pulmonary embolism. She is on once a daily dose of lovenox.     She has been started on heparin gtt. She has been hydrated and received IV anti-nausea medications. She is feeling better.       Plan:   - Agree with the heparin gtt  - Recommend switching to lovenox 100 mg  sq q12h   - Check heparin Xa levels to monitor dosing 4-6 hrs after the dose.   - Discharge on q12h lovenox  - she did not get neulasta with this cycle as planned  - We will continue to follow and appreciate the consult    Over 60  min of direct face to face time spent with patient with more than 50% time spent in counseling and coordinating care.      Reid Blake MD    Code Status    DNR/DNI    Reason for Consult   Reason for consult: I was asked by Dr. Berman to evaluate this patient for recurrent pulmonary embolism and metastatic colon cancer.    Primary Care Physician   RIGOBERTO SEN    Chief Complaint   SOB with minimal exertion, nausea, vomiting, SOB    History is obtained from the patient. She notes of diarrhea Tuesday night. The next 2 days she was feeling a little better, but then had nausea/vomiting/ diarrhea again. Thursday night she had difficulty using the rest room which was within her bedroom due to SOB.     She has received IV nausea medications and it seems to have helped.     History of Present Illness   Liv Sylvester is a 67 year old female who presents with nausea, vomiting and SOB    Past Medical History   I have reviewed this patient's medical history and updated it with pertinent information if needed.   Past Medical History:   Diagnosis Date     Cancer (H)     colon     Flash pulmonary edema (H) 4/2013    With concurrent severe HTN, complicating DAPHNEY/BSO     Hemorrhoid      HTN (hypertension)      Obesity        Past Surgical History   I have reviewed this patient's surgical history and updated it with pertinent information if needed.  Past Surgical History:   Procedure Laterality Date     COLONOSCOPY N/A 9/30/2017    Procedure: COMBINED COLONOSCOPY, SINGLE OR MULTIPLE BIOPSY/POLYPECTOMY BY BIOPSY;  colonoscopy;  Surgeon: Kevin Celis MD;  Location: [facility]     HYSTERECTOMY, DAPHNEY  4/2013    with BSO     INSERT PORT VASCULAR ACCESS N/A 10/9/2017    Procedure: INSERT PORT VASCULAR  ACCESS;  PORT PLACEMENT (MAC);  Surgeon: Rigoberto Nolen MD;  Location: [facility]       Prior to Admission Medications   Prior to Admission Medications   Prescriptions Last Dose Informant Patient Reported? Taking?   ACETAMINOPHEN PO 12/14/2017 at Unknown time Self Yes Yes   Sig: Take 1,000 mg by mouth every 4 hours as needed for pain ((Patient has been taking 4-12 tabs per day))   CALCIUM-MAGNESIUM-ZINC PO 12/13/2017 Self Yes Yes   Sig: Take 1 tablet by mouth daily   CHLORTHALIDONE PO Past Week at Unknown time Pharmacy Yes Yes   Sig: Take 25 mg by mouth daily as needed ((Patient is supposed to take 25mg daily but states she only takes as needed))    DM-Doxylamine-Acetaminophen (VICKS NYQUIL COLD & FLU) 15-6. MG/15ML LIQD Past Week at Unknown time Self Yes Yes   Sig: Take 15 mLs by mouth nightly as needed   LORazepam (ATIVAN) 0.5 MG tablet prn med Pharmacy No Yes   Sig: Take 1 tablet (0.5 mg) by mouth every 4 hours as needed (Anxiety, Nausea/Vomiting or Sleep)   Multiple Vitamins-Minerals (CENTRUM SILVER) per tablet 12/13/2017 Self Yes Yes   Sig: Take 1 tablet by mouth daily.   Potassium Chloride ER 20 MEQ TBCR 12/13/2017 Pharmacy No Yes   Sig: Take 1 tablet (20 mEq) by mouth daily   SERTRALINE HCL PO 12/13/2017 Pharmacy Yes Yes   Sig: Take 50 mg by mouth daily (Takes half of a 100mg tablet for a total of 50mg daily)   VITAMIN D, CHOLECALCIFEROL, PO 12/13/2017 Self Yes Yes   Sig: Take 1 tablet by mouth daily (OTC: Pt unsure of strength)   albuterol (PROAIR HFA/PROVENTIL HFA/VENTOLIN HFA) 108 (90 BASE) MCG/ACT Inhaler prn med Pharmacy No Yes   Sig: Inhale 2 puffs into the lungs every 6 hours as needed for shortness of breath / dyspnea or wheezing   enoxaparin (LOVENOX) 80 MG/0.8ML injection 12/13/2017 Pharmacy No Yes   Sig: Inject 1.6 mLs (160 mg) Subcutaneous every 24 hours   lidocaine (XYLOCAINE) 5 % ointment prn med Pharmacy No Yes   Sig: Apply 0.5 g topically as needed for moderate pain Apply pea size  amount to rectal area for pain   lidocaine-prilocaine (EMLA) cream prn med Pharmacy No Yes   Sig: Apply topically as needed for moderate pain Apply dollop size amount to port site 30-60 minutes prior to accessing.   lisinopril (PRINIVIL,ZESTRIL) 10 MG tablet 2017 Pharmacy No Yes   Sig: Take 1 tablet by mouth daily.   ondansetron (ZOFRAN) 8 MG tablet prn med Pharmacy No Yes   Sig: Take 1 tablet (8 mg) by mouth every 8 hours as needed (Nausea/Vomiting)   prochlorperazine (COMPAZINE) 10 MG tablet prn med Pharmacy No Yes   Sig: Take 1 tablet (10 mg) by mouth every 6 hours as needed (Nausea/Vomiting)   sennosides (SENOKOT) 8.6 MG tablet prn med Self Yes Yes   Sig: Take 1 tablet by mouth 2 times daily as needed for constipation      Facility-Administered Medications: None     Allergies   No Known Allergies    Social History   I have reviewed this patient's social history and updated it with pertinent information if needed. Liv [place]  reports that she quit smoking about 12 years ago. She has never used smokeless tobacco. She reports that she does not drink alcohol or use illicit drugs.    Family History   I have reviewed this patient's family history and updated it with pertinent information if needed.   Family History   Problem Relation Age of Onset     CEREBROVASCULAR DISEASE Father       age 66     HEART DISEASE Mother       of MI, age 72     DIABETES Mother      Hypertension Mother        Review of Systems   The 10 point Review of Systems is negative other than noted in the HPI or here.     Physical Exam   Temp: 98.5  F (36.9  C) Temp src: Oral BP: 151/76 Pulse: 110 Heart Rate: 96 Resp: 16 SpO2: 97 % O2 Device: None (Room air)    Vital Signs with Ranges  Temp:  [97.2  F (36.2  C)-99  F (37.2  C)] 98.5  F (36.9  C)  Pulse:  [110-133] 110  Heart Rate:  [] 96  Resp:  [16] 16  BP: (125-157)/(75-82) 151/76  SpO2:  [95 %-100 %] 97 %  229 lbs 15.04 oz    Constitutional: Obese elderly male in no acute  distress  Eyes: LU, palor +ve, EOMI, no icterus  ENT: wnl  Respiratory: CTA and P  Cardiovascular: distant heart sounds, S1S2+, tachy,   GI: obese, no obvious HSmegaly, BS++/hyperactive  Lymph/Hematologic: wnl  Genitourinary: deferred  Skin: no rash  Musculoskeletal: no edema  Neurologic: AAO x 3, no focal neurologic deficits, cranial nerves intact  Neuropsychiatric: pleasant mood and affect    Data   Recent Labs   Lab Test  12/15/17   1238  12/13/17   0915  11/29/17   0915  11/22/17   0920  11/08/17   0905   NA  136  140  143  144  138   POTASSIUM  3.9  3.7  3.7  2.8*  3.1*   CHLORIDE  102  107  109  106  102   CO2  26  27  28  33*  27   ANIONGAP  8  6  6  5  9   BUN  19  12  9  10  16   CR  0.73  0.66  0.67  0.60  0.75   GLC  116*  128*  113*  109*  162*   JESÚS  9.8  9.7  8.5  8.6  9.1     No results for input(s): MAG, PHOS in the last 92599 hours.  Recent Labs   Lab Test  12/16/17   0630  12/15/17   1238  12/13/17   0915  11/29/17   0915  11/22/17   0920  11/08/17   0905   WBC  3.8*  5.0  5.8  3.9*  2.5*  4.1   HGB  11.5*  13.0  11.6*  9.9*  9.9*  10.5*   PLT  173  174  225  172  156  270   MCV  86  85  86  85  81  80   NEUTROPHIL   --   75.3  65.8  49.2  37.0  59.3     Recent Labs   Lab Test  12/15/17   1238  12/13/17   0915  11/29/17   0915   BILITOTAL  0.4  0.3  0.3   ALKPHOS  112  107  81   ALT  30  24  41   AST  44  23  35   ALBUMIN  3.5  3.3*  2.7*     TSH   Date Value Ref Range Status   05/13/2013 0.87 0.4 - 5.0 mU/L Final     Recent Labs   Lab Test  11/29/17   1240  09/30/17   0739   CEA  95.5*  143.8*     Results for orders placed or performed during the hospital encounter of 12/15/17   XR Chest 2 Views    Narrative    CHEST TWO VIEW 12/15/2017 1:53 PM     HISTORY: Cough and cold symptoms, on chemo.    COMPARISON: 10/20/2017      Impression    IMPRESSION: Right internal jugular chest port in place with catheter  tip in the SVC. The cardiac silhouette and pulmonary vasculature are  normal. There are no  acute infiltrates or pleural effusions.    DIPESH BANEGAS MD   CT Abdomen Pelvis w Contrast     Value    Radiologist flags Pulmonary embolism (AA)    Narrative    CT ABDOMEN AND PELVIS WITH CONTRAST   12/15/2017 2:24 PM      HISTORY: Abdominal pain and vomiting. History of cancer with  chemotherapy.    TECHNIQUE:  CT abdomen and pelvis with intravenous contrast. Radiation  dose for this scan was reduced using automated exposure control,  adjustment of the mA and/or kV according to patient size, or iterative  reconstruction technique. 114 mL Isovue-370     COMPARISON:  11/27/2017    FINDINGS:  Abdomen:  There appear to be emboli in branches of the left lower lobe  pulmonary artery, incompletely imaged on this abdomen CT. Minimal  dependent atelectasis bilaterally. The heart size is normal. There are  multiple masses in the liver consistent with metastases. These have  overall decreased in size since the previous exam. An anterior liver  dome mass measures 5.7 cm transversely (previously 6.1 cm). A  posterior dome lesion measures 3.1 cm transverse (previously 3.5 cm).  The portal vein is patent. The spleen, gallbladder, pancreas and  adrenal glands are normal in appearance. There are two right renal  cortical cysts and a few tiny probable cortical cysts bilaterally. A  few small erwin hepatis region lymph nodes are similar to the previous  exam. No abdominal or pelvic lymph node enlargement. There is  atherosclerotic calcification of aorta and its branches. No aneurysm.    Pelvis: Bowel appears normal without obstruction or inflammation. The  appendix is at the upper limits of normal in size but without  surrounding inflammation or evidence of acute appendicitis. No free  intraperitoneal gas or fluid. Uterus is absent. No adnexal mass.  Degenerative disease in the spine.      Impression    IMPRESSION:  1. Probable left lower lobe pulmonary emboli. These appear to be new  since 11/27/2017.  2. Interval decrease in size  of multiple hepatic metastases.     [Critical Result: Pulmonary embolism]    Finding was identified on 12/15/2017 2:32 PM.     Dr. Cash was contacted by me on 12/15/2017 2:39 PM and  verbalized understanding of the critical result.     MIGUELINA HERRING MD

## 2017-12-16 NOTE — PLAN OF CARE
DEYSI consulted for recommendations.  She is currently undergoing treatment with Dr. Piedra at the [facility] who should be informed of her admission.  Dr. Piedra's group will see her to ensure continuity of care.    MD DEYSI Jones

## 2017-12-16 NOTE — PROGRESS NOTES
Arrived to floor 1700 from ED.  A&Ox4.  Pt initially had HR 160s, MD notified and orders: IVF increased to 150cc, continue to monitor.  Currently tachycardic in 110's.   Other VSS, afebrile this shift.  Heparin drip 1450/hr and 150/h NS infusing in L PIV.  R chest port Heparin locked.  Hep 10A 0.62.  Chemo precautions, chemo discontinued in ER today by Infusion RN.  Dysnpea on exertion.  C/o headache 5/10, PRN Tylenol effective relief.  Baseline numbness in fingertips d/t chemo.  BS present, last BM this AM.  Up with SBA and walker in room. Calls appropriately.

## 2017-12-16 NOTE — PROGRESS NOTES
Critical hep 10a: 1.10. Pharmacist was notified. Hold heparin drip for now, pharmacist will enter orders.

## 2017-12-16 NOTE — PLAN OF CARE
Problem: Patient Care Overview  Goal: Plan of Care/Patient Progress Review  A&Ox4. VSS. Hep XA 1.10. Hep gtt held for 1 hr, restarted at 11.5. Re-draw at 1430.   Chemo precautions through Sunday.  Denies pain.  Baseline numbness in fingertips d/t chemo.  BS present. Up with SBA and walker in room. Becomes SOB with activity, otherwise respiratory status WDL. Calls appropriately. Continue to adjust anticoagulation.

## 2017-12-16 NOTE — PROGRESS NOTES
[facility]    Hospitalist Progress Note :     Cumulative Summary: Liv Sylvester is a 67 year old female who was admitted on 12/15/2017 when she presented with generalized weakness.  Her past medical history significant on a stage IV metastatic colon cancer with metastasis to liver and lung, recurrent DVTs and PEs currently on once a day Lovenox, also have a history of ovarian cancer status post total abdominal hysterectomy with bilateral salpingorectomy five years ago.  Patient was evaluated in the emergency room where she was found to have new pulmonary embolism in left lower lobe on CT scan and liver metastasis.Patient was started on heparin infusion and was admitted for further evaluation and management     Assessment & Plan     Active Problems:  PE left lower lobe  H/o DVTs/PEs:  CT scan showed probable new left lower lobe PE. Patient was actually already on Lovenox therapy for DVTs/PEs that were first found in October.  It sounds like she was initially on Lovenox injections, switched to Xarelto at the beginning of November and then switched back to Lovenox 2 weeks ago after they found her bilateral DVTs had progressed in size.  In the ED the patient was switched to a Heparin drip for further therapy.   On further questioning this morning, patient told me that she was taking Lovenox 160 mg subcutaneous every 24 hours.  Her weight is 103 kg , and she might benefit from taking 100 mg subcutaneous b.i.d. with close monitoring of anti-factor X A initially to assure she is receiving sufficient anticoagulation rather than once a day dose.    -- Continue to monitor patient on medical floor  -- At this time will continue patient on heparin infusion  -- We'll discuss with heme oncology about further recommendations regarding patient anticoagulation and if changing her back to Lovenox twice a day might be a better approach than once a day.     Colon cancer stage IV w/mets to the liver and  lung  Chemotherapy side effects: Patient is followed by Dr. Piedra and she states that she undergoes chemotherapy every 2-3 weeks and has completed 6-7 rounds.  Her last round was 4 days ago.  Since then she has had generalized weakness, and N/V/D which she states is normal for her after a round of chemotherapy, she told me that she is unable to keep up with good oral intake    -- Symptomatic management, discussed with bedside on and regarding administering Zofran routinely before diet and see if patient can increase her oral intake  -- Gentle hydration     Sinus tachycardia: Likely due to PE and possible dehydration from her N/V/D, significantly improved this morning with hydration and anticoagulation.  -- Will continue to monitor with vitals.  Do not feel patient warrants telemetry at this time     Reported fever: supposedly had a fever of 101 degrees at home last night but has been afebrile since without intervention.  No infectious symptoms here other than her normal side effects from chemo.  A CT scan did not show any evidence of pneumonia.  UA did not show any signs of infection.  Her WBC is normal.  May just be due to the PE and/or reaction to chemotherapy  -- At this time will hold off on antibiotics as she has been afebrile  -- Continue to monitor      HTN :Well controlled at this time.  -- Continue PTA Chlorthalidone and Lisinopril      DVT Prophylaxis: Heparin drip  Code Status: DNR / DNI     Disposition: Expected discharge tomorrow or Monday depending upon patient's clinical improvement and if patient is cleared by hematology and we have clear plan for anticoagulation.      Carolyn Berman MD, FACP  Text Page (7am - 6pm)      Interval History   Patient care was assumed this morning, patient was seen in  When she is known to me from her previous hospitalization when she was diagnosed with colon cancer.     patient told me that she is feeling weak although better as compared to yesterday.  She feels  significantly nauseated yesterday she only had a few sips of orange juice and this morning she has only cup of Jello. We discussed about administrating anti-emetics and see if she would be able to increase her oral intake today.    -Data reviewed today: I reviewed all new labs and imaging results over the last 24 hours.    I personally reviewed no images or EKG's today.    Physical Exam   Temp: 97.2  F (36.2  C) Temp src: Oral BP: 135/82 Pulse: 110 Heart Rate: 85 Resp: 16 SpO2: 95 % O2 Device: None (Room air)    Vitals:    12/15/17 1218 12/15/17 1523   Weight: 103 kg (227 lb 1.2 oz) 104.3 kg (229 lb 15 oz)     Vital Signs with Ranges  Temp:  [97.2  F (36.2  C)-99  F (37.2  C)] 97.2  F (36.2  C)  Pulse:  [110-133] 110  Heart Rate:  [] 85  Resp:  [16-25] 16  BP: (118-167)/() 135/82  SpO2:  [92 %-100 %] 95 %  I/O last 3 completed shifts:  In: 2397 [P.O.:360; I.V.:2037]  Out: 100 [Urine:100]    GENERAL: Alert , awake and oriented. NAD. Conversational, appropriate. Very pleasant AAF  HEENT: Normocephalic. EOMI. No icterus or injection. Nares normal.   LUNGS: Clear to auscultation. No dyspnea at rest.   HEART: Regular rate. Extremities perfused.   ABDOMEN: obese,Soft, nontender, and nondistended. Positive bowel sounds.   EXTREMITIES: No LE edema noted.   NEUROLOGIC: Moves extremities x4 on command. No acute focal neurologic abnormalities noted.     Medications     HEParin 1,150 Units/hr (12/16/17 0823)     - MEDICATION INSTRUCTIONS -       NaCl 150 mL/hr at 12/16/17 0048       lisinopril  10 mg Oral Daily     potassium chloride SA  20 mEq Oral Daily     sertraline (ZOLOFT) tablet 50 mg  50 mg Oral Daily     cholecalciferol  1,000 Units Oral Daily       Data     Recent Labs  Lab 12/16/17  0630 12/15/17  1238 12/15/17  1230 12/13/17  0915   WBC 3.8* 5.0  --  5.8   HGB 11.5* 13.0  --  11.6*   MCV 86 85  --  86    174  --  225   INR  --   --  1.12  --    NA  --  136  --  140   POTASSIUM  --  3.9  --  3.7    CHLORIDE  --  102  --  107   CO2  --  26  --  27   BUN  --  19  --  12   CR  --  0.73  --  0.66   ANIONGAP  --  8  --  6   JESÚS  --  9.8  --  9.7   GLC  --  116*  --  128*   ALBUMIN  --  3.5  --  3.3*   PROTTOTAL  --  8.4  --  7.7   BILITOTAL  --  0.4  --  0.3   ALKPHOS  --  112  --  107   ALT  --  30  --  24   AST  --  44  --  23       Imaging:   Recent Results (from the past 24 hour(s))   XR Chest 2 Views    Narrative    CHEST TWO VIEW 12/15/2017 1:53 PM     HISTORY: Cough and cold symptoms, on chemo.    COMPARISON: 10/20/2017      Impression    IMPRESSION: Right internal jugular chest port in place with catheter  tip in the SVC. The cardiac silhouette and pulmonary vasculature are  normal. There are no acute infiltrates or pleural effusions.    DIPESH BANEGAS MD   CT Abdomen Pelvis w Contrast   Result Value    Radiologist flags Pulmonary embolism (AA)    Narrative    CT ABDOMEN AND PELVIS WITH CONTRAST   12/15/2017 2:24 PM      HISTORY: Abdominal pain and vomiting. History of cancer with  chemotherapy.    TECHNIQUE:  CT abdomen and pelvis with intravenous contrast. Radiation  dose for this scan was reduced using automated exposure control,  adjustment of the mA and/or kV according to patient size, or iterative  reconstruction technique. 114 mL Isovue-370     COMPARISON:  11/27/2017    FINDINGS:  Abdomen:  There appear to be emboli in branches of the left lower lobe  pulmonary artery, incompletely imaged on this abdomen CT. Minimal  dependent atelectasis bilaterally. The heart size is normal. There are  multiple masses in the liver consistent with metastases. These have  overall decreased in size since the previous exam. An anterior liver  dome mass measures 5.7 cm transversely (previously 6.1 cm). A  posterior dome lesion measures 3.1 cm transverse (previously 3.5 cm).  The portal vein is patent. The spleen, gallbladder, pancreas and  adrenal glands are normal in appearance. There are two right renal  cortical  cysts and a few tiny probable cortical cysts bilaterally. A  few small erwin hepatis region lymph nodes are similar to the previous  exam. No abdominal or pelvic lymph node enlargement. There is  atherosclerotic calcification of aorta and its branches. No aneurysm.    Pelvis: Bowel appears normal without obstruction or inflammation. The  appendix is at the upper limits of normal in size but without  surrounding inflammation or evidence of acute appendicitis. No free  intraperitoneal gas or fluid. Uterus is absent. No adnexal mass.  Degenerative disease in the spine.      Impression    IMPRESSION:  1. Probable left lower lobe pulmonary emboli. These appear to be new  since 11/27/2017.  2. Interval decrease in size of multiple hepatic metastases.     [Critical Result: Pulmonary embolism]    Finding was identified on 12/15/2017 2:32 PM.     Dr. Cash was contacted by me on 12/15/2017 2:39 PM and  verbalized understanding of the critical result.     MIGUELINA HERRING MD

## 2017-12-16 NOTE — PLAN OF CARE
Problem: Patient Care Overview  Goal: Plan of Care/Patient Progress Review  A/O.  VSS.  Denies pain.  Heparin rate changed to 1300 units/hr, next hep 10A in AM.  Zofran effective for nausea.  Pt continues to have loose stool, reported small amount of blood.  Port needle changed.  Up SBA + walker, calls appropriately.  Chemo precautions maintained.  Continue to monitor.

## 2017-12-17 NOTE — PLAN OF CARE
Problem: Patient Care Overview  Goal: Plan of Care/Patient Progress Review  Outcome: No Change  A/O.  Slept well overnight.  VSS.  Denies pain.  Hep gtt infusing at 1050 units/hr, next hep 10A in AM.  Up SBA, calls appropriately.  D/C once anticoagulation plan in place.  Continue to monitor.

## 2017-12-17 NOTE — PROGRESS NOTES
Oncology follow up note    Date of Admission:  12/15/2017  Date of Consult (When I saw the patient): 12/17/17        Assessment & Plan     Liv Sylvester is a 67 year old female who was admitted on 12/15/2017. I was asked to see the patient for recurrent PE in patient with metastatic colon cancer on chemotherapy.      Assessment:   Pulmonary embolism  Metastatic colon cancer in a patient with history of stage 1A grade 3 endometrial carcinoma approximately around 2012  Iron deficiency anemia   ECOG PS 2    Patient was seen in presence of several family members including daughter, 2 grand daughters, niece amongst others. Explained her plans of transition to lovenox bid and heparin Xa 4 hrs after the dose to assess for her dosing efficacy. She would be likely discharged tomorrow. She is feeling better. She has no new complains. Her symptoms have mostly improved.     She did not get her neulasta with this cycle and we will have to watch her counts.     She will need close follow up in Medical Oncology.     Constitutional: Obese elderly male in no acute distress  Eyes: LU, palor +ve, EOMI, no icterus  ENT: wnl  Respiratory: CTA and P  Cardiovascular: distant heart sounds, S1S2+, tachy,   GI: obese, no obvious HSmegaly, BS++/hyperactive  Lymph/Hematologic: wnl  Genitourinary: deferred  Skin: no rash  Musculoskeletal: no edema  Neurologic: AAO x 3, no focal neurologic deficits, cranial nerves intact  Neuropsychiatric: pleasant mood and affect    Recent Labs   Lab Test  12/15/17   1238  12/13/17   0915  11/29/17   0915  11/22/17   0920  11/08/17   0905   NA  136  140  143  144  138   POTASSIUM  3.9  3.7  3.7  2.8*  3.1*   CHLORIDE  102  107  109  106  102   CO2  26  27  28  33*  27   ANIONGAP  8  6  6  5  9   BUN  19  12  9  10  16   CR  0.73  0.66  0.67  0.60  0.75   GLC  116*  128*  113*  109*  162*   JESÚS  9.8  9.7  8.5  8.6  9.1     No results for input(s): MAG, PHOS in the last [id number] hours.  Recent Labs   Lab Test   12/16/17   0630  12/15/17   1238  12/13/17   0915  11/29/17   0915  11/22/17   0920  11/08/17   0905   WBC  3.8*  5.0  5.8  3.9*  2.5*  4.1   HGB  11.5*  13.0  11.6*  9.9*  9.9*  10.5*   PLT  173  174  225  172  156  270   MCV  86  85  86  85  81  80   NEUTROPHIL   --   75.3  65.8  49.2  37.0  59.3     Recent Labs   Lab Test  12/15/17   1238  12/13/17   0915  11/29/17   0915   BILITOTAL  0.4  0.3  0.3   ALKPHOS  112  107  81   ALT  30  24  41   AST  44  23  35   ALBUMIN  3.5  3.3*  2.7*     TSH   Date Value Ref Range Status   05/13/2013 0.87 0.4 - 5.0 mU/L Final     Recent Labs   Lab Test  11/29/17   1240  09/30/17   0739   CEA  95.5*  143.8*     Results for orders placed or performed during the hospital encounter of 12/15/17   XR Chest 2 Views    Narrative    CHEST TWO VIEW 12/15/2017 1:53 PM     HISTORY: Cough and cold symptoms, on chemo.    COMPARISON: 10/20/2017      Impression    IMPRESSION: Right internal jugular chest port in place with catheter  tip in the SVC. The cardiac silhouette and pulmonary vasculature are  normal. There are no acute infiltrates or pleural effusions.    DIPESH BANEGAS MD   CT Abdomen Pelvis w Contrast     Value    Radiologist flags Pulmonary embolism (AA)    Narrative    CT ABDOMEN AND PELVIS WITH CONTRAST   12/15/2017 2:24 PM      HISTORY: Abdominal pain and vomiting. History of cancer with  chemotherapy.    TECHNIQUE:  CT abdomen and pelvis with intravenous contrast. Radiation  dose for this scan was reduced using automated exposure control,  adjustment of the mA and/or kV according to patient size, or iterative  reconstruction technique. 114 mL Isovue-370     COMPARISON:  11/27/2017    FINDINGS:  Abdomen:  There appear to be emboli in branches of the left lower lobe  pulmonary artery, incompletely imaged on this abdomen CT. Minimal  dependent atelectasis bilaterally. The heart size is normal. There are  multiple masses in the liver consistent with metastases. These have  overall  decreased in size since the previous exam. An anterior liver  dome mass measures 5.7 cm transversely (previously 6.1 cm). A  posterior dome lesion measures 3.1 cm transverse (previously 3.5 cm).  The portal vein is patent. The spleen, gallbladder, pancreas and  adrenal glands are normal in appearance. There are two right renal  cortical cysts and a few tiny probable cortical cysts bilaterally. A  few small erwin hepatis region lymph nodes are similar to the previous  exam. No abdominal or pelvic lymph node enlargement. There is  atherosclerotic calcification of aorta and its branches. No aneurysm.    Pelvis: Bowel appears normal without obstruction or inflammation. The  appendix is at the upper limits of normal in size but without  surrounding inflammation or evidence of acute appendicitis. No free  intraperitoneal gas or fluid. Uterus is absent. No adnexal mass.  Degenerative disease in the spine.      Impression    IMPRESSION:  1. Probable left lower lobe pulmonary emboli. These appear to be new since 11/27/2017.  2. Interval decrease in size of multiple hepatic metastases.     [Critical Result: Pulmonary embolism]    Finding was identified on 12/15/2017 2:32 PM.     Dr. Cash was contacted by me on 12/15/2017 2:39 PM and  verbalized understanding of the critical result.     MIGUELINA HERRING MD

## 2017-12-17 NOTE — PLAN OF CARE
Problem: Patient Care Overview  Goal: Plan of Care/Patient Progress Review  PT: Orders received, PT screen completed.  Pt admitted with PEs.  Reports she lives in apartment with granddaughter who can help as needed, no stairs needed to access apartment, uses FWW for mobility, IND with ADLs.  Denies falls hx in last 6 months.    Discharge Planner PT   Patient plan for discharge: Home  Current status: IND sit <> stand, SBA progressed to mod I with FWW to amb x100', no LOB noted  Barriers to return to prior living situation: none anticipated  Recommendations for discharge: Home with assist from family as needed  Rationale for recommendations: Pt has demonstrated the ability to complete all mobility safely. Is at/near baseline for mobility. No acute PT needs identified.  Will complete order at this time.  RN aware         Entered by: Venus Johnson 12/17/2017 11:02 AM

## 2017-12-17 NOTE — PLAN OF CARE
Problem: Patient Care Overview  Goal: Plan of Care/Patient Progress Review  A/O.  VSS on RA.  Denies pain, can become dyspneic with activity.   Up SBA, calls appropriately.  Started on lovenox, 1st dose given 0900. Plan: Administer 3 Lovenox doses, and check Hep 10a 4 hours after 3rd dose. Discharge pending Hep 10a result. Lidocaine for use before lovenox injection is available.  Will continue to monitor.

## 2017-12-17 NOTE — PROGRESS NOTES
[facility]    Hospitalist Progress Note :     Cumulative Summary: Liv Sylvester is a 67 year old female who was admitted on 12/15/2017 when she presented with generalized weakness.  Her past medical history significant on a stage IV metastatic colon cancer with metastasis to liver and lung, recurrent DVTs and PEs currently on once a day Lovenox, also have a history of ovarian cancer status post total abdominal hysterectomy with bilateral salpingorectomy five years ago.  Patient was evaluated in the emergency room where she was found to have new pulmonary embolism in left lower lobe on CT scan and liver metastasis.Patient was started on heparin infusion and was admitted for further evaluation and management.  Case was reviewed with hematology oncology who were in agreement to change patient to Lovenox 1 mg per/KG does twice a day rather than once a day PTA dose of 160 mg subcutaneous.    Assessment & Plan     Active Problems:  PE left lower lobe  H/o DVTs/PEs:  CT scan showed probable new left lower lobe PE. Patient was actually already on Lovenox therapy for DVTs/PEs that were first found in October.  It sounds like she was initially on Lovenox injections, switched to Xarelto at the beginning of November and then switched back to Lovenox 2 weeks ago after they found her bilateral DVTs had progressed in size.  In the ED the patient was switched to a Heparin drip for further therapy.  Case was reviewed with oncology who are in agreement to change patient to Lovenox 1 mg per KG dose b.i.d.      -- Continue to monitor patient on medical floor  -- Discussed the case yesterday with oncology, they were in the agreement to change patient on Lovenox 100 mg subcutaneous b.i.d. rather than 160 mg every 24 hours and then checking antifactor 10 A levels after three dosages and discharging patient on Lovenox b.i.d.  -- We will start heparin infusion, will start patient on Lovenox b.i.d. dose, posted pharmacy to  monitor levels to make sure patient is getting discharged on therapeutic dose of Lovenox, I did explain this to patient she showed understanding and is okay to stay another day so her Lovenox dose can be monitored.     Colon cancer stage IV w/mets to the liver and lung  Chemotherapy side effects: Patient is followed by Dr. Piedra and she states that she undergoes chemotherapy every 2-3 weeks and has completed 6-7 rounds.  Her last round was 4 days ago.  Since then she has had generalized weakness, and N/V/D which she states is normal for her after a round of chemotherapy, she told me that she is unable to keep up with good oral intake    -- Symptomatic management, discussed with bedside on and regarding administering Zofran routinely before diet and see if patient can increase her oral intake  -- We will discontinue IV fluids, further management as per hematology oncology, appreciate their help.     Sinus tachycardia: Likely due to PE and possible dehydration from her N/V/D, significantly improved this morning with hydration and anticoagulation.  -- Will continue to monitor with vitals.  Do not feel patient warrants telemetry at this time     Reported fever: supposedly had a fever of 101 degrees at home last night but has been afebrile since without intervention.  No infectious symptoms here other than her normal side effects from chemo.  A CT scan did not show any evidence of pneumonia.  UA did not show any signs of infection.  Her WBC is normal.  May just be due to the PE and/or reaction to chemotherapy, patient remains afebrile during her hospitalization  -- Continue to monitor      HTN :Well controlled at this time.  -- Continue PTA Chlorthalidone and Lisinopril , off the fluids now and is restarted on her home dose of chlorthalidone.     DVT Prophylaxis:  on Lovenox  Code Status: DNR / DNI     Disposition: Expected discharge tomorrow once pharmacy has checked anti-factor X A levels and we are sure that patient is  going home at the right dose of Lovenox.  Will also consult physical therapy to increase patient mobilization today, we'll consult  to evaluate if patient needs any further home care at discharge, plan to discharge tomorrow.    Carolyn Berman MD, FACP  Text Page (7am - 6pm)      Interval History   Patient was seen and examined this morning, sitting in bed, looks clinically better, told me that she is feeling stronger and was actually able to tolerate the diet last night. We discussed about switching her from heparin infusion to subcutaneous Lovenox but twice a day rather than once a day and then monitoring the labs to make sure that she is on the therapeutic dose once she is discharged, patient is in agreement and is okay to stay one more day.    -Data reviewed today: I reviewed all new labs and imaging results over the last 24 hours.    I personally reviewed no images or EKG's today.    Physical Exam   Temp: 98.4  F (36.9  C) Temp src: Oral BP: 151/74   Heart Rate: 89 Resp: 16 SpO2: 99 % O2 Device: None (Room air)    Vitals:    12/15/17 1218 12/15/17 1523   Weight: 103 kg (227 lb 1.2 oz) 104.3 kg (229 lb 15 oz)     Vital Signs with Ranges  Temp:  [98.4  F (36.9  C)-99  F (37.2  C)] 98.4  F (36.9  C)  Heart Rate:  [87-98] 89  Resp:  [16] 16  BP: (151-182)/(74-97) 151/74  SpO2:  [95 %-100 %] 99 %  I/O last 3 completed shifts:  In: 3734 [I.V.:3734]  Out: 600 [Urine:600]    GENERAL: Alert , awake and oriented. NAD. Conversational, appropriate. Very pleasant AAF  HEENT: Normocephalic. EOMI. No icterus or injection. Nares normal.   LUNGS: Clear to auscultation. No dyspnea at rest.   HEART: Regular rate. Extremities perfused.   ABDOMEN: obese,Soft, nontender, and nondistended. Positive bowel sounds.   EXTREMITIES: No LE edema noted.   NEUROLOGIC: Moves extremities x4 on command. No acute focal neurologic abnormalities noted.     Medications     - MEDICATION INSTRUCTIONS -         enoxaparin  1 mg/kg  Subcutaneous Q12H     chlorthalidone  25 mg Oral Daily     lisinopril  10 mg Oral Daily     potassium chloride SA  20 mEq Oral Daily     sertraline (ZOLOFT) tablet 50 mg  50 mg Oral Daily     cholecalciferol  1,000 Units Oral Daily       Data     Recent Labs  Lab 12/16/17  0630 12/15/17  1238 12/15/17  1230 12/13/17  0915   WBC 3.8* 5.0  --  5.8   HGB 11.5* 13.0  --  11.6*   MCV 86 85  --  86    174  --  225   INR  --   --  1.12  --    NA  --  136  --  140   POTASSIUM  --  3.9  --  3.7   CHLORIDE  --  102  --  107   CO2  --  26  --  27   BUN  --  19  --  12   CR  --  0.73  --  0.66   ANIONGAP  --  8  --  6   JESÚS  --  9.8  --  9.7   GLC  --  116*  --  128*   ALBUMIN  --  3.5  --  3.3*   PROTTOTAL  --  8.4  --  7.7   BILITOTAL  --  0.4  --  0.3   ALKPHOS  --  112  --  107   ALT  --  30  --  24   AST  --  44  --  23       Imaging:   No results found for this or any previous visit (from the past 24 hour(s)).

## 2017-12-18 NOTE — PLAN OF CARE
Problem: Patient Care Overview  Goal: Plan of Care/Patient Progress Review  Outcome: Improving  pt a&o. Up ad eve. Denies pain. Regular diet, fair appetite. Zofran given prior to dinner. Plan is for possible d/c home tomorrow. Pt calls appropriately, will continue to monitor and support.

## 2017-12-18 NOTE — DISCHARGE SUMMARY
[facility]    Discharge Summary  Hospitalist    Date of Admission:  12/15/2017  Date of Discharge:  12/18/2017  Discharging Provider: Carolyn Berman MD,FACP    Discharge Diagnoses     Active Problems:    PE (pulmonary thromboembolism) (H), left lower lobe  History of DVTs and pulmonary embolism, recently diagnosed in October 2017 on anticoagulation.  Stage IV colon cancer with metastases to liver and lung  Chemotherapy side effects   sinus tachycardia, resolved   essential hypertension      History of Present Illness   As per Admitting MD: Liv Sylvester is a 67 year old female with a history of recurrent DVTs/PEs, stage IV colon cancer and hypertension who presents with generalized weakness.  Patient received her last chemotherapy treatment 3 days ago and since then has been having issues with generalized weakness.  She has also had SOB, diarrhea, nausea and vomiting which she states is normal for her after her chemotherapy treatments.  She then became concerned yesterday evening as she had a fever of 101 degrees.  She rechecked it later in the evening and it had come down to 99 degrees and she has been afebrile since.  She denies any cough, dysuria, increased urinary frequency or rashes.  She also denies any chest pain, light headedness, leg swelling, leg pain, headaches or focal weakness. In the ED labs and imaging were obtained.  A CT scan showed a probably left lower lobe PE and the patient was started on Heparin drip.  She was also given a 1000 mL fluid bolus    Hospital Course   Liv Sylvester is a 67 year old female who was admitted on 12/15/2017 when she presented with generalized weakness.  Her past medical history significant on a stage IV metastatic colon cancer with metastasis to liver and lung, recurrent DVTs and PEs currently on once a day Lovenox 160 mg subcutaneous , also have a history of ovarian cancer status post total abdominal hysterectomy with bilateral salpingorectomy five years  ago.  Patient was evaluated in the emergency room where she was found to have new pulmonary embolism in left lower lobe on CT scan and slight enhancement of liver metastasis.Patient was started on heparin infusion and was admitted for further evaluation and management.  Case was reviewed with hematology oncology who were in agreement to change patient to Lovenox 1 mg per/KG does twice a day ( 100 mg )rather than once a day PTA dose of 160 mg subcutaneous.  He is also noticed to have low WBC counts and she was given the dose of Neupogen 480 MCG subcu before she was discharged.  She does have follow-up appointment with Dr. Piedra on December 27 she was given Lovenox for 2 weeks.  Patient was seen and examined on the day of discharge , she is feeling well, does not have any complaints , I did review the discharge medications and instructions with the patient and plan for her to follow up with the PCP after the hospitalization .patient was in agreement , she was discharged in stable condition back to her home.    Carolyn Berman MD, FACP    Significant Results and Procedures   As below    Pending Results   These results will be followed up by PCP      Unresulted Labs Ordered in the Past 30 Days of this Admission     Date and Time Order Name Status Description    12/15/2017 1509 Blood culture Preliminary     12/15/2017 1509 Blood culture Preliminary           Code Status   DNR / DNI       Primary Care Physician   RIGOBERTO SEN    Physical Exam   Temp: 98.2  F (36.8  C) Temp src: Oral BP: 109/69   Heart Rate: 105 Resp: 16 SpO2: 99 % O2 Device: None (Room air)    Vitals:    12/15/17 1218 12/15/17 1523   Weight: 103 kg (227 lb 1.2 oz) 104.3 kg (229 lb 15 oz)     Vital Signs with Ranges  Temp:  [97.4  F (36.3  C)-98.2  F (36.8  C)] 98.2  F (36.8  C)  Heart Rate:  [] 105  Resp:  [16] 16  BP: (109-163)/(69-84) 109/69  SpO2:  [99 %] 99 %  I/O last 3 completed shifts:  In: 360 [P.O.:360]  Out: 2045  [Urine:1475]    Constitutional: Awake, alert, cooperative, no apparent distress  Respiratory: Clear to auscultation bilaterally, no crackles or wheezing  Cardiovascular: Regular rate and rhythm, normal S1 and S2, and no murmur noted  GI: Normal bowel sounds, soft, non-distended, non-tender  Skin/Integumen: No rashes, no cyanosis, no edema      Discharge Disposition   Discharged to home  Condition at discharge: Stable    Consultations This Hospital Stay   PHARMACY TO DOSE HEPARIN  PHARMACY TO DOSE HEPARIN  HEMATOLOGY & ONCOLOGY IP CONSULT  HEMATOLOGY & ONCOLOGY IP CONSULT  PHARMACY TO DOSE MEDICATION  PHYSICAL THERAPY ADULT IP CONSULT  SOCIAL WORK IP CONSULT    Time Spent on this Encounter   I, Carolyn Fairbanksbal, personally saw the patient today and spent less than or equal to 30 minutes discharging this patient.    Discharge Orders     Reason for your hospital stay   You were admitted to the hospital secondary to blood clot     Follow-up and recommended labs and tests    Follow up with primary care provider, RIGOBERTO SEN, within 7 days for hospital follow- up.  No follow up labs or test are needed.  Please also keep up your appointment with  as already scheduled on Dec 27th.     Activity   Your activity upon discharge: activity as tolerated     Discharge Instructions   Please take Lovenox 100 mg Subcutaneous shot every 12 hours , you are given two weeks supply , you will be given further refill by .thanks     DNR/DNI     Diet   Follow this diet upon discharge: Orders Placed This Encounter     Combination Diet Regular Diet Adult       Discharge Medications   Current Discharge Medication List      CONTINUE these medications which have CHANGED    Details   enoxaparin (LOVENOX) 100 MG/ML injection Inject 1.04 mLs (104 mg) Subcutaneous every 12 hours for 14 days  Qty: 29.12 mL, Refills: 0    Associated Diagnoses: PE (pulmonary thromboembolism) (H); Other acute pulmonary embolism without acute cor pulmonale (H)          CONTINUE these medications which have NOT CHANGED    Details   DM-Doxylamine-Acetaminophen (VICKS NYQUIL COLD & FLU) 15-6. MG/15ML LIQD Take 15 mLs by mouth nightly as needed      sennosides (SENOKOT) 8.6 MG tablet Take 1 tablet by mouth 2 times daily as needed for constipation      Potassium Chloride ER 20 MEQ TBCR Take 1 tablet (20 mEq) by mouth daily  Qty: 30 tablet, Refills: 11    Associated Diagnoses: Hypokalemia      lidocaine (XYLOCAINE) 5 % ointment Apply 0.5 g topically as needed for moderate pain Apply pea size amount to rectal area for pain  Qty: 5 g, Refills: 0      LORazepam (ATIVAN) 0.5 MG tablet Take 1 tablet (0.5 mg) by mouth every 4 hours as needed (Anxiety, Nausea/Vomiting or Sleep)  Qty: 30 tablet, Refills: 2    Associated Diagnoses: Malignant neoplasm of hepatic flexure (H)      prochlorperazine (COMPAZINE) 10 MG tablet Take 1 tablet (10 mg) by mouth every 6 hours as needed (Nausea/Vomiting)  Qty: 30 tablet, Refills: 2    Associated Diagnoses: Malignant neoplasm of hepatic flexure (H)      ondansetron (ZOFRAN) 8 MG tablet Take 1 tablet (8 mg) by mouth every 8 hours as needed (Nausea/Vomiting)  Qty: 10 tablet, Refills: 2    Associated Diagnoses: Malignant neoplasm of hepatic flexure (H)      lidocaine-prilocaine (EMLA) cream Apply topically as needed for moderate pain Apply dollop size amount to port site 30-60 minutes prior to accessing.  Qty: 30 g, Refills: 1    Associated Diagnoses: Malignant neoplasm of hepatic flexure (H); Port catheter in place      albuterol (PROAIR HFA/PROVENTIL HFA/VENTOLIN HFA) 108 (90 BASE) MCG/ACT Inhaler Inhale 2 puffs into the lungs every 6 hours as needed for shortness of breath / dyspnea or wheezing  Qty: 1 Inhaler, Refills: 3    Associated Diagnoses: Reactive airway disease without complication, unspecified asthma severity, unspecified whether persistent      CHLORTHALIDONE PO Take 25 mg by mouth daily as needed ((Patient is supposed to take 25mg  daily but states she only takes as needed))       SERTRALINE HCL PO Take 50 mg by mouth daily (Takes half of a 100mg tablet for a total of 50mg daily)      CALCIUM-MAGNESIUM-ZINC PO Take 1 tablet by mouth daily      VITAMIN D, CHOLECALCIFEROL, PO Take 1 tablet by mouth daily (OTC: Pt unsure of strength)      ACETAMINOPHEN PO Take 1,000 mg by mouth every 4 hours as needed for pain ((Patient has been taking 4-12 tabs per day))      lisinopril (PRINIVIL,ZESTRIL) 10 MG tablet Take 1 tablet by mouth daily.  Qty: 30 tablet, Refills: 11    Comments: Profile only, do not fill today  Associated Diagnoses: HTN (hypertension)      Multiple Vitamins-Minerals (CENTRUM SILVER) per tablet Take 1 tablet by mouth daily.  Qty: 100 tablet, Refills: 12           Allergies   No Known Allergies  Data   Most Recent 3 CBC's:  Recent Labs   Lab Test  12/18/17   0600  12/16/17   0630  12/15/17   1238   WBC  2.9*  3.8*  5.0   HGB  11.3*  11.5*  13.0   MCV  85  86  85   PLT  184  173  174      Most Recent 3 BMP's:  Recent Labs   Lab Test  12/18/17   0600  12/15/17   1238  12/13/17   0915  11/29/17   0915   NA   --   136  140  143   POTASSIUM   --   3.9  3.7  3.7   CHLORIDE   --   102  107  109   CO2   --   26  27  28   BUN   --   19  12  9   CR  0.58  0.73  0.66  0.67   ANIONGAP   --   8  6  6   JESÚS   --   9.8  9.7  8.5   GLC   --   116*  128*  113*     Most Recent 2 LFT's:  Recent Labs   Lab Test  12/15/17   1238  12/13/17   0915   AST  44  23   ALT  30  24   ALKPHOS  112  107   BILITOTAL  0.4  0.3     Most Recent INR's and Anticoagulation Dosing History:  Anticoagulation Dose History     Recent Dosing and Labs Latest Ref Rng & Units 9/29/2017 12/15/2017    INR 0.86 - 1.14 1.05 1.12        Most Recent 3 Troponin's:  Recent Labs   Lab Test  10/20/17   1320  06/14/10   1207   TROPI  <0.015  <0.012     Most Recent Cholesterol Panel:  Recent Labs   Lab Test  05/13/13   0837   CHOL  162   LDL  115   HDL  28*   TRIG  95     Most Recent 6 Bacteria  Isolates From Any Culture (See EPIC Reports for Culture Details):  Recent Labs   Lab Test  12/15/17   1538  12/15/17   1238   CULT  No growth after 3 days  No growth after 3 days     Most Recent TSH, T4 and A1c Labs:  Recent Labs   Lab Test  05/13/13   0837   TSH  0.87     Results for orders placed or performed during the hospital encounter of 12/15/17   XR Chest 2 Views    Narrative    CHEST TWO VIEW 12/15/2017 1:53 PM     HISTORY: Cough and cold symptoms, on chemo.    COMPARISON: 10/20/2017      Impression    IMPRESSION: Right internal jugular chest port in place with catheter  tip in the SVC. The cardiac silhouette and pulmonary vasculature are  normal. There are no acute infiltrates or pleural effusions.    DIPESH BANEGAS MD   CT Abdomen Pelvis w Contrast     Value    Radiologist flags Pulmonary embolism (AA)    Narrative    CT ABDOMEN AND PELVIS WITH CONTRAST   12/15/2017 2:24 PM      HISTORY: Abdominal pain and vomiting. History of cancer with  chemotherapy.    TECHNIQUE:  CT abdomen and pelvis with intravenous contrast. Radiation  dose for this scan was reduced using automated exposure control,  adjustment of the mA and/or kV according to patient size, or iterative  reconstruction technique. 114 mL Isovue-370     COMPARISON:  11/27/2017    FINDINGS:  Abdomen:  There appear to be emboli in branches of the left lower lobe  pulmonary artery, incompletely imaged on this abdomen CT. Minimal  dependent atelectasis bilaterally. The heart size is normal. There are  multiple masses in the liver consistent with metastases. These have  overall decreased in size since the previous exam. An anterior liver  dome mass measures 5.7 cm transversely (previously 6.1 cm). A  posterior dome lesion measures 3.1 cm transverse (previously 3.5 cm).  The portal vein is patent. The spleen, gallbladder, pancreas and  adrenal glands are normal in appearance. There are two right renal  cortical cysts and a few tiny probable cortical cysts  bilaterally. A  few small erwin hepatis region lymph nodes are similar to the previous  exam. No abdominal or pelvic lymph node enlargement. There is  atherosclerotic calcification of aorta and its branches. No aneurysm.    Pelvis: Bowel appears normal without obstruction or inflammation. The  appendix is at the upper limits of normal in size but without  surrounding inflammation or evidence of acute appendicitis. No free  intraperitoneal gas or fluid. Uterus is absent. No adnexal mass.  Degenerative disease in the spine.      Impression    IMPRESSION:  1. Probable left lower lobe pulmonary emboli. These appear to be new  since 11/27/2017.  2. Interval decrease in size of multiple hepatic metastases.     [Critical Result: Pulmonary embolism]    Finding was identified on 12/15/2017 2:32 PM.     Dr. Cash was contacted by me on 12/15/2017 2:39 PM and  verbalized understanding of the critical result.     MIGUELINA HERRING MD

## 2017-12-18 NOTE — PLAN OF CARE
Problem: Patient Care Overview  Goal: Plan of Care/Patient Progress Review  Outcome: Improving  VSS.  Denies pain.  Pt slept well overnight.  Plan to d/c today, pending counts.  Continue to monitor.

## 2017-12-18 NOTE — CONSULTS
ONCOLOGY PROGRESS NOTE      Liv Sylvester is a 67-year-old female with metastatic colon cancer with liver metastasis.  She is currently on palliative chemotherapy with modified FOLFOX-6 and Avastin.  Last treatment was on 12/13/2017.  She was supposed to receive Neulasta. She did not get it.      The patient also has a known pulmonary embolism.  CT chest angiogram on 10/20/2017 had revealed a few small acute pulmonary emboli in the left lung.  There was also a linear filling defect in the right lower lobe pulmonary artery, most likely representing an embolus.  Lower extremity ultrasound on 10/20/2017 revealed small bilateral posterior tibial vein thrombosis.  The patient has been on Lovenox.  CT of the chest, abdomen and pelvis on 11/27/2017 revealed a new small pulmonary embolus in the descending right pulmonary artery.  Some of the previous pulmonary embolism were stable.  There was thrombus around the Port-A-Cath.  Lower extremity ultrasound on 11/29/2017 revealed worsening DVT.      When she was diagnosed with PE on 10/20/2017, she was on Lovenox 1 mg b.i.d.  It was switched to once-a-day dosing as the patient was finding it difficult to self-inject due to tremors.  It was subsequently changed to Xarelto as she was having a difficult time self-injecting        The patient presented to the ER on 12/15/2017 with generalized weakness.  She also had a few other symptoms, including fever and some abdominal discomfort.  She also complained of some shortness of breath, diarrhea and nausea.  She had multiple investigations done, including CT of the abdomen and pelvis on 12/15/2017.  It revealed improvement in her metastatic liver disease.  It did reveal probable left lower lobe pulmonary emboli that appear to be new since 11/27/2017.      For the pulmonary embolism, the patient has now been switched to Lovenox 1 mg/kg q.12h.  She is tolerating it well.      Overall, she feels better.  Shortness of breath is better.   Fatigue is less.  She is not having any bleeding from any site.  No infection.  No fever or chills.      PHYSICAL EXAMINATION:   GENERAL:  She is alert and oriented x3.   VITAL SIGNS:  Reviewed.   Rest of the systems are not examined.      LABORATORY DATA:  Reviewed.  WBC is mildly low at 2.9.  Heparin anti-Xa level is 0.74.      ASSESSMENT:   1.  67-year-old female with bilateral pulmonary emboli.  This is secondary to metastatic colon cancer.   2.  Metastatic colon cancer, on palliative chemotherapy.   3.  Leukopenia from chemotherapy.   4.  Mild anemia.      PLAN:   1.  The patient clinically is doing well.  She is not symptomatic from pulmonary embolism now.  She is on Lovenox.  I told her that she should take twice-a-day dosing of Lovenox.  This will be better in her case.  The patient will be discharged home on Lovenox 1 mg/kg q.12h.  I explained to the patient that she is at high risk of thrombosis.  Because of that, it is important that she continue her anticoagulation and not stop it.  Side effects of anticoagulation, including bleeding, were discussed   2.  Labs were reviewed.  White count is mildly low.  We will give her Neupogen 480 mcg subcu.   3. Patient is going to be discharged today.  She will follow up with Dr. Piedra in the clinic next week. Patient had a few questions, which were all answered.      Recommendations were discussed with Dr. Berman.         HUNTER TORRES MD             D: 2017 14:00   T: 2017 16:13   MT: [id number]      Name:     JESSICA MOYA   MRN:      [id number]        Account:       [id number]   :      1950           Consult Date:  2017      Document: [id number]

## 2017-12-18 NOTE — PROGRESS NOTES
This is a recent snapshot of the patient's [facility] medical record.  For current drug dose and complete information and questions, call [phone] or In [facility] pool, fv home infusion ([id number])  CSN Number:  [id number]

## 2017-12-18 NOTE — PHARMACY
Lovenox covered - $154 / month    Thank you,  Ramana Younger CPhT  [facility] Discharge Pharmacy Liaison  [phone]

## 2017-12-19 NOTE — PROGRESS NOTES
This is a recent snapshot of the patient's [facility] medical record.  For current drug dose and complete information and questions, call [phone] or In [facility] pool, fv home infusion ([id number])  CSN Number:  [id number]

## 2017-12-19 NOTE — TELEPHONE ENCOUNTER
Liv called with her [employer] information today stating that she called them and was told they will not be sending anymore paperwork for her leave extension( last paperwork filled out in Sept 2017)  She was told we need to connect with them.    She provided the contact information:  [employer]-  Phone [phone]  Fax [phone]  Her WIN id# [id number]  Claim # [id number]    I called and spoke with a  who stated she just need a last dictation with an ok for leave extension, for duration of time, future treatment plan, how she is tolerating and anything else that may be beneficial.  The due date for this is 1/11/18.

## 2017-12-19 NOTE — TELEPHONE ENCOUNTER
Discharge Summary  Hospitalist     Date of Admission:  12/15/2017  Date of Discharge:  12/18/2017  Discharging Provider: Carolyn Berman MD,FACP        Spoke with Liv who was discharged from the hospital yesterday.  She states she is tired but denies any other concerns( nausea, vommitting,shortness of breath, dizziness)  She states lovenox injections are going well.    She is aware to call with any new symptoms or concerns.

## 2017-12-20 NOTE — PROGRESS NOTES
This is a recent snapshot of the patient's [facility] medical record.  For current drug dose and complete information and questions, call [phone] or In [facility] pool, fv home infusion ([id number])  CSN Number:  [id number]

## 2017-12-21 NOTE — TELEPHONE ENCOUNTER
Last MD note adended and faxed to below number with her WIN # and claim # attached.  Message left for her to return call

## 2017-12-26 NOTE — PROGRESS NOTES
This is a recent snapshot of the patient's [facility] medical record.  For current drug dose and complete information and questions, call [phone] or In [facility] pool, fv home infusion ([id number])  CSN Number:  [id number]

## 2017-12-27 NOTE — PROGRESS NOTES
"Oncology Rooming Note    December 27, 2017 9:30 AM   Liv Sylvester is a 67 year old female who presents for:    Chief Complaint   Patient presents with     Oncology Clinic Visit     RETURN-1 month follow up-colon cancer     Initial Vitals: /85 (BP Location: Right arm, Patient Position: Chair, Cuff Size: Adult Regular)  Pulse 54  Temp 98.7  F (37.1  C)  Resp 16  Wt 101.6 kg (224 lb)  SpO2 98%  BMI 33.06 kg/m2 Estimated body mass index is 33.06 kg/(m^2) as calculated from the following:    Height as of 12/13/17: 1.753 m (5' 9.02\").    Weight as of this encounter: 101.6 kg (224 lb). Body surface area is 2.22 meters squared.  No Pain (0) Comment: Data Unavailable   No LMP recorded. Patient has had a hysterectomy.  Allergies reviewed: Yes  Medications reviewed: Yes    Medications: Medication refills not needed today.  Pharmacy name entered into [facility]:    [facility] [place], [place] - [address] [facility] [place], [place] - [address]    Clinical concerns: none     5 minutes for nursing intake (face to face time)     Simin Lentz CMA            "

## 2017-12-27 NOTE — PATIENT INSTRUCTIONS
1.  FOLFOX with Avastin every 2 weeks with neulasta support.  Chemo today.   Scheduled/Ryan  2.  Return to clinic 2 weeks with Infusion Clinic appointment.   Scheduled/ryan  3-  Anti Xa level 4 hours after the dose of Lovenox.  Scheduled/Ryan  4.  Continue Lovenox 1 mg / KG SQ Q 12 hours.   5.  Start Ritalin 5 mg in the morning and 5 mg at lunch time.   6-  Schedule a follow up with palliative care. Scheduled/Ryan  7- Replace electrolyte today if needed.   8- Weekly NP/MD visits with anti xa level to be drawn

## 2017-12-27 NOTE — LETTER
"    12/27/2017         RE: Liv Sylvester  [address]  [facility] [address]        Dear Colleague,    Thank you for referring your patient, Liv Sylvester, to the [facility]. Please see a copy of my visit note below.    Oncology Rooming Note    December 27, 2017 9:30 AM   Liv Sylvester is a 67 year old female who presents for:    Chief Complaint   Patient presents with     Oncology Clinic Visit     RETURN-1 month follow up-colon cancer     Initial Vitals: /85 (BP Location: Right arm, Patient Position: Chair, Cuff Size: Adult Regular)  Pulse 54  Temp 98.7  F (37.1  C)  Resp 16  Wt 101.6 kg (224 lb)  SpO2 98%  BMI 33.06 kg/m2 Estimated body mass index is 33.06 kg/(m^2) as calculated from the following:    Height as of 12/13/17: 1.753 m (5' 9.02\").    Weight as of this encounter: 101.6 kg (224 lb). Body surface area is 2.22 meters squared.  No Pain (0) Comment: Data Unavailable   No LMP recorded. Patient has had a hysterectomy.  Allergies reviewed: Yes  Medications reviewed: Yes    Medications: Medication refills not needed today.  Pharmacy name entered into BeehiveID:    [facility] [place], [place] - [address]  [employer] [facility] - [place], [place] - [address]    Clinical concerns: none     5 minutes for nursing intake (face to face time)     Simin Lentz [facility] PHYS[facility]ANS  HEMATOLOGY ONCOLOGY    ONCOLOGY FOLLOWUP NOTE      DIAGNOSIS:    1- Metastatic colon cancer in a patient with history of stage 1A grade 3 endometrial carcinoma approximately around 2012.     On 09/29/2017, Liv Sylvester presented with progressive fatigue and right upper quadrant pain.  CT scan 09/29/2017 showed partially contracted bladder and findings suspicious for liver metastases.  There were multiple defined hypoechoic foci scoped scattered throughout the liver, the largest was in the right lobe.  CT was also " showing enlargement of hepatic and retroperitoneal lymph nodes.  On 09/30/2017, she underwent a colonoscopy, which showed a fungating, infiltrative, ulcerated, partially obstructing mass at the hepatic flexure.  Pathology is consistent with moderately differentiated adenocarcinoma with normal mismatch repair protein expression.   2- Significant iron deficiency anemia.   3- VTE- malignancy related     TREATMENT:  10/11/2017 FOLFOX and Avastin.      SUBJECTIVE:  The patient was seen as a followup today. She continues to be fatigued. She states that she still forgot to inject herself with lovenox few times last week.     REVIEW OF SYSTEMS:  A complete review of systems was performed and found to be negative other than pertinent positives mentioned in history of present illness.      Past medical, social histories reviewed.     Meds- Reviewed.     PHYSICAL EXAMINATION:   VITAL SIGNS: /85 (BP Location: Right arm, Patient Position: Chair, Cuff Size: Adult Regular)  Pulse 54  Temp 98.7  F (37.1  C)  Resp 16  Wt 101.6 kg (224 lb)  SpO2 98%  BMI 33.06 kg/m2  CONSTITUTIONAL: Appears tired.   HEENT: Pupils are equal. Oropharynx is clear.   NECK: No cervical or supraclavicular lymphadenopathy.   RESPIRATORY: Clear bilaterally.   CARD/VASC: S1, S2, regular.   GI: Soft, nontender, nondistended, no hepatosplenomegaly.   MUSKULOSKELETAL: Warm, well perfused.   NEUROLOGIC: Alert, awake.   INTEGUMENT: No rash.   LYMPHATICS: Bilateral edema.   PSYCH: Mood and affect was normal.     LABORATORY DATA AND IMAGING REVIEWED DURING THIS VISIT:  Recent Labs   Lab Test  12/18/17   0600  12/15/17   1238  12/13/17   0915   NA   --   136  140   POTASSIUM   --   3.9  3.7   CHLORIDE   --   102  107   CO2   --   26  27   ANIONGAP   --   8  6   BUN   --   19  12   CR  0.58  0.73  0.66   GLC   --   116*  128*   JESÚS   --   9.8  9.7     Recent Labs   Lab Test  12/18/17   0600  12/16/17   0630  12/15/17   1238  12/13/17   0915  11/29/17    0915   WBC  2.9*  3.8*  5.0  5.8  3.9*   HGB  11.3*  11.5*  13.0  11.6*  9.9*   PLT  184  173  174  225  172   MCV  85  86  85  86  85   NEUTROPHIL   --    --   75.3  65.8  49.2     Recent Labs   Lab Test  12/15/17   1238  12/13/17   0915  11/29/17   0915   BILITOTAL  0.4  0.3  0.3   ALKPHOS  112  107  81   ALT  30  24  41   AST  44  23  35   ALBUMIN  3.5  3.3*  2.7*     Results for JESSICA MOYA (MRN [id number]) as of 12/27/2017 09:38   Ref. Range 9/30/2017 07:39 11/29/2017 12:40   CEA Latest Ref Range: 0 - 2.5 ug/L 143.8 (H) 95.5 (H)       Results for orders placed or performed during the hospital encounter of 12/15/17   XR Chest 2 Views    Narrative    CHEST TWO VIEW 12/15/2017 1:53 PM     HISTORY: Cough and cold symptoms, on chemo.    COMPARISON: 10/20/2017      Impression    IMPRESSION: Right internal jugular chest port in place with catheter  tip in the SVC. The cardiac silhouette and pulmonary vasculature are  normal. There are no acute infiltrates or pleural effusions.    DIPESH BANEGAS MD   CT Abdomen Pelvis w Contrast     Value    Radiologist flags Pulmonary embolism (AA)    Narrative    CT ABDOMEN AND PELVIS WITH CONTRAST   12/15/2017 2:24 PM      HISTORY: Abdominal pain and vomiting. History of cancer with  chemotherapy.    TECHNIQUE:  CT abdomen and pelvis with intravenous contrast. Radiation  dose for this scan was reduced using automated exposure control,  adjustment of the mA and/or kV according to patient size, or iterative  reconstruction technique. 114 mL Isovue-370     COMPARISON:  11/27/2017    FINDINGS:  Abdomen:  There appear to be emboli in branches of the left lower lobe  pulmonary artery, incompletely imaged on this abdomen CT. Minimal  dependent atelectasis bilaterally. The heart size is normal. There are  multiple masses in the liver consistent with metastases. These have  overall decreased in size since the previous exam. An anterior liver  dome mass measures 5.7 cm transversely  (previously 6.1 cm). A  posterior dome lesion measures 3.1 cm transverse (previously 3.5 cm).  The portal vein is patent. The spleen, gallbladder, pancreas and  adrenal glands are normal in appearance. There are two right renal  cortical cysts and a few tiny probable cortical cysts bilaterally. A  few small erwin hepatis region lymph nodes are similar to the previous  exam. No abdominal or pelvic lymph node enlargement. There is  atherosclerotic calcification of aorta and its branches. No aneurysm.    Pelvis: Bowel appears normal without obstruction or inflammation. The  appendix is at the upper limits of normal in size but without  surrounding inflammation or evidence of acute appendicitis. No free  intraperitoneal gas or fluid. Uterus is absent. No adnexal mass.  Degenerative disease in the spine.      Impression    IMPRESSION:  1. Probable left lower lobe pulmonary emboli. These appear to be new  since 11/27/2017.  2. Interval decrease in size of multiple hepatic metastases.     [Critical Result: Pulmonary embolism]    Finding was identified on 12/15/2017 2:32 PM.     Dr. Cash was contacted by me on 12/15/2017 2:39 PM and  verbalized understanding of the critical result.     MIGUELINA HERRING MD     ECOG performance status 1.      ASSESSMENT:    1- This is a 66-year-old lady with stage IV metastatic colon cancer with normal mismatch repair protein expression.  She has multiple liver metastases and metastasis in the abdominal lymph nodes.     She is on palliative intent combination of FOLFOX with Avastin which is given every 2 weeks intravenously.      - Labs were reviewed with the patient, CEA is improving.   - CT Scan 12/15 results were reviewed with the patient.I think continued issues with thrombosis are related to compliance as well. We have arranged for home health. Agree with the Lovenox 1 mg/kg Q 12 hours dose.      2- She also has iron deficiency anemia due to chronic blood loss from her cancer.  She  is S/P injectafer.     3- VTE- CT scan 12/2017 showed new right sided small pulmonary embolus. There was a thrombus associated with the port as well. The findings was concern for continued thrombosis despite being on Xarelto. She was swicthed to Lovenox.      4- Her situation is high risk. I think she is not able for self care and their is not enough family support. We have talked to her about option of assisted living and she is open to this idea. We will have our  work on this.     5- Fatigue- I will start her on Ritalin.      PLAN:   1.  FOLFOX with Avastin every 2 weeks with neulasta support.  Chemo today.   2.  Return to clinic 2 weeks with Infusion Clinic appointment.   3-  Anti Xa level 4 hours after the dose of Lovenox.   4.  Continue Lovenox 1 mg / KG SQ Q 12 hours.   5.  Start Ritalin 5 mg in the morning and 5 mg at lunch time.   6-  Schedule a follow up with palliative care.   7- Replace electrolyte today if needed.   RADHA PIEDRA MD    12/27/2017      Again, thank you for allowing me to participate in the care of your patient.        Sincerely,        Radha Piedra MD

## 2017-12-27 NOTE — MR AVS SNAPSHOT
After Visit Summary   12/27/2017    Liv Sylvester    MRN: [id number]           Patient Information     Date Of Birth          1950        Visit Information        Provider Department      12/27/2017 9:30 AM Radha Piedra MD [facility]        Today's Diagnoses     Neoplastic malignant related fatigue    -  1    PE (pulmonary thromboembolism) (H)        Other acute pulmonary embolism without acute cor pulmonale (H)          Care Instructions    1.  FOLFOX with Avastin every 2 weeks with neulasta support.  Chemo today.   Scheduled/[place]  2.  Return to clinic 2 weeks with Infusion Clinic appointment.   Scheduled/[place]anza  3-  Anti Xa level 4 hours after the dose of Lovenox.  Scheduled/[place]  4.  Continue Lovenox 1 mg / KG SQ Q 12 hours.   5.  Start Ritalin 5 mg in the morning and 5 mg at lunch time.   6-  Schedule a follow up with palliative care. Scheduled/[place]  7- Replace electrolyte today if needed.   8- Weekly NP/MD visits with anti xa level to be drawn          Follow-ups after your visit        Your next 10 appointments already scheduled     Jan 04, 2018  9:00 AM CST   Level 1 with  INFUSION CHAIR 18   [facility] ([facility])    [facility]  [address]  [place] [address]   [phone]            Jan 04, 2018 10:15 AM CST   Return Visit with Radha Piedra MD   [facility] ([facility])    [facility]  [address]  [place] [address]   [phone]            Evaristo 10, 2018  9:00 AM CST   Level 6 with  INFUSION CHAIR 8   [facility] ([facility])    [facility]  [address]  [place] [address]   [phone]            Evaristo 10, 2018  9:30 AM CST   Return Visit with Radha Piedra MD   [facility] ([facility])    [facility]  [place] [id number]  [place] [address]   [phone]            Jan 12, 2018 12:30 PM CST   Level 1 with SH INFUSION CHAIR 13   [facility] ([facility])    [facility]  [address]  [place] [address]   [phone]            Jan 16, 2018 12:00 PM CST   Level 1 with SH INFUSION CHAIR 10   [facility] ([facility])    [place] [id number]  [place] [address]   [phone]            Jan 16, 2018 12:30 PM CST   New Visit with Nela Spaulding MD   [facility] ([facility])    [place] [id number]  [place] [address]   [phone]            Jan 16, 2018  1:30 PM CST   Return Visit with TITO Briones CNP   [facility] ([facility])    [facility] [place] [id number]  [place] [address]   [phone]            Jan 24, 2018  9:00 AM CST   Level 6 with  INFUSION CHAIR 17   [facility] ([facility])    [facility]  [address]  [place] [address]   [phone]            Jan 24, 2018  9:30 AM CST   Return Visit with Radha Piedra MD   [facility])    [facility] [place] [id number]  [place] [address] [phone]              Who to contact     If you have questions or need follow up information about today's clinic visit or your schedule please contact [facility] directly at [phone].  Normal or non-critical lab and imaging results will be communicated to you by MyChart, letter or phone within 4 business days after the clinic has received the results. If you do not hear from us within 7 days, please contact the clinic through  "Netlifthart or phone. If you have a critical or abnormal lab result, we will notify you by phone as soon as possible.  Submit refill requests through Clinc! or call your pharmacy and they will forward the refill request to us. Please allow 3 business days for your refill to be completed.          Additional Information About Your Visit        NetliftharAC Holdco Information     Clinc! lets you send messages to your doctor, view your test results, renew your prescriptions, schedule appointments and more. To sign up, go to [email or web address] . Click on \"Log in\" on the left side of the screen, which will take you to the Welcome page. Then click on \"Sign up Now\" on the right side of the page.     You will be asked to enter the access code listed below, as well as some personal information. Please follow the directions to create your username and password.     Your access code is: [email or web address]  Expires: 2018  7:56 AM     Your access code will  in 90 days. If you need help or a new code, please call your [place] clinic or [phone].        Care EveryWhere ID     This is your Care EveryWhere ID. This could be used by other organizations to access your [place] medical records  [id number]        Your Vitals Were     Pulse Temperature Respirations Pulse Oximetry BMI (Body Mass Index)       54 98.7  F (37.1  C) 16 98% 33.06 kg/m2        Blood Pressure from Last 3 Encounters:   17 132/66   17 132/85   17 109/69    Weight from Last 3 Encounters:   17 101.6 kg (224 lb)   17 103.1 kg (227 lb 6.4 oz)   12/15/17 104.3 kg (229 lb 15 oz)                 Today's Medication Changes          These changes are accurate as of: 17 11:59 PM.  If you have any questions, ask your nurse or doctor.               Start taking these medicines.        Dose/Directions    methylphenidate 5 MG tablet   Commonly known as:  RITALIN   Used for:  Neoplastic malignant related fatigue   Started by:  Radha Piedra, " MD        Dose:  5 mg   Take 1 tablet (5 mg) by mouth 2 times daily   Quantity:  60 tablet   Refills:  0            Where to get your medicines      These medications were sent to [facility] [place], [place] - [address]  [address] [place] [address]     Phone:  [phone]     enoxaparin 100 MG/ML injection         Some of these will need a paper prescription and others can be bought over the counter.  Ask your nurse if you have questions.     Bring a paper prescription for each of these medications     methylphenidate 5 MG tablet                Primary Care Provider Office Phone # Fax #    Amaya Howard [phone]       [facility] [address] [facility] [address]        Equal Access to Services     HAYDEE IVERSON : Jeffry vallejo Somoises, waaxda luqadaha, qaybta kaalmada adeegyada, john cevallos . So [facility] [phone].    ATENCIÓN: Si habla español, tiene a schmitt disposición servicios gratuitos de asistencia lingüística. [facility] [phone].    We comply with applicable federal civil rights laws and [place] laws. We do not discriminate on the basis of race, color, national origin, age, disability, sex, sexual orientation, or gender identity.            Thank you!     Thank you for choosing [facility]  for your care. Our goal is always to provide you with excellent care. Hearing back from our patients is one way we can continue to improve our services. Please take a few minutes to complete the written survey that you may receive in the mail after your visit with us. Thank you!             Your Updated Medication List - Protect others around you: Learn how to safely use, store and throw away your medicines at www.disposemymeds.org.          This list is accurate as of: 12/27/17 11:59 PM.  Always use your most recent med list.                   Brand Name Dispense Instructions for use Diagnosis    ACETAMINOPHEN PO       Take 1,000 mg by mouth every 4 hours as needed for pain ((Patient has been taking 4-12 tabs per day))        albuterol 108 (90 BASE) MCG/ACT Inhaler    PROAIR HFA/PROVENTIL HFA/VENTOLIN HFA    1 Inhaler    Inhale 2 puffs into the lungs every 6 hours as needed for shortness of breath / dyspnea or wheezing    Reactive airway disease without complication, unspecified asthma severity, unspecified whether persistent       CALCIUM-MAGNESIUM-ZINC PO      Take 1 tablet by mouth daily        CENTRUM SILVER per tablet     100 tablet    Take 1 tablet by mouth daily.        CHLORTHALIDONE PO      Take 25 mg by mouth daily as needed ((Patient is supposed to take 25mg daily but states she only takes as needed))        enoxaparin 100 MG/ML injection    LOVENOX    62.4 mL    Inject 1.04 mLs (104 mg) Subcutaneous every 12 hours    PE (pulmonary thromboembolism) (H), Other acute pulmonary embolism without acute cor pulmonale (H)       lidocaine 5 % ointment    XYLOCAINE    5 g    Apply 0.5 g topically as needed for moderate pain Apply pea size amount to rectal area for pain        lidocaine-prilocaine cream    EMLA    30 g    Apply topically as needed for moderate pain Apply dollop size amount to port site 30-60 minutes prior to accessing.    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    HTN (hypertension)       LORazepam 0.5 MG tablet    ATIVAN    30 tablet    Take 1 tablet (0.5 mg) by mouth every 4 hours as needed (Anxiety, Nausea/Vomiting or Sleep)    Malignant neoplasm of hepatic flexure (H)       methylphenidate 5 MG tablet    RITALIN    60 tablet    Take 1 tablet (5 mg) by mouth 2 times daily    Neoplastic malignant related fatigue       ondansetron 8 MG tablet    ZOFRAN    10 tablet    Take 1 tablet (8 mg) by mouth every 8 hours as needed (Nausea/Vomiting)    Malignant neoplasm of hepatic flexure (H)        Potassium Chloride ER 20 MEQ Tbcr     30 tablet    Take 1 tablet (20 mEq) by mouth daily    Hypokalemia       prochlorperazine 10 MG tablet    COMPAZINE    30 tablet    Take 1 tablet (10 mg) by mouth every 6 hours as needed (Nausea/Vomiting)    Malignant neoplasm of hepatic flexure (H)       sennosides 8.6 MG tablet    SENOKOT     Take 1 tablet by mouth 2 times daily as needed for constipation        SERTRALINE HCL PO      Take 50 mg by mouth daily (Takes half of a 100mg tablet for a total of 50mg daily)        GREGG NYQUIL COLD & FLU 15-6. MG/15ML Liqd   Generic drug:  DM-Doxylamine-Acetaminophen      Take 15 mLs by mouth nightly as needed        VITAMIN D (CHOLECALCIFEROL) PO      Take 1 tablet by mouth daily (OTC: Pt unsure of strength)

## 2017-12-27 NOTE — MR AVS SNAPSHOT
After Visit Summary   12/27/2017    Liv Sylvester    MRN: [id number]           Patient Information     Date Of Birth          1950        Visit Information        Provider Department      12/27/2017 9:00 AM SH INFUSION CHAIR 8 [facility]        Today's Diagnoses     Malignant neoplasm of hepatic flexure (H)    -  1      Care Instructions    For any questions/concerns regarding chemo pump please call our office during regular business hours ([phone])  After hours please call  home infusion [phone]          Follow-ups after your visit        Your next 10 appointments already scheduled     Dec 29, 2017 12:00 PM CST   Level 1 with SH INFUSION CHAIR 16   [facility] ([facility])    [facility]  [address]  [place] [address]   [phone]            Evaristo 10, 2018  9:00 AM CST   Level 6 with SH INFUSION CHAIR 8   [facility] ([facility])    [facility] [place] [id number]  [place] [address]   [phone]            Evaristo 10, 2018  9:30 AM CST   Return Visit with Radha Piedra MD   [facility] ([facility])    [facility]  [address]  [place] [address]   [phone]            Jan 12, 2018 12:30 PM CST   Level 1 with SH INFUSION CHAIR 13   [facility] ([facility])    [facility]  [address]  [place] [address]   [phone]            Jan 24, 2018  9:00 AM CST   Level 6 with SH INFUSION CHAIR 17   [facility] ([facility])    [facility]  [address]  [place] [address]   [phone]            Jan 24, 2018  9:30 AM CST   Return Visit with Radha Piedra MD   [facility] ([facility])    [facility] [place] [facility]  [address]  [place] [address]   [phone]            Jan 26, 2018 12:00 PM CST   Level 1 with SH INFUSION CHAIR 5   [facility] ([facility])    [facility] [place]  [address]  [place] [address]   [phone]            Feb 07, 2018  9:00 AM CST   Level 6 with SH INFUSION CHAIR 18   [facility] ([facility])    [facility] [place] [facility]  [address]  [place] [address]   [phone]            Feb 07, 2018  9:15 AM CST   Return Visit with Radha Piedra MD   [facility] ([facility])    [facility] [place] [facility]  [address]  [place] [address]   [phone]            Feb 09, 2018 12:00 PM CST   Level 1 with SH INFUSION CHAIR 12   [facility] ([facility])    [facility] [place] [id number]  [place] [address]   [phone]              Who to contact     If you have questions or need follow up information about today's clinic visit or your schedule please contact [facility] directly at [phone].  Normal or non-critical lab and imaging results will be communicated to you by [employer], letter or phone within 4 business days after the clinic has received the results. If you do not hear from us within 7 days, please contact the clinic through [employer] or phone. If you have a critical or abnormal lab result, we will notify you by phone as soon as possible.  Submit refill requests through [employer] or call your pharmacy and they will forward the refill request to us. Please allow 3 business days for your refill to be completed.          Additional Information About Your Visit        [employer] Information     [employer] lets you send  "messages to your doctor, view your test results, renew your prescriptions, schedule appointments and more. To sign up, go to [email or web address] . Click on \"Log in\" on the left side of the screen, which will take you to the Welcome page. Then click on \"Sign up Now\" on the right side of the page.     You will be asked to enter the access code listed below, as well as some personal information. Please follow the directions to create your username and password.     Your access code is: [email or web address]  Expires: 2017 11:39 AM     Your access code will  in 90 days. If you need help or a new code, please call your [place] clinic or [phone].        Care EveryWhere ID     This is your Care EveryWhere ID. This could be used by other organizations to access your [place] medical records  [id number]        Your Vitals Were     BMI (Body Mass Index)                   33.57 kg/m2            Blood Pressure from Last 3 Encounters:   17 132/85   17 109/69   17 134/88    Weight from Last 3 Encounters:   17 101.6 kg (224 lb)   17 103.1 kg (227 lb 6.4 oz)   12/15/17 104.3 kg (229 lb 15 oz)              We Performed the Following     CBC with platelets differential     Comprehensive metabolic panel          Today's Medication Changes          These changes are accurate as of: 17  1:17 PM.  If you have any questions, ask your nurse or doctor.               Start taking these medicines.        Dose/Directions    methylphenidate 5 MG tablet   Commonly known as:  RITALIN   Used for:  Neoplastic malignant related fatigue   Started by:  Radha Piedra MD        Dose:  5 mg   Take 1 tablet (5 mg) by mouth 2 times daily   Quantity:  60 tablet   Refills:  0            Where to get your medicines      These medications were sent to [facility] - [address]  [address] [address]     Phone:  [phone]     enoxaparin 100 MG/ML " injection         Some of these will need a paper prescription and others can be bought over the counter.  Ask your nurse if you have questions.     Bring a paper prescription for each of these medications     methylphenidate 5 MG tablet                Primary Care Provider Office Phone # Fax #    Amaya Hoawrd [phone]       [facility] [address] [facility] [address]        Equal Access to Services     HAYDEE IVERSON : Hadii aad ku hadasho Soomaali, waaxda luqadaha, qaybta kaalmada adeegyada, waxay idiin hayaan adeeg pauldemondjoe lajoellen . So [facility] [phone].    ATENCIÓN: Si habla espgail, tiene a schmitt disposición servicios gratuitos de asistencia lingüística. Wiley al [phone].    We comply with applicable federal civil rights laws and [place] laws. We do not discriminate on the basis of race, color, national origin, age, disability, sex, sexual orientation, or gender identity.            Thank you!     Thank you for choosing [facility] AND [facility]  for your care. Our goal is always to provide you with excellent care. Hearing back from our patients is one way we can continue to improve our services. Please take a few minutes to complete the written survey that you may receive in the mail after your visit with us. Thank you!             Your Updated Medication List - Protect others around you: Learn how to safely use, store and throw away your medicines at www.disposemymeds.org.          This list is accurate as of: 12/27/17  1:17 PM.  Always use your most recent med list.                   Brand Name Dispense Instructions for use Diagnosis    ACETAMINOPHEN PO      Take 1,000 mg by mouth every 4 hours as needed for pain ((Patient has been taking 4-12 tabs per day))        albuterol 108 (90 BASE) MCG/ACT Inhaler    PROAIR HFA/PROVENTIL HFA/VENTOLIN HFA    1 Inhaler    Inhale 2 puffs into the lungs every 6 hours as needed for shortness of breath / dyspnea or wheezing     Reactive airway disease without complication, unspecified asthma severity, unspecified whether persistent       CALCIUM-MAGNESIUM-ZINC PO      Take 1 tablet by mouth daily        CENTRUM SILVER per tablet     100 tablet    Take 1 tablet by mouth daily.        CHLORTHALIDONE PO      Take 25 mg by mouth daily as needed ((Patient is supposed to take 25mg daily but states she only takes as needed))        enoxaparin 100 MG/ML injection    LOVENOX    62.4 mL    Inject 1.04 mLs (104 mg) Subcutaneous every 12 hours    PE (pulmonary thromboembolism) (H), Other acute pulmonary embolism without acute cor pulmonale (H)       lidocaine 5 % ointment    XYLOCAINE    5 g    Apply 0.5 g topically as needed for moderate pain Apply pea size amount to rectal area for pain        lidocaine-prilocaine cream    EMLA    30 g    Apply topically as needed for moderate pain Apply dollop size amount to port site 30-60 minutes prior to accessing.    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    HTN (hypertension)       LORazepam 0.5 MG tablet    ATIVAN    30 tablet    Take 1 tablet (0.5 mg) by mouth every 4 hours as needed (Anxiety, Nausea/Vomiting or Sleep)    Malignant neoplasm of hepatic flexure (H)       methylphenidate 5 MG tablet    RITALIN    60 tablet    Take 1 tablet (5 mg) by mouth 2 times daily    Neoplastic malignant related fatigue       ondansetron 8 MG tablet    ZOFRAN    10 tablet    Take 1 tablet (8 mg) by mouth every 8 hours as needed (Nausea/Vomiting)    Malignant neoplasm of hepatic flexure (H)       Potassium Chloride ER 20 MEQ Tbcr     30 tablet    Take 1 tablet (20 mEq) by mouth daily    Hypokalemia       prochlorperazine 10 MG tablet    COMPAZINE    30 tablet    Take 1 tablet (10 mg) by mouth every 6 hours as needed (Nausea/Vomiting)    Malignant neoplasm of hepatic flexure (H)       sennosides 8.6 MG  tablet    SENOKOT     Take 1 tablet by mouth 2 times daily as needed for constipation        SERTRALINE HCL PO      Take 50 mg by mouth daily (Takes half of a 100mg tablet for a total of 50mg daily)        GREGG NYQUIL COLD & FLU 15-6. MG/15ML Liqd   Generic drug:  DM-Doxylamine-Acetaminophen      Take 15 mLs by mouth nightly as needed        VITAMIN D (CHOLECALCIFEROL) PO      Take 1 tablet by mouth daily (OTC: Pt unsure of strength)

## 2017-12-27 NOTE — PROGRESS NOTES
[facility]  HEMATOLOGY ONCOLOGY    ONCOLOGY FOLLOWUP NOTE      DIAGNOSIS:    1- Metastatic colon cancer in a patient with history of stage 1A grade 3 endometrial carcinoma approximately around 2012.     On 09/29/2017, Liv Sylvester presented with progressive fatigue and right upper quadrant pain.  CT scan 09/29/2017 showed partially contracted bladder and findings suspicious for liver metastases.  There were multiple defined hypoechoic foci scoped scattered throughout the liver, the largest was in the right lobe.  CT was also showing enlargement of hepatic and retroperitoneal lymph nodes.  On 09/30/2017, she underwent a colonoscopy, which showed a fungating, infiltrative, ulcerated, partially obstructing mass at the hepatic flexure.  Pathology is consistent with moderately differentiated adenocarcinoma with normal mismatch repair protein expression.   2- Significant iron deficiency anemia.   3- VTE- malignancy related     TREATMENT:  10/11/2017 FOLFOX and Avastin.      SUBJECTIVE:  The patient was seen as a followup today. She continues to be fatigued. She states that she still forgot to inject herself with lovenox few times last week.     REVIEW OF SYSTEMS:  A complete review of systems was performed and found to be negative other than pertinent positives mentioned in history of present illness.      Past medical, social histories reviewed.     Meds- Reviewed.     PHYSICAL EXAMINATION:   VITAL SIGNS: /85 (BP Location: Right arm, Patient Position: Chair, Cuff Size: Adult Regular)  Pulse 54  Temp 98.7  F (37.1  C)  Resp 16  Wt 101.6 kg (224 lb)  SpO2 98%  BMI 33.06 kg/m2  CONSTITUTIONAL: Appears tired.   HEENT: Pupils are equal. Oropharynx is clear.   NECK: No cervical or supraclavicular lymphadenopathy.   RESPIRATORY: Clear bilaterally.   CARD/VASC: S1, S2, regular.   GI: Soft, nontender, nondistended, no hepatosplenomegaly.   MUSKULOSKELETAL: Warm, well perfused.   NEUROLOGIC:  Alert, awake.   INTEGUMENT: No rash.   LYMPHATICS: Bilateral edema.   PSYCH: Mood and affect was normal.     LABORATORY DATA AND IMAGING REVIEWED DURING THIS VISIT:  Recent Labs   Lab Test  12/18/17   0600  12/15/17   1238  12/13/17   0915   NA   --   136  140   POTASSIUM   --   3.9  3.7   CHLORIDE   --   102  107   CO2   --   26  27   ANIONGAP   --   8  6   BUN   --   19  12   CR  0.58  0.73  0.66   GLC   --   116*  128*   JESÚS   --   9.8  9.7     Recent Labs   Lab Test  12/18/17   0600  12/16/17   0630  12/15/17   1238  12/13/17   0915  11/29/17   0915   WBC  2.9*  3.8*  5.0  5.8  3.9*   HGB  11.3*  11.5*  13.0  11.6*  9.9*   PLT  184  173  174  225  172   MCV  85  86  85  86  85   NEUTROPHIL   --    --   75.3  65.8  49.2     Recent Labs   Lab Test  12/15/17   1238  12/13/17   0915  11/29/17   0915   BILITOTAL  0.4  0.3  0.3   ALKPHOS  112  107  81   ALT  30  24  41   AST  44  23  35   ALBUMIN  3.5  3.3*  2.7*     Results for JESSICA MOYA (MRN [id number]) as of 12/27/2017 09:38   Ref. Range 9/30/2017 07:39 11/29/2017 12:40   CEA Latest Ref Range: 0 - 2.5 ug/L 143.8 (H) 95.5 (H)       Results for orders placed or performed during the hospital encounter of 12/15/17   XR Chest 2 Views    Narrative    CHEST TWO VIEW 12/15/2017 1:53 PM     HISTORY: Cough and cold symptoms, on chemo.    COMPARISON: 10/20/2017      Impression    IMPRESSION: Right internal jugular chest port in place with catheter  tip in the SVC. The cardiac silhouette and pulmonary vasculature are  normal. There are no acute infiltrates or pleural effusions.    DIPESH BANEGAS MD   CT Abdomen Pelvis w Contrast     Value    Radiologist flags Pulmonary embolism (AA)    Narrative    CT ABDOMEN AND PELVIS WITH CONTRAST   12/15/2017 2:24 PM      HISTORY: Abdominal pain and vomiting. History of cancer with  chemotherapy.    TECHNIQUE:  CT abdomen and pelvis with intravenous contrast. Radiation  dose for this scan was reduced using automated exposure  control,  adjustment of the mA and/or kV according to patient size, or iterative  reconstruction technique. 114 mL Isovue-370     COMPARISON:  11/27/2017    FINDINGS:  Abdomen:  There appear to be emboli in branches of the left lower lobe  pulmonary artery, incompletely imaged on this abdomen CT. Minimal  dependent atelectasis bilaterally. The heart size is normal. There are  multiple masses in the liver consistent with metastases. These have  overall decreased in size since the previous exam. An anterior liver  dome mass measures 5.7 cm transversely (previously 6.1 cm). A  posterior dome lesion measures 3.1 cm transverse (previously 3.5 cm).  The portal vein is patent. The spleen, gallbladder, pancreas and  adrenal glands are normal in appearance. There are two right renal  cortical cysts and a few tiny probable cortical cysts bilaterally. A  few small erwin hepatis region lymph nodes are similar to the previous  exam. No abdominal or pelvic lymph node enlargement. There is  atherosclerotic calcification of aorta and its branches. No aneurysm.    Pelvis: Bowel appears normal without obstruction or inflammation. The  appendix is at the upper limits of normal in size but without  surrounding inflammation or evidence of acute appendicitis. No free  intraperitoneal gas or fluid. Uterus is absent. No adnexal mass.  Degenerative disease in the spine.      Impression    IMPRESSION:  1. Probable left lower lobe pulmonary emboli. These appear to be new  since 11/27/2017.  2. Interval decrease in size of multiple hepatic metastases.     [Critical Result: Pulmonary embolism]    Finding was identified on 12/15/2017 2:32 PM.     Dr. Cash was contacted by me on 12/15/2017 2:39 PM and  verbalized understanding of the critical result.     MIGUELINA HERRING MD     ECOG performance status 1.      ASSESSMENT:    1- This is a 66-year-old lady with stage IV metastatic colon cancer with normal mismatch repair protein expression.   She has multiple liver metastases and metastasis in the abdominal lymph nodes.     She is on palliative intent combination of FOLFOX with Avastin which is given every 2 weeks intravenously.      - Labs were reviewed with the patient, CEA is improving.   - CT Scan 12/15 results were reviewed with the patient.I think continued issues with thrombosis are related to compliance as well. We have arranged for home health. Agree with the Lovenox 1 mg/kg Q 12 hours dose.      2- She also has iron deficiency anemia due to chronic blood loss from her cancer.  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     3- VTE- CT scan 12/2017 showed new right sided small pulmonary embolus. There was a thrombus associated with the port as well. The findings was concern for continued thrombosis despite being on Xarelto. She was swicthed to Lovenox.      4- Her situation is high risk. I think she is not able for self care and their is not enough family support. We have talked to her about option of assisted living and she is open to this idea. We will have our  work on this.     5- Fatigue- I will start her on Ritalin.      PLAN:   1.  FOLFOX with Avastin every 2 weeks with neulasta support.  Chemo today.   2.  Return to clinic 2 weeks with Infusion Clinic appointment.   3-  Anti Xa level 4 hours after the dose of Lovenox.   4.  Continue Lovenox 1 mg / KG SQ Q 12 hours.   5.  Start Ritalin 5 mg in the morning and 5 mg at lunch time.   6-  Schedule a follow up with palliative care.   7- Replace electrolyte today if needed.   SIDRA NAILS MD    12/27/2017

## 2017-12-27 NOTE — PROGRESS NOTES
"Infusion Nursing Note:  Liv Sylvester presents today for FOLFOX/Avastin C6D1.    Patient seen by provider today: Yes: Dr. Piedra   present during visit today: Not Applicable.    Note: Patient is currently giving herself Lovenox injections twice a day. States she is feeling better however she has a cold that she \"just can't shake\". Patient states she has mild nausea but its a lot better than it has been in the past    Intravenous Access:  Labs drawn without difficulty.  Implanted Port; accessed by [facility].    Treatment Conditions:  Lab Results   Component Value Date    HGB 11.0 12/27/2017     Lab Results   Component Value Date    WBC 3.0 12/27/2017      Lab Results   Component Value Date    ANEU 1.3 12/27/2017     Lab Results   Component Value Date     12/27/2017      Lab Results   Component Value Date     12/27/2017                   Lab Results   Component Value Date    POTASSIUM 3.4 12/27/2017           No results found for: MAG         Lab Results   Component Value Date    CR 0.68 12/27/2017                   Lab Results   Component Value Date    JESÚS 9.4 12/27/2017                Lab Results   Component Value Date    BILITOTAL 0.4 12/27/2017           Lab Results   Component Value Date    ALBUMIN 3.3 12/27/2017                    Lab Results   Component Value Date    ALT 26 12/27/2017           Lab Results   Component Value Date    AST 27 12/27/2017     Results reviewed, labs MET treatment parameters, ok to proceed with treatment.  Urine: patient unable to provide a sample; per Dr. Irma leyva to proceed with treatment.    Post Infusion Assessment:Patient tolerated infusion without incident.  Blood return noted pre and post infusion.  Blood return noted during administration every 2-3 cc.  Site patent and intact, free from redness, edema or discomfort.  No evidence of extravasations.    Discharge Plan:   Discharge instructions reviewed with: Patient.  Patient and/or family verbalized " "understanding of discharge instructions and all questions answered.  Copy of AVS reviewed with patient and/or family.  Patient will return 12/29/2017 for next appointment.  Patient discharged in stable condition accompanied by: self.  Departure Mode: Ambulatory.    Prior to discharge: Port is secured in place with tegaderm and flushed with 10cc NS with positive blood return noted.  Continuous home infusion Dosi-Fuser pump connected.    All connectors secured in place and clamps taped open.    Pump started, \"running\" noted on display (CADD): Not Applicable.  Patient instructed to call our clinic or [facility] with any questions or concerns at home.  Patient verbalized understanding.    Patient set up for pump disconnect at our clinic on 12/29/2017 @1200.      Tanya Seymour RN  "

## 2017-12-27 NOTE — PATIENT INSTRUCTIONS
For any questions/concerns regarding chemo pump please call our office during regular business hours ([phone])  After hours please call  home infusion [phone]

## 2017-12-28 NOTE — PROGRESS NOTES
This is a recent snapshot of the patient's [facility] medical record.  For current drug dose and complete information and questions, call [phone] or In Basket pool, fv home infusion ([id number])  CSN Number:  [id number]

## 2017-12-29 NOTE — MR AVS SNAPSHOT
After Visit Summary   12/29/2017    Liv Sylvester    MRN: [id number]           Patient Information     Date Of Birth          1950        Visit Information        Provider Department      12/29/2017 12:00 PM SH INFUSION CHAIR 16 [facility]        Today's Diagnoses     PE (pulmonary thromboembolism) (H)    -  1    Neoplastic malignant related fatigue        Other acute pulmonary embolism without acute cor pulmonale (H)        Portacath in place        Malignant neoplasm of hepatic flexure (H)           Follow-ups after your visit        Your next 10 appointments already scheduled     Jan 04, 2018  9:00 AM CST   Level 1 with SH INFUSION CHAIR 18   [facility] ([facility])    [facility]  [address]  [place] [address]   [phone]            Jan 04, 2018 10:15 AM CST   Return Visit with Radha Piedra MD   [facility] ([facility])    [facility]  [address]  [place] [address]   [phone]            Evaristo 10, 2018  9:00 AM CST   Level 6 with SH INFUSION CHAIR 8   [facility] ([facility])    [facility]  [address]  [place] [address]   [phone]            Evaristo 10, 2018  9:30 AM CST   Return Visit with Radha Piedra MD   [facility] ([facility])    [facility]  [address]  [place] [address]   [phone]            Jan 12, 2018 12:30 PM CST   Level 1 with SH INFUSION CHAIR 13   [facility] ([facility])    [facility] [place]  [address]  [place] [address]   [phone]            Jan 16, 2018 12:00 PM CST   Level 1 with SH INFUSION CHAIR 10   [facility] ([place]  [facility])    [facility]  [address]  [place] [address]   [phone]            Jan 16, 2018 12:30 PM CST   New Visit with Nela Spaulding MD   [facility] ([facility])    [facility]  [address]  [place] [address]   [phone]            Jan 16, 2018  1:30 PM CST   Return Visit with TITO Briones CNP   [facility] ([facility])    [place] [id number]  [place] [address]   [phone]            Jan 24, 2018  9:00 AM CST   Level 6 with  INFUSION CHAIR 17   [facility] ([facility])    [place] [id number]  [place] [address]   [phone]            Jan 24, 2018  9:30 AM CST   Return Visit with Radha Piedra MD   [facility] ([facility])    [place] [id number]  [place] [address]   [phone]              Who to contact     If you have questions or need follow up information about today's clinic visit or your schedule please contact [facility] directly at [phone].  Normal or non-critical lab and imaging results will be communicated to you by Aptanahart, letter or phone within 4 business days after the clinic has received the results. If you do not hear from us within 7 days, please contact the clinic through [employer] or phone. If you have a critical or abnormal lab result, we will notify you by phone as soon as possible.  Submit refill requests through [employer] or call your pharmacy and they will forward the refill request to us. Please allow 3 business days for your refill to be completed.          Additional Information About Your Visit        [employer] Information     [employer] lets you send messages to your doctor, view your test results,  "renew your prescriptions, schedule appointments and more. To sign up, go to [email or web address] . Click on \"Log in\" on the left side of the screen, which will take you to the Welcome page. Then click on \"Sign up Now\" on the right side of the page.     You will be asked to enter the access code listed below, as well as some personal information. Please follow the directions to create your username and password.     Your access code is: [email or web address]  Expires: 2017 11:39 AM     Your access code will  in 90 days. If you need help or a new code, please call your [facility] clinic or [phone].        Care EveryWhere ID     This is your Care EveryWhere ID. This could be used by other organizations to access your [facility] medical records  [id number]        Your Vitals Were     Pulse Temperature Respirations             104 98.8  F (37.1  C) (Oral) 18          Blood Pressure from Last 3 Encounters:   17 132/66   17 132/85   17 109/69    Weight from Last 3 Encounters:   17 101.6 kg (224 lb)   17 103.1 kg (227 lb 6.4 oz)   12/15/17 104.3 kg (229 lb 15 oz)              We Performed the Following     Heparin 10a Level        Primary Care Provider Office Phone # Fax #    Amaya Howard [phone]       [place] [address] [place] [address]        Equal Access to Services     [facility] : Hadii aad ku hadasho Soomaali, waaxda luqadaha, qaybta kaalmada adeegyada, waxay odalis maya. So [facility] [phone].    ATENCIÓN: Si habla español, tiene a schmitt disposición servicios gratuitos de asistencia lingüística. Llame al [phone].    We comply with applicable federal civil rights laws and [place] laws. We do not discriminate on the basis of race, color, national origin, age, disability, sex, sexual orientation, or gender identity.            Thank you!     Thank you for choosing [facility]  for your " care. Our goal is always to provide you with excellent care. Hearing back from our patients is one way we can continue to improve our services. Please take a few minutes to complete the written survey that you may receive in the mail after your visit with us. Thank you!             Your Updated Medication List - Protect others around you: Learn how to safely use, store and throw away your medicines at www.disposemymeds.org.          This list is accurate as of: 12/29/17  1:01 PM.  Always use your most recent med list.                   Brand Name Dispense Instructions for use Diagnosis    ACETAMINOPHEN PO      Take 1,000 mg by mouth every 4 hours as needed for pain ((Patient has been taking 4-12 tabs per day))        albuterol 108 (90 BASE) MCG/ACT Inhaler    PROAIR HFA/PROVENTIL HFA/VENTOLIN HFA    1 Inhaler    Inhale 2 puffs into the lungs every 6 hours as needed for shortness of breath / dyspnea or wheezing    Reactive airway disease without complication, unspecified asthma severity, unspecified whether persistent       CALCIUM-MAGNESIUM-ZINC PO      Take 1 tablet by mouth daily        CENTRUM SILVER per tablet     100 tablet    Take 1 tablet by mouth daily.        CHLORTHALIDONE PO      Take 25 mg by mouth daily as needed ((Patient is supposed to take 25mg daily but states she only takes as needed))        enoxaparin 100 MG/ML injection    LOVENOX    62.4 mL    Inject 1.04 mLs (104 mg) Subcutaneous every 12 hours    PE (pulmonary thromboembolism) (H), Other acute pulmonary embolism without acute cor pulmonale (H)       lidocaine 5 % ointment    XYLOCAINE    5 g    Apply 0.5 g topically as needed for moderate pain Apply pea size amount to rectal area for pain        lidocaine-prilocaine cream    EMLA    30 g    Apply topically as needed for moderate pain Apply dollop size amount to port site 30-60 minutes prior to accessing.    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       lisinopril 10  MG tablet    PRINIVIL/ZESTRIL    30 tablet    Take 1 tablet by mouth daily.    HTN (hypertension)       LORazepam 0.5 MG tablet    ATIVAN    30 tablet    Take 1 tablet (0.5 mg) by mouth every 4 hours as needed (Anxiety, Nausea/Vomiting or Sleep)    Malignant neoplasm of hepatic flexure (H)       methylphenidate 5 MG tablet    RITALIN    60 tablet    Take 1 tablet (5 mg) by mouth 2 times daily    Neoplastic malignant related fatigue       ondansetron 8 MG tablet    ZOFRAN    10 tablet    Take 1 tablet (8 mg) by mouth every 8 hours as needed (Nausea/Vomiting)    Malignant neoplasm of hepatic flexure (H)       Potassium Chloride ER 20 MEQ Tbcr     30 tablet    Take 1 tablet (20 mEq) by mouth daily    Hypokalemia       prochlorperazine 10 MG tablet    COMPAZINE    30 tablet    Take 1 tablet (10 mg) by mouth every 6 hours as needed (Nausea/Vomiting)    Malignant neoplasm of hepatic flexure (H)       sennosides 8.6 MG tablet    SENOKOT     Take 1 tablet by mouth 2 times daily as needed for constipation        SERTRALINE HCL PO      Take 50 mg by mouth daily (Takes half of a 100mg tablet for a total of 50mg daily)        VICKS NYQUIL COLD & FLU 15-6. MG/15ML Liqd   Generic drug:  DM-Doxylamine-Acetaminophen      Take 15 mLs by mouth nightly as needed        VITAMIN D (CHOLECALCIFEROL) PO      Take 1 tablet by mouth daily (OTC: Pt unsure of strength)

## 2017-12-29 NOTE — PROGRESS NOTES
Infusion Nursing Note:  Liv Sylvester presents today for port labs, pump disconnect, neulasta.    Patient seen by provider today: No   present during visit today: Not Applicable.    Note: Patient reports feeling well overall, no new concerns since exam with Dr. Piedra on 12/27. Patient continues with peeling hands that are sore. States she is using vaseline. Suggested patient use aquaphor, eucerin cream and/or udder cream. Patient verbalized understanding and will call if skin worsens. Patient confirmed she gave herself lovenox at 0810 this morning, hep Xa level drawn at 1212.    Intravenous Access:  Labs drawn without difficulty.  Implanted Port.    Treatment Conditions:  Labs pending at time of discharge.      Post Infusion Assessment:  Patient tolerated injection without incident. Neulasta given SQ in left upper arm.  Site patent and intact, free from redness, edema or discomfort.  No evidence of extravasations.  Access discontinued per protocol.    Discharge Plan:   Discharge instructions reviewed with: Patient.  Patient and/or family verbalized understanding of discharge instructions and all questions answered.  Copy of AVS reviewed with patient and/or family.  Patient will return 1/4/18 for next appointment.  Patient discharged in stable condition accompanied by: self.  Departure Mode: Ambulatory with walker.    Shreya Petty, RN

## 2018-01-01 ENCOUNTER — MEDICAL CORRESPONDENCE (OUTPATIENT)
Dept: HEALTH INFORMATION MANAGEMENT | Facility: CLINIC | Age: 68
End: 2018-01-01

## 2018-01-01 ENCOUNTER — HOME INFUSION (PRE-WILLOW HOME INFUSION) (OUTPATIENT)
Dept: PHARMACY | Facility: CLINIC | Age: 68
End: 2018-01-01

## 2018-01-01 ENCOUNTER — TELEPHONE (OUTPATIENT)
Dept: ONCOLOGY | Facility: CLINIC | Age: 68
End: 2018-01-01

## 2018-01-01 ENCOUNTER — APPOINTMENT (OUTPATIENT)
Dept: ULTRASOUND IMAGING | Facility: CLINIC | Age: 68
End: 2018-01-01
Attending: EMERGENCY MEDICINE
Payer: COMMERCIAL

## 2018-01-01 ENCOUNTER — HOSPITAL ENCOUNTER (OUTPATIENT)
Facility: CLINIC | Age: 68
Setting detail: SPECIMEN
Discharge: HOME | End: 2018-02-23
Attending: INTERNAL MEDICINE | Admitting: INTERNAL MEDICINE
Payer: COMMERCIAL

## 2018-01-01 ENCOUNTER — ONCOLOGY VISIT (OUTPATIENT)
Dept: ONCOLOGY | Facility: CLINIC | Age: 68
End: 2018-01-01
Attending: INTERNAL MEDICINE
Payer: COMMERCIAL

## 2018-01-01 ENCOUNTER — HOSPITAL ENCOUNTER (INPATIENT)
Facility: CLINIC | Age: 68
LOS: 2 days | Discharge: HOME HEALTH | End: 2018-01-07
Attending: EMERGENCY MEDICINE | Admitting: INTERNAL MEDICINE
Payer: COMMERCIAL

## 2018-01-01 ENCOUNTER — APPOINTMENT (OUTPATIENT)
Dept: ULTRASOUND IMAGING | Facility: CLINIC | Age: 68
End: 2018-01-01
Attending: HOSPITALIST
Payer: COMMERCIAL

## 2018-01-01 ENCOUNTER — INFUSION THERAPY VISIT (OUTPATIENT)
Dept: INFUSION THERAPY | Facility: CLINIC | Age: 68
End: 2018-01-01
Attending: INTERNAL MEDICINE
Payer: COMMERCIAL

## 2018-01-01 ENCOUNTER — HOSPITAL ENCOUNTER (OUTPATIENT)
Facility: CLINIC | Age: 68
Setting detail: SPECIMEN
Discharge: HOME | End: 2018-02-28
Attending: INTERNAL MEDICINE | Admitting: INTERNAL MEDICINE
Payer: COMMERCIAL

## 2018-01-01 ENCOUNTER — HOSPITAL ENCOUNTER (INPATIENT)
Facility: CLINIC | Age: 68
LOS: 8 days | Discharge: HOME HEALTH | DRG: 854 | End: 2018-04-28
Attending: EMERGENCY MEDICINE | Admitting: INTERNAL MEDICINE
Payer: MEDICARE

## 2018-01-01 ENCOUNTER — HOSPITAL ENCOUNTER (OUTPATIENT)
Facility: CLINIC | Age: 68
Setting detail: SPECIMEN
Discharge: HOME | End: 2018-03-14
Attending: INTERNAL MEDICINE | Admitting: INTERNAL MEDICINE
Payer: COMMERCIAL

## 2018-01-01 ENCOUNTER — ALLIED HEALTH/NURSE VISIT (OUTPATIENT)
Dept: ONCOLOGY | Facility: CLINIC | Age: 68
End: 2018-01-01

## 2018-01-01 ENCOUNTER — HOSPITAL ENCOUNTER (OUTPATIENT)
Facility: CLINIC | Age: 68
End: 2018-01-01
Payer: MEDICARE

## 2018-01-01 ENCOUNTER — TRANSFERRED RECORDS (OUTPATIENT)
Dept: HEALTH INFORMATION MANAGEMENT | Facility: CLINIC | Age: 68
End: 2018-01-01

## 2018-01-01 ENCOUNTER — APPOINTMENT (OUTPATIENT)
Dept: CARDIOLOGY | Facility: CLINIC | Age: 68
DRG: 854 | End: 2018-01-01
Attending: INTERNAL MEDICINE
Payer: MEDICARE

## 2018-01-01 ENCOUNTER — HOSPITAL ENCOUNTER (OUTPATIENT)
Facility: CLINIC | Age: 68
Setting detail: SPECIMEN
Discharge: HOME | End: 2018-01-11
Attending: INTERNAL MEDICINE | Admitting: INTERNAL MEDICINE
Payer: COMMERCIAL

## 2018-01-01 ENCOUNTER — DOCUMENTATION ONLY (OUTPATIENT)
Dept: ONCOLOGY | Facility: CLINIC | Age: 68
End: 2018-01-01

## 2018-01-01 ENCOUNTER — HOSPITAL ENCOUNTER (INPATIENT)
Facility: CLINIC | Age: 68
LOS: 2 days | Discharge: HOME HEALTH | End: 2018-03-02
Attending: HOSPITALIST | Admitting: HOSPITALIST
Payer: COMMERCIAL

## 2018-01-01 ENCOUNTER — APPOINTMENT (OUTPATIENT)
Dept: ULTRASOUND IMAGING | Facility: CLINIC | Age: 68
End: 2018-01-01
Attending: INTERNAL MEDICINE
Payer: COMMERCIAL

## 2018-01-01 ENCOUNTER — ONCOLOGY VISIT (OUTPATIENT)
Dept: ONCOLOGY | Facility: CLINIC | Age: 68
End: 2018-01-01
Attending: INTERNAL MEDICINE
Payer: MEDICAID

## 2018-01-01 ENCOUNTER — CARE COORDINATION (OUTPATIENT)
Dept: ONCOLOGY | Facility: CLINIC | Age: 68
End: 2018-01-01

## 2018-01-01 ENCOUNTER — HOSPITAL ENCOUNTER (OUTPATIENT)
Dept: CT IMAGING | Facility: CLINIC | Age: 68
End: 2018-02-19
Attending: INTERNAL MEDICINE | Admitting: INTERNAL MEDICINE
Payer: COMMERCIAL

## 2018-01-01 ENCOUNTER — APPOINTMENT (OUTPATIENT)
Dept: INTERVENTIONAL RADIOLOGY/VASCULAR | Facility: CLINIC | Age: 68
DRG: 854 | End: 2018-01-01
Attending: INTERNAL MEDICINE
Payer: MEDICARE

## 2018-01-01 ENCOUNTER — HOSPITAL ENCOUNTER (OUTPATIENT)
Facility: CLINIC | Age: 68
Discharge: HOME | End: 2018-02-19
Attending: INTERNAL MEDICINE | Admitting: INTERNAL MEDICINE
Payer: COMMERCIAL

## 2018-01-01 ENCOUNTER — ALLIED HEALTH/NURSE VISIT (OUTPATIENT)
Dept: INFUSION THERAPY | Facility: CLINIC | Age: 68
End: 2018-01-01

## 2018-01-01 ENCOUNTER — HOSPITAL ENCOUNTER (OUTPATIENT)
Facility: CLINIC | Age: 68
Setting detail: SPECIMEN
End: 2018-03-15
Attending: INTERNAL MEDICINE
Payer: COMMERCIAL

## 2018-01-01 ENCOUNTER — HOSPITAL ENCOUNTER (OUTPATIENT)
Facility: CLINIC | Age: 68
Setting detail: SPECIMEN
End: 2018-02-23
Attending: INTERNAL MEDICINE
Payer: COMMERCIAL

## 2018-01-01 ENCOUNTER — APPOINTMENT (OUTPATIENT)
Dept: GENERAL RADIOLOGY | Facility: CLINIC | Age: 68
DRG: 854 | End: 2018-01-01
Attending: EMERGENCY MEDICINE
Payer: MEDICARE

## 2018-01-01 ENCOUNTER — APPOINTMENT (OUTPATIENT)
Dept: CT IMAGING | Facility: CLINIC | Age: 68
DRG: 854 | End: 2018-01-01
Attending: EMERGENCY MEDICINE
Payer: MEDICARE

## 2018-01-01 ENCOUNTER — HOSPITAL ENCOUNTER (EMERGENCY)
Facility: CLINIC | Age: 68
Discharge: HOME | End: 2018-02-21
Attending: EMERGENCY MEDICINE | Admitting: EMERGENCY MEDICINE
Payer: COMMERCIAL

## 2018-01-01 ENCOUNTER — HOSPITAL ENCOUNTER (OUTPATIENT)
Facility: CLINIC | Age: 68
Setting detail: SPECIMEN
Discharge: HOME | End: 2018-03-29
Attending: INTERNAL MEDICINE | Admitting: INTERNAL MEDICINE
Payer: COMMERCIAL

## 2018-01-01 ENCOUNTER — APPOINTMENT (OUTPATIENT)
Dept: PHYSICAL THERAPY | Facility: CLINIC | Age: 68
End: 2018-01-01
Payer: COMMERCIAL

## 2018-01-01 ENCOUNTER — HOSPITAL ENCOUNTER (OUTPATIENT)
Facility: CLINIC | Age: 68
Setting detail: SPECIMEN
Discharge: HOME | End: 2018-01-24
Attending: INTERNAL MEDICINE | Admitting: INTERNAL MEDICINE
Payer: COMMERCIAL

## 2018-01-01 ENCOUNTER — INFUSION THERAPY VISIT (OUTPATIENT)
Dept: INFUSION THERAPY | Facility: CLINIC | Age: 68
End: 2018-01-01
Attending: INTERNAL MEDICINE
Payer: MEDICAID

## 2018-01-01 ENCOUNTER — HOSPITAL ENCOUNTER (OUTPATIENT)
Facility: CLINIC | Age: 68
Setting detail: SPECIMEN
Discharge: HOME | End: 2018-02-07
Attending: INTERNAL MEDICINE | Admitting: INTERNAL MEDICINE
Payer: COMMERCIAL

## 2018-01-01 ENCOUNTER — ONCOLOGY VISIT (OUTPATIENT)
Dept: ONCOLOGY | Facility: CLINIC | Age: 68
End: 2018-01-01
Attending: NURSE PRACTITIONER
Payer: COMMERCIAL

## 2018-01-01 ENCOUNTER — HOSPITAL ENCOUNTER (OUTPATIENT)
Facility: CLINIC | Age: 68
Setting detail: SPECIMEN
Discharge: HOME | End: 2018-04-12
Attending: INTERNAL MEDICINE | Admitting: INTERNAL MEDICINE
Payer: MEDICAID

## 2018-01-01 ENCOUNTER — HOSPITAL ENCOUNTER (OUTPATIENT)
Facility: CLINIC | Age: 68
Setting detail: SPECIMEN
Discharge: HOME | End: 2018-05-10
Attending: INTERNAL MEDICINE | Admitting: INTERNAL MEDICINE
Payer: COMMERCIAL

## 2018-01-01 ENCOUNTER — HOSPITAL ENCOUNTER (INPATIENT)
Facility: CLINIC | Age: 68
LOS: 2 days | Discharge: HOME HEALTH | End: 2018-01-17
Attending: EMERGENCY MEDICINE | Admitting: HOSPITALIST
Payer: COMMERCIAL

## 2018-01-01 ENCOUNTER — APPOINTMENT (OUTPATIENT)
Dept: CARDIOLOGY | Facility: CLINIC | Age: 68
DRG: 854 | End: 2018-01-01
Attending: HOSPITALIST
Payer: MEDICARE

## 2018-01-01 VITALS
RESPIRATION RATE: 16 BRPM | SYSTOLIC BLOOD PRESSURE: 122 MMHG | TEMPERATURE: 98 F | HEART RATE: 101 BPM | BODY MASS INDEX: 28.42 KG/M2 | OXYGEN SATURATION: 100 % | DIASTOLIC BLOOD PRESSURE: 70 MMHG | WEIGHT: 198.5 LBS | HEIGHT: 70 IN

## 2018-01-01 VITALS
RESPIRATION RATE: 16 BRPM | HEIGHT: 70 IN | HEART RATE: 101 BPM | BODY MASS INDEX: 28.42 KG/M2 | DIASTOLIC BLOOD PRESSURE: 70 MMHG | SYSTOLIC BLOOD PRESSURE: 122 MMHG | WEIGHT: 198.5 LBS | TEMPERATURE: 98 F | OXYGEN SATURATION: 100 %

## 2018-01-01 VITALS
WEIGHT: 209 LBS | TEMPERATURE: 97.9 F | HEART RATE: 84 BPM | RESPIRATION RATE: 16 BRPM | SYSTOLIC BLOOD PRESSURE: 126 MMHG | DIASTOLIC BLOOD PRESSURE: 73 MMHG | BODY MASS INDEX: 29.99 KG/M2

## 2018-01-01 VITALS
TEMPERATURE: 98.3 F | SYSTOLIC BLOOD PRESSURE: 82 MMHG | DIASTOLIC BLOOD PRESSURE: 63 MMHG | OXYGEN SATURATION: 99 % | WEIGHT: 208 LBS | RESPIRATION RATE: 16 BRPM | BODY MASS INDEX: 29.84 KG/M2 | HEART RATE: 134 BPM

## 2018-01-01 VITALS
BODY MASS INDEX: 29.35 KG/M2 | RESPIRATION RATE: 14 BRPM | HEIGHT: 70 IN | DIASTOLIC BLOOD PRESSURE: 72 MMHG | SYSTOLIC BLOOD PRESSURE: 122 MMHG | WEIGHT: 205 LBS | TEMPERATURE: 98.2 F | HEART RATE: 95 BPM

## 2018-01-01 VITALS
TEMPERATURE: 97.3 F | HEIGHT: 70 IN | SYSTOLIC BLOOD PRESSURE: 159 MMHG | BODY MASS INDEX: 30.46 KG/M2 | RESPIRATION RATE: 18 BRPM | HEART RATE: 69 BPM | DIASTOLIC BLOOD PRESSURE: 81 MMHG | WEIGHT: 212.8 LBS

## 2018-01-01 VITALS
SYSTOLIC BLOOD PRESSURE: 135 MMHG | HEIGHT: 70 IN | WEIGHT: 210 LBS | DIASTOLIC BLOOD PRESSURE: 75 MMHG | RESPIRATION RATE: 18 BRPM | BODY MASS INDEX: 30.06 KG/M2 | OXYGEN SATURATION: 98 % | TEMPERATURE: 98.4 F

## 2018-01-01 VITALS
BODY MASS INDEX: 29.55 KG/M2 | DIASTOLIC BLOOD PRESSURE: 71 MMHG | TEMPERATURE: 98.6 F | WEIGHT: 206.4 LBS | OXYGEN SATURATION: 98 % | SYSTOLIC BLOOD PRESSURE: 146 MMHG | HEIGHT: 70 IN | RESPIRATION RATE: 16 BRPM | HEART RATE: 105 BPM

## 2018-01-01 VITALS — RESPIRATION RATE: 18 BRPM | SYSTOLIC BLOOD PRESSURE: 141 MMHG | DIASTOLIC BLOOD PRESSURE: 62 MMHG | TEMPERATURE: 98.2 F

## 2018-01-01 VITALS
SYSTOLIC BLOOD PRESSURE: 148 MMHG | WEIGHT: 223 LBS | HEIGHT: 70 IN | HEART RATE: 88 BPM | BODY MASS INDEX: 31.92 KG/M2 | TEMPERATURE: 97.8 F | OXYGEN SATURATION: 98 % | RESPIRATION RATE: 18 BRPM | DIASTOLIC BLOOD PRESSURE: 81 MMHG

## 2018-01-01 VITALS
OXYGEN SATURATION: 100 % | RESPIRATION RATE: 18 BRPM | HEART RATE: 80 BPM | DIASTOLIC BLOOD PRESSURE: 69 MMHG | TEMPERATURE: 97.4 F | SYSTOLIC BLOOD PRESSURE: 149 MMHG

## 2018-01-01 VITALS
HEART RATE: 110 BPM | WEIGHT: 203 LBS | RESPIRATION RATE: 16 BRPM | DIASTOLIC BLOOD PRESSURE: 64 MMHG | TEMPERATURE: 99 F | OXYGEN SATURATION: 98 % | SYSTOLIC BLOOD PRESSURE: 123 MMHG | BODY MASS INDEX: 29.13 KG/M2

## 2018-01-01 VITALS
RESPIRATION RATE: 18 BRPM | BODY MASS INDEX: 29.99 KG/M2 | WEIGHT: 209 LBS | HEART RATE: 98 BPM | DIASTOLIC BLOOD PRESSURE: 65 MMHG | SYSTOLIC BLOOD PRESSURE: 112 MMHG | TEMPERATURE: 97.9 F

## 2018-01-01 VITALS
SYSTOLIC BLOOD PRESSURE: 145 MMHG | BODY MASS INDEX: 29.29 KG/M2 | DIASTOLIC BLOOD PRESSURE: 68 MMHG | HEART RATE: 124 BPM | HEIGHT: 70 IN | RESPIRATION RATE: 24 BRPM | TEMPERATURE: 98.2 F | WEIGHT: 204.6 LBS | OXYGEN SATURATION: 97 %

## 2018-01-01 VITALS
WEIGHT: 223 LBS | HEIGHT: 70 IN | SYSTOLIC BLOOD PRESSURE: 132 MMHG | DIASTOLIC BLOOD PRESSURE: 67 MMHG | HEART RATE: 76 BPM | BODY MASS INDEX: 31.92 KG/M2 | OXYGEN SATURATION: 98 % | RESPIRATION RATE: 18 BRPM | TEMPERATURE: 97.8 F

## 2018-01-01 VITALS
HEART RATE: 136 BPM | DIASTOLIC BLOOD PRESSURE: 79 MMHG | RESPIRATION RATE: 18 BRPM | TEMPERATURE: 98.2 F | SYSTOLIC BLOOD PRESSURE: 124 MMHG | BODY MASS INDEX: 29.9 KG/M2 | OXYGEN SATURATION: 100 % | WEIGHT: 208.4 LBS

## 2018-01-01 VITALS
RESPIRATION RATE: 18 BRPM | DIASTOLIC BLOOD PRESSURE: 65 MMHG | WEIGHT: 223 LBS | TEMPERATURE: 98.5 F | BODY MASS INDEX: 31.92 KG/M2 | SYSTOLIC BLOOD PRESSURE: 135 MMHG | HEIGHT: 70 IN

## 2018-01-01 VITALS
OXYGEN SATURATION: 96 % | HEART RATE: 102 BPM | SYSTOLIC BLOOD PRESSURE: 121 MMHG | TEMPERATURE: 98.3 F | HEIGHT: 70 IN | BODY MASS INDEX: 32.35 KG/M2 | DIASTOLIC BLOOD PRESSURE: 67 MMHG | RESPIRATION RATE: 16 BRPM | WEIGHT: 226 LBS

## 2018-01-01 VITALS
BODY MASS INDEX: 31.25 KG/M2 | WEIGHT: 217.8 LBS | RESPIRATION RATE: 16 BRPM | TEMPERATURE: 97.9 F | DIASTOLIC BLOOD PRESSURE: 67 MMHG | HEART RATE: 82 BPM | SYSTOLIC BLOOD PRESSURE: 126 MMHG

## 2018-01-01 VITALS
SYSTOLIC BLOOD PRESSURE: 92 MMHG | HEIGHT: 70 IN | BODY MASS INDEX: 29.29 KG/M2 | HEART RATE: 90 BPM | OXYGEN SATURATION: 97 % | DIASTOLIC BLOOD PRESSURE: 61 MMHG | WEIGHT: 204.6 LBS | TEMPERATURE: 98.2 F | RESPIRATION RATE: 20 BRPM

## 2018-01-01 VITALS
WEIGHT: 226 LBS | HEART RATE: 79 BPM | TEMPERATURE: 98.2 F | OXYGEN SATURATION: 97 % | HEIGHT: 70 IN | DIASTOLIC BLOOD PRESSURE: 72 MMHG | SYSTOLIC BLOOD PRESSURE: 143 MMHG | BODY MASS INDEX: 32.35 KG/M2 | RESPIRATION RATE: 18 BRPM

## 2018-01-01 DIAGNOSIS — C18.3 MALIGNANT NEOPLASM OF HEPATIC FLEXURE (H): Primary | ICD-10-CM

## 2018-01-01 DIAGNOSIS — I26.99 OTHER ACUTE PULMONARY EMBOLISM WITHOUT ACUTE COR PULMONALE (H): ICD-10-CM

## 2018-01-01 DIAGNOSIS — C18.9 MALIGNANT NEOPLASM OF COLON, UNSPECIFIED PART OF COLON (H): ICD-10-CM

## 2018-01-01 DIAGNOSIS — E87.6 HYPOKALEMIA: Primary | ICD-10-CM

## 2018-01-01 DIAGNOSIS — I26.99 PE (PULMONARY THROMBOEMBOLISM) (H): ICD-10-CM

## 2018-01-01 DIAGNOSIS — D72.829 LEUKOCYTOSIS, UNSPECIFIED TYPE: ICD-10-CM

## 2018-01-01 DIAGNOSIS — Z95.828 PORTACATH IN PLACE: ICD-10-CM

## 2018-01-01 DIAGNOSIS — Z71.9 COUNSELING NOS(V65.40): Primary | ICD-10-CM

## 2018-01-01 DIAGNOSIS — E87.6 HYPOKALEMIA: ICD-10-CM

## 2018-01-01 DIAGNOSIS — J45.909 REACTIVE AIRWAY DISEASE WITHOUT COMPLICATION, UNSPECIFIED ASTHMA SEVERITY, UNSPECIFIED WHETHER PERSISTENT: ICD-10-CM

## 2018-01-01 DIAGNOSIS — R52 PAIN: Primary | ICD-10-CM

## 2018-01-01 DIAGNOSIS — I82.90 VTE (VENOUS THROMBOEMBOLISM): ICD-10-CM

## 2018-01-01 DIAGNOSIS — C18.9 MALIGNANT NEOPLASM OF COLON, UNSPECIFIED PART OF COLON (H): Primary | ICD-10-CM

## 2018-01-01 DIAGNOSIS — O22.30 DVT (DEEP VEIN THROMBOSIS) IN PREGNANCY: ICD-10-CM

## 2018-01-01 DIAGNOSIS — I82.409 RECURRENT DEEP VEIN THROMBOSIS (DVT) (H): ICD-10-CM

## 2018-01-01 DIAGNOSIS — C18.3 MALIGNANT NEOPLASM OF HEPATIC FLEXURE (H): ICD-10-CM

## 2018-01-01 DIAGNOSIS — I82.419 DEEP VEIN THROMBOSIS (DVT) OF FEMORAL VEIN, UNSPECIFIED CHRONICITY, UNSPECIFIED LATERALITY (H): ICD-10-CM

## 2018-01-01 DIAGNOSIS — I10 HTN (HYPERTENSION): ICD-10-CM

## 2018-01-01 DIAGNOSIS — I82.409 DEEP VEIN THROMBOSIS (DVT) (H): ICD-10-CM

## 2018-01-01 DIAGNOSIS — N17.9 ACUTE RENAL FAILURE, UNSPECIFIED ACUTE RENAL FAILURE TYPE (H): ICD-10-CM

## 2018-01-01 DIAGNOSIS — I82.4Y2 ACUTE DEEP VEIN THROMBOSIS (DVT) OF PROXIMAL VEIN OF LEFT LOWER EXTREMITY (H): ICD-10-CM

## 2018-01-01 DIAGNOSIS — R53.0 NEOPLASTIC MALIGNANT RELATED FATIGUE: ICD-10-CM

## 2018-01-01 DIAGNOSIS — D50.0 IRON DEFICIENCY ANEMIA DUE TO CHRONIC BLOOD LOSS: Primary | ICD-10-CM

## 2018-01-01 DIAGNOSIS — D64.9 NORMOCYTIC ANEMIA: Primary | ICD-10-CM

## 2018-01-01 DIAGNOSIS — R10.13 ABDOMINAL PAIN, EPIGASTRIC: ICD-10-CM

## 2018-01-01 DIAGNOSIS — M79.662 PAIN OF LEFT LOWER LEG: ICD-10-CM

## 2018-01-01 DIAGNOSIS — I26.99 PE (PULMONARY THROMBOEMBOLISM) (H): Primary | ICD-10-CM

## 2018-01-01 DIAGNOSIS — I10 ESSENTIAL HYPERTENSION: ICD-10-CM

## 2018-01-01 LAB
ALBUMIN SERPL-MCNC: 2.3 G/DL (ref 3.4–5)
ALBUMIN SERPL-MCNC: 2.7 G/DL (ref 3.4–5)
ALBUMIN SERPL-MCNC: 2.8 G/DL (ref 3.4–5)
ALBUMIN SERPL-MCNC: 2.9 G/DL (ref 3.4–5)
ALBUMIN SERPL-MCNC: 3 G/DL (ref 3.4–5)
ALBUMIN SERPL-MCNC: 3 G/DL (ref 3.4–5)
ALBUMIN SERPL-MCNC: 3.4 G/DL (ref 3.4–5)
ALBUMIN UR-MCNC: NEGATIVE MG/DL
ALP SERPL-CCNC: 100 U/L (ref 40–150)
ALP SERPL-CCNC: 111 U/L (ref 40–150)
ALP SERPL-CCNC: 112 U/L (ref 40–150)
ALP SERPL-CCNC: 130 U/L (ref 40–150)
ALP SERPL-CCNC: 155 U/L (ref 40–150)
ALP SERPL-CCNC: 179 U/L (ref 40–150)
ALP SERPL-CCNC: 191 U/L (ref 40–150)
ALP SERPL-CCNC: 255 U/L (ref 40–150)
ALP SERPL-CCNC: 486 U/L (ref 40–150)
ALP SERPL-CCNC: 87 U/L (ref 40–150)
ALP SERPL-CCNC: 96 U/L (ref 40–150)
ALT SERPL W P-5'-P-CCNC: 18 U/L (ref 0–50)
ALT SERPL W P-5'-P-CCNC: 18 U/L (ref 0–50)
ALT SERPL W P-5'-P-CCNC: 20 U/L (ref 0–50)
ALT SERPL W P-5'-P-CCNC: 20 U/L (ref 0–50)
ALT SERPL W P-5'-P-CCNC: 25 U/L (ref 0–50)
ALT SERPL W P-5'-P-CCNC: 26 U/L (ref 0–50)
ALT SERPL W P-5'-P-CCNC: 32 U/L (ref 0–50)
ALT SERPL W P-5'-P-CCNC: 38 U/L (ref 0–50)
ALT SERPL W P-5'-P-CCNC: 44 U/L (ref 0–50)
ALT SERPL W P-5'-P-CCNC: 49 U/L (ref 0–50)
ALT SERPL W P-5'-P-CCNC: 53 U/L (ref 0–50)
ANION GAP SERPL CALCULATED.3IONS-SCNC: 10 MMOL/L (ref 3–14)
ANION GAP SERPL CALCULATED.3IONS-SCNC: 5 MMOL/L (ref 3–14)
ANION GAP SERPL CALCULATED.3IONS-SCNC: 6 MMOL/L (ref 3–14)
ANION GAP SERPL CALCULATED.3IONS-SCNC: 7 MMOL/L (ref 3–14)
ANION GAP SERPL CALCULATED.3IONS-SCNC: 8 MMOL/L (ref 3–14)
ANION GAP SERPL CALCULATED.3IONS-SCNC: 9 MMOL/L (ref 3–14)
ANION GAP SERPL CALCULATED.3IONS-SCNC: 9 MMOL/L (ref 3–14)
APPEARANCE UR: CLEAR
APPEARANCE UR: CLEAR
APTT PPP: 107 SEC (ref 22–37)
APTT PPP: 111 SEC (ref 22–37)
APTT PPP: 118 SEC (ref 22–37)
APTT PPP: 37 SEC (ref 22–37)
APTT PPP: 63 SEC (ref 22–37)
AST SERPL W P-5'-P-CCNC: 104 U/L (ref 0–45)
AST SERPL W P-5'-P-CCNC: 20 U/L (ref 0–45)
AST SERPL W P-5'-P-CCNC: 24 U/L (ref 0–45)
AST SERPL W P-5'-P-CCNC: 36 U/L (ref 0–45)
AST SERPL W P-5'-P-CCNC: 41 U/L (ref 0–45)
AST SERPL W P-5'-P-CCNC: 41 U/L (ref 0–45)
AST SERPL W P-5'-P-CCNC: 50 U/L (ref 0–45)
AST SERPL W P-5'-P-CCNC: 54 U/L (ref 0–45)
AST SERPL W P-5'-P-CCNC: 57 U/L (ref 0–45)
AST SERPL W P-5'-P-CCNC: 65 U/L (ref 0–45)
AST SERPL W P-5'-P-CCNC: 86 U/L (ref 0–45)
BACTERIA SPEC CULT: ABNORMAL
BACTERIA SPEC CULT: NO GROWTH
BACTERIA SPEC CULT: NORMAL
BASOPHILS # BLD AUTO: 0 10E9/L (ref 0–0.2)
BASOPHILS # BLD AUTO: 0.1 10E9/L (ref 0–0.2)
BASOPHILS NFR BLD AUTO: 0.1 %
BASOPHILS NFR BLD AUTO: 0.2 %
BASOPHILS NFR BLD AUTO: 0.3 %
BASOPHILS NFR BLD AUTO: 0.3 %
BILIRUB SERPL-MCNC: 0.2 MG/DL (ref 0.2–1.3)
BILIRUB SERPL-MCNC: 0.3 MG/DL (ref 0.2–1.3)
BILIRUB SERPL-MCNC: 0.3 MG/DL (ref 0.2–1.3)
BILIRUB SERPL-MCNC: 0.4 MG/DL (ref 0.2–1.3)
BILIRUB SERPL-MCNC: 0.4 MG/DL (ref 0.2–1.3)
BILIRUB SERPL-MCNC: 0.5 MG/DL (ref 0.2–1.3)
BILIRUB SERPL-MCNC: 0.7 MG/DL (ref 0.2–1.3)
BILIRUB SERPL-MCNC: 0.7 MG/DL (ref 0.2–1.3)
BILIRUB UR QL STRIP: NEGATIVE
BILIRUB UR QL STRIP: NEGATIVE
BUN SERPL-MCNC: 10 MG/DL (ref 7–30)
BUN SERPL-MCNC: 10 MG/DL (ref 7–30)
BUN SERPL-MCNC: 11 MG/DL (ref 7–30)
BUN SERPL-MCNC: 11 MG/DL (ref 7–30)
BUN SERPL-MCNC: 12 MG/DL (ref 7–30)
BUN SERPL-MCNC: 12 MG/DL (ref 7–30)
BUN SERPL-MCNC: 14 MG/DL (ref 7–30)
BUN SERPL-MCNC: 15 MG/DL (ref 7–30)
BUN SERPL-MCNC: 15 MG/DL (ref 7–30)
BUN SERPL-MCNC: 17 MG/DL (ref 7–30)
BUN SERPL-MCNC: 20 MG/DL (ref 7–30)
BUN SERPL-MCNC: 5 MG/DL (ref 7–30)
BUN SERPL-MCNC: 6 MG/DL (ref 7–30)
BUN SERPL-MCNC: 7 MG/DL (ref 7–30)
BUN SERPL-MCNC: 8 MG/DL (ref 7–30)
BUN SERPL-MCNC: 9 MG/DL (ref 7–30)
C DIFF TOX B STL QL: NEGATIVE
CALCIUM SERPL-MCNC: 8.4 MG/DL (ref 8.5–10.1)
CALCIUM SERPL-MCNC: 8.6 MG/DL (ref 8.5–10.1)
CALCIUM SERPL-MCNC: 8.7 MG/DL (ref 8.5–10.1)
CALCIUM SERPL-MCNC: 8.9 MG/DL (ref 8.5–10.1)
CALCIUM SERPL-MCNC: 9 MG/DL (ref 8.5–10.1)
CALCIUM SERPL-MCNC: 9.1 MG/DL (ref 8.5–10.1)
CALCIUM SERPL-MCNC: 9.1 MG/DL (ref 8.5–10.1)
CALCIUM SERPL-MCNC: 9.2 MG/DL (ref 8.5–10.1)
CALCIUM SERPL-MCNC: 9.2 MG/DL (ref 8.5–10.1)
CALCIUM SERPL-MCNC: 9.3 MG/DL (ref 8.5–10.1)
CALCIUM SERPL-MCNC: 9.6 MG/DL (ref 8.5–10.1)
CALCIUM SERPL-MCNC: 9.7 MG/DL (ref 8.5–10.1)
CALCIUM SERPL-MCNC: 9.8 MG/DL (ref 8.5–10.1)
CEA SERPL-MCNC: 223.6 UG/L (ref 0–2.5)
CEA SERPL-MCNC: 271 UG/L (ref 0–2.5)
CEA SERPL-MCNC: 64.7 UG/L (ref 0–2.5)
CHLORIDE SERPL-SCNC: 101 MMOL/L (ref 94–109)
CHLORIDE SERPL-SCNC: 102 MMOL/L (ref 94–109)
CHLORIDE SERPL-SCNC: 103 MMOL/L (ref 94–109)
CHLORIDE SERPL-SCNC: 105 MMOL/L (ref 94–109)
CHLORIDE SERPL-SCNC: 106 MMOL/L (ref 94–109)
CHLORIDE SERPL-SCNC: 107 MMOL/L (ref 94–109)
CHLORIDE SERPL-SCNC: 109 MMOL/L (ref 94–109)
CHLORIDE SERPL-SCNC: 110 MMOL/L (ref 94–109)
CHLORIDE SERPL-SCNC: 111 MMOL/L (ref 94–109)
CHLORIDE SERPL-SCNC: 99 MMOL/L (ref 94–109)
CO2 SERPL-SCNC: 23 MMOL/L (ref 20–32)
CO2 SERPL-SCNC: 23 MMOL/L (ref 20–32)
CO2 SERPL-SCNC: 26 MMOL/L (ref 20–32)
CO2 SERPL-SCNC: 27 MMOL/L (ref 20–32)
CO2 SERPL-SCNC: 28 MMOL/L (ref 20–32)
CO2 SERPL-SCNC: 29 MMOL/L (ref 20–32)
CO2 SERPL-SCNC: 31 MMOL/L (ref 20–32)
COLOR UR AUTO: ABNORMAL
COLOR UR AUTO: YELLOW
COPATH REPORT: NORMAL
CREAT SERPL-MCNC: 0.51 MG/DL (ref 0.52–1.04)
CREAT SERPL-MCNC: 0.57 MG/DL (ref 0.52–1.04)
CREAT SERPL-MCNC: 0.58 MG/DL (ref 0.52–1.04)
CREAT SERPL-MCNC: 0.59 MG/DL (ref 0.52–1.04)
CREAT SERPL-MCNC: 0.6 MG/DL (ref 0.52–1.04)
CREAT SERPL-MCNC: 0.61 MG/DL (ref 0.52–1.04)
CREAT SERPL-MCNC: 0.62 MG/DL (ref 0.52–1.04)
CREAT SERPL-MCNC: 0.62 MG/DL (ref 0.52–1.04)
CREAT SERPL-MCNC: 0.63 MG/DL (ref 0.52–1.04)
CREAT SERPL-MCNC: 0.63 MG/DL (ref 0.52–1.04)
CREAT SERPL-MCNC: 0.67 MG/DL (ref 0.52–1.04)
CREAT SERPL-MCNC: 0.7 MG/DL (ref 0.52–1.04)
CREAT SERPL-MCNC: 0.71 MG/DL (ref 0.52–1.04)
CREAT SERPL-MCNC: 0.88 MG/DL (ref 0.52–1.04)
CREAT SERPL-MCNC: 0.98 MG/DL (ref 0.52–1.04)
CREAT SERPL-MCNC: 1.15 MG/DL (ref 0.52–1.04)
CREAT SERPL-MCNC: 1.43 MG/DL (ref 0.52–1.04)
CREAT SERPL-MCNC: 1.58 MG/DL (ref 0.52–1.04)
CREAT SERPL-MCNC: 1.59 MG/DL (ref 0.52–1.04)
DIFFERENTIAL METHOD BLD: ABNORMAL
EOSINOPHIL # BLD AUTO: 0 10E9/L (ref 0–0.7)
EOSINOPHIL # BLD AUTO: 0.1 10E9/L (ref 0–0.7)
EOSINOPHIL NFR BLD AUTO: 0 %
EOSINOPHIL NFR BLD AUTO: 0.1 %
EOSINOPHIL NFR BLD AUTO: 0.3 %
EOSINOPHIL NFR BLD AUTO: 0.4 %
EOSINOPHIL NFR BLD AUTO: 0.6 %
EOSINOPHIL NFR BLD AUTO: 0.6 %
EOSINOPHIL NFR BLD AUTO: 0.7 %
EOSINOPHIL NFR BLD AUTO: 0.8 %
EOSINOPHIL NFR BLD AUTO: 1 %
EOSINOPHIL NFR BLD AUTO: 1.1 %
EOSINOPHIL NFR BLD AUTO: 1.3 %
EOSINOPHIL NFR BLD AUTO: 1.6 %
EOSINOPHIL NFR BLD AUTO: 1.6 %
EOSINOPHIL SPEC QL WRIGHT STN: NORMAL
ERYTHROCYTE [DISTWIDTH] IN BLOOD BY AUTOMATED COUNT: 18.6 % (ref 10–15)
ERYTHROCYTE [DISTWIDTH] IN BLOOD BY AUTOMATED COUNT: 19 % (ref 10–15)
ERYTHROCYTE [DISTWIDTH] IN BLOOD BY AUTOMATED COUNT: 19.1 % (ref 10–15)
ERYTHROCYTE [DISTWIDTH] IN BLOOD BY AUTOMATED COUNT: 19.6 % (ref 10–15)
ERYTHROCYTE [DISTWIDTH] IN BLOOD BY AUTOMATED COUNT: 19.7 % (ref 10–15)
ERYTHROCYTE [DISTWIDTH] IN BLOOD BY AUTOMATED COUNT: 19.8 % (ref 10–15)
ERYTHROCYTE [DISTWIDTH] IN BLOOD BY AUTOMATED COUNT: 20.3 % (ref 10–15)
ERYTHROCYTE [DISTWIDTH] IN BLOOD BY AUTOMATED COUNT: 20.4 % (ref 10–15)
ERYTHROCYTE [DISTWIDTH] IN BLOOD BY AUTOMATED COUNT: 20.5 % (ref 10–15)
ERYTHROCYTE [DISTWIDTH] IN BLOOD BY AUTOMATED COUNT: 21.7 % (ref 10–15)
ERYTHROCYTE [DISTWIDTH] IN BLOOD BY AUTOMATED COUNT: 21.8 % (ref 10–15)
ERYTHROCYTE [DISTWIDTH] IN BLOOD BY AUTOMATED COUNT: 22.2 % (ref 10–15)
ERYTHROCYTE [DISTWIDTH] IN BLOOD BY AUTOMATED COUNT: 22.9 % (ref 10–15)
ERYTHROCYTE [DISTWIDTH] IN BLOOD BY AUTOMATED COUNT: 23.2 % (ref 10–15)
ERYTHROCYTE [DISTWIDTH] IN BLOOD BY AUTOMATED COUNT: 24.5 % (ref 10–15)
ERYTHROCYTE [DISTWIDTH] IN BLOOD BY AUTOMATED COUNT: 24.6 % (ref 10–15)
ERYTHROCYTE [DISTWIDTH] IN BLOOD BY AUTOMATED COUNT: 24.8 % (ref 10–15)
FERRITIN SERPL-MCNC: 2291 NG/ML (ref 8–252)
FERRITIN SERPL-MCNC: 2377 NG/ML (ref 8–252)
GFR SERPL CREATININE-BSD FRML MDRD: 32 ML/MIN/1.7M2
GFR SERPL CREATININE-BSD FRML MDRD: 32 ML/MIN/1.7M2
GFR SERPL CREATININE-BSD FRML MDRD: 36 ML/MIN/1.7M2
GFR SERPL CREATININE-BSD FRML MDRD: 47 ML/MIN/1.7M2
GFR SERPL CREATININE-BSD FRML MDRD: 56 ML/MIN/1.7M2
GFR SERPL CREATININE-BSD FRML MDRD: 64 ML/MIN/1.7M2
GFR SERPL CREATININE-BSD FRML MDRD: 81 ML/MIN/1.7M2
GFR SERPL CREATININE-BSD FRML MDRD: 82 ML/MIN/1.7M2
GFR SERPL CREATININE-BSD FRML MDRD: 87 ML/MIN/1.7M2
GFR SERPL CREATININE-BSD FRML MDRD: >90 ML/MIN/1.7M2
GLUCOSE SERPL-MCNC: 102 MG/DL (ref 70–99)
GLUCOSE SERPL-MCNC: 103 MG/DL (ref 70–99)
GLUCOSE SERPL-MCNC: 107 MG/DL (ref 70–99)
GLUCOSE SERPL-MCNC: 108 MG/DL (ref 70–99)
GLUCOSE SERPL-MCNC: 110 MG/DL (ref 70–99)
GLUCOSE SERPL-MCNC: 111 MG/DL (ref 70–99)
GLUCOSE SERPL-MCNC: 113 MG/DL (ref 70–99)
GLUCOSE SERPL-MCNC: 124 MG/DL (ref 70–99)
GLUCOSE SERPL-MCNC: 126 MG/DL (ref 70–99)
GLUCOSE SERPL-MCNC: 128 MG/DL (ref 70–99)
GLUCOSE SERPL-MCNC: 133 MG/DL (ref 70–99)
GLUCOSE SERPL-MCNC: 150 MG/DL (ref 70–99)
GLUCOSE SERPL-MCNC: 158 MG/DL (ref 70–99)
GLUCOSE SERPL-MCNC: 81 MG/DL (ref 70–99)
GLUCOSE SERPL-MCNC: 89 MG/DL (ref 70–99)
GLUCOSE SERPL-MCNC: 93 MG/DL (ref 70–99)
GLUCOSE SERPL-MCNC: 93 MG/DL (ref 70–99)
GLUCOSE SERPL-MCNC: 94 MG/DL (ref 70–99)
GLUCOSE SERPL-MCNC: 94 MG/DL (ref 70–99)
GLUCOSE SERPL-MCNC: 99 MG/DL (ref 70–99)
GLUCOSE UR STRIP-MCNC: NEGATIVE MG/DL
GLUCOSE UR STRIP-MCNC: NEGATIVE MG/DL
HCT VFR BLD AUTO: 26.1 % (ref 35–47)
HCT VFR BLD AUTO: 26.8 % (ref 35–47)
HCT VFR BLD AUTO: 26.9 % (ref 35–47)
HCT VFR BLD AUTO: 26.9 % (ref 35–47)
HCT VFR BLD AUTO: 27.1 % (ref 35–47)
HCT VFR BLD AUTO: 27.6 % (ref 35–47)
HCT VFR BLD AUTO: 27.6 % (ref 35–47)
HCT VFR BLD AUTO: 29.4 % (ref 35–47)
HCT VFR BLD AUTO: 29.4 % (ref 35–47)
HCT VFR BLD AUTO: 29.9 % (ref 35–47)
HCT VFR BLD AUTO: 30 % (ref 35–47)
HCT VFR BLD AUTO: 30.2 % (ref 35–47)
HCT VFR BLD AUTO: 30.3 % (ref 35–47)
HCT VFR BLD AUTO: 30.4 % (ref 35–47)
HCT VFR BLD AUTO: 30.5 % (ref 35–47)
HCT VFR BLD AUTO: 30.7 % (ref 35–47)
HCT VFR BLD AUTO: 31.1 % (ref 35–47)
HCT VFR BLD AUTO: 31.5 % (ref 35–47)
HCT VFR BLD AUTO: 32.3 % (ref 35–47)
HCT VFR BLD AUTO: 32.3 % (ref 35–47)
HCT VFR BLD AUTO: 34.5 % (ref 35–47)
HCT VFR BLD AUTO: 34.7 % (ref 35–47)
HGB BLD-MCNC: 10.4 G/DL (ref 11.7–15.7)
HGB BLD-MCNC: 10.5 G/DL (ref 11.7–15.7)
HGB BLD-MCNC: 10.7 G/DL (ref 11.7–15.7)
HGB BLD-MCNC: 11.3 G/DL (ref 11.7–15.7)
HGB BLD-MCNC: 11.4 G/DL (ref 11.7–15.7)
HGB BLD-MCNC: 8.3 G/DL (ref 11.7–15.7)
HGB BLD-MCNC: 8.4 G/DL (ref 11.7–15.7)
HGB BLD-MCNC: 8.6 G/DL (ref 11.7–15.7)
HGB BLD-MCNC: 8.8 G/DL (ref 11.7–15.7)
HGB BLD-MCNC: 9.4 G/DL (ref 11.7–15.7)
HGB BLD-MCNC: 9.6 G/DL (ref 11.7–15.7)
HGB BLD-MCNC: 9.6 G/DL (ref 11.7–15.7)
HGB BLD-MCNC: 9.7 G/DL (ref 11.7–15.7)
HGB BLD-MCNC: 9.7 G/DL (ref 11.7–15.7)
HGB BLD-MCNC: 9.8 G/DL (ref 11.7–15.7)
HGB BLD-MCNC: 9.9 G/DL (ref 11.7–15.7)
HGB BLD-MCNC: 9.9 G/DL (ref 11.7–15.7)
HGB UR QL STRIP: NEGATIVE
HGB UR QL STRIP: NEGATIVE
HYALINE CASTS #/AREA URNS LPF: 1 /LPF (ref 0–2)
IMM GRANULOCYTES # BLD: 0 10E9/L (ref 0–0.4)
IMM GRANULOCYTES # BLD: 0.2 10E9/L (ref 0–0.4)
IMM GRANULOCYTES # BLD: 0.2 10E9/L (ref 0–0.4)
IMM GRANULOCYTES # BLD: 2 10E9/L (ref 0–0.4)
IMM GRANULOCYTES NFR BLD: 0 %
IMM GRANULOCYTES NFR BLD: 0.3 %
IMM GRANULOCYTES NFR BLD: 0.4 %
IMM GRANULOCYTES NFR BLD: 0.5 %
IMM GRANULOCYTES NFR BLD: 0.6 %
IMM GRANULOCYTES NFR BLD: 0.7 %
IMM GRANULOCYTES NFR BLD: 0.8 %
IMM GRANULOCYTES NFR BLD: 5 %
INR PPP: 1.13 (ref 0.86–1.14)
INR PPP: 1.21 (ref 0.86–1.14)
INR PPP: 1.21 (ref 0.86–1.14)
INTERPRETATION ECG - MUSE: NORMAL
IRON SATN MFR SERPL: 10 % (ref 15–46)
IRON SATN MFR SERPL: 12 % (ref 15–46)
IRON SERPL-MCNC: 22 UG/DL (ref 35–180)
IRON SERPL-MCNC: 27 UG/DL (ref 35–180)
KETONES UR STRIP-MCNC: NEGATIVE MG/DL
KETONES UR STRIP-MCNC: NEGATIVE MG/DL
LACTATE BLD-SCNC: 0.7 MMOL/L (ref 0.7–2)
LACTATE BLD-SCNC: 0.8 MMOL/L (ref 0.7–2)
LEUKOCYTE ESTERASE UR QL STRIP: ABNORMAL
LEUKOCYTE ESTERASE UR QL STRIP: NEGATIVE
LMWH PPP CHRO-ACNC: 0.33 IU/ML
LMWH PPP CHRO-ACNC: 0.38 IU/ML
LMWH PPP CHRO-ACNC: 0.38 IU/ML
LMWH PPP CHRO-ACNC: 0.44 IU/ML
LMWH PPP CHRO-ACNC: 0.44 IU/ML
LMWH PPP CHRO-ACNC: 0.47 IU/ML
LMWH PPP CHRO-ACNC: 0.5 IU/ML
LMWH PPP CHRO-ACNC: 0.52 IU/ML
LMWH PPP CHRO-ACNC: 0.55 IU/ML
LMWH PPP CHRO-ACNC: 0.57 IU/ML
LMWH PPP CHRO-ACNC: 0.68 IU/ML
LMWH PPP CHRO-ACNC: 0.72 IU/ML
LMWH PPP CHRO-ACNC: 0.72 IU/ML
LMWH PPP CHRO-ACNC: 0.82 IU/ML
LMWH PPP CHRO-ACNC: 0.92 IU/ML
LMWH PPP CHRO-ACNC: 0.99 IU/ML
LMWH PPP CHRO-ACNC: 1 IU/ML
LMWH PPP CHRO-ACNC: 1.23 IU/ML
LMWH PPP CHRO-ACNC: 1.36 IU/ML
LMWH PPP CHRO-ACNC: 1.36 IU/ML
LMWH PPP CHRO-ACNC: 1.43 IU/ML
LMWH PPP CHRO-ACNC: 1.72 IU/ML
LMWH PPP CHRO-ACNC: >2 IU/ML
LYMPHOCYTES # BLD AUTO: 0.7 10E9/L (ref 0.8–5.3)
LYMPHOCYTES # BLD AUTO: 0.8 10E9/L (ref 0.8–5.3)
LYMPHOCYTES # BLD AUTO: 0.8 10E9/L (ref 0.8–5.3)
LYMPHOCYTES # BLD AUTO: 1 10E9/L (ref 0.8–5.3)
LYMPHOCYTES # BLD AUTO: 1.1 10E9/L (ref 0.8–5.3)
LYMPHOCYTES # BLD AUTO: 1.1 10E9/L (ref 0.8–5.3)
LYMPHOCYTES # BLD AUTO: 1.3 10E9/L (ref 0.8–5.3)
LYMPHOCYTES # BLD AUTO: 1.4 10E9/L (ref 0.8–5.3)
LYMPHOCYTES # BLD AUTO: 1.5 10E9/L (ref 0.8–5.3)
LYMPHOCYTES # BLD AUTO: 1.6 10E9/L (ref 0.8–5.3)
LYMPHOCYTES # BLD AUTO: 1.7 10E9/L (ref 0.8–5.3)
LYMPHOCYTES # BLD AUTO: 1.7 10E9/L (ref 0.8–5.3)
LYMPHOCYTES # BLD AUTO: 1.8 10E9/L (ref 0.8–5.3)
LYMPHOCYTES NFR BLD AUTO: 10.6 %
LYMPHOCYTES NFR BLD AUTO: 10.6 %
LYMPHOCYTES NFR BLD AUTO: 19.1 %
LYMPHOCYTES NFR BLD AUTO: 19.4 %
LYMPHOCYTES NFR BLD AUTO: 19.6 %
LYMPHOCYTES NFR BLD AUTO: 21.9 %
LYMPHOCYTES NFR BLD AUTO: 29.3 %
LYMPHOCYTES NFR BLD AUTO: 34.3 %
LYMPHOCYTES NFR BLD AUTO: 36.5 %
LYMPHOCYTES NFR BLD AUTO: 4.3 %
LYMPHOCYTES NFR BLD AUTO: 5.4 %
LYMPHOCYTES NFR BLD AUTO: 7.5 %
LYMPHOCYTES NFR BLD AUTO: 8.5 %
Lab: ABNORMAL
Lab: NORMAL
MAGNESIUM SERPL-MCNC: 2 MG/DL (ref 1.6–2.3)
MAGNESIUM SERPL-MCNC: 2.1 MG/DL (ref 1.6–2.3)
MCH RBC QN AUTO: 28.6 PG (ref 26.5–33)
MCH RBC QN AUTO: 28.9 PG (ref 26.5–33)
MCH RBC QN AUTO: 29 PG (ref 26.5–33)
MCH RBC QN AUTO: 29.1 PG (ref 26.5–33)
MCH RBC QN AUTO: 29.3 PG (ref 26.5–33)
MCH RBC QN AUTO: 29.4 PG (ref 26.5–33)
MCH RBC QN AUTO: 29.7 PG (ref 26.5–33)
MCH RBC QN AUTO: 29.9 PG (ref 26.5–33)
MCH RBC QN AUTO: 29.9 PG (ref 26.5–33)
MCH RBC QN AUTO: 30.1 PG (ref 26.5–33)
MCH RBC QN AUTO: 30.2 PG (ref 26.5–33)
MCH RBC QN AUTO: 30.3 PG (ref 26.5–33)
MCH RBC QN AUTO: 30.4 PG (ref 26.5–33)
MCH RBC QN AUTO: 30.4 PG (ref 26.5–33)
MCH RBC QN AUTO: 30.5 PG (ref 26.5–33)
MCH RBC QN AUTO: 30.5 PG (ref 26.5–33)
MCH RBC QN AUTO: 30.7 PG (ref 26.5–33)
MCH RBC QN AUTO: 30.7 PG (ref 26.5–33)
MCH RBC QN AUTO: 30.8 PG (ref 26.5–33)
MCH RBC QN AUTO: 31 PG (ref 26.5–33)
MCH RBC QN AUTO: 31.3 PG (ref 26.5–33)
MCH RBC QN AUTO: 31.4 PG (ref 26.5–33)
MCHC RBC AUTO-ENTMCNC: 31.1 G/DL (ref 31.5–36.5)
MCHC RBC AUTO-ENTMCNC: 31.2 G/DL (ref 31.5–36.5)
MCHC RBC AUTO-ENTMCNC: 31.2 G/DL (ref 31.5–36.5)
MCHC RBC AUTO-ENTMCNC: 31.6 G/DL (ref 31.5–36.5)
MCHC RBC AUTO-ENTMCNC: 31.7 G/DL (ref 31.5–36.5)
MCHC RBC AUTO-ENTMCNC: 31.8 G/DL (ref 31.5–36.5)
MCHC RBC AUTO-ENTMCNC: 31.8 G/DL (ref 31.5–36.5)
MCHC RBC AUTO-ENTMCNC: 31.9 G/DL (ref 31.5–36.5)
MCHC RBC AUTO-ENTMCNC: 32 G/DL (ref 31.5–36.5)
MCHC RBC AUTO-ENTMCNC: 32 G/DL (ref 31.5–36.5)
MCHC RBC AUTO-ENTMCNC: 32.1 G/DL (ref 31.5–36.5)
MCHC RBC AUTO-ENTMCNC: 32.1 G/DL (ref 31.5–36.5)
MCHC RBC AUTO-ENTMCNC: 32.2 G/DL (ref 31.5–36.5)
MCHC RBC AUTO-ENTMCNC: 32.3 G/DL (ref 31.5–36.5)
MCHC RBC AUTO-ENTMCNC: 32.5 G/DL (ref 31.5–36.5)
MCHC RBC AUTO-ENTMCNC: 32.7 G/DL (ref 31.5–36.5)
MCHC RBC AUTO-ENTMCNC: 32.8 G/DL (ref 31.5–36.5)
MCHC RBC AUTO-ENTMCNC: 32.8 G/DL (ref 31.5–36.5)
MCHC RBC AUTO-ENTMCNC: 32.9 G/DL (ref 31.5–36.5)
MCHC RBC AUTO-ENTMCNC: 33 G/DL (ref 31.5–36.5)
MCHC RBC AUTO-ENTMCNC: 33.1 G/DL (ref 31.5–36.5)
MCHC RBC AUTO-ENTMCNC: 33.3 G/DL (ref 31.5–36.5)
MCV RBC AUTO: 86 FL (ref 78–100)
MCV RBC AUTO: 88 FL (ref 78–100)
MCV RBC AUTO: 89 FL (ref 78–100)
MCV RBC AUTO: 89 FL (ref 78–100)
MCV RBC AUTO: 90 FL (ref 78–100)
MCV RBC AUTO: 91 FL (ref 78–100)
MCV RBC AUTO: 93 FL (ref 78–100)
MCV RBC AUTO: 93 FL (ref 78–100)
MCV RBC AUTO: 95 FL (ref 78–100)
MCV RBC AUTO: 96 FL (ref 78–100)
MCV RBC AUTO: 97 FL (ref 78–100)
MCV RBC AUTO: 97 FL (ref 78–100)
MONOCYTES # BLD AUTO: 0.3 10E9/L (ref 0–1.3)
MONOCYTES # BLD AUTO: 0.5 10E9/L (ref 0–1.3)
MONOCYTES # BLD AUTO: 0.6 10E9/L (ref 0–1.3)
MONOCYTES # BLD AUTO: 0.6 10E9/L (ref 0–1.3)
MONOCYTES # BLD AUTO: 0.7 10E9/L (ref 0–1.3)
MONOCYTES # BLD AUTO: 0.8 10E9/L (ref 0–1.3)
MONOCYTES # BLD AUTO: 1 10E9/L (ref 0–1.3)
MONOCYTES # BLD AUTO: 1 10E9/L (ref 0–1.3)
MONOCYTES # BLD AUTO: 1.1 10E9/L (ref 0–1.3)
MONOCYTES # BLD AUTO: 1.3 10E9/L (ref 0–1.3)
MONOCYTES # BLD AUTO: 2.3 10E9/L (ref 0–1.3)
MONOCYTES NFR BLD AUTO: 10.4 %
MONOCYTES NFR BLD AUTO: 10.7 %
MONOCYTES NFR BLD AUTO: 12.8 %
MONOCYTES NFR BLD AUTO: 14.1 %
MONOCYTES NFR BLD AUTO: 14.3 %
MONOCYTES NFR BLD AUTO: 14.7 %
MONOCYTES NFR BLD AUTO: 16.9 %
MONOCYTES NFR BLD AUTO: 17.6 %
MONOCYTES NFR BLD AUTO: 2.4 %
MONOCYTES NFR BLD AUTO: 2.7 %
MONOCYTES NFR BLD AUTO: 5.9 %
MONOCYTES NFR BLD AUTO: 7.5 %
MONOCYTES NFR BLD AUTO: 7.8 %
MUCOUS THREADS #/AREA URNS LPF: PRESENT /LPF
MUCOUS THREADS #/AREA URNS LPF: PRESENT /LPF
NEUTROPHILS # BLD AUTO: 1.3 10E9/L (ref 1.6–8.3)
NEUTROPHILS # BLD AUTO: 1.8 10E9/L (ref 1.6–8.3)
NEUTROPHILS # BLD AUTO: 17.6 10E9/L (ref 1.6–8.3)
NEUTROPHILS # BLD AUTO: 2.9 10E9/L (ref 1.6–8.3)
NEUTROPHILS # BLD AUTO: 27.8 10E9/L (ref 1.6–8.3)
NEUTROPHILS # BLD AUTO: 3 10E9/L (ref 1.6–8.3)
NEUTROPHILS # BLD AUTO: 3.8 10E9/L (ref 1.6–8.3)
NEUTROPHILS # BLD AUTO: 36.1 10E9/L (ref 1.6–8.3)
NEUTROPHILS # BLD AUTO: 4.1 10E9/L (ref 1.6–8.3)
NEUTROPHILS # BLD AUTO: 5.4 10E9/L (ref 1.6–8.3)
NEUTROPHILS # BLD AUTO: 5.9 10E9/L (ref 1.6–8.3)
NEUTROPHILS # BLD AUTO: 7 10E9/L (ref 1.6–8.3)
NEUTROPHILS # BLD AUTO: 7.1 10E9/L (ref 1.6–8.3)
NEUTROPHILS NFR BLD AUTO: 44.3 %
NEUTROPHILS NFR BLD AUTO: 49.6 %
NEUTROPHILS NFR BLD AUTO: 58.6 %
NEUTROPHILS NFR BLD AUTO: 59.2 %
NEUTROPHILS NFR BLD AUTO: 65.4 %
NEUTROPHILS NFR BLD AUTO: 66.7 %
NEUTROPHILS NFR BLD AUTO: 72.3 %
NEUTROPHILS NFR BLD AUTO: 74.4 %
NEUTROPHILS NFR BLD AUTO: 79.4 %
NEUTROPHILS NFR BLD AUTO: 80.6 %
NEUTROPHILS NFR BLD AUTO: 82.6 %
NEUTROPHILS NFR BLD AUTO: 88.2 %
NEUTROPHILS NFR BLD AUTO: 91 %
NITRATE UR QL: NEGATIVE
NITRATE UR QL: NEGATIVE
NRBC # BLD AUTO: 0 10*3/UL
NRBC BLD AUTO-RTO: 0 /100
PH UR STRIP: 6 PH (ref 5–7)
PH UR STRIP: 6.5 PH (ref 5–7)
PLATELET # BLD AUTO: 105 10E9/L (ref 150–450)
PLATELET # BLD AUTO: 128 10E9/L (ref 150–450)
PLATELET # BLD AUTO: 128 10E9/L (ref 150–450)
PLATELET # BLD AUTO: 130 10E9/L (ref 150–450)
PLATELET # BLD AUTO: 144 10E9/L (ref 150–450)
PLATELET # BLD AUTO: 162 10E9/L (ref 150–450)
PLATELET # BLD AUTO: 164 10E9/L (ref 150–450)
PLATELET # BLD AUTO: 174 10E9/L (ref 150–450)
PLATELET # BLD AUTO: 183 10E9/L (ref 150–450)
PLATELET # BLD AUTO: 185 10E9/L (ref 150–450)
PLATELET # BLD AUTO: 189 10E9/L (ref 150–450)
PLATELET # BLD AUTO: 192 10E9/L (ref 150–450)
PLATELET # BLD AUTO: 197 10E9/L (ref 150–450)
PLATELET # BLD AUTO: 204 10E9/L (ref 150–450)
PLATELET # BLD AUTO: 204 10E9/L (ref 150–450)
PLATELET # BLD AUTO: 209 10E9/L (ref 150–450)
PLATELET # BLD AUTO: 214 10E9/L (ref 150–450)
PLATELET # BLD AUTO: 214 10E9/L (ref 150–450)
PLATELET # BLD AUTO: 229 10E9/L (ref 150–450)
PLATELET # BLD AUTO: 232 10E9/L (ref 150–450)
PLATELET # BLD AUTO: 342 10E9/L (ref 150–450)
PLATELET # BLD AUTO: ABNORMAL 10E9/L (ref 150–450)
POTASSIUM SERPL-SCNC: 2.8 MMOL/L (ref 3.4–5.3)
POTASSIUM SERPL-SCNC: 3 MMOL/L (ref 3.4–5.3)
POTASSIUM SERPL-SCNC: 3.2 MMOL/L (ref 3.4–5.3)
POTASSIUM SERPL-SCNC: 3.3 MMOL/L (ref 3.4–5.3)
POTASSIUM SERPL-SCNC: 3.4 MMOL/L (ref 3.4–5.3)
POTASSIUM SERPL-SCNC: 3.5 MMOL/L (ref 3.4–5.3)
POTASSIUM SERPL-SCNC: 3.6 MMOL/L (ref 3.4–5.3)
POTASSIUM SERPL-SCNC: 3.8 MMOL/L (ref 3.4–5.3)
POTASSIUM SERPL-SCNC: 3.8 MMOL/L (ref 3.4–5.3)
POTASSIUM SERPL-SCNC: 3.9 MMOL/L (ref 3.4–5.3)
PROCALCITONIN SERPL-MCNC: 0.39 NG/ML
PROT SERPL-MCNC: 7 G/DL (ref 6.8–8.8)
PROT SERPL-MCNC: 7.2 G/DL (ref 6.8–8.8)
PROT SERPL-MCNC: 7.2 G/DL (ref 6.8–8.8)
PROT SERPL-MCNC: 7.4 G/DL (ref 6.8–8.8)
PROT SERPL-MCNC: 7.4 G/DL (ref 6.8–8.8)
PROT SERPL-MCNC: 7.5 G/DL (ref 6.8–8.8)
PROT SERPL-MCNC: 7.6 G/DL (ref 6.8–8.8)
PROT SERPL-MCNC: 7.7 G/DL (ref 6.8–8.8)
PROT SERPL-MCNC: 7.8 G/DL (ref 6.8–8.8)
PROT SERPL-MCNC: 7.8 G/DL (ref 6.8–8.8)
PROT SERPL-MCNC: 8.8 G/DL (ref 6.8–8.8)
RBC # BLD AUTO: 2.75 10E12/L (ref 3.8–5.2)
RBC # BLD AUTO: 2.82 10E12/L (ref 3.8–5.2)
RBC # BLD AUTO: 2.83 10E12/L (ref 3.8–5.2)
RBC # BLD AUTO: 2.84 10E12/L (ref 3.8–5.2)
RBC # BLD AUTO: 2.88 10E12/L (ref 3.8–5.2)
RBC # BLD AUTO: 2.89 10E12/L (ref 3.8–5.2)
RBC # BLD AUTO: 2.89 10E12/L (ref 3.8–5.2)
RBC # BLD AUTO: 3.07 10E12/L (ref 3.8–5.2)
RBC # BLD AUTO: 3.12 10E12/L (ref 3.8–5.2)
RBC # BLD AUTO: 3.15 10E12/L (ref 3.8–5.2)
RBC # BLD AUTO: 3.16 10E12/L (ref 3.8–5.2)
RBC # BLD AUTO: 3.19 10E12/L (ref 3.8–5.2)
RBC # BLD AUTO: 3.21 10E12/L (ref 3.8–5.2)
RBC # BLD AUTO: 3.23 10E12/L (ref 3.8–5.2)
RBC # BLD AUTO: 3.24 10E12/L (ref 3.8–5.2)
RBC # BLD AUTO: 3.36 10E12/L (ref 3.8–5.2)
RBC # BLD AUTO: 3.43 10E12/L (ref 3.8–5.2)
RBC # BLD AUTO: 3.54 10E12/L (ref 3.8–5.2)
RBC # BLD AUTO: 3.54 10E12/L (ref 3.8–5.2)
RBC # BLD AUTO: 3.63 10E12/L (ref 3.8–5.2)
RBC # BLD AUTO: 3.69 10E12/L (ref 3.8–5.2)
RBC # BLD AUTO: 3.86 10E12/L (ref 3.8–5.2)
RBC #/AREA URNS AUTO: 2 /HPF (ref 0–2)
RBC #/AREA URNS AUTO: <1 /HPF (ref 0–2)
RBC MORPH BLD: ABNORMAL
RETICS # AUTO: 13.2 10E9/L (ref 25–95)
RETICS/RBC NFR AUTO: 0.4 % (ref 0.5–2)
SODIUM SERPL-SCNC: 134 MMOL/L (ref 133–144)
SODIUM SERPL-SCNC: 136 MMOL/L (ref 133–144)
SODIUM SERPL-SCNC: 136 MMOL/L (ref 133–144)
SODIUM SERPL-SCNC: 137 MMOL/L (ref 133–144)
SODIUM SERPL-SCNC: 138 MMOL/L (ref 133–144)
SODIUM SERPL-SCNC: 139 MMOL/L (ref 133–144)
SODIUM SERPL-SCNC: 139 MMOL/L (ref 133–144)
SODIUM SERPL-SCNC: 140 MMOL/L (ref 133–144)
SODIUM SERPL-SCNC: 141 MMOL/L (ref 133–144)
SODIUM SERPL-SCNC: 142 MMOL/L (ref 133–144)
SOURCE: ABNORMAL
SOURCE: ABNORMAL
SP GR UR STRIP: 1 (ref 1–1.03)
SP GR UR STRIP: 1.03 (ref 1–1.03)
SPECIMEN SOURCE: ABNORMAL
SPECIMEN SOURCE: NORMAL
SQUAMOUS #/AREA URNS AUTO: 5 /HPF (ref 0–1)
TIBC SERPL-MCNC: 219 UG/DL (ref 240–430)
TIBC SERPL-MCNC: 219 UG/DL (ref 240–430)
TRANSFERRIN SERPL-MCNC: 174 MG/DL (ref 210–360)
TROPONIN I SERPL-MCNC: <0.015 UG/L (ref 0–0.04)
UROBILINOGEN UR STRIP-MCNC: NORMAL MG/DL (ref 0–2)
UROBILINOGEN UR STRIP-MCNC: NORMAL MG/DL (ref 0–2)
VANCOMYCIN SERPL-MCNC: 27.5 MG/L
WBC # BLD AUTO: 12.5 10E9/L (ref 4–11)
WBC # BLD AUTO: 13 10E9/L (ref 4–11)
WBC # BLD AUTO: 14.3 10E9/L (ref 4–11)
WBC # BLD AUTO: 15.9 10E9/L (ref 4–11)
WBC # BLD AUTO: 21.8 10E9/L (ref 4–11)
WBC # BLD AUTO: 3 10E9/L (ref 4–11)
WBC # BLD AUTO: 3.2 10E9/L (ref 4–11)
WBC # BLD AUTO: 3.7 10E9/L (ref 4–11)
WBC # BLD AUTO: 30.6 10E9/L (ref 4–11)
WBC # BLD AUTO: 4.4 10E9/L (ref 4–11)
WBC # BLD AUTO: 4.6 10E9/L (ref 4–11)
WBC # BLD AUTO: 4.9 10E9/L (ref 4–11)
WBC # BLD AUTO: 40.9 10E9/L (ref 4–11)
WBC # BLD AUTO: 5.6 10E9/L (ref 4–11)
WBC # BLD AUTO: 6.1 10E9/L (ref 4–11)
WBC # BLD AUTO: 6.3 10E9/L (ref 4–11)
WBC # BLD AUTO: 7.3 10E9/L (ref 4–11)
WBC # BLD AUTO: 8.4 10E9/L (ref 4–11)
WBC # BLD AUTO: 8.5 10E9/L (ref 4–11)
WBC # BLD AUTO: 9 10E9/L (ref 4–11)
WBC # BLD AUTO: 9 10E9/L (ref 4–11)
WBC # BLD AUTO: 9.6 10E9/L (ref 4–11)
WBC #/AREA URNS AUTO: 1 /HPF (ref 0–5)
WBC #/AREA URNS AUTO: 5 /HPF (ref 0–5)

## 2018-01-01 PROCEDURE — 83550 IRON BINDING TEST: CPT | Performed by: INTERNAL MEDICINE

## 2018-01-01 PROCEDURE — 85027 COMPLETE CBC AUTOMATED: CPT | Performed by: INTERNAL MEDICINE

## 2018-01-01 PROCEDURE — 85025 COMPLETE CBC W/AUTO DIFF WBC: CPT | Performed by: INTERNAL MEDICINE

## 2018-01-01 PROCEDURE — 36591 DRAW BLOOD OFF VENOUS DEVICE: CPT

## 2018-01-01 PROCEDURE — 25000128 H RX IP 250 OP 636: Performed by: INTERNAL MEDICINE

## 2018-01-01 PROCEDURE — 06H03DZ INSERTION OF INTRALUMINAL DEVICE INTO INFERIOR VENA CAVA, PERCUTANEOUS APPROACH: ICD-10-PCS | Performed by: RADIOLOGY

## 2018-01-01 PROCEDURE — A9270 NON-COVERED ITEM OR SERVICE: HCPCS | Mod: GY | Performed by: INTERNAL MEDICINE

## 2018-01-01 PROCEDURE — 85520 HEPARIN ASSAY: CPT | Performed by: INTERNAL MEDICINE

## 2018-01-01 PROCEDURE — 87076 CULTURE ANAEROBE IDENT EACH: CPT | Performed by: EMERGENCY MEDICINE

## 2018-01-01 PROCEDURE — 99232 SBSQ HOSP IP/OBS MODERATE 35: CPT | Performed by: INTERNAL MEDICINE

## 2018-01-01 PROCEDURE — 25000128 H RX IP 250 OP 636: Performed by: HOSPITALIST

## 2018-01-01 PROCEDURE — 85730 THROMBOPLASTIN TIME PARTIAL: CPT | Performed by: INTERNAL MEDICINE

## 2018-01-01 PROCEDURE — 89190 NASAL SMEAR FOR EOSINOPHILS: CPT | Performed by: INTERNAL MEDICINE

## 2018-01-01 PROCEDURE — 96368 THER/DIAG CONCURRENT INF: CPT

## 2018-01-01 PROCEDURE — 25000132 ZZH RX MED GY IP 250 OP 250 PS 637: Mod: GY | Performed by: INTERNAL MEDICINE

## 2018-01-01 PROCEDURE — 93971 EXTREMITY STUDY: CPT | Mod: LT

## 2018-01-01 PROCEDURE — 99215 OFFICE O/P EST HI 40 MIN: CPT | Performed by: INTERNAL MEDICINE

## 2018-01-01 PROCEDURE — 25000132 ZZH RX MED GY IP 250 OP 250 PS 637: Performed by: INTERNAL MEDICINE

## 2018-01-01 PROCEDURE — 25000128 H RX IP 250 OP 636: Performed by: PHYSICIAN ASSISTANT

## 2018-01-01 PROCEDURE — 40000847 ZZHCL STATISTIC MORPHOLOGY W/INTERP HISTOLOGY TC 85060: Performed by: HOSPITALIST

## 2018-01-01 PROCEDURE — 25000132 ZZH RX MED GY IP 250 OP 250 PS 637: Performed by: HOSPITALIST

## 2018-01-01 PROCEDURE — 25000125 ZZHC RX 250: Performed by: INTERNAL MEDICINE

## 2018-01-01 PROCEDURE — 96375 TX/PRO/DX INJ NEW DRUG ADDON: CPT

## 2018-01-01 PROCEDURE — 97161 PT EVAL LOW COMPLEX 20 MIN: CPT | Mod: GP | Performed by: PHYSICAL THERAPIST

## 2018-01-01 PROCEDURE — 12000000 ZZH R&B MED SURG/OB

## 2018-01-01 PROCEDURE — 87800 DETECT AGNT MULT DNA DIREC: CPT | Performed by: EMERGENCY MEDICINE

## 2018-01-01 PROCEDURE — 96415 CHEMO IV INFUSION ADDL HR: CPT

## 2018-01-01 PROCEDURE — 80048 BASIC METABOLIC PNL TOTAL CA: CPT | Performed by: PHYSICIAN ASSISTANT

## 2018-01-01 PROCEDURE — 99223 1ST HOSP IP/OBS HIGH 75: CPT | Mod: AI | Performed by: INTERNAL MEDICINE

## 2018-01-01 PROCEDURE — G0463 HOSPITAL OUTPT CLINIC VISIT: HCPCS | Mod: 25

## 2018-01-01 PROCEDURE — 87040 BLOOD CULTURE FOR BACTERIA: CPT | Performed by: INTERNAL MEDICINE

## 2018-01-01 PROCEDURE — 25000128 H RX IP 250 OP 636: Performed by: EMERGENCY MEDICINE

## 2018-01-01 PROCEDURE — 96416 CHEMO PROLONG INFUSE W/PUMP: CPT

## 2018-01-01 PROCEDURE — 96413 CHEMO IV INFUSION 1 HR: CPT

## 2018-01-01 PROCEDURE — 82565 ASSAY OF CREATININE: CPT | Performed by: INTERNAL MEDICINE

## 2018-01-01 PROCEDURE — 81001 URINALYSIS AUTO W/SCOPE: CPT | Performed by: INTERNAL MEDICINE

## 2018-01-01 PROCEDURE — 74177 CT ABD & PELVIS W/CONTRAST: CPT

## 2018-01-01 PROCEDURE — 40000894 ZZH STATISTIC OT IP EVAL DEFER

## 2018-01-01 PROCEDURE — 99285 EMERGENCY DEPT VISIT HI MDM: CPT | Mod: 25

## 2018-01-01 PROCEDURE — 80053 COMPREHEN METABOLIC PANEL: CPT | Performed by: INTERNAL MEDICINE

## 2018-01-01 PROCEDURE — 84484 ASSAY OF TROPONIN QUANT: CPT | Performed by: EMERGENCY MEDICINE

## 2018-01-01 PROCEDURE — 99152 MOD SED SAME PHYS/QHP 5/>YRS: CPT

## 2018-01-01 PROCEDURE — 36415 COLL VENOUS BLD VENIPUNCTURE: CPT | Performed by: INTERNAL MEDICINE

## 2018-01-01 PROCEDURE — 99207 ZZC CDG-MDM COMPONENT: MEETS LOW - DOWN CODED: CPT | Performed by: INTERNAL MEDICINE

## 2018-01-01 PROCEDURE — 85730 THROMBOPLASTIN TIME PARTIAL: CPT | Performed by: HOSPITALIST

## 2018-01-01 PROCEDURE — A9270 NON-COVERED ITEM OR SERVICE: HCPCS | Mod: GY | Performed by: HOSPITALIST

## 2018-01-01 PROCEDURE — 25000128 H RX IP 250 OP 636

## 2018-01-01 PROCEDURE — 83735 ASSAY OF MAGNESIUM: CPT | Performed by: INTERNAL MEDICINE

## 2018-01-01 PROCEDURE — 87181 SC STD AGAR DILUTION PER AGT: CPT | Performed by: EMERGENCY MEDICINE

## 2018-01-01 PROCEDURE — 96367 TX/PROPH/DG ADDL SEQ IV INF: CPT

## 2018-01-01 PROCEDURE — 83540 ASSAY OF IRON: CPT | Performed by: INTERNAL MEDICINE

## 2018-01-01 PROCEDURE — 40000863 ZZH STATISTIC RADIOLOGY XRAY, US, CT, MAR, NM

## 2018-01-01 PROCEDURE — 84145 PROCALCITONIN (PCT): CPT | Performed by: INTERNAL MEDICINE

## 2018-01-01 PROCEDURE — 83605 ASSAY OF LACTIC ACID: CPT | Performed by: INTERNAL MEDICINE

## 2018-01-01 PROCEDURE — 80048 BASIC METABOLIC PNL TOTAL CA: CPT | Performed by: EMERGENCY MEDICINE

## 2018-01-01 PROCEDURE — 40000235 ZZH STATISTIC TELEMETRY

## 2018-01-01 PROCEDURE — 93320 DOPPLER ECHO COMPLETE: CPT | Mod: 26 | Performed by: INTERNAL MEDICINE

## 2018-01-01 PROCEDURE — 99233 SBSQ HOSP IP/OBS HIGH 50: CPT | Performed by: INTERNAL MEDICINE

## 2018-01-01 PROCEDURE — 93312 ECHO TRANSESOPHAGEAL: CPT | Mod: 26 | Performed by: INTERNAL MEDICINE

## 2018-01-01 PROCEDURE — 99214 OFFICE O/P EST MOD 30 MIN: CPT | Performed by: NURSE PRACTITIONER

## 2018-01-01 PROCEDURE — G0498 CHEMO EXTEND IV INFUS W/PUMP: HCPCS

## 2018-01-01 PROCEDURE — 82378 CARCINOEMBRYONIC ANTIGEN: CPT | Performed by: INTERNAL MEDICINE

## 2018-01-01 PROCEDURE — 25000128 H RX IP 250 OP 636: Performed by: RADIOLOGY

## 2018-01-01 PROCEDURE — 84132 ASSAY OF SERUM POTASSIUM: CPT | Performed by: INTERNAL MEDICINE

## 2018-01-01 PROCEDURE — 40000857 ZZH STATISTIC TEE INCLUDES SEDATION

## 2018-01-01 PROCEDURE — 71260 CT THORAX DX C+: CPT

## 2018-01-01 PROCEDURE — 99207 ZZC DROP WITH A PROCEDURE: CPT | Mod: 25

## 2018-01-01 PROCEDURE — 25000132 ZZH RX MED GY IP 250 OP 250 PS 637: Performed by: PHYSICIAN ASSISTANT

## 2018-01-01 PROCEDURE — 85610 PROTHROMBIN TIME: CPT | Performed by: EMERGENCY MEDICINE

## 2018-01-01 PROCEDURE — G0463 HOSPITAL OUTPT CLINIC VISIT: HCPCS

## 2018-01-01 PROCEDURE — 40000193 ZZH STATISTIC PT WARD VISIT: Performed by: PHYSICAL THERAPIST

## 2018-01-01 PROCEDURE — 71046 X-RAY EXAM CHEST 2 VIEWS: CPT

## 2018-01-01 PROCEDURE — 80048 BASIC METABOLIC PNL TOTAL CA: CPT | Performed by: INTERNAL MEDICINE

## 2018-01-01 PROCEDURE — 85520 HEPARIN ASSAY: CPT | Performed by: HOSPITALIST

## 2018-01-01 PROCEDURE — 99223 1ST HOSP IP/OBS HIGH 75: CPT | Mod: AI | Performed by: HOSPITALIST

## 2018-01-01 PROCEDURE — 85060 BLOOD SMEAR INTERPRETATION: CPT | Performed by: HOSPITALIST

## 2018-01-01 PROCEDURE — C1769 GUIDE WIRE: HCPCS

## 2018-01-01 PROCEDURE — 99239 HOSP IP/OBS DSCHRG MGMT >30: CPT | Performed by: INTERNAL MEDICINE

## 2018-01-01 PROCEDURE — 96361 HYDRATE IV INFUSION ADD-ON: CPT

## 2018-01-01 PROCEDURE — 99214 OFFICE O/P EST MOD 30 MIN: CPT | Performed by: INTERNAL MEDICINE

## 2018-01-01 PROCEDURE — 99232 SBSQ HOSP IP/OBS MODERATE 35: CPT | Performed by: HOSPITALIST

## 2018-01-01 PROCEDURE — 96374 THER/PROPH/DIAG INJ IV PUSH: CPT | Mod: XU

## 2018-01-01 PROCEDURE — 99223 1ST HOSP IP/OBS HIGH 75: CPT | Performed by: INTERNAL MEDICINE

## 2018-01-01 PROCEDURE — 27210742 ZZH CATH CR1

## 2018-01-01 PROCEDURE — 25000125 ZZHC RX 250: Performed by: HOSPITALIST

## 2018-01-01 PROCEDURE — 82728 ASSAY OF FERRITIN: CPT | Performed by: INTERNAL MEDICINE

## 2018-01-01 PROCEDURE — 96374 THER/PROPH/DIAG INJ IV PUSH: CPT

## 2018-01-01 PROCEDURE — 85025 COMPLETE CBC W/AUTO DIFF WBC: CPT | Performed by: EMERGENCY MEDICINE

## 2018-01-01 PROCEDURE — 93306 TTE W/DOPPLER COMPLETE: CPT

## 2018-01-01 PROCEDURE — 96365 THER/PROPH/DIAG IV INF INIT: CPT

## 2018-01-01 PROCEDURE — 93306 TTE W/DOPPLER COMPLETE: CPT | Mod: 26 | Performed by: INTERNAL MEDICINE

## 2018-01-01 PROCEDURE — 87040 BLOOD CULTURE FOR BACTERIA: CPT | Performed by: EMERGENCY MEDICINE

## 2018-01-01 PROCEDURE — 83605 ASSAY OF LACTIC ACID: CPT | Performed by: EMERGENCY MEDICINE

## 2018-01-01 PROCEDURE — 96365 THER/PROPH/DIAG IV INF INIT: CPT | Mod: 59

## 2018-01-01 PROCEDURE — 80048 BASIC METABOLIC PNL TOTAL CA: CPT | Performed by: HOSPITALIST

## 2018-01-01 PROCEDURE — 85730 THROMBOPLASTIN TIME PARTIAL: CPT | Performed by: EMERGENCY MEDICINE

## 2018-01-01 PROCEDURE — 80202 ASSAY OF VANCOMYCIN: CPT | Performed by: INTERNAL MEDICINE

## 2018-01-01 PROCEDURE — 93005 ELECTROCARDIOGRAM TRACING: CPT

## 2018-01-01 PROCEDURE — 80053 COMPREHEN METABOLIC PANEL: CPT | Performed by: EMERGENCY MEDICINE

## 2018-01-01 PROCEDURE — 81001 URINALYSIS AUTO W/SCOPE: CPT | Performed by: EMERGENCY MEDICINE

## 2018-01-01 PROCEDURE — 25000132 ZZH RX MED GY IP 250 OP 250 PS 637: Mod: GY | Performed by: HOSPITALIST

## 2018-01-01 PROCEDURE — 93971 EXTREMITY STUDY: CPT | Mod: XS,LT

## 2018-01-01 PROCEDURE — 85027 COMPLETE CBC AUTOMATED: CPT | Performed by: EMERGENCY MEDICINE

## 2018-01-01 PROCEDURE — 36415 COLL VENOUS BLD VENIPUNCTURE: CPT | Performed by: PHYSICIAN ASSISTANT

## 2018-01-01 PROCEDURE — 25000132 ZZH RX MED GY IP 250 OP 250 PS 637: Performed by: EMERGENCY MEDICINE

## 2018-01-01 PROCEDURE — 96411 CHEMO IV PUSH ADDL DRUG: CPT

## 2018-01-01 PROCEDURE — 99238 HOSP IP/OBS DSCHRG MGMT 30/<: CPT | Performed by: INTERNAL MEDICINE

## 2018-01-01 PROCEDURE — 85027 COMPLETE CBC AUTOMATED: CPT | Performed by: HOSPITALIST

## 2018-01-01 PROCEDURE — 96417 CHEMO IV INFUS EACH ADDL SEQ: CPT

## 2018-01-01 PROCEDURE — 81003 URINALYSIS AUTO W/O SCOPE: CPT | Performed by: INTERNAL MEDICINE

## 2018-01-01 PROCEDURE — 85025 COMPLETE CBC W/AUTO DIFF WBC: CPT | Performed by: PHYSICIAN ASSISTANT

## 2018-01-01 PROCEDURE — 27210886 ZZH ACCESSORY CR5

## 2018-01-01 PROCEDURE — 99222 1ST HOSP IP/OBS MODERATE 55: CPT | Mod: AI | Performed by: HOSPITALIST

## 2018-01-01 PROCEDURE — 93325 DOPPLER ECHO COLOR FLOW MAPG: CPT | Mod: 26 | Performed by: INTERNAL MEDICINE

## 2018-01-01 PROCEDURE — 87086 URINE CULTURE/COLONY COUNT: CPT | Performed by: EMERGENCY MEDICINE

## 2018-01-01 PROCEDURE — 99232 SBSQ HOSP IP/OBS MODERATE 35: CPT | Performed by: NURSE PRACTITIONER

## 2018-01-01 PROCEDURE — 25000125 ZZHC RX 250: Performed by: EMERGENCY MEDICINE

## 2018-01-01 PROCEDURE — 97802 MEDICAL NUTRITION INDIV IN: CPT | Performed by: DIETITIAN, REGISTERED

## 2018-01-01 PROCEDURE — 84466 ASSAY OF TRANSFERRIN: CPT | Performed by: INTERNAL MEDICINE

## 2018-01-01 PROCEDURE — C1880 VENA CAVA FILTER: HCPCS

## 2018-01-01 PROCEDURE — 99283 EMERGENCY DEPT VISIT LOW MDM: CPT

## 2018-01-01 PROCEDURE — 85045 AUTOMATED RETICULOCYTE COUNT: CPT | Performed by: HOSPITALIST

## 2018-01-01 PROCEDURE — 40000556 ZZH STATISTIC PERIPHERAL IV START W US GUIDANCE

## 2018-01-01 PROCEDURE — 87040 BLOOD CULTURE FOR BACTERIA: CPT | Performed by: HOSPITALIST

## 2018-01-01 PROCEDURE — 25000125 ZZHC RX 250: Performed by: RADIOLOGY

## 2018-01-01 PROCEDURE — 87493 C DIFF AMPLIFIED PROBE: CPT | Performed by: INTERNAL MEDICINE

## 2018-01-01 PROCEDURE — 85610 PROTHROMBIN TIME: CPT | Performed by: RADIOLOGY

## 2018-01-01 PROCEDURE — 93325 DOPPLER ECHO COLOR FLOW MAPG: CPT

## 2018-01-01 PROCEDURE — 85025 COMPLETE CBC W/AUTO DIFF WBC: CPT | Performed by: HOSPITALIST

## 2018-01-01 PROCEDURE — 99207 ZZC APP CREDIT; MD BILLING SHARED VISIT: CPT | Performed by: PHYSICIAN ASSISTANT

## 2018-01-01 RX ORDER — FLUOROURACIL 50 MG/ML
400 INJECTION, SOLUTION INTRAVENOUS ONCE
Status: CANCELLED | OUTPATIENT
Start: 2018-01-01

## 2018-01-01 RX ORDER — LORAZEPAM 2 MG/ML
0.5 INJECTION INTRAMUSCULAR EVERY 4 HOURS PRN
Status: CANCELLED
Start: 2018-01-01

## 2018-01-01 RX ORDER — EPINEPHRINE 1 MG/ML
0.3 INJECTION, SOLUTION INTRAMUSCULAR; SUBCUTANEOUS EVERY 5 MIN PRN
Status: CANCELLED | OUTPATIENT
Start: 2018-01-01

## 2018-01-01 RX ORDER — DIPHENHYDRAMINE HYDROCHLORIDE 50 MG/ML
50 INJECTION INTRAMUSCULAR; INTRAVENOUS
Status: CANCELLED
Start: 2018-01-01

## 2018-01-01 RX ORDER — SODIUM CHLORIDE 9 MG/ML
1000 INJECTION, SOLUTION INTRAVENOUS CONTINUOUS PRN
Status: CANCELLED
Start: 2018-01-01

## 2018-01-01 RX ORDER — MEPERIDINE HYDROCHLORIDE 25 MG/ML
25 INJECTION INTRAMUSCULAR; INTRAVENOUS; SUBCUTANEOUS EVERY 30 MIN PRN
Status: CANCELLED | OUTPATIENT
Start: 2018-01-01

## 2018-01-01 RX ORDER — METHYLPREDNISOLONE SODIUM SUCCINATE 125 MG/2ML
125 INJECTION, POWDER, LYOPHILIZED, FOR SOLUTION INTRAMUSCULAR; INTRAVENOUS
Status: CANCELLED
Start: 2018-01-01

## 2018-01-01 RX ORDER — POTASSIUM CL/LIDO/0.9 % NACL 10MEQ/0.1L
10 INTRAVENOUS SOLUTION, PIGGYBACK (ML) INTRAVENOUS
Status: DISCONTINUED | OUTPATIENT
Start: 2018-01-01 | End: 2018-01-01 | Stop reason: HOSPADM

## 2018-01-01 RX ORDER — ALBUTEROL SULFATE 90 UG/1
1-2 AEROSOL, METERED RESPIRATORY (INHALATION)
Status: CANCELLED
Start: 2018-01-01

## 2018-01-01 RX ORDER — HEPARIN SODIUM (PORCINE) LOCK FLUSH IV SOLN 100 UNIT/ML 100 UNIT/ML
5 SOLUTION INTRAVENOUS
Status: DISCONTINUED | OUTPATIENT
Start: 2018-01-01 | End: 2018-01-01 | Stop reason: HOSPADM

## 2018-01-01 RX ORDER — SODIUM CHLORIDE 9 MG/ML
INJECTION, SOLUTION INTRAVENOUS CONTINUOUS
Status: DISCONTINUED | OUTPATIENT
Start: 2018-01-01 | End: 2018-01-01 | Stop reason: HOSPADM

## 2018-01-01 RX ORDER — ACETAMINOPHEN 325 MG/1
650 TABLET ORAL EVERY 4 HOURS PRN
Status: DISCONTINUED | OUTPATIENT
Start: 2018-01-01 | End: 2018-01-01 | Stop reason: HOSPADM

## 2018-01-01 RX ORDER — ACETAMINOPHEN 500 MG
1000 TABLET ORAL EVERY 4 HOURS PRN
Status: DISCONTINUED | OUTPATIENT
Start: 2018-01-01 | End: 2018-01-01 | Stop reason: HOSPADM

## 2018-01-01 RX ORDER — FONDAPARINUX SODIUM 10 MG/.8ML
INJECTION SUBCUTANEOUS
Refills: 0 | OUTPATIENT
Start: 2018-01-01

## 2018-01-01 RX ORDER — SODIUM CHLORIDE 9 MG/ML
INJECTION, SOLUTION INTRAVENOUS CONTINUOUS PRN
Status: DISCONTINUED | OUTPATIENT
Start: 2018-01-01 | End: 2018-01-01

## 2018-01-01 RX ORDER — EPINEPHRINE 0.3 MG/.3ML
0.3 INJECTION SUBCUTANEOUS EVERY 5 MIN PRN
Status: CANCELLED | OUTPATIENT
Start: 2018-01-01

## 2018-01-01 RX ORDER — HYDRALAZINE HYDROCHLORIDE 20 MG/ML
10 INJECTION INTRAMUSCULAR; INTRAVENOUS EVERY 4 HOURS PRN
Status: DISCONTINUED | OUTPATIENT
Start: 2018-01-01 | End: 2018-01-01 | Stop reason: HOSPADM

## 2018-01-01 RX ORDER — ALBUTEROL SULFATE 0.83 MG/ML
2.5 SOLUTION RESPIRATORY (INHALATION)
Status: CANCELLED | OUTPATIENT
Start: 2018-01-01

## 2018-01-01 RX ORDER — POTASSIUM CHLORIDE 7.45 MG/ML
10 INJECTION INTRAVENOUS
Status: DISCONTINUED | OUTPATIENT
Start: 2018-01-01 | End: 2018-01-01

## 2018-01-01 RX ORDER — POTASSIUM CHLORIDE 1500 MG/1
20 TABLET, EXTENDED RELEASE ORAL DAILY
Qty: 30 TABLET | Refills: 0 | Status: SHIPPED | OUTPATIENT
Start: 2018-01-01

## 2018-01-01 RX ORDER — SODIUM CHLORIDE 9 MG/ML
INJECTION, SOLUTION INTRAVENOUS CONTINUOUS
Status: DISCONTINUED | OUTPATIENT
Start: 2018-01-01 | End: 2018-01-01

## 2018-01-01 RX ORDER — NALOXONE HYDROCHLORIDE 0.4 MG/ML
.1-.4 INJECTION, SOLUTION INTRAMUSCULAR; INTRAVENOUS; SUBCUTANEOUS
Status: DISCONTINUED | OUTPATIENT
Start: 2018-01-01 | End: 2018-01-01 | Stop reason: HOSPADM

## 2018-01-01 RX ORDER — HEPARIN SODIUM,PORCINE 10 UNIT/ML
5-10 VIAL (ML) INTRAVENOUS
Status: DISCONTINUED | OUTPATIENT
Start: 2018-01-01 | End: 2018-01-01 | Stop reason: HOSPADM

## 2018-01-01 RX ORDER — METRONIDAZOLE 250 MG/1
250 TABLET ORAL EVERY 8 HOURS SCHEDULED
Status: DISCONTINUED | OUTPATIENT
Start: 2018-01-01 | End: 2018-01-01 | Stop reason: HOSPADM

## 2018-01-01 RX ORDER — PROCHLORPERAZINE 25 MG
12.5 SUPPOSITORY, RECTAL RECTAL EVERY 12 HOURS PRN
Status: DISCONTINUED | OUTPATIENT
Start: 2018-01-01 | End: 2018-01-01 | Stop reason: HOSPADM

## 2018-01-01 RX ORDER — ONDANSETRON 4 MG/1
4 TABLET, ORALLY DISINTEGRATING ORAL EVERY 6 HOURS PRN
Status: DISCONTINUED | OUTPATIENT
Start: 2018-01-01 | End: 2018-01-01 | Stop reason: HOSPADM

## 2018-01-01 RX ORDER — METHYLPHENIDATE HYDROCHLORIDE 5 MG/1
5 TABLET ORAL 2 TIMES DAILY
Qty: 60 TABLET | Refills: 0 | COMMUNITY
Start: 2018-01-01 | End: 2019-02-21

## 2018-01-01 RX ORDER — LISINOPRIL 10 MG/1
10 TABLET ORAL DAILY
Status: DISCONTINUED | OUTPATIENT
Start: 2018-01-01 | End: 2018-01-01 | Stop reason: HOSPADM

## 2018-01-01 RX ORDER — ALBUTEROL SULFATE 90 UG/1
2 AEROSOL, METERED RESPIRATORY (INHALATION) EVERY 6 HOURS PRN
Status: DISCONTINUED | OUTPATIENT
Start: 2018-01-01 | End: 2018-01-01 | Stop reason: HOSPADM

## 2018-01-01 RX ORDER — CALCIUM CARBONATE 500 MG/1
500-1000 TABLET, CHEWABLE ORAL
Status: DISCONTINUED | OUTPATIENT
Start: 2018-01-01 | End: 2018-01-01 | Stop reason: HOSPADM

## 2018-01-01 RX ORDER — IOPAMIDOL 612 MG/ML
150 INJECTION, SOLUTION INTRAVASCULAR ONCE
Status: COMPLETED | OUTPATIENT
Start: 2018-01-01 | End: 2018-01-01

## 2018-01-01 RX ORDER — MAGNESIUM SULFATE HEPTAHYDRATE 40 MG/ML
4 INJECTION, SOLUTION INTRAVENOUS EVERY 4 HOURS PRN
Status: DISCONTINUED | OUTPATIENT
Start: 2018-01-01 | End: 2018-01-01 | Stop reason: HOSPADM

## 2018-01-01 RX ORDER — ACETAMINOPHEN 650 MG/1
650 SUPPOSITORY RECTAL EVERY 4 HOURS PRN
Status: DISCONTINUED | OUTPATIENT
Start: 2018-01-01 | End: 2018-01-01 | Stop reason: HOSPADM

## 2018-01-01 RX ORDER — LORAZEPAM 0.5 MG/1
0.5 TABLET ORAL EVERY 4 HOURS PRN
Status: DISCONTINUED | OUTPATIENT
Start: 2018-01-01 | End: 2018-01-01 | Stop reason: HOSPADM

## 2018-01-01 RX ORDER — ACETAMINOPHEN 325 MG/1
650 TABLET ORAL EVERY 4 HOURS PRN
Status: DISCONTINUED | OUTPATIENT
Start: 2018-01-01 | End: 2018-01-01

## 2018-01-01 RX ORDER — EPINEPHRINE 1 MG/ML
0.3 INJECTION, SOLUTION, CONCENTRATE INTRAVENOUS EVERY 5 MIN PRN
Status: CANCELLED | OUTPATIENT
Start: 2018-01-01

## 2018-01-01 RX ORDER — POTASSIUM CHLORIDE 1.5 G/1.58G
20-40 POWDER, FOR SOLUTION ORAL
Status: DISCONTINUED | OUTPATIENT
Start: 2018-01-01 | End: 2018-01-01

## 2018-01-01 RX ORDER — FENTANYL CITRATE 50 UG/ML
25 INJECTION, SOLUTION INTRAMUSCULAR; INTRAVENOUS
Status: DISCONTINUED | OUTPATIENT
Start: 2018-01-01 | End: 2018-01-01

## 2018-01-01 RX ORDER — FONDAPARINUX SODIUM 10 MG/.8ML
10 INJECTION SUBCUTANEOUS ONCE
Status: COMPLETED | OUTPATIENT
Start: 2018-01-01 | End: 2018-01-01

## 2018-01-01 RX ORDER — HYDROCODONE BITARTRATE AND ACETAMINOPHEN 5; 325 MG/1; MG/1
1-2 TABLET ORAL EVERY 4 HOURS PRN
Qty: 30 TABLET | Refills: 0 | Status: SHIPPED | OUTPATIENT
Start: 2018-01-01 | End: 2018-01-01

## 2018-01-01 RX ORDER — AMOXICILLIN 250 MG
2 CAPSULE ORAL 2 TIMES DAILY PRN
Status: DISCONTINUED | OUTPATIENT
Start: 2018-01-01 | End: 2018-01-01 | Stop reason: HOSPADM

## 2018-01-01 RX ORDER — PROCHLORPERAZINE MALEATE 5 MG
5 TABLET ORAL EVERY 6 HOURS PRN
Status: DISCONTINUED | OUTPATIENT
Start: 2018-01-01 | End: 2018-01-01 | Stop reason: HOSPADM

## 2018-01-01 RX ORDER — BISACODYL 10 MG
10 SUPPOSITORY, RECTAL RECTAL DAILY PRN
Status: DISCONTINUED | OUTPATIENT
Start: 2018-01-01 | End: 2018-01-01 | Stop reason: HOSPADM

## 2018-01-01 RX ORDER — POTASSIUM CHLORIDE 29.8 MG/ML
20 INJECTION INTRAVENOUS
Status: DISCONTINUED | OUTPATIENT
Start: 2018-01-01 | End: 2018-01-01 | Stop reason: HOSPADM

## 2018-01-01 RX ORDER — POTASSIUM CHLORIDE 1500 MG/1
20-40 TABLET, EXTENDED RELEASE ORAL
Status: DISCONTINUED | OUTPATIENT
Start: 2018-01-01 | End: 2018-01-01 | Stop reason: HOSPADM

## 2018-01-01 RX ORDER — HEPARIN SODIUM,PORCINE 10 UNIT/ML
5-10 VIAL (ML) INTRAVENOUS EVERY 24 HOURS
Status: DISCONTINUED | OUTPATIENT
Start: 2018-01-01 | End: 2018-01-01 | Stop reason: HOSPADM

## 2018-01-01 RX ORDER — POTASSIUM CL/LIDO/0.9 % NACL 10MEQ/0.1L
10 INTRAVENOUS SOLUTION, PIGGYBACK (ML) INTRAVENOUS
Status: DISCONTINUED | OUTPATIENT
Start: 2018-01-01 | End: 2018-01-01

## 2018-01-01 RX ORDER — METHYLPHENIDATE HYDROCHLORIDE 5 MG/1
5 TABLET ORAL 2 TIMES DAILY
Status: DISCONTINUED | OUTPATIENT
Start: 2018-01-01 | End: 2018-01-01 | Stop reason: HOSPADM

## 2018-01-01 RX ORDER — HYDROCODONE BITARTRATE AND ACETAMINOPHEN 5; 325 MG/1; MG/1
1-2 TABLET ORAL EVERY 4 HOURS PRN
Status: DISCONTINUED | OUTPATIENT
Start: 2018-01-01 | End: 2018-01-01 | Stop reason: HOSPADM

## 2018-01-01 RX ORDER — LIDOCAINE 40 MG/G
CREAM TOPICAL
Status: DISCONTINUED | OUTPATIENT
Start: 2018-01-01 | End: 2018-01-01

## 2018-01-01 RX ORDER — POTASSIUM CHLORIDE 7.45 MG/ML
10 INJECTION INTRAVENOUS
Status: DISCONTINUED | OUTPATIENT
Start: 2018-01-01 | End: 2018-01-01 | Stop reason: HOSPADM

## 2018-01-01 RX ORDER — AMOXICILLIN 250 MG
1 CAPSULE ORAL 2 TIMES DAILY PRN
Status: DISCONTINUED | OUTPATIENT
Start: 2018-01-01 | End: 2018-01-01 | Stop reason: HOSPADM

## 2018-01-01 RX ORDER — CALCIUM CARBONATE 500 MG/1
1000 TABLET, CHEWABLE ORAL 4 TIMES DAILY PRN
Status: DISCONTINUED | OUTPATIENT
Start: 2018-01-01 | End: 2018-01-01 | Stop reason: HOSPADM

## 2018-01-01 RX ORDER — HEPARIN SODIUM (PORCINE) LOCK FLUSH IV SOLN 100 UNIT/ML 100 UNIT/ML
500 SOLUTION INTRAVENOUS ONCE
Status: DISCONTINUED | OUTPATIENT
Start: 2018-01-01 | End: 2018-01-01 | Stop reason: HOSPADM

## 2018-01-01 RX ORDER — PIPERACILLIN SODIUM, TAZOBACTAM SODIUM 3; .375 G/15ML; G/15ML
3.38 INJECTION, POWDER, LYOPHILIZED, FOR SOLUTION INTRAVENOUS EVERY 6 HOURS
Status: DISCONTINUED | OUTPATIENT
Start: 2018-01-01 | End: 2018-01-01

## 2018-01-01 RX ORDER — POTASSIUM CHLORIDE 1500 MG/1
20 TABLET, EXTENDED RELEASE ORAL DAILY
Qty: 30 TABLET | Refills: 11 | Status: SHIPPED | OUTPATIENT
Start: 2018-01-01 | End: 2018-01-01

## 2018-01-01 RX ORDER — CHLORTHALIDONE 25 MG/1
25 TABLET ORAL DAILY PRN
Status: DISCONTINUED | OUTPATIENT
Start: 2018-01-01 | End: 2018-01-01 | Stop reason: HOSPADM

## 2018-01-01 RX ORDER — FERROUS SULFATE 325(65) MG
325 TABLET ORAL DAILY
Status: DISCONTINUED | OUTPATIENT
Start: 2018-01-01 | End: 2018-01-01 | Stop reason: HOSPADM

## 2018-01-01 RX ORDER — FLUOROURACIL 50 MG/ML
400 INJECTION, SOLUTION INTRAVENOUS ONCE
Status: COMPLETED | OUTPATIENT
Start: 2018-01-01 | End: 2018-01-01

## 2018-01-01 RX ORDER — ONDANSETRON 4 MG/1
4 TABLET, ORALLY DISINTEGRATING ORAL EVERY 6 HOURS PRN
Status: DISCONTINUED | OUTPATIENT
Start: 2018-01-01 | End: 2018-01-01

## 2018-01-01 RX ORDER — FONDAPARINUX SODIUM 7.5 MG/.6ML
7.5 INJECTION SUBCUTANEOUS EVERY 24 HOURS
Qty: 90 SYRINGE | Refills: 0 | Status: SHIPPED | OUTPATIENT
Start: 2018-01-01

## 2018-01-01 RX ORDER — OXYCODONE HYDROCHLORIDE 5 MG/1
5-10 TABLET ORAL EVERY 4 HOURS PRN
Status: DISCONTINUED | OUTPATIENT
Start: 2018-01-01 | End: 2018-01-01 | Stop reason: HOSPADM

## 2018-01-01 RX ORDER — FENTANYL CITRATE 50 UG/ML
25-50 INJECTION, SOLUTION INTRAMUSCULAR; INTRAVENOUS
Status: COMPLETED | OUTPATIENT
Start: 2018-01-01 | End: 2018-01-01

## 2018-01-01 RX ORDER — FONDAPARINUX SODIUM 10 MG/.8ML
10 INJECTION SUBCUTANEOUS EVERY 24 HOURS
Qty: 24 ML | Refills: 0 | Status: SHIPPED | OUTPATIENT
Start: 2018-01-01 | End: 2018-01-01

## 2018-01-01 RX ORDER — POTASSIUM CHLORIDE 1500 MG/1
20-40 TABLET, EXTENDED RELEASE ORAL
Status: DISCONTINUED | OUTPATIENT
Start: 2018-01-01 | End: 2018-01-01

## 2018-01-01 RX ORDER — HYDROCODONE BITARTRATE AND ACETAMINOPHEN 5; 325 MG/1; MG/1
1 TABLET ORAL EVERY 6 HOURS PRN
Status: DISCONTINUED | OUTPATIENT
Start: 2018-01-01 | End: 2018-01-01 | Stop reason: HOSPADM

## 2018-01-01 RX ORDER — POTASSIUM CHLORIDE 750 MG/1
40 TABLET, EXTENDED RELEASE ORAL ONCE
Status: COMPLETED | OUTPATIENT
Start: 2018-01-01 | End: 2018-01-01

## 2018-01-01 RX ORDER — POLYETHYLENE GLYCOL 3350 17 G/17G
17 POWDER, FOR SOLUTION ORAL DAILY PRN
Status: DISCONTINUED | OUTPATIENT
Start: 2018-01-01 | End: 2018-01-01 | Stop reason: HOSPADM

## 2018-01-01 RX ORDER — FLUMAZENIL 0.1 MG/ML
0.2 INJECTION, SOLUTION INTRAVENOUS
Status: DISCONTINUED | OUTPATIENT
Start: 2018-01-01 | End: 2018-01-01 | Stop reason: HOSPADM

## 2018-01-01 RX ORDER — FENTANYL CITRATE 50 UG/ML
25-50 INJECTION, SOLUTION INTRAMUSCULAR; INTRAVENOUS EVERY 5 MIN PRN
Status: CANCELLED | OUTPATIENT
Start: 2018-01-01

## 2018-01-01 RX ORDER — METRONIDAZOLE 250 MG/1
250 TABLET ORAL EVERY 8 HOURS
Qty: 6 TABLET | Refills: 0 | Status: SHIPPED | OUTPATIENT
Start: 2018-01-01 | End: 2018-01-01

## 2018-01-01 RX ORDER — HYDROMORPHONE HYDROCHLORIDE 1 MG/ML
.3-.5 INJECTION, SOLUTION INTRAMUSCULAR; INTRAVENOUS; SUBCUTANEOUS
Status: DISCONTINUED | OUTPATIENT
Start: 2018-01-01 | End: 2018-01-01 | Stop reason: HOSPADM

## 2018-01-01 RX ORDER — FAMOTIDINE 20 MG/1
20 TABLET, FILM COATED ORAL 2 TIMES DAILY
Status: DISCONTINUED | OUTPATIENT
Start: 2018-01-01 | End: 2018-01-01 | Stop reason: HOSPADM

## 2018-01-01 RX ORDER — POTASSIUM CHLORIDE 1500 MG/1
40 TABLET, EXTENDED RELEASE ORAL EVERY 4 HOURS
Status: DISCONTINUED | OUTPATIENT
Start: 2018-01-01 | End: 2018-01-01 | Stop reason: HOSPADM

## 2018-01-01 RX ORDER — OXYCODONE HYDROCHLORIDE 5 MG/1
5-10 TABLET ORAL EVERY 4 HOURS PRN
Qty: 50 TABLET | Refills: 0 | Status: SHIPPED | OUTPATIENT
Start: 2018-01-01

## 2018-01-01 RX ORDER — HEPARIN SODIUM (PORCINE) LOCK FLUSH IV SOLN 100 UNIT/ML 100 UNIT/ML
500 SOLUTION INTRAVENOUS EVERY 8 HOURS
Status: CANCELLED
Start: 2018-01-01

## 2018-01-01 RX ORDER — LISINOPRIL 10 MG/1
10 TABLET ORAL DAILY
Qty: 30 TABLET | Refills: 11 | Status: SHIPPED | OUTPATIENT
Start: 2018-01-01 | End: 2018-01-01

## 2018-01-01 RX ORDER — CHLORTHALIDONE 25 MG/1
25 TABLET ORAL DAILY
Status: DISCONTINUED | OUTPATIENT
Start: 2018-01-01 | End: 2018-01-01

## 2018-01-01 RX ORDER — FERROUS SULFATE 325(65) MG
325 TABLET ORAL DAILY
Qty: 100 TABLET | Refills: 1 | Status: SHIPPED | OUTPATIENT
Start: 2018-01-01

## 2018-01-01 RX ORDER — DEXTROSE MONOHYDRATE 25 G/50ML
25-50 INJECTION, SOLUTION INTRAVENOUS
Status: DISCONTINUED | OUTPATIENT
Start: 2018-01-01 | End: 2018-01-01 | Stop reason: HOSPADM

## 2018-01-01 RX ORDER — NALOXONE HYDROCHLORIDE 0.4 MG/ML
.1-.4 INJECTION, SOLUTION INTRAMUSCULAR; INTRAVENOUS; SUBCUTANEOUS
Status: DISCONTINUED | OUTPATIENT
Start: 2018-01-01 | End: 2018-01-01

## 2018-01-01 RX ORDER — NALOXONE HYDROCHLORIDE 0.4 MG/ML
.1-.4 INJECTION, SOLUTION INTRAMUSCULAR; INTRAVENOUS; SUBCUTANEOUS
Status: CANCELLED | OUTPATIENT
Start: 2018-01-01

## 2018-01-01 RX ORDER — ONDANSETRON 4 MG/1
4-8 TABLET, FILM COATED ORAL EVERY 8 HOURS PRN
Status: DISCONTINUED | OUTPATIENT
Start: 2018-01-01 | End: 2018-01-01 | Stop reason: HOSPADM

## 2018-01-01 RX ORDER — LIDOCAINE 40 MG/G
CREAM TOPICAL
Status: DISCONTINUED | OUTPATIENT
Start: 2018-01-01 | End: 2018-01-01 | Stop reason: HOSPADM

## 2018-01-01 RX ORDER — ONDANSETRON 2 MG/ML
4 INJECTION INTRAMUSCULAR; INTRAVENOUS EVERY 6 HOURS PRN
Status: DISCONTINUED | OUTPATIENT
Start: 2018-01-01 | End: 2018-01-01 | Stop reason: HOSPADM

## 2018-01-01 RX ORDER — ENOXAPARIN SODIUM 100 MG/ML
100 INJECTION SUBCUTANEOUS EVERY 12 HOURS
COMMUNITY
End: 2018-01-01

## 2018-01-01 RX ORDER — POTASSIUM CHLORIDE 1.5 G/1.58G
20-40 POWDER, FOR SOLUTION ORAL
Status: DISCONTINUED | OUTPATIENT
Start: 2018-01-01 | End: 2018-01-01 | Stop reason: HOSPADM

## 2018-01-01 RX ORDER — HYDROMORPHONE HYDROCHLORIDE 1 MG/ML
0.2 INJECTION, SOLUTION INTRAMUSCULAR; INTRAVENOUS; SUBCUTANEOUS
Status: DISCONTINUED | OUTPATIENT
Start: 2018-01-01 | End: 2018-01-01 | Stop reason: HOSPADM

## 2018-01-01 RX ORDER — LIDOCAINE HYDROCHLORIDE 40 MG/ML
1.5 SOLUTION TOPICAL ONCE
Status: COMPLETED | OUTPATIENT
Start: 2018-01-01 | End: 2018-01-01

## 2018-01-01 RX ORDER — POTASSIUM CHLORIDE 1500 MG/1
20 TABLET, EXTENDED RELEASE ORAL DAILY
Qty: 30 TABLET | Refills: 0 | Status: SHIPPED | OUTPATIENT
Start: 2018-01-01 | End: 2018-01-01

## 2018-01-01 RX ORDER — NICOTINE POLACRILEX 4 MG
15-30 LOZENGE BUCCAL
Status: DISCONTINUED | OUTPATIENT
Start: 2018-01-01 | End: 2018-01-01 | Stop reason: HOSPADM

## 2018-01-01 RX ORDER — OXYCODONE AND ACETAMINOPHEN 5; 325 MG/1; MG/1
1-2 TABLET ORAL EVERY 4 HOURS PRN
Status: DISCONTINUED | OUTPATIENT
Start: 2018-01-01 | End: 2018-01-01

## 2018-01-01 RX ORDER — PIPERACILLIN SODIUM, TAZOBACTAM SODIUM 3; .375 G/15ML; G/15ML
3.38 INJECTION, POWDER, LYOPHILIZED, FOR SOLUTION INTRAVENOUS ONCE
Status: COMPLETED | OUTPATIENT
Start: 2018-01-01 | End: 2018-01-01

## 2018-01-01 RX ORDER — FONDAPARINUX SODIUM 10 MG/.8ML
INJECTION SUBCUTANEOUS
Qty: 90 SYRINGE | Refills: 0 | Status: ON HOLD | OUTPATIENT
Start: 2018-01-01 | End: 2018-01-01

## 2018-01-01 RX ORDER — FONDAPARINUX SODIUM 10 MG/.8ML
INJECTION SUBCUTANEOUS
Qty: 30 SYRINGE | Refills: 3 | Status: SHIPPED | OUTPATIENT
Start: 2018-01-01 | End: 2018-01-01

## 2018-01-01 RX ORDER — FENTANYL CITRATE 50 UG/ML
25-50 INJECTION, SOLUTION INTRAMUSCULAR; INTRAVENOUS EVERY 5 MIN PRN
Status: DISCONTINUED | OUTPATIENT
Start: 2018-01-01 | End: 2018-01-01 | Stop reason: HOSPADM

## 2018-01-01 RX ORDER — POTASSIUM CHLORIDE 1500 MG/1
TABLET, EXTENDED RELEASE ORAL
Qty: 30 TABLET | Refills: 0 | Status: SHIPPED | OUTPATIENT
Start: 2018-01-01 | End: 2018-01-01

## 2018-01-01 RX ORDER — GLYCOPYRROLATE 0.2 MG/ML
0.1 INJECTION, SOLUTION INTRAMUSCULAR; INTRAVENOUS ONCE
Status: COMPLETED | OUTPATIENT
Start: 2018-01-01 | End: 2018-01-01

## 2018-01-01 RX ORDER — FENTANYL CITRATE 50 UG/ML
INJECTION, SOLUTION INTRAMUSCULAR; INTRAVENOUS
Status: DISCONTINUED
Start: 2018-01-01 | End: 2018-01-01 | Stop reason: HOSPADM

## 2018-01-01 RX ORDER — IODIXANOL 320 MG/ML
150 INJECTION, SOLUTION INTRAVASCULAR ONCE
Status: DISCONTINUED | OUTPATIENT
Start: 2018-01-01 | End: 2018-01-01 | Stop reason: CLARIF

## 2018-01-01 RX ORDER — AMLODIPINE BESYLATE 5 MG/1
5 TABLET ORAL DAILY
Status: DISCONTINUED | OUTPATIENT
Start: 2018-01-01 | End: 2018-01-01 | Stop reason: HOSPADM

## 2018-01-01 RX ORDER — ACETAMINOPHEN 500 MG
500 TABLET ORAL EVERY 6 HOURS PRN
Status: DISCONTINUED | OUTPATIENT
Start: 2018-01-01 | End: 2018-01-01 | Stop reason: HOSPADM

## 2018-01-01 RX ORDER — OXYCODONE AND ACETAMINOPHEN 5; 325 MG/1; MG/1
1-2 TABLET ORAL EVERY 4 HOURS PRN
Status: DISCONTINUED | OUTPATIENT
Start: 2018-01-01 | End: 2018-01-01 | Stop reason: HOSPADM

## 2018-01-01 RX ORDER — SENNOSIDES 8.6 MG
1 TABLET ORAL 2 TIMES DAILY PRN
Status: DISCONTINUED | OUTPATIENT
Start: 2018-01-01 | End: 2018-01-01

## 2018-01-01 RX ORDER — LIDOCAINE 50 MG/G
0.5 OINTMENT TOPICAL 2 TIMES DAILY PRN
Status: DISCONTINUED | OUTPATIENT
Start: 2018-01-01 | End: 2018-01-01 | Stop reason: HOSPADM

## 2018-01-01 RX ORDER — CHLORTHALIDONE 25 MG/1
25 TABLET ORAL DAILY PRN
Qty: 30 TABLET | Refills: 0 | Status: SHIPPED | OUTPATIENT
Start: 2018-01-01

## 2018-01-01 RX ORDER — HEPARIN SODIUM (PORCINE) LOCK FLUSH IV SOLN 100 UNIT/ML 100 UNIT/ML
500 SOLUTION INTRAVENOUS EVERY 8 HOURS
Status: DISCONTINUED | OUTPATIENT
Start: 2018-01-01 | End: 2018-01-01 | Stop reason: CLARIF

## 2018-01-01 RX ORDER — FONDAPARINUX SODIUM 10 MG/.8ML
10 INJECTION SUBCUTANEOUS DAILY
Status: DISCONTINUED | OUTPATIENT
Start: 2018-01-01 | End: 2018-01-01 | Stop reason: HOSPADM

## 2018-01-01 RX ORDER — FLUMAZENIL 0.1 MG/ML
0.2 INJECTION, SOLUTION INTRAVENOUS
Status: CANCELLED | OUTPATIENT
Start: 2018-01-01

## 2018-01-01 RX ORDER — POTASSIUM CHLORIDE 1500 MG/1
20 TABLET, EXTENDED RELEASE ORAL DAILY
Status: DISCONTINUED | OUTPATIENT
Start: 2018-01-01 | End: 2018-01-01 | Stop reason: HOSPADM

## 2018-01-01 RX ORDER — POTASSIUM CHLORIDE 750 MG/1
40 TABLET, EXTENDED RELEASE ORAL DAILY
Status: DISCONTINUED | OUTPATIENT
Start: 2018-01-01 | End: 2018-01-01 | Stop reason: HOSPADM

## 2018-01-01 RX ORDER — LIDOCAINE HYDROCHLORIDE 10 MG/ML
INJECTION, SOLUTION INFILTRATION; PERINEURAL
Status: DISCONTINUED
Start: 2018-01-01 | End: 2018-01-01 | Stop reason: HOSPADM

## 2018-01-01 RX ORDER — LIDOCAINE HYDROCHLORIDE 10 MG/ML
1-30 INJECTION, SOLUTION EPIDURAL; INFILTRATION; INTRACAUDAL; PERINEURAL
Status: CANCELLED | OUTPATIENT
Start: 2018-01-01

## 2018-01-01 RX ORDER — LIDOCAINE HYDROCHLORIDE 10 MG/ML
1-30 INJECTION, SOLUTION EPIDURAL; INFILTRATION; INTRACAUDAL; PERINEURAL
Status: COMPLETED | OUTPATIENT
Start: 2018-01-01 | End: 2018-01-01

## 2018-01-01 RX ORDER — ALBUTEROL SULFATE 90 UG/1
2 AEROSOL, METERED RESPIRATORY (INHALATION) EVERY 6 HOURS PRN
Qty: 1 INHALER | Refills: 3 | Status: SHIPPED | OUTPATIENT
Start: 2018-01-01

## 2018-01-01 RX ORDER — IOPAMIDOL 755 MG/ML
109 INJECTION, SOLUTION INTRAVASCULAR ONCE
Status: COMPLETED | OUTPATIENT
Start: 2018-01-01 | End: 2018-01-01

## 2018-01-01 RX ORDER — POTASSIUM CHLORIDE 29.8 MG/ML
20 INJECTION INTRAVENOUS
Status: DISCONTINUED | OUTPATIENT
Start: 2018-01-01 | End: 2018-01-01

## 2018-01-01 RX ORDER — LISINOPRIL 10 MG/1
10 TABLET ORAL DAILY
Qty: 30 TABLET | Refills: 0 | Status: SHIPPED | OUTPATIENT
Start: 2018-01-01 | End: 2018-01-01

## 2018-01-01 RX ORDER — POTASSIUM CHLORIDE 1500 MG/1
20 TABLET, EXTENDED RELEASE ORAL DAILY
Status: DISCONTINUED | OUTPATIENT
Start: 2018-01-01 | End: 2018-01-01

## 2018-01-01 RX ORDER — OXYCODONE HYDROCHLORIDE 5 MG/1
5-10 TABLET ORAL EVERY 4 HOURS PRN
Qty: 50 TABLET | Refills: 0 | Status: SHIPPED | OUTPATIENT
Start: 2018-01-01 | End: 2018-01-01

## 2018-01-01 RX ORDER — DEXTROSE MONOHYDRATE 25 G/50ML
25 INJECTION, SOLUTION INTRAVENOUS ONCE
Status: DISCONTINUED | OUTPATIENT
Start: 2018-01-01 | End: 2018-01-01

## 2018-01-01 RX ORDER — PROCHLORPERAZINE MALEATE 5 MG
5-10 TABLET ORAL EVERY 6 HOURS PRN
Status: DISCONTINUED | OUTPATIENT
Start: 2018-01-01 | End: 2018-01-01 | Stop reason: HOSPADM

## 2018-01-01 RX ORDER — MORPHINE SULFATE 2 MG/ML
4 INJECTION, SOLUTION INTRAMUSCULAR; INTRAVENOUS
Status: DISCONTINUED | OUTPATIENT
Start: 2018-01-01 | End: 2018-01-01

## 2018-01-01 RX ORDER — PROCHLORPERAZINE MALEATE 5 MG
5 TABLET ORAL EVERY 6 HOURS PRN
Status: DISCONTINUED | OUTPATIENT
Start: 2018-01-01 | End: 2018-01-01

## 2018-01-01 RX ORDER — IOPAMIDOL 755 MG/ML
103 INJECTION, SOLUTION INTRAVASCULAR ONCE
Status: COMPLETED | OUTPATIENT
Start: 2018-01-01 | End: 2018-01-01

## 2018-01-01 RX ORDER — FLUMAZENIL 0.1 MG/ML
0.2 INJECTION, SOLUTION INTRAVENOUS
Status: DISCONTINUED | OUTPATIENT
Start: 2018-01-01 | End: 2018-01-01

## 2018-01-01 RX ADMIN — Medication 1 TABLET: at 08:52

## 2018-01-01 RX ADMIN — HYDROCODONE BITARTRATE AND ACETAMINOPHEN 2 TABLET: 5; 325 TABLET ORAL at 21:39

## 2018-01-01 RX ADMIN — LISINOPRIL 10 MG: 10 TABLET ORAL at 14:58

## 2018-01-01 RX ADMIN — ONDANSETRON HYDROCHLORIDE: 2 INJECTION, SOLUTION INTRAVENOUS at 10:41

## 2018-01-01 RX ADMIN — SODIUM CHLORIDE: 9 INJECTION, SOLUTION INTRAVENOUS at 17:43

## 2018-01-01 RX ADMIN — Medication 1 TABLET: at 08:14

## 2018-01-01 RX ADMIN — DEXTROSE MONOHYDRATE 250 ML: 50 INJECTION, SOLUTION INTRAVENOUS at 12:51

## 2018-01-01 RX ADMIN — MORPHINE SULFATE 4 MG: 2 INJECTION, SOLUTION INTRAMUSCULAR; INTRAVENOUS at 10:44

## 2018-01-01 RX ADMIN — FAMOTIDINE 20 MG: 20 TABLET ORAL at 20:14

## 2018-01-01 RX ADMIN — SERTRALINE HYDROCHLORIDE 50 MG: 50 TABLET ORAL at 08:09

## 2018-01-01 RX ADMIN — SODIUM CHLORIDE 97 ML: 9 INJECTION, SOLUTION INTRAVENOUS at 19:22

## 2018-01-01 RX ADMIN — VANCOMYCIN HYDROCHLORIDE 2000 MG: 5 INJECTION, POWDER, LYOPHILIZED, FOR SOLUTION INTRAVENOUS at 13:12

## 2018-01-01 RX ADMIN — METHYLPHENIDATE HYDROCHLORIDE 5 MG: 5 TABLET ORAL at 08:14

## 2018-01-01 RX ADMIN — DEXTROSE MONOHYDRATE 250 ML: 50 INJECTION, SOLUTION INTRAVENOUS at 10:37

## 2018-01-01 RX ADMIN — PIPERACILLIN SODIUM,TAZOBACTAM SODIUM 3.38 G: 3; .375 INJECTION, POWDER, FOR SOLUTION INTRAVENOUS at 00:04

## 2018-01-01 RX ADMIN — FAMOTIDINE 20 MG: 20 TABLET ORAL at 20:02

## 2018-01-01 RX ADMIN — SERTRALINE HYDROCHLORIDE 50 MG: 50 TABLET ORAL at 08:53

## 2018-01-01 RX ADMIN — Medication 0.2 MG: at 20:40

## 2018-01-01 RX ADMIN — SODIUM CHLORIDE: 9 INJECTION, SOLUTION INTRAVENOUS at 12:26

## 2018-01-01 RX ADMIN — LEUCOVORIN CALCIUM 800 MG: 350 INJECTION, POWDER, LYOPHILIZED, FOR SOLUTION INTRAMUSCULAR; INTRAVENOUS at 11:01

## 2018-01-01 RX ADMIN — SERTRALINE HYDROCHLORIDE 50 MG: 50 TABLET ORAL at 09:55

## 2018-01-01 RX ADMIN — METRONIDAZOLE 250 MG: 250 TABLET ORAL at 05:45

## 2018-01-01 RX ADMIN — IOPAMIDOL 109 ML: 755 INJECTION, SOLUTION INTRAVENOUS at 10:12

## 2018-01-01 RX ADMIN — SERTRALINE HYDROCHLORIDE 50 MG: 50 TABLET ORAL at 14:58

## 2018-01-01 RX ADMIN — FONDAPARINUX SODIUM 10 MG: 10 INJECTION, SOLUTION SUBCUTANEOUS at 09:44

## 2018-01-01 RX ADMIN — PIPERACILLIN SODIUM,TAZOBACTAM SODIUM 3.38 G: 3; .375 INJECTION, POWDER, FOR SOLUTION INTRAVENOUS at 18:56

## 2018-01-01 RX ADMIN — CHLORTHALIDONE 25 MG: 25 TABLET ORAL at 08:09

## 2018-01-01 RX ADMIN — HEPARIN SODIUM 1300 UNITS/HR: 10000 INJECTION, SOLUTION INTRAVENOUS at 16:34

## 2018-01-01 RX ADMIN — FENTANYL CITRATE 75 MCG: 50 INJECTION, SOLUTION INTRAMUSCULAR; INTRAVENOUS at 15:09

## 2018-01-01 RX ADMIN — SODIUM CHLORIDE: 9 INJECTION, SOLUTION INTRAVENOUS at 15:07

## 2018-01-01 RX ADMIN — METHYLPHENIDATE HYDROCHLORIDE 5 MG: 5 TABLET ORAL at 08:09

## 2018-01-01 RX ADMIN — SODIUM CHLORIDE: 9 INJECTION, SOLUTION INTRAVENOUS at 22:45

## 2018-01-01 RX ADMIN — SERTRALINE HYDROCHLORIDE 50 MG: 50 TABLET ORAL at 08:00

## 2018-01-01 RX ADMIN — LEUCOVORIN CALCIUM 800 MG: 350 INJECTION, POWDER, LYOPHILIZED, FOR SOLUTION INTRAMUSCULAR; INTRAVENOUS at 10:46

## 2018-01-01 RX ADMIN — METHYLPHENIDATE HYDROCHLORIDE 5 MG: 5 TABLET ORAL at 07:54

## 2018-01-01 RX ADMIN — OXALIPLATIN 200 MG: 100 INJECTION, SOLUTION, CONCENTRATE INTRAVENOUS at 12:04

## 2018-01-01 RX ADMIN — PIPERACILLIN SODIUM,TAZOBACTAM SODIUM 3.38 G: 3; .375 INJECTION, POWDER, FOR SOLUTION INTRAVENOUS at 11:37

## 2018-01-01 RX ADMIN — METHYLPHENIDATE HYDROCHLORIDE 5 MG: 5 TABLET ORAL at 09:55

## 2018-01-01 RX ADMIN — SERTRALINE HYDROCHLORIDE 50 MG: 50 TABLET ORAL at 16:19

## 2018-01-01 RX ADMIN — LISINOPRIL 10 MG: 10 TABLET ORAL at 08:59

## 2018-01-01 RX ADMIN — CALCIUM CARBONATE (ANTACID) CHEW TAB 500 MG 1000 MG: 500 CHEW TAB at 11:19

## 2018-01-01 RX ADMIN — OXYCODONE HYDROCHLORIDE 5 MG: 5 TABLET ORAL at 20:35

## 2018-01-01 RX ADMIN — HYDROCODONE BITARTRATE AND ACETAMINOPHEN 2 TABLET: 5; 325 TABLET ORAL at 17:05

## 2018-01-01 RX ADMIN — Medication 1 TABLET: at 08:27

## 2018-01-01 RX ADMIN — POTASSIUM CHLORIDE 40 MEQ: 750 TABLET, FILM COATED, EXTENDED RELEASE ORAL at 12:01

## 2018-01-01 RX ADMIN — LEUCOVORIN CALCIUM 800 MG: 350 INJECTION, POWDER, LYOPHILIZED, FOR SOLUTION INTRAMUSCULAR; INTRAVENOUS at 09:52

## 2018-01-01 RX ADMIN — PIPERACILLIN SODIUM,TAZOBACTAM SODIUM 3.38 G: 3; .375 INJECTION, POWDER, FOR SOLUTION INTRAVENOUS at 14:35

## 2018-01-01 RX ADMIN — SERTRALINE HYDROCHLORIDE 50 MG: 50 TABLET ORAL at 08:28

## 2018-01-01 RX ADMIN — SODIUM CHLORIDE: 9 INJECTION, SOLUTION INTRAVENOUS at 08:28

## 2018-01-01 RX ADMIN — METHYLPHENIDATE HYDROCHLORIDE 5 MG: 5 TABLET ORAL at 09:01

## 2018-01-01 RX ADMIN — METRONIDAZOLE 250 MG: 250 TABLET ORAL at 20:05

## 2018-01-01 RX ADMIN — ALTEPLASE 2 MG: 2.2 INJECTION, POWDER, LYOPHILIZED, FOR SOLUTION INTRAVENOUS at 21:24

## 2018-01-01 RX ADMIN — IOPAMIDOL 30 ML: 612 INJECTION, SOLUTION INTRAVENOUS at 19:10

## 2018-01-01 RX ADMIN — FAMOTIDINE 20 MG: 20 INJECTION, SOLUTION INTRAVENOUS at 10:06

## 2018-01-01 RX ADMIN — RANITIDINE 75 MG: 75 TABLET, FILM COATED ORAL at 11:19

## 2018-01-01 RX ADMIN — HEPARIN SODIUM 1300 UNITS/HR: 10000 INJECTION, SOLUTION INTRAVENOUS at 20:00

## 2018-01-01 RX ADMIN — METHYLPHENIDATE HYDROCHLORIDE 5 MG: 5 TABLET ORAL at 08:27

## 2018-01-01 RX ADMIN — LIDOCAINE HYDROCHLORIDE 7 ML: 10 INJECTION, SOLUTION INFILTRATION; PERINEURAL at 19:09

## 2018-01-01 RX ADMIN — VANCOMYCIN HYDROCHLORIDE 2000 MG: 5 INJECTION, POWDER, LYOPHILIZED, FOR SOLUTION INTRAVENOUS at 14:39

## 2018-01-01 RX ADMIN — POTASSIUM CHLORIDE 40 MEQ: 750 TABLET, FILM COATED, EXTENDED RELEASE ORAL at 11:53

## 2018-01-01 RX ADMIN — GLYCOPYRROLATE 0.1 MG: 0.2 INJECTION, SOLUTION INTRAMUSCULAR; INTRAVENOUS at 14:25

## 2018-01-01 RX ADMIN — HEPARIN, PORCINE (PF) 10 UNIT/ML INTRAVENOUS SYRINGE 5 ML: at 11:20

## 2018-01-01 RX ADMIN — Medication 1 LOZENGE: at 03:15

## 2018-01-01 RX ADMIN — Medication 1 TABLET: at 13:42

## 2018-01-01 RX ADMIN — HEPARIN SODIUM 1500 UNITS/HR: 10000 INJECTION, SOLUTION INTRAVENOUS at 16:46

## 2018-01-01 RX ADMIN — HEPARIN SODIUM 1300 UNITS/HR: 10000 INJECTION, SOLUTION INTRAVENOUS at 22:36

## 2018-01-01 RX ADMIN — SODIUM CHLORIDE 250 ML: 9 INJECTION, SOLUTION INTRAVENOUS at 10:00

## 2018-01-01 RX ADMIN — METHYLPHENIDATE HYDROCHLORIDE 5 MG: 5 TABLET ORAL at 15:14

## 2018-01-01 RX ADMIN — OXYCODONE HYDROCHLORIDE 10 MG: 5 TABLET ORAL at 22:57

## 2018-01-01 RX ADMIN — LIDOCAINE HYDROCHLORIDE 1.5 ML: 40 SOLUTION TOPICAL at 14:29

## 2018-01-01 RX ADMIN — HEPARIN SODIUM 1300 UNITS/HR: 10000 INJECTION, SOLUTION INTRAVENOUS at 03:08

## 2018-01-01 RX ADMIN — PIPERACILLIN SODIUM,TAZOBACTAM SODIUM 3.38 G: 3; .375 INJECTION, POWDER, FOR SOLUTION INTRAVENOUS at 04:03

## 2018-01-01 RX ADMIN — SODIUM CHLORIDE: 9 INJECTION, SOLUTION INTRAVENOUS at 00:07

## 2018-01-01 RX ADMIN — SODIUM CHLORIDE: 9 INJECTION, SOLUTION INTRAVENOUS at 05:44

## 2018-01-01 RX ADMIN — RANITIDINE 75 MG: 75 TABLET, FILM COATED ORAL at 08:00

## 2018-01-01 RX ADMIN — HEPARIN SODIUM 1200 UNITS/HR: 10000 INJECTION, SOLUTION INTRAVENOUS at 11:30

## 2018-01-01 RX ADMIN — AMLODIPINE BESYLATE 5 MG: 5 TABLET ORAL at 08:53

## 2018-01-01 RX ADMIN — PIPERACILLIN SODIUM,TAZOBACTAM SODIUM 3.38 G: 3; .375 INJECTION, POWDER, FOR SOLUTION INTRAVENOUS at 21:21

## 2018-01-01 RX ADMIN — SODIUM CHLORIDE, PRESERVATIVE FREE 5 ML: 5 INJECTION INTRAVENOUS at 10:04

## 2018-01-01 RX ADMIN — METHYLPHENIDATE HYDROCHLORIDE 5 MG: 5 TABLET ORAL at 13:42

## 2018-01-01 RX ADMIN — SODIUM CHLORIDE 250 ML: 9 INJECTION, SOLUTION INTRAVENOUS at 10:03

## 2018-01-01 RX ADMIN — METHYLPHENIDATE HYDROCHLORIDE 5 MG: 5 TABLET ORAL at 14:39

## 2018-01-01 RX ADMIN — PIPERACILLIN SODIUM,TAZOBACTAM SODIUM 3.38 G: 3; .375 INJECTION, POWDER, FOR SOLUTION INTRAVENOUS at 06:01

## 2018-01-01 RX ADMIN — SERTRALINE HYDROCHLORIDE 50 MG: 50 TABLET ORAL at 08:27

## 2018-01-01 RX ADMIN — Medication 1 TABLET: at 07:54

## 2018-01-01 RX ADMIN — AMLODIPINE BESYLATE 5 MG: 5 TABLET ORAL at 09:55

## 2018-01-01 RX ADMIN — ONDANSETRON 4 MG: 4 TABLET, FILM COATED ORAL at 11:50

## 2018-01-01 RX ADMIN — HEPARIN SODIUM 1300 UNITS/HR: 10000 INJECTION, SOLUTION INTRAVENOUS at 14:36

## 2018-01-01 RX ADMIN — Medication 1 TABLET: at 17:07

## 2018-01-01 RX ADMIN — SERTRALINE HYDROCHLORIDE 50 MG: 50 TABLET ORAL at 08:59

## 2018-01-01 RX ADMIN — OXYCODONE HYDROCHLORIDE 5 MG: 5 TABLET ORAL at 08:09

## 2018-01-01 RX ADMIN — METHYLPHENIDATE HYDROCHLORIDE 5 MG: 5 TABLET ORAL at 08:38

## 2018-01-01 RX ADMIN — FERROUS SULFATE TAB 325 MG (65 MG ELEMENTAL FE) 325 MG: 325 (65 FE) TAB at 09:55

## 2018-01-01 RX ADMIN — LISINOPRIL 10 MG: 10 TABLET ORAL at 09:01

## 2018-01-01 RX ADMIN — LISINOPRIL 10 MG: 10 TABLET ORAL at 08:00

## 2018-01-01 RX ADMIN — SERTRALINE HYDROCHLORIDE 50 MG: 50 TABLET ORAL at 08:37

## 2018-01-01 RX ADMIN — SODIUM CHLORIDE, PRESERVATIVE FREE 5 ML: 5 INJECTION INTRAVENOUS at 10:14

## 2018-01-01 RX ADMIN — METHYLPHENIDATE HYDROCHLORIDE 5 MG: 5 TABLET ORAL at 08:24

## 2018-01-01 RX ADMIN — HYDROCODONE BITARTRATE AND ACETAMINOPHEN 2 TABLET: 5; 325 TABLET ORAL at 09:51

## 2018-01-01 RX ADMIN — HEPARIN SODIUM 1100 UNITS/HR: 10000 INJECTION, SOLUTION INTRAVENOUS at 21:44

## 2018-01-01 RX ADMIN — HEPARIN SODIUM 1300 UNITS/HR: 10000 INJECTION, SOLUTION INTRAVENOUS at 23:55

## 2018-01-01 RX ADMIN — Medication 2 LOZENGE: at 05:12

## 2018-01-01 RX ADMIN — METHYLPHENIDATE HYDROCHLORIDE 5 MG: 5 TABLET ORAL at 20:14

## 2018-01-01 RX ADMIN — PIPERACILLIN SODIUM,TAZOBACTAM SODIUM 3.38 G: 3; .375 INJECTION, POWDER, FOR SOLUTION INTRAVENOUS at 00:11

## 2018-01-01 RX ADMIN — SODIUM CHLORIDE 71 ML: 9 INJECTION, SOLUTION INTRAVENOUS at 10:13

## 2018-01-01 RX ADMIN — PIPERACILLIN SODIUM,TAZOBACTAM SODIUM 3.38 G: 3; .375 INJECTION, POWDER, FOR SOLUTION INTRAVENOUS at 14:18

## 2018-01-01 RX ADMIN — PIPERACILLIN SODIUM,TAZOBACTAM SODIUM 3.38 G: 3; .375 INJECTION, POWDER, FOR SOLUTION INTRAVENOUS at 11:03

## 2018-01-01 RX ADMIN — SODIUM CHLORIDE 250 ML: 9 INJECTION, SOLUTION INTRAVENOUS at 12:02

## 2018-01-01 RX ADMIN — SODIUM CHLORIDE: 9 INJECTION, SOLUTION INTRAVENOUS at 01:52

## 2018-01-01 RX ADMIN — MIDAZOLAM HYDROCHLORIDE 1 MG: 1 INJECTION, SOLUTION INTRAMUSCULAR; INTRAVENOUS at 18:47

## 2018-01-01 RX ADMIN — SENNOSIDES AND DOCUSATE SODIUM 2 TABLET: 8.6; 5 TABLET ORAL at 06:28

## 2018-01-01 RX ADMIN — LISINOPRIL 10 MG: 10 TABLET ORAL at 08:40

## 2018-01-01 RX ADMIN — HEPARIN SODIUM 1400 UNITS/HR: 10000 INJECTION, SOLUTION INTRAVENOUS at 17:07

## 2018-01-01 RX ADMIN — METHYLPHENIDATE HYDROCHLORIDE 5 MG: 5 TABLET ORAL at 12:57

## 2018-01-01 RX ADMIN — SODIUM CHLORIDE: 9 INJECTION, SOLUTION INTRAVENOUS at 09:27

## 2018-01-01 RX ADMIN — OXYCODONE HYDROCHLORIDE AND ACETAMINOPHEN 1 TABLET: 5; 325 TABLET ORAL at 18:30

## 2018-01-01 RX ADMIN — FENTANYL CITRATE 50 MCG: 50 INJECTION, SOLUTION INTRAMUSCULAR; INTRAVENOUS at 18:48

## 2018-01-01 RX ADMIN — SODIUM CHLORIDE: 9 INJECTION, SOLUTION INTRAVENOUS at 02:49

## 2018-01-01 RX ADMIN — HYDROCODONE BITARTRATE AND ACETAMINOPHEN 2 TABLET: 5; 325 TABLET ORAL at 08:52

## 2018-01-01 RX ADMIN — MIDAZOLAM HYDROCHLORIDE 1 MG: 1 INJECTION, SOLUTION INTRAMUSCULAR; INTRAVENOUS at 15:14

## 2018-01-01 RX ADMIN — FAMOTIDINE 20 MG: 20 TABLET ORAL at 09:01

## 2018-01-01 RX ADMIN — LEUCOVORIN CALCIUM 800 MG: 350 INJECTION, POWDER, LYOPHILIZED, FOR SOLUTION INTRAMUSCULAR; INTRAVENOUS at 10:38

## 2018-01-01 RX ADMIN — OXYCODONE HYDROCHLORIDE 5 MG: 5 TABLET ORAL at 16:39

## 2018-01-01 RX ADMIN — BEVACIZUMAB 550 MG: 400 INJECTION, SOLUTION INTRAVENOUS at 12:17

## 2018-01-01 RX ADMIN — POTASSIUM CHLORIDE 20 MEQ: 1.5 POWDER, FOR SOLUTION ORAL at 10:06

## 2018-01-01 RX ADMIN — HYDROCODONE BITARTRATE AND ACETAMINOPHEN 2 TABLET: 5; 325 TABLET ORAL at 17:42

## 2018-01-01 RX ADMIN — POTASSIUM CHLORIDE 40 MEQ: 1.5 POWDER, FOR SOLUTION ORAL at 08:18

## 2018-01-01 RX ADMIN — SERTRALINE HYDROCHLORIDE 50 MG: 50 TABLET ORAL at 07:54

## 2018-01-01 RX ADMIN — SODIUM CHLORIDE: 9 INJECTION, SOLUTION INTRAVENOUS at 04:20

## 2018-01-01 RX ADMIN — OXALIPLATIN 200 MG: 100 INJECTION, SOLUTION, CONCENTRATE INTRAVENOUS at 11:02

## 2018-01-01 RX ADMIN — SODIUM CHLORIDE, PRESERVATIVE FREE 5 ML: 5 INJECTION INTRAVENOUS at 16:23

## 2018-01-01 RX ADMIN — POTASSIUM CHLORIDE 20 MEQ: 1500 TABLET, EXTENDED RELEASE ORAL at 10:03

## 2018-01-01 RX ADMIN — METHYLPHENIDATE HYDROCHLORIDE 5 MG: 5 TABLET ORAL at 17:15

## 2018-01-01 RX ADMIN — FERROUS SULFATE TAB 325 MG (65 MG ELEMENTAL FE) 325 MG: 325 (65 FE) TAB at 08:27

## 2018-01-01 RX ADMIN — FLUOROURACIL 930 MG: 50 INJECTION, SOLUTION INTRAVENOUS at 15:01

## 2018-01-01 RX ADMIN — LEUCOVORIN CALCIUM 800 MG: 350 INJECTION, POWDER, LYOPHILIZED, FOR SOLUTION INTRAMUSCULAR; INTRAVENOUS at 12:01

## 2018-01-01 RX ADMIN — OXYCODONE HYDROCHLORIDE 5 MG: 5 TABLET ORAL at 17:03

## 2018-01-01 RX ADMIN — Medication 1 TABLET: at 09:55

## 2018-01-01 RX ADMIN — METHYLPHENIDATE HYDROCHLORIDE 5 MG: 5 TABLET ORAL at 08:52

## 2018-01-01 RX ADMIN — HEPARIN SODIUM 1100 UNITS/HR: 10000 INJECTION, SOLUTION INTRAVENOUS at 09:01

## 2018-01-01 RX ADMIN — HEPARIN SODIUM 1000 UNITS/HR: 10000 INJECTION, SOLUTION INTRAVENOUS at 06:29

## 2018-01-01 RX ADMIN — Medication 0.2 MG: at 18:41

## 2018-01-01 RX ADMIN — Medication 0.2 MG: at 18:20

## 2018-01-01 RX ADMIN — AMLODIPINE BESYLATE 5 MG: 5 TABLET ORAL at 09:28

## 2018-01-01 RX ADMIN — METRONIDAZOLE 250 MG: 250 TABLET ORAL at 11:16

## 2018-01-01 RX ADMIN — SODIUM CHLORIDE: 9 INJECTION, SOLUTION INTRAVENOUS at 01:51

## 2018-01-01 RX ADMIN — ONDANSETRON 4 MG: 4 TABLET, ORALLY DISINTEGRATING ORAL at 17:06

## 2018-01-01 RX ADMIN — VANCOMYCIN HYDROCHLORIDE 2000 MG: 5 INJECTION, POWDER, LYOPHILIZED, FOR SOLUTION INTRAVENOUS at 01:03

## 2018-01-01 RX ADMIN — DEXTROSE MONOHYDRATE 250 ML: 50 INJECTION, SOLUTION INTRAVENOUS at 10:41

## 2018-01-01 RX ADMIN — SENNOSIDES AND DOCUSATE SODIUM 2 TABLET: 8.6; 5 TABLET ORAL at 20:50

## 2018-01-01 RX ADMIN — METHYLPHENIDATE HYDROCHLORIDE 5 MG: 5 TABLET ORAL at 08:28

## 2018-01-01 RX ADMIN — SODIUM CHLORIDE: 9 INJECTION, SOLUTION INTRAVENOUS at 20:18

## 2018-01-01 RX ADMIN — FENTANYL CITRATE 50 MCG: 50 INJECTION, SOLUTION INTRAMUSCULAR; INTRAVENOUS at 18:42

## 2018-01-01 RX ADMIN — PIPERACILLIN SODIUM,TAZOBACTAM SODIUM 3.38 G: 3; .375 INJECTION, POWDER, FOR SOLUTION INTRAVENOUS at 02:47

## 2018-01-01 RX ADMIN — FAMOTIDINE 20 MG: 20 TABLET ORAL at 08:40

## 2018-01-01 RX ADMIN — OXALIPLATIN 200 MG: 100 INJECTION, SOLUTION, CONCENTRATE INTRAVENOUS at 12:52

## 2018-01-01 RX ADMIN — PIPERACILLIN SODIUM,TAZOBACTAM SODIUM 3.38 G: 3; .375 INJECTION, POWDER, FOR SOLUTION INTRAVENOUS at 21:24

## 2018-01-01 RX ADMIN — PIPERACILLIN SODIUM,TAZOBACTAM SODIUM 3.38 G: 3; .375 INJECTION, POWDER, FOR SOLUTION INTRAVENOUS at 03:00

## 2018-01-01 RX ADMIN — POTASSIUM CHLORIDE 40 MEQ: 1500 TABLET, EXTENDED RELEASE ORAL at 18:53

## 2018-01-01 RX ADMIN — HYDROCODONE BITARTRATE AND ACETAMINOPHEN 2 TABLET: 5; 325 TABLET ORAL at 02:53

## 2018-01-01 RX ADMIN — PIPERACILLIN SODIUM,TAZOBACTAM SODIUM 3.38 G: 3; .375 INJECTION, POWDER, FOR SOLUTION INTRAVENOUS at 05:35

## 2018-01-01 RX ADMIN — SERTRALINE HYDROCHLORIDE 50 MG: 50 TABLET ORAL at 08:24

## 2018-01-01 RX ADMIN — Medication 3500 UNITS: at 16:46

## 2018-01-01 RX ADMIN — SODIUM CHLORIDE, PRESERVATIVE FREE 5 ML: 5 INJECTION INTRAVENOUS at 11:41

## 2018-01-01 RX ADMIN — SODIUM CHLORIDE: 9 INJECTION, SOLUTION INTRAVENOUS at 18:23

## 2018-01-01 RX ADMIN — SODIUM CHLORIDE: 9 INJECTION, SOLUTION INTRAVENOUS at 22:02

## 2018-01-01 RX ADMIN — DEXTROSE MONOHYDRATE 250 ML: 50 INJECTION, SOLUTION INTRAVENOUS at 10:40

## 2018-01-01 RX ADMIN — Medication 0.2 MG: at 15:43

## 2018-01-01 RX ADMIN — FONDAPARINUX SODIUM 10 MG: 10 INJECTION, SOLUTION SUBCUTANEOUS at 08:28

## 2018-01-01 RX ADMIN — HEPARIN SODIUM 18 UNITS/KG/HR: 10000 INJECTION, SOLUTION INTRAVENOUS at 14:23

## 2018-01-01 RX ADMIN — HEPARIN SODIUM 10000 UNITS: 10000 INJECTION, SOLUTION INTRAVENOUS; SUBCUTANEOUS at 18:51

## 2018-01-01 RX ADMIN — OXALIPLATIN 131 MG: 100 INJECTION, SOLUTION, CONCENTRATE INTRAVENOUS at 09:54

## 2018-01-01 RX ADMIN — PIPERACILLIN SODIUM,TAZOBACTAM SODIUM 3.38 G: 3; .375 INJECTION, POWDER, FOR SOLUTION INTRAVENOUS at 20:18

## 2018-01-01 RX ADMIN — FERROUS SULFATE TAB 325 MG (65 MG ELEMENTAL FE) 325 MG: 325 (65 FE) TAB at 08:53

## 2018-01-01 RX ADMIN — PIPERACILLIN SODIUM,TAZOBACTAM SODIUM 3.38 G: 3; .375 INJECTION, POWDER, FOR SOLUTION INTRAVENOUS at 05:44

## 2018-01-01 RX ADMIN — ONDANSETRON 4 MG: 2 INJECTION INTRAMUSCULAR; INTRAVENOUS at 21:22

## 2018-01-01 RX ADMIN — METHYLPHENIDATE HYDROCHLORIDE 5 MG: 5 TABLET ORAL at 08:40

## 2018-01-01 RX ADMIN — ONDANSETRON 4 MG: 4 TABLET, FILM COATED ORAL at 11:36

## 2018-01-01 RX ADMIN — FLUOROURACIL 930 MG: 50 INJECTION, SOLUTION INTRAVENOUS at 14:09

## 2018-01-01 RX ADMIN — ONDANSETRON HYDROCHLORIDE: 2 INJECTION, SOLUTION INTRAVENOUS at 09:31

## 2018-01-01 RX ADMIN — ONDANSETRON 4 MG: 2 INJECTION INTRAMUSCULAR; INTRAVENOUS at 14:00

## 2018-01-01 RX ADMIN — ONDANSETRON HYDROCHLORIDE: 2 INJECTION, SOLUTION INTRAVENOUS at 11:09

## 2018-01-01 RX ADMIN — IOPAMIDOL 103 ML: 755 INJECTION, SOLUTION INTRAVENOUS at 19:22

## 2018-01-01 RX ADMIN — POTASSIUM CHLORIDE 40 MEQ: 1500 TABLET, EXTENDED RELEASE ORAL at 20:41

## 2018-01-01 RX ADMIN — SODIUM CHLORIDE: 9 INJECTION, SOLUTION INTRAVENOUS at 06:52

## 2018-01-01 RX ADMIN — SENNOSIDES AND DOCUSATE SODIUM 1 TABLET: 8.6; 5 TABLET ORAL at 16:16

## 2018-01-01 RX ADMIN — MIDAZOLAM HYDROCHLORIDE 2 MG: 1 INJECTION, SOLUTION INTRAMUSCULAR; INTRAVENOUS at 15:09

## 2018-01-01 RX ADMIN — PIPERACILLIN SODIUM,TAZOBACTAM SODIUM 3.38 G: 3; .375 INJECTION, POWDER, FOR SOLUTION INTRAVENOUS at 23:55

## 2018-01-01 RX ADMIN — OXYCODONE HYDROCHLORIDE 5 MG: 5 TABLET ORAL at 20:42

## 2018-01-01 RX ADMIN — SODIUM CHLORIDE: 9 INJECTION, SOLUTION INTRAVENOUS at 22:15

## 2018-01-01 RX ADMIN — PIPERACILLIN SODIUM,TAZOBACTAM SODIUM 3.38 G: 3; .375 INJECTION, POWDER, FOR SOLUTION INTRAVENOUS at 08:59

## 2018-01-01 RX ADMIN — POTASSIUM CHLORIDE 20 MEQ: 1500 TABLET, EXTENDED RELEASE ORAL at 08:00

## 2018-01-01 RX ADMIN — POTASSIUM CHLORIDE 20 MEQ: 1500 TABLET, EXTENDED RELEASE ORAL at 08:24

## 2018-01-01 RX ADMIN — PIPERACILLIN SODIUM,TAZOBACTAM SODIUM 3.38 G: 3; .375 INJECTION, POWDER, FOR SOLUTION INTRAVENOUS at 08:37

## 2018-01-01 RX ADMIN — SERTRALINE HYDROCHLORIDE 50 MG: 50 TABLET ORAL at 08:40

## 2018-01-01 RX ADMIN — DEXTROSE MONOHYDRATE 250 ML: 50 INJECTION, SOLUTION INTRAVENOUS at 09:31

## 2018-01-01 RX ADMIN — METHYLPHENIDATE HYDROCHLORIDE 5 MG: 5 TABLET ORAL at 20:02

## 2018-01-01 RX ADMIN — SERTRALINE HYDROCHLORIDE 50 MG: 50 TABLET ORAL at 08:14

## 2018-01-01 RX ADMIN — CHLORTHALIDONE 25 MG: 25 TABLET ORAL at 11:37

## 2018-01-01 RX ADMIN — Medication 1 TABLET: at 08:09

## 2018-01-01 RX ADMIN — PIPERACILLIN SODIUM,TAZOBACTAM SODIUM 3.38 G: 3; .375 INJECTION, POWDER, FOR SOLUTION INTRAVENOUS at 12:57

## 2018-01-01 RX ADMIN — DEXTROSE MONOHYDRATE 250 ML: 50 INJECTION, SOLUTION INTRAVENOUS at 11:09

## 2018-01-01 RX ADMIN — HEPARIN SODIUM 1300 UNITS/HR: 10000 INJECTION, SOLUTION INTRAVENOUS at 07:14

## 2018-01-01 RX ADMIN — Medication 1 LOZENGE: at 20:50

## 2018-01-01 RX ADMIN — HEPARIN SODIUM 1300 UNITS/HR: 10000 INJECTION, SOLUTION INTRAVENOUS at 10:04

## 2018-01-01 RX ADMIN — HEPARIN 5 ML: 100 SYRINGE at 14:26

## 2018-01-01 RX ADMIN — FERROUS SULFATE TAB 325 MG (65 MG ELEMENTAL FE) 325 MG: 325 (65 FE) TAB at 08:14

## 2018-01-01 RX ADMIN — POTASSIUM CHLORIDE 20 MEQ: 1500 TABLET, EXTENDED RELEASE ORAL at 08:28

## 2018-01-01 RX ADMIN — TOPICAL ANESTHETIC 1 ML: 200 SPRAY DENTAL; PERIODONTAL at 15:08

## 2018-01-01 RX ADMIN — ONDANSETRON HYDROCHLORIDE: 2 INJECTION, SOLUTION INTRAVENOUS at 12:02

## 2018-01-01 RX ADMIN — SODIUM CHLORIDE 1000 ML: 9 INJECTION, SOLUTION INTRAVENOUS at 14:45

## 2018-01-01 RX ADMIN — PIPERACILLIN SODIUM,TAZOBACTAM SODIUM 3.38 G: 3; .375 INJECTION, POWDER, FOR SOLUTION INTRAVENOUS at 17:04

## 2018-01-01 RX ADMIN — LEUCOVORIN CALCIUM 800 MG: 350 INJECTION, POWDER, LYOPHILIZED, FOR SOLUTION INTRAMUSCULAR; INTRAVENOUS at 12:51

## 2018-01-01 RX ADMIN — HYDROCODONE BITARTRATE AND ACETAMINOPHEN 2 TABLET: 5; 325 TABLET ORAL at 01:57

## 2018-01-01 RX ADMIN — POTASSIUM CHLORIDE 20 MEQ: 1500 TABLET, EXTENDED RELEASE ORAL at 08:59

## 2018-01-01 RX ADMIN — POTASSIUM CHLORIDE 40 MEQ: 1500 TABLET, EXTENDED RELEASE ORAL at 09:56

## 2018-01-01 RX ADMIN — ONDANSETRON HYDROCHLORIDE: 2 INJECTION, SOLUTION INTRAVENOUS at 10:10

## 2018-01-01 RX ADMIN — FONDAPARINUX SODIUM 10 MG: 10 INJECTION, SOLUTION SUBCUTANEOUS at 16:21

## 2018-01-01 RX ADMIN — SODIUM CHLORIDE: 9 INJECTION, SOLUTION INTRAVENOUS at 16:19

## 2018-01-01 RX ADMIN — ONDANSETRON HYDROCHLORIDE: 2 INJECTION, SOLUTION INTRAVENOUS at 10:03

## 2018-01-01 RX ADMIN — AMLODIPINE BESYLATE 5 MG: 5 TABLET ORAL at 08:14

## 2018-01-01 RX ADMIN — POTASSIUM CHLORIDE 20 MEQ: 1500 TABLET, EXTENDED RELEASE ORAL at 08:09

## 2018-01-01 RX ADMIN — SERTRALINE HYDROCHLORIDE 50 MG: 50 TABLET ORAL at 09:01

## 2018-01-01 RX ADMIN — ENOXAPARIN SODIUM 100 MG: 100 INJECTION SUBCUTANEOUS at 09:56

## 2018-01-01 RX ADMIN — POTASSIUM CHLORIDE 20 MEQ: 1500 TABLET, EXTENDED RELEASE ORAL at 22:38

## 2018-01-01 RX ADMIN — OXALIPLATIN 131 MG: 100 INJECTION, SOLUTION, CONCENTRATE INTRAVENOUS at 10:39

## 2018-01-01 RX ADMIN — PIPERACILLIN SODIUM,TAZOBACTAM SODIUM 3.38 G: 3; .375 INJECTION, POWDER, FOR SOLUTION INTRAVENOUS at 08:02

## 2018-01-01 RX ADMIN — SODIUM CHLORIDE: 9 INJECTION, SOLUTION INTRAVENOUS at 08:18

## 2018-01-01 RX ADMIN — OXALIPLATIN 200 MG: 100 INJECTION, SOLUTION, CONCENTRATE INTRAVENOUS at 10:46

## 2018-01-01 RX ADMIN — MIDAZOLAM HYDROCHLORIDE 1 MG: 1 INJECTION, SOLUTION INTRAMUSCULAR; INTRAVENOUS at 18:42

## 2018-01-01 RX ADMIN — METHYLPHENIDATE HYDROCHLORIDE 5 MG: 5 TABLET ORAL at 14:01

## 2018-01-01 RX ADMIN — PIPERACILLIN SODIUM,TAZOBACTAM SODIUM 3.38 G: 3; .375 INJECTION, POWDER, FOR SOLUTION INTRAVENOUS at 18:13

## 2018-01-01 RX ADMIN — HEPARIN SODIUM 1400 UNITS/HR: 10000 INJECTION, SOLUTION INTRAVENOUS at 01:01

## 2018-01-01 RX ADMIN — FERROUS SULFATE TAB 325 MG (65 MG ELEMENTAL FE) 325 MG: 325 (65 FE) TAB at 20:42

## 2018-01-01 ASSESSMENT — ACTIVITIES OF DAILY LIVING (ADL)
TOILETING: 0 - INDEPENDENT
FALL_HISTORY_WITHIN_LAST_SIX_MONTHS: NO
DRESS: 0-->INDEPENDENT
DRESS: 0-->INDEPENDENT
SWALLOWING: 0-->SWALLOWS FOODS/LIQUIDS WITHOUT DIFFICULTY
RETIRED_EATING: 0-->INDEPENDENT
SWALLOWING: 0-->SWALLOWS FOODS/LIQUIDS WITHOUT DIFFICULTY
RETIRED_EATING: 0-->INDEPENDENT
RETIRED_COMMUNICATION: 0-->UNDERSTANDS/COMMUNICATES WITHOUT DIFFICULTY
RETIRED_COMMUNICATION: 0-->UNDERSTANDS/COMMUNICATES WITHOUT DIFFICULTY
COGNITION: 0 - NO COGNITION ISSUES REPORTED
TOILETING: 0-->INDEPENDENT
TRANSFERRING: 1-->ASSISTIVE EQUIPMENT
DRESS: 0 - INDEPENDENT
BATHING: 0-->INDEPENDENT
BATHING: 1 - ASSISTIVE EQUIPMENT
EATING: 0 - INDEPENDENT
AMBULATION: 1 - ASSISTIVE EQUIPMENT
SWALLOWING: 0 - SWALLOWS FOODS/LIQUIDS WITHOUT DIFFICULTY
TOILETING: 0-->INDEPENDENT
BATHING: 1-->ASSISTIVE EQUIPMENT
TRANSFERRING: 1 - ASSISTIVE EQUIPMENT
AMBULATION: 1-->ASSISTIVE EQUIPMENT
FALL_HISTORY_WITHIN_LAST_SIX_MONTHS: NO
TRANSFERRING: 1-->ASSISTIVE EQUIPMENT
AMBULATION: 1-->ASSISTIVE EQUIPMENT
COMMUNICATION: 0 - UNDERSTANDS/COMMUNICATES WITHOUT DIFFICULTY
COGNITION: 0 - NO COGNITION ISSUES REPORTED

## 2018-01-01 ASSESSMENT — ENCOUNTER SYMPTOMS
DYSURIA: 0
COUGH: 1
PALPITATIONS: 1
VOMITING: 0
FREQUENCY: 0
CONSTIPATION: 0
WEAKNESS: 1
NAUSEA: 0
SHORTNESS OF BREATH: 1
APPETITE CHANGE: 1
DIZZINESS: 0
DIARRHEA: 0
NAUSEA: 0
DIAPHORESIS: 1
FEVER: 0
SHORTNESS OF BREATH: 1
SHORTNESS OF BREATH: 0
MYALGIAS: 1

## 2018-01-01 ASSESSMENT — PAIN SCALES - GENERAL
PAINLEVEL: NO PAIN (0)
PAINLEVEL: NO PAIN (0)
PAINLEVEL: MILD PAIN (2)
PAINLEVEL: NO PAIN (0)
PAINLEVEL: NO PAIN (0)
PAINLEVEL: NO PAIN (1)
PAINLEVEL: NO PAIN (0)

## 2018-01-01 ASSESSMENT — PAIN DESCRIPTION - DESCRIPTORS: DESCRIPTORS: OTHER (COMMENT)

## 2018-01-02 NOTE — PROGRESS NOTES
This is a recent snapshot of the patient's [facility] medical record.  For current drug dose and complete information and questions, call [phone] or In Basket pool, [facility] home infusion ([id number])  CSN Number:  [id number]

## 2018-01-02 NOTE — TELEPHONE ENCOUNTER
Spoke with Liv this morning about her [id number] level being high last week.  Liv stated she could come in for a peripheral lab draw today at 1. ( Per Dr. Piedra's note 4-6 hrs after am dose)  She stated she would have to arrange transportation but it would be manageable  She states she gave her lovenox at 8 am.  She no - showed her lab.  Message left for her to return call

## 2018-01-02 NOTE — PROGRESS NOTES
anisha Ortiz's anti x-1 level was high. It needs to be rechecked 4-6 hours after the dose of lovenox. Please get it checked today.

## 2018-01-05 PROBLEM — O22.30 DVT (DEEP VEIN THROMBOSIS) IN PREGNANCY: Status: ACTIVE | Noted: 2018-01-01

## 2018-01-05 NOTE — IP AVS SNAPSHOT
MRN:[id number]                      After Visit Summary   1/5/2018    Liv Sylvester    MRN: [id number]           Thank you!     Thank you for choosing [facility] for your care. Our goal is always to provide you with excellent care. Hearing back from our patients is one way we can continue to improve our services. Please take a few minutes to complete the written survey that you may receive in the mail after you visit with us. Thank you!        Patient Information     Date Of Birth          1950        About your hospital stay     You were admitted on:  January 5, 2018 You last received care in the:  [place] [address] [facility]    You were discharged on:  January 7, 2018        Reason for your hospital stay       New left leg blood clot                  Who to Call     For medical emergencies, please call 911.  For non-urgent questions about your medical care, please call your primary care provider or clinic, [phone]          Attending Provider     Provider Specialty    Marivel Lewis MD Emergency Medicine    Marmichelle, Jayson Suárez DO Internal Medicine       Primary Care Provider Office Phone # Fax #    Amaya Howard [phone]      After Care Instructions     Activity       Your activity upon discharge: activity as tolerated            Diet       Follow this diet upon discharge: Regular diet            Discharge Instructions                 Follow-up Appointments     Follow-up and recommended labs and tests        Follow up with Dr. Piedra on Wed, 1/10/18 as scheduled, check CBC and Potassium then.                  Your next 10 appointments already scheduled     Evaristo 10, 2018  9:00 AM CST   Level 6 with  INFUSION CHAIR 8   [facility] ([facility])    [facility]  [address] [facility] [address]  [facility] [address]   [phone]            Evaristo 10, 2018  9:30 AM CST   Return Visit with Radha Piedra MD    [facility] ([facility])    [facility]  [address]  [place] [address]   [phone]            Jan 12, 2018 12:30 PM CST   Level 1 with SH INFUSION CHAIR 13   [facility] ([facility])    [facility]  [address]  [place] [address]   [phone]            Jan 16, 2018 12:00 PM CST   Level 1 with SH INFUSION CHAIR 10   [facility] ([facility])    [facility]  [address]  [place] [address]   [phone]            Jan 16, 2018 12:30 PM CST   New Visit with Nela Spaulding MD   [facility] ([facility])    [facility] [place] [id number]  [place] [address]   [phone]            Jan 16, 2018  1:30 PM CST   Return Visit with TITO Briones CNP   [facility] ([facility])    [facility]  [address]  [place] [address]   [phone]            Jan 24, 2018  9:00 AM CST   Level 6 with SH INFUSION CHAIR 17   [facility] ([facility])    [facility]  [address]  [place] [address]   [phone]            Jan 24, 2018  9:30 AM CST   Return Visit with Radha Piedra MD   [facility] ([facility])    [facility]  [address]  [place] [address]   [phone]            Jan 26, 2018 12:00 PM CST   Level 1 with SH INFUSION CHAIR 5   [facility] ([facility])    [facility]  [address]  [place] [address]   [phone]            Jan 31, 2018 12:00 PM CST   Level 1 with  INFUSION CHAIR 11   [facility]  "([facility])    [facility] [place]  [address]  [place] [address] [phone]              Additional Services     Home care nursing referral       RN extended hours visit. RN to assess respiratory and cardiac status.    Your provider has ordered home care nursing services. If you have not been contacted within 2 days of your discharge please call [phone]                  Pending Results     No orders found from 1/3/2018 to 2018.            Statement of Approval     Ordered          18 1243  I have reviewed and agree with all the recommendations and orders detailed in this document.  EFFECTIVE NOW     Approved and electronically signed by:  Addison Galo MD             Admission Information     Date & Time Provider Department Dept. Phone    2018 Jayson Frausto,  [place] [address] [facility] [phone]      Your Vitals Were     Blood Pressure Pulse Temperature Respirations Height Weight    143/72 (BP Location: Right arm) 79 98.2  F (36.8  C) (Oral) 18 1.778 m (5' 10\") 102.5 kg (226 lb)    Pulse Oximetry BMI (Body Mass Index)                97% 32.43 kg/m2          MyChart Information     [employer] lets you send messages to your doctor, view your test results, renew your prescriptions, schedule appointments and more. To sign up, go to [email or web address] . Click on \"Log in\" on the left side of the screen, which will take you to the Welcome page. Then click on \"Sign up Now\" on the right side of the page.     You will be asked to enter the access code listed below, as well as some personal information. Please follow the directions to create your username and password.     Your access code is: [email or web address]  Expires: 2018  7:56 AM     Your access code will  in 90 days. If you need help or a new code, please call your [place] clinic or [phone].        Care EveryWhere ID     This is your Care EveryWhere ID. This " could be used by other organizations to access your [place] medical records  [id number]        Equal Access to Services     HAYDEE IVERSON : Hadii anthony Pierce, wawerner michael, qaleroyta sherimary annmayur hawkins, john millerdemondjoe maay. So [facility] [phone].    ATENCIÓN: Si habla español, tiene a schmitt disposición servicios gratuitos de asistencia lingüística. Llame al [phone].    We comply with applicable federal civil rights laws and [place] laws. We do not discriminate on the basis of race, color, national origin, age, disability, sex, sexual orientation, or gender identity.               Review of your medicines      START taking        Dose / Directions    HYDROcodone-acetaminophen 5-325 MG per tablet   Commonly known as:  NORCO   Used for:  Pain        Dose:  1-2 tablet   Take 1-2 tablets by mouth every 4 hours as needed for moderate to severe pain   Quantity:  30 tablet   Refills:  0         CONTINUE these medicines which have NOT CHANGED        Dose / Directions    ACETAMINOPHEN PO        Dose:  1000 mg   Take 1,000 mg by mouth every 4 hours as needed for pain   Refills:  0       albuterol 108 (90 BASE) MCG/ACT Inhaler   Commonly known as:  PROAIR HFA/PROVENTIL HFA/VENTOLIN HFA   Used for:  Reactive airway disease without complication, unspecified asthma severity, unspecified whether persistent        Dose:  2 puff   Inhale 2 puffs into the lungs every 6 hours as needed for shortness of breath / dyspnea or wheezing   Quantity:  1 Inhaler   Refills:  3       CALCIUM-MAGNESIUM-ZINC PO        Dose:  1 tablet   Take 1 tablet by mouth daily   Refills:  0       CENTRUM SILVER per tablet        Dose:  1 tablet   Take 1 tablet by mouth daily.   Quantity:  100 tablet   Refills:  12       CHLORTHALIDONE PO        Dose:  25 mg   Take 25 mg by mouth daily as needed ((Patient is supposed to take 25mg daily but states she only takes as needed))   Refills:  0       enoxaparin 100 MG/ML Soln    Commonly known as:  LOVENOX        Dose:  100 mg   Inject 100 mg Subcutaneous every 12 hours   Refills:  0       lidocaine 5 % ointment   Commonly known as:  XYLOCAINE        Dose:  0.5 inch   Apply 0.5 g topically as needed for moderate pain Apply pea size amount to rectal area for pain   Quantity:  5 g   Refills:  0       lidocaine-prilocaine cream   Commonly known as:  EMLA   Used for:  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        Apply topically as needed for moderate pain Apply dollop size amount to port site 30-60 minutes prior to accessing.   Quantity:  30 g   Refills:  1       lisinopril 10 MG tablet   Commonly known as:  PRINIVIL/ZESTRIL   Used for:  HTN (hypertension)        Dose:  10 mg   Take 1 tablet by mouth daily.   Quantity:  30 tablet   Refills:  11       LORazepam 0.5 MG tablet   Commonly known as:  ATIVAN   Used for:  Malignant neoplasm of hepatic flexure (H)        Dose:  0.5 mg   Take 1 tablet (0.5 mg) by mouth every 4 hours as needed (Anxiety, Nausea/Vomiting or Sleep)   Quantity:  30 tablet   Refills:  2       methylphenidate 5 MG tablet   Commonly known as:  RITALIN   Used for:  Neoplastic malignant related fatigue        Dose:  5 mg   Take 1 tablet (5 mg) by mouth 2 times daily   Quantity:  60 tablet   Refills:  0       ondansetron 8 MG tablet   Commonly known as:  ZOFRAN   Used for:  Malignant neoplasm of hepatic flexure (H)        Dose:  8 mg   Take 1 tablet (8 mg) by mouth every 8 hours as needed (Nausea/Vomiting)   Quantity:  10 tablet   Refills:  2       Potassium Chloride ER 20 MEQ Tbcr   Used for:  Hypokalemia        Dose:  20 mEq   Take 1 tablet (20 mEq) by mouth daily   Quantity:  30 tablet   Refills:  11       prochlorperazine 10 MG tablet   Commonly known as:  COMPAZINE   Used for:  Malignant neoplasm of hepatic flexure (H)        Dose:  10 mg   Take 1 tablet (10 mg) by mouth every 6 hours as needed (Nausea/Vomiting)   Quantity:  30 tablet   Refills:   2       sennosides 8.6 MG tablet   Commonly known as:  SENOKOT        Dose:  1 tablet   Take 1 tablet by mouth 2 times daily as needed for constipation   Refills:  0       SERTRALINE HCL PO        Dose:  50 mg   Take 50 mg by mouth daily (Takes half of a 100mg tablet for a total of 50mg daily)   Refills:  0       VICKS NYQUIL COLD & FLU 15-6. MG/15ML Liqd   Generic drug:  DM-Doxylamine-Acetaminophen        Dose:  15 mL   Take 15 mLs by mouth nightly as needed   Refills:  0       VITAMIN D (CHOLECALCIFEROL) PO        Dose:  1 tablet   Take 1 tablet by mouth daily (OTC: Pt unsure of strength)   Refills:  0            Where to get your medicines      Some of these will need a paper prescription and others can be bought over the counter. Ask your nurse if you have questions.     Bring a paper prescription for each of these medications     HYDROcodone-acetaminophen 5-325 MG per tablet                Protect others around you: Learn how to safely use, store and throw away your medicines at www.disposemymeds.org.             Medication List: This is a list of all your medications and when to take them. Check marks below indicate your daily home schedule. Keep this list as a reference.      Medications           Morning Afternoon Evening Bedtime As Needed    ACETAMINOPHEN PO   Take 1,000 mg by mouth every 4 hours as needed for pain                                albuterol 108 (90 BASE) MCG/ACT Inhaler   Commonly known as:  PROAIR HFA/PROVENTIL HFA/VENTOLIN HFA   Inhale 2 puffs into the lungs every 6 hours as needed for shortness of breath / dyspnea or wheezing                                CALCIUM-MAGNESIUM-ZINC PO   Take 1 tablet by mouth daily                                CENTRUM SILVER per tablet   Take 1 tablet by mouth daily.                                CHLORTHALIDONE PO   Take 25 mg by mouth daily as needed ((Patient is supposed to take 25mg daily but states she only takes as needed))                                 enoxaparin 100 MG/ML Soln   Commonly known as:  LOVENOX   Inject 100 mg Subcutaneous every 12 hours   Last time this was given:  100 mg on 1/7/2018  9:56 AM                                HYDROcodone-acetaminophen 5-325 MG per tablet   Commonly known as:  NORCO   Take 1-2 tablets by mouth every 4 hours as needed for moderate to severe pain   Last time this was given:  2 tablets on 1/7/2018  9:51 AM                                lidocaine 5 % ointment   Commonly known as:  XYLOCAINE   Apply 0.5 g topically as needed for moderate pain Apply pea size amount to rectal area for pain                                lidocaine-prilocaine cream   Commonly known as:  EMLA   Apply topically as needed for moderate pain Apply dollop size amount to port site 30-60 minutes prior to accessing.                                lisinopril 10 MG tablet   Commonly known as:  PRINIVIL/ZESTRIL   Take 1 tablet by mouth daily.   Last time this was given:  10 mg on 1/7/2018  9:01 AM                                LORazepam 0.5 MG tablet   Commonly known as:  ATIVAN   Take 1 tablet (0.5 mg) by mouth every 4 hours as needed (Anxiety, Nausea/Vomiting or Sleep)                                methylphenidate 5 MG tablet   Commonly known as:  RITALIN   Take 1 tablet (5 mg) by mouth 2 times daily   Last time this was given:  5 mg on 1/7/2018  9:01 AM                                ondansetron 8 MG tablet   Commonly known as:  ZOFRAN   Take 1 tablet (8 mg) by mouth every 8 hours as needed (Nausea/Vomiting)                                Potassium Chloride ER 20 MEQ Tbcr   Take 1 tablet (20 mEq) by mouth daily                                prochlorperazine 10 MG tablet   Commonly known as:  COMPAZINE   Take 1 tablet (10 mg) by mouth every 6 hours as needed (Nausea/Vomiting)                                sennosides 8.6 MG tablet   Commonly known as:  SENOKOT   Take 1 tablet by mouth 2 times daily as needed for constipation                                 SERTRALINE HCL PO   Take 50 mg by mouth daily (Takes half of a 100mg tablet for a total of 50mg daily)   Last time this was given:  50 mg on 1/7/2018  9:01 AM                                VICKS NYQUIL COLD & FLU 15-6. MG/15ML Liqd   Take 15 mLs by mouth nightly as needed   Generic drug:  DM-Doxylamine-Acetaminophen                                VITAMIN D (CHOLECALCIFEROL) PO   Take 1 tablet by mouth daily (OTC: Pt unsure of strength)

## 2018-01-05 NOTE — LETTER
Transition Communication Hand-off for Care Transitions to Next Level of Care Provider    Name: Liv Sylvester  MRN #: [id number]  Primary Care Provider: RIGOBERTO SEN     Primary Clinic: [address] [address]     Reason for Hospitalization:  Acute deep vein thrombosis (DVT) of proximal vein of left lower extremity (H) [I82.4Y2]  Malignant neoplasm of colon, unspecified part of colon (H) [C18.9]  Admit Date/Time: 1/5/2018 10:18 AM  Discharge Date:1/7/18  Payor Source: Payor: BCBS / Plan: BCBS OUT OF STATE / Product Type: Indemnity /     Readmission Assessment Measure (VISH) Risk Score/category: elevated     Reason for Communication Hand-off Referral: Fragility    Discharge Plan:       Concern for non-adherence with plan of care:   no  Discharge Needs Assessment:  Needs       Most Recent Value    # of Referrals Placed by [facility] Homecare, Communication hand-offs to next level of Care Providers            Follow-up specialty is recommended: Yes    Follow-up plan:  Future Appointments  Date Time Provider Department Center   1/10/2018 9:00 AM  INFUSION CHAIR 8 [facility]   1/10/2018 9:30 AM Radha Piedra MD [facility]   1/12/2018 12:30 PM SH INFUSION CHAIR 13 [facility]   1/16/2018 12:00 PM [facility]   1/16/2018 12:30 PM Nela Spaulding MD [facility]   1/16/2018 1:30 PM Chris Llamas APRN [facility]   1/24/2018 9:00 AM SH INFUSION CHAIR 17 [facility]   1/24/2018 9:30 AM Radha Piedra MD [facility]   1/26/2018 12:00 PM [facility]   1/31/2018 12:00 PM SH [facility] 11 [facility]   1/31/2018 1:00 PM Radha Piedra MD [facility]   2/7/2018 9:00 AM SH INFUSION CHAIR 18 [facility]   2/7/2018 9:15 AM Radha Piedra MD [facility]   2/9/2018 12:00 PM SH INFUSION CHAIR 12 [facility]       Any outstanding tests or procedures:        Referrals     Future  Labs/Procedures    Home care nursing referral     Comments:    RN extended hours visit. RN to assess respiratory and cardiac status.    Your provider has ordered home care nursing services. If you have not been contacted within 2 days of your discharge please call [phone]            Key Recommendations:  Pt was admitted for DVT. Home on [facility]. Home Care will be resumed thru [facility]     Deanne [place]    AVS/Discharge Summary is the source of truth; this is a helpful guide for improved communication of patient story

## 2018-01-05 NOTE — ED PROVIDER NOTES
History     Chief Complaint:  Leg Pain     HPI   Liv Sylvester is a 67 year old female with stage IV colon cancer who is anticoagulated on Lovenox for previous PE/DVT in September 2017. She presents to the emergency department today for evaluation of left arm and leg pain. The patient reports onset of left upper arm pain that began 01/01/18 without injury or trauma. She notes increased pain with movement and has tried heating pads in addition to Tylenol with some relief of the pain. The patient reports onset of left leg pain that began 2 days ago without injury or trauma, but states that the pain is constant. She is currently undergoing chemotherapy and when she presented to her cancer treatment center they were concerned for possible PE/DVT and sent the patient to the ED for further evaluation. The patient reports having had a cough of late and reports feeling short of breath ever since she started chemotherapy, but that this is getting better. She also reports some swelling of the lower left leg. No chest pain or fevers.     Of note, in December the patient was on Lovenox at the time but did have a recurrent pulmonary embolism and therefore her Lovenox was changed from 1.5mg/kg Qday to 1mg/kg BID.     Allergies:  No Known Drug Allergies      Medications:    Ritalin  Lovenox  Potassium chloride   Ativan   Compazine   Zofran   Albuterol  Chlorthalidone   Sertraline   Lisinopril      Past Medical History:    Colon cancer, stage IV  Pulmonary embolism   DVT  Hypertension  Obesity   Hemorrhoids     Past Surgical History:    Colonoscopy   Hysterectomy   Vascular port     Family History:    Cerebrovascular disease  Heart disease  Diabetes   Hypertension    Social History:  The patient was alone in the ED.  Smoking Status: former, QD 06/14/05  Alcohol Use: No   Marital Status:   [4]     Review of Systems   Constitutional: Negative for fever.   Respiratory: Positive for cough and shortness of breath (improving).   "  Cardiovascular: Positive for leg swelling. Negative for chest pain.   Musculoskeletal: Positive for myalgias (left arm and leg).   All other systems reviewed and are negative.    Physical Exam     Patient Vitals for the past 24 hrs:   BP Temp Temp src Pulse Heart Rate Resp SpO2 Height Weight   01/05/18 1514 154/83 97.2  F (36.2  C) Oral - 75 14 97 % - -   01/05/18 1415 154/83 - - 79 - 14 99 % - -   01/05/18 1400 154/86 - - - - - 100 % - -   01/05/18 1345 159/83 - - - - - 100 % - -   01/05/18 1320 157/89 - - 77 - 16 100 % - -   01/05/18 1315 157/89 - - - - - 100 % 1.778 m (5' 10\") 102.5 kg (226 lb)   01/05/18 1300 150/81 - - - - - 100 % - -   01/05/18 1245 140/84 - - - - - 99 % - -   01/05/18 1215 139/82 - - - - - 100 % - -   01/05/18 1200 134/88 - - - - - 99 % - -   01/05/18 1146 121/79 - - 97 - 14 100 % - -   01/05/18 1050 132/76 - - - - - 99 % - -   01/05/18 1045 145/85 - - - - - 99 % - -   01/05/18 1023 138/87 98.4  F (36.9  C) Oral - 111 - 100 % 1.778 m (5' 10\") 102.5 kg (226 lb)      Physical Exam  General/Appearance: appears stated age, well-groomed, appears comfortable, elevated BMI  Eyes: EOMI, no scleral injection, no icterus  ENT: MMM  Neck: supple, nl ROM, no stiffness  Cardiovascular: tachy but regular, nl S1S2, no m/r/g, 2+ pulses in all 4 extremities, cap refill <2sec, port in R anterior chest wall  Respiratory: CTAB, good air movement throughout, no wheezes/rhonchi/rales, no increased WOB, no retractions  Back: no lesions  GI: abd soft, non-distended, nttp,  no HSM, no rebound, no guarding, nl BS  MSK: [place], good tone, no bony abnormality, pain along medial upper L thigh with heating pad on it  Skin: warm and well-perfused, no rash, no significant ble edema, no ecchymosis, nl turgor  Neuro: GCS 15, alert and oriented, no gross focal neuro deficits  Psych: interacts appropriately  Heme: no petechia, no purpura, no active bleeding      Emergency Department Course     Imaging:  Radiology findings were " communicated with the Patient who voiced understanding of the findings.  Arm, left, venous US  Negative for deep venous thrombosis in the left upper  extremity.  TYREE DOAN MD    US Lower Extremity Venous Duplex Left  1. New left common femoral, femoral and popliteal vein deep vein  thrombosis.  2. Resolution of left calf deep vein thrombosis.  SAUL FU,     Laboratory:  Laboratory findings were communicated with the Patient who voiced understanding of the findings.  INR: 1.21 (H)   PTT: 37   CBC: WBC 9.0, HGB 10.7 (L),  (L)  BMP: K 3.0 (L), Glucose 108 (H) o/w WNL (Creatinine 0.58)    Interventions:  1409 heparin 18units/kg/hr IV     Emergency Department Course:  Nursing notes and vitals reviewed.  1019 I entered the room.  1023 I performed an exam of the patient as documented above.   IV was inserted and blood was drawn for laboratory testing, results above.  The patient was sent for Ultrasound while in the emergency department, results above.   The patient received the above intervention(s).   1257 the patient was rechecked and updated the Patient regarding the results of the laboratory and imaging studies.    1300 I spoke with Dr. Piedra, the patient's oncologist, regarding patient's presentation, findings, and plan of care. They recommended heparin drip and admission.  1319 I spoke with Dr. Frausto of the hospitalist service regarding patient's presentation, findings, and plan of care.   I discussed the treatment plan with the patient. They expressed understanding of this plan and consented to admission. I discussed the patient with Dr. Frausto, who will admit the patient to a monitored bed for further evaluation and treatment.     Impression & Plan      Medical Decision Making:  Liv Sylvester is a 67 year old female on chemotherapy for stage IV colon cancer who presents to the emergency department today for evaluation of left upper and left lower extremity pain.  She is already had Lovenox  increased from 1.5 mg/kg daily to 1 mg/kg twice daily secondary to recurrent clot.  Highest on my differential today was another breakthrough clot.  Ultrasound of her left upper extremity was normal but ultrasound of her left lower extremity showed significant extension of her previous clot into the left femoral artery.  She states that she has chronic shortness of breath and CT PE study in the ED as necessary cough but that these have actually been improving over the past several months.  I do not feel that.  There is no other signs of cellulitis or other mimicking etiology.  I spoke with her oncologist who states that she is on Lovenox as her insurance will not cover oral medication.  They have been concerned that she may not be dosing herself right with the Lovenox.  Because of this he feels that she would benefit from being started on a heparin drip and brought into the hospital.  Nursing home may need to be considered.  The patient feels comfortable and agrees with admission.      Diagnosis:    ICD-10-CM    1. Acute deep vein thrombosis (DVT) of proximal vein of left lower extremity (H) I82.4Y2 INR     PTT     CBC with platelets differential     Basic metabolic panel   2. Malignant neoplasm of colon, unspecified part of colon (H) C18.9      Disposition:   The patient was admitted to the hospital for further evaluation and care.    CMS Diagnoses: None     Scribe Disclosure:  I, Augustus Harper, am serving as a scribe on 1/5/2018 to document services personally performed by Marivel Lewis*, based on my observations and the provider's statements to me.    EMERGENCY DEPARTMENT     Marivel Lewis MD  01/05/18 3855

## 2018-01-05 NOTE — TELEPHONE ENCOUNTER
Received call from  [facility] stating that she sent Pt to ER due to Right lower extremity pain 10/10 in her knee/groin with swelling.    Pt has metastic colon cancer.

## 2018-01-05 NOTE — CONSULTS
[facility]    Hematology/Oncology Consult Note      Date of Admission:  1/5/2018  Date of Consult:  01/05/18  Reason for Consult: metastatic colon cancer, admitted with LLL DVT    Primary Oncologist: Dr. Piedra      ASSESSMENT/PLAN:  Liv Sylvester is a 67 year old female with:    1) LLL DVT: She has been on Lovenox at home, which she says that she has been injecting on time.  However, I spoke with her primary oncologist, Dr. Piedra, and there is ongoing concern that she is not compliant with her Lovenox injections, rather than failure of Lovenox.  He does not believe that she failed Xarelto either, and feels that she had compliance issues with that as well, with expense of the medication also being a barrier.    -continue heparin drip for now  -I've discussed patient's case with Dr. Piedra.  Upon discharge, would transition patient back to Lovenox twice a day, with monitoring of anti-Xa levels ~4-6 hours after a dose.  We can also consider fondaparinux, as this is only a once a day injection, if her insurance will cover this  -if she is able to be discharged to an assisted living facility, this might help her with compliance with her medications  -we will follow    2) Metastatic colon cancer: She is on FOLFOX+Avastin.  Her last dose was on 12/27/17 with port disconnect on 12/19/17.  -she will follow-up with Dr. Piedra after discharge.  She currently has appointment on 1/10/18    3) Social: She has been having difficulty living at home on her own due to significant emotional stress due to complex family issues.  She is considering moving to an assisted living facility.  This may help with her medication compliance issues as well.    -will consult social work      HISTORY OF PRESENT ILLNESS: Liv Sylvester is a 67 year old female with metastatic cancer, history of endometrial cancer, who was admitted with left leg pain.  She is currently being treated for stage IV metastatic colon cancer by Dr. Piedra.   She has multiple liver metastases and abdominal lymph node metastases.  She is on chemotherapy with palliative intent with FOLFOX+Avastin.  Her last dose was on 12/27/17 with port disconnect on 12/19/17.  She has history of VTE.  She was previously on Xarelto, but due to continued thrombosis, she was switched to Lovenox with monitoring of anti-Xa levels.  There is question of compliance with her anti-coagulation medications.  Ultrasound of lower extremities on 1/5/18 showed new left common femoral, femoral and popliteal vein DVT.  There is resolution of left calf deep vein thrombosis.  She was started on heparin drip in the hospital.      Liv says that she is feeling better, but she still has pain in her left leg.  She says that she has been giving herself Lovenox injections on time.      REVIEW OF SYSTEMS:   14 point ROS was reviewed and is negative other than as noted above in HPI.       MEDICATIONS:  Current Facility-Administered Medications   Medication     heparin 100 UNIT/ML injection 500 Units     acetaminophen (TYLENOL) tablet 1,000 mg     chlorthalidone (HYGROTON) tablet 25 mg     lidocaine (XYLOCAINE) 5 % ointment 0.5 g     lisinopril (PRINIVIL/ZESTRIL) tablet 10 mg     LORazepam (ATIVAN) tablet 0.5 mg     methylphenidate (RITALIN) tablet 5 mg     sertraline (ZOLOFT) tablet 50 mg     naloxone (NARCAN) injection 0.1-0.4 mg     lidocaine 1 % 1 mL     lidocaine (LMX4) cream     sodium chloride (PF) 0.9% PF flush 3 mL     sodium chloride (PF) 0.9% PF flush 3 mL     Patient is already receiving anticoagulation with heparin, enoxaparin (LOVENOX), warfarin (COUMADIN)  or other anticoagulant medication     0.9% sodium chloride infusion     potassium chloride SA (K-DUR/KLOR-CON M) CR tablet 20-40 mEq     potassium chloride (KLOR-CON) Packet 20-40 mEq     potassium chloride 10 mEq in 100 mL sterile water intermittent infusion (premix)     potassium chloride 10 mEq in 100 mL intermittent infusion with 10 mg lidocaine      potassium chloride 20 mEq in 50 mL intermittent infusion     HYDROcodone-acetaminophen (NORCO) 5-325 MG per tablet 1-2 tablet     HYDROmorphone (PF) (DILAUDID) injection 0.2 mg     senna-docusate (SENOKOT-S;PERICOLACE) 8.6-50 MG per tablet 1 tablet    Or     senna-docusate (SENOKOT-S;PERICOLACE) 8.6-50 MG per tablet 2 tablet     polyethylene glycol (MIRALAX/GLYCOLAX) Packet 17 g     bisacodyl (DULCOLAX) Suppository 10 mg     ondansetron (ZOFRAN-ODT) ODT tab 4 mg    Or     ondansetron (ZOFRAN) injection 4 mg     prochlorperazine (COMPAZINE) injection 5 mg    Or     prochlorperazine (COMPAZINE) tablet 5 mg    Or     prochlorperazine (COMPAZINE) Suppository 12.5 mg     famotidine (PEPCID) tablet 20 mg     heparin infusion 25,000 units in 0.45% NaCl 250 mL         ALLERGIES:  No Known Allergies      PAST MEDICAL HISTORY:  Past Medical History:   Diagnosis Date     Cancer (H)     colon     Flash pulmonary edema (H) 4/2013    With concurrent severe HTN, complicating DAPHNEY/BSO     Hemorrhoid      HTN (hypertension)      Obesity          PAST SURGICAL HISTORY:  Past Surgical History:   Procedure Laterality Date     COLONOSCOPY N/A 9/30/2017    Procedure: COMBINED COLONOSCOPY, SINGLE OR MULTIPLE BIOPSY/POLYPECTOMY BY BIOPSY;  colonoscopy;  Surgeon: Kevin Celis MD;  Location:  GI     HYSTERECTOMY, DAPHNEY  4/2013    with BSO     INSERT PORT VASCULAR ACCESS N/A 10/9/2017    Procedure: INSERT PORT VASCULAR ACCESS;  PORT PLACEMENT (MAC);  Surgeon: Rigoberto Nolen MD;  Location: [facility]         SOCIAL HISTORY:  Social History     Social History     Marital status:      Spouse name: N/A     Number of children: 7     Years of education: N/A     Occupational History      [employer]     Social History Main Topics     Smoking status: Former Smoker     Quit date: 6/14/2005     Smokeless tobacco: Never Used     Alcohol use No     Drug use: No     Sexual activity: Not Currently     Other Topics Concern     Not on  "file     Social History Narrative         FAMILY HISTORY:  Family History   Problem Relation Age of Onset     CEREBROVASCULAR DISEASE Father       age 66     HEART DISEASE Mother       of MI, age 72     DIABETES Mother      Hypertension Mother          PHYSICAL EXAM:  Vital signs:  Temp: 97.2  F (36.2  C) Temp src: Oral BP: 154/83 Pulse: 79 Heart Rate: 75 Resp: 14 SpO2: 97 % O2 Device: None (Room air)   Height: 177.8 cm (5' 10\") Weight: 102.5 kg (226 lb)  Estimated body mass index is 32.43 kg/(m^2) as calculated from the following:    Height as of this encounter: 1.778 m (5' 10\").    Weight as of this encounter: 102.5 kg (226 lb).    GENERAL/CONSTITUTIONAL: No acute distress.  EYES: No scleral icterus.  RESPIRATORY: Clear to auscultation bilaterally. No crackles or wheezing.   CARDIOVASCULAR: Regular rate and rhythm without murmurs, gallops, or rubs.  GASTROINTESTINAL: No tenderness. The patient has normal bowel sounds. No guarding.  No distention.  MUSCULOSKELETAL: Warm and well-perfused.  Edema of the left lower extremity with tenderness.  NEUROLOGIC: Alert, oriented, answers questions appropriately.      LABS:  CBC RESULTS:   Recent Labs   Lab Test  18   1313   WBC  9.0   RBC  3.69*   HGB  10.7*   HCT  32.3*   MCV  88   MCH  29.0   MCHC  33.1   RDW  24.5*   PLT  128*       Recent Labs   Lab Test  18   1313  17   0915   NA  138  138   POTASSIUM  3.0*  3.4   CHLORIDE  103  103   CO2  29  29   ANIONGAP  6  6   GLC  108*  132*   BUN  10  10   CR  0.58  0.68   JESÚS  9.3  9.4       IMAGING:  Ultrasound of lower extremities 18:  1. New left common femoral, femoral and popliteal vein deep vein  thrombosis.  2. Resolution of left calf deep vein thrombosis.        Thank you for the opportunity to participate in this patient's care.  Please call with any questions.    Marivel Roth MD  Hematology/Oncology  [facility]  "

## 2018-01-05 NOTE — H&P
PRIMARY CARE  PHYSICIAN:  Amaya Howard MD.       PRIMARY ONCOLOGIST:  Radha Piedra MD.      CHIEF COMPLAINT:  Left leg pain.      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      The patient indicated that her left leg pain began 2 days ago and had noted swelling.  She was attempting to manage her pain with Tylenol and Icy Hot without any relief.  The pain was worse with movement and standing.  She was seen in Oncology Clinic earlier today and patient does undergo chemotherapy.  During assessment by Oncology, there was concern for development of DVT despite patient being on Lovenox and patient was sent to [facility] Emergency Department.      The patient was evaluated by Dr. Lewis in the Emergency Department.  Evaluation included a basic metabolic panel that revealed a potassium level 3, glucose level 108, creatinine of 0.58 with a GFR greater than 90, otherwise within normal limits.  CBC with differential revealed a white count of 9, hemoglobin of 10.7, platelet count of 128, hematocrit of 32.3, RBC count of 3.69 and RDW of 24.5, otherwise within normal limits.  INR was noted to be 1.21 with a PTT of 37.  Left leg venous ultrasound was performed which noted a new left common femoral and popliteal vein DVT and resolution of left calf deep vein thrombosis.  Left upper extremity venous ultrasound was negative for DVT.  I believe Dr. Lewis contacted on-call Oncology Service who recommended starting heparin drip and admission to [facility].      I evaluated the patient in the Emergency Department where she was lying comfortably in bed upon arrival.  She again endorsed swelling and pain in her left leg that was worse with movement and standing.  She obtained minimal relief with Tylenol or Icy Hot and states that she is compliant with Lovenox injections.      The patient also states that she has had frequent night sweats since Wednesday.  She states that her hands and face seem to be darkening in complexion and having experienced more dry skin and bruising on her abdomen.  The patient also indicates that she has had a slight sensation of heart flutter on and off for the last month.  Does complain of dyspnea on exertion with minimal walking with ongoing slight cough.  When asked about sputum production, she states it is occasionally bloody mucus.  She has had some nausea with 1 episode of vomiting occurring yesterday, otherwise this has been relatively controlled.  She has been experiencing some lightheadedness and hand numbness and indicates that her mood has been up and down.      PAST MEDICAL HISTORY:   1.  Stage IV colon cancer with mets to the liver and lung.   2.  Recurrent DVTs/PE on chronic subcu Lovenox injections initially diagnosed in 09/2017.   3.  Essential hypertension.   4.  Obesity.   5.  Iron deficiency anemia.   6.  Major depressive disorder.   7.  Hemorrhoids.   8.  History of ovarian cancer, status post total abdominal hysterectomy with bilateral salpingo-oophorectomy.      PAST SURGICAL HISTORY:   1.  Total abdominal hysterectomy with bilateral salpingo-oophorectomy.   2.  Right vascular port placement to the chest wall.      PRIOR TO ADMIT MEDICATIONS:    Prior to Admission medications    Medication Sig Last Dose Taking? Auth Provider   enoxaparin (LOVENOX) 100 MG/ML SOLN Inject 100 mg Subcutaneous every 12 hours 1/5/2018 at 0830 Yes Unknown, Entered By History   methylphenidate (RITALIN) 5 MG tablet Take 1 tablet  (5 mg) by mouth 2 times daily 1/4/2018 at Unknown time Yes Radha Piedra MD   DM-Doxylamine-Acetaminophen (VICKS NYQUIL COLD & FLU) 15-6. MG/15ML LIQD Take 15 mLs by mouth nightly as needed Past Week at Unknown time Yes Unknown, Entered By History   sennosides (SENOKOT) 8.6 MG tablet Take 1 tablet by mouth 2 times daily as needed for constipation prn med Yes Unknown, Entered By History   Potassium Chloride ER 20 MEQ TBCR Take 1 tablet (20 mEq) by mouth daily 1/4/2018 at Unknown time Yes Radha Piedra MD   lidocaine (XYLOCAINE) 5 % ointment Apply 0.5 g topically as needed for moderate pain Apply pea size amount to rectal area for pain prn med Yes Rene Aragon MD   LORazepam (ATIVAN) 0.5 MG tablet Take 1 tablet (0.5 mg) by mouth every 4 hours as needed (Anxiety, Nausea/Vomiting or Sleep) Past Week at Unknown time Yes Rahda Piedra MD   prochlorperazine (COMPAZINE) 10 MG tablet Take 1 tablet (10 mg) by mouth every 6 hours as needed (Nausea/Vomiting) prn med at Unknown time Yes Radha Piedra MD   ondansetron (ZOFRAN) 8 MG tablet Take 1 tablet (8 mg) by mouth every 8 hours as needed (Nausea/Vomiting) prn med Yes Radha Piedra MD   lidocaine-prilocaine (EMLA) cream Apply topically as needed for moderate pain Apply dollop size amount to port site 30-60 minutes prior to accessing. prn med Yes Radha Piedra MD   albuterol (PROAIR HFA/PROVENTIL HFA/VENTOLIN HFA) 108 (90 BASE) MCG/ACT Inhaler Inhale 2 puffs into the lungs every 6 hours as needed for shortness of breath / dyspnea or wheezing prn med Yes Radha Piedra MD   CHLORTHALIDONE PO Take 25 mg by mouth daily as needed ((Patient is supposed to take 25mg daily but states she only takes as needed))  Past Week at Unknown time Yes Unknown, Entered By History   SERTRALINE HCL PO Take 50 mg by mouth daily (Takes half of a 100mg tablet for a total of 50mg daily) 1/4/2018 at Unknown time Yes Unknown, Entered By History   CALCIUM-MAGNESIUM-ZINC PO Take 1 tablet by mouth daily  1/4/2018 at Unknown time Yes Unknown, Entered By History   VITAMIN D, CHOLECALCIFEROL, PO Take 1 tablet by mouth daily (OTC: Pt unsure of strength) 1/4/2018 at Unknown time Yes Unknown, Entered By History   ACETAMINOPHEN PO Take 1,000 mg by mouth every 4 hours as needed for pain  1/5/2018 at x 1000mg Yes Unknown, Entered By History   lisinopril (PRINIVIL,ZESTRIL) 10 MG tablet Take 1 tablet by mouth daily. 1/4/2018 at Unknown time Yes Adrian Fonseca MD   Multiple Vitamins-Minerals (CENTRUM SILVER) per tablet Take 1 tablet by mouth daily. 1/4/2018 at Unknown time Yes Adrian Fonseca MD     ALLERGIES:  No known drug allergies, though patient indicates she is allergic to bee venom.       SOCIAL HISTORY:  The patient currently resides in an apartment in [facility] with her granddaughter.  She is a former smoker.  She quit in 2005.  She states she has never utilized street or illicit drugs or alcohol.  She currently utilizes a walker to assist with ambulation.      FAMILY HISTORY:   1.  Father passed away at the age of 66 due to a CVA and had known heart disease as well.   2.  Mother passed away at the age of 72 due to an MI and had noted high blood pressure and diabetes.      REVIEW OF SYSTEMS:  A 10-point review of systems was performed.  All pertinent positives are listed in the History of Present Illness, otherwise is negative.      PHYSICAL EXAMINATION:   VITAL SIGNS:  Temperature is 98.4 degrees Fahrenheit with a blood pressure 157/89, heart rate of 77 beats per minute, respiratory rate of 16, O2 saturation of 100% on room air.   GENERAL:  The patient is indicating she has pain in her left leg but is not quantifying it.  The patient is awake, alert, cooperative, in no apparent distress, alert and oriented x3.   HEENT:  Normocephalic, atraumatic.  Moist mucous membranes present.  No exudates noted in the posterior pharynx.  Uvula is midline.  Eyes:  Pupils are equal, round, reactive to light.   Extraocular movements are intact.  Normal sclerae.   NECK:  Supple, normal range of motion, no tracheal deviation, no cervical lymphadenopathy present.   CARDIAC:  Regular rate and rhythm, no rubs, murmurs or gallops appreciated.   PULMONARY:  Lungs are clear to auscultation bilaterally, no wheezes, rhonchi or rales appreciated.   GASTROINTESTINAL:  Bowel sounds are present in all 4 quadrants, soft, nontender, nondistended.   NEUROLOGIC:  Cranial nerves II-XII appear grossly intact.  The patient demonstrates equal sensation, coordination and strength in the upper extremities bilaterally as well as the right lower extremity.  Difficult to assess left lower extremity secondary to pain.   EXTREMITIES:  Edema is noted in the left lower extremity.  Right is relatively normal.  There is tenderness to palpation throughout the left lower extremity as well.      ASSESSMENT AND PLAN:  Liv Sylvester is a 67-year-old female with a past medical history significant for stage IV colon cancer with mets to the liver and lung, recurrent deep venous thrombosis/pulmonary embolus on chronic Lovenox injections, hypertension, obesity, iron deficiency anemia, major depressive disorder, hemorrhoids and history of ovarian cancer who is being admitted to [facility] on 01/05/2018 due to recurrent deep venous thrombosis despite anticoagulation therapy.   1.  Recurrent left lower extremity deep venous thrombosis:  Patient has failed outpatient management.  Has stated that she has been compliant with subcutaneous Lovenox injections at home.  The Oncology Service has been consulted in the Emergency Department with recommendations to start a heparin drip with plans to follow once on the floor.  Will formally place a consult for [facility] as the patient follows with Dr. Piedra.  Will continue with heparin drip with pharmacy assistance with management.   2.  Stage IV colon cancer with noted mets to the liver and lung:  [facility] consult has been requested.  The patient is currently undergoing chemotherapy.  Likely this will be on hold while hospitalized but will defer to Oncology Service.  Will ensure antinausea medications are available as well as analgesic management including p.r.n. oral Tylenol, p.r.n. oral Norco and p.r.n. intravenous Dilaudid.   3.  Essential hypertension:  We will resume prior to admit p.r.n. chlorthalidone 25 mg and lisinopril 10 mg daily with hold parameters in place.   4.  Major depressive disorder:  Will resume prior to admit Sertraline 50 mg daily and as needed Ativan.  The patient also appears to be on Ritalin 5 mg 2 times daily, which will also be resumed at this point.   5.  Iron deficiency anemia:  This was historic.  Currently, hemoglobin is noted to be 10.7 and appears to be stable.  MCV is at 88.  No further interventions appear necessary, but will monitor.   6.  Hemorrhoids:  Will continue p.r.n. Lidocaine ointment as needed as well as further analgesic management as stated above.  Will also order for stool softeners as needed.   7.  History of ovarian cancer:  This appears to be stable following a total abdominal hysterectomy with bilateral salpingo-oophorectomy.  No further interventions appear necessary.   8.  Deep venous thrombosis prophylaxis:  Patient will be on heparin drip with pharmacy assistance with management.      CODE STATUS:  Discussed with patient and she elects to be DNR/DNI.      DISPOSITION:  The patient is being admitted under inpatient status due to failed outpatient management of a DVT despite subcutaneous Lovenox.  The patient is currently requiring  a heparin drip.  She will be assessed by [facility] and will likely be hospitalized for a minimum of 2 evenings while undergoing evaluation and management.      The patient was discussed with Dr. Frausto who agrees with the assessment and plan as stated above.  Dr. Fruasto will evaluate the patient independently.          ZANE GONZALEZ DO       As dictated by VALENTINO POWERS PA-C            D: 2018 14:16   T: 2018 15:12   MT: [id number]      Name:     JESSICA MOYA   MRN:      [id number]        Account:      [id number]   :      1950           Admitted:     [id number]      Document: [id number]       cc: Aamya Howard MD

## 2018-01-05 NOTE — PHARMACY-ADMISSION MEDICATION HISTORY
Admission medication history interview status for the 1/5/2018  admission is complete. See EPIC admission navigator for prior to admission medications     Medication history source reliability:Good    Actions taken by pharmacist (provider contacted, etc):Patient states that the only changes made to her list since her last admission was the addition of Ritalin to her medication regimen.      Additional medication history information not noted on PTA med list :None    Medication reconciliation/reorder completed by provider prior to medication history? No    Time spent in this activity: 15 minutes    Prior to Admission medications    Medication Sig Last Dose Taking? Auth Provider   enoxaparin (LOVENOX) 100 MG/ML SOLN Inject 100 mg Subcutaneous every 12 hours 1/5/2018 at 0830 Yes Unknown, Entered By History   methylphenidate (RITALIN) 5 MG tablet Take 1 tablet (5 mg) by mouth 2 times daily 1/4/2018 at Unknown time Yes Radha Piedra MD   DM-Doxylamine-Acetaminophen (VICKS NYQUIL COLD & FLU) 15-6. MG/15ML LIQD Take 15 mLs by mouth nightly as needed Past Week at Unknown time Yes Unknown, Entered By History   sennosides (SENOKOT) 8.6 MG tablet Take 1 tablet by mouth 2 times daily as needed for constipation prn med Yes Unknown, Entered By History   Potassium Chloride ER 20 MEQ TBCR Take 1 tablet (20 mEq) by mouth daily 1/4/2018 at Unknown time Yes Radha Piedra MD   lidocaine (XYLOCAINE) 5 % ointment Apply 0.5 g topically as needed for moderate pain Apply pea size amount to rectal area for pain prn med Yes Rene Aragon MD   LORazepam (ATIVAN) 0.5 MG tablet Take 1 tablet (0.5 mg) by mouth every 4 hours as needed (Anxiety, Nausea/Vomiting or Sleep) Past Week at Unknown time Yes Radha Piedra MD   prochlorperazine (COMPAZINE) 10 MG tablet Take 1 tablet (10 mg) by mouth every 6 hours as needed (Nausea/Vomiting) prn med at Unknown time Yes Radha Piedra MD   ondansetron (ZOFRAN) 8 MG tablet Take 1 tablet (8 mg) by mouth  every 8 hours as needed (Nausea/Vomiting) prn med Yes Radha Piedra MD   lidocaine-prilocaine (EMLA) cream Apply topically as needed for moderate pain Apply dollop size amount to port site 30-60 minutes prior to accessing. prn med Yes Radha Piedra MD   albuterol (PROAIR HFA/PROVENTIL HFA/VENTOLIN HFA) 108 (90 BASE) MCG/ACT Inhaler Inhale 2 puffs into the lungs every 6 hours as needed for shortness of breath / dyspnea or wheezing prn med Yes Radha Piedra MD   CHLORTHALIDONE PO Take 25 mg by mouth daily as needed ((Patient is supposed to take 25mg daily but states she only takes as needed))  Past Week at Unknown time Yes Unknown, Entered By History   SERTRALINE HCL PO Take 50 mg by mouth daily (Takes half of a 100mg tablet for a total of 50mg daily) 1/4/2018 at Unknown time Yes Unknown, Entered By History   CALCIUM-MAGNESIUM-ZINC PO Take 1 tablet by mouth daily 1/4/2018 at Unknown time Yes Unknown, Entered By History   VITAMIN D, CHOLECALCIFEROL, PO Take 1 tablet by mouth daily (OTC: Pt unsure of strength) 1/4/2018 at Unknown time Yes Unknown, Entered By History   ACETAMINOPHEN PO Take 1,000 mg by mouth every 4 hours as needed for pain  1/5/2018 at x 1000mg Yes Unknown, Entered By History   lisinopril (PRINIVIL,ZESTRIL) 10 MG tablet Take 1 tablet by mouth daily. 1/4/2018 at Unknown time Yes Adrian Fonseca MD   Multiple Vitamins-Minerals (CENTRUM SILVER) per tablet Take 1 tablet by mouth daily. 1/4/2018 at Unknown time Yes Adrian Fonseca MD

## 2018-01-05 NOTE — ED NOTES
"[facility]  ED Nurse Handoff Report    ED Chief complaint: Leg Pain (LLE pain and swelling behind left knee.  Chemo patient, sent here for further evaluation. )      ED Diagnosis:   Final diagnoses:   Acute deep vein thrombosis (DVT) of proximal vein of left lower extremity (H)   Malignant neoplasm of colon, unspecified part of colon (H)       Code Status: DNR / DNI    Allergies: No Known Allergies    Activity level - Baseline/Home:  Stand with Assist of 2    Activity Level - Current:   Stand with Assist of 2     Needed?: No    Isolation: Yes  Infection: cancer pt, currently doing chemo    Bariatric?: No    Vital Signs:   Vitals:    01/05/18 1023 01/05/18 1045 01/05/18 1050 01/05/18 1146   BP: 138/87 145/85 132/76 121/79   Pulse:    97   Resp:    14   Temp: 98.4  F (36.9  C)      TempSrc: Oral      SpO2: 100% 99% 99% 100%   Weight: 102.5 kg (226 lb)      Height: 1.778 m (5' 10\")          Cardiac Rhythm: ,        Pain level:      Is this patient confused?: No    Patient Report:  DONALDIBE NOTE: KATHE Sylvester is a 67 year old female with stage IV colon cancer who is anticoagulated on Lovenox for previous PE/DVT in September 2017. She presents to the emergency department today for evaluation of left arm and leg pain. The patient reports onset of left upper arm pain that began 01/01/18 without injury or trauma. She notes increased pain with movement and has tried heating pads in addition to Tylenol with some relief of the pain. The patient reports onset of left leg pain that began 2 days ago without injury or trauma, but states that the pain is constant. She is currently undergoing chemotherapy and when she presented to her cancer treatment center they were concerned for possible PE/DVT and sent the patient to the ED for further evaluation. The patient reports having had a cough of late and reports feeling short of breath ever since she started chemotherapy, but that this is getting " better. She also reports some swelling of the lower left leg. No chest pain or fevers.    RN note: She was given 4mg IV morphine and that has helped her pain. VSS. U/S today does show evidence for a new dvt in the left leg. Heparin will be started shortly.    Family Comments: no family present    OBS brochure/video discussed/provided to patient: No    ED Medications:   Medications   morphine (PF) injection 4 mg (4 mg Intravenous Given 1/5/18 1044)   heparin 100 UNIT/ML injection 500 Units (not administered)       Drips infusing?:  Yes heparin      ED NURSE PHONE NUMBER: [phone]

## 2018-01-05 NOTE — TELEPHONE ENCOUNTER
"Social Work Progress Note      Data/Intervention:  Patient Name:  Liv Sylvester  /Age:  1950 (67 year old)    Reason for Follow-Up:  Pt is a 67-year-old woman who has a diagnosis of stage IV metastatic colon cancer, receiving palliative FOLFOX and Avastin chemotherapy at [facility]. This clinician called pt today per JACKIE Narvaez's request regarding increasing concern about pt's ability to manage care independently at home.     Intervention:   This clinician spoke with Liv on phone who reported that she has started to think about transitioning from independent living situation to assisted living community but has some concern about costs. This provided general information about [employer]'s assistance with older adults thinking about transitioning into assisted living communities. Pt acknowledged that she would be open to larger discussion with this clinician at next clinic visit, reporting that she feels that she has been struggling at home due to \"significant emotional stress\" as a result of complex family dynamics. Pt acknowledges that she would also like to discuss family concerns with this clinician that have been impacting her ability to cope and care for herself at next clinic visit.     This clinician discussed with pt importance of sending in SSDI application instead of continuing to live off [place], as this will . Encouraged pt to contact [employer] to learn more as to timing of application and guidance as to next steps. Pt reported that she would contact them this afternoon.     Plan:  This clinician will plan to have more extensive discussion with pt 1/10/18 regarding pt's coping and management of stress in the context of advancing illness. This clinician will also follow-up with pt at that time about [employer] resource, as well as where pt is in progressing towards application for SSDI.     Pt previously given this clinician's contact information and " knows to reach out in the case of psychosocial distress or concern.     Please call or page if needs or concerns arise.     DEANNE Watkins, LICSW  Direct Phone: [phone]  Pager: [phone]

## 2018-01-06 PROBLEM — D68.59 PRIMARY HYPERCOAGULABLE STATE (H): Status: ACTIVE | Noted: 2018-01-01

## 2018-01-06 PROBLEM — Z91.148 NONCOMPLIANCE WITH MEDICATION REGIMEN: Status: ACTIVE | Noted: 2018-01-01

## 2018-01-06 PROBLEM — C18.3 MALIGNANT NEOPLASM OF HEPATIC FLEXURE (H): Status: ACTIVE | Noted: 2017-01-01

## 2018-01-06 PROBLEM — I82.402 ACUTE DEEP VEIN THROMBOSIS (DVT) OF LEFT LOWER EXTREMITY (H): Status: ACTIVE | Noted: 2018-01-01

## 2018-01-06 NOTE — PROGRESS NOTES
[facility]    Hematology/Oncology Follow-up Note      Today's Date: 01/06/18  Date of Admission:  1/5/2018  Reason for Consult: metastatic colon cancer, admitted with LLL DVT    Primary Oncologist: Dr. Piedra      ASSESSMENT/PLAN:  Liv Sylvester is a 67 year old female with:    ) LLL DVT: She has been on Lovenox at home, which she says that she has been injecting on time.  However, I spoke with her primary oncologist, Dr. Piedra, and there is ongoing concern that she is not compliant with her Lovenox injections, rather than failure of Lovenox.  He does not believe that she failed Xarelto either, and feels that she had compliance issues with that as well, with expense of the medication also being a barrier.    -continue heparin drip for now  -I've discussed patient's case with Dr. Piedra.  Upon discharge, would transition patient back to Lovenox twice a day, with monitoring of anti-Xa levels ~4-6 hours after a dose.  We can also consider fondaparinux, as this is only a once a day injection, if her insurance will cover this.  I have placed consult for pharmacy liaison to check on coverage for fondaparinux outpatient.  -she can likely be discharged once her leg pain is better  -if she is able to be discharged to an assisted living facility, this might help her with compliance with her medications  -we will follow     2) Metastatic colon cancer: She is on FOLFOX+Avastin.  Her last dose was on 12/27/17 with port disconnect on 12/19/17.  -she will follow-up with Dr. Piedra after discharge.  She currently has appointment on 1/10/18     3) Social: She has been having difficulty living at home on her own due to significant emotional stress due to complex family issues.  She is considering moving to an assisted living facility.  This may help with her medication compliance issues as well.    -social work consulted        INTERIM HISTORY: Liv was seen in her room today.  Her daughter was present today as well.   She says that she was feeling better.  However, her left leg started hurting again today, when she got up and when she moves.       MEDICATIONS:  Current Facility-Administered Medications   Medication     sodium chloride (PF) 0.9% PF flush 10-20 mL     heparin lock flush 10 UNIT/ML injection 5-10 mL     heparin lock flush 10 UNIT/ML injection 5-10 mL     heparin 100 UNIT/ML injection 5 mL     sodium chloride (PF) 0.9% PF flush 10-20 mL     heparin infusion 25,000 units in 0.45% NaCl 250 mL     heparin 100 UNIT/ML injection 500 Units     acetaminophen (TYLENOL) tablet 1,000 mg     chlorthalidone (HYGROTON) tablet 25 mg     lidocaine (XYLOCAINE) 5 % ointment 0.5 g     lisinopril (PRINIVIL/ZESTRIL) tablet 10 mg     LORazepam (ATIVAN) tablet 0.5 mg     methylphenidate (RITALIN) tablet 5 mg     sertraline (ZOLOFT) tablet 50 mg     naloxone (NARCAN) injection 0.1-0.4 mg     lidocaine 1 % 1 mL     lidocaine (LMX4) cream     sodium chloride (PF) 0.9% PF flush 3 mL     Patient is already receiving anticoagulation with heparin, enoxaparin (LOVENOX), warfarin (COUMADIN)  or other anticoagulant medication     HYDROcodone-acetaminophen (NORCO) 5-325 MG per tablet 1-2 tablet     HYDROmorphone (PF) (DILAUDID) injection 0.2 mg     senna-docusate (SENOKOT-S;PERICOLACE) 8.6-50 MG per tablet 1 tablet    Or     senna-docusate (SENOKOT-S;PERICOLACE) 8.6-50 MG per tablet 2 tablet     polyethylene glycol (MIRALAX/GLYCOLAX) Packet 17 g     bisacodyl (DULCOLAX) Suppository 10 mg     ondansetron (ZOFRAN-ODT) ODT tab 4 mg    Or     ondansetron (ZOFRAN) injection 4 mg     prochlorperazine (COMPAZINE) injection 5 mg    Or     prochlorperazine (COMPAZINE) tablet 5 mg    Or     prochlorperazine (COMPAZINE) Suppository 12.5 mg     famotidine (PEPCID) tablet 20 mg           ALLERGIES:  No Known Allergies      PHYSICAL EXAM:  Vital signs:  Temp: 98.4  F (36.9  C) Temp src: Oral BP: 160/71   Heart Rate: 80 Resp: 16 SpO2: 99 % O2 Device: None (Room  "air)   Height: 177.8 cm (5' 10\") Weight: 102.5 kg (226 lb)  Estimated body mass index is 32.43 kg/(m^2) as calculated from the following:    Height as of this encounter: 1.778 m (5' 10\").    Weight as of this encounter: 102.5 kg (226 lb).    GENERAL/CONSTITUTIONAL: No acute distress. Daughter at bedside.  NEUROLOGIC: Alert, oriented, answers questions appropriately.        LABS:  CBC RESULTS:   Recent Labs   Lab Test  01/06/18   0416   WBC  6.3   RBC  3.43*   HGB  9.8*   HCT  29.4*   MCV  86   MCH  28.6   MCHC  33.3   RDW  24.8*   PLT  130*       Recent Labs   Lab Test  01/06/18   0416  01/06/18   0230  01/05/18   1313   NA  141   --   138   POTASSIUM  3.6  3.5  3.0*   CHLORIDE  107   --   103   CO2  27   --   29   ANIONGAP  7   --   6   GLC  107*   --   108*   BUN  11   --   10   CR  0.60   --   0.58   JESÚS  8.7   --   9.3       I spent a total of 25 minutes with the patient, with over >50% of the time in counseling and/or coordination of care.       Marivel Roth MD  Hematology/Oncology  [facility]  "

## 2018-01-06 NOTE — PLAN OF CARE
Problem: Patient Care Overview  Goal: Plan of Care/Patient Progress Review  A/O.  VSS on RA.   Norco and heat packs effective for LLE pain.  Heparin gtt held for 1 hour, restarted at 0600 at 1200 u/hr.  Next Hep 10A scheduled for 1200.  Up SBA + walker to BR.  Calls appropriately.  Continue to monitor.

## 2018-01-06 NOTE — PROGRESS NOTES
"[facility]    Hospitalist Progress Note    Assessment & Plan   Liv Sylvester is a 67 year old female who was admitted on 1/5/2018 with    Impression:   Principal Problem:    Acute deep vein thrombosis (DVT) of left lower extremity (H)  Active Problems:    HTN (hypertension)    Colon Cancer, with Liver Met (Stage IV)    DVT (deep vein thrombosis) in pregnancy (H)    Primary hypercoagulable state with Recurrent DVT/PE    Noncompliance with medication regimen    Thrombocytopenia    Hypokalemia -- better    Plan:  Anticoagulation per HemeOnc (was on Lovenox when this episode occurred but she admits \"I did miss some doses\".  Continue IV heparin for now -- ?discharge on Lovenox again vs Fondeparineaux?  Will have soc services discuss possible assisted living (she is considering it).  CBC in AM.     DVT Prophylaxis: IV heparin  Code Status: DNR/DNI    Disposition: Expected discharge in 1-2 days once left leg pain better.    Addison Nielson MD  Pager [phone]  Cell Phone [phone]  Text Page (7am to 6pm)    Interval History   Reports left leg pain better but not gone.  Admits she may have missed some doses of Lovenox. Reports left leg swelling better.     Physical Exam   Temp: 98.4  F (36.9  C) Temp src: Oral BP: 160/71   Heart Rate: 80 Resp: 16 SpO2: 99 % O2 Device: None (Room air)    Vitals:    01/05/18 1023 01/05/18 1315   Weight: 102.5 kg (226 lb) 102.5 kg (226 lb)     Vital Signs with Ranges  Temp:  [96.1  F (35.6  C)-98.4  F (36.9  C)] 98.4  F (36.9  C)  Heart Rate:  [74-81] 80  Resp:  [14-18] 16  BP: (124-160)/(54-71) 160/71  SpO2:  [95 %-100 %] 99 %  I/O last 3 completed shifts:  In: 2104 [P.O.:840; I.V.:1264]  Out: 200 [Urine:200]    Constitutional: Awake, alert, cooperative, no apparent distress  Respiratory: Clear to auscultation bilaterally, no crackles or wheezing  Cardiovascular: Regular rate and rhythm, normal S1 and S2, and no murmur noted  GI: Normal bowel sounds, soft, " non-distended, non-tender  Extrem: mild tenderness over medial left lower leg compartment, no ankle edema  Neuro: Ox3, no focal motor or sensory deficits    Medications     HEParin 1,100 Units/hr (01/06/18 1423)     - MEDICATION INSTRUCTIONS -         heparin lock flush  5-10 mL Intracatheter Q24H     heparin  5 mL Intracatheter Q28 Days     heparin  500 Units Intracatheter Once     lisinopril  10 mg Oral Daily     methylphenidate  5 mg Oral BID     sertraline (ZOLOFT) tablet 50 mg  50 mg Oral Daily     famotidine  20 mg Oral BID       Data     Recent Labs  Lab 01/06/18  0416 01/06/18  0230 01/05/18  1313   WBC 6.3  --  9.0   HGB 9.8*  --  10.7*   MCV 86  --  88   *  --  128*   INR  --   --  1.21*     --  138   POTASSIUM 3.6 3.5 3.0*   CHLORIDE 107  --  103   CO2 27  --  29   BUN 11  --  10   CR 0.60  --  0.58   ANIONGAP 7  --  6   JESÚS 8.7  --  9.3   *  --  108*       Imaging:   No results found for this or any previous visit (from the past 24 hour(s)).

## 2018-01-06 NOTE — PLAN OF CARE
Problem: Pain, Acute (Adult)  Goal: Identify Related Risk Factors and Signs and Symptoms  Related risk factors and signs and symptoms are identified upon initiation of Human Response Clinical Practice Guideline (CPG).   Outcome: No Change  [id number]: No c/o pain this shift. Declined PRNs for pain. Hep decreased from 12 to 11. Recheck scheduled for 2020. Up w/SHARON and walker. A&Ox4.

## 2018-01-06 NOTE — PLAN OF CARE
Problem: Pain, Acute (Adult)  Goal: Acceptable Pain Control/Comfort Level  Patient will demonstrate the desired outcomes by discharge/transition of care.  Outcome: Improving   Pt is alert and oriented X4. VSS.  Pt complained of 7/10 pain IV dilaudid and Norco given with good relief.  Pt also used hot pack to the left thigh which also helped with pain relief.  Pt is assist of 1 with walker and gait belt.  Pt last Chemo was on Wednesday 1/3/18, ON chemo precautions until 1/6/18 night.  Hep 10 A elevated this evening at 2030, pharmacist updated and told writer to stop heparin for 1 hour and restarted medication at 13ml/hr, next recheck at 0430.  Pt on cardiac diet, no caffeine-not much of appetite this evening.  IVF infusing into port at 75ml/hr.  Will continue to monitor.

## 2018-01-06 NOTE — PLAN OF CARE
Problem: Patient Care Overview  Goal: Plan of Care/Patient Progress Review  [id number]:  A/O, forgetful.  VSS on RA.  Norco and heat packs effective for managing LLE pain.  Per pt report, LLE seems less swollen.  Heparin gtt infusing at 1200 units/hr.  Up SBA + walker to BR.  Cardiac diet, good appetite.  Continue to monitor.

## 2018-01-07 NOTE — PROGRESS NOTES
Pt to be discharged. She has used [facility] in past, at this time will be start of care again-[facility] notified. Follow up appointment:  Evaristo 10, 2018  9:30 AM CST   Return Visit with Radha Piedra MD   [facility] ([facility])     Communication handoff sent to PCP

## 2018-01-07 NOTE — CONSULTS
"BRIEF NUTRITION ASSESSMENT      REASON FOR ASSESSMENT:  Nutrition Admission Screen - Unintentional weight loss of 10# or more in past 2 months    NUTRITION HISTORY:  Pt reports following a regular diet  Breakfast - grits  Lunch - leftovers, sandwiches (chicken and PB)  Dinner - varies  Enjoys jello and applesauce  Likes to drink juice  Has not been consuming any nutrition supplements, but is interested in trying Ensure    Pt works at [employer] and plans to buy supplements there (gets a discount)    CURRENT DIET AND INTAKE:  Diet:  2400mg Na, LSF, no caffeine              Reports tolerating breakfast - omelet (saving her OJ and plans to try some Ensure)    Dtr went out to get pt some soup for lunch - \"the portions are too small here\"      ANTHROPOMETRICS:  Height: 5'10\"  Weight: (1/5) 102.5 kg  BMI: 32.4 kg/m2  IBW:  68.2 kg  Weight Status: Obesity Grade I BMI 30-34.9  %IBW: 150%  Weight History: Pt confirms losing some wt with chemo - \"just don't eat as much as I used to\"  Wt Readings from Last 10 Encounters:   01/05/18 102.5 kg (226 lb)   12/27/17 101.6 kg (224 lb)   12/27/17 103.1 kg (227 lb 6.4 oz)   12/15/17 104.3 kg (229 lb 15 oz)   12/13/17 104.3 kg (230 lb)   11/29/17 110.5 kg (243 lb 9.6 oz)   11/29/17 110.5 kg (243 lb 9.6 oz)   11/22/17 106.2 kg (234 lb 3.2 oz)   11/08/17 105.9 kg (233 lb 6.4 oz)   10/25/17 108.5 kg (239 lb 3.2 oz)         LABS:  Labs noted    MALNUTRITION:  Patient does not meet two of the following criteria necessary for diagnosing malnutrition: significant weight loss, reduced intake, subcutaneous fat loss, muscle loss or fluid retention    NUTRITION INTERVENTION:  Nutrition Diagnosis:  No nutrition diagnosis at this time.    Implementation:  Nutrition Education ---> Reviewed available supplements to try.  Sent pt some supplements to sample while here.      FOLLOW UP/MONITORING:   Will re-evaluate in 7 - 10 days, or sooner, if re-consulted.          "

## 2018-01-07 NOTE — PLAN OF CARE
Problem: Patient Care Overview  Goal: Plan of Care/Patient Progress Review  Outcome: Adequate for Discharge Date Met: 01/07/18  Pt A/O.  Hep gtt stopped today and switched back to lovenox.  Pt already understands how to give her own lovenox injections, since she was on them prior to admission.  Discharged with discharge instructions and discharge med.  Pt refused the flu shot.  Discharged to home with resumption of home care.

## 2018-01-07 NOTE — PLAN OF CARE
Problem: Patient Care Overview  Goal: Plan of Care/Patient Progress Review  Outcome: No Change  A&O x4 but forgetful at times. VSS on RA.  Norco, dilaudid and heat packs effective for managing LLE pain.  Per pt report, LLE seems less swollen.  Heparin gtt infusing at 1100 units/hr.  Up SBA + walker to BR.  Cardiac diet, good appetite.  Continue to monitor.

## 2018-01-07 NOTE — PLAN OF CARE
"Problem: Patient Care Overview  Goal: Plan of Care/Patient Progress Review  Outcome: No Change  A&O x4 but forgetful at times. High SBP in 150-170s and slightly tachy (100s) but otherwise VSS on RA.  PRN Norco and heat given for left leg pain, effective. Patient states \"LLE seems less swollen, and less numb.\" Heparin gtt infusing at 1100 units/hr via port. Up SBA + walker to BR, voiding adequately. Cardiac, low sodium/cholestrol, no caffeine diet, tolerating well, good appetite.       "

## 2018-01-07 NOTE — DISCHARGE SUMMARY
"[facility]    Discharge Summary  Hospitalist    Date of Admission:  1/5/2018  Date of Discharge:  1/7/2018  Discharging Provider: Addison Nielson MD    Discharge Diagnoses   Principal Problem:    Acute deep vein thrombosis (DVT) of left lower extremity (H)  Active Problems:    HTN (hypertension)    Colon Cancer, with Liver Met (Stage IV)    DVT (deep vein thrombosis) in pregnancy (H)    Primary hypercoagulable state with Recurrent DVT/PE    Noncompliance with medication regimen      History of Present Illness    67-year-old female with a past medical history significant for stage IV colon cancer with mets to the liver and lungs, recurrent DVTs/PE on chronic Lovenox therapy, essential hypertension, obesity, iron deficiency anemia, major depressive disorder, hemorrhoids and history of ovarian cancer, status po2st total abdominal hysterectomy with bilateral salpingo-oophorectomy who presented to [facility] on 01/05/2018 due to left arm and left leg pain and arm ultrasound if negative, and left leg US shows an acute DVT in left femoral and popliteal veins.      Hospital Course    Treated with IV heparin, and initially she said she had been taking the Lovenox, but then did admit she did miss \"some doses\", and suspect the non-compliance was the cause of the DVT recurrence.  By discharge her leg pain was almost completely resolved, and she will resume Lovenox 100 mg SQ bid.  She will resume home care on discharge.  Also she had been getting IV fluids daily at home, but did not need them here and this was discontinued.      She was getting daily IV fluids at home but did not need them here, and will not continue them on discharge, but will continue the visiting nurse 2-3 times a week to help with monitoring symptoms and assuring medication compliance.      Addison Nielson MD  Pager: [phone]  Cell Phone:  [phone]     Significant Results and Procedures "   As above    Code Status   DNR / DNI       Primary Care Physician   RIGOBERTO SEN    Physical Exam   Temp: 98.2  F (36.8  C) Temp src: Oral BP: 143/72   Heart Rate: 87 Resp: 18 SpO2: 97 % O2 Device: None (Room air)    Vitals:    01/05/18 1023 01/05/18 1315   Weight: 102.5 kg (226 lb) 102.5 kg (226 lb)     Vital Signs with Ranges  Temp:  [97.5  F (36.4  C)-98.4  F (36.9  C)] 98.2  F (36.8  C)  Heart Rate:  [] 87  Resp:  [16-18] 18  BP: (143-184)/(66-86) 143/72  SpO2:  [97 %-100 %] 97 %  I/O last 3 completed shifts:  In: 600 [P.O.:600]  Out: 200 [Urine:200]    Exam on discharge: lungs clear, left leg tenderness and swelling resolved.     Discharge Disposition   Admited to home care:   Agency: [facility]  Discharged to home  Condition at discharge: Fair    Consultations This Hospital Stay   PHARMACY TO DOSE HEPARIN  HEMATOLOGY & ONCOLOGY IP CONSULT  SOCIAL WORK IP CONSULT  PHARMACY TO DOSE HEPARIN  SOCIAL WORK IP CONSULT  SOCIAL WORK IP CONSULT  PHARMACY LIAISON FOR MEDICATION COVERAGE CONSULT    Time Spent on this Encounter   I spent a total of 35 minutes discharging this patient.     Discharge Orders     Home care nursing referral     Reason for your hospital stay   New left leg blood clot     Follow-up and recommended labs and tests    Follow up with Dr. Piedra on Wed, 1/10/18 as scheduled, check CBC and Potassium then.     Activity   Your activity upon discharge: activity as tolerated     Discharge Instructions   Call Dr. Galo at Pager [phone] if questions, or Cell Phone [phone].     MD face to face encounter   Documentation of Face to Face and Certification for Home Health Services    I certify that patient: Liv Sylvester is under my care and that I, or a nurse practitioner or physician's assistant working with me, had a face-to-face encounter that meets the physician face-to-face encounter requirements with this patient on: 1/7/2018.    This encounter with the patient was in whole, or in part,  Nutrition, metabolism, and development symptoms for the following medical condition, which is the primary reason for home health care: Metastatic colon cancer with hypercoagulable state and recurrent DVT and PE.    I certify that, based on my findings, the following services are medically necessary home health services: Nursing.    My clinical findings support the need for the above services because: Nurse is needed: To provide assessment and oversight required in the home to assure adherence to the medical plan due to: recurrent blood clots and questionable medication compliance..    Further, I certify that my clinical findings support that this patient is homebound (i.e. absences from home require considerable and taxing effort and are for medical reasons or Congregational services or infrequently or of short duration when for other reasons) because: Leaving home is medically contraindicated for the following reason(s): Dyspnea on exertion that makes it so they cannot leave their home for needed services without clinical deterioration...    Based on the above findings. I certify that this patient is confined to the home and needs intermittent skilled nursing care, physical therapy and/or speech therapy.  The patient is under my care, and I have initiated the establishment of the plan of care.  This patient will be followed by a physician who will periodically review the plan of care.  Physician/Provider to provide follow up care: Amaya Howard    Attending hospital physician (the Medicare certified [place] provider): Addison Nielson  Physician Signature: See electronic signature associated with these discharge orders.  Date: 1/7/2018     DNR/DNI     Diet   Follow this diet upon discharge: Regular diet       Discharge Medications   Current Discharge Medication List      CONTINUE these medications which have NOT CHANGED    Details   enoxaparin (LOVENOX) 100 MG/ML SOLN Inject 100 mg Subcutaneous every 12 hours      methylphenidate (RITALIN) 5 MG tablet Take 1  tablet (5 mg) by mouth 2 times daily  Qty: 60 tablet, Refills: 0    Associated Diagnoses: Neoplastic malignant related fatigue      DM-Doxylamine-Acetaminophen (VICKS NYQUIL COLD & FLU) 15-6. MG/15ML LIQD Take 15 mLs by mouth nightly as needed      sennosides (SENOKOT) 8.6 MG tablet Take 1 tablet by mouth 2 times daily as needed for constipation      Potassium Chloride ER 20 MEQ TBCR Take 1 tablet (20 mEq) by mouth daily  Qty: 30 tablet, Refills: 11    Associated Diagnoses: Hypokalemia      lidocaine (XYLOCAINE) 5 % ointment Apply 0.5 g topically as needed for moderate pain Apply pea size amount to rectal area for pain  Qty: 5 g, Refills: 0      LORazepam (ATIVAN) 0.5 MG tablet Take 1 tablet (0.5 mg) by mouth every 4 hours as needed (Anxiety, Nausea/Vomiting or Sleep)  Qty: 30 tablet, Refills: 2    Associated Diagnoses: Malignant neoplasm of hepatic flexure (H)      prochlorperazine (COMPAZINE) 10 MG tablet Take 1 tablet (10 mg) by mouth every 6 hours as needed (Nausea/Vomiting)  Qty: 30 tablet, Refills: 2    Associated Diagnoses: Malignant neoplasm of hepatic flexure (H)      ondansetron (ZOFRAN) 8 MG tablet Take 1 tablet (8 mg) by mouth every 8 hours as needed (Nausea/Vomiting)  Qty: 10 tablet, Refills: 2    Associated Diagnoses: Malignant neoplasm of hepatic flexure (H)      lidocaine-prilocaine (EMLA) cream Apply topically as needed for moderate pain Apply dollop size amount to port site 30-60 minutes prior to accessing.  Qty: 30 g, Refills: 1    Associated Diagnoses: Malignant neoplasm of hepatic flexure (H); Port catheter in place      albuterol (PROAIR HFA/PROVENTIL HFA/VENTOLIN HFA) 108 (90 BASE) MCG/ACT Inhaler Inhale 2 puffs into the lungs every 6 hours as needed for shortness of breath / dyspnea or wheezing  Qty: 1 Inhaler, Refills: 3    Associated Diagnoses: Reactive airway disease without complication, unspecified asthma severity, unspecified whether persistent      CHLORTHALIDONE PO Take 25 mg by  mouth daily as needed ((Patient is supposed to take 25mg daily but states she only takes as needed))       SERTRALINE HCL PO Take 50 mg by mouth daily (Takes half of a 100mg tablet for a total of 50mg daily)      CALCIUM-MAGNESIUM-ZINC PO Take 1 tablet by mouth daily      VITAMIN D, CHOLECALCIFEROL, PO Take 1 tablet by mouth daily (OTC: Pt unsure of strength)      ACETAMINOPHEN PO Take 1,000 mg by mouth every 4 hours as needed for pain       lisinopril (PRINIVIL,ZESTRIL) 10 MG tablet Take 1 tablet by mouth daily.  Qty: 30 tablet, Refills: 11    Comments: Profile only, do not fill today  Associated Diagnoses: HTN (hypertension)      Multiple Vitamins-Minerals (CENTRUM SILVER) per tablet Take 1 tablet by mouth daily.  Qty: 100 tablet, Refills: 12           Allergies   No Known Allergies  Data   Most Recent 3 CBC's:  Recent Labs   Lab Test  01/06/18   0416  01/05/18   1313  12/27/17   0915   WBC  6.3  9.0  3.0*   HGB  9.8*  10.7*  11.0*   MCV  86  88  87   PLT  130*  128*  172      Most Recent 3 BMP's:  Recent Labs   Lab Test  01/07/18   0606  01/06/18   0416  01/06/18   0230  01/05/18   1313  12/27/17   0915   NA   --   141   --   138  138   POTASSIUM  3.2*  3.6  3.5  3.0*  3.4   CHLORIDE   --   107   --   103  103   CO2   --   27   --   29  29   BUN   --   11   --   10  10   CR   --   0.60   --   0.58  0.68   ANIONGAP   --   7   --   6  6   JESÚS   --   8.7   --   9.3  9.4   GLC   --   107*   --   108*  132*     Most Recent 2 LFT's:  Recent Labs   Lab Test  12/27/17   0915  12/15/17   1238   AST  27  44   ALT  26  30   ALKPHOS  87  112   BILITOTAL  0.4  0.4     Most Recent INR's and Anticoagulation Dosing History:  Anticoagulation Dose History     Recent Dosing and Labs Latest Ref Rng & Units 9/29/2017 12/15/2017 1/5/2018    INR 0.86 - 1.14 1.05 1.12 1.21(H)        Most Recent 3 Troponin's:  Recent Labs   Lab Test  10/20/17   1320  06/14/10   1207   TROPI  <0.015  <0.012     Most Recent Cholesterol Panel:  Recent Labs    Lab Test  05/13/13   0837   CHOL  162   LDL  115   HDL  28*   TRIG  95     Most Recent 6 Bacteria Isolates From Any Culture (See EPIC Reports for Culture Details):  Recent Labs   Lab Test  12/15/17   1538  12/15/17   1238   CULT  No growth  No growth     Most Recent TSH, T4 and A1c Labs:  Recent Labs   Lab Test  05/13/13   0837   TSH  0.87       Need for prophylactic measure Nutrition, metabolism, and development symptoms Nutrition, metabolism, and development symptoms

## 2018-01-08 NOTE — PROGRESS NOTES
This is a recent snapshot of the patient's [facility] medical record.  For current drug dose and complete information and questions, call [phone] or In Basket pool, fv home infusion ([id number])  CSN Number:  [id number]

## 2018-01-08 NOTE — PROGRESS NOTES
This is a recent snapshot of the patient's [facility] medical record.  For current drug dose and complete information and questions, call [phone] or In Basket pool, fv home infusion ([id number])  CSN Number:  [id number]

## 2018-01-09 NOTE — PROGRESS NOTES
This is a recent snapshot of the patient's [facility] medical record.  For current drug dose and complete information and questions, call [phone] or In Basket pool, fv home infusion ([id number])  CSN Number:  [id number]

## 2018-01-11 NOTE — PROGRESS NOTES
[facility] PHYSICIANS  HEMATOLOGY ONCOLOGY    ONCOLOGY FOLLOWUP NOTE      DIAGNOSIS:    1- Metastatic colon cancer in a patient with history of stage 1A grade 3 endometrial carcinoma approximately around 2012.     On 09/29/2017, Liv Sylvester presented with progressive fatigue and right upper quadrant pain.  CT scan 09/29/2017 showed partially contracted bladder and findings suspicious for liver metastases.  There were multiple defined hypoechoic foci scoped scattered throughout the liver, the largest was in the right lobe.  CT was also showing enlargement of hepatic and retroperitoneal lymph nodes.  On 09/30/2017, she underwent a colonoscopy, which showed a fungating, infiltrative, ulcerated, partially obstructing mass at the hepatic flexure.  Pathology is consistent with moderately differentiated adenocarcinoma with normal mismatch repair protein expression.   2- Significant iron deficiency anemia.   3- VTE- malignancy related     TREATMENT:  10/11/2017 FOLFOX and Avastin.      SUBJECTIVE:  The patient was seen as a followup today. She was admitted to the hospital last week for recurrent DVT while on lovenox. She still has pain in her left leg. She is back on lovenox.     REVIEW OF SYSTEMS:  A complete review of systems was performed and found to be negative other than pertinent positives mentioned in history of present illness.      Past medical, social histories reviewed.     Meds- Reviewed.     PHYSICAL EXAMINATION:   VITAL SIGNS: /62  Temp 98.2  F (36.8  C) (Oral)  Resp 18  CONSTITUTIONAL: Appears tired.   HEENT: Pupils are equal. Oropharynx is clear.   NECK: No cervical or supraclavicular lymphadenopathy.   RESPIRATORY: Clear bilaterally.   CARD/VASC: S1, S2, regular.   GI: Soft, nontender, nondistended, no hepatosplenomegaly.   MUSKULOSKELETAL: Warm, well perfused.   NEUROLOGIC: Alert, awake.   INTEGUMENT: No rash.   LYMPHATICS: Bilateral edema. Left thigh in tender.   PSYCH: Mood and  affect was normal.     LABORATORY DATA AND IMAGING REVIEWED DURING THIS VISIT:  Recent Labs   Lab Test  01/11/18   1005  01/07/18   0606  01/06/18   0416   NA  142   --   141   POTASSIUM  3.0*  3.2*  3.6   CHLORIDE  106   --   107   CO2  28   --   27   ANIONGAP  8   --   7   BUN  12   --   11   CR  0.57   --   0.60   GLC  126*   --   107*   JESÚS  9.3   --   8.7     Recent Labs   Lab Test  01/11/18   1005  01/06/18   0416  01/05/18   1313   WBC  4.9  6.3  9.0   HGB  10.4*  9.8*  10.7*   PLT  214  130*  128*   MCV  89  86  88   NEUTROPHIL  58.6  59.2  65.4     Recent Labs   Lab Test  01/11/18   1005  12/27/17   0915  12/15/17   1238   BILITOTAL  0.3  0.4  0.4   ALKPHOS  96  87  112   ALT  49  26  30   AST  41  27  44   ALBUMIN  2.7*  3.3*  3.5     Results for JESSICA MOYA (MRN [id number]) as of 1/11/2018 11:24   Ref. Range 1/5/2018 20:27 1/6/2018 04:16 1/6/2018 12:55 1/6/2018 20:20 1/7/2018 07:00   Heparin 10A Level Latest Units: IU/mL 1.43 (HH) 1.23 (HH) 0.82 0.68 0.57       Results for orders placed or performed during the hospital encounter of 01/05/18   US Lower Extremity Venous Duplex Left    Narrative    VENOUS ULTRASOUND LEFT LEG  1/5/2018 11:37 AM     HISTORY: History of DVT, medial thigh pain, DVT concern.     COMPARISON: 11/29/2017.    FINDINGS:  Examination of the deep veins with graded compression and  color flow Doppler with spectral wave form analysis was performed;  images show nonocclusive thrombus in the left common femoral and  femoral veins. Nonocclusive thrombus is seen in the popliteal vein.  Resolution of calf DVT.    The right common femoral vein was evaluated for comparison and is  normal.       Impression    IMPRESSION:   1. New left common femoral, femoral and popliteal vein deep vein  thrombosis.  2. Resolution of left calf deep vein thrombosis.    SAUL FU DO   Arm, left, venous US    Narrative    ULTRASOUND UPPER EXTREMITY VENOUS DUPLEX LEFT  1/5/2018 11:38 AM     HISTORY:  History of deep venous thrombosis, left medial upper arm  pain, concern for DVT.     COMPARISON: None.    TECHNIQUE: Color Doppler and spectral waveform analysis performed  throughout the deep and superficial veins of the left upper extremity.    FINDINGS: The left internal jugular, subclavian, axillary, and  brachial veins demonstrate normal blood flow, compression (where  applicable), and augmentation. Basilic and cephalic veins are patent.  Radial and ulnar veins are compressible.      Impression    IMPRESSION: Negative for deep venous thrombosis in the left upper  extremity.    TYREE DOAN MD         ECOG performance status 1.      ASSESSMENT:    1- This is a 66-year-old lady with stage IV metastatic colon cancer with normal mismatch repair protein expression.  She has multiple liver metastases and metastasis in the abdominal lymph nodes.     She is on palliative intent combination of FOLFOX with Avastin which is given every 2 weeks intravenously.      - Labs were reviewed with the patient, hypokalemia- she will get replacement per protocol.   - She will proceed with FOLFOX/Avastin today.      2- She also has iron deficiency anemia due to chronic blood loss from her cancer.  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     3- VTE- CT scan 12/2017 showed new right sided small pulmonary embolus. There was a thrombus associated with the port as well. The findings was concern for continued thrombosis despite being on Xarelto. She was swicthed to Lovenox.    - On 1/5/18 she had new thrombosis left femoral and popliteal vein. We have been concerned about her compliance/ability to self inject and she has previously reported forgetting to inject herself. Her Ant Xa was supra therapeutic when she came in the hospital. I think it is reasonable to continue Lovenox. Discussed compliance.  will work with her to look for different living arrangements or help.   She ha significant pain related to thrombus.      PLAN:   1- Anti Xa  level to be drawn today at 1 pm  2- Continue Lovenox 1 mg/kg SQ Q 12 hours  3- Replace electrolytes per protocol  4- Stop hydrocodone/APAP  5- Start oxycodone 5-10 mg Q 4 hours as needed for pain  6- FOLOFOX/Avastin today  7- See Chris Llamas NP next week    SIDRA NAILS MD    1/11/2018

## 2018-01-11 NOTE — TELEPHONE ENCOUNTER
Spoke with patient today about lovenox, a refill has been sent to [facility] but because this was filled on 12/28/17 the insurance will not let them fill it until 01/19. I gave the patient the phone number to [facility] and told her to call them to check on the price that day.    I also informed her that Xarelto has a copay card available if we ever did need to go that route.

## 2018-01-11 NOTE — TELEPHONE ENCOUNTER
Julieta paperwork completed and signed by MD.    Employee WIN#[id number]  Case [id number]    Faxed to [phone] two times and also emailed to [email or web address]    Liv updated
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
## 2018-01-11 NOTE — MR AVS SNAPSHOT
After Visit Summary   1/11/2018    Liv Sylvester    MRN: [id number]           Patient Information     Date Of Birth          1950        Visit Information        Provider Department      1/11/2018 11:00 AM Radha Piedra MD [facility]        Today's Diagnoses     Malignant neoplasm of colon, unspecified part of colon (H)    -  1    VTE (venous thromboembolism)        Colon Cancer, with Liver Met (Stage IV)        PE (pulmonary thromboembolism) (H)        Other acute pulmonary embolism without acute cor pulmonale (H)        Pain of left lower leg          Care Instructions    1- Anti Xa level to be drawn today at 1 pm  2- Continue Lovenox 1 mg/kg SQ Q 12 hours  3- Replace electrolytes per protocol  4- Stop hydrocodone/APAP  5- Start oxycodone 5-10 mg Q 4 hours as needed for pain  6- FOLOFOX/Avastin today  7- See Chris Llamas NP next week          Follow-ups after your visit        Your next 10 appointments already scheduled     Jan 13, 2018 10:30 AM CST   Level 1 with  INFUSION CHAIR 13   [facility]fusion [facility] ([facility])    [facility] [place] [id number]  [place] [address]   [phone]            Jan 16, 2018 12:00 PM CST   Level 1 with  INFUSION CHAIR 10   [facility] and Infusion Center ([facility])    [facility]  [address]  [place] [address]   [phone]            Jan 16, 2018 12:30 PM CST   New Visit with Nela Spaulding MD   [facility] ([facility])    [facility]  [address]  [place] [address]   [phone]            Jan 16, 2018  1:30 PM CST   Return Visit with TITO Briones [facility] ([facility])    [facility]  [address]  [place] [address]   [phone]            Jan 24, 2018  9:00  AM CST   Level 6 with SH INFUSION CHAIR 17   [facility]fusion Center ([facility])    [facility] [place]  [address]  [place] [address]   [phone]            Jan 24, 2018  9:30 AM CST   Return Visit with Radha Piedra MD   [facility] ([facility])    [facility] [place]  [address]  [place] [address]   [phone]            Jan 26, 2018 12:00 PM CST   Level 1 with SH INFUSION CHAIR 5   [facility]fusion [facility] ([facility])    [facility] [place]  [address]  [place] [address]   [phone]            Jan 31, 2018 12:00 PM CST   Level 1 with SH INFUSION CHAIR 11   [facility] ([facility])    [facility] [place]  [address]  [place] [address]   [phone]            Jan 31, 2018  1:00 PM CST   Return Visit with Radha Piedra MD   [facility] ([facility])    [facility] [place]  [address]  [place] [address]   [phone]            Feb 07, 2018  9:00 AM CST   Level 6 with SH INFUSION CHAIR 18   [facility] ([facility])    [facility]  [address]  [place] [address]   [phone]              Who to contact     If you have questions or need follow up information about today's clinic visit or your schedule please contact [facility] directly at [phone].  Normal or non-critical lab and imaging results will be communicated to you by MyChart, letter or phone within 4 business days after the clinic has received the results. If you do not hear from us within 7 days, please contact the clinic through MyChart or phone. If you have a critical or abnormal lab result, we will notify you by phone as soon as  "possible.  Submit refill requests through [employer] or call your pharmacy and they will forward the refill request to us. Please allow 3 business days for your refill to be completed.          Additional Information About Your Visit        [employer] Information     [employer] lets you send messages to your doctor, view your test results, renew your prescriptions, schedule appointments and more. To sign up, go to [email or web address] . Click on \"Log in\" on the left side of the screen, which will take you to the Welcome page. Then click on \"Sign up Now\" on the right side of the page.     You will be asked to enter the access code listed below, as well as some personal information. Please follow the directions to create your username and password.     Your access code is: [email or web address]  Expires: 2018  7:56 AM     Your access code will  in 90 days. If you need help or a new code, please call your [facility] clinic or [phone].        Care EveryWhere ID     This is your Care EveryWhere ID. This could be used by other organizations to access your [facility] medical records  [id number]        Your Vitals Were     Temperature Respirations                98.2  F (36.8  C) (Oral) 18           Blood Pressure from Last 3 Encounters:   18 141/62   18 141/62   18 143/72    Weight from Last 3 Encounters:   18 101.2 kg (223 lb)   18 102.5 kg (226 lb)   17 101.6 kg (224 lb)              We Performed the Following     Heparin 10a Level          Today's Medication Changes          These changes are accurate as of: 18  1:48 PM.  If you have any questions, ask your nurse or doctor.               Start taking these medicines.        Dose/Directions    order for DME   Used for:  Malignant neoplasm of colon, unspecified part of colon (H), VTE (venous thromboembolism), PE (pulmonary thromboembolism) (H), Pain of left lower leg   Started by:  Radha Piedra MD        Equipment being ordered: " Wheelchair   Quantity:  1 Device   Refills:  0       oxyCODONE IR 5 MG tablet   Commonly known as:  ROXICODONE   Used for:  Malignant neoplasm of colon, unspecified part of colon (H)   Started by:  Radha Piedra MD        Dose:  5-10 mg   Take 1-2 tablets (5-10 mg) by mouth every 4 hours as needed for pain maximum 12 tablet(s) per day   Quantity:  50 tablet   Refills:  0         These medicines have changed or have updated prescriptions.        Dose/Directions    * enoxaparin 100 MG/ML Soln   Commonly known as:  LOVENOX   This may have changed:  Another medication with the same name was added. Make sure you understand how and when to take each.        Dose:  100 mg   Inject 100 mg Subcutaneous every 12 hours   Refills:  0       * enoxaparin 100 MG/ML injection   Commonly known as:  LOVENOX   This may have changed:  You were already taking a medication with the same name, and this prescription was added. Make sure you understand how and when to take each.   Used for:  PE (pulmonary thromboembolism) (H), Other acute pulmonary embolism without acute cor pulmonale (H)   Changed by:  Radha Piedra MD        Dose:  100 mg   Inject 1 mL (100 mg) Subcutaneous every 12 hours   Quantity:  60 mL   Refills:  3       * Notice:  This list has 2 medication(s) that are the same as other medications prescribed for you. Read the directions carefully, and ask your doctor or other care provider to review them with you.      Stop taking these medicines if you haven't already. Please contact your care team if you have questions.     HYDROcodone-acetaminophen 5-325 MG per tablet   Commonly known as:  NORCO   Stopped by:  Radha Piedra MD                Where to get your medicines      These medications were sent to [facility] [address] [place] [address] - [address] [facility]  [address] [facility] [address]     Phone:  [phone]     enoxaparin 100 MG/ML injection         Some of these will need a paper prescription and  others can be bought over the counter.  Ask your nurse if you have questions.     Bring a paper prescription for each of these medications     order for DME    oxyCODONE IR 5 MG tablet                Primary Care Provider Office Phone # Fax Julio Howard [phone]       [facility] [address] [facility] [address]        Equal Access to Services     HAYDEE IVERSON : Hadii aad ku hadasho Soomaali, waaxda luqadaha, qaybta kaalmada adeegyada, waxay idiin hayaan adeeg kharash lajoellen . So [facility] [phone].    ATENCIÓN: Si habla español, tiene a schmitt disposición servicios gratuitos de asistencia lingüística. Wiley al [phone].    We comply with applicable federal civil rights laws and [place] laws. We do not discriminate on the basis of race, color, national origin, age, disability, sex, sexual orientation, or gender identity.            Thank you!     Thank you for choosing [facility]  for your care. Our goal is always to provide you with excellent care. Hearing back from our patients is one way we can continue to improve our services. Please take a few minutes to complete the written survey that you may receive in the mail after your visit with us. Thank you!             Your Updated Medication List - Protect others around you: Learn how to safely use, store and throw away your medicines at www.disposemymeds.org.          This list is accurate as of: 1/11/18  1:48 PM.  Always use your most recent med list.                   Brand Name Dispense Instructions for use Diagnosis    ACETAMINOPHEN PO      Take 1,000 mg by mouth every 4 hours as needed for pain        albuterol 108 (90 BASE) MCG/ACT Inhaler    PROAIR HFA/PROVENTIL HFA/VENTOLIN HFA    1 Inhaler    Inhale 2 puffs into the lungs every 6 hours as needed for shortness of breath / dyspnea or wheezing    Reactive airway disease without complication, unspecified asthma severity, unspecified whether persistent        CALCIUM-MAGNESIUM-ZINC PO      Take 1 tablet by mouth daily        CENTRUM SILVER per tablet     100 tablet    Take 1 tablet by mouth daily.        CHLORTHALIDONE PO      Take 25 mg by mouth daily as needed ((Patient is supposed to take 25mg daily but states she only takes as needed))        * enoxaparin 100 MG/ML Soln    LOVENOX     Inject 100 mg Subcutaneous every 12 hours        * enoxaparin 100 MG/ML injection    LOVENOX    60 mL    Inject 1 mL (100 mg) Subcutaneous every 12 hours    PE (pulmonary thromboembolism) (H), Other acute pulmonary embolism without acute cor pulmonale (H)       lidocaine 5 % ointment    XYLOCAINE    5 g    Apply 0.5 g topically as needed for moderate pain Apply pea size amount to rectal area for pain        lidocaine-prilocaine cream    EMLA    30 g    Apply topically as needed for moderate pain Apply dollop size amount to port site 30-60 minutes prior to accessing.    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    HTN (hypertension)       LORazepam 0.5 MG tablet    ATIVAN    30 tablet    Take 1 tablet (0.5 mg) by mouth every 4 hours as needed (Anxiety, Nausea/Vomiting or Sleep)    Malignant neoplasm of hepatic flexure (H)       methylphenidate 5 MG tablet    RITALIN    60 tablet    Take 1 tablet (5 mg) by mouth 2 times daily    Neoplastic malignant related fatigue       ondansetron 8 MG tablet    ZOFRAN    10 tablet    Take 1 tablet (8 mg) by mouth every 8 hours as needed (Nausea/Vomiting)    Malignant neoplasm of hepatic flexure (H)       order for DME     1 Device    Equipment being ordered: Wheelchair    Malignant neoplasm of colon, unspecified part of colon (H), VTE (venous thromboembolism), PE (pulmonary thromboembolism) (H), Pain of left lower leg       oxyCODONE IR 5 MG tablet    ROXICODONE    50 tablet    Take 1-2 tablets (5-10 mg) by mouth every 4 hours as  needed for pain maximum 12 tablet(s) per day    Malignant neoplasm of colon, unspecified part of colon (H)       Potassium Chloride ER 20 MEQ Tbcr     30 tablet    Take 1 tablet (20 mEq) by mouth daily    Hypokalemia       prochlorperazine 10 MG tablet    COMPAZINE    30 tablet    Take 1 tablet (10 mg) by mouth every 6 hours as needed (Nausea/Vomiting)    Malignant neoplasm of hepatic flexure (H)       sennosides 8.6 MG tablet    SENOKOT     Take 1 tablet by mouth 2 times daily as needed for constipation        SERTRALINE HCL PO      Take 50 mg by mouth daily (Takes half of a 100mg tablet for a total of 50mg daily)        VICKS NYQUIL COLD & FLU 15-6. MG/15ML Liqd   Generic drug:  DM-Doxylamine-Acetaminophen      Take 15 mLs by mouth nightly as needed        VITAMIN D (CHOLECALCIFEROL) PO      Take 1 tablet by mouth daily (OTC: Pt unsure of strength)        * Notice:  This list has 2 medication(s) that are the same as other medications prescribed for you. Read the directions carefully, and ask your doctor or other care provider to review them with you.

## 2018-01-11 NOTE — MR AVS SNAPSHOT
After Visit Summary   1/11/2018    Liv Sylvester    MRN: [id number]           Patient Information     Date Of Birth          1950        Visit Information        Provider Department      1/11/2018 9:30 AM SH INFUSION CHAIR 17 [facility]        Today's Diagnoses     Colon Cancer, with Liver Met (Stage IV)    -  1    PE (pulmonary thromboembolism) (H)        Neoplastic malignant related fatigue        Other acute pulmonary embolism without acute cor pulmonale (H)           Follow-ups after your visit        Your next 10 appointments already scheduled     Jan 13, 2018 10:30 AM CST   Level 1 with SH INFUSION CHAIR 13   [facility] ([facility])    [facility]  [address]  [place] [address]   [phone]            Jan 16, 2018 12:00 PM CST   Level 1 with SH INFUSION CHAIR 10   [facility] ([facility])    [facility]  [address]  [place] [address]   [phone]            Jan 16, 2018 12:30 PM CST   New Visit with Nela Spaulding MD   [facility] ([facility])    [facility]  [address]  [place] [address]   [phone]            Jan 16, 2018  1:30 PM CST   Return Visit with TITO Briones CNP   [facility] ([facility])    [facility]  [address]  [place] [address]   [phone]            Jan 24, 2018  9:00 AM CST   Level 6 with SH INFUSION CHAIR 17   [facility] ([facility])    [facility]  [address]  [place] [address]   [phone]            Jan 24, 2018  9:30 AM CST   Return Visit with Radha Piedra MD   [facility] ([facility])    [facility]  [address]  [place] [address]   [phone]            Jan 26, 2018 12:00 PM CST   Level 1 with SH INFUSION CHAIR 5   [facility] ([facility])    [facility] [place]  [address]  [place] [address]   [phone]            Jan 31, 2018 12:00 PM CST   Level 1 with SH INFUSION CHAIR 11   [facility] ([facility])    [facility]  [address]  [place] [address]   [phone]            Jan 31, 2018  1:00 PM CST   Return Visit with Radha Piedra MD   [facility] ([facility])    [facility]  [address]  [place] [address]   [phone]            Feb 07, 2018  9:00 AM CST   Level 6 with SH INFUSION CHAIR 18   [facility] ([facility])    [facility]  [address]  [place] [address]   [phone]              Who to contact     If you have questions or need follow up information about today's clinic visit or your schedule please contact [facility] directly at [phone].  Normal or non-critical lab and imaging results will be communicated to you by [employer], letter or phone within 4 business days after the clinic has received the results. If you do not hear from us within 7 days, please contact the clinic through [employer] or phone. If you have a critical or abnormal lab result, we will notify you by phone as soon as possible.  Submit refill requests through [email or web address] or call your pharmacy and they will forward the refill request to us. Please allow 3 business days for your refill to be completed.          Additional Information About Your Visit        [email or web address] Information     [email or web address] lets you send messages to your doctor, view your test results, renew  "your prescriptions, schedule appointments and more. To sign up, go to [email or web address] . Click on \"Log in\" on the left side of the screen, which will take you to the Welcome page. Then click on \"Sign up Now\" on the right side of the page.     You will be asked to enter the access code listed below, as well as some personal information. Please follow the directions to create your username and password.     Your access code is: [email or web address]  Expires: 2018  7:56 AM     Your access code will  in 90 days. If you need help or a new code, please call your [facility] clinic or [phone].        Care EveryWhere ID     This is your Care EveryWhere ID. This could be used by other organizations to access your [facility] medical records  [id number]        Your Vitals Were     Temperature Respirations Height BMI (Body Mass Index)          98.5  F (36.9  C) (Oral) 18 1.778 m (5' 10\") 32 kg/m2         Blood Pressure from Last 3 Encounters:   18 141/62   18 135/65   18 143/72    Weight from Last 3 Encounters:   18 101.2 kg (223 lb)   18 102.5 kg (226 lb)   17 101.6 kg (224 lb)              We Performed the Following     CBC with platelets differential     Comprehensive metabolic panel     Heparin 10a Level     Protein qualitative urine          Today's Medication Changes          These changes are accurate as of: 18  4:50 PM.  If you have any questions, ask your nurse or doctor.               Start taking these medicines.        Dose/Directions    order for DME   Used for:  Malignant neoplasm of colon, unspecified part of colon (H), VTE (venous thromboembolism), PE (pulmonary thromboembolism) (H), Pain of left lower leg   Started by:  Radha Piedra MD        Equipment being ordered: Wheelchair   Quantity:  1 Device   Refills:  0       oxyCODONE IR 5 MG tablet   Commonly known as:  ROXICODONE   Used for:  Malignant neoplasm of colon, unspecified part of colon (H)   Started by: "  Radha Piedra MD        Dose:  5-10 mg   Take 1-2 tablets (5-10 mg) by mouth every 4 hours as needed for pain maximum 12 tablet(s) per day   Quantity:  50 tablet   Refills:  0         These medicines have changed or have updated prescriptions.        Dose/Directions    * enoxaparin 100 MG/ML Soln   Commonly known as:  LOVENOX   This may have changed:  Another medication with the same name was added. Make sure you understand how and when to take each.        Dose:  100 mg   Inject 100 mg Subcutaneous every 12 hours   Refills:  0       * enoxaparin 100 MG/ML injection   Commonly known as:  LOVENOX   This may have changed:  You were already taking a medication with the same name, and this prescription was added. Make sure you understand how and when to take each.   Used for:  PE (pulmonary thromboembolism) (H), Other acute pulmonary embolism without acute cor pulmonale (H)   Changed by:  Radha Piedra MD        Dose:  100 mg   Inject 1 mL (100 mg) Subcutaneous every 12 hours   Quantity:  60 mL   Refills:  3       * Notice:  This list has 2 medication(s) that are the same as other medications prescribed for you. Read the directions carefully, and ask your doctor or other care provider to review them with you.      Stop taking these medicines if you haven't already. Please contact your care team if you have questions.     HYDROcodone-acetaminophen 5-325 MG per tablet   Commonly known as:  NORCO   Stopped by:  Radha Piedra MD                Where to get your medicines      These medications were sent to [facility] [address] [place] [address]     Phone:  [phone]     enoxaparin 100 MG/ML injection         Some of these will need a paper prescription and others can be bought over the counter.  Ask your nurse if you have questions.     Bring a paper prescription for each of these medications     order for DME    oxyCODONE IR 5 MG tablet                 Primary Care Provider Office Phone # Fax #    Amaya Howard [phone]       [facility] [address] [facility] [address]        Equal Access to Services     HAYDEE IVERSON : Hadii aad ku hadbhartio Socelyali, waaxda luqadaha, qaybta kaalmada adeegarturda, john tao laAlexsandraeva maya. So [facility] [phone].    ATENCIÓN: Si habla español, tiene a schmitt disposición servicios gratuitos de asistencia lingüística. [facility] [phone].    We comply with applicable federal civil rights laws and [place] laws. We do not discriminate on the basis of race, color, national origin, age, disability, sex, sexual orientation, or gender identity.            Thank you!     Thank you for choosing [facility]  for your care. Our goal is always to provide you with excellent care. Hearing back from our patients is one way we can continue to improve our services. Please take a few minutes to complete the written survey that you may receive in the mail after your visit with us. Thank you!             Your Updated Medication List - Protect others around you: Learn how to safely use, store and throw away your medicines at www.disposemymeds.org.          This list is accurate as of: 1/11/18  4:50 PM.  Always use your most recent med list.                   Brand Name Dispense Instructions for use Diagnosis    ACETAMINOPHEN PO      Take 1,000 mg by mouth every 4 hours as needed for pain        albuterol 108 (90 BASE) MCG/ACT Inhaler    PROAIR HFA/PROVENTIL HFA/VENTOLIN HFA    1 Inhaler    Inhale 2 puffs into the lungs every 6 hours as needed for shortness of breath / dyspnea or wheezing    Reactive airway disease without complication, unspecified asthma severity, unspecified whether persistent       CALCIUM-MAGNESIUM-ZINC PO      Take 1 tablet by mouth daily        CENTRUM SILVER per tablet     100 tablet    Take 1 tablet by mouth daily.        CHLORTHALIDONE PO      Take 25 mg by  mouth daily as needed ((Patient is supposed to take 25mg daily but states she only takes as needed))        * enoxaparin 100 MG/ML Soln    LOVENOX     Inject 100 mg Subcutaneous every 12 hours        * enoxaparin 100 MG/ML injection    LOVENOX    60 mL    Inject 1 mL (100 mg) Subcutaneous every 12 hours    PE (pulmonary thromboembolism) (H), Other acute pulmonary embolism without acute cor pulmonale (H)       lidocaine 5 % ointment    XYLOCAINE    5 g    Apply 0.5 g topically as needed for moderate pain Apply pea size amount to rectal area for pain        lidocaine-prilocaine cream    EMLA    30 g    Apply topically as needed for moderate pain Apply dollop size amount to port site 30-60 minutes prior to accessing.    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    HTN (hypertension)       LORazepam 0.5 MG tablet    ATIVAN    30 tablet    Take 1 tablet (0.5 mg) by mouth every 4 hours as needed (Anxiety, Nausea/Vomiting or Sleep)    Malignant neoplasm of hepatic flexure (H)       methylphenidate 5 MG tablet    RITALIN    60 tablet    Take 1 tablet (5 mg) by mouth 2 times daily    Neoplastic malignant related fatigue       ondansetron 8 MG tablet    ZOFRAN    10 tablet    Take 1 tablet (8 mg) by mouth every 8 hours as needed (Nausea/Vomiting)    Malignant neoplasm of hepatic flexure (H)       order for DME     1 Device    Equipment being ordered: Wheelchair    Malignant neoplasm of colon, unspecified part of colon (H), VTE (venous thromboembolism), PE (pulmonary thromboembolism) (H), Pain of left lower leg       oxyCODONE IR 5 MG tablet    ROXICODONE    50 tablet    Take 1-2 tablets (5-10 mg) by mouth every 4 hours as needed for pain maximum 12 tablet(s) per day    Malignant neoplasm of colon, unspecified part of colon (H)       Potassium Chloride ER 20 MEQ Tbcr     30 tablet    Take 1 tablet (20 mEq) by  mouth daily    Hypokalemia       prochlorperazine 10 MG tablet    COMPAZINE    30 tablet    Take 1 tablet (10 mg) by mouth every 6 hours as needed (Nausea/Vomiting)    Malignant neoplasm of hepatic flexure (H)       sennosides 8.6 MG tablet    SENOKOT     Take 1 tablet by mouth 2 times daily as needed for constipation        SERTRALINE HCL PO      Take 50 mg by mouth daily (Takes half of a 100mg tablet for a total of 50mg daily)        VICKS NYQUIL COLD & FLU 15-6. MG/15ML Liqd   Generic drug:  DM-Doxylamine-Acetaminophen      Take 15 mLs by mouth nightly as needed        VITAMIN D (CHOLECALCIFEROL) PO      Take 1 tablet by mouth daily (OTC: Pt unsure of strength)        * Notice:  This list has 2 medication(s) that are the same as other medications prescribed for you. Read the directions carefully, and ask your doctor or other care provider to review them with you.

## 2018-01-11 NOTE — PROGRESS NOTES
"Oncology Rooming Note    January 11, 2018 11:00 AM   Liv Sylvester is a 67 year old female who presents for:    Chief Complaint   Patient presents with     Oncology Clinic Visit     Initial Vitals: /62  Temp 98.2  F (36.8  C) (Oral)  Resp 18 Estimated body mass index is 32.43 kg/(m^2) as calculated from the following:    Height as of 1/5/18: 1.778 m (5' 10\").    Weight as of 1/5/18: 102.5 kg (226 lb). There is no height or weight on file to calculate BSA.  Data Unavailable Comment: Data Unavailable   No LMP recorded. Patient has had a hysterectomy.  Allergies reviewed: Yes  Medications reviewed: Yes    Medications: MEDICATION REFILLS NEEDED TODAY. Provider was notified. POTASSIUM AND HYDROCODONE REFILLS NEEDED TODAY.  Pharmacy name entered into [employer]    [facility] [place] [place] [facility] [address] [facility] [place] [place] [facility] [address] [facility]    Clinical concerns: None     5 minutes for nursing intake (face to face time)     Merle Lopez CMA            DISCHARGE PLAN:    Reported to Infusion RN Oralia regarding  Pm lab draw and electrolyte replacement.  Oxycodone rx given to Jennifer.  Liv is aware to STOP hydrocodone   Treatment today with pump disconnect on Saturday  Appt. with Chris on 1/16/18  Script for wheelchari provided to her.  PT/OT ordered  Dr. Piedra is ok with next cycle being on 1/24/18.. 13 days and not exactly 14 days  Next appointments: See patient instruction section  Departure Mode: W/C  Accompanied by: self    Laina Mondragon RN      "

## 2018-01-11 NOTE — PATIENT INSTRUCTIONS
1- Anti Xa level to be drawn today at 1 pm/ Done  2- Continue Lovenox 1 mg/kg SQ Q 12 hours/ She is aware  3- Replace electrolytes per protocol  4- Stop hydrocodone/APAP/ She is aware  5- Start oxycodone 5-10 mg Q 4 hours as needed for pain  6- FOLOFOX/Avastin today  7- See Chris Llamas NP next week

## 2018-01-11 NOTE — LETTER
"    1/11/2018         RE: Liv Sylvester  [address]  [place] [address]        Dear Colleague,    Thank you for referring your patient, Liv Sylvester, to the [facility]. Please see a copy of my visit note below.    Oncology Rooming Note    January 11, 2018 11:00 AM   Liv Sylvester is a 67 year old female who presents for:    Chief Complaint   Patient presents with     Oncology Clinic Visit     Initial Vitals: /62  Temp 98.2  F (36.8  C) (Oral)  Resp 18 Estimated body mass index is 32.43 kg/(m^2) as calculated from the following:    Height as of 1/5/18: 1.778 m (5' 10\").    Weight as of 1/5/18: 102.5 kg (226 lb). There is no height or weight on file to calculate BSA.  Data Unavailable Comment: Data Unavailable   No LMP recorded. Patient has had a hysterectomy.  Allergies reviewed: Yes  Medications reviewed: Yes    Medications: MEDICATION REFILLS NEEDED TODAY. Provider was notified. POTASSIUM AND HYDROCODONE REFILLS NEEDED TODAY.  Pharmacy name entered into [employer]:    [facility] [place], [place] - [address] [facility] [place] [place] - [address] [facility]    Clinical concerns: None     5 minutes for nursing intake (face to face time)     Merle Lopez CMA            DISCHARGE PLAN:    Reported to Infusion RN Oralia regarding  Pm lab draw and electrolyte replacement.  Oxycodone rx given to Jennifer.  Liv is aware to STOP hydrocodone   Treatment today with pump disconnect on Saturday  Appt. with Chris on 1/16/18  Script for wheelchari provided to her.  PT/OT ordered  Dr. Piedra is ok with next cycle being on 1/24/18.. 13 days and not exactly 14 days  Next appointments: See patient instruction section  Departure Mode: W/C  Accompanied by: self    Laina Mondragon RN        [facility]  HEMATOLOGY ONCOLOGY    ONCOLOGY FOLLOWUP NOTE      DIAGNOSIS:    1- Metastatic colon cancer in a patient with history " of stage 1A grade 3 endometrial carcinoma approximately around 2012.     On 09/29/2017, Liv Sylvester presented with progressive fatigue and right upper quadrant pain.  CT scan 09/29/2017 showed partially contracted bladder and findings suspicious for liver metastases.  There were multiple defined hypoechoic foci scoped scattered throughout the liver, the largest was in the right lobe.  CT was also showing enlargement of hepatic and retroperitoneal lymph nodes.  On 09/30/2017, she underwent a colonoscopy, which showed a fungating, infiltrative, ulcerated, partially obstructing mass at the hepatic flexure.  Pathology is consistent with moderately differentiated adenocarcinoma with normal mismatch repair protein expression.   2- Significant iron deficiency anemia.   3- VTE- malignancy related     TREATMENT:  10/11/2017 FOLFOX and Avastin.      SUBJECTIVE:  The patient was seen as a followup today. She was admitted to the hospital last week for recurrent DVT while on lovenox. She still has pain in her left leg. She is back on lovenox.     REVIEW OF SYSTEMS:  A complete review of systems was performed and found to be negative other than pertinent positives mentioned in history of present illness.      Past medical, social histories reviewed.     Meds- Reviewed.     PHYSICAL EXAMINATION:   VITAL SIGNS: /62  Temp 98.2  F (36.8  C) (Oral)  Resp 18  CONSTITUTIONAL: Appears tired.   HEENT: Pupils are equal. Oropharynx is clear.   NECK: No cervical or supraclavicular lymphadenopathy.   RESPIRATORY: Clear bilaterally.   CARD/VASC: S1, S2, regular.   GI: Soft, nontender, nondistended, no hepatosplenomegaly.   MUSKULOSKELETAL: Warm, well perfused.   NEUROLOGIC: Alert, awake.   INTEGUMENT: No rash.   LYMPHATICS: Bilateral edema. Left thigh in tender.   PSYCH: Mood and affect was normal.     LABORATORY DATA AND IMAGING REVIEWED DURING THIS VISIT:  Recent Labs   Lab Test  01/11/18   1005  01/07/18   0606  01/06/18   0416    NA  142   --   141   POTASSIUM  3.0*  3.2*  3.6   CHLORIDE  106   --   107   CO2  28   --   27   ANIONGAP  8   --   7   BUN  12   --   11   CR  0.57   --   0.60   GLC  126*   --   107*   JESÚS  9.3   --   8.7     Recent Labs   Lab Test  01/11/18   1005  01/06/18   0416  01/05/18   1313   WBC  4.9  6.3  9.0   HGB  10.4*  9.8*  10.7*   PLT  214  130*  128*   MCV  89  86  88   NEUTROPHIL  58.6  59.2  65.4     Recent Labs   Lab Test  01/11/18   1005  12/27/17   0915  12/15/17   1238   BILITOTAL  0.3  0.4  0.4   ALKPHOS  96  87  112   ALT  49  26  30   AST  41  27  44   ALBUMIN  2.7*  3.3*  3.5     Results for JESSICA MOYA (MRN [id number]) as of 1/11/2018 11:24   Ref. Range 1/5/2018 20:27 1/6/2018 04:16 1/6/2018 12:55 1/6/2018 20:20 1/7/2018 07:00   Heparin 10A Level Latest Units: IU/mL 1.43 (HH) 1.23 (HH) 0.82 0.68 0.57       Results for orders placed or performed during the hospital encounter of 01/05/18   US Lower Extremity Venous Duplex Left    Narrative    VENOUS ULTRASOUND LEFT LEG  1/5/2018 11:37 AM     HISTORY: History of DVT, medial thigh pain, DVT concern.     COMPARISON: 11/29/2017.    FINDINGS:  Examination of the deep veins with graded compression and  color flow Doppler with spectral wave form analysis was performed;  images show nonocclusive thrombus in the left common femoral and  femoral veins. Nonocclusive thrombus is seen in the popliteal vein.  Resolution of calf DVT.    The right common femoral vein was evaluated for comparison and is  normal.       Impression    IMPRESSION:   1. New left common femoral, femoral and popliteal vein deep vein  thrombosis.  2. Resolution of left calf deep vein thrombosis.    SAUL FU DO   Arm, left, venous US    Narrative    ULTRASOUND UPPER EXTREMITY VENOUS DUPLEX LEFT  1/5/2018 11:38 AM     HISTORY: History of deep venous thrombosis, left medial upper arm  pain, concern for DVT.     COMPARISON: None.    TECHNIQUE: Color Doppler and spectral waveform  analysis performed  throughout the deep and superficial veins of the left upper extremity.    FINDINGS: The left internal jugular, subclavian, axillary, and  brachial veins demonstrate normal blood flow, compression (where  applicable), and augmentation. Basilic and cephalic veins are patent.  Radial and ulnar veins are compressible.      Impression    IMPRESSION: Negative for deep venous thrombosis in the left upper  extremity.    TYREE DOAN MD         ECOG performance status 1.      ASSESSMENT:    1- This is a 66-year-old lady with stage IV metastatic colon cancer with normal mismatch repair protein expression.  She has multiple liver metastases and metastasis in the abdominal lymph nodes.     She is on palliative intent combination of FOLFOX with Avastin which is given every 2 weeks intravenously.      - Labs were reviewed with the patient, hypokalemia- she will get replacement per protocol.   - She will proceed with FOLFOX/Avastin today.      2- She also has iron deficiency anemia due to chronic blood loss from her cancer.  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     3- VTE- CT scan 12/2017 showed new right sided small pulmonary embolus. There was a thrombus associated with the port as well. The findings was concern for continued thrombosis despite being on Xarelto. She was swicthed to Lovenox.    - On 1/5/18 she had new thrombosis left femoral and popliteal vein. We have been concerned about her compliance/ability to self inject and she has previously reported forgetting to inject herself. Her Ant Xa was supra therapeutic when she came in the hospital. I think it is reasonable to continue Lovenox. Discussed compliance.  will work with her to look for different living arrangements or help.   She ha significant pain related to thrombus.      PLAN:   1- Anti Xa level to be drawn today at 1 pm  2- Continue Lovenox 1 mg/kg SQ Q 12 hours  3- Replace electrolytes per protocol  4- Stop hydrocodone/APAP  5- Start  oxycodone 5-10 mg Q 4 hours as needed for pain  6- FOLOFOX/Avastin today  7- See Chris Llamas NP next week    RADHA PIEDRA MD    1/11/2018        Again, thank you for allowing me to participate in the care of your patient.        Sincerely,        Radha Piedra MD

## 2018-01-11 NOTE — PROGRESS NOTES
Infusion Nursing Note:  Liv Sylvester presents today for folfox/avastin.    Patient seen by provider today: Yes: Dr. Piedra   present during visit today: Not Applicable.    Note: N/A.    Intravenous Access:  Implanted Port.    Treatment Conditions:  Lab Results   Component Value Date    HGB 10.4 01/11/2018     Lab Results   Component Value Date    WBC 4.9 01/11/2018      Lab Results   Component Value Date    ANEU 3.0 01/11/2018     Lab Results   Component Value Date     01/11/2018      Lab Results   Component Value Date     01/11/2018                   Lab Results   Component Value Date    POTASSIUM 3.0 01/11/2018           No results found for: MAG         Lab Results   Component Value Date    CR 0.57 01/11/2018                   Lab Results   Component Value Date    JESÚS 9.3 01/11/2018                Lab Results   Component Value Date    BILITOTAL 0.3 01/11/2018           Lab Results   Component Value Date    ALBUMIN 2.7 01/11/2018                    Lab Results   Component Value Date    ALT 49 01/11/2018           Lab Results   Component Value Date    AST 41 01/11/2018     Results reviewed, labs MET treatment parameters, ok to proceed with treatment.          Post Infusion Assessment:  Patient tolerated infusion without incident.  Blood return noted pre and post infusion.  Blood return noted during administration every 2 cc.  Site patent and intact, free from redness, edema or discomfort.  No evidence of extravasations.    Discharge Plan:   Discharge instructions reviewed with: Patient.  Patient and/or family verbalized understanding of discharge instructions and all questions answered.  Copy of AVS reviewed with patient and/or family.  Patient will return 1/16/18 for next appointment.  Patient discharged in stable condition accompanied by: self and daughter will meet patient at front door of this bldg.  Departure Mode: Wheelchair with Med Assistant to front door of this bldg.  Prior to  "discharge: Port is secured in place with tegaderm and flushed with 10cc NS with positive blood return noted.    Continuous home infusion Dosi-Fuser pump connected.    All connectors secured in place and clamps taped open.    Pump started, \"running\" noted on display (CADD): Not Applicable.  Patient instructed to call our clinic or [facility] with any questions or concerns at home.  Patient verbalized understanding.    Patient set up for pump disconnect at home with [facility] on 1/13/18 1:15pm for neulasta injection also..      [facility] with [facility] regarding neulasta injection to be completed at home visit on Sat with pump disconnect.  She stated she would speak with the pharmacist there to make sure the order for neulasta can be given.    Irwin Dorsey RN                      "

## 2018-01-12 NOTE — TELEPHONE ENCOUNTER
I made 2 attempts to both granddaughter and patient regarding the instructions with the elevated heparin 10A level. 3rd attempts was able to speak with granddaselenaghtiffanie Xiao    Arixtra is not available till Monday.     Dr. Piedra advised to take Xarelto daily both Saturday and Sunday then on Monday start Arixtra right away.    I confirmed with Dameon  patient did take Lovenox this morning. I then instructed for mechelletent to take 2nd dose of Lovenox this evening and then start the Xarelto tomorrow.They do not have Xeralto at home so a script was sent to [facility]. Debbie Dasilva

## 2018-01-12 NOTE — PROGRESS NOTES
"Social Work Progress Note      Data/Intervention:  Patient Name:  Liv Sylvester  /Age:  1950 (67 year old)    Reason for Follow-Up: Liv is a davon 67-year-old woman who has a diagnosis of metastatic colon cancer who has been on FOLFOX and Avastin, pt admitted to hospital last week for DVT when on lovonox. This clinician called pt today as pt came to [facility] when this clinician was scheduled at [facility].     Intervention:   Liv is the mother of 7 children (Joyce- [place], Jessica-[place], Parker- [place], Parth- [place], Александр- [place], Jayson-[place]  to Suraj Jean-Baptiste). Pt lives at home with her granddaughter Serina (19yo), and another granddaughter (15yo). Pt reports that 15yo granddaughter will be moving out of home this weekend as she is aware that she \"cannot care for a 16 year old anymore.\" Pt reports that she has many concerns about possibility of moving to an assisted living or senior housing complex as she has fears about granddaughter Serina's ability to manage on own. Pt reported that she is hopeful that she will live for another 3 years to support granddaughter through college and into her early employment. This clinician acknowledged the same hope that pt will be able to have more time, but acknowledged that it is important to plan for future if timeframe is more limited than we all hope for. Pt acknowledged that she has started to explore financial resources, acknowledging that she understands SSDI to not be a benefit available to her as she is over the age of 65. Pt also reports it is her understanding that her SSI would not change in amount if she were to retire completely from work. Pt reported that she is worried as her STD ends at end of March. This clinician asked pt about LTD through work, which pt reported she will not be able to have. Pt reported that she is hopeful that she will be able to return to work part time in coming months.     Engaged with pt in life review and " appreciation of what she has learned from granddaughters and what she hopes they have learned from her. Encouraged pt to discuss with granddaughters what she shared with this clinician. This clinician also made recommendation to pt to meet with siomara Smalls and this clinician to further discuss future planning in the event that pt's timeframe is shorter than pt's hope of 3 years. Discussion about living in senior housing is emotionally changed and complex due to pt's relationship with grandchildren. Pt reported that she would talk with granddaughter about when they could come in to see this clinician and Dr. Piedra.     Resources Provided:  Care [employer]    Plan:  1) Pt will need to complete HCP documentation at future visit. This clinician has discussed with pt in past, pt could benefit from renewed discussion surrounding this.   2) Pt to contact this clinician/clinic regarding scheduling an appointment with granddaughter to meet with this clinician and Dr. Piedra. This clinician strongly feels that pt could benefit from meeting again with palliative care MD to continue this discussion as well.   3) This clinician will explore Medicare DME providers close to pt, pt only has Part A and this clinician understands DME equipment to only be for pts with Part B. Pt to contact [facility] to explore options for filling wheelchair script she received from Laina Mondragon RN earlier this week.   4) Will continue to follow pt for ongoing psychosocial support as pt continues to adjust to treatment and realize its impacts.    Please call or page if needs or concerns arise.     DEANNE Watkins, [facility]  Direct Phone: [phone]  Pager: [phone]

## 2018-01-12 NOTE — PROGRESS NOTES
Her Anti-xa level is again supra therapeutic. Please check if Arixtra is covered. I would like her to switch to Arixtra 10 mg daily. This is critical and if there is high co-pay then we should look in to financial assistance. Thanks

## 2018-01-12 NOTE — MR AVS SNAPSHOT
After Visit Summary   1/12/2018    Liv Sylvester    MRN: [id number]           Patient Information     Date Of Birth          1950        Visit Information        Provider Department      1/12/2018 4:54 PM Stephanie Awad LICSW [facility]        Today's Diagnoses     Counseling NOS(V65.40)    -  1       Follow-ups after your visit        Your next 10 appointments already scheduled     Jan 16, 2018 12:00 PM CST   Level 1 with SH INFUSION CHAIR 10   [facility] ([facility])    [place] [id number]  [place] [address]   [phone]            Jan 16, 2018 12:30 PM CST   New Visit with Nela Spaulding MD   [facility] ([facility])    [place] [id number]  [place] [address]   [phone]            Jan 16, 2018  1:30 PM CST   Return Visit with TITO Briones CNP   [facility] ([facility])    [facility]  [address]  [place] [address]   [phone]            Jan 24, 2018  9:00 AM CST   Level 6 with SH INFUSION CHAIR 17   [facility] ([facility])    [facility]  [address]  [place] [address]   [phone]            Jan 24, 2018  9:30 AM CST   Return Visit with Radha Piedra MD   [facility] ([facility])    [facility]  [address]  [place] [address]   [phone]            Jan 26, 2018 12:00 PM CST   Level 1 with SH INFUSION CHAIR 5   [facility] ([facility])    [facility]  [address]  [place] [address]   [phone]            Jan 31, 2018 12:00 PM CST   Level 1 with SH INFUSION CHAIR 11   [facility]  "Infusion Center ([facility])    [place]  [address]  [place] [address]   [phone]            Jan 31, 2018  1:00 PM CST   Return Visit with Radha Piedra MD   [facility] ([facility])    [place]  [address]  [place] [address]   [phone]            Feb 07, 2018  9:00 AM CST   Level 6 with  INFUSION CHAIR 18   [facility] ([facility])    [place]  [address]  [place] [address]   [phone]            Feb 07, 2018  9:15 AM CST   Return Visit with Radha Piedra MD   [facility] ([facility])    [place]  [address]  [place] [address]   [phone]              Who to contact     If you have questions or need follow up information about today's clinic visit or your schedule please contact [facility] directly at [phone].  Normal or non-critical lab and imaging results will be communicated to you by B[employer], letter or phone within 4 business days after the clinic has received the results. If you do not hear from us within 7 days, please contact the clinic through Bemb[employer]art or phone. If you have a critical or abnormal lab result, we will notify you by phone as soon as possible.  Submit refill requests through [employer] or call your pharmacy and they will forward the refill request to us. Please allow 3 business days for your refill to be completed.          Additional Information About Your Visit        [employer] Information     [employer] lets you send messages to your doctor, view your test results, renew your prescriptions, schedule appointments and more. To sign up, go to [email or web address] . Click on \"Log in\" on the left side of the screen, which will take you to the Welcome page. Then click on \"Sign up Now\" on the right " side of the page.     You will be asked to enter the access code listed below, as well as some personal information. Please follow the directions to create your username and password.     Your access code is: [email or web address]  Expires: 2018  7:56 AM     Your access code will  in 90 days. If you need help or a new code, please call your [facility] clinic or [phone].        Care EveryWhere ID     This is your Care EveryWhere ID. This could be used by other organizations to access your [facility] medical records  [id number]         Blood Pressure from Last 3 Encounters:   18 141/62   18 135/65   18 143/72    Weight from Last 3 Encounters:   18 101.2 kg (223 lb)   18 102.5 kg (226 lb)   17 101.6 kg (224 lb)              Today, you had the following     No orders found for display         Today's Medication Changes          These changes are accurate as of: 18  5:20 PM.  If you have any questions, ask your nurse or doctor.               Start taking these medicines.        Dose/Directions    fondaparinux 10 MG/0.8ML injection   Commonly known as:  ARIXTRA   Used for:  PE (pulmonary thromboembolism) (H), DVT (deep vein thrombosis) in pregnancy (H)   Started by:  Radha Piedra MD        Dose:  10 mg   Inject 0.8 mLs (10 mg) Subcutaneous every 24 hours   Quantity:  24 mL   Refills:  0       rivaroxaban ANTICOAGULANT 20 MG Tabs tablet   Commonly known as:  XARELTO   Used for:  PE (pulmonary thromboembolism) (H), DVT (deep vein thrombosis) in pregnancy (H)   Started by:  Radha Piedra MD        Dose:  20 mg   Take 1 tablet (20 mg) by mouth daily (with dinner)   Quantity:  3 tablet   Refills:  0            Where to get your medicines      These medications were sent to [facility] [address] [place] [address]  [address] [facility] [address]     Phone:  [phone]     fondaparinux 10 MG/0.8ML injection         These medications were sent  to [facility] [address] [facility]  [address] [facility] [address]     Phone:  [phone]     rivaroxaban ANTICOAGULANT 20 MG Tabs tablet                Primary Care Provider Office Phone # Fax #    Amaya Howard [phone]       [facility] [address] [facility] [address]        Equal Access to Services     HAYDEE IVERSON : Hadii aad ku hadasho Soomaali, waaxda luqadaha, qaybta kaalmada adeegyada, waxay idiin hayaan adeeg khdemondsh la'eva . So [facility] [phone].    ATENCIÓN: Si habla español, tiene a schmitt disposición servicios gratuitos de asistencia lingüística. Wiley al [phone].    We comply with applicable federal civil rights laws and [place] laws. We do not discriminate on the basis of race, color, national origin, age, disability, sex, sexual orientation, or gender identity.            Thank you!     Thank you for choosing [facility] [place] [facility]  for your care. Our goal is always to provide you with excellent care. Hearing back from our patients is one way we can continue to improve our services. Please take a few minutes to complete the written survey that you may receive in the mail after your visit with us. Thank you!             Your Updated Medication List - Protect others around you: Learn how to safely use, store and throw away your medicines at www.disposemymeds.org.          This list is accurate as of: 1/12/18  5:20 PM.  Always use your most recent med list.                   Brand Name Dispense Instructions for use Diagnosis    ACETAMINOPHEN PO      Take 1,000 mg by mouth every 4 hours as needed for pain        albuterol 108 (90 BASE) MCG/ACT Inhaler    PROAIR HFA/PROVENTIL HFA/VENTOLIN HFA    1 Inhaler    Inhale 2 puffs into the lungs every 6 hours as needed for shortness of breath / dyspnea or wheezing    Reactive airway disease without complication, unspecified asthma severity, unspecified whether persistent        CALCIUM-MAGNESIUM-ZINC PO      Take 1 tablet by mouth daily        CENTRUM SILVER per tablet     100 tablet    Take 1 tablet by mouth daily.        CHLORTHALIDONE PO      Take 25 mg by mouth daily as needed ((Patient is supposed to take 25mg daily but states she only takes as needed))        * enoxaparin 100 MG/ML Soln    LOVENOX     Inject 100 mg Subcutaneous every 12 hours        * enoxaparin 100 MG/ML injection    LOVENOX    60 mL    Inject 1 mL (100 mg) Subcutaneous every 12 hours    PE (pulmonary thromboembolism) (H), Other acute pulmonary embolism without acute cor pulmonale (H)       fondaparinux 10 MG/0.8ML injection    ARIXTRA    24 mL    Inject 0.8 mLs (10 mg) Subcutaneous every 24 hours    PE (pulmonary thromboembolism) (H), DVT (deep vein thrombosis) in pregnancy (H)       lidocaine 5 % ointment    XYLOCAINE    5 g    Apply 0.5 g topically as needed for moderate pain Apply pea size amount to rectal area for pain        lidocaine-prilocaine cream    EMLA    30 g    Apply topically as needed for moderate pain Apply dollop size amount to port site 30-60 minutes prior to accessing.    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    HTN (hypertension)       LORazepam 0.5 MG tablet    ATIVAN    30 tablet    Take 1 tablet (0.5 mg) by mouth every 4 hours as needed (Anxiety, Nausea/Vomiting or Sleep)    Malignant neoplasm of hepatic flexure (H)       methylphenidate 5 MG tablet    RITALIN    60 tablet    Take 1 tablet (5 mg) by mouth 2 times daily    Neoplastic malignant related fatigue       ondansetron 8 MG tablet    ZOFRAN    10 tablet    Take 1 tablet (8 mg) by mouth every 8 hours as needed (Nausea/Vomiting)    Malignant neoplasm of hepatic flexure (H)       order for DME     1 Device    Equipment being ordered: Wheelchair    Malignant neoplasm of colon, unspecified part of colon (H), VTE  (venous thromboembolism), PE (pulmonary thromboembolism) (H), Pain of left lower leg       oxyCODONE IR 5 MG tablet    ROXICODONE    50 tablet    Take 1-2 tablets (5-10 mg) by mouth every 4 hours as needed for pain maximum 12 tablet(s) per day    Malignant neoplasm of colon, unspecified part of colon (H)       Potassium Chloride ER 20 MEQ Tbcr     30 tablet    Take 1 tablet (20 mEq) by mouth daily    Hypokalemia       prochlorperazine 10 MG tablet    COMPAZINE    30 tablet    Take 1 tablet (10 mg) by mouth every 6 hours as needed (Nausea/Vomiting)    Malignant neoplasm of hepatic flexure (H)       rivaroxaban ANTICOAGULANT 20 MG Tabs tablet    XARELTO    3 tablet    Take 1 tablet (20 mg) by mouth daily (with dinner)    PE (pulmonary thromboembolism) (H), DVT (deep vein thrombosis) in pregnancy (H)       sennosides 8.6 MG tablet    SENOKOT     Take 1 tablet by mouth 2 times daily as needed for constipation        SERTRALINE HCL PO      Take 50 mg by mouth daily (Takes half of a 100mg tablet for a total of 50mg daily)        GREGG NYQUIL COLD & FLU 15-6. MG/15ML Liqd   Generic drug:  DM-Doxylamine-Acetaminophen      Take 15 mLs by mouth nightly as needed        VITAMIN D (CHOLECALCIFEROL) PO      Take 1 tablet by mouth daily (OTC: Pt unsure of strength)        * Notice:  This list has 2 medication(s) that are the same as other medications prescribed for you. Read the directions carefully, and ask your doctor or other care provider to review them with you.

## 2018-01-15 PROBLEM — I82.409 RECURRENT DEEP VEIN THROMBOSIS (DVT) (H): Status: ACTIVE | Noted: 2018-01-01

## 2018-01-15 NOTE — IP AVS SNAPSHOT
MRN:[id number]                      After Visit Summary   1/15/2018    Liv Sylvester    MRN: [id number]           Thank you!     Thank you for choosing [facility] for your care. Our goal is always to provide you with excellent care. Hearing back from our patients is one way we can continue to improve our services. Please take a few minutes to complete the written survey that you may receive in the mail after you visit with us. Thank you!        Patient Information     Date Of Birth          1950        About your hospital stay     You were admitted on:  January 15, 2018 You last received care in the:  [place] [address] [facility]    You were discharged on:  January 17, 2018        Reason for your hospital stay       DVT                  Who to Call     For medical emergencies, please call 911.  For non-urgent questions about your medical care, please call your primary care provider or clinic, [phone]          Attending Provider     Provider Specialty    Ana Miramontes MD Emergency Medicine    [facility], Maximus Allen MD Internal Medicine       Primary Care Provider Office Phone # Fax #    Amaya Howard [phone]      After Care Instructions     Activity       Your activity upon discharge: activity as tolerated            Diet       Follow this diet upon discharge:   Snacks/Supplements Adult: Ensure Plus (Adult); Between Meals      Combination Diet Regular Diet Adult                  Follow-up Appointments     Follow-up and recommended labs and tests        Follow up with primary care provider, AMAYA HOWARD, within 7 days for hospital follow- up.  The following labs/tests are recommended: BMP.    Follow up with Oncology as per their recommendations.  Please attend your scheduled appointments with [facility] they will call you if they require you to come in earlier they will call you.                  Your next 10 appointments already scheduled     Jan 24, 2018  9:00 AM  CST   Level 6 with SH INFUSION CHAIR 17   [facility] ([facility])    [facility] [place] [id number]  [place] [address]   [phone]            Jan 24, 2018  9:30 AM CST   Return Visit with Radha Piedra MD   [facility] ([facility])    [facility] [place] [id number]  [place] [address]   [phone]            Jan 26, 2018 12:00 PM CST   Level 1 with SH INFUSION CHAIR 5   [facility] ([facility])    [facility] [place] [address]  [place] [address]   [phone]            Jan 31, 2018 12:00 PM CST   Level 1 with SH INFUSION CHAIR 11   [facility] ([facility])    [facility] [place] [id number]  [place] [address]   [phone]            Jan 31, 2018  1:00 PM CST   Return Visit with Radha Piedra MD   [facility] ([facility])    [facility] [place] [id number]  [place] [address]   [phone]            Feb 07, 2018  9:00 AM CST   Level 6 with SH INFUSION CHAIR 18   [facility] ([facility])    [facility] [place] [id number]  [place] [address]   [phone]            Feb 07, 2018  9:15 AM CST   Return Visit with Radha Piedra MD   [facility] ([facility])    [facility] [place] [id number]  [place] [address]   [phone]            Feb 09, 2018 12:00 PM CST   Level 1 with SH INFUSION CHAIR 12   [facility] ([facility])    [facility] [place] [id number]  [place] [address]   [phone]            Feb 12, 2018  9:00 AM CST   New Visit with Nela Spaulding,  MD   [facility] ([facility])    [facility] [place]  [address]  [place] [address] [phone]              Additional Services     Home Care OT Referral for Hospital Discharge       OT to eval and treat    Your provider has ordered home care - occupational therapy. If you have not been contacted within 2 days of your discharge please call the department phone number listed on the top of this document.            Home Care PT Referral for Hospital Discharge       PT to eval and treat    Your provider has ordered home care - physical therapy. If you have not been contacted within 2 days of your discharge please call the department phone number listed on the top of this document.            Home Care Social Service Referral for Hospital Discharge        to home safety evaluation.    Your provider has ordered home care - . If you have not been contacted within 2 days of your discharge please call the department phone number listed on the top of this document.            Home care nursing referral       RN skilled nursing visit. RN to assess n/a.  RN to teach medication management.  Resumption of [facility] line care changes and labs as needed by primary Oncologist.     Your provider has ordered home care nursing services. If you have not been contacted within 2 days of your discharge please call the inpatient department phone number at [phone] .                  Pending Results     No orders found from 1/13/2018 to 1/16/2018.            Statement of Approval     Ordered          01/17/18 1529  I have reviewed and agree with all the recommendations and orders detailed in this document.  EFFECTIVE NOW     Approved and electronically signed by:  Kristy Sterling MD             Admission Information     Date & Time Provider Department Dept. Phone    1/15/2018 Maximus Mills MD [facility] [address] [facility] [phone]      Your Vitals Were     Blood Pressure Pulse Temperature Respirations Height Weight    121/67 (BP Location: Right arm) 102 98.3  F (36.8  C) (Oral) 16 1.778 m (5' 10\") 102.5 kg (226 lb)    Pulse Oximetry BMI (Body Mass Index)                96% 32.43 kg/m2          [employer] Information     [employer] lets you send messages to your doctor, view your test results, renew your prescriptions, schedule appointments and more. To sign up, go to [email or web address] . Click on \"Log in\" on the left side of the screen, which will take you to the Welcome page. Then click on \"Sign up Now\" on the right side of the page.     You will be asked to enter the access code listed below, as well as some personal information. Please follow the directions to create your username and password.     Your access code is: [email or web address]  Expires: 2018  7:56 AM     Your access code will  in 90 days. If you need help or a new code, please call your [facility] clinic or [phone].        Care EveryWhere ID     This is your Care EveryWhere ID. This could be used by other organizations to access your [facility] medical records  [id number]        Equal Access to Services     HAYDEE IVERSON AH: Jeffry Pierce, waaxda luqadaha, qaybta kaalmada adeegyada, john maya. So [facility] [phone].    ATENCIÓN: Si habla español, tiene a schmitt disposición servicios gratuitos de asistencia lingüística. Dagobertoame al [phone].    We comply with applicable federal civil rights laws and [place] laws. We do not discriminate on the basis of race, color, national origin, age, disability, sex, sexual orientation, or gender identity.               Review of your medicines      CONTINUE these medicines which have NOT CHANGED        Dose / Directions    ACETAMINOPHEN PO        Dose:  1000 mg   Take 1,000 mg by mouth every 4 hours as needed for pain   Refills:  0       albuterol 108 (90 BASE) " MCG/ACT Inhaler   Commonly known as:  PROAIR HFA/PROVENTIL HFA/VENTOLIN HFA   Used for:  Reactive airway disease without complication, unspecified asthma severity, unspecified whether persistent        Dose:  2 puff   Inhale 2 puffs into the lungs every 6 hours as needed for shortness of breath / dyspnea or wheezing   Quantity:  1 Inhaler   Refills:  3       CALCIUM-MAGNESIUM-ZINC PO        Dose:  1 tablet   Take 1 tablet by mouth daily   Refills:  0       CENTRUM SILVER per tablet        Dose:  1 tablet   Take 1 tablet by mouth daily.   Quantity:  100 tablet   Refills:  12       CHLORTHALIDONE PO        Dose:  25 mg   Take 25 mg by mouth daily as needed ((Patient is supposed to take 25mg daily but states she only takes as needed))   Refills:  0       fondaparinux 10 MG/0.8ML injection   Commonly known as:  ARIXTRA   Used for:  PE (pulmonary thromboembolism) (H), DVT (deep vein thrombosis) in pregnancy (H)        Dose:  10 mg   Inject 0.8 mLs (10 mg) Subcutaneous every 24 hours   Quantity:  24 mL   Refills:  0       lidocaine 5 % ointment   Commonly known as:  XYLOCAINE        Dose:  0.5 inch   Apply 0.5 g topically as needed for moderate pain Apply pea size amount to rectal area for pain   Quantity:  5 g   Refills:  0       lidocaine-prilocaine cream   Commonly known as:  EMLA   Used for:  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        Apply topically as needed for moderate pain Apply dollop size amount to port site 30-60 minutes prior to accessing.   Quantity:  30 g   Refills:  1       lisinopril 10 MG tablet   Commonly known as:  PRINIVIL/ZESTRIL   Used for:  Essential hypertension        Dose:  10 mg   Take 1 tablet (10 mg) by mouth daily   Quantity:  30 tablet   Refills:  11       LORazepam 0.5 MG tablet   Commonly known as:  ATIVAN   Used for:  Malignant neoplasm of hepatic flexure (H)        Dose:  0.5 mg   Take 1 tablet (0.5 mg) by mouth every 4 hours as needed (Anxiety,  Nausea/Vomiting or Sleep)   Quantity:  30 tablet   Refills:  2       ondansetron 8 MG tablet   Commonly known as:  ZOFRAN   Used for:  Malignant neoplasm of hepatic flexure (H)        Dose:  8 mg   Take 1 tablet (8 mg) by mouth every 8 hours as needed (Nausea/Vomiting)   Quantity:  10 tablet   Refills:  2       order for DME   Used for:  Malignant neoplasm of colon, unspecified part of colon (H), VTE (venous thromboembolism), PE (pulmonary thromboembolism) (H), Pain of left lower leg        Equipment being ordered: Wheelchair   Quantity:  1 Device   Refills:  0       oxyCODONE IR 5 MG tablet   Commonly known as:  ROXICODONE   Used for:  Malignant neoplasm of colon, unspecified part of colon (H)        Dose:  5-10 mg   Take 1-2 tablets (5-10 mg) by mouth every 4 hours as needed for pain maximum 12 tablet(s) per day   Quantity:  50 tablet   Refills:  0       Potassium Chloride ER 20 MEQ Tbcr   Used for:  Hypokalemia        Dose:  20 mEq   Take 1 tablet (20 mEq) by mouth daily   Quantity:  30 tablet   Refills:  11       prochlorperazine 10 MG tablet   Commonly known as:  COMPAZINE   Used for:  Malignant neoplasm of hepatic flexure (H)        Dose:  10 mg   Take 1 tablet (10 mg) by mouth every 6 hours as needed (Nausea/Vomiting)   Quantity:  30 tablet   Refills:  2       sennosides 8.6 MG tablet   Commonly known as:  SENOKOT        Dose:  1 tablet   Take 1 tablet by mouth 2 times daily as needed for constipation   Refills:  0       SERTRALINE HCL PO        Dose:  50 mg   Take 50 mg by mouth daily (Takes half of a 100mg tablet for a total of 50mg daily)   Refills:  0       VICKS NYQUIL COLD & FLU 15-6. MG/15ML Liqd   Generic drug:  DM-Doxylamine-Acetaminophen        Dose:  15 mL   Take 15 mLs by mouth nightly as needed   Refills:  0       VITAMIN D (CHOLECALCIFEROL) PO        Dose:  1 tablet   Take 1 tablet by mouth daily (OTC: Pt unsure of strength)   Refills:  0         STOP taking     enoxaparin 100 MG/ML  injection   Commonly known as:  LOVENOX                Where to get your medicines      These medications were sent to [facility] [address] [place] [address] - [address] [facility]  [address] [facility] [address]     Phone:  [phone]     lisinopril 10 MG tablet    Potassium Chloride ER 20 MEQ Tbcr                Protect others around you: Learn how to safely use, store and throw away your medicines at www.disposemymeds.org.             Medication List: This is a list of all your medications and when to take them. Check marks below indicate your daily home schedule. Keep this list as a reference.      Medications           Morning Afternoon Evening Bedtime As Needed    ACETAMINOPHEN PO   Take 1,000 mg by mouth every 4 hours as needed for pain                                albuterol 108 (90 BASE) MCG/ACT Inhaler   Commonly known as:  PROAIR HFA/PROVENTIL HFA/VENTOLIN HFA   Inhale 2 puffs into the lungs every 6 hours as needed for shortness of breath / dyspnea or wheezing                                CALCIUM-MAGNESIUM-ZINC PO   Take 1 tablet by mouth daily                                CENTRUM SILVER per tablet   Take 1 tablet by mouth daily.                                CHLORTHALIDONE PO   Take 25 mg by mouth daily as needed ((Patient is supposed to take 25mg daily but states she only takes as needed))                                fondaparinux 10 MG/0.8ML injection   Commonly known as:  ARIXTRA   Inject 0.8 mLs (10 mg) Subcutaneous every 24 hours                                lidocaine 5 % ointment   Commonly known as:  XYLOCAINE   Apply 0.5 g topically as needed for moderate pain Apply pea size amount to rectal area for pain                                lidocaine-prilocaine cream   Commonly known as:  EMLA   Apply topically as needed for moderate pain Apply dollop size amount to port site 30-60 minutes prior to accessing.                                lisinopril 10 MG tablet    Commonly known as:  PRINIVIL/ZESTRIL   Take 1 tablet (10 mg) by mouth daily   Last time this was given:  10 mg on 1/17/2018  8:00 AM                                LORazepam 0.5 MG tablet   Commonly known as:  ATIVAN   Take 1 tablet (0.5 mg) by mouth every 4 hours as needed (Anxiety, Nausea/Vomiting or Sleep)                                ondansetron 8 MG tablet   Commonly known as:  ZOFRAN   Take 1 tablet (8 mg) by mouth every 8 hours as needed (Nausea/Vomiting)                                order for DME   Equipment being ordered: Wheelchair                                oxyCODONE IR 5 MG tablet   Commonly known as:  ROXICODONE   Take 1-2 tablets (5-10 mg) by mouth every 4 hours as needed for pain maximum 12 tablet(s) per day                                Potassium Chloride ER 20 MEQ Tbcr   Take 1 tablet (20 mEq) by mouth daily                                prochlorperazine 10 MG tablet   Commonly known as:  COMPAZINE   Take 1 tablet (10 mg) by mouth every 6 hours as needed (Nausea/Vomiting)                                sennosides 8.6 MG tablet   Commonly known as:  SENOKOT   Take 1 tablet by mouth 2 times daily as needed for constipation                                SERTRALINE HCL PO   Take 50 mg by mouth daily (Takes half of a 100mg tablet for a total of 50mg daily)   Last time this was given:  50 mg on 1/17/2018  8:00 AM                                GREGG NYQUIL COLD & FLU 15-6. MG/15ML Liqd   Take 15 mLs by mouth nightly as needed   Generic drug:  DM-Doxylamine-Acetaminophen                                VITAMIN D (CHOLECALCIFEROL) PO   Take 1 tablet by mouth daily (OTC: Pt unsure of strength)

## 2018-01-15 NOTE — ED PROVIDER NOTES
"  History     Chief Complaint:  Deep Vein Thrombosis       HPI   Liv Sylvester is a 67 year old female with a history of colon cancer, obesity, DVTs, flash PE on xarelto, and HTN currently anti-coagulated on lisinopril who presents to the ED for evaluation of deep vein thrombosis. The patient was referred here to the ED by Dr. Piedra, the patient's oncologist. He reports that her clot is moving up her right leg to her groin, the port is clogged and the patient is noncompliant with her medications.     Here in the ED, the patient denies shortness of breath, chest pain, nausea, or vomiting. Of note, the patient had treatment on January 9th for her colon cancer.     Allergies:  NKDA     Medications:    Arixtra  Xarelto  Oxycodone  Lovenox injection and SOLN  Ritalin  Vicks nyquil  Senokot  Potassium chloride  Lidocaine  Ativan  Compazine  Zofran  EMLA  Albuterol inhaler  Chlorthalidone  Sertraline  Lisinopril    Past Medical History:    Cancer (H)   Flash pulmonary edema (H)   Hemorrhoid   HTN (hypertension)   Obesity  Iron deficiency anemia  Primary hypercoagulable state with Recurrent DVT/PE     Past Surgical History:    Hysterectomy  Insert port vascular acess    Family History:    Cerebrovascular disease  MI  DM  HTN    Social History:  Presents alone.  Former smoker: quit in 2005  Negative for alcohol use.  Marital Status:   [4]    Review of Systems   Respiratory: Negative for shortness of breath.    Cardiovascular: Negative for chest pain.        Positive for DVT.   Gastrointestinal: Negative for nausea and vomiting.   10 point review of systems performed and is negative except as above and in HPI.    Physical Exam   First Vitals:  BP: 110/64  Pulse: 102  Temp: 98.8  F (37.1  C)  Resp: 14  Height: 177.8 cm (5' 10\")  Weight: 102.5 kg (226 lb)    Physical Exam  General: Sitting in wheelchair. No apparent distress.  Head:  The scalp, face, and head appear normal  Mouth/Throat: Mucus membranes are " moist  CV:  Regular rate    Normal S1 and S2  No pathological murmur   Resp:  Breath sounds equal bilaterally    Non-labored, no retractions or accessory muscle use    No coarseness    No wheezing     Lungs clear to auscultation. Vascular access on left chest.  GI:  Abdomen is soft, no rigidity    No tenderness to palpation  MS:  Normal motor assessment of all extremities.    Good capillary refill noted.    Left medial thigh tender to palpation.   Skin:  No rash or lesions noted.  Neuro: Speech is normal and fluent. No apparent deficit.  Psych: Awake. Alert.  Normal affect.      Appropriate interactions.    Emergency Department Course   Laboratory:  1632 INR: 1.13    CBC: WBC: 4.6, HGB: 11.3 (L), PLT: 174  BMP: Glucose 99, o/w WNL (Creatinine: 0.60)    Interventions:  1646 Heparin loading dose bolus 3,500 units IV    Emergency Department Course:  Nursing notes and vitals reviewed. 1451 I performed an exam of the patient as documented above.     IV inserted. Medicine administered as documented above. Blood drawn. This was sent to the lab for further testing, results above.    1622  I consulted with Dr. Mills of the hospitalist services. They are in agreement to accept the patient for admission.    Findings and plan explained to the Patient who consents to admission. Discussed the patient with Dr. Mills, who will admit the patient to a medical bed for further monitoring, evaluation, and treatment.    Impression & Plan    Medical Decision Making:  Liv Sylvester is a 67 year old female who presents for evaluation of DVT. The patient was referred here by Dr. Piedra, her oncologist. He reports DVT in her right leg moving upwards to her groin and noncompliance with her medications. Blood was drawn and heparin infusion and bolus administered while here in the ED. There are no symptoms to suggest this has progressed to pulmonary embolism.   Patient understands the risk/benefit ratio to this therapy and the possibility of  serious and/or life-threatening hemorrhage.  I will admit the patient to medicine team for further workup and evaluation.  There is no indication at this point for thrombolysis or contacting the interventional team for directed thrombolytics. Dr. Piedra specifically requested the patient be admitted for initiation of heparin drip as she is continuing to worsen despite outpatient treatment. He believes this may be secondary to noncompliance.    Diagnosis:    ICD-10-CM   1. Deep vein thrombosis (DVT) (H) I82.409       Disposition:  Admitted to Dr. Mills of the hospitalist service.    I, Justyna Hernández, am serving as a scribe on 1/15/2018 at 2:51 PM to personally document services performed by Ana Miraomntes MD based on my observations and the provider's statements to me.       Justyna Hernández  1/15/2018    EMERGENCY DEPARTMENT       Ana Miramontes MD  01/16/18 0175

## 2018-01-15 NOTE — PROGRESS NOTES
RECEIVING UNIT ED HANDOFF REVIEW    ED Nurse Handoff Report was reviewed by: EDITH DUMONT on January 15, 2018 at 5:08 PM

## 2018-01-15 NOTE — PROGRESS NOTES
This is a recent snapshot of the patient's [facility] medical record.  For current drug dose and complete information and questions, call [phone] or In Basket pool, [facility] ([id number])  CSN Number:  [id number]

## 2018-01-15 NOTE — IP AVS SNAPSHOT
[address]    [place] [address]    Phone:  [phone]                                       After Visit Summary   1/15/2018    Liv Sylvester    MRN: [id number]           After Visit Summary Signature Page     I have received my discharge instructions, and my questions have been answered. I have discussed any challenges I see with this plan with the nurse or doctor.    ..........................................................................................................................................  Patient/Patient Representative Signature      ..........................................................................................................................................  Patient Representative Print Name and Relationship to Patient    ..................................................               ................................................  Date                                            Time    ..........................................................................................................................................  Reviewed by Signature/Title    ...................................................              ..............................................  Date                                                            Time

## 2018-01-15 NOTE — ED NOTES
.  [facility]  ED Nurse Handoff Report    ED Chief complaint: Deep Vein Thrombosis (per Dr Piedra, pt is noncompliant colon CA, DVT's, has clot that is moving up to groin, port is clogged, Treatment on Jan 9th needs heparin Drip)      ED Diagnosis:   Final diagnoses:   Deep vein thrombosis (DVT) (H)       Code Status: DNR / DNI per records    Allergies: No Known Allergies    Activity level - Baseline/Home:  Independent    Activity Level - Current:   Independent     Needed?: No    Isolation: No  Infection: Not Applicable    Bariatric?: No    Vital Signs:   Vitals:    01/15/18 1503 01/15/18 1515 01/15/18 1530 01/15/18 1533   BP: 117/81 114/71 111/74    Pulse:       Resp:       Temp:       TempSrc:       SpO2:  100% 100%    Weight:    102.5 kg (226 lb)   Height:           Cardiac Rhythm: ,        Pain level:      Is this patient confused?: No    Patient Report: Initial Complaint: DVT  Focused Assessment: Known dvt in lower leg- complaints of pain progressing up to left thigh/groin.  Tests Performed: Blood  Abnormal Results: still pending  Treatments provided: Heparin IV    Family Comments: at bedside    OBS brochure/video discussed/provided to patient: N/A    ED Medications:   Medications   heparin infusion 25,000 units in 0.45% NaCl 250 mL (not administered)   heparin Loading Dose bolus dose from infusion pump 3,500 Units (not administered)       Drips infusing?:  Yes      ED NURSE PHONE NUMBER: *[phone]

## 2018-01-15 NOTE — PHARMACY-ADMISSION MEDICATION HISTORY
Admission medication history interview status for the 1/15/2018  admission is complete. See EPIC admission navigator for prior to admission medications     Medication history source reliability:Moderate    Actions taken by pharmacist (provider contacted, etc):None     Additional medication history information not noted on PTA med list :non compliant    Medication reconciliation/reorder completed by provider prior to medication history? No    Time spent in this activity: 15min.    Prior to Admission medications    Medication Sig Last Dose Taking? Auth Provider   LORazepam (ATIVAN) 0.5 MG tablet Take 1 tablet (0.5 mg) by mouth every 4 hours as needed (Anxiety, Nausea/Vomiting or Sleep) Past Week at Unknown time Yes Radha Piedra MD   prochlorperazine (COMPAZINE) 10 MG tablet Take 1 tablet (10 mg) by mouth every 6 hours as needed (Nausea/Vomiting) Past Week at Unknown time Yes Radha Piedra MD   ondansetron (ZOFRAN) 8 MG tablet Take 1 tablet (8 mg) by mouth every 8 hours as needed (Nausea/Vomiting) Past Week at Unknown time Yes Radha Piedra MD   CALCIUM-MAGNESIUM-ZINC PO Take 1 tablet by mouth daily Past Week at Unknown time Yes Unknown, Entered By History   Multiple Vitamins-Minerals (CENTRUM SILVER) per tablet Take 1 tablet by mouth daily. Past Week at Unknown time Yes Adrian Fonseca MD   fondaparinux (ARIXTRA) 10 MG/0.8ML injection Inject 0.8 mLs (10 mg) Subcutaneous every 24 hours not started  Radha Piedra MD   oxyCODONE IR (ROXICODONE) 5 MG tablet Take 1-2 tablets (5-10 mg) by mouth every 4 hours as needed for pain maximum 12 tablet(s) per day 1/12/2018  Radha Piedra MD   enoxaparin (LOVENOX) 100 MG/ML injection Inject 1 mL (100 mg) Subcutaneous every 12 hours 1/12/2018  Radha Piedra MD   order for DME Equipment being ordered: Wheelchair   Radha Piedra MD   DM-Doxylamine-Acetaminophen (VICKS NYQUIL COLD & FLU) 15-6. MG/15ML LIQD Take 15 mLs by mouth nightly as needed prn  Unknown, Entered By History    sennosides (SENOKOT) 8.6 MG tablet Take 1 tablet by mouth 2 times daily as needed for constipation prn  Unknown, Entered By History   Potassium Chloride ER 20 MEQ TBCR Take 1 tablet (20 mEq) by mouth daily 1/12/2018  Radha Piedra MD   lidocaine (XYLOCAINE) 5 % ointment Apply 0.5 g topically as needed for moderate pain Apply pea size amount to rectal area for pain prn  Rene Aragon MD   lidocaine-prilocaine (EMLA) cream Apply topically as needed for moderate pain Apply dollop size amount to port site 30-60 minutes prior to accessing. Unknown at Unknown time  Radha Piedra MD   albuterol (PROAIR HFA/PROVENTIL HFA/VENTOLIN HFA) 108 (90 BASE) MCG/ACT Inhaler Inhale 2 puffs into the lungs every 6 hours as needed for shortness of breath / dyspnea or wheezing More than a month at Unknown time  Radha Piedra MD   CHLORTHALIDONE PO Take 25 mg by mouth daily as needed ((Patient is supposed to take 25mg daily but states she only takes as needed))  More than a month at Unknown time  Unknown, Entered By History   SERTRALINE HCL PO Take 50 mg by mouth daily (Takes half of a 100mg tablet for a total of 50mg daily) 1/12/2018  Unknown, Entered By History   VITAMIN D, CHOLECALCIFEROL, PO Take 1 tablet by mouth daily (OTC: Pt unsure of strength) 1/12/2018  Unknown, Entered By History   ACETAMINOPHEN PO Take 1,000 mg by mouth every 4 hours as needed for pain  prn  Unknown, Entered By History   lisinopril (PRINIVIL,ZESTRIL) 10 MG tablet Take 1 tablet by mouth daily. 1/12/2018  Adrian Fonseca MD

## 2018-01-15 NOTE — LETTER
Transition Communication Hand-off for Care Transitions to Next Level of Care Provider    Name: Liv Sylvester  MRN #: [id number]  Primary Care Provider: AMAYA HOWARD  Primary Care MD Name: Amaya Howard  Primary Clinic: [address] [address]     Reason for Hospitalization:  Deep vein thrombosis (DVT) (H) [I82.409]  Admit Date/Time: 1/15/2018  2:49 PM  Discharge Date: 1/18/18    Plan of Care Goals/Milestone Events:   Patient Concerns: getting back to work in 3 months    Patient Goals: to have Homecare PT/OT to gain strength           Reason for Communication Hand-off Referral: Fragility  Difficulty understanding plan of care  Multiple providers/specialties    Discharge Plan:  Discharge Plan:       Most Recent Value    Concerns To Be Addressed discharge planning concerns           Concern for non-adherence with plan of care:   Y/N y  Discharge Needs Assessment:  Needs       Most Recent Value    Equipment Currently Used at Home walker, rolling    Transportation Available family or friend will provide    Current Discharge Risk physical impairment    # of Referrals Placed by CTS External Care Coordination, Durable Medical Equipment (DME), Scheduled Follow-up appointments, Communication hand-offs to next level of Care Providers, Insurance Verification for medications            Follow-up specialty is recommended: Yes    Follow-up plan:  Future Appointments  Date Time Provider Department Center   1/19/2018 2:00 PM Enriqueta Khan MD [facility] [place]   1/24/2018 9:00 AM  [facility]IR [facility]   1/24/2018 9:30 AM Radha Piedra MD [facility] [place]   1/26/2018 12:00 PM [facility]ION [facility]   1/31/2018 12:00 PM [facility]   1/31/2018 1:00 PM Radha Piedra MD [facility] [place]   2/7/2018 9:00 AM SH INFUSION CHAIR 18 [facility]   2/7/2018 9:15 AM Radha Piedra MD [facility] [place]   2/9/2018 12:00 PM [facility]    2/12/2018 9:00 AM Nela Spaulding MD [facility]       Any outstanding tests or procedures:        Referrals     Future Labs/Procedures    Home care nursing referral     Comments:    RN skilled nursing visit. RN to assess n/a.  RN to teach medication management.  Resumption of [facility] line care changes and labs as needed by primary Oncologist.     Your provider has ordered home care nursing services. If you have not been contacted within 2 days of your discharge please call the inpatient department phone number at [phone] .    Home Care OT Referral for Hospital Discharge     Comments:    OT to eval and treat    Your provider has ordered home care - occupational therapy. If you have not been contacted within 2 days of your discharge please call the department phone number listed on the top of this document.    Home Care PT Referral for Hospital Discharge     Comments:    PT to eval and treat    Your provider has ordered home care - physical therapy. If you have not been contacted within 2 days of your discharge please call the department phone number listed on the top of this document.    Home Care Social Service Referral for Hospital Discharge     Comments:     to home safety evaluation.    Your provider has ordered home care - . If you have not been contacted within 2 days of your discharge please call the department phone number listed on the top of this document.             Key Recommendations:  Patient is discharging with sub Q fondaparinux due to PMH of PE DVT and extension of DVT.  Patient has follow up with her Oncologist Dr. Piedra 1/19.  Patient is open to [facility] and will continue with services in addition to homecare PT/OT/SW for resources.  Hospital SW assisted patient with referral to [facility] to come prior to discharge for a Healthcare directive.  Patient has DME equipment walker but says  the clinic is working with her in the outpatient setting for a wheelchair.  Patient requires close monitoring and follow up.  Patient has a granddaughter that is living with her and is able to assist her with appointments and picking up medications as needed.  Patient may benefit from visiting [facility] as she would like help with light duty cleaning.      Marylou Cardoza    AVS/Discharge Summary is the source of truth; this is a helpful guide for improved communication of patient story

## 2018-01-16 NOTE — PLAN OF CARE
0928-2070: Alert and oriented x4. VSS. Denies pain. Up with one and walker. Heparin on hold until 1745 at which time it will be changed to 11.5 cc per hour, recheck at 2345. Zofran given x1. Plan to continue to monitor.

## 2018-01-16 NOTE — CONSULTS
REQUESTING PHYSICIAN:  Dr. Mills.       REASON FOR CONSULTATION:  Recurrent DVT.      HISTORY OF PRESENT ILLNESS:  Ms. Sylvester is a 67-year-old female with metastatic colon cancer diagnosed in 09/2017.  CT scan on 09/29/2017 had revealed multiple liver metastases and focal bowel wall thickening involving the colon at hepatic flexure, along with mesenteric and retroperitoneal lymphadenopathy.  Colonoscopy on 09/30/2017 had revealed a partially obstructing tumor at hepatic flexure.  Biopsy revealed moderately differentiated adenocarcinoma.  Normal pattern of mismatch repair protein expression.  CEA on 09/30/2017 was 143.8.  Next generation sequencing revealed KRAS mutation.  The patient is currently on chemotherapy with FOLFOX and Avastin, which were started on 10/11/2017.  She has received 7 cycles.  Last treatment was on 01/11/2018.  Followup CT scan on 12/15/2017 reveals improvement in metastatic disease.      Patient has been having recurrent thrombosis.    1. CT chest angiogram on 10/20/2017 revealed few small acute pulmonary emboli in left lung.  Patient was admitted to the hospital and started on heparin.  She was discharged on Lovenox.  Lower extremity ultrasound on 10/20/2017 had revealed bilateral posterior tibial vein deep vein thrombosis in the calf.      2. Patient had trouble injecting Lovenox.  She was switched to Xarelto on 11/03/2017.      3. Patient had CT chest, abdomen and pelvis on 11/27/2017.  It revealed numerous small pulmonary embolus in descending right pulmonary artery.  Additional right-sided pulmonary emboli were unchanged.  Previously seen left-sided pulmonary emboli were not visualized  There was thrombus around the port-a-cath in the superior vena cava.  Lower extremity ultrasound on 11/29/2017 revealed worsening bilateral DVT.  There was extension of bilateral posterior tibial vein thrombosis as well as new popliteal vein deep vein thrombus.      4. On 11/29/2017,  she was  switched from Xarelto to Lovenox.      5. CT abdomen and pelvis on 12/15/2017, revealed probable left lower lobe pulmonary emboli.  This appeared new compared to 11/27/2017. She was admitted to the hospital and started on heparin.  She was discharged home on Lovenox 1 mg/kg q.12 hours.  Prior to admission, she was on Lovenox once a day.      6. Patient was admitted to hospital on 01/05/2018 because of new DVT.  The patient had presented to the ER with leg swelling.  Left lower extremity ultrasound on 01/05/2018, revealed new left common femoral, femoral and popliteal DVT.  There was resolution of left calf DVT.  Left upper extremity ultrasound was negative for DVT.  The patient was admitted to hospital and started on heparin.  On questioning, patient mentioned that she was not very compliant with Lovenox.  It was felt that new DVT was secondary to patient not being compliant with Lovenox.  She was discharged on Lovenox 1 mg/kg q.12 hours. Anti-Xa level has been monitored and patient has been in therapeutic range.      Patient had left lower extremity ultrasound on 01/15/2018 because of worsening pain in the left thigh.  Ultrasound revealed new DVT extending into the left external iliac vein.  There is no significant change in thrombus in the left common femoral, superficial femoral and popliteal vein.  She also had left upper extremity ultrasoundon 01/16/2018. There was no DVT.  The patient was admitted to the hospital.  She is currently on heparin drip.      I asked the patient regarding compliance with her Lovenox.  The patient said that she has been compliant now.  She has just missed 1 dose.      The patient says that overall she is stable.      REVIEW OF SYSTEMS:  Main problem is some pain in the left thigh.  No headache.  No dizziness.  No ear pain or sore throat.  No neck pain.  No chest pain or difficulty breathing.  No abdominal pain, nausea or vomiting.  No urinary or bowel complaints.  No bleeding from  any site.  No fever or chills.  She has some discomfort in the left thigh area.  All other review of systems negative.      ALLERGIES:  None.      MEDICATIONS:  Reviewed.      PAST MEDICAL HISTORY:   1.  Metastatic colon cancer.   2.  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   3.  Hypertension.   4.  Anxiety and depression.    5.  History of stage IA, grade 3 endometrial carcinoma, status post hysterectomy and salpingo-oophorectomy.      SOCIAL HISTORY:    -She is a former smoker. Quit in 2005.    -No alcohol use.      PHYSICAL EXAMINATION:   GENERAL:  Alert and oriented x 3.   VITAL SIGNS:  Reviewed.   FACE:  No swelling.   EYES:  No icterus.   THROAT:  No ulcer.  No thrush.  No bleeding.   NECK:  Supple. No lymphadenopathy or thyromegaly.   LUNGS:  Good air entry bilaterally.  No crackles or wheezing.   HEART:  Regular.  No murmur.   ABDOMEN:  Soft and nontender.  No hepatosplenomegaly.  No mass.   EXTREMITIES:  Mild ankle edema.  No calf redness or tenderness.  In left thigh, there is mild tenderness.  No redness.  No skin changes.   SKIN:  No rash.      LABORATORY DATA:  Reviewed.      ASSESSMENT:   1.  A 67-year-old female with recurrent deep venous thrombosis (DVT) and pulmonary embolus (PE).  This is malignancy related.  Patient has failed Xarelto and enoxaparin.   2.  Stage IV colon cancer with liver and pulmonary metastases.  Patient is on palliative chemotherapy with FOLFOX and Avastin.   3.  Leukopenia secondary to chemotherapy.   4.  Anemia secondary to chemotherapy and colon cancer.   5.  Other medical problems including hypertension, depression, anxiety.   6.  History of endometrial cancer.      RECOMMENDATIONS:   1.  I had a long discussion with the patient.  She was alone in the room.  I discussed with her regarding recurrent DVT and PE.  She had multiple episodes of PE and DVT documented on the scans. I explained to the patient her thromboses are due to malignancy. We do not need to do any  hypercoagulable workup.      2.  Discussed regarding treatment.  The patient has failed Xarelto and Lovenox.      The patient is currently on heparin drip.  Discussed with her regarding long-term management of anticoagulation.  Dr. Piedra has recommended Arixtra.  This is an anti-Xa inhibitor.  It is approved for treatment of DVT and PE.  It is a once a day subcutaneous injection.  I told the patient that Arixtra would be next logical option.  Complications including bleeding complications were discussed.  If for some reason she is unable to take subcutaneous injection, another option would be warfarin.  Generally, in malignancy associated thrombosis, low molecular weight heparin recommended.  The patient has already failed enoxaparin.  Because of that, warfarin can also be considered.  But, personally I would favor Arixtra.     I told the patient that in the last couple of months she had recurrent episode of thrombosis.  I explained to her that she may get a big pulmonary embolism which can be life threatening.  If she continues to get these episodes of thrombosis, we will also consider placement of IVC filter.     3.  Discussed with her regarding metastatic colon cancer.  She is on palliative treatment with FOLFOX and Avastin.  Avastin can cause venous thrombosis.  I would recommend stopping the Avastin.  I will discuss this with her primary oncologist, Dr. Piedra.     4.  Labs were reviewed.  She is leukopenic, which is secondary to chemotherapy.  She is also mildly anemic.  Labs will be monitored.  It should improve.     5.  The patient had multiple questions, which were all answered.  Will continue to follow.      Total time spent 60 minutes, more than 50% of time was spent in counseling and coordination of care.         HUNTER TORRES MD             D: 2018 14:48   T: 2018 15:42   MT: ALYSON      Name:     JESSICA MOYA   MRN:      [id number]        Account:       [id number]   :      1950            Consult Date:  01/16/2018      Document: [id number]

## 2018-01-16 NOTE — PROVIDER NOTIFICATION
MD Notification    Notified Person:  MD    Notified Persons Name: Dr. Strange    Notification Date/Time:  1/16/2018 0810    Notification Interaction:  Talked with Physician    Purpose of Notification: Critical lab -     Orders Received:  No new orders.  Pharmacy managing heparin drip.  Will watch for new orders from [facility].    Natalee Valentino, RN 1/16/2018 8:12 AM

## 2018-01-16 NOTE — TELEPHONE ENCOUNTER
Patient has been admitted to [facility] for anticoagulation management.    I have called and LVM with [facility] with the above update. Debbie Dasilva

## 2018-01-16 NOTE — PROGRESS NOTES
Consult dictated.    67-year-old female with metastatic colon cancer admitted with progression of left lower extremity DVT.  Patient has malignancy related recurrent DVT and PE.  She has failed Xarleto and Lovenox.  She is currently on heparin drip.    Plan:  -Start fondaparinux (arixtra) at 10 mg subcutaneous once daily when patient is ready for discharge. (If this is not approved by insurance company, start her on warfarin.)  -We will stop Avastin as part of chemotherapy.  Avastin can cause thrombosis.

## 2018-01-16 NOTE — PROGRESS NOTES
This is a recent snapshot of the patient's [facility] medical record.  For current drug dose and complete information and questions, call [phone] or In [facility] pool, [facility] ([id number])  CSN Number:  [id number]

## 2018-01-16 NOTE — PROVIDER NOTIFICATION
"MD Notification    Notified Person:  MD    Notified Persons Name: Dr. Strange    Notification Date/Time: 1/16/2018 0954    Notification Interaction:  Talked with Physician    Purpose of Notification: Patient c/o left upper arm pain that \"feels similar to previous blood clots\".  No signs of redness/warmth/ increased edema.  Patient denies chest pain and shortness of breath.     Orders Received: MD will come to evaluate the patient.  Is already being treated for DVT in [facility], so ultrasound may not be necessary, but MD will speak with patient.    Natalee Valentino RN 1/16/2018 9:56 AM   "

## 2018-01-16 NOTE — H&P
PRIMARY CARE PHYSICIAN:  Dr. Amaya Howard.        PRIMARY ONCOLOGIST:  Radha Piedra MD.       CHIEF COMPLAINT:  Concern for worsening DVT.      HISTORY OF PRESENT ILLNESS:  Ms. Liv Sylvester is a 67-year-old female with past medical history significant for metastatic colon cancer, hypertension, anxiety, DVT, recurrent DVT and PE, some concerns for noncompliance with anticoagulation, who was sent to the ER for concerns for worsening DVT.  She has history of recurrent DVT in the past and used to be on Xarelto and she had DVT despite being on Xarelto, so she was switched to Lovenox.  There were some concerns for compliance which was thought to be the cause for recurrent PE and she was admitted in mid December for that.  She was discharged on Lovenox.  Dr. Piedra was following the labs with Anti-Xa and despite concerns for compliance, it seems that her Xa levels were higher. She was noted to have DVT despite high Xa level so the plan was to switch her to fondaparinux, which she has not picked up yet.  Her last dose of Lovenox was Friday.     Today, she started having worsening pain of her thigh on the left.  Previously, she used to have left calf pain which has progressed to left thigh pain now.  The concern was that she was having progression of thrombosis, so she was sent to the ER for further anticoagulation management and particularly initiation of heparin drip.  I did discuss with Dr. Piedra about the care plan over the phone.  She denies any fever, chills or rigors.  Denies chest pain or shortness of breath.  Denies pain in abdomen.  Reports a normal bowel and bladder habit.  In the ER, she was seen by Dr. Miramontes.  A heparin drip was initiated and Hospitalist was requested admission for further evaluation.      REVIEW OF SYSTEMS:  A 10-point review of systems was done and was negative apart from those mentioned in the history of present illness.      PAST MEDICAL HISTORY:   1.  Recurrent DVT and PE.   2.  Hypertension.    3.  Anxiety, depression.   4.  Metastatic colon cancer with mets to liver.   5.  Chronic anemia.   6.  Concern for medication noncompliance.   7.  Ovarian cancer, status post DAPHNEY/BSO.      MEDICATIONS PRIOR TO ADMISSION:  Prescriptions Prior to Admission   Medication Sig Dispense Refill Last Dose     LORazepam (ATIVAN) 0.5 MG tablet Take 1 tablet (0.5 mg) by mouth every 4 hours as needed (Anxiety, Nausea/Vomiting or Sleep) 30 tablet 2 Past Week at Unknown time     prochlorperazine (COMPAZINE) 10 MG tablet Take 1 tablet (10 mg) by mouth every 6 hours as needed (Nausea/Vomiting) 30 tablet 2 Past Week at Unknown time     ondansetron (ZOFRAN) 8 MG tablet Take 1 tablet (8 mg) by mouth every 8 hours as needed (Nausea/Vomiting) 10 tablet 2 Past Week at Unknown time     CALCIUM-MAGNESIUM-ZINC PO Take 1 tablet by mouth daily   Past Week at Unknown time     Multiple Vitamins-Minerals (CENTRUM SILVER) per tablet Take 1 tablet by mouth daily. 100 tablet 12 Past Week at Unknown time     fondaparinux (ARIXTRA) 10 MG/0.8ML injection Inject 0.8 mLs (10 mg) Subcutaneous every 24 hours 24 mL 0 not started     oxyCODONE IR (ROXICODONE) 5 MG tablet Take 1-2 tablets (5-10 mg) by mouth every 4 hours as needed for pain maximum 12 tablet(s) per day 50 tablet 0 1/12/2018     enoxaparin (LOVENOX) 100 MG/ML injection Inject 1 mL (100 mg) Subcutaneous every 12 hours 60 mL 3 1/12/2018     order for DME Equipment being ordered: Wheelchair 1 Device 0      DM-Doxylamine-Acetaminophen (VICKS NYQUIL COLD & FLU) 15-6. MG/15ML LIQD Take 15 mLs by mouth nightly as needed   prn     sennosides (SENOKOT) 8.6 MG tablet Take 1 tablet by mouth 2 times daily as needed for constipation   prn     Potassium Chloride ER 20 MEQ TBCR Take 1 tablet (20 mEq) by mouth daily 30 tablet 11 1/12/2018     lidocaine (XYLOCAINE) 5 % ointment Apply 0.5 g topically as needed for moderate pain Apply pea size amount to rectal area for pain 5 g 0 prn      lidocaine-prilocaine (EMLA) cream Apply topically as needed for moderate pain Apply dollop size amount to port site 30-60 minutes prior to accessing. 30 g 1 Unknown at Unknown time     albuterol (PROAIR HFA/PROVENTIL HFA/VENTOLIN HFA) 108 (90 BASE) MCG/ACT Inhaler Inhale 2 puffs into the lungs every 6 hours as needed for shortness of breath / dyspnea or wheezing 1 Inhaler 3 More than a month at Unknown time     CHLORTHALIDONE PO Take 25 mg by mouth daily as needed ((Patient is supposed to take 25mg daily but states she only takes as needed))    More than a month at Unknown time     SERTRALINE HCL PO Take 50 mg by mouth daily (Takes half of a 100mg tablet for a total of 50mg daily)   1/12/2018     VITAMIN D, CHOLECALCIFEROL, PO Take 1 tablet by mouth daily (OTC: Pt unsure of strength)   1/12/2018     ACETAMINOPHEN PO Take 1,000 mg by mouth every 4 hours as needed for pain    prn     lisinopril (PRINIVIL,ZESTRIL) 10 MG tablet Take 1 tablet by mouth daily. 30 tablet 11 1/12/2018         ALLERGIES:  No known drug allergies.      SOCIAL HISTORY:  Her granddaughter lives with her.  She quit smoking in 2005.  Denies alcohol or illicit drug use.      FAMILY HISTORY:  Father passed away due to a CVA.  Mother passed away due to an MI.  Family history otherwise not pertinent to current presentation.      PHYSICAL EXAMINATION:   GENERAL:  The patient is conscious, alert, oriented x3, very pleasant, lying comfortably in bed in no apparent distress.   VITAL SIGNS:  Temperature 98.8, heart rate of 102, blood pressure 111/74, saturation 100% on room air.   HEENT:  Pupils are equal and reactive to light and accommodation.  Extraocular movements are intact.  Oral mucosa is slightly dry.   NECK:  Supple.  No JVD.   RESPIRATORY:  Lung sounds bilaterally clear to auscultation, no wheezes or crepitation.   CARDIOVASCULAR:  Normal S1-S2, regular rate and rhythm, no murmur.   ABDOMEN:  Soft, nontender, nondistended, no guarding, rigidity  or rebound tenderness.   LOWER EXTREMITIES:  With no edema.  I did not appreciate any significant tenderness on the left leg examination.     NEUROLOGIC:  No focal neurological deficits noted.  Cranial nerves II-XII grossly intact.   PSYCHIATRIC:  Normal mood and affect.      LABORATORY DATA:  CBC with WBC of 4.6, hemoglobin 11.3, platelets 174,000.  Last heparin Xa level from 11/18 was high at 1.36.      ASSESSMENT AND PLAN:  Ms. Liv Sylvester is a 67-year-old female with past medical history significant for metastatic colon cancer, chronic anemia, recurrent deep venous thrombosis, pulmonary embolus, hypertension, anxiety, depression and ovarian cancer who was sent to the ER by her home RN for concerns for deep venous thrombosis progression.     1.  Concern for left leg deep venous thrombosis progression with concerns for some noncompliance with anticoagulation.  I discussed the care plan with Dr. Piedra who suggested that the patient has been having recurrent deep venous thrombosis despite supratherapeutic heparin Xa level, even though there are some concerns for medication noncompliance in the past.  Will admit the patient as inpatient.  We will start her on heparin drip.  We will repeat left leg ultrasound.  The plan is to switch her anticoagulation to fondaparinux if she has progression of her deep venous thrombosis compared to the prior imaging. We will also get occupational therapy evaluation to see if she needs more help at home versus need for placement to a TCU as it seems she did have some issues with her medications and oftentimes forgets to take her medication.  We will consult Oncology for further evaluation and  for disposition planning.     2.  Metastatic colon cancer with mets to liver and lung.  She has been on FOLFOX and Avastin.  Further management per Oncology.   3.  Anxiety, depression.  Will resume her prior to admission Ativan and Zoloft when verified.   4.  Hypertension.  Resume  lisinopril when verified.   5.  Mild dehydration.  Will start her on IV hydration with normal saline at 100 mL per hour.   6.  History of ovarian cancer, status post total abdominal hysterectomy, bilateral salpingo-oophorectomy.     7.  Deep venous thrombosis prophylaxis.  She will be on full dose anticoagulation for her history of recurrent deep venous thrombosis and pulmonary embolus.      CODE STATUS:  This was discussed and she wishes to be DNR/DNI.         OCTAVIA ROJO MD             D: 01/15/2018 17:05   T: 01/15/2018 18:58   MT: [email or web address]      Name:     JESSICA MOYA   MRN:      [id number]        Account:      [id number]   :      1950           Admitted:     [id number]      Document: [id number]       cc: Amaya Howard MD

## 2018-01-16 NOTE — PROGRESS NOTES
This is a recent snapshot of the patient's [facility] medical record.  For current drug dose and complete information and questions, call [phone] or In [facility] pool, fv home infusion ([id number])  CSN Number:  [id number]

## 2018-01-16 NOTE — PROGRESS NOTES
[facility]    Hospitalist Progress Note    Assessment & Plan   Liv Sylvester is a 67-year-old female with past medical history significant for metastatic colon cancer, chronic anemia, recurrent deep venous thrombosis, pulmonary embolus, hypertension, anxiety, depression and ovarian cancer who was sent to the ER by her home RN for concerns for deep venous thrombosis progression.     Left leg deep venous thrombosis  Patient has had progression of her left lower leg DVT with concerns for some noncompliance with anticoagulation.  The care plan was discussed with Dr. Piedra who suggested that the patient has been having recurrent deep venous thrombosis despite supratherapeutic heparin Xa level, even though there are some concerns for medication noncompliance in the past.  Repeat US showed a new DVT extending in to the left external iliac vein.   Patient also began to complain of an ache in her right upper extremity.  Suspect this is likely a DVT as well as there is no signs of erythema, warmth or fluctuance.  Discussed with patient that US of the RUE would unlikely  but patient still preferred to have one performed  - Continue Heparin drip with plans to switch to Fondaparinux  - US of the RUE   - Hem/onc consulted and appreciate their recommendations  - PT/OT consult placed.  Patient will likely need home health at minimum    Metastatic colon cancer with mets to liver and lung  She has been on FOLFOX and Avastin  - Further management per Oncology  - May want to consider palliative care consult    Anxiety/Depression  - PTA Ativan and Zoloft    Hypertension.  - PTA Lisinopril     History of ovarian cancer  Status post total abdominal hysterectomy, bilateral salpingo-oophorectomy    DVT Prophylaxis: On heparin drip for known   Code Status: DNR/DNI    Disposition: Expected discharge in 1-2 days once anticoagulation decisions made.    Jerry Strange, DO  Text Page (7am to 6pm)    Interval  History   Patient seen and examined.  Only complaint at this time is an ache in her right upper extremity that she states is similar to the pain in her leg from her DVT.  No chest pain or SOB.     -Data reviewed today: I reviewed all new labs and imaging results over the last 24 hours. I personally reviewed the US results of the left lower extremity    Physical Exam   Temp: 98.6  F (37  C) Temp src: Oral BP: 137/72 Pulse: 102 Heart Rate: 84 Resp: 16 SpO2: 97 % O2 Device: None (Room air)    Vitals:    01/15/18 1447 01/15/18 1533   Weight: 102.5 kg (226 lb) 102.5 kg (226 lb)     Vital Signs with Ranges  Temp:  [98.5  F (36.9  C)-98.8  F (37.1  C)] 98.6  F (37  C)  Pulse:  [102] 102  Heart Rate:  [84-95] 84  Resp:  [14-18] 16  BP: (110-166)/(60-86) 137/72  SpO2:  [97 %-100 %] 97 %       Constitutional: Awake, alert, cooperative, no apparent distress  Respiratory: Clear to auscultation bilaterally, no crackles or wheezing  Cardiovascular: Diminished heart sounds but regular rate and rhythm, normal S1 and S2, and no murmur noted  GI: Normal bowel sounds, soft, non-distended, non-tender  Skin/Integumen: No rashes, no cyanosis  MSK: Tenderness to the left upper arm.  No area of firmness, redness, swelling or warmth    Medications     HEParin 1,250 Units/hr (01/16/18 0856)     - MEDICATION INSTRUCTIONS -       NaCl 100 mL/hr at 01/16/18 0927       ranitidine  75 mg Oral Daily     lisinopril  10 mg Oral Daily     potassium chloride SA  20 mEq Oral Daily     sertraline (ZOLOFT) tablet 50 mg  50 mg Oral Daily       Data     Recent Labs  Lab 01/16/18  0600 01/15/18  1632 01/15/18  1542 01/11/18  1005   WBC 3.2*  --  4.6 4.9   HGB 9.7*  --  11.3* 10.4*   MCV 90  --  89 89     --  174 214   INR  --  1.13  --   --    NA  --   --  137 142   POTASSIUM  --   --  3.9 3.0*   CHLORIDE  --   --  102 106   CO2  --   --  29 28   BUN  --   --  15 12   CR  --   --  0.60 0.57   ANIONGAP  --   --  6 8   JESÚS  --   --  9.3 9.3   GLC  --    --  99 126*   ALBUMIN  --   --   --  2.7*   PROTTOTAL  --   --   --  7.5   BILITOTAL  --   --   --  0.3   ALKPHOS  --   --   --  96   ALT  --   --   --  49   AST  --   --   --  41       Imaging:   Recent Results (from the past 24 hour(s))   US Lower Extremity Venous Duplex Left    Narrative    VENOUS ULTRASOUND LEFT LEG  1/15/2018 8:25 PM     HISTORY: Monitor progression of DVT.    COMPARISON: 1/5/2018    FINDINGS:  Examination of the deep veins with graded compression and  color flow Doppler with spectral wave form analysis was performed.   There is occlusive thrombus in the left external iliac vein. This is  new from when compared to the previous exam on 1/5/2018. Again  identified is nonocclusive thrombus in the distal left common femoral  vein that becomes occlusive as it extends from the proximal to mid  left superficial femoral vein. There is trickle of flow seen in the  distal left superficial femoral vein and popliteal vein. The posterior  tibial veins are patent. The peroneal veins are not visualized.      Impression    IMPRESSION: New deep venous thrombus extending into the left external  iliac vein.  No significant change in thrombus in the left common  femoral vein, superficial femoral vein and popliteal vein.    KARLEE DERAS MD

## 2018-01-16 NOTE — PLAN OF CARE
Problem: Patient Care Overview  Goal: Plan of Care/Patient Progress Review  A/O x 4, denies pain. VSS.   Skin is dry, flaky.  Pt has darkened areas of skin on face, hands, and feet in comparison to the rest of her skin, CMS intact.  C/o cough, cough drops given. L PIV is WDL, infusing.  Port is WDL., infusing.  Up with one and a walker. Ultrasound of left lower leg completed yesterday, results pending.  Plan of care: continue to manage pain and anticoagulants.

## 2018-01-16 NOTE — CONSULTS
"BRIEF NUTRITION ASSESSMENT      REASON FOR ASSESSMENT:  Nutrition Admission Screen - \"Unintentional weight loss of 10# or more in past 2 months\"    NUTRITION HISTORY:  Pt is known from recent admit (visited with pt on 1/7):  Pt reports following a regular diet  Breakfast - grits  Lunch - leftovers, sandwiches (chicken and PB)  Dinner - varies  Enjoys jello and applesauce  Likes to drink juice  Has not been consuming any nutrition supplements, but is interested in trying Ensure  Pt works at [employer] and plans to buy supplements there (gets a discount)    Visited with pt this morning  She tells me that she hasn't had a chance to buy any Ensure yet  She has been eating the same diet she reported on 1/7  No change in her po intake      CURRENT DIET AND INTAKE:  Diet:  Regular              Pt reports eating an omelet, jello for breakfast - saving her OJ  Pt would really like to get strawberry Ensure (left in the bottle) BID between meals    ANTHROPOMETRICS:  Height: 5'10\"  Weight: (1/15) 102.5 kg  BMI: 32.4 kg/m2  IBW:  68.2 kg  Weight Status: Obesity Grade I BMI 30-34.9  %IBW: 150%  Weight History: Wt is down ~8# (3%) over the past 2 months  Wt Readings from Last 10 Encounters:   01/15/18 102.5 kg (226 lb)   01/11/18 101.2 kg (223 lb)   01/05/18 102.5 kg (226 lb)   12/27/17 101.6 kg (224 lb)   12/27/17 103.1 kg (227 lb 6.4 oz)   12/15/17 104.3 kg (229 lb 15 oz)   12/13/17 104.3 kg (230 lb)   11/29/17 110.5 kg (243 lb 9.6 oz)   11/29/17 110.5 kg (243 lb 9.6 oz)   11/22/17 106.2 kg (234 lb 3.2 oz)         LABS:  Labs noted    MALNUTRITION:  Patient does not meet two of the following criteria necessary for diagnosing malnutrition: significant weight loss, reduced intake, subcutaneous fat loss, muscle loss or fluid retention    NUTRITION INTERVENTION:  Nutrition Diagnosis:  No nutrition diagnosis at this time.    Implementation:  Nutrition Education ---> Reviewed current diet order and available supplements  Will send a " strawberry Ensure supplement at 10am and 2pm    FOLLOW UP/MONITORING:   Will re-evaluate in 7 - 10 days, or sooner, if re-consulted.

## 2018-01-16 NOTE — PLAN OF CARE
Problem: Patient Care Overview  Goal: Plan of Care/Patient Progress Review  Outcome: No Change  Pt up to unit at 1745.   Left leg pain managed by oral Percocet.  BP to 166/86, OVSS.   Skin is dry, flaky.  Pt has darkened areas of skin on face, hands, and feet in comparison to the rest of her skin.  L PIV is WDL, infusing.  Port is WDL, initially, occluded.  Alteplase ordered and administered with good blood return noted, infusing.  Up with one and a walker. Ultrasound of left lower leg completed today.  Plan of care: continue to manage pain and anticoagulants.

## 2018-01-17 NOTE — DISCHARGE SUMMARY
Patient discharged at 4:42 PM to home. No IV in place. Port non de-accessed per pt request d/t frequent infusions, flushed with NS and heparin lock flush. Charge RN notified. Pain at time of discharge was 0/10. Belongings returned to patient.  Discharge instructions and medications reviewed with patient. Patient verbalized understanding and all questions were answered.  Prescriptions sent to pharmacy of pt's choice. At time of discharge, patient condition was stable and left the unit via WC escorted by DARIO and daughter, Shelby.

## 2018-01-17 NOTE — PLAN OF CARE
Problem: Patient Care Overview  Goal: Plan of Care/Patient Progress Review  OT: Orders received and chart reviewed. Per discussion with PT, No need for IP OT involvement. OT will defer to next level of care and complete orders.
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
## 2018-01-17 NOTE — PROGRESS NOTES
PROGRESS NOTE:  01/17/2018      SUBJECTIVE:  Ms. Sylvester is a 67-year-old female with metastatic colon cancer diagnosed in 09/2017.  She is currently on palliative chemotherapy with FOLFOX and Avastin.  Last treatment was on 01/11/2018.      The patient has been having recurrent thrombosis secondary to malignancy.  She had multiple documented episodes of pulmonary embolism and lower extremity DVT.  The patient previously has been on treatment with Xarelto and enoxaparin.   She has had thrombosis on that.        She has been admitted with worsening DVT.  Lower extremity ultrasound on 01/15/2018 revealed extension of left lower extremity DVT into left external iliac vein.  She is currently on heparin drip.      I met with the patient in her room.  Overall she is feeling better.  No headache or dizziness.  No chest pain or difficulty breathing.  No abdominal pain, nausea or vomiting.  No bleeding.  No fever, chills or night sweats.  Discomfort in the left thigh is better.  She has been walking in the hallway.      PHYSICAL EXAMINATION:   GENERAL:  She was alert and oriented x 3.   VITAL SIGNS:  Reviewed.   Rest of the system not examined.      ASSESSMENT:   1.  A 67-year-old female with recurrent pulmonary embolism and DVT.  This is secondary to malignancy.  She has failed Xarelto and enoxaparin.   2.  Stage IV colon cancer on palliative chemotherapy.   3.  Leukopenia and anemia secondary to chemotherapy.      PLAN:   1. Patient clinically is stable from DVT.  She is pretty asymptomatic.  No pains in the lower extremity.  No chest pain or shortness of breath.      Patient is on heparin drip.  She will be started on fondaparinux.  Discussed regarding fondaparinux.  I told her it will be given once a day.  The patient previously has given subcutaneous injection at home and she can do it.  It is being covered by her insurance with a small copay.  Patient is agreeable for fondaparinux.      I told the patient that she is  at high risk of recurrent thrombosis.  I advised her to seek medical attention if she has any chest pain, shortness of breath, pain/swelling/redness in extremities.      General ways to reduce thrombosis were discussed.  I advised the patient to ambulate and be active.      Patient is on Avastin.  That can cause thrombosis.  I have discussed with Dr. Piedra; he may consider stopping Avastin.     2.  Patient is cytopenic from chemotherapy.  CBC will be monitored as an outpatient.      3.  For metastatic colon cancer, she will continue on palliative chemotherapy as an outpatient.      5.  Patient will follow up in Oncology Clinic after discharge.  We will sign off. Discussed with Dr. Sterling.           HUNTER TORRES MD             D: 2018 13:57   T: 2018 14:31   MT: EVELYN      Name:     JESSICA MOYA   MRN:      [id number]        Account:      [id number]   :      1950           Service Date: 2018      Document: [id number]

## 2018-01-17 NOTE — PLAN OF CARE
Problem: Pain, Acute (Adult)  Goal: Identify Related Risk Factors and Signs and Symptoms  Related risk factors and signs and symptoms are identified upon initiation of Human Response Clinical Practice Guideline (CPG).   Outcome: Improving  A&O. CMS intact. Bowel sounds active and present. VSS. Up with 1 assist, walker. Denies pain. C/o constipation, Senna available PRN, pt states she will request it later. Possible d/c 1/17.

## 2018-01-17 NOTE — PLAN OF CARE
Problem: Patient Care Overview  Goal: Plan of Care/Patient Progress Review  Outcome: Improving  A&O x 4. dry flaky skin on extremities.  Bowel sounds active and present, BM today. Up with A1 + walker, therapies signed off for safe dc home. Plan to dc home this evening with SQ anticoagulation that pt will  at her [facility] pharmacy

## 2018-01-17 NOTE — PLAN OF CARE
Problem: Patient Care Overview  Goal: Plan of Care/Patient Progress Review   A&O x 4.  CMS intact, dry flaky skin on extremities.  Bowel sounds active and present. VSS. Up with 1 assist, walker. Denies pain. Heparin gtt adjusted to 10 ml/hr, re-draw at 0900.  Possible d/c 1/17.

## 2018-01-17 NOTE — PROVIDER NOTIFICATION
MD Notification    Notified Person:  MD    Notified Persons Name:  OSCAR Sterling    Notification Date/Time: 1/17/18 14:22     Notification Interaction:  Talked with Physician    Purpose of Notification: Per d/w Dr. Allen he was okay with patient discharging home if able to  her fondaparinux.  His office will be calling for follow up.    Orders Received: MD to get the patient a dose of her SQ fondaprinux prior to discharge with heparin gtt.   Comments:

## 2018-01-17 NOTE — DISCHARGE SUMMARY
[facility]    Discharge Summary  Hospitalist    Date of Admission:  1/15/2018  Date of Discharge:  1/17/2018  Discharging Provider: Kristy Sterling MD    Discharge Diagnoses   1. Progresive Left leg deep venous thrombosis  2. Right upper extremity discomfort  3. Possible noncompliance with anticoagulation  4. Metastatic colon cancer with mets to liver and lung  5. Obesity    Other Diagnosis:  1. Anxiety/Depression  2. Hypertension.  3. History of ovarian cancer    History of Present Illness    C/o: Deep Vein Thrombosis     Liv Sylvester is a 67 year old female with a history of colon cancer, obesity, DVTs, flash PE on xarelto, and HTN currently anti-coagulated on lisinopril who presents to the ED for evaluation of deep vein thrombosis. The patient was referred here to the ED by Dr. Piedra, the patient's oncologist. He reports that her clot is moving up her right leg to her groin, the port is clogged and the patient is noncompliant with her medications.    Hospital Course   Liv Sylvester was admitted on 1/15/2018.  The following problems were addressed during her hospitalization:    Active Problems:    Acute deep vein thrombosis (DVT) of left lower extremity (H)    Recurrent deep vein thrombosis (DVT) (H)    Left leg deep venous thrombosis  Patient has had progression of her left lower leg DVT with concerns for some noncompliance with anticoagulation.  The care plan was discussed with Dr. Piedra who suggested that the patient has been having recurrent deep venous thrombosis despite supratherapeutic heparin Xa level, even though there are some concerns for medication noncompliance in the past.  Repeat US showed a new DVT extending in to the left external iliac vein.   - ? If pt failed Xarelto & Lovenox vs noncompliance (pt admits she does not like taking SQ inj that caused bruised skin).  - per oncology PTA  Avastin stopped as it can cause thrombosis.  - appreciate oncology consult,recommended  fondaparinux (arixtra)10 mg SQ/daily, 1st dose this evening before d/c home.  - continued Heparin ggt till d/c & stop after receiving  (arixtra)10 mg SQ this evening before d/c  - d/chome with home RN,PT/OT, SW     Right upper extremity discomfort:  - US negative for DVT  - pain resolved.       Metastatic colon cancer with mets to liver and lung  She has been on FOLFOX and Avastin  - Further management per Oncology  - May want to consider palliative care consult      Anxiety/Depression  - PTA Ativan and Zoloft      Hypertension.  - PTA Lisinopril   Kristy Sterling MD    Significant Results and Procedures   No operations/procedures/interventions    Pending Results   none  Unresulted Labs Ordered in the Past 30 Days of this Admission     No orders found from 11/16/2017 to 1/16/2018.          Code Status   CODE STATUS:DNR/DNI       Primary Care Physician   RIGOBERTO SEN    Physical Exam   Temp: 98.3  F (36.8  C) Temp src: Oral BP: 121/67   Heart Rate: 87 Resp: 16 SpO2: 96 % O2 Device: None (Room air)    Vitals:    01/15/18 1447 01/15/18 1533   Weight: 102.5 kg (226 lb) 102.5 kg (226 lb)     Vital Signs with Ranges  Temp:  [98.3  F (36.8  C)-99.6  F (37.6  C)] 98.3  F (36.8  C)  Heart Rate:  [82-95] 87  Resp:  [16] 16  BP: (121-156)/(55-81) 121/67  SpO2:  [96 %-98 %] 96 %  I/O last 3 completed shifts:  In: 240 [P.O.:240]  Out: -     Examination:  Well-built well-nourished. In NAD  Heart: Regular rhythm. S1S2, no murmurs, rubs,gallops  Lungs: Clear to auscultation. And no rhonchi rales or wheezing heard.  Abdomen: Soft and nontender. Bowel sounds present.  Neuro:A,A,Ox3, motor sensory grossly intact    Discharge Disposition   Discharged to home with home RN/PT/OT/SWCondition at discharge: Stable    Consultations This Hospital Stay   PHARMACY TO DOSE HEPARIN  PHARMACY TO DOSE HEPARIN  HEMATOLOGY & ONCOLOGY IP CONSULT  PHYSICAL THERAPY ADULT IP CONSULT  OCCUPATIONAL THERAPY ADULT IP CONSULT  SOCIAL WORK IP  CONSULT    Time Spent on this Encounter   I, Kristy Sterling MD, personally saw the patient today and spent greater than 30 minutes discharging this patient.    Discharge Orders     PHYSICAL THERAPY REFERRAL     Home care nursing referral     Home Care PT Referral for Hospital Discharge     Home Care OT Referral for Hospital Discharge     Home Care Social Service Referral for Hospital Discharge     Reason for your hospital stay   DVT     Follow-up and recommended labs and tests    Follow up with primary care provider, RIGOBERTO SEN, within 7 days for hospital follow- up.  The following labs/tests are recommended: BMP.    Follow up with Oncology as per their recommendations.     Activity   Your activity upon discharge: activity as tolerated     MD face to face encounter   Documentation of Face to Face and Certification for Home Health Services    I certify that patient: Liv Sylvester is under my care and that I, or a nurse practitioner or physician's assistant working with me, had a face-to-face encounter that meets the physician face-to-face encounter requirements with this patient on: 1/17/2018.    This encounter with the patient was in whole, or in part, for the following medical condition, which is the primary reason for home health care:Left leg deep venous thrombosis.    I certify that, based on my findings, the following services are medically necessary home health services: Nursing, Occupational Therapy, Physical Therapy and .    My clinical findings support the need for the above services because: Nurse is needed: To assess adherence to medication compliance & administrating SQ injections,after changes in medications or other medical regimen., To provide assessment and oversight required in the home to assure adherence to the medical plan due to: past ? Failure of treatment felt likely due to possible noncompliance. and To provide caregiver training to assist with:  treatment..    Further, I certify that my clinical findings support that this patient is homebound (i.e. absences from home require considerable and taxing effort and are for medical reasons or [facility] services or infrequently or of short duration when for other reasons) because: Leaving home is medically contraindicated for the following reason(s): Dyspnea on exertion that makes it so they cannot leave their home for needed services without clinical deterioration...    Based on the above findings. I certify that this patient is confined to the home and needs intermittent skilled nursing care, physical therapy and/or speech therapy.  The patient is under my care, and I have initiated the establishment of the plan of care.  This patient will be followed by a physician who will periodically review the plan of care.  Physician/Provider to provide follow up care: Amaya Howard    Attending hospital physician (the Medicare certified [place] provider): Kristy Sterling MD  Physician Signature: See electronic signature associated with these discharge orders.  Date: 1/17/2018     DNR/DNI     Diet   Follow this diet upon discharge:   Snacks/Supplements Adult: Ensure Plus (Adult); Between Meals     Combination Diet Regular Diet Adult       Discharge Medications   Current Discharge Medication List      CONTINUE these medications which have CHANGED    Details   lisinopril (PRINIVIL/ZESTRIL) 10 MG tablet Take 1 tablet (10 mg) by mouth daily  Qty: 30 tablet, Refills: 11    Associated Diagnoses: Essential hypertension      Potassium Chloride ER 20 MEQ TBCR Take 1 tablet (20 mEq) by mouth daily  Qty: 30 tablet, Refills: 11    Associated Diagnoses: Hypokalemia         CONTINUE these medications which have NOT CHANGED    Details   LORazepam (ATIVAN) 0.5 MG tablet Take 1 tablet (0.5 mg) by mouth every 4 hours as needed (Anxiety, Nausea/Vomiting or Sleep)  Qty: 30 tablet, Refills: 2    Associated Diagnoses: Malignant neoplasm  of hepatic flexure (H)      prochlorperazine (COMPAZINE) 10 MG tablet Take 1 tablet (10 mg) by mouth every 6 hours as needed (Nausea/Vomiting)  Qty: 30 tablet, Refills: 2    Associated Diagnoses: Malignant neoplasm of hepatic flexure (H)      ondansetron (ZOFRAN) 8 MG tablet Take 1 tablet (8 mg) by mouth every 8 hours as needed (Nausea/Vomiting)  Qty: 10 tablet, Refills: 2    Associated Diagnoses: Malignant neoplasm of hepatic flexure (H)      CALCIUM-MAGNESIUM-ZINC PO Take 1 tablet by mouth daily      Multiple Vitamins-Minerals (CENTRUM SILVER) per tablet Take 1 tablet by mouth daily.  Qty: 100 tablet, Refills: 12      fondaparinux (ARIXTRA) 10 MG/0.8ML injection Inject 0.8 mLs (10 mg) Subcutaneous every 24 hours  Qty: 24 mL, Refills: 0    Associated Diagnoses: PE (pulmonary thromboembolism) (H); DVT (deep vein thrombosis) in pregnancy (H)      oxyCODONE IR (ROXICODONE) 5 MG tablet Take 1-2 tablets (5-10 mg) by mouth every 4 hours as needed for pain maximum 12 tablet(s) per day  Qty: 50 tablet, Refills: 0    Associated Diagnoses: Malignant neoplasm of colon, unspecified part of colon (H)      order for DME Equipment being ordered: Wheelchair  Qty: 1 Device, Refills: 0    Associated Diagnoses: Malignant neoplasm of colon, unspecified part of colon (H); VTE (venous thromboembolism); PE (pulmonary thromboembolism) (H); Pain of left lower leg      DM-Doxylamine-Acetaminophen (VICKS NYQUIL COLD & FLU) 15-6. MG/15ML LIQD Take 15 mLs by mouth nightly as needed      sennosides (SENOKOT) 8.6 MG tablet Take 1 tablet by mouth 2 times daily as needed for constipation      lidocaine (XYLOCAINE) 5 % ointment Apply 0.5 g topically as needed for moderate pain Apply pea size amount to rectal area for pain  Qty: 5 g, Refills: 0      lidocaine-prilocaine (EMLA) cream Apply topically as needed for moderate pain Apply dollop size amount to port site 30-60 minutes prior to accessing.  Qty: 30 g, Refills: 1    Associated Diagnoses:  Malignant neoplasm of hepatic flexure (H); Port catheter in place      albuterol (PROAIR HFA/PROVENTIL HFA/VENTOLIN HFA) 108 (90 BASE) MCG/ACT Inhaler Inhale 2 puffs into the lungs every 6 hours as needed for shortness of breath / dyspnea or wheezing  Qty: 1 Inhaler, Refills: 3    Associated Diagnoses: Reactive airway disease without complication, unspecified asthma severity, unspecified whether persistent      CHLORTHALIDONE PO Take 25 mg by mouth daily as needed ((Patient is supposed to take 25mg daily but states she only takes as needed))       SERTRALINE HCL PO Take 50 mg by mouth daily (Takes half of a 100mg tablet for a total of 50mg daily)      VITAMIN D, CHOLECALCIFEROL, PO Take 1 tablet by mouth daily (OTC: Pt unsure of strength)      ACETAMINOPHEN PO Take 1,000 mg by mouth every 4 hours as needed for pain          STOP taking these medications       enoxaparin (LOVENOX) 100 MG/ML injection Comments:   Reason for Stopping:             Allergies   No Known Allergies  Data   Most Recent 3 CBC's:  Recent Labs   Lab Test  01/16/18   0600  01/15/18   1542  01/11/18   1005   WBC  3.2*  4.6  4.9   HGB  9.7*  11.3*  10.4*   MCV  90  89  89   PLT  162  174  214      Most Recent 3 BMP's:  Recent Labs   Lab Test  01/15/18   1542  01/11/18   1005  01/07/18   0606  01/06/18   0416   NA  137  142   --   141   POTASSIUM  3.9  3.0*  3.2*  3.6   CHLORIDE  102  106   --   107   CO2  29  28   --   27   BUN  15  12   --   11   CR  0.60  0.57   --   0.60   ANIONGAP  6  8   --   7   JESÚS  9.3  9.3   --   8.7   GLC  99  126*   --   107*     Most Recent 2 LFT's:  Recent Labs   Lab Test  01/11/18   1005  12/27/17   0915   AST  41  27   ALT  49  26   ALKPHOS  96  87   BILITOTAL  0.3  0.4     Most Recent INR's and Anticoagulation Dosing History:  Anticoagulation Dose History     Recent Dosing and Labs Latest Ref Rng & Units 9/29/2017 12/15/2017 1/5/2018 1/15/2018    INR 0.86 - 1.14 1.05 1.12 1.21(H) 1.13        Most Recent 3  Troponin's:  Recent Labs   Lab Test  10/20/17   1320  06/14/10   1207   TROPI  <0.015  <0.012     Most Recent Cholesterol Panel:  Recent Labs   Lab Test  05/13/13   0837   CHOL  162   LDL  115   HDL  28*   TRIG  95     Most Recent 6 Bacteria Isolates From Any Culture (See EPIC Reports for Culture Details):  Recent Labs   Lab Test  12/15/17   1538  12/15/17   1238   CULT  No growth  No growth     Most Recent TSH, T4 and A1c Labs:  Recent Labs   Lab Test  05/13/13   0837   TSH  0.87     Results for orders placed or performed during the hospital encounter of 01/15/18   US Lower Extremity Venous Duplex Left    Narrative    VENOUS ULTRASOUND LEFT LEG  1/15/2018 8:25 PM     HISTORY: Monitor progression of DVT.    COMPARISON: 1/5/2018    FINDINGS:  Examination of the deep veins with graded compression and  color flow Doppler with spectral wave form analysis was performed.   There is occlusive thrombus in the left external iliac vein. This is  new from when compared to the previous exam on 1/5/2018. Again  identified is nonocclusive thrombus in the distal left common femoral  vein that becomes occlusive as it extends from the proximal to mid  left superficial femoral vein. There is trickle of flow seen in the  distal left superficial femoral vein and popliteal vein. The posterior  tibial veins are patent. The peroneal veins are not visualized.      Impression    IMPRESSION: New deep venous thrombus extending into the left external  iliac vein.  No significant change in thrombus in the left common  femoral vein, superficial femoral vein and popliteal vein.    KARLEE DERAS MD   US Upper Extremity Venous Duplex Left    Narrative    US UPPER EXTREMITY VENOUS DUPLEX LEFT  1/16/2018 10:39 AM     HISTORY: rule out DVT, Pain in left upper;     COMPARISON: 1/5/2018    FINDINGS: Color Doppler and spectral waveform analysis performed.      Impression    IMPRESSION: No evidence of deep venous thrombosis.      KARLEE DERAS MD

## 2018-01-17 NOTE — PROGRESS NOTES
Met with the patient at the bedside regarding discharge planning.  Patient is on short term leave from her employer [employer] and currently has a SQ fondaprinux at their d/c pharmacy for her.  She is agreeable to giving herself these injections and has previously given herself lovenox injections.  Patients Granddaughter Lora will be coming in today to go over discharge planning orders and will  the medication at [facility] in [place].  Patient has her own walker with her and feels comfortable with d/c planning today as she is currently working with the outpatient clinic on getting a w/c for DME.  Patient is currently open to [facility] for line care and IV medications s/p chemo injections.  Patient is agreeable to HC PT/OT/SW in addition to [facility].  -Patient to get a SQ injection of Fondaprinux here and rx to make recommendations timing of next sq injection  (1/18) MD also filling K+ and lisinopril (PTA) as patient is running low  -MD to order Homecare PT/OT/SW in addition to resumption of [facility] for line cares   -Appointments are in for Alejandro follow up  -Hand off to PCP and Dr. Allen's group  -SW met with patient and provided resources for [facility] and [facility] SW asking for [facility] to meet with the patient today for HCD form to be completed  -Updated bedside RN with the plan listed above.

## 2018-01-17 NOTE — PLAN OF CARE
Problem: Patient Care Overview  Goal: Plan of Care/Patient Progress Review  PT: Orders received and chart reviewed. Eval completed. Patient with history of significant for metastatic colon cancer, chronic anemia, recurrent deep venous thrombosis, pulmonary embolus, hypertension, anxiety, depression and ovarian cancer. Admitted with leg pain and concern over DVT progression, referred to hospital by home RN   . Prior to admit patient living in apartment with no access concerns, she lives with her granddaughter, family assists with meals, pt acknowledging she could use help with house cleaning weekly, pt able to drive but does not, family provides transportation, they are involved and supportive. Pt reports mod independence with mobility using WW at baseline, more limited lately due to increased left leg pain, indep with all self cares and most daily activities. Pt reports she likes to remain active but experiences limitations due to cancer treatment. Pt very interested in OP cancer rehab program.  Discharge Planner PT   Patient plan for discharge: Home, would like to go to cancer rehab  Current status: Pt demonstrates independent bed mobility and mod indep w/ transfers using WW, appropriate and safe hand placement. Pt ambulated 400-500 feet w/ WW and supervision only, A for IV pole, using WW pt demonstrates reduced gait speed and decreased step length, able to maintain pace with head turning and direction changes, no path deviation or overt balance issues observed. Pt demonstrates LE strength that is mildly decreased due to decreased activity recently, ROM is WFL and functional endurance is reduced compared with her ability a few months ago.OT screen completed including basic memory/judgement screening, no IP OT needs seen, would benefit from OT at OP cancer rehab. Patient has no further skilled inpatient PT needs, is safe to discharge to home with assist of family as needed and OP Cancer rehab program.  Recommend pt  ambulate with nursing staff 4x per day for circulation benefits and increased activity tolerance until IV discontinued then pateint may ambulate independenty.   Barriers to return to prior living situation: none  Recommendations for discharge: Home with family assist and OP Cancer rehab  Rationale for recommendations: Pt previously independent and active with desire to remain as active as possible, finding decreased activity tolerance while having chemotherapy, would benefit from OP cancer rehab program for home program instruction, education on activity modification and energy conservation, etc       Entered by: Mitzi Jaimes 01/17/2018 9:38 AM

## 2018-01-17 NOTE — PROGRESS NOTES
01/17/18 0900   Quick Adds   Type of Visit Initial PT Evaluation   Living Environment   Lives With grandchild(matteo)   Living Arrangements apartment   Home Accessibility no concerns   Transportation Available family or friend will provide   Living Environment Comment stair access from street parking, pt normally wall in lower garage, no steps with elevator access  (pt able to but does not drive)   Self-Care   Usual Activity Tolerance good   Current Activity Tolerance good   Regular Exercise no   Equipment Currently Used at Home walker, rolling   Functional Level Prior   Ambulation 1-->assistive equipment   Transferring 1-->assistive equipment   Toileting 0-->independent   Bathing 1-->assistive equipment   Dressing 0-->independent   Eating 0-->independent   Fall history within last six months no   Which of the above functional risks had a recent onset or change? ambulation   Prior Functional Level Comment mod indep w/ mobility, AD for bathing   General Information   Onset of Illness/Injury or Date of Surgery - Date 01/15/18   Referring Physician Jerry Strange, DO   Patient/Family Goals Statement return home   Pertinent History of Current Problem (include personal factors and/or comorbidities that impact the POC) Pt with significant for metastatic colon cancer, chronic anemia, recurrent deep venous thrombosis, pulmonary embolus, hypertension, anxiety, depression and ovarian cancer who was sent to the ER by her home RN for concerns for deep venous thrombosis progression.    Precautions/Limitations fall precautions   Cognitive Status Examination   Orientation orientation to person, place and time   Level of Consciousness alert   Follows Commands and Answers Questions 100% of the time   Personal Safety and Judgment intact   Memory intact   Posture    Posture Forward head position;Protracted shoulders   Range of Motion (ROM)   ROM Comment LE ROM WFL   Strength   Strength Comments 4-/5 LE strength grossly  "assessed   Bed Mobility   Bed Mobility Comments independent   Transfer Skills   Transfer Comments mod independent   Gait   Gait Comments ambulated 400-500 feet w/ WW and supervision only, A for IV pole   Balance   Balance Comments using  WW at baseline, no recent falls able to navigated pace and direction change with WW, mildly inc LUIGI   Clinical Impression   Criteria for Skilled Therapeutic Intervention evaluation only   Influenced by the following impairments decreased functional endurance   Functional limitations due to impairments decreased functional endurance   Clinical Presentation Stable/Uncomplicated   Clinical Presentation Rationale clinical observation   Clinical Decision Making (Complexity) Low complexity   Predicted Duration of Therapy Intervention (days/wks) eval only   Anticipated Discharge Disposition Home with Outpatient Therapy;Home with Assist   Risk & Benefits of therapy have been explained Yes   Patient, Family & other staff in agreement with plan of care Yes   [facility] [employer] \"6 Clicks\"   2016, Trustees of [facility], under license to [email or web address].  All rights reserved.   6 Clicks Short Forms Basic Mobility Inpatient Short Form   [facility] [employer]  \"6 Clicks\" V.2 Basic Mobility Inpatient Short Form   1. Turning from your back to your side while in a flat bed without using bedrails? 4 - None   2. Moving from lying on your back to sitting on the side of a flat bed without using bedrails? 4 - None   3. Moving to and from a bed to a chair (including a wheelchair)? 4 - None   4. Standing up from a chair using your arms (e.g., wheelchair, or bedside chair)? 4 - None   5. To walk in hospital room? 3 - A Little   6. Climbing 3-5 steps with a railing? 3 - A Little   Basic Mobility Raw Score (Score out of 24.Lower scores equate to lower levels of function) 22   Total Evaluation Time   Total Evaluation Time (Minutes) 15     "

## 2018-01-17 NOTE — PROGRESS NOTES
Care Transition Initial Assessment - SW  Reason For Consult: discharge planning  Met with: PATIENT   Active Problems:    Acute deep vein thrombosis (DVT) of left lower extremity (H)    Recurrent deep vein thrombosis (DVT) (H)         DATA  Lives With: grandchild(matteo)  Living Arrangements: apartment  Description of Support System: Supportive, Involved  Who is your support system?: Children, Other (specify) (grand children )  Support Assessment: Adequate family and caregiver support.   Identified issues/concerns regarding health management: SW following for d/c needs  Quality Of Family Relationships: supportive, involved  Transportation Available: family or friend will provide    ASSESSMENT  Cognitive Status:  Alert and oriented x4  Concerns to be addressed: patient is a 67 year old female who was admitted to the hospital for acute DVT. Prior to hospitalization patient was living at home with granddaughter and was receiving home infusion through [facility]. SW and CC went to speak with patient to discuss d/c plan with patient before she returns home. PT and OT have both assessed patient and feels patient would be safe discharging Home with family assist and OP Cancer rehab. Patient is planning to d/c home with  HC for RN/OT/PT/SW and [facility], CC will send referral. SW will also complete [employer] form and fax for patient (form faxed on 1/17 @ [phone]). SW discussed completing a ACD before d/cing so one is on file at the hospital. Patient is agreeable. SW text paged  who was planning to meet with patient and complete ACD.        PLAN  Financial costs for the patient includes   Patient given options and choices for discharge   Patient/family is agreeable to the plan?  YES  Patient Goals and Preferences: [facility] (RN/OT/PT/SW) and [facility] resources  Patient anticipates discharging to:  Home with granddaughter    Maricruz Domínguez, [facility]   *[phone]

## 2018-01-18 NOTE — PROGRESS NOTES
This is a recent snapshot of the patient's [facility] medical record.  For current drug dose and complete information and questions, call [phone] or In Basket pool, fv home infusion ([id number])  CSN Number:  [id number]

## 2018-01-19 NOTE — PROGRESS NOTES
This is a recent snapshot of the patient's [facility] medical record.  For current drug dose and complete information and questions, call [phone] or In Basket pool, fv home infusion ([id number])  CSN Number:  [id number]

## 2018-01-19 NOTE — TELEPHONE ENCOUNTER
Called demond today at 210pm to see if she was on her way to her 2pm appointment w/ Dr. Khan.  She stated she did not know about it and will not be coming because she needs to pre-arrange her rides.  Confirmed her 1/24/18 appointment with Dr. Piedra.  Stephanie, Social work notified.    Demond states she is feeling fine and is down to her 1 shot a day that they gave her at her hospital discharge.   She offers no concerns and has no questions.

## 2018-01-22 NOTE — PROGRESS NOTES
This is a recent snapshot of the patient's [facility] medical record.  For current drug dose and complete information and questions, call [phone] or In Basket pool, fv home infusion ([id number])  CSN Number:  [id number]

## 2018-01-23 NOTE — TELEPHONE ENCOUNTER
Called pt today to remind of visit 1/24/18 with Dr. Piedra as pt missed last scheduled appointment in clinic. Pt reported that she is aware of appointment and that she is planning to have granddaughter with her at appointment as she believes granddaughter is off of work. Pt reported that children are assisting her in filling walker prescription. No other psychosocial concerns reported today. Pt aware of how to reach out in case of distress or concern. This clinician will continue to follow.     DEANNE Watkins, [facility]  Phone: [phone]  Pager: [phone]    [facility]: M, T  *every other Thursday, 8am-4:30pm  [facility]: W, F, *every other Thursday, 8am-4:30pm

## 2018-01-23 NOTE — PROGRESS NOTES
This is a recent snapshot of the patient's [facility] medical record.  For current drug dose and complete information and questions, call [phone] or In Basket pool, fv home infusion ([id number])  CSN Number:  [id number]

## 2018-01-24 NOTE — PROGRESS NOTES
"Infusion Nursing Note:  Liv Sylvester presents today for FOLFOX C8D1.    Patient seen by provider today: Yes: Dr. Piedra   present during visit today: Not Applicable.    Note: No longer receiving Avastin..    Intravenous Access:  Peripheral IV placed.    Treatment Conditions:  Lab Results   Component Value Date    HGB 10.5 01/24/2018     Lab Results   Component Value Date    WBC 3.7 01/24/2018      Lab Results   Component Value Date    ANEU 1.8 01/24/2018     Lab Results   Component Value Date     01/24/2018      Results reviewed, labs MET treatment parameters, ok to proceed with treatment.    Post Infusion Assessment:  Patient tolerated infusion without incident.  Blood return noted pre and post infusion.  Site patent and intact, free from redness, edema or discomfort.  No evidence of extravasations.    Prior to discharge: Port is secured in place with tegaderm and flushed with 10cc NS with positive blood return noted.  Continuous home infusion Dosi-Fuser pump connected.    All connectors secured in place and clamps taped open.    Pump started, \"running\" noted on display (CADD): Not Applicable.  Patient instructed to call our clinic or [facility] with any questions or concerns at home.  Patient verbalized understanding.    Patient set up for pump disconnect at home with [facility] on 1/26/2018 at 1215; Neulasta to be given as well.      Discharge Plan:   Discharge instructions reviewed with: Patient.  Patient and/or family verbalized understanding of discharge instructions and all questions answered.  Copy of AVS reviewed with patient and/or family.  Patient will return 1/31/2018 for next appointment.  Patient discharged in stable condition accompanied by: self.  Departure Mode: Ambulatory.    Tanya Seymour RN  "

## 2018-01-24 NOTE — PROGRESS NOTES
"Oncology Rooming Note    January 24, 2018 9:35 AM   Liv Sylvester is a 67 year old female who presents for:    Chief Complaint   Patient presents with     Oncology Clinic Visit     Initial Vitals: There were no vitals taken for this visit. Estimated body mass index is 31.25 kg/(m^2) as calculated from the following:    Height as of 1/15/18: 1.778 m (5' 10\").    Weight as of an earlier encounter on 1/24/18: 98.8 kg (217 lb 12.8 oz). There is no height or weight on file to calculate BSA.  Data Unavailable Comment: Data Unavailable   No LMP recorded. Patient has had a hysterectomy.  Allergies reviewed: Yes  Medications reviewed: Yes    Medications: Medication refills not needed today.  Pharmacy name entered into Genm[employer]:    [facility] - [address]    Clinical concerns: no    5 minutes for nursing intake (face to face time)          Juliana Stewart MA              "

## 2018-01-24 NOTE — PROGRESS NOTES
[facility] PHYSICIANS  HEMATOLOGY ONCOLOGY    ONCOLOGY FOLLOWUP NOTE      DIAGNOSIS:    1- Metastatic colon cancer in a patient with history of stage 1A grade 3 endometrial carcinoma approximately around 2012.     On 09/29/2017, Liv Sylvester presented with progressive fatigue and right upper quadrant pain.  CT scan 09/29/2017 showed partially contracted bladder and findings suspicious for liver metastases.  There were multiple defined hypoechoic foci scoped scattered throughout the liver, the largest was in the right lobe.  CT was also showing enlargement of hepatic and retroperitoneal lymph nodes.  On 09/30/2017, she underwent a colonoscopy, which showed a fungating, infiltrative, ulcerated, partially obstructing mass at the hepatic flexure.  Pathology is consistent with moderately differentiated adenocarcinoma with normal mismatch repair protein expression.   2- Significant iron deficiency anemia.   3- VTE- malignancy related     TREATMENT:  10/11/2017 FOLFOX and Avastin.   1/24/2018 discontinued Avastin.      SUBJECTIVE:  The patient was seen as a followup today. She Is feeling better. Leg pain is better. No issues with bleeding or bruising.     REVIEW OF SYSTEMS:  A complete review of systems was performed and found to be negative other than pertinent positives mentioned in history of present illness.      Past medical, social histories reviewed.     Meds- Reviewed.     PHYSICAL EXAMINATION:   VITAL SIGNS: Reviewed  CONSTITUTIONAL: Appears tired.   HEENT: Pupils are equal. Oropharynx is clear.   NECK: No cervical or supraclavicular lymphadenopathy.   RESPIRATORY: Clear bilaterally.   CARD/VASC: S1, S2, regular.   GI: Soft, nontender, nondistended, no hepatosplenomegaly.   MUSKULOSKELETAL: Warm, well perfused.   NEUROLOGIC: Alert, awake.   INTEGUMENT: No rash.   LYMPHATICS: Bilateral edema.   PSYCH: Mood and affect was normal.     LABORATORY DATA AND IMAGING REVIEWED DURING THIS VISIT:  Recent Labs    Lab Test  01/15/18   1542  01/11/18   1005   NA  137  142   POTASSIUM  3.9  3.0*   CHLORIDE  102  106   CO2  29  28   ANIONGAP  6  8   BUN  15  12   CR  0.60  0.57   GLC  99  126*   JESÚS  9.3  9.3     Recent Labs   Lab Test  01/16/18   0600  01/15/18   1542  01/11/18   1005  01/06/18   0416  01/05/18   1313   WBC  3.2*  4.6  4.9  6.3  9.0   HGB  9.7*  11.3*  10.4*  9.8*  10.7*   PLT  162  174  214  130*  128*   MCV  90  89  89  86  88   NEUTROPHIL   --    --   58.6  59.2  65.4     Recent Labs   Lab Test  01/11/18   1005  12/27/17   0915  12/15/17   1238   BILITOTAL  0.3  0.4  0.4   ALKPHOS  96  87  112   ALT  49  26  30   AST  41  27  44   ALBUMIN  2.7*  3.3*  3.5         Results for orders placed or performed during the hospital encounter of 01/15/18   US Lower Extremity Venous Duplex Left    Narrative    VENOUS ULTRASOUND LEFT LEG  1/15/2018 8:25 PM     HISTORY: Monitor progression of DVT.    COMPARISON: 1/5/2018    FINDINGS:  Examination of the deep veins with graded compression and  color flow Doppler with spectral wave form analysis was performed.   There is occlusive thrombus in the left external iliac vein. This is  new from when compared to the previous exam on 1/5/2018. Again  identified is nonocclusive thrombus in the distal left common femoral  vein that becomes occlusive as it extends from the proximal to mid  left superficial femoral vein. There is trickle of flow seen in the  distal left superficial femoral vein and popliteal vein. The posterior  tibial veins are patent. The peroneal veins are not visualized.      Impression    IMPRESSION: New deep venous thrombus extending into the left external  iliac vein.  No significant change in thrombus in the left common  femoral vein, superficial femoral vein and popliteal vein.    KARLEE DERAS MD   US Upper Extremity Venous Duplex Left    Narrative    US UPPER EXTREMITY VENOUS DUPLEX LEFT  1/16/2018 10:39 AM     HISTORY: rule out DVT, Pain in left upper;      COMPARISON: 1/5/2018    FINDINGS: Color Doppler and spectral waveform analysis performed.      Impression    IMPRESSION: No evidence of deep venous thrombosis.      KARLEE DERAS MD       ECOG performance status 1.      ASSESSMENT:    1- This is a 67-year-old lady with stage IV metastatic colon cancer with normal mismatch repair protein expression.  She has multiple liver metastases and metastasis in the abdominal lymph nodes.     She was started on palliative intent combination of FOLFOX with Avastin which is given every 2 weeks intravenously.      - Labs were reviewed with the patient, normocytic anemia related to chemotherapy.   - We will discontinue Avastin due to recurrent issues with thrombosis/   - She will proceed with FOLFOX/Avastin today.      2- She also has iron deficiency anemia due to chronic blood loss from her cancer.  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     3- VTEd recurrent episodes of thrombosis. There was always a concern about compliance.   She is on Arixtra now and appears to be doing better symptomatically.      PLAN:   1-  Continue Arixtra  2-  FOLOFOX today, continue every two weeks- Avastin discontinued.   3-  RTC MD 2 weeks with infusion clinic appointment    SIDRA NAILS MD    1/24/2018

## 2018-01-24 NOTE — PROGRESS NOTES
Oncology Distress Screening Follow-up  Clinical Social Work  [facility]    Identified Concern and Score From Distress Screenin. How concerned are you about your ability to eat?   0           2. How concerned are you about unintended weight loss or your current weight?   5           3. How concerned are you about feeling depressed or very sad?   4           4. How concerned are you about feeling anxious or very scared?   5           5. How concerned are you about your spiritual wellbeing or sense of peace?   2           6. How concerned are you about work and home life issues that may be affected by your cancer?   8           7. How concerned are you about knowing what resources are available to help you?   4           You can also ask to be contacted by one of our Oncology Supportive Care professionals.    8. If you want to be contacted by one of our professionals, I can send a message to them right now.   Oncology Social  Worker             Date of Distress Screenin18    Intervention:   Liv is a davon woman with a diagnosis of metastatic colon cancer, she comes to clinic today for C8D1 FOLFOX and follow-up with Dr. Piedra. Pt reports that she has been coping well since discharge after last hospitalization. Pt reports that she was relieved to learn today that Avastin could be leading to blood clots and that she will no longer be receiving this medication. Pt reports that her daughter is picking up a new walker for her tomorrow from [facility] that will have a seat, which pt reports she feels will be better for her. Pt reported that she had a fall last week, but has continued to work with PT surrounding mobility and endurance. Pt reports that she continues to feel that she is getting stronger, and has hopes of being able to return to work at the end of March. Pt acknowledged one of her goals is to receive gold pendent that designates that she has worked at [employer] for 25 years, which she will receive in 2  years time. Pt reports today that she continues to have hope that she will be able to live for 2 more years, but has worries that she will not. Extensive discussion today surrounding complex psychosocial situation in home of her ongoing care of granddaughters, and plans for support of granddaughters after she is no longer living. Pt reiterates with this clinician today that son in [place] has been very helpful in supporting granddaughter, and that after she is no longer living that he will be ongoing caretaker for her.     Pt reported that she completed HCP paperwork when pt was last admitted, designating grandmayurughter to be primary agent and her daughter Jessica to be secondary.     Larger discussion with pt today about transportation issues that have made it difficult for pt to get to appointments. Pt reports that it has been more difficult for her to afford [employer], but below is her account information:   ADA certification number: [id number]  [employer]: [phone], [phone]  Eligible to 1/1/2200    This clinician also provided pt with additional information about [facility] program, and benefit of reaching out to agency with as much advanced notice as possible as this program is volunteer based.     Plan:  1) This clinician to fax in application for [employer] for food delivery, and [facility] request for To-Go card to assist pt in transport issues  2) Ongoing discussion with pt surrounding pt's care plan for granddaughters if hospitalized unexpectedly in future, as well as pt's hopes for granddaughter's future if pt unable to care for granddaughters in home  3) Will continue to provide ongoing psychosocial support as pt continues to adjust to treatment and realize its impacts    Please page this clinician in the case of psychosocial distress or need.     DEANNE Watkins, [facility]  Phone: [phone]  Pager: [phone]    [facility]: M, T  *every other Thursday,  8am-4:30pm  [facility]: W, F, *every other Thursday, 8am-4:30pm

## 2018-01-24 NOTE — PATIENT INSTRUCTIONS
1- Continue Arixtra  2-  FOLOFOX today, continue every two weeks- Avastin discontinued.   3-  RTC MD 2 weeks with infusion clinic appointment  Already Scheduled - Charissa    AVS given to patient - Charissa

## 2018-01-24 NOTE — MR AVS SNAPSHOT
After Visit Summary   1/24/2018    Liv Sylvester    MRN: [id number]           Patient Information     Date Of Birth          1950        Visit Information        Provider Department      1/24/2018 9:30 AM Radha Piedra MD [facility]        Care Instructions    1- Continue Arixtra  2-  FOLOFOX today, continue every two weeks- Avastin discontinued.   3-  RTC MD 2 weeks with infusion clinic appointment          Follow-ups after your visit        Your next 10 appointments already scheduled     Jan 26, 2018 12:00 PM CST   Level 1 with SH INFUSION CHAIR 5   [facility] ([facility])    [facility]  [address]  [place] [address]   [phone]            Jan 31, 2018 12:00 PM CST   Level 1 with SH INFUSION CHAIR 11   [facility] ([facility])    [facility] [place] [id number]  [place] [address]   [phone]            Jan 31, 2018  1:00 PM CST   Return Visit with Radha Piedra MD   [facility] ([facility])    [facility]  [address]  [place] [address]   [phone]            Feb 07, 2018  9:00 AM CST   Level 6 with SH INFUSION CHAIR 13   [facility] ([facility])    [facility]  [address]  [place] [address]   [phone]            Feb 07, 2018  9:15 AM CST   Return Visit with Radha Piedra MD   [facility] ([facility])    [facility]  [address]  [place] [address]   [phone]            Feb 09, 2018 12:00 PM CST   Level 1 with SH INFUSION CHAIR 12   [facility] ([facility])    [facility]  [address]  [place] [address]   [phone]  "           2018  9:00 AM CST   New Visit with Nela Spaulding MD   [facility] ([facility])    [facility] [place]  [address]  [place] [address] [phone]              Who to contact     If you have questions or need follow up information about today's clinic visit or your schedule please contact [facility] directly at [phone].  Normal or non-critical lab and imaging results will be communicated to you by MyChart, letter or phone within 4 business days after the clinic has received the results. If you do not hear from us within 7 days, please contact the clinic through [employer] or phone. If you have a critical or abnormal lab result, we will notify you by phone as soon as possible.  Submit refill requests through [employer] or call your pharmacy and they will forward the refill request to us. Please allow 3 business days for your refill to be completed.          Additional Information About Your Visit        EgullyharEdÃºkame Information     [employer] lets you send messages to your doctor, view your test results, renew your prescriptions, schedule appointments and more. To sign up, go to [email or web address] . Click on \"Log in\" on the left side of the screen, which will take you to the Welcome page. Then click on \"Sign up Now\" on the right side of the page.     You will be asked to enter the access code listed below, as well as some personal information. Please follow the directions to create your username and password.     Your access code is: [email or web address]  Expires: 2018  7:56 AM     Your access code will  in 90 days. If you need help or a new code, please call your [facility] clinic or [phone].        Care EveryWhere ID     This is your Care EveryWhere ID. This could be used by other organizations to access your [facility] medical records  [id number]         Blood Pressure from Last 3 Encounters:   18 121/67 "   01/11/18 141/62   01/11/18 135/65    Weight from Last 3 Encounters:   01/24/18 98.8 kg (217 lb 12.8 oz)   01/15/18 102.5 kg (226 lb)   01/11/18 101.2 kg (223 lb)              Today, you had the following     No orders found for display       Primary Care Provider Office Phone # Fax #    Amaya Howard [phone]       [place] [address] [place] [address]        Equal Access to Services     SARITHA [facility] HospitalOMAYRA : Hadii aad ku hadasho Soomaali, waaxda luqadaha, qaybta kaalmada adeegyada, waxcleopatra cevallos . So [facility] [phone].    ATENCIÓN: Si habla español, tiene a schmitt disposición servicios gratuitos de asistencia lingüística. LlCleveland Clinic Foundation [phone].    We comply with applicable federal civil rights laws and [place] laws. We do not discriminate on the basis of race, color, national origin, age, disability, sex, sexual orientation, or gender identity.            Thank you!     Thank you for choosing [facility]  for your care. Our goal is always to provide you with excellent care. Hearing back from our patients is one way we can continue to improve our services. Please take a few minutes to complete the written survey that you may receive in the mail after your visit with us. Thank you!             Your Updated Medication List - Protect others around you: Learn how to safely use, store and throw away your medicines at www.disposemymeds.org.          This list is accurate as of 1/24/18 10:00 AM.  Always use your most recent med list.                   Brand Name Dispense Instructions for use Diagnosis    ACETAMINOPHEN PO      Take 1,000 mg by mouth every 4 hours as needed for pain        albuterol 108 (90 BASE) MCG/ACT Inhaler    PROAIR HFA/PROVENTIL HFA/VENTOLIN HFA    1 Inhaler    Inhale 2 puffs into the lungs every 6 hours as needed for shortness of breath / dyspnea or wheezing    Reactive airway disease without complication, unspecified asthma severity,  unspecified whether persistent       CALCIUM-MAGNESIUM-ZINC PO      Take 1 tablet by mouth daily        CENTRUM SILVER per tablet     100 tablet    Take 1 tablet by mouth daily.        CHLORTHALIDONE PO      Take 25 mg by mouth daily as needed ((Patient is supposed to take 25mg daily but states she only takes as needed))        fondaparinux 10 MG/0.8ML injection    ARIXTRA    24 mL    Inject 0.8 mLs (10 mg) Subcutaneous every 24 hours    PE (pulmonary thromboembolism) (H), DVT (deep vein thrombosis) in pregnancy (H)       lidocaine 5 % ointment    XYLOCAINE    5 g    Apply 0.5 g topically as needed for moderate pain Apply pea size amount to rectal area for pain        lidocaine-prilocaine cream    EMLA    30 g    Apply topically as needed for moderate pain Apply dollop size amount to port site 30-60 minutes prior to accessing.    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       lisinopril 10 MG tablet    PRINIVIL/ZESTRIL    30 tablet    Take 1 tablet (10 mg) by mouth daily    Essential hypertension       LORazepam 0.5 MG tablet    ATIVAN    30 tablet    Take 1 tablet (0.5 mg) by mouth every 4 hours as needed (Anxiety, Nausea/Vomiting or Sleep)    Malignant neoplasm of hepatic flexure (H)       ondansetron 8 MG tablet    ZOFRAN    10 tablet    Take 1 tablet (8 mg) by mouth every 8 hours as needed (Nausea/Vomiting)    Malignant neoplasm of hepatic flexure (H)       order for DME     1 Device    Equipment being ordered: Wheelchair    Malignant neoplasm of colon, unspecified part of colon (H), VTE (venous thromboembolism), PE (pulmonary thromboembolism) (H), Pain of left lower leg       oxyCODONE IR 5 MG tablet    ROXICODONE    50 tablet    Take 1-2 tablets (5-10 mg) by mouth every 4 hours as needed for pain maximum 12 tablet(s) per day    Malignant neoplasm of colon, unspecified part of colon (H)       Potassium Chloride ER 20 MEQ Tbcr     30 tablet    Take 1 tablet (20 mEq) by mouth daily     Hypokalemia       prochlorperazine 10 MG tablet    COMPAZINE    30 tablet    Take 1 tablet (10 mg) by mouth every 6 hours as needed (Nausea/Vomiting)    Malignant neoplasm of hepatic flexure (H)       sennosides 8.6 MG tablet    SENOKOT     Take 1 tablet by mouth 2 times daily as needed for constipation        SERTRALINE HCL PO      Take 50 mg by mouth daily (Takes half of a 100mg tablet for a total of 50mg daily)        VICKS NYQUIL COLD & FLU 15-6. MG/15ML Liqd   Generic drug:  DM-Doxylamine-Acetaminophen      Take 15 mLs by mouth nightly as needed        VITAMIN D (CHOLECALCIFEROL) PO      Take 1 tablet by mouth daily (OTC: Pt unsure of strength)

## 2018-01-24 NOTE — MR AVS SNAPSHOT
After Visit Summary   1/24/2018    Liv Sylvester    MRN: [id number]           Patient Information     Date Of Birth          1950        Visit Information        Provider Department      1/24/2018 9:00 AM SH INFUSION CHAIR 17 [facility]        Today's Diagnoses     Colon Cancer, with Liver Met (Stage IV)    -  1    PE (pulmonary thromboembolism) (H)        Neoplastic malignant related fatigue        Other acute pulmonary embolism without acute cor pulmonale (H)           Follow-ups after your visit        Your next 10 appointments already scheduled     Jan 26, 2018 12:00 PM CST   Level 1 with SH INFUSION CHAIR 5   [facility] ([facility])    [facility]  [address]  [place] [address]   [phone]            Jan 31, 2018 12:00 PM CST   Level 1 with SH INFUSION CHAIR 11   [facility] [place] ([facility])    [facility] [place]  [address]  [place] [address]   [phone]            Jan 31, 2018  1:00 PM CST   Return Visit with Radha Piedra MD   [facility] ([facility])    [facility]  [address]  [place] [address]   [phone]            Feb 07, 2018  9:00 AM CST   Level 6 with SH INFUSION CHAIR 13   [facility] ([facility])    [facility]  [address]  [place] [address]   [phone]            Feb 07, 2018  9:15 AM CST   Return Visit with Radha Piedra MD   [facility] ([facility])    [facility] [place]  [address]  [place] [address]   [phone]            Feb 09, 2018 12:00 PM CST   Level 1 with SH INFUSION CHAIR 12   [facility] ([facility])    [facility]  [address]  [place] [address]   [phone]            2018  9:00 AM CST   New Visit with Nela Spaulding MD   [facility] ([facility])    [facility] [place]  [address]  [place] [address]   [phone]              Who to contact     If you have questions or need follow up information about today's clinic visit or your schedule please contact [facility] directly at [phone].  Normal or non-critical lab and imaging results will be communicated to you by MyChart, letter or phone within 4 business days after the clinic has received the results. If you do not hear from us within 7 days, please contact the clinic through MyChart or phone. If you have a critical or abnormal lab result, we will notify you by phone as soon as possible.  Submit refill requests through [employer] or call your pharmacy and they will forward the refill request to us. Please allow 3 business days for your refill to be completed.          Additional Information About Your Visit        MyChart Information     [employer] lets you send messages to your doctor, view your test results, renew your prescriptions, schedule appointments and more. To sign up, go to [email or web address] . Click on \"Log in\" on the left side of the screen, which will take you to the Welcome page. Then click on \"Sign up Now\" on the right side of the page.     You will be asked to enter the access code listed below, as well as some personal information. Please follow the directions to create your username and password.     Your access code is: [email or web address]  Expires: 2018  7:56 AM     Your access code will  in 90 days. If you need help or a new code, please call your [place] clinic or [phone].        Care EveryWhere ID     This is your Care EveryWhere ID. This could be used by other organizations to access " your [place] medical records  [id number]        Your Vitals Were     Pulse Temperature Respirations BMI (Body Mass Index)          82 97.9  F (36.6  C) (Oral) 16 31.25 kg/m2         Blood Pressure from Last 3 Encounters:   01/24/18 126/67   01/17/18 121/67   01/11/18 141/62    Weight from Last 3 Encounters:   01/24/18 98.8 kg (217 lb 12.8 oz)   01/15/18 102.5 kg (226 lb)   01/11/18 101.2 kg (223 lb)              We Performed the Following     CBC with platelets differential     Comprehensive metabolic panel     Heparin 10a Level        Primary Care Provider Office Phone # Fax #    Amaya Howard [phone]       [facility] [address] [place] [address]        Equal Access to Services     HAYDEE IVERSON : Jeffry Pierce, waaxda luqadaha, qaybta kaalmamayur hawkins, john cevallos . [facility] [phone].    ATENCIÓN: Si habla español, tiene a schmitt disposición servicios gratuitos de asistencia lingüística. Wiley al [phone].    We comply with applicable federal civil rights laws and [place] laws. We do not discriminate on the basis of race, color, national origin, age, disability, sex, sexual orientation, or gender identity.            Thank you!     Thank you for choosing [facility] [place] [facility]  for your care. Our goal is always to provide you with excellent care. Hearing back from our patients is one way we can continue to improve our services. Please take a few minutes to complete the written survey that you may receive in the mail after your visit with us. Thank you!             Your Updated Medication List - Protect others around you: Learn how to safely use, store and throw away your medicines at www.disposemymeds.org.          This list is accurate as of 1/24/18  2:39 PM.  Always use your most recent med list.                   Brand Name Dispense Instructions for use Diagnosis    ACETAMINOPHEN PO      Take 1,000 mg by  mouth every 4 hours as needed for pain        albuterol 108 (90 BASE) MCG/ACT Inhaler    PROAIR HFA/PROVENTIL HFA/VENTOLIN HFA    1 Inhaler    Inhale 2 puffs into the lungs every 6 hours as needed for shortness of breath / dyspnea or wheezing    Reactive airway disease without complication, unspecified asthma severity, unspecified whether persistent       CALCIUM-MAGNESIUM-ZINC PO      Take 1 tablet by mouth daily        CENTRUM SILVER per tablet     100 tablet    Take 1 tablet by mouth daily.        CHLORTHALIDONE PO      Take 25 mg by mouth daily as needed ((Patient is supposed to take 25mg daily but states she only takes as needed))        fondaparinux 10 MG/0.8ML injection    ARIXTRA    24 mL    Inject 0.8 mLs (10 mg) Subcutaneous every 24 hours    PE (pulmonary thromboembolism) (H), DVT (deep vein thrombosis) in pregnancy (H)       lidocaine 5 % ointment    XYLOCAINE    5 g    Apply 0.5 g topically as needed for moderate pain Apply pea size amount to rectal area for pain        lidocaine-prilocaine cream    EMLA    30 g    Apply topically as needed for moderate pain Apply dollop size amount to port site 30-60 minutes prior to accessing.    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       lisinopril 10 MG tablet    PRINIVIL/ZESTRIL    30 tablet    Take 1 tablet (10 mg) by mouth daily    Essential hypertension       LORazepam 0.5 MG tablet    ATIVAN    30 tablet    Take 1 tablet (0.5 mg) by mouth every 4 hours as needed (Anxiety, Nausea/Vomiting or Sleep)    Malignant neoplasm of hepatic flexure (H)       ondansetron 8 MG tablet    ZOFRAN    10 tablet    Take 1 tablet (8 mg) by mouth every 8 hours as needed (Nausea/Vomiting)    Malignant neoplasm of hepatic flexure (H)       order for DME     1 Device    Equipment being ordered: Wheelchair    Malignant neoplasm of colon, unspecified part of colon (H), VTE (venous thromboembolism), PE (pulmonary thromboembolism) (H), Pain of left lower leg        oxyCODONE IR 5 MG tablet    ROXICODONE    50 tablet    Take 1-2 tablets (5-10 mg) by mouth every 4 hours as needed for pain maximum 12 tablet(s) per day    Malignant neoplasm of colon, unspecified part of colon (H)       Potassium Chloride ER 20 MEQ Tbcr     30 tablet    Take 1 tablet (20 mEq) by mouth daily    Hypokalemia       prochlorperazine 10 MG tablet    COMPAZINE    30 tablet    Take 1 tablet (10 mg) by mouth every 6 hours as needed (Nausea/Vomiting)    Malignant neoplasm of hepatic flexure (H)       sennosides 8.6 MG tablet    SENOKOT     Take 1 tablet by mouth 2 times daily as needed for constipation        SERTRALINE HCL PO      Take 50 mg by mouth daily (Takes half of a 100mg tablet for a total of 50mg daily)        GREGG NYQUIL COLD & FLU 15-6. MG/15ML Liqd   Generic drug:  DM-Doxylamine-Acetaminophen      Take 15 mLs by mouth nightly as needed        VITAMIN D (CHOLECALCIFEROL) PO      Take 1 tablet by mouth daily (OTC: Pt unsure of strength)

## 2018-01-24 NOTE — LETTER
1/24/2018         RE: Liv Sylvester  [address]  [place] [address]        Dear Colleague,    Thank you for referring your patient, Liv Sylvester, to the [facility]. Please see a copy of my visit note below.    [facility] PHYSICIANS  HEMATOLOGY ONCOLOGY    ONCOLOGY FOLLOWUP NOTE      DIAGNOSIS:    1- Metastatic colon cancer in a patient with history of stage 1A grade 3 endometrial carcinoma approximately around 2012.     On 09/29/2017, Liv Sylvester presented with progressive fatigue and right upper quadrant pain.  CT scan 09/29/2017 showed partially contracted bladder and findings suspicious for liver metastases.  There were multiple defined hypoechoic foci scoped scattered throughout the liver, the largest was in the right lobe.  CT was also showing enlargement of hepatic and retroperitoneal lymph nodes.  On 09/30/2017, she underwent a colonoscopy, which showed a fungating, infiltrative, ulcerated, partially obstructing mass at the hepatic flexure.  Pathology is consistent with moderately differentiated adenocarcinoma with normal mismatch repair protein expression.   2- Significant iron deficiency anemia.   3- VTE- malignancy related     TREATMENT:  10/11/2017 FOLFOX and Avastin.   1/24/2018 discontinued Avastin.      SUBJECTIVE:  The patient was seen as a followup today. She Is feeling better. Leg pain is better. No issues with bleeding or bruising.     REVIEW OF SYSTEMS:  A complete review of systems was performed and found to be negative other than pertinent positives mentioned in history of present illness.      Past medical, social histories reviewed.     Meds- Reviewed.     PHYSICAL EXAMINATION:   VITAL SIGNS: Reviewed  CONSTITUTIONAL: Appears tired.   HEENT: Pupils are equal. Oropharynx is clear.   NECK: No cervical or supraclavicular lymphadenopathy.   RESPIRATORY: Clear bilaterally.   CARD/VASC: S1, S2, regular.   GI: Soft, nontender, nondistended, no  hepatosplenomegaly.   MUSKULOSKELETAL: Warm, well perfused.   NEUROLOGIC: Alert, awake.   INTEGUMENT: No rash.   LYMPHATICS: Bilateral edema.   PSYCH: Mood and affect was normal.     LABORATORY DATA AND IMAGING REVIEWED DURING THIS VISIT:  Recent Labs   Lab Test  01/15/18   1542  01/11/18   1005   NA  137  142   POTASSIUM  3.9  3.0*   CHLORIDE  102  106   CO2  29  28   ANIONGAP  6  8   BUN  15  12   CR  0.60  0.57   GLC  99  126*   JESÚS  9.3  9.3     Recent Labs   Lab Test  01/16/18   0600  01/15/18   1542  01/11/18   1005  01/06/18   0416  01/05/18   1313   WBC  3.2*  4.6  4.9  6.3  9.0   HGB  9.7*  11.3*  10.4*  9.8*  10.7*   PLT  162  174  214  130*  128*   MCV  90  89  89  86  88   NEUTROPHIL   --    --   58.6  59.2  65.4     Recent Labs   Lab Test  01/11/18   1005  12/27/17   0915  12/15/17   1238   BILITOTAL  0.3  0.4  0.4   ALKPHOS  96  87  112   ALT  49  26  30   AST  41  27  44   ALBUMIN  2.7*  3.3*  3.5         Results for orders placed or performed during the hospital encounter of 01/15/18   US Lower Extremity Venous Duplex Left    Narrative    VENOUS ULTRASOUND LEFT LEG  1/15/2018 8:25 PM     HISTORY: Monitor progression of DVT.    COMPARISON: 1/5/2018    FINDINGS:  Examination of the deep veins with graded compression and  color flow Doppler with spectral wave form analysis was performed.   There is occlusive thrombus in the left external iliac vein. This is  new from when compared to the previous exam on 1/5/2018. Again  identified is nonocclusive thrombus in the distal left common femoral  vein that becomes occlusive as it extends from the proximal to mid  left superficial femoral vein. There is trickle of flow seen in the  distal left superficial femoral vein and popliteal vein. The posterior  tibial veins are patent. The peroneal veins are not visualized.      Impression    IMPRESSION: New deep venous thrombus extending into the left external  iliac vein.  No significant change in thrombus in the  "left common  femoral vein, superficial femoral vein and popliteal vein.    KARLEE DERAS MD   US Upper Extremity Venous Duplex Left    Narrative    US UPPER EXTREMITY VENOUS DUPLEX LEFT  1/16/2018 10:39 AM     HISTORY: rule out DVT, Pain in left upper;     COMPARISON: 1/5/2018    FINDINGS: Color Doppler and spectral waveform analysis performed.      Impression    IMPRESSION: No evidence of deep venous thrombosis.      KARLEE DERAS MD       ECOG performance status 1.      ASSESSMENT:    1- This is a 67-year-old lady with stage IV metastatic colon cancer with normal mismatch repair protein expression.  She has multiple liver metastases and metastasis in the abdominal lymph nodes.     She was started on palliative intent combination of FOLFOX with Avastin which is given every 2 weeks intravenously.      - Labs were reviewed with the patient, normocytic anemia related to chemotherapy.   - We will discontinue Avastin due to recurrent issues with thrombosis/   - She will proceed with FOLFOX/Avastin today.      2- She also has iron deficiency anemia due to chronic blood loss from her cancer.  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     3- VTEd recurrent episodes of thrombosis. There was always a concern about compliance.   She is on Arixtra now and appears to be doing better symptomatically.      PLAN:   1-  Continue Arixtra  2-  FOLOFOX today, continue every two weeks- Avastin discontinued.   3-  RTC MD 2 weeks with infusion clinic appointment    SIDRA NAILS MD    1/24/2018        Oncology Rooming Note    January 24, 2018 9:35 AM   Liv Sylvester is a 67 year old female who presents for:    Chief Complaint   Patient presents with     Oncology Clinic Visit     Initial Vitals: There were no vitals taken for this visit. Estimated body mass index is 31.25 kg/(m^2) as calculated from the following:    Height as of 1/15/18: 1.778 m (5' 10\").    Weight as of an earlier encounter on 1/24/18: 98.8 kg (217 lb 12.8 oz). There is no height or " weight on file to calculate BSA.  Data Unavailable Comment: Data Unavailable   No LMP recorded. Patient has had a hysterectomy.  Allergies reviewed: Yes  Medications reviewed: Yes    Medications: Medication refills not needed today.  Pharmacy name entered into Easy-Point:    [facility] [address] [place] [address]    Clinical concerns: no    5 minutes for nursing intake (face to face time)          Juliana Stewart MA                Again, thank you for allowing me to participate in the care of your patient.        Sincerely,        Radha Piedra MD

## 2018-01-24 NOTE — MR AVS SNAPSHOT
After Visit Summary   1/24/2018    Liv Sylvester    MRN: [id number]           Patient Information     Date Of Birth          1950        Visit Information        Provider Department      1/24/2018 1:01 PM Stephanie Awad LICSW [facility]        Today's Diagnoses     Counseling NOS(V65.40)    -  1       Follow-ups after your visit        Your next 10 appointments already scheduled     Jan 26, 2018 12:00 PM CST   Level 1 with SH INFUSION CHAIR 5   [facility] ([facility])    [facility]  [address]  [place] [address]   [phone]            Jan 31, 2018 12:00 PM CST   Level 1 with SH INFUSION CHAIR 11   [facility] ([facility])    [facility]  [address]  [place] [address]   [phone]            Jan 31, 2018  1:00 PM CST   Return Visit with Radha Piedra MD   [facility] ([facility])    [facility]  [address]  [place] [address]   [phone]            Feb 07, 2018  9:00 AM CST   Level 6 with SH INFUSION CHAIR 13   [facility] ([facility])    [facility]  [address]  [place] [address]   [phone]            Feb 07, 2018  9:15 AM CST   Return Visit with Radha Piedra MD   [facility] ([facility])    [facility]  [address]  [place] [address]   [phone]            Feb 09, 2018 12:00 PM CST   Level 1 with SH INFUSION CHAIR 12   [facility] ([facility])    [facility]  [address]  [place] [address]   [phone]            Feb 12, 2018  9:00 AM CST   New Visit with Nela Spaulding MD   [facility]  "Clinic ([facility])    [facility]  [address]  [place] [address] [phone]              Who to contact     If you have questions or need follow up information about today's clinic visit or your schedule please contact [facility] directly at [phone].  Normal or non-critical lab and imaging results will be communicated to you by MyChart, letter or phone within 4 business days after the clinic has received the results. If you do not hear from us within 7 days, please contact the clinic through MyChart or phone. If you have a critical or abnormal lab result, we will notify you by phone as soon as possible.  Submit refill requests through UpTo or call your pharmacy and they will forward the refill request to us. Please allow 3 business days for your refill to be completed.          Additional Information About Your Visit        Gen[employer]zz[employer]ift[employer] Information     UpTo lets you send messages to your doctor, view your test results, renew your prescriptions, schedule appointments and more. To sign up, go to [email or web address] . Click on \"Log in\" on the left side of the screen, which will take you to the Welcome page. Then click on \"Sign up Now\" on the right side of the page.     You will be asked to enter the access code listed below, as well as some personal information. Please follow the directions to create your username and password.     Your access code is: [email or web address]  Expires: 2018  7:56 AM     Your access code will  in 90 days. If you need help or a new code, please call your [place] clinic or [phone].        Care EveryWhere ID     This is your Care EveryWhere ID. This could be used by other organizations to access your [place] medical records  [id number]         Blood Pressure from Last 3 Encounters:   18 116/63   18 121/67   18 141/62    Weight from Last 3 Encounters:   18 98.8 kg (217 lb 12.8 " oz)   01/15/18 102.5 kg (226 lb)   01/11/18 101.2 kg (223 lb)              Today, you had the following     No orders found for display       Primary Care Provider Office Phone # Fax #    Amaya Howard [phone]       [facility] [address] [facility] [address]        Equal Access to Services     [facility] : Hadii aad ku hadasho Soomaali, waaxda luqadaha, qaybta kaalmada adeegyada, waxay idiin hayaan adeeg khjhonny ladeltan . So [facility] [phone].    ATENCIÓN: Si habla español, tiene a schmitt disposición servicios gratuitos de asistencia lingüística. Wiley al [phone].    We comply with applicable federal civil rights laws and [place] laws. We do not discriminate on the basis of race, color, national origin, age, disability, sex, sexual orientation, or gender identity.            Thank you!     Thank you for choosing [facility]  for your care. Our goal is always to provide you with excellent care. Hearing back from our patients is one way we can continue to improve our services. Please take a few minutes to complete the written survey that you may receive in the mail after your visit with us. Thank you!             Your Updated Medication List - Protect others around you: Learn how to safely use, store and throw away your medicines at www.disposemymeds.org.          This list is accurate as of 1/24/18  1:21 PM.  Always use your most recent med list.                   Brand Name Dispense Instructions for use Diagnosis    ACETAMINOPHEN PO      Take 1,000 mg by mouth every 4 hours as needed for pain        albuterol 108 (90 BASE) MCG/ACT Inhaler    PROAIR HFA/PROVENTIL HFA/VENTOLIN HFA    1 Inhaler    Inhale 2 puffs into the lungs every 6 hours as needed for shortness of breath / dyspnea or wheezing    Reactive airway disease without complication, unspecified asthma severity, unspecified whether persistent       CALCIUM-MAGNESIUM-ZINC PO      Take 1 tablet by mouth daily         CENTRUM SILVER per tablet     100 tablet    Take 1 tablet by mouth daily.        CHLORTHALIDONE PO      Take 25 mg by mouth daily as needed ((Patient is supposed to take 25mg daily but states she only takes as needed))        fondaparinux 10 MG/0.8ML injection    ARIXTRA    24 mL    Inject 0.8 mLs (10 mg) Subcutaneous every 24 hours    PE (pulmonary thromboembolism) (H), DVT (deep vein thrombosis) in pregnancy (H)       lidocaine 5 % ointment    XYLOCAINE    5 g    Apply 0.5 g topically as needed for moderate pain Apply pea size amount to rectal area for pain        lidocaine-prilocaine cream    EMLA    30 g    Apply topically as needed for moderate pain Apply dollop size amount to port site 30-60 minutes prior to accessing.    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       lisinopril 10 MG tablet    PRINIVIL/ZESTRIL    30 tablet    Take 1 tablet (10 mg) by mouth daily    Essential hypertension       LORazepam 0.5 MG tablet    ATIVAN    30 tablet    Take 1 tablet (0.5 mg) by mouth every 4 hours as needed (Anxiety, Nausea/Vomiting or Sleep)    Malignant neoplasm of hepatic flexure (H)       ondansetron 8 MG tablet    ZOFRAN    10 tablet    Take 1 tablet (8 mg) by mouth every 8 hours as needed (Nausea/Vomiting)    Malignant neoplasm of hepatic flexure (H)       order for DME     1 Device    Equipment being ordered: Wheelchair    Malignant neoplasm of colon, unspecified part of colon (H), VTE (venous thromboembolism), PE (pulmonary thromboembolism) (H), Pain of left lower leg       oxyCODONE IR 5 MG tablet    ROXICODONE    50 tablet    Take 1-2 tablets (5-10 mg) by mouth every 4 hours as needed for pain maximum 12 tablet(s) per day    Malignant neoplasm of colon, unspecified part of colon (H)       Potassium Chloride ER 20 MEQ Tbcr     30 tablet    Take 1 tablet (20 mEq) by mouth daily    Hypokalemia       prochlorperazine 10 MG tablet    COMPAZINE    30 tablet    Take 1 tablet (10 mg) by mouth  every 6 hours as needed (Nausea/Vomiting)    Malignant neoplasm of hepatic flexure (H)       sennosides 8.6 MG tablet    SENOKOT     Take 1 tablet by mouth 2 times daily as needed for constipation        SERTRALINE HCL PO      Take 50 mg by mouth daily (Takes half of a 100mg tablet for a total of 50mg daily)        GREGG NYQUIL COLD & FLU 15-6. MG/15ML Liqd   Generic drug:  DM-Doxylamine-Acetaminophen      Take 15 mLs by mouth nightly as needed        VITAMIN D (CHOLECALCIFEROL) PO      Take 1 tablet by mouth daily (OTC: Pt unsure of strength)

## 2018-01-25 NOTE — TELEPHONE ENCOUNTER
Informed pt that she had been approved through [facility] for $494 for transportation assistance on To-Go card. Card mailed by Moira Reyes today, pt knows to use it in the next 28 days and that card expires 2025. No psychosocial concerns or needs reported today, pt knows to reach out in case of concern.     DEANNE Watkins, LICSW  Phone: [phone]  Pager: [phone]    Sobieski Machelle: M, T  *every other Thursday, 8am-4:30pm  Sobieski Reed: W, F, *every other Thursday, 8am-4:30pm

## 2018-01-25 NOTE — PROGRESS NOTES
This is a recent snapshot of the patient's [facility] medical record.  For current drug dose and complete information and questions, call [phone] or In [facility] pool, fv home infusion ([id number])  CSN Number:  [id number]

## 2018-01-29 NOTE — PROGRESS NOTES
This is a recent snapshot of the patient's [facility] medical record.  For current drug dose and complete information and questions, call [phone] or In [facility] pool, fv home infusion ([id number])  CSN Number:  [id number]

## 2018-01-30 NOTE — TELEPHONE ENCOUNTER
8:57 am  Sabrina Mckeon RN [facility] called regarding the patient.  Can IV hydration resume?  They have her at one liter normal saline prn daily.  The patient is hypotensive at 98/50 with increased heart rate up 20 bpm at 96.  The patient has dry mucus membranes, general weakness and mattering eyes.  Also can we call a new prescription for potassium chloride 20 meq po qd to the [facility] in [place] at [phone] (new pharmacy).  If you have any questions call Sabrina back at [phone].  Monserrat Alonso MA

## 2018-01-30 NOTE — TELEPHONE ENCOUNTER
I called Dr. Piedra and received the verbal ok to resume IV Hydration prn.  He also stated that blood pressure medications should be held when her b/p is low. Continue to monitor blood pressure. Potassium has 11 refills and can be refilled.    I LVM with Sabrina with the above orders and to continue to monitor blood pressure. Debbie Dasilva

## 2018-01-31 NOTE — PROGRESS NOTES
This is a recent snapshot of the patient's [facility] medical record.  For current drug dose and complete information and questions, call [phone] or In [facility] pool, fv home infusion ([id number])  CSN Number:  [id number]

## 2018-02-02 NOTE — PROGRESS NOTES
This is a recent snapshot of the patient's [facility] medical record.  For current drug dose and complete information and questions, call [phone] or In [facility] pool, fv home infusion ([id number])  CSN Number:  [id number]

## 2018-02-07 NOTE — PROGRESS NOTES
This is a recent snapshot of the patient's [facility] medical record.  For current drug dose and complete information and questions, call [phone] or In [facility] pool, fv home infusion ([id number])  CSN Number:  [id number]

## 2018-02-07 NOTE — LETTER
"    2/7/2018         RE: Liv Sylvester  [address]  [place] [address]        Dear Colleague,    Thank you for referring your patient, Liv Sylvester, to the [facility]. Please see a copy of my visit note below.    [facility] PHYSICIANS  HEMATOLOGY ONCOLOGY    ONCOLOGY FOLLOWUP NOTE      DIAGNOSIS:    1- Metastatic colon cancer in a patient with history of stage 1A grade 3 endometrial carcinoma approximately around 2012.     On 09/29/2017, Liv Sylvester presented with progressive fatigue and right upper quadrant pain.  CT scan 09/29/2017 showed partially contracted bladder and findings suspicious for liver metastases.  There were multiple defined hypoechoic foci scoped scattered throughout the liver, the largest was in the right lobe.  CT was also showing enlargement of hepatic and retroperitoneal lymph nodes.  On 09/30/2017, she underwent a colonoscopy, which showed a fungating, infiltrative, ulcerated, partially obstructing mass at the hepatic flexure.  Pathology is consistent with moderately differentiated adenocarcinoma with normal mismatch repair protein expression.   2- Significant iron deficiency anemia.   3- VTE- malignancy related     TREATMENT:  10/11/2017 FOLFOX and Avastin.   1/24/2018 discontinued Avastin.      SUBJECTIVE:  The patient was seen as a followup today. She thinks that legs are better. She has off an don minor discomfort in her upper abdomen. Her appetite is good.     REVIEW OF SYSTEMS:  A complete review of systems was performed and found to be negative other than pertinent positives mentioned in history of present illness.      Past medical, social histories reviewed.     Meds- Reviewed.     PHYSICAL EXAMINATION:   VITAL SIGNS:/81  Pulse 88  Temp 97.8  F (36.6  C) (Oral)  Resp 18  Ht 1.778 m (5' 10\")  Wt 101.2 kg (223 lb)  SpO2 98%  BMI 32 kg/m2  CONSTITUTIONAL: Appears tired.   HEENT: Pupils are equal. Oropharynx is clear.   NECK: No " cervical or supraclavicular lymphadenopathy.   RESPIRATORY: Clear bilaterally.   CARD/VASC: S1, S2, regular.   GI: Soft, nontender, nondistended, no hepatosplenomegaly.   MUSKULOSKELETAL: Warm, well perfused.   NEUROLOGIC: Alert, awake.   INTEGUMENT: No rash.   LYMPHATICS: Bilateral edema.   PSYCH: Mood and affect was normal.     LABORATORY DATA AND IMAGING REVIEWED DURING THIS VISIT:  Recent Labs   Lab Test  01/24/18   0925  01/15/18   1542   NA  138  137   POTASSIUM  3.8  3.9   CHLORIDE  105  102   CO2  28  29   ANIONGAP  5  6   BUN  15  15   CR  0.67  0.60   GLC  113*  99   JESÚS  9.6  9.3     Recent Labs   Lab Test  01/24/18   0925  01/16/18   0600  01/15/18   1542  01/11/18   1005  01/06/18   0416   WBC  3.7*  3.2*  4.6  4.9  6.3   HGB  10.5*  9.7*  11.3*  10.4*  9.8*   PLT  214  162  174  214  130*   MCV  91  90  89  89  86   NEUTROPHIL  49.6   --    --   58.6  59.2     Recent Labs   Lab Test  01/24/18   0925  01/11/18   1005  12/27/17   0915   BILITOTAL  0.3  0.3  0.4   ALKPHOS  100  96  87   ALT  18  49  26   AST  20  41  27   ALBUMIN  2.9*  2.7*  3.3*     ECOG performance status 1.      ASSESSMENT:    1- This is a 67-year-old lady with stage IV metastatic colon cancer with normal mismatch repair protein expression.  She has multiple liver metastases and metastasis in the abdominal lymph nodes.     She was started on palliative intent combination of FOLFOX with Avastin which is given every 2 weeks intravenously.      - Labs were reviewed with the patient, mild neutropenia, normocytic anemia and thrombocytopenia. She will proceed with chemotherapy today. I will eliminate 5FU bolus from her treatment.      2- She also has iron deficiency anemia due to chronic blood loss from her cancer.  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     3- VTEd recurrent episodes of thrombosis. There was always a concern about compliance.   She is on Arixtra now and appears to be doing better symptomatically.      PLAN:   1-  Continue Arixtra  2-   "FOLOFOX today, continue every two weeks-   3-  RTC MD 2 weeks with infusion clinic appointment  4- Please add CEA to today's labs  5- CT scan chest abdomen and pelvis 2-3 days prior to next next cycle of chemoethrap    RADHA PIEDRA MD    2/7/2018        Oncology Rooming Note    February 7, 2018 9:35 AM   Liv Sylvester is a 67 year old female who presents for:    Chief Complaint   Patient presents with     Oncology Clinic Visit     Initial Vitals: /81  Pulse 88  Temp 97.8  F (36.6  C) (Oral)  Resp 18  Ht 1.778 m (5' 10\")  Wt 101.2 kg (223 lb)  SpO2 98%  BMI 32 kg/m2 Estimated body mass index is 32 kg/(m^2) as calculated from the following:    Height as of this encounter: 1.778 m (5' 10\").    Weight as of this encounter: 101.2 kg (223 lb). Body surface area is 2.24 meters squared.  Data Unavailable Comment: Data Unavailable   No LMP recorded. Patient has had a hysterectomy.  Allergies reviewed: Yes  Medications reviewed: Yes    Medications: MEDICATION REFILLS NEEDED TODAY. Provider was notified. Refill ARIXTRA  Pharmacy name entered into [facility]:    [facility] - [place], [place] - [address] [place] [address]    Clinical concerns: no    5 minutes for nursing intake (face to face time)          Juliana Stewart MA            Again, thank you for allowing me to participate in the care of your patient.        Sincerely,        Radha Piedra MD    "

## 2018-02-07 NOTE — PROGRESS NOTES
"[facility] PHYSICIANS  HEMATOLOGY ONCOLOGY    ONCOLOGY FOLLOWUP NOTE      DIAGNOSIS:    1- Metastatic colon cancer in a patient with history of stage 1A grade 3 endometrial carcinoma approximately around 2012.     On 09/29/2017, Liv Sylvester presented with progressive fatigue and right upper quadrant pain.  CT scan 09/29/2017 showed partially contracted bladder and findings suspicious for liver metastases.  There were multiple defined hypoechoic foci scoped scattered throughout the liver, the largest was in the right lobe.  CT was also showing enlargement of hepatic and retroperitoneal lymph nodes.  On 09/30/2017, she underwent a colonoscopy, which showed a fungating, infiltrative, ulcerated, partially obstructing mass at the hepatic flexure.  Pathology is consistent with moderately differentiated adenocarcinoma with normal mismatch repair protein expression.   2- Significant iron deficiency anemia.   3- VTE- malignancy related     TREATMENT:  10/11/2017 FOLFOX and Avastin.   1/24/2018 discontinued Avastin.      SUBJECTIVE:  The patient was seen as a followup today. She thinks that legs are better. She has off an don minor discomfort in her upper abdomen. Her appetite is good.     REVIEW OF SYSTEMS:  A complete review of systems was performed and found to be negative other than pertinent positives mentioned in history of present illness.      Past medical, social histories reviewed.     Meds- Reviewed.     PHYSICAL EXAMINATION:   VITAL SIGNS:/81  Pulse 88  Temp 97.8  F (36.6  C) (Oral)  Resp 18  Ht 1.778 m (5' 10\")  Wt 101.2 kg (223 lb)  SpO2 98%  BMI 32 kg/m2  CONSTITUTIONAL: Appears tired.   HEENT: Pupils are equal. Oropharynx is clear.   NECK: No cervical or supraclavicular lymphadenopathy.   RESPIRATORY: Clear bilaterally.   CARD/VASC: S1, S2, regular.   GI: Soft, nontender, nondistended, no hepatosplenomegaly.   MUSKULOSKELETAL: Warm, well perfused.   NEUROLOGIC: Alert, awake. "   INTEGUMENT: No rash.   LYMPHATICS: Bilateral edema.   PSYCH: Mood and affect was normal.     LABORATORY DATA AND IMAGING REVIEWED DURING THIS VISIT:  Recent Labs   Lab Test  01/24/18   0925  01/15/18   1542   NA  138  137   POTASSIUM  3.8  3.9   CHLORIDE  105  102   CO2  28  29   ANIONGAP  5  6   BUN  15  15   CR  0.67  0.60   GLC  113*  99   JESÚS  9.6  9.3     Recent Labs   Lab Test  01/24/18   0925  01/16/18   0600  01/15/18   1542  01/11/18   1005  01/06/18   0416   WBC  3.7*  3.2*  4.6  4.9  6.3   HGB  10.5*  9.7*  11.3*  10.4*  9.8*   PLT  214  162  174  214  130*   MCV  91  90  89  89  86   NEUTROPHIL  49.6   --    --   58.6  59.2     Recent Labs   Lab Test  01/24/18   0925  01/11/18   1005  12/27/17   0915   BILITOTAL  0.3  0.3  0.4   ALKPHOS  100  96  87   ALT  18  49  26   AST  20  41  27   ALBUMIN  2.9*  2.7*  3.3*     ECOG performance status 1.      ASSESSMENT:    1- This is a 67-year-old lady with stage IV metastatic colon cancer with normal mismatch repair protein expression.  She has multiple liver metastases and metastasis in the abdominal lymph nodes.     She was started on palliative intent combination of FOLFOX with Avastin which is given every 2 weeks intravenously.      - Labs were reviewed with the patient, mild neutropenia, normocytic anemia and thrombocytopenia. She will proceed with chemotherapy today. I will eliminate 5FU bolus from her treatment.      2- She also has iron deficiency anemia due to chronic blood loss from her cancer.  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     3- VTEd recurrent episodes of thrombosis. There was always a concern about compliance.   She is on Arixtra now and appears to be doing better symptomatically.      PLAN:   1-  Continue Arixtra  2-  FOLOFOX today, continue every two weeks-   3-  RTC MD 2 weeks with infusion clinic appointment  4- Please add CEA to today's labs  5- CT scan chest abdomen and pelvis 2-3 days prior to next next cycle of chemoethrap    SIDRA NAILS MD     2/7/2018

## 2018-02-07 NOTE — MR AVS SNAPSHOT
After Visit Summary   2/7/2018    Liv Sylvester    MRN: [id number]           Patient Information     Date Of Birth          1950        Visit Information        Provider Department      2/7/2018 9:15 AM Radha Piedra MD [facility]        Today's Diagnoses     Colon Cancer, with Liver Met (Stage IV)    -  1      Care Instructions    1-  Continue Arixtra  2-  FOLOFOX today, continue every two weeks-   3-  RTC MD 2 weeks with infusion clinic appointment  4- Please add CEA to today's labs  5- CT scan chest abdomen and pelvis 2-3 days prior to next next cycle of chemoethrapy          Follow-ups after your visit        Your next 10 appointments already scheduled     Feb 09, 2018 12:00 PM CST   Level 1 with  INFUSION CHAIR 12   [facility] ([facility])    [facility]  [address]  [facility] [address]   [phone]            Feb 12, 2018  9:00 AM CST   New Visit with Nela Spaulding MD   [facility] ([facility])    [facility]  [address]  [facility] [address]   [phone]            Feb 19, 2018 10:00 AM CST   (Arrive by 9:45 AM)   CT CHEST/ABDOMEN/PELVIS W CONTRAST with SHCT1   [facility] CT ([facility])    [address] [facility] [address]   [phone]           Please bring any scans or X-rays taken at other hospitals, if similar tests were done. Also bring a list of your medicines, including vitamins, minerals and over-the-counter drugs. It is safest to leave personal items at home.  Be sure to tell your doctor:   If you have any allergies.   If there s any chance you are pregnant.   If you are breastfeeding.   If you have any special needs.  You may have contrast for this exam. To prepare:   Do not eat or drink for 2 hours before your exam. If you need to take medicine, you may take it with  small sips of water. (We may ask you to take liquid medicine as well.)   The day before your exam, drink extra fluids at least six 8-ounce glasses (unless your doctor tells you to restrict your fluids).  Patients over 70 or patients with diabetes or kidney problems:   If you haven t had a blood test (creatinine test) within the last 30 days, go to your clinic or Diagnostic Imaging Department for this test.  If you have diabetes:   If your kidney function is normal, continue taking your metformin (Avandamet, Glucophage, Glucovance, Metaglip) on the day of your exam.   If your kidney function is abnormal, wait 48 hours before restarting this medicine.  You will have oral contrast for this exam:   You will drink the contrast at home. Get this from your clinic or Diagnostic Imaging Department. Please follow the directions given.  Please wear loose clothing, such as a sweat suit or jogging clothes. Avoid snaps, zippers and other metal. We may ask you to undress and put on a hospital gown.  If you have any questions, please call the Imaging Department where you will have your exam.            Feb 21, 2018  8:30 AM CST   Level 6 with  INFUSION CHAIR 8   [facility] ([facility])    [facility] [place] [id number]  [facility] [id number] [phone]            Feb 21, 2018 10:45 AM CST   Return Visit with Radha Piedra MD   [facility] ([facility])    [facility] [place] [id number]  [facility] [id number] [phone]              Future tests that were ordered for you today     Open Future Orders        Priority Expected Expires Ordered    CT Chest/Abdomen/Pelvis w Contrast Routine  2/7/2019 2/7/2018            Who to contact     If you have questions or need follow up information about today's clinic visit or your schedule please contact [facility] directly at [phone].  Normal  "or non-critical lab and imaging results will be communicated to you by [employer], letter or phone within 4 business days after the clinic has received the results. If you do not hear from us within 7 days, please contact the clinic through [employer] or phone. If you have a critical or abnormal lab result, we will notify you by phone as soon as possible.  Submit refill requests through [employer] or call your pharmacy and they will forward the refill request to us. Please allow 3 business days for your refill to be completed.          Additional Information About Your Visit        [employer]HelpMeNow Information     [employer] lets you send messages to your doctor, view your test results, renew your prescriptions, schedule appointments and more. To sign up, go to [email or web address] . Click on \"Log in\" on the left side of the screen, which will take you to the Welcome page. Then click on \"Sign up Now\" on the right side of the page.     You will be asked to enter the access code listed below, as well as some personal information. Please follow the directions to create your username and password.     Your access code is: [email or web address]  Expires: 2018  7:56 AM     Your access code will  in 90 days. If you need help or a new code, please call your [place] clinic or [phone].        Care EveryWhere ID     This is your Care EveryWhere ID. This could be used by other organizations to access your [place] medical records  [id number]        Your Vitals Were     Pulse Temperature Respirations Height Pulse Oximetry BMI (Body Mass Index)    88 97.8  F (36.6  C) (Oral) 18 1.778 m (5' 10\") 98% 32 kg/m2       Blood Pressure from Last 3 Encounters:   18 148/81   18 148/81   18 126/67    Weight from Last 3 Encounters:   18 101.2 kg (223 lb)   18 101.2 kg (223 lb)   18 98.8 kg (217 lb 12.8 oz)              We Performed the Following     Carcinoembryonic Antigen          Today's Medication Changes       "    These changes are accurate as of 2/7/18 10:47 AM.  If you have any questions, ask your nurse or doctor.               Stop taking these medicines if you haven't already. Please contact your care team if you have questions.     lisinopril 10 MG tablet   Commonly known as:  PRINIVIL/ZESTRIL   Stopped by:  Radha Piedra MD                    Primary Care Provider Office Phone # Fax #    Amaya Howard [phone]       [address] [address]        Equal Access to Services     [facility]: Hadii aad ku hadasho Soomaali, waaxda luqadaha, qaybta kaalmada adeegyada, waxcleopatra cevallos . So [facility] [phone].    ATENCIÓN: Si habla español, tiene a schmitt disposición servicios gratuitos de asistencia lingüística. [facility] [phone].    We comply with applicable federal civil rights laws and [place] laws. We do not discriminate on the basis of race, color, national origin, age, disability, sex, sexual orientation, or gender identity.            Thank you!     Thank you for choosing [facility]  for your care. Our goal is always to provide you with excellent care. Hearing back from our patients is one way we can continue to improve our services. Please take a few minutes to complete the written survey that you may receive in the mail after your visit with us. Thank you!             Your Updated Medication List - Protect others around you: Learn how to safely use, store and throw away your medicines at www.disposemymeds.org.          This list is accurate as of 2/7/18 10:47 AM.  Always use your most recent med list.                   Brand Name Dispense Instructions for use Diagnosis    ACETAMINOPHEN PO      Take 1,000 mg by mouth every 4 hours as needed for pain        albuterol 108 (90 BASE) MCG/ACT Inhaler    PROAIR HFA/PROVENTIL HFA/VENTOLIN HFA    1 Inhaler    Inhale 2 puffs into the lungs every 6 hours as needed for shortness of breath /  dyspnea or wheezing    Reactive airway disease without complication, unspecified asthma severity, unspecified whether persistent       CALCIUM-MAGNESIUM-ZINC PO      Take 1 tablet by mouth daily        CENTRUM SILVER per tablet     100 tablet    Take 1 tablet by mouth daily.        CHLORTHALIDONE PO      Take 25 mg by mouth daily as needed ((Patient is supposed to take 25mg daily but states she only takes as needed))        fondaparinux 10 MG/0.8ML injection    ARIXTRA    24 mL    Inject 0.8 mLs (10 mg) Subcutaneous every 24 hours    PE (pulmonary thromboembolism) (H), DVT (deep vein thrombosis) in pregnancy (H)       lidocaine 5 % ointment    XYLOCAINE    5 g    Apply 0.5 g topically as needed for moderate pain Apply pea size amount to rectal area for pain        lidocaine-prilocaine cream    EMLA    30 g    Apply topically as needed for moderate pain Apply dollop size amount to port site 30-60 minutes prior to accessing.    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       LORazepam 0.5 MG tablet    ATIVAN    30 tablet    Take 1 tablet (0.5 mg) by mouth every 4 hours as needed (Anxiety, Nausea/Vomiting or Sleep)    Malignant neoplasm of hepatic flexure (H)       ondansetron 8 MG tablet    ZOFRAN    10 tablet    Take 1 tablet (8 mg) by mouth every 8 hours as needed (Nausea/Vomiting)    Malignant neoplasm of hepatic flexure (H)       order for DME     1 Device    Equipment being ordered: Wheelchair    Malignant neoplasm of colon, unspecified part of colon (H), VTE (venous thromboembolism), PE (pulmonary thromboembolism) (H), Pain of left lower leg       oxyCODONE IR 5 MG tablet    ROXICODONE    50 tablet    Take 1-2 tablets (5-10 mg) by mouth every 4 hours as needed for pain maximum 12 tablet(s) per day    Malignant neoplasm of colon, unspecified part of colon (H)       Potassium Chloride ER 20 MEQ Tbcr     30 tablet    Take 1 tablet (20 mEq) by mouth daily    Hypokalemia       prochlorperazine  10 MG tablet    COMPAZINE    30 tablet    Take 1 tablet (10 mg) by mouth every 6 hours as needed (Nausea/Vomiting)    Malignant neoplasm of hepatic flexure (H)       sennosides 8.6 MG tablet    SENOKOT     Take 1 tablet by mouth 2 times daily as needed for constipation        SERTRALINE HCL PO      Take 50 mg by mouth daily (Takes half of a 100mg tablet for a total of 50mg daily)        VICKS NYQUIL COLD & FLU 15-6. MG/15ML Liqd   Generic drug:  DM-Doxylamine-Acetaminophen      Take 15 mLs by mouth nightly as needed        VITAMIN D (CHOLECALCIFEROL) PO      Take 1 tablet by mouth daily (OTC: Pt unsure of strength)

## 2018-02-07 NOTE — PATIENT INSTRUCTIONS
Your chemo Pump will infuse for 46 hours. If at any time the pump should malfunction or you have pump concerns, please contact [facility] after hours at [phone].You will need an appointment for the pump disconnect in our clinic or with [facility].    [facility] will be out to your house on Friday Feb 9, 2018  At 11am to disconnect your chemo and apply the on-pro neulasta.

## 2018-02-07 NOTE — PROGRESS NOTES
"Oncology Rooming Note    February 7, 2018 9:35 AM   Liv Sylvester is a 67 year old female who presents for:    Chief Complaint   Patient presents with     Oncology Clinic Visit     Initial Vitals: /81  Pulse 88  Temp 97.8  F (36.6  C) (Oral)  Resp 18  Ht 1.778 m (5' 10\")  Wt 101.2 kg (223 lb)  SpO2 98%  BMI 32 kg/m2 Estimated body mass index is 32 kg/(m^2) as calculated from the following:    Height as of this encounter: 1.778 m (5' 10\").    Weight as of this encounter: 101.2 kg (223 lb). Body surface area is 2.24 meters squared.  Data Unavailable Comment: Data Unavailable   No LMP recorded. Patient has had a hysterectomy.  Allergies reviewed: Yes  Medications reviewed: Yes    Medications: MEDICATION REFILLS NEEDED TODAY. Provider was notified. Refill ARIXTRA  Pharmacy name entered into [facility]:    [facility] - [place], [place] - [address] [place] [address]    Clinical concerns: no    5 minutes for nursing intake (face to face time)          Juliana Stewart MA          "

## 2018-02-07 NOTE — PATIENT INSTRUCTIONS
1-  Continue Arixtra  2-  FOLOFOX today, continue every two weeks- scheduled/janice  3-  RTC MD 2 weeks with infusion clinic appointment  Scheduled/janice  4- Please add CEA to today's labs  5- CT scan chest abdomen and pelvis 2-3 days prior to next next cycle of chemoethrapy      Scheduled/janice    AVS printed & given to patient with oral contrast/janice

## 2018-02-07 NOTE — PROGRESS NOTES
Infusion Nursing Note:  Liv Sylvester presents today for folfox-6.    Patient seen by provider today: Yes: Dr. Piedra   present during visit today: Not Applicable.    Note: N/A.    Intravenous Access:  Implanted Port.    Treatment Conditions:  Lab Results   Component Value Date    HGB 9.7 02/07/2018     Lab Results   Component Value Date    WBC 3.0 02/07/2018      Lab Results   Component Value Date    ANEU 1.3 02/07/2018     Lab Results   Component Value Date     02/07/2018      Lab Results   Component Value Date     02/07/2018                   Lab Results   Component Value Date    POTASSIUM 3.0 02/07/2018           No results found for: MAG         Lab Results   Component Value Date    CR 0.63 02/07/2018                   Lab Results   Component Value Date    JESÚS 9.0 02/07/2018                Lab Results   Component Value Date    BILITOTAL 0.5 02/07/2018           Lab Results   Component Value Date    ALBUMIN 2.9 02/07/2018                    Lab Results   Component Value Date    ALT 20 02/07/2018           Lab Results   Component Value Date    AST 24 02/07/2018       Results reviewed, labs MET treatment parameters, ok to proceed with treatment.  K+  3.0 oral replacement given today.    Post Infusion Assessment:  Patient tolerated infusion without incident.  Blood return noted pre and post infusion.  Site patent and intact, free from redness, edema or discomfort.  No evidence of extravasations.    Discharge Plan:   Discharge instructions reviewed with: Patient.  Patient and/or family verbalized understanding of discharge instructions and all questions answered.  Copy of AVS reviewed with patient and/or family.  Patient will return 2/21/18 for next appointment.  Patient discharged in stable condition accompanied by: self.  to meet her in front of this dg.  Departure Mode: Ambulatory with walker.      Prior to discharge: Port is secured in place with tegaderm and flushed with 10cc NS  "with positive blood return noted.  Continuous home infusion Dosi-Fuser pump connected.    All connectors secured in place and clamps taped open.    Pump started, \"running\" noted on display (CADD): Not Applicable.  Patient instructed to call our clinic or [facility] with any questions or concerns at home.  Patient verbalized understanding.    Patient set up for pump disconnect at home with [facility] on Friday 2/9/18  11am. Patient and Home [facility]fusion aware.       Irwin Dorsey RN                        "

## 2018-02-07 NOTE — MR AVS SNAPSHOT
After Visit Summary   2/7/2018    Liv Sylvester    MRN: [id number]           Patient Information     Date Of Birth          1950        Visit Information        Provider Department      2/7/2018 5:14 PM Stephanie Awad LICSW [facility]        Today's Diagnoses     Counseling NOS(V65.40)    -  1       Follow-ups after your visit        Your next 10 appointments already scheduled     Feb 12, 2018  9:00 AM CST   New Visit with Nela Spaulding MD   [facility] ([facility])    [facility]  [address]  [facility] [address]   [phone]            Feb 19, 2018 10:00 AM CST   (Arrive by 9:45 AM)   CT CHEST/ABDOMEN/PELVIS W CONTRAST with SHCT1   [facility] CT ([facility])    [address] [facility] [address]   [phone]           Please bring any scans or X-rays taken at other hospitals, if similar tests were done. Also bring a list of your medicines, including vitamins, minerals and over-the-counter drugs. It is safest to leave personal items at home.  Be sure to tell your doctor:   If you have any allergies.   If there s any chance you are pregnant.   If you are breastfeeding.   If you have any special needs.  You may have contrast for this exam. To prepare:   Do not eat or drink for 2 hours before your exam. If you need to take medicine, you may take it with small sips of water. (We may ask you to take liquid medicine as well.)   The day before your exam, drink extra fluids at least six 8-ounce glasses (unless your doctor tells you to restrict your fluids).  Patients over 70 or patients with diabetes or kidney problems:   If you haven t had a blood test (creatinine test) within the last 30 days, go to your clinic or Diagnostic Imaging Department for this test.  If you have diabetes:   If your kidney function is normal, continue taking your metformin  (Avandamet, Glucophage, Glucovance, Metaglip) on the day of your exam.   If your kidney function is abnormal, wait 48 hours before restarting this medicine.  You will have oral contrast for this exam:   You will drink the contrast at home. Get this from your clinic or Diagnostic Imaging Department. Please follow the directions given.  Please wear loose clothing, such as a sweat suit or jogging clothes. Avoid snaps, zippers and other metal. We may ask you to undress and put on a hospital gown.  If you have any questions, please call the Imaging Department where you will have your exam.            Feb 21, 2018  8:30 AM CST   Level 6 with  INFUSION CHAIR 8   [facility] ([facility])    [facility]  [address]  [facility] [address]   [phone]            Feb 21, 2018 10:45 AM CST   Return Visit with Radha Piedra MD   [facility] ([facility])    [facility]  [address]  [facility] [address]   [phone]              Future tests that were ordered for you today     Open Future Orders        Priority Expected Expires Ordered    CT Chest/Abdomen/Pelvis w Contrast Routine  2/7/2019 2/7/2018            Who to contact     If you have questions or need follow up information about today's clinic visit or your schedule please contact [facility] INF[facility]ION CENTER directly at [phone].  Normal or non-critical lab and imaging results will be communicated to you by xTurionhart, letter or phone within 4 business days after the clinic has received the results. If you do not hear from us within 7 days, please contact the clinic through xTurionhart or phone. If you have a critical or abnormal lab result, we will notify you by phone as soon as possible.  Submit refill requests through GEO'Supp or call your pharmacy and they will forward the refill request to us. Please allow 3  "business days for your refill to be completed.          Additional Information About Your Visit        "[employer]hart Information     TravelPi lets you send messages to your doctor, view your test results, renew your prescriptions, schedule appointments and more. To sign up, go to [email or web address] . Click on \"Log in\" on the left side of the screen, which will take you to the Welcome page. Then click on \"Sign up Now\" on the right side of the page.     You will be asked to enter the access code listed below, as well as some personal information. Please follow the directions to create your username and password.     Your access code is: [email or web address]  Expires: 2018  7:56 AM     Your access code will  in 90 days. If you need help or a new code, please call your [place] clinic or [phone].        Care EveryWhere ID     This is your Care EveryWhere ID. This could be used by other organizations to access your [place] medical records  [id number]         Blood Pressure from Last 3 Encounters:   18 148/81   18 132/67   18 126/67    Weight from Last 3 Encounters:   18 101.2 kg (223 lb)   18 101.2 kg (223 lb)   18 98.8 kg (217 lb 12.8 oz)              Today, you had the following     No orders found for display         Today's Medication Changes          These changes are accurate as of 18 11:59 PM.  If you have any questions, ask your nurse or doctor.               These medicines have changed or have updated prescriptions.        Dose/Directions    fondaparinux 10 MG/0.8ML injection   Commonly known as:  ARIXTRA   This may have changed:  See the new instructions.   Used for:  PE (pulmonary thromboembolism) (H), DVT (deep vein thrombosis) in pregnancy (H)   Changed by:  Radha Piedra MD        INJECT 0.8 MLS (10 MG) SUBCUTANEOUSLY ONCE DAILY.   Quantity:  30 Syringe   Refills:  3         Stop taking these medicines if you haven't already. Please contact your care team if " you have questions.     lisinopril 10 MG tablet   Commonly known as:  PRINIVIL/ZESTRIL   Stopped by:  Radha Piedra MD                Where to get your medicines      These medications were sent to [facility] [address] [place] [address] [address] [facility]  [address] [facility] [address]     Phone:  [phone]     fondaparinux 10 MG/0.8ML injection                Primary Care Provider Office Phone # Fax #    Amaya Howard [phone]       [facility] [address] [facility] [address]        Equal Access to Services     [facility]: Hadii aad ku hadasho Soomaali, waaxda luqadaha, qaybta kaalmada adeegyada, john pascal adeslime cevallos . So [facility] [phone].    ATENCIÓN: Si habla español, tiene a schmitt disposición servicios gratuitos de asistencia lingüística. [facility] [phone].    We comply with applicable federal civil rights laws and [place] laws. We do not discriminate on the basis of race, color, national origin, age, disability, sex, sexual orientation, or gender identity.            Thank you!     Thank you for choosing [facility] AND [facility]  for your care. Our goal is always to provide you with excellent care. Hearing back from our patients is one way we can continue to improve our services. Please take a few minutes to complete the written survey that you may receive in the mail after your visit with us. Thank you!             Your Updated Medication List - Protect others around you: Learn how to safely use, store and throw away your medicines at www.disposemymeds.org.          This list is accurate as of 2/7/18 11:59 PM.  Always use your most recent med list.                   Brand Name Dispense Instructions for use Diagnosis    ACETAMINOPHEN PO      Take 1,000 mg by mouth every 4 hours as needed for pain        albuterol 108 (90 BASE) MCG/ACT Inhaler    PROAIR HFA/PROVENTIL HFA/VENTOLIN HFA    1 Inhaler    Inhale 2 puffs  into the lungs every 6 hours as needed for shortness of breath / dyspnea or wheezing    Reactive airway disease without complication, unspecified asthma severity, unspecified whether persistent       CALCIUM-MAGNESIUM-ZINC PO      Take 1 tablet by mouth daily        CENTRUM SILVER per tablet     100 tablet    Take 1 tablet by mouth daily.        CHLORTHALIDONE PO      Take 25 mg by mouth daily as needed ((Patient is supposed to take 25mg daily but states she only takes as needed))        fondaparinux 10 MG/0.8ML injection    ARIXTRA    30 Syringe    INJECT 0.8 MLS (10 MG) SUBCUTANEOUSLY ONCE DAILY.    PE (pulmonary thromboembolism) (H), DVT (deep vein thrombosis) in pregnancy (H)       lidocaine 5 % ointment    XYLOCAINE    5 g    Apply 0.5 g topically as needed for moderate pain Apply pea size amount to rectal area for pain        lidocaine-prilocaine cream    EMLA    30 g    Apply topically as needed for moderate pain Apply dollop size amount to port site 30-60 minutes prior to accessing.    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       LORazepam 0.5 MG tablet    ATIVAN    30 tablet    Take 1 tablet (0.5 mg) by mouth every 4 hours as needed (Anxiety, Nausea/Vomiting or Sleep)    Malignant neoplasm of hepatic flexure (H)       ondansetron 8 MG tablet    ZOFRAN    10 tablet    Take 1 tablet (8 mg) by mouth every 8 hours as needed (Nausea/Vomiting)    Malignant neoplasm of hepatic flexure (H)       order for DME     1 Device    Equipment being ordered: Wheelchair    Malignant neoplasm of colon, unspecified part of colon (H), VTE (venous thromboembolism), PE (pulmonary thromboembolism) (H), Pain of left lower leg       oxyCODONE IR 5 MG tablet    ROXICODONE    50 tablet    Take 1-2 tablets (5-10 mg) by mouth every 4 hours as needed for pain maximum 12 tablet(s) per day    Malignant neoplasm of colon, unspecified part of colon (H)       Potassium Chloride ER 20 MEQ Tbcr     30 tablet    Take 1  tablet (20 mEq) by mouth daily    Hypokalemia       prochlorperazine 10 MG tablet    COMPAZINE    30 tablet    Take 1 tablet (10 mg) by mouth every 6 hours as needed (Nausea/Vomiting)    Malignant neoplasm of hepatic flexure (H)       sennosides 8.6 MG tablet    SENOKOT     Take 1 tablet by mouth 2 times daily as needed for constipation        SERTRALINE HCL PO      Take 50 mg by mouth daily (Takes half of a 100mg tablet for a total of 50mg daily)        VICKS NYQUIL COLD & FLU 15-6. MG/15ML Liqd   Generic drug:  DM-Doxylamine-Acetaminophen      Take 15 mLs by mouth nightly as needed        VITAMIN D (CHOLECALCIFEROL) PO      Take 1 tablet by mouth daily (OTC: Pt unsure of strength)

## 2018-02-07 NOTE — MR AVS SNAPSHOT
After Visit Summary   2/7/2018    Liv Sylvester    MRN: [id number]           Patient Information     Date Of Birth          1950        Visit Information        Provider Department      2/7/2018 9:00 AM  INFUSION CHAIR 13 [facility]        Today's Diagnoses     Colon Cancer, with Liver Met (Stage IV)    -  1    Portacath in place          Care Instructions    Your chemo Pump will infuse for 46 hours. If at any time the pump should malfunction or you have pump concerns, please contact [facility] after hours at [phone].You will need an appointment for the pump disconnect in our clinic or with [facility].    [facility] will be out to your house on Friday Feb 9, 2018  At 11am to disconnect your chemo and apply the on-pro neulasta.          Follow-ups after your visit        Your next 10 appointments already scheduled     Feb 12, 2018  9:00 AM CST   New Visit with Nela Spaulding MD   [facility] ([facility])    [facility]  [address] [facility] [address] [address] [phone]            Feb 19, 2018 10:00 AM CST   (Arrive by 9:45 AM)   CT CHEST/ABDOMEN/PELVIS W CONTRAST with SHCT1   [facility] CT ([facility])    [address] [facility] [address] [phone]           Please bring any scans or X-rays taken at other hospitals, if similar tests were done. Also bring a list of your medicines, including vitamins, minerals and over-the-counter drugs. It is safest to leave personal items at home.  Be sure to tell your doctor:   If you have any allergies.   If there s any chance you are pregnant.   If you are breastfeeding.   If you have any special needs.  You may have contrast for this exam. To prepare:   Do not eat or drink for 2 hours before your exam. If you need to take medicine, you may take it with small sips of water.  (We may ask you to take liquid medicine as well.)   The day before your exam, drink extra fluids at least six 8-ounce glasses (unless your doctor tells you to restrict your fluids).  Patients over 70 or patients with diabetes or kidney problems:   If you haven t had a blood test (creatinine test) within the last 30 days, go to your clinic or Diagnostic Imaging Department for this test.  If you have diabetes:   If your kidney function is normal, continue taking your metformin (Avandamet, Glucophage, Glucovance, Metaglip) on the day of your exam.   If your kidney function is abnormal, wait 48 hours before restarting this medicine.  You will have oral contrast for this exam:   You will drink the contrast at home. Get this from your clinic or Diagnostic Imaging Department. Please follow the directions given.  Please wear loose clothing, such as a sweat suit or jogging clothes. Avoid snaps, zippers and other metal. We may ask you to undress and put on a hospital gown.  If you have any questions, please call the Imaging Department where you will have your exam.            Feb 21, 2018  8:30 AM CST   Level 6 with  INFUSION CHAIR 8   [facility] ([facility])    [facility]  [address]  [facility] [address] [phone]            Feb 21, 2018 10:45 AM CST   Return Visit with Radha Piedra MD   [facility] ([facility])    [facility]  [address]  [facility] [address] [phone]              Future tests that were ordered for you today     Open Future Orders        Priority Expected Expires Ordered    CT Chest/Abdomen/Pelvis w Contrast Routine  2/7/2019 2/7/2018            Who to contact     If you have questions or need follow up information about today's clinic visit or your schedule please contact [facility] INF[facility]ION C[facility] directly at [phone].  Normal  "or non-critical lab and imaging results will be communicated to you by FloQasthart, letter or phone within 4 business days after the clinic has received the results. If you do not hear from us within 7 days, please contact the clinic through Trineant or phone. If you have a critical or abnormal lab result, we will notify you by phone as soon as possible.  Submit refill requests through Codefied or call your pharmacy and they will forward the refill request to us. Please allow 3 business days for your refill to be completed.          Additional Information About Your Visit        [email or web address] Information     Cod[employer]ied lets you send messages to your doctor, view your test results, renew your prescriptions, schedule appointments and more. To sign up, go to [email or web address] . Click on \"Log in\" on the left side of the screen, which will take you to the Welcome page. Then click on \"Sign up Now\" on the right side of the page.     You will be asked to enter the access code listed below, as well as some personal information. Please follow the directions to create your username and password.     Your access code is: [email or web address]  Expires: 2018  7:56 AM     Your access code will  in 90 days. If you need help or a new code, please call your [place] clinic or [phone].        Care EveryWhere ID     This is your Care EveryWhere ID. This could be used by other organizations to access your [place] medical records  [id number]        Your Vitals Were     Pulse Temperature Respirations Height Pulse Oximetry BMI (Body Mass Index)    88 97.8  F (36.6  C) (Oral) 18 1.778 m (5' 10\") 98% 32 kg/m2       Blood Pressure from Last 3 Encounters:   18 148/81   18 148/81   18 126/67    Weight from Last 3 Encounters:   18 101.2 kg (223 lb)   18 101.2 kg (223 lb)   18 98.8 kg (217 lb 12.8 oz)              We Performed the Following     CBC with platelets differential     Comprehensive metabolic panel  "         Today's Medication Changes          These changes are accurate as of 2/7/18  4:42 PM.  If you have any questions, ask your nurse or doctor.               Stop taking these medicines if you haven't already. Please contact your care team if you have questions.     lisinopril 10 MG tablet   Commonly known as:  PRINIVIL/ZESTRIL   Stopped by:  Radha Piedra MD                    Primary Care Provider Office Phone # Fax #    Amyaa Howard [phone]       [facility] [address] [facility] [address]        Equal Access to Services     [facility]: Hadii aad ku hadasho Soomaali, waaxda luqadaha, qaybta kaalmada adenigel, john cevallos . So [facility] [phone].    ATENCIÓN: Si habla español, tiene a schmitt disposición servicios gratuitos de asistencia lingüística. [facility] [phone].    We comply with applicable federal civil rights laws and [place] laws. We do not discriminate on the basis of race, color, national origin, age, disability, sex, sexual orientation, or gender identity.            Thank you!     Thank you for choosing [facility] AND [facility]  for your care. Our goal is always to provide you with excellent care. Hearing back from our patients is one way we can continue to improve our services. Please take a few minutes to complete the written survey that you may receive in the mail after your visit with us. Thank you!             Your Updated Medication List - Protect others around you: Learn how to safely use, store and throw away your medicines at www.disposemymeds.org.          This list is accurate as of 2/7/18  4:42 PM.  Always use your most recent med list.                   Brand Name Dispense Instructions for use Diagnosis    ACETAMINOPHEN PO      Take 1,000 mg by mouth every 4 hours as needed for pain        albuterol 108 (90 BASE) MCG/ACT Inhaler    PROAIR HFA/PROVENTIL HFA/VENTOLIN HFA    1 Inhaler    Inhale 2 puffs into  the lungs every 6 hours as needed for shortness of breath / dyspnea or wheezing    Reactive airway disease without complication, unspecified asthma severity, unspecified whether persistent       CALCIUM-MAGNESIUM-ZINC PO      Take 1 tablet by mouth daily        CENTRUM SILVER per tablet     100 tablet    Take 1 tablet by mouth daily.        CHLORTHALIDONE PO      Take 25 mg by mouth daily as needed ((Patient is supposed to take 25mg daily but states she only takes as needed))        fondaparinux 10 MG/0.8ML injection    ARIXTRA    24 mL    Inject 0.8 mLs (10 mg) Subcutaneous every 24 hours    PE (pulmonary thromboembolism) (H), DVT (deep vein thrombosis) in pregnancy (H)       lidocaine 5 % ointment    XYLOCAINE    5 g    Apply 0.5 g topically as needed for moderate pain Apply pea size amount to rectal area for pain        lidocaine-prilocaine cream    EMLA    30 g    Apply topically as needed for moderate pain Apply dollop size amount to port site 30-60 minutes prior to accessing.    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       LORazepam 0.5 MG tablet    ATIVAN    30 tablet    Take 1 tablet (0.5 mg) by mouth every 4 hours as needed (Anxiety, Nausea/Vomiting or Sleep)    Malignant neoplasm of hepatic flexure (H)       ondansetron 8 MG tablet    ZOFRAN    10 tablet    Take 1 tablet (8 mg) by mouth every 8 hours as needed (Nausea/Vomiting)    Malignant neoplasm of hepatic flexure (H)       order for DME     1 Device    Equipment being ordered: Wheelchair    Malignant neoplasm of colon, unspecified part of colon (H), VTE (venous thromboembolism), PE (pulmonary thromboembolism) (H), Pain of left lower leg       oxyCODONE IR 5 MG tablet    ROXICODONE    50 tablet    Take 1-2 tablets (5-10 mg) by mouth every 4 hours as needed for pain maximum 12 tablet(s) per day    Malignant neoplasm of colon, unspecified part of colon (H)       Potassium Chloride ER 20 MEQ Tbcr     30 tablet    Take 1 tablet  (20 mEq) by mouth daily    Hypokalemia       prochlorperazine 10 MG tablet    COMPAZINE    30 tablet    Take 1 tablet (10 mg) by mouth every 6 hours as needed (Nausea/Vomiting)    Malignant neoplasm of hepatic flexure (H)       sennosides 8.6 MG tablet    SENOKOT     Take 1 tablet by mouth 2 times daily as needed for constipation        SERTRALINE HCL PO      Take 50 mg by mouth daily (Takes half of a 100mg tablet for a total of 50mg daily)        VICKS NYQUIL COLD & FLU 15-6. MG/15ML Liqd   Generic drug:  DM-Doxylamine-Acetaminophen      Take 15 mLs by mouth nightly as needed        VITAMIN D (CHOLECALCIFEROL) PO      Take 1 tablet by mouth daily (OTC: Pt unsure of strength)

## 2018-02-08 NOTE — PROGRESS NOTES
This is a recent snapshot of the patient's [facility] medical record.  For current drug dose and complete information and questions, call [phone] or In [facility] pool, fv home infusion ([id number])  CSN Number:  [id number]

## 2018-02-08 NOTE — PROGRESS NOTES
This is a recent snapshot of the patient's [facility] medical record.  For current drug dose and complete information and questions, call [phone] or In Basket pool, fv home infusion ([id number])  CSN Number:  [id number]

## 2018-02-12 NOTE — PROGRESS NOTES
This is a recent snapshot of the patient's [facility] medical record.  For current drug dose and complete information and questions, call [phone] or In Basket pool, fv home infusion ([id number])  CSN Number:  [id number]

## 2018-02-12 NOTE — PROGRESS NOTES
This is a recent snapshot of the patient's [facility] medical record.  For current drug dose and complete information and questions, call [phone] or In Basket pool, fv home infusion ([id number])  CSN Number:  [id number]

## 2018-02-13 NOTE — PROGRESS NOTES
This is a recent snapshot of the patient's [facility] medical record.  For current drug dose and complete information and questions, call [phone] or In Basket pool, fv home infusion ([id number])  CSN Number:  [id number]

## 2018-02-13 NOTE — TELEPHONE ENCOUNTER
Received voicemail from [facility] Luz requesting call back regarding Pt getting 2 liters of hydration daily--1 liter twice daily.    Pt is tolerating it well--no swelling & lungs are clear.    Please call PATY Escobar back at: [phone]

## 2018-02-13 NOTE — TELEPHONE ENCOUNTER
I returned call back to Luz and after reviewing chart I did not see any orders or notations that patient could have 2 liters of fluids daily. I informed Luz that I could clarify with Dr. Piedra in the morning if a verbal order could be given or not and return call to her.    She agreed to wait till tomorrow to clarify order. Debbie Dasilva

## 2018-02-14 NOTE — PROGRESS NOTES
This is a recent snapshot of the patient's [facility] medical record.  For current drug dose and complete information and questions, call [phone] or In Basket pool, fv home infusion ([id number])  CSN Number:  [id number]

## 2018-02-14 NOTE — TELEPHONE ENCOUNTER
RN clarified w/ Dr. Piedra that Liv is not to have more than 1 L of iv fluid Every other day.  Order given to RN at [id number]

## 2018-02-15 NOTE — PROGRESS NOTES
This is a recent snapshot of the patient's [facility] medical record.  For current drug dose and complete information and questions, call [phone] or In Basket pool, fv home infusion ([id number])  CSN Number:  [id number]

## 2018-02-17 NOTE — PROGRESS NOTES
[employer] Claim Critical Illness form  Certificate # [id number] faxed to [phone] /[place] [phone]  With Dr. Piedra's last dictation from 2/7/18 and pathology as requested.  Will follow up when I am in the office next on Tuesday 2/20/21 to make sure it was received.

## 2018-02-19 NOTE — PROGRESS NOTES
This is a recent snapshot of the patient's [facility] medical record.  For current drug dose and complete information and questions, call [phone] or In Basket pool, fv home infusion ([id number])  CSN Number:  [id number]

## 2018-02-19 NOTE — PROGRESS NOTES
verified that the port is a power port.  Re-accessed with power port needle.  Flushed with heparin post scan and left accessed for home infusion today.

## 2018-02-20 NOTE — TELEPHONE ENCOUNTER
Received a phone call from Tita home infusion RN stating that she assessed patient today and her BP was elevated at 162/90. Patient has stopped her Sertraline for blood pressure and home care RN is wondering if patient should restart the Sertraline . Please call patient after Dr. Piedra responds back at [phone] and home care RN Tita at [phone]. Joyce Betts RN,BSN,OCN        Tuesday 2-20-18 Late Entry:    Called & spoke with home infusion JACKIE Rivers & explained to her that Sertraline is actually Zoloft which is an antidepressant.  Chlorthalidone (a diuretic/antihypertensive) is listed on Pt's med list.  Encourage JACKIE Rivers to have Pt's BP addressed by her PCP Dr. Amaya Howard.  JACKIE Rivers also verbalized that there may be some medication compliance issues with Pt as well.  Pt is scheduled to see her oncologist  tomorrow 2-21-18 for her metastatic colon cancer.

## 2018-02-21 NOTE — ED AVS SNAPSHOT
Emergency Department    [address] [place] [address]    Phone:  [phone]    Fax:  [phone]                                       Liv Sylvester   MRN: [id number]    Department:   Emergency Department   Date of Visit:  2/21/2018           After Visit Summary Signature Page     I have received my discharge instructions, and my questions have been answered. I have discussed any challenges I see with this plan with the nurse or doctor.    ..........................................................................................................................................  Patient/Patient Representative Signature      ..........................................................................................................................................  Patient Representative Print Name and Relationship to Patient    ..................................................               ................................................  Date                                            Time    ..........................................................................................................................................  Reviewed by Signature/Title    ...................................................              ..............................................  Date                                                            Time

## 2018-02-21 NOTE — ED AVS SNAPSHOT
Emergency Department    [address] [facility] [address]    Phone:  [phone]    Fax:  [phone]                                       Liv Sylvester   MRN: [id number]    Department:   Emergency Department   Date of Visit:  2/21/2018           Patient Information     Date Of Birth          1950        Your diagnoses for this visit were:     Hypokalemia        You were seen by Rene Aragon MD.      Follow-up Information     Follow up with Amaya Howard.    Specialty:  Family Practice    Contact information:    [facility]  [address] [facility] [address]  [phone]          Follow up with Radha Piedra MD.    Specialty:  Oncology    Contact information:    [address] [address]  [phone]          Discharge Instructions         Hypokalemia  Hypokalemia means a low level of potassium in the blood. This most often occurs in people who take diuretics (water pills). It can also occur due to severe vomiting or diarrhea.  It is also seen in people who take laxatives for long periods of time. It can sometimes be associated with hypomagnesemia (low magnesium) which needs to be corrected before your potassium levels can be fixed.  A mild case usually causes no symptoms. It is only found with blood testing. More severe potassium loss causes generalized weakness, muscle or abdominal cramping, heart palpitations (rapid or irregular heartbeats), low blood pressure, specific muscle weakness, and in some people who are paralyzed.   Home care    Take any potassium supplements prescribed.    Eat foods rich in potassium. The highest amount is found in avocado, baked potatoes, spinach, cantaloupe, cod, halibut, salmon, and scallops. White, red, or stephenson beans are also very good sources. A modest amount is found in orange juice, bananas, carrots, and tomato juice.    If you take certain types of diuretics, you will also need to take potassium supplements. If you take  a diuretic, discuss potassium supplements with your doctor.  Follow-up care  Follow up with your healthcare provider for a repeat blood test within the next week or as advised by our staff.  When to seek medical advice  Call your healthcare provider if any of the following occur:    Increased weakness, fatigue, muscle cramps    Dizziness  Call 911  Call 911 if any of the following occur:    Irregular heartbeat, extra beats or very fast heart rate    Loss of consciousness  Date Last Reviewed: 7/1/2017 2000-2017 [employer]. [address] [place] [address]. All rights reserved. This information is not intended as a substitute for professional medical care. Always follow your healthcare professional's instructions.          Future Appointments        Provider Department Dept Phone Center    2/23/2018 9:30 AM [place] chair 1 [facility] [phone] [facility]    2/25/2018 11:00 AM [facility] Chair 1 [facility] [phone] [facility]      24 Hour Appointment Hotline       To make an appointment at any [facility], call [phone] ([phone]). If you don't have a family doctor or clinic, we will help you find one. [facility] are conveniently located to serve the needs of you and your family.             Review of your medicines      Our records show that you are taking the medicines listed below. If these are incorrect, please call your family doctor or clinic.        Dose / Directions Last dose taken    ACETAMINOPHEN PO   Dose:  1000 mg        Take 1,000 mg by mouth every 4 hours as needed for pain   Refills:  0        albuterol 108 (90 BASE) MCG/ACT Inhaler   Commonly known as:  PROAIR HFA/PROVENTIL HFA/VENTOLIN HFA   Dose:  2 puff   Quantity:  1 Inhaler        Inhale 2 puffs into the lungs every 6 hours as needed for shortness of breath / dyspnea or wheezing   Refills:  3        CALCIUM-MAGNESIUM-ZINC PO    Dose:  1 tablet        Take 1 tablet by mouth daily   Refills:  0        CENTRUM SILVER per tablet   Dose:  1 tablet   Quantity:  100 tablet        Take 1 tablet by mouth daily.   Refills:  12        chlorthalidone 25 MG tablet   Commonly known as:  HYGROTON   Dose:  25 mg   Quantity:  30 tablet        Take 1 tablet (25 mg) by mouth daily as needed   Refills:  0        fondaparinux 10 MG/0.8ML injection   Commonly known as:  ARIXTRA   Quantity:  90 Syringe        INJECT 0.8 MLS (10 MG) SUBCUTANEOUSLY ONCE DAILY.   Refills:  0        lidocaine 5 % ointment   Commonly known as:  XYLOCAINE   Dose:  0.5 inch   Quantity:  5 g        Apply 0.5 g topically as needed for moderate pain Apply pea size amount to rectal area for pain   Refills:  0        lidocaine-prilocaine cream   Commonly known as:  EMLA   Quantity:  30 g        Apply topically as needed for moderate pain Apply dollop size amount to port site 30-60 minutes prior to accessing.   Refills:  1        lisinopril 10 MG tablet   Commonly known as:  PRINIVIL/ZESTRIL   Dose:  10 mg   Quantity:  30 tablet        Take 1 tablet (10 mg) by mouth daily   Refills:  0        LORazepam 0.5 MG tablet   Commonly known as:  ATIVAN   Dose:  0.5 mg   Quantity:  30 tablet        Take 1 tablet (0.5 mg) by mouth every 4 hours as needed (Anxiety, Nausea/Vomiting or Sleep)   Refills:  2        methylphenidate 5 MG tablet   Commonly known as:  RITALIN   Dose:  5 mg   Quantity:  60 tablet        Take 1 tablet (5 mg) by mouth 2 times daily   Refills:  0        ondansetron 8 MG tablet   Commonly known as:  ZOFRAN   Dose:  8 mg   Quantity:  10 tablet        Take 1 tablet (8 mg) by mouth every 8 hours as needed (Nausea/Vomiting)   Refills:  2        order for DME   Quantity:  1 Device        Equipment being ordered: Wheelchair   Refills:  0        oxyCODONE IR 5 MG tablet   Commonly known as:  ROXICODONE   Dose:  5-10 mg   Quantity:  50 tablet        Take 1-2 tablets (5-10 mg) by mouth  every 4 hours as needed for pain maximum 12 tablet(s) per day   Refills:  0        Potassium Chloride ER 20 MEQ Tbcr   Dose:  20 mEq   Quantity:  30 tablet        Take 1 tablet (20 mEq) by mouth daily   Refills:  0        prochlorperazine 10 MG tablet   Commonly known as:  COMPAZINE   Dose:  10 mg   Quantity:  30 tablet        Take 1 tablet (10 mg) by mouth every 6 hours as needed (Nausea/Vomiting)   Refills:  2        sennosides 8.6 MG tablet   Commonly known as:  SENOKOT   Dose:  1 tablet        Take 1 tablet by mouth 2 times daily as needed for constipation   Refills:  0        sertraline 50 MG tablet   Commonly known as:  ZOLOFT   Quantity:  30 tablet        Take 1 tablet daily.   Refills:  0        VICKS NYQUIL COLD & FLU 15-6. MG/15ML Liqd   Dose:  15 mL   Generic drug:  DM-Doxylamine-Acetaminophen        Take 15 mLs by mouth nightly as needed   Refills:  0        VITAMIN D (CHOLECALCIFEROL) PO   Dose:  1 tablet        Take 1 tablet by mouth daily (OTC: Pt unsure of strength)   Refills:  0                Procedures and tests performed during your visit     CBC (+ platelets, no diff)    Comprehensive metabolic panel      Orders Needing Specimen Collection     None      Pending Results     No orders found from 2/19/2018 to 2/22/2018.            Pending Culture Results     No orders found from 2/19/2018 to 2/22/2018.            Pending Results Instructions     If you had any lab results that were not finalized at the time of your Discharge, you can call the ED Lab Result RN at [phone]. You will be contacted by this team for any positive Lab results or changes in treatment. The nurses are available 7 days a week from 10A to 6:30P.  You can leave a message 24 hours per day and they will return your call.        Test Results From Your Hospital Stay        2/21/2018  7:37 PM      Component Results     Component Value Ref Range & Units Status    WBC 6.1 4.0 - 11.0 10e9/L Final    RBC Count 3.16 (L) 3.8 -  5.2 10e12/L Final    Hemoglobin 9.8 (L) 11.7 - 15.7 g/dL Final    Hematocrit 29.9 (L) 35.0 - 47.0 % Final    MCV 95 78 - 100 fl Final    MCH 31.0 26.5 - 33.0 pg Final    MCHC 32.8 31.5 - 36.5 g/dL Final    RDW 21.7 (H) 10.0 - 15.0 % Final    Platelet Count 105 (L) 150 - 450 10e9/L Final         2/21/2018  7:35 PM      Component Results     Component Value Ref Range & Units Status    Sodium 140 133 - 144 mmol/L Final    Potassium 3.3 (L) 3.4 - 5.3 mmol/L Final    Chloride 106 94 - 109 mmol/L Final    Carbon Dioxide 27 20 - 32 mmol/L Final    Anion Gap 7 3 - 14 mmol/L Final    Glucose 89 70 - 99 mg/dL Final    Urea Nitrogen 6 (L) 7 - 30 mg/dL Final    Creatinine 0.61 0.52 - 1.04 mg/dL Final    GFR Estimate >90 >60 mL/min/1.7m2 Final    Non  GFR Calc    GFR Estimate If Black >90 >60 mL/min/1.7m2 Final    African American GFR Calc    Calcium 9.2 8.5 - 10.1 mg/dL Final    Bilirubin Total 0.7 0.2 - 1.3 mg/dL Final    Albumin 3.0 (L) 3.4 - 5.0 g/dL Final    Protein Total 7.2 6.8 - 8.8 g/dL Final    Alkaline Phosphatase 112 40 - 150 U/L Final    ALT 53 (H) 0 - 50 U/L Final    AST 65 (H) 0 - 45 U/L Final                Clinical Quality Measure: Blood Pressure Screening     Your blood pressure was checked while you were in the emergency department today. The last reading we obtained was  BP: 150/76 . Please read the guidelines below about what these numbers mean and what you should do about them.  If your systolic blood pressure (the top number) is less than 120 and your diastolic blood pressure (the bottom number) is less than 80, then your blood pressure is normal. There is nothing more that you need to do about it.  If your systolic blood pressure (the top number) is 120-139 or your diastolic blood pressure (the bottom number) is 80-89, your blood pressure may be higher than it should be. You should have your blood pressure rechecked within a year by a primary care provider.  If your systolic blood pressure  "(the top number) is 140 or greater or your diastolic blood pressure (the bottom number) is 90 or greater, you may have high blood pressure. High blood pressure is treatable, but if left untreated over time it can put you at risk for heart attack, stroke, or kidney failure. You should have your blood pressure rechecked by a primary care provider within the next 4 weeks.  If your provider in the emergency department today gave you specific instructions to follow-up with your doctor or provider even sooner than that, you should follow that instruction and not wait for up to 4 weeks for your follow-up visit.        Thank you for choosing [facility]       Thank you for choosing [facility] for your care. Our goal is always to provide you with excellent care. Hearing back from our patients is one way we can continue to improve our services. Please take a few minutes to complete the written survey that you may receive in the mail after you visit with us. Thank you!        [employer] Information     [employer] lets you send messages to your doctor, view your test results, renew your prescriptions, schedule appointments and more. To sign up, go to [email or web address] . Click on \"Log in\" on the left side of the screen, which will take you to the Welcome page. Then click on \"Sign up Now\" on the right side of the page.     You will be asked to enter the access code listed below, as well as some personal information. Please follow the directions to create your username and password.     Your access code is: [email or web address]  Expires: 2018  7:56 AM     Your access code will  in 90 days. If you need help or a new code, please call your [facility] clinic or [phone].        Care EveryWhere ID     This is your Care EveryWhere ID. This could be used by other organizations to access your [facility] medical records  [id number]        Equal Access to Services     HAYDEE IVERSON AH: wiley Carreon qaybta " john perkins ah. So [facility] [phone].    ATENCIÓN: Si habla español, tiene a schmitt disposición servicios gratuitos de asistencia lingüística. Llame al [phone].    We comply with applicable federal civil rights laws and [place] laws. We do not discriminate on the basis of race, color, national origin, age, disability, sex, sexual orientation, or gender identity.            After Visit Summary       This is your record. Keep this with you and show to your community pharmacist(s) and doctor(s) at your next visit.

## 2018-02-21 NOTE — TELEPHONE ENCOUNTER
Called home infusion JACKIE Rivers to see if she had reached out to Pt's PCP re: hypertension 162/90.  JACKIE Rivers said that she did not because Pt had exam appt scheduled today with Oncologist .    JACKIE Rivers stated that she gave Pt a liter of IV fluids after checking her BP of 162/90 because Pt said that she wasn't feeling well & had some nausea.    Tita RN reports that Pt's BP recheck after the IV fluids was close to pre-hydration.    Tita again reported that she feels Pt is noncompliant with taking her medications.    Updated 's CC JACKIE Marina.

## 2018-02-21 NOTE — TELEPHONE ENCOUNTER
Please see result note from today's potassium draw.  Reviewed Dr. Piedra's result note with Liv.  She should go to the er for potassium replacement.  She verbalized understanding and importance of this.   Will call one of her kids to take her.    Reviewed earlier phone messages from yesterday& today, potassium is one of the medications she has been out of for weeks.  Refill sent.

## 2018-02-21 NOTE — TELEPHONE ENCOUNTER
Received phone call from JACKIE Rivers with update that she went through Pt's medications with Pt & found out that Pt is out of multiple medications for weeks: including her BP medications & her K-Dur.    Last CBCpd & CMP were on 2-7-18 & Pt's K+ level: 3.0.  JACKIE Rivers also reports that Pt has been also giving herself IV hydration occasionally & she saw Pt going to start up her own IV fluids today.    Discussed all of this with  & CC JACKIE Marina.  New orders: refill medications that Pt is out of, stop IV fluids--take them out of Pt's home, draw K+ level today. Do not start Pt on metoprolol.    BP: 118/70 today. Pt saw her PCP one month ago per Pt.    Updated JACKIE Rivers & she verbalized understanding.

## 2018-02-21 NOTE — PROGRESS NOTES
This is a recent snapshot of the patient's [facility] medical record.  For current drug dose and complete information and questions, call [phone] or In Basket pool, fv home infusion ([id number])  CSN Number:  [id number]

## 2018-02-22 NOTE — ED PROVIDER NOTES
"  History     Chief Complaint:  Abnormal Labs    HPI   Liv Sylvester is a 67 year old female, currently being treated for stage IV colon cancer, who presents with abnormal labs. The patient was told by Dr. Piedra of oncology to present to the ED, because of her low potassium level of 2.8. A home health worker checked on her today, and these labs revealed the low potassium. The health worker also noticed she was out of some of her medications, and took care of replacing these. The patient also reports feeling weak and tired, and has a reduced appetite.    Allergies:  NKDA      Medications:    Arixtra  Xarelto  Oxycodone  Lovenox injection and SOLN  Ritalin  Vicks nyquil  Senokot  Potassium chloride  Lidocaine  Ativan  Compazine  Zofran  EMLA  Albuterol inhaler  Chlorthalidone  Sertraline  Lisinopril     Past Medical History:    Cancer  Flash pulmonary edema  Hemorrhoid                 HTN         Obesity  Iron deficiency anemia  Primary hypercoagulable state with Recurrent DVT/PE                       Past Surgical History:    Hysterectomy  Insert port vascular access     Family History:    Cerebrovascular disease  MI  DM  HTN     Social History:  Presents alone.  Former smoker: quit in 2005  Negative for alcohol use.  Marital Status:   [4]    Review of Systems   Constitutional: Positive for appetite change.   Neurological: Positive for weakness.   All other systems reviewed and are negative.      Physical Exam   First Vitals:  BP: 150/76  Heart Rate: 93  Temp: 98.4  F (36.9  C)  Resp: 18  Height: 177.8 cm (5' 10\")  Weight: 95.3 kg (210 lb)  SpO2: 98 %    Physical Exam  GENERAL: well developed, pleasant  HEAD: atraumatic  EYES: pupils reactive, extraocular muscles intact, conjunctivae normal  ENT:  mucus membranes moist  NECK:  trachea midline, normal range of motion  RESPIRATORY: no tachypnea, breath sounds clear to auscultation   CVS: normal S1/S2, no murmurs, intact distal pulses  ABDOMEN: soft, nontender, " nondistention  MUSCULOSKELETAL: no deformities.   SKIN: warm and dry, no acute rashes or ulceration.External port in place.  NEURO: GCS 15, cranial nerves intact, alert and oriented x3  PSYCH:  Mood/affect normal      Emergency Department Course     Laboratory:  CBC: WBC 6.1, HGB 9.8 (L),  (L)  CMP: Potassium 3.3 (L), BUN 6 (L), Albumin 3.0 (L), ALT 53 (H), AST 65 (H), o/w WNL (Creatinine 0.61)    Interventions:  1853: Potassium chloride, 40 mEq, PO    Emergency Department Course:  Nursing notes and vitals reviewed.  I performed an exam of the patient as documented above.  The above workup was undertaken.   I rechecked the patient and discussed results.    Findings and plan explained to the Patient. Patient discharged home, status improved, with instructions regarding supportive care, medications, and reasons to return as well as the importance of close follow-up was reviewed.      Impression & Plan      Medical Decision Making:  Liv Sylvester is a 67 year old female who presents due to low potassium. Potassium is repeated here, and is low-normal, but not as low as was recorded in the chart. Possibly either lab error vs not wasting enough blood when drawing it from her port. It was possibly diluted. Regardless, patient was given potassium protocol, and does not require IV potassium. Basic labs are as above, and look to be stable. She is not having any other infectious complaints or focal tenderness for further workup.     Diagnosis:    ICD-10-CM   1. Hypokalemia E87.6     Disposition:  Discharge to home with primary care follow up.     Addison CASTILLO, am serving as a scribe on 2/21/2018 at 6:36 PM to personally document services performed by Rene Aragon MD, based on my observations and the provider's statements to me.     EMERGENCY DEPARTMENT       Rene Aragon MD  02/24/18 0052

## 2018-02-22 NOTE — PROGRESS NOTES
This is a recent snapshot of the patient's [facility] medical record.  For current drug dose and complete information and questions, call [phone] or In Basket pool, fv home infusion ([id number])  CSN Number:  [id number]

## 2018-02-22 NOTE — DISCHARGE INSTRUCTIONS
Hypokalemia  Hypokalemia means a low level of potassium in the blood. This most often occurs in people who take diuretics (water pills). It can also occur due to severe vomiting or diarrhea.  It is also seen in people who take laxatives for long periods of time. It can sometimes be associated with hypomagnesemia (low magnesium) which needs to be corrected before your potassium levels can be fixed.  A mild case usually causes no symptoms. It is only found with blood testing. More severe potassium loss causes generalized weakness, muscle or abdominal cramping, heart palpitations (rapid or irregular heartbeats), low blood pressure, specific muscle weakness, and in some people who are paralyzed.   Home care    Take any potassium supplements prescribed.    Eat foods rich in potassium. The highest amount is found in avocado, baked potatoes, spinach, cantaloupe, cod, halibut, salmon, and scallops. White, red, or stephenson beans are also very good sources. A modest amount is found in orange juice, bananas, carrots, and tomato juice.    If you take certain types of diuretics, you will also need to take potassium supplements. If you take a diuretic, discuss potassium supplements with your doctor.  Follow-up care  Follow up with your healthcare provider for a repeat blood test within the next week or as advised by our staff.  When to seek medical advice  Call your healthcare provider if any of the following occur:    Increased weakness, fatigue, muscle cramps    Dizziness  Call 911  Call 911 if any of the following occur:    Irregular heartbeat, extra beats or very fast heart rate    Loss of consciousness  Date Last Reviewed: 7/1/2017 2000-2017 [employer] [address] [place] [address], [place] [address]. All rights reserved. This information is not intended as a substitute for professional medical care. Always follow your healthcare professional's instructions.

## 2018-02-23 NOTE — PROGRESS NOTES
"Infusion Nursing Note:  Liv Sylvester presents today for C10D1 Folfox.    Patient seen by provider today: No   present during visit today: Not Applicable.    Note: N/A.    Intravenous Access:  Labs drawn without difficulty.  Implanted Port.    Treatment Conditions:  Lab Results   Component Value Date    HGB 9.8 02/23/2018     Lab Results   Component Value Date    WBC 4.4 02/23/2018      Lab Results   Component Value Date    ANEU 2.9 02/23/2018     Lab Results   Component Value Date     02/23/2018      Lab Results   Component Value Date     02/23/2018                   Lab Results   Component Value Date    POTASSIUM 3.6 02/23/2018           No results found for: MAG         Lab Results   Component Value Date    CR 0.59 02/23/2018                   Lab Results   Component Value Date    JESÚS 9.3 02/23/2018                Lab Results   Component Value Date    BILITOTAL 0.5 02/23/2018           Lab Results   Component Value Date    ALBUMIN 3.0 02/23/2018                    Lab Results   Component Value Date    ALT 44 02/23/2018           Lab Results   Component Value Date    AST 50 02/23/2018       Results reviewed, labs MET treatment parameters, ok to proceed with treatment.      Post Infusion Assessment:  Patient tolerated infusion without incident.  Blood return noted pre and post infusion.  Site patent and intact, free from redness, edema or discomfort.  No evidence of extravasations.    Prior to discharge: Port is secured in place with tegaderm and flushed with 10cc NS with positive blood return noted.  Continuous home infusion Dosi-Fuser pump connected.    All connectors secured in place and clamps taped open.    Pump started, \"running\" noted on display (CADD): Not Applicable.  Patient instructed to call our clinic or [facility] with any questions or concerns at home.  Patient verbalized understanding.    Patient set up for pump disconnect at home with [facility] on " Sunday at 11am.  RN also verified with home infusion that patient will be getting neulasta onpro on Sunday as well.        Discharge Plan:   Patient declined prescription refills.  Discharge instructions reviewed with: Patient.  Patient and/or family verbalized understanding of discharge instructions and all questions answered.  Copy of AVS reviewed with patient and/or family.  Patient will return 2/28/18 for next appointment.  Patient discharged in stable condition accompanied by: self.  Departure Mode: Ambulatory.    Petar Beal RN

## 2018-02-23 NOTE — PROGRESS NOTES
This is a recent snapshot of the patient's [facility] medical record.  For current drug dose and complete information and questions, call [phone] or In Basket pool, fv home infusion ([id number])  CSN Number:  [id number]

## 2018-02-23 NOTE — MR AVS SNAPSHOT
"              After Visit Summary   2/23/2018    Liv Sylvester    MRN: [id number]           Patient Information     Date Of Birth          1950        Visit Information        Provider Department      2/23/2018 9:30 AM  INFUSION CHAIR 12 [facility]        Today's Diagnoses     Colon Cancer, with Liver Met (Stage IV)    -  1       Follow-ups after your visit        Your next 10 appointments already scheduled     Feb 28, 2018  1:30 PM CST   Return Visit with Radha Piedra MD   [facility] ([facility])    [facility]  [address]  [facility] [id number] [phone]              Who to contact     If you have questions or need follow up information about today's clinic visit or your schedule please contact [facility] directly at [phone].  Normal or non-critical lab and imaging results will be communicated to you by [employer], letter or phone within 4 business days after the clinic has received the results. If you do not hear from us within 7 days, please contact the clinic through [employer] or phone. If you have a critical or abnormal lab result, we will notify you by phone as soon as possible.  Submit refill requests through [employer] or call your pharmacy and they will forward the refill request to us. Please allow 3 business days for your refill to be completed.          Additional Information About Your Visit        MyChart Information     [employer] lets you send messages to your doctor, view your test results, renew your prescriptions, schedule appointments and more. To sign up, go to [email or web address] . Click on \"Log in\" on the left side of the screen, which will take you to the Welcome page. Then click on \"Sign up Now\" on the right side of the page.     You will be asked to enter the access code listed below, as well as some personal information. Please follow the directions to " "create your username and password.     Your access code is: [email or web address]  Expires: 2018  7:56 AM     Your access code will  in 90 days. If you need help or a new code, please call your [facility] or [phone].        Care EveryWhere ID     This is your Care EveryWhere ID. This could be used by other organizations to access your [facility] medical records  [id number]        Your Vitals Were     Pulse Temperature Respirations Height BMI (Body Mass Index)       69 97.3  F (36.3  C) (Oral) 18 1.778 m (5' 10\") 30.53 kg/m2        Blood Pressure from Last 3 Encounters:   18 159/81   18 135/75   18 148/81    Weight from Last 3 Encounters:   18 96.5 kg (212 lb 12.8 oz)   18 95.3 kg (210 lb)   18 101.2 kg (223 lb)              We Performed the Following     CBC with platelets differential     Comprehensive metabolic panel        Primary Care Provider Office Phone # Fax #    Amaya Bethea Anita [phone]       [facility] [address] [place] [address]        Equal Access to Services     HAYDEE IVERSON : Hadii anthony ku hadasho Soomaali, waaxda luqadaha, qaybta kaalmada adeegyada, waxay daliain haywaltn soniya cevallos . So [facility] [phone].    ATENCIÓN: Si habla español, tiene a schmitt disposición servicios gratuitos de asistencia lingüística. Lllloyd al [phone].    We comply with applicable federal civil rights laws and [place] laws. We do not discriminate on the basis of race, color, national origin, age, disability, sex, sexual orientation, or gender identity.            Thank you!     Thank you for choosing [facility]  for your care. Our goal is always to provide you with excellent care. Hearing back from our patients is one way we can continue to improve our services. Please take a few minutes to complete the written survey that you may receive in the mail after your visit with us. Thank you!             Your Updated " Medication List - Protect others around you: Learn how to safely use, store and throw away your medicines at www.disposemymeds.org.          This list is accurate as of 2/23/18  1:38 PM.  Always use your most recent med list.                   Brand Name Dispense Instructions for use Diagnosis    ACETAMINOPHEN PO      Take 1,000 mg by mouth every 4 hours as needed for pain        albuterol 108 (90 BASE) MCG/ACT Inhaler    PROAIR HFA/PROVENTIL HFA/VENTOLIN HFA    1 Inhaler    Inhale 2 puffs into the lungs every 6 hours as needed for shortness of breath / dyspnea or wheezing    Reactive airway disease without complication, unspecified asthma severity, unspecified whether persistent       CALCIUM-MAGNESIUM-ZINC PO      Take 1 tablet by mouth daily        CENTRUM SILVER per tablet     100 tablet    Take 1 tablet by mouth daily.        chlorthalidone 25 MG tablet    HYGROTON    30 tablet    Take 1 tablet (25 mg) by mouth daily as needed    HTN (hypertension)       fondaparinux 10 MG/0.8ML injection    ARIXTRA    90 Syringe    INJECT 0.8 MLS (10 MG) SUBCUTANEOUSLY ONCE DAILY.    PE (pulmonary thromboembolism) (H), DVT (deep vein thrombosis) in pregnancy (H)       lidocaine 5 % ointment    XYLOCAINE    5 g    Apply 0.5 g topically as needed for moderate pain Apply pea size amount to rectal area for pain        lidocaine-prilocaine cream    EMLA    30 g    Apply topically as needed for moderate pain Apply dollop size amount to port site 30-60 minutes prior to accessing.    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       lisinopril 10 MG tablet    PRINIVIL/ZESTRIL    30 tablet    Take 1 tablet (10 mg) by mouth daily    HTN (hypertension)       LORazepam 0.5 MG tablet    ATIVAN    30 tablet    Take 1 tablet (0.5 mg) by mouth every 4 hours as needed (Anxiety, Nausea/Vomiting or Sleep)    Malignant neoplasm of hepatic flexure (H)       methylphenidate 5 MG tablet    RITALIN    60 tablet    Take 1 tablet  (5 mg) by mouth 2 times daily        ondansetron 8 MG tablet    ZOFRAN    10 tablet    Take 1 tablet (8 mg) by mouth every 8 hours as needed (Nausea/Vomiting)    Malignant neoplasm of hepatic flexure (H)       order for DME     1 Device    Equipment being ordered: Wheelchair    Malignant neoplasm of colon, unspecified part of colon (H), VTE (venous thromboembolism), PE (pulmonary thromboembolism) (H), Pain of left lower leg       oxyCODONE IR 5 MG tablet    ROXICODONE    50 tablet    Take 1-2 tablets (5-10 mg) by mouth every 4 hours as needed for pain maximum 12 tablet(s) per day    Malignant neoplasm of colon, unspecified part of colon (H)       Potassium Chloride ER 20 MEQ Tbcr     30 tablet    Take 1 tablet (20 mEq) by mouth daily    Hypokalemia       prochlorperazine 10 MG tablet    COMPAZINE    30 tablet    Take 1 tablet (10 mg) by mouth every 6 hours as needed (Nausea/Vomiting)    Malignant neoplasm of hepatic flexure (H)       sennosides 8.6 MG tablet    SENOKOT     Take 1 tablet by mouth 2 times daily as needed for constipation        sertraline 50 MG tablet    ZOLOFT    30 tablet    Take 1 tablet daily.    Malignant neoplasm of hepatic flexure (H)       VICKS NYQUIL COLD & FLU 15-6. MG/15ML Liqd   Generic drug:  DM-Doxylamine-Acetaminophen      Take 15 mLs by mouth nightly as needed        VITAMIN D (CHOLECALCIFEROL) PO      Take 1 tablet by mouth daily (OTC: Pt unsure of strength)

## 2018-02-26 NOTE — PROGRESS NOTES
This is a recent snapshot of the patient's [facility] medical record.  For current drug dose and complete information and questions, call [phone] or In Basket pool, fv home infusion ([id number])  CSN Number:  [id number]

## 2018-02-26 NOTE — PROGRESS NOTES
This is a recent snapshot of the patient's [facility] medical record.  For current drug dose and complete information and questions, call [phone] or In Basket pool, fv home infusion ([id number])  CSN Number:  [id number]

## 2018-02-27 NOTE — PROGRESS NOTES
This is a recent snapshot of the patient's [facility] medical record.  For current drug dose and complete information and questions, call [phone] or In Basket pool, fv home infusion ([id number])  CSN Number:  [id number]

## 2018-02-28 PROBLEM — R11.2 INTRACTABLE NAUSEA AND VOMITING: Status: ACTIVE | Noted: 2018-01-01

## 2018-02-28 NOTE — PROGRESS NOTES
"Oncology Distress Screening Follow-up  Clinical Social Work  [facility]    Identified Concern and Score From Distress Screenin. How concerned are you about your ability to eat?   10           2. How concerned are you about unintended weight loss or your current weight?   0           3. How concerned are you about feeling depressed or very sad?   10           4. How concerned are you about feeling anxious or very scared?   10           5. How concerned are you about your spiritual wellbeing or sense of peace?   2           6. How concerned are you about work and home life issues that may be affected by your cancer?   5           7. How concerned are you about knowing what resources are available to help you?   0           You can also ask to be contacted by one of our Oncology Supportive Care professionals.    8. If you want to be contacted by one of our professionals, I can send a message to them right now.   Oncology Social  Worker             Date of Distress Screenin18    Intervention:   This clinician met with pt due to positive distress screen and request for SW involvement. Lvi is a davon woman who is well known to this clinician from outpatient clinic, pt has diagnosis of metastatic colon cancer and has been on palliative FOLFOX chemotherapy. Pt comes to clinic today after multiple days of nausea, loss of appetite, weakness. Provided emotional support to pt today who acknowledged feeling, \"the worst I have felt in my whole life,\" and fearful about why she is having issues with nausea now that she hasn't had after any other cycle. Pt in agreement with plan for admission today, and reported that she would let granddaughters know of admission and ask them to stay with her son who lives locally.     Follow-up Required: Will continue to follow pt for ongoing psychosocial support as pt continues to realize impacts of treatment and makes decisions as to next steps in care.     DEANNE Watkins, " LICSW  Phone: [phone]  Pager: [phone]    Noemy Carty: M, T  *every other Thursday, 8am-4:30pm  Noemy Mcbride: W, F, *every other Thursday, 8am-4:30pm

## 2018-02-28 NOTE — PROGRESS NOTES
[facility] PHYSICIANS  HEMATOLOGY ONCOLOGY    ONCOLOGY FOLLOWUP NOTE      DIAGNOSIS:    1- Metastatic colon cancer in a patient with history of stage 1A grade 3 endometrial carcinoma approximately around 2012.     On 09/29/2017, Liv Sylvester presented with progressive fatigue and right upper quadrant pain.  CT scan 09/29/2017 showed partially contracted bladder and findings suspicious for liver metastases.  There were multiple defined hypoechoic foci scoped scattered throughout the liver, the largest was in the right lobe.  CT was also showing enlargement of hepatic and retroperitoneal lymph nodes.  On 09/30/2017, she underwent a colonoscopy, which showed a fungating, infiltrative, ulcerated, partially obstructing mass at the hepatic flexure.  Pathology is consistent with moderately differentiated adenocarcinoma with normal mismatch repair protein expression.   2- Significant iron deficiency anemia.   3- VTE- malignancy related     TREATMENT:  10/11/2017 FOLFOX and Avastin.   1/24/2018 discontinued Avastin.      SUBJECTIVE:  The patient was seen as a followup today. She started to have severe nausea and vomiting since last Friday. Since Saturday she is not able to keep anything down. She is having regular bowel movements. No fever. Very fatigued today.     REVIEW OF SYSTEMS:  A complete review of systems was performed and found to be negative other than pertinent positives mentioned in history of present illness.      Past medical, social histories reviewed.     Meds- Reviewed.     PHYSICAL EXAMINATION:   VITAL SIGNS:BP (!) 82/63 (BP Location: Left arm, Patient Position: Chair, Cuff Size: Adult Large)  Pulse 134  Temp 98.3  F (36.8  C) (Oral)  Resp 16  Wt 94.3 kg (208 lb)  SpO2 99%  BMI 29.84 kg/m2  CONSTITUTIONAL: Very fatigued and distressed.   HEENT: Pupils are equal. Oropharynx is clear.   NECK: No cervical or supraclavicular lymphadenopathy.   RESPIRATORY: Clear bilaterally.   CARD/VASC: S1,  S2, regular.   GI: Soft, nontender, nondistended, no hepatosplenomegaly.   MUSKULOSKELETAL: Warm, well perfused.   NEUROLOGIC: Alert, awake.   INTEGUMENT: No rash.   LYMPHATICS: Bilateral edema.   PSYCH: Mood and affect was normal.     LABORATORY DATA AND IMAGING REVIEWED DURING THIS VISIT:  Recent Labs   Lab Test  02/23/18 0920 02/21/18 1857   NA  140  140   POTASSIUM  3.6  3.3*   CHLORIDE  106  106   CO2  27  27   ANIONGAP  7  7   BUN  8  6*   CR  0.59  0.61   GLC  133*  89   JESÚS  9.3  9.2     Recent Labs   Lab Test  02/23/18 0920 02/21/18 1857 02/07/18   0915  01/24/18   0925   WBC  4.4  6.1  3.0*  3.7*   HGB  9.8*  9.8*  9.7*  10.5*   PLT  144*  105*  128*  214   MCV  96  95  93  91   NEUTROPHIL  66.7   --   44.3  49.6     Recent Labs   Lab Test  02/23/18 0920 02/21/18 1857 02/07/18   0915   BILITOTAL  0.5  0.7  0.5   ALKPHOS  111  112  87   ALT  44  53*  20   AST  50*  65*  24   ALBUMIN  3.0*  3.0*  2.9*         Results for orders placed or performed during the hospital encounter of 02/19/18   CT Chest/Abdomen/Pelvis w Contrast    Narrative    CT CHEST, ABDOMEN, AND PELVIS WITH CONTRAST  2/19/2018 10:15 AM     HISTORY:  Follow up malignant neoplasm of hepatic flexure (H).    COMPARISON: 12/15/2017, ultrasound 1/15/2018.    TECHNIQUE: Volumetric helical acquisition of CT images from the lung  apices through the symphysis pubis after the administration of 109 mL  Isovue-370 intravenous contrast. Radiation dose for this scan was  reduced using automated exposure control, adjustment of the mA and/or  kV according to patient size, or iterative reconstruction technique.    FINDINGS:   Chest: Right IJ Port-A-Cath with the tip at the atriocaval junction.  Hypodensity seen around the Port-A-Cath catheter is unchanged. Thyroid  is normal. Heart and great vessels are within normal limits.  Atherosclerotic disease is seen throughout the aortic arch. No pleural  effusion, pericardial effusion or  pneumothorax. There is low-density  along the anterior aspect of the right lower lobe pulmonary artery  (series 3, image 22), which may represent chronic pulmonary embolus.  This was not definitely visualized on the prior study as it was not  included on the field-of-view; however, on the study from 11/27/2017  there appears to be pulmonary emboli in right lower lobe pulmonary  arteries. 7 mm pulmonary nodule in the right lower lobe (series 4,  image 20), unchanged when compared to the CT from 11/27/2017. No new  pulmonary nodules.    Abdomen: Again noted multiple hypodense lesions are seen throughout  the liver, consistent with hepatic metastasis. At the hepatic dome  there is a mass measuring 5.8 x 6.6 cm, unchanged when measured in a  similar fashion. Hypodense mass along the lateral right hepatic lobe  decreased in size measuring 5.7 cm, previously 6.1 cm. Minimally  increased size of lesion in left hepatic lobe measuring 2.6 cm,  previously 2.3 cm. The spleen, adrenal glands, gallbladder and  pancreas show no focal abnormality. Hepatic and portal veins are  patent. Multiple cystic lesions are seen throughout the right kidney.  Left kidney is normal. No intrahepatic or extrahepatic biliary  dilatation. No intraperitoneal free air or free fluid.    Small and large bowel are normal in caliber without evidence of  obstruction. Colonic wall thickening at the level of the hepatic  flexure may represent patient's known primary (series 5, image 19).  The appendix is normal. Improved abdominal lymphadenopathy. For  example there is a lymph node in the anterior mesentery measuring 2.9  x 1.5 cm, previously 3.1 x 1.8 cm. There is a second measuring 1.5 x  2.0 cm, previously 1.6 x 2.3 cm. No abdominal aortic aneurysm. Bladder  is normal. Filling defect is noted in the left common femoral and  external iliac veins, consistent with DVT. This is unchanged when  compared to the ultrasound from 1/15/2018.    Bones: No  suspicious bony lesions.      Impression    IMPRESSION:  1. Mixed response to therapy of multiple hepatic metastases.  2. Minimally improved abdominal lymphadenopathy.  3. Left common femoral and external iliac deep vein thrombosis,  unchanged.  4. Filling defect along the peripheral right lower lobe pulmonary  artery, suggesting chronic pulmonary embolus. This is more chronic in  appearance when compared to study from 11/27/2017.  5. No change in mass at the hepatic flexure.    SAUL FU DO       ECOG performance status 2     ASSESSMENT:    1- This is a 67-year-old lady with stage IV metastatic colon cancer with normal mismatch repair protein expression.  She has multiple liver metastases and metastasis in the abdominal lymph nodes.     She was started on palliative intent combination of FOLFOX with Avastin which is given every 2 weeks intravenously.      - Intractable nausea and vomiting which has resulted in dehydration and hypotension. We will start her on IV fluids. She was quite distressed. I have discussed her situation with the hospitalist on call and she is accepted for admission.   - CT scan 2/19/18 results were reviewed with the patient, I reviewed the images myself. She has generally stable disease indicating a positive response to treatment.   - Loss of appetite, related to malignancy, she wanted to know about medical cannabis, she would like to be enrolled in that.      2- She also has iron deficiency anemia due to chronic blood loss from her cancer.  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     3- VTE/ recurrent episodes of thrombosis. There was always a concern about compliance.   She is on Arixtra now and appears to be doing better symptomatically.      PLAN:   Admit to inpatient.     SIDRA NAILS MD    2/28/2018

## 2018-02-28 NOTE — LETTER
Transition Communication Hand-off for Care Transitions to Next Level of Care Provider    Name: Liv Sylvester  MRN #: [id number]  Primary Care Provider: RIGOBERTO SEN  Primary Care MD Name: NEYMAR Sen  Primary Clinic: [address] [address]     Reason for Hospitalization:  Dehydration, vomiting  Intractable nausea and vomiting  Admit Date/Time: 2/28/2018  3:32 PM  Discharge Date: 3/2/18  Reason for Communication Hand-off Referral: Fragility  Multiple providers/specialties    Discharge Plan: Patient admitted for intractable n/v s/p chemotherapy.  Patient was given antiemetics IVF and discharged to home with resumption of Home Infusion/SN and PT through [facility].  Patient's Oncology provider aware of d/c and will call her for follow up appointments.  Patient has rx for Fondiparineux and will resume this at discharge. Patient high readmission risk due.         Concern for non-adherence with plan of care:   Y/N n  Discharge Needs Assessment:  Needs       Most Recent Value    # of Referrals Placed by CTS External Care Coordination, Specialty Providers, Home Infusion, Communication hand-offs to next level of Care Providers, Insurance Verification for medications          Follow-up plan:  No future appointments.    Any outstanding tests or procedures:        Referrals     Future Labs/Procedures    Home Care PT Referral for Hospital Discharge     Comments:    Resumption of PT to eval and treat    Your provider has ordered home care - physical therapy. If you have not been contacted within 2 days of your discharge please call the department phone number listed on the top of this document.    Home infusion referral     Comments:    Your provider has referred you to: FM[facility]: [facility] [place] [phone]   [email or web address]    Local Address (if different from home address): N/A    Anticipated Length of Therapy: resumption of [facility]  Home Infusion/Skilled RN services     Home Infusion Pharmacist to adjust therapy based on labs and clinical assessments: Yes    Labs:  May draw labs from Venous Catheter: Yes  Home Infusion Pharmacist to order labs based on therapy type and clinical assessments: Yes  Call/Fax Lab Results to: Previous ordering Provider    Agency Staff to assess nursing needs for Infusion Therapy.    Access Device Management:  IV Access Type: Port-a-Cath  Flush with Heparin and Normal Saline IVP PRN and routine site care (per agency protocol) to maintain access device? Yes            Key Recommendations:      Marylou Cardoza    AVS/Discharge Summary is the source of truth; this is a helpful guide for improved communication of patient story

## 2018-02-28 NOTE — IP AVS SNAPSHOT
MRN:[id number]                      After Visit Summary   2/28/2018    Liv Sylvester    MRN: [id number]           Thank you!     Thank you for choosing [facility] for your care. Our goal is always to provide you with excellent care. Hearing back from our patients is one way we can continue to improve our services. Please take a few minutes to complete the written survey that you may receive in the mail after you visit with us. Thank you!        Patient Information     Date Of Birth          1950        About your hospital stay     You were admitted on:  February 28, 2018 You last received care in the:  [place] [address] [facility]    You were discharged on:  March 2, 2018        Reason for your hospital stay       Nausea/vomiting                  Who to Call     For medical emergencies, please call 911.  For non-urgent questions about your medical care, please call your primary care provider or clinic, [phone]          Attending Provider     Provider Specialty    Maximus Mills MD Internal Medicine       Primary Care Provider Office Phone # Fax #    Amaya Sen [phone]      After Care Instructions     Activity       Your activity upon discharge: activity as tolerated            Diet       Follow this diet upon discharge: Orders Placed This Encounter      Advance Diet as Tolerated: Regular Diet Adult                  Follow-up Appointments     Follow-up and recommended labs and tests        Follow up with primary care provider, AMAYA SEN, within 2-3 weeks, for hospital follow- up. No follow up labs or test are needed.    Also should follow-up with hem/onc as planned                  Additional Services     Home Care PT Referral for Hospital Discharge       Resumption of PT to eval and treat    Your provider has ordered home care - physical therapy. If you have not been contacted within 2 days of your discharge please call the department phone number listed  "on the top of this document.            Home infusion referral       Your provider has referred you to: FM[facility]: [facility] [place] [phone]   [email or web address]    Local Address (if different from home address): N/A    Anticipated Length of Therapy: resumption of [facility]/Skilled RN services     Home Infusion Pharmacist to adjust therapy based on labs and clinical assessments: Yes    Labs:  May draw labs from Venous Catheter: Yes  Home Infusion Pharmacist to order labs based on therapy type and clinical assessments: Yes  Call/Fax Lab Results to: Previous ordering Provider    Agency Staff to assess nursing needs for Infusion Therapy.    Access Device Management:  IV Access Type: Port-a-Cath  Flush with Heparin and Normal Saline IVP PRN and routine site care (per agency protocol) to maintain access device? Yes                  Pending Results     No orders found from 2/26/2018 to 3/1/2018.            Statement of Approval     Ordered          03/02/18 1012  I have reviewed and agree with all the recommendations and orders detailed in this document.  EFFECTIVE NOW     Approved and electronically signed by:  Jerry Strange DO             Admission Information     Date & Time Provider Department Dept. Phone    2/28/2018 Maximus Mills MD [place] [address] Oncology [phone]      Your Vitals Were     Blood Pressure Pulse Temperature Respirations Pulse Oximetry       150/71 (BP Location: Right arm) 83 97.3  F (36.3  C) (Oral) 18 100%       MyChart Information     MyChart lets you send messages to your doctor, view your test results, renew your prescriptions, schedule appointments and more. To sign up, go to [email or web address] . Click on \"Log in\" on the left side of the screen, which will take you to the Welcome page. Then click on \"Sign up Now\" on the right side of the page.     You will be asked to enter the access code " listed below, as well as some personal information. Please follow the directions to create your username and password.     Your access code is: [email or web address]  Expires: 2018  7:56 AM     Your access code will  in 90 days. If you need help or a new code, please call your [facility] clinic or [phone].        Care EveryWhere ID     This is your Care EveryWhere ID. This could be used by other organizations to access your [facility] medical records  [id number]        Equal Access to Services     HAYDEE IVERSON : Hadii anthony kahn hadbhartio Soomaali, waaxda luqadaha, qaybta kaalmada ross, john cevallos . So [facility] [phone].    ATENCIÓN: Si habla español, tiene a schmitt disposición servicios gratuitos de asistencia lingüística. Llame al [phone].    We comply with applicable federal civil rights laws and [place] laws. We do not discriminate on the basis of race, color, national origin, age, disability, sex, sexual orientation, or gender identity.               Review of your medicines      CONTINUE these medicines which may have CHANGED, or have new prescriptions. If we are uncertain of the size of tablets/capsules you have at home, strength may be listed as something that might have changed.        Dose / Directions    fondaparinux 7.5MG/0.6ML injection   Commonly known as:  ARIXTRA   This may have changed:    - how much to take  - how to take this  - when to take this  - additional instructions   Used for:  PE (pulmonary thromboembolism) (H)        Dose:  7.5 mg   Inject 0.6 mLs (7.5 mg) Subcutaneous every 24 hours   Quantity:  90 Syringe   Refills:  0         CONTINUE these medicines which have NOT CHANGED        Dose / Directions    ACETAMINOPHEN PO        Dose:  500 mg   Take 500 mg by mouth every 4 hours as needed for pain   Refills:  0       albuterol 108 (90 BASE) MCG/ACT Inhaler   Commonly known as:  PROAIR HFA/PROVENTIL HFA/VENTOLIN HFA   Used for:  Reactive airway disease  without complication, unspecified asthma severity, unspecified whether persistent        Dose:  2 puff   Inhale 2 puffs into the lungs every 6 hours as needed for shortness of breath / dyspnea or wheezing   Quantity:  1 Inhaler   Refills:  3       CALCIUM-MAGNESIUM-ZINC PO        Dose:  1 tablet   Take 1 tablet by mouth daily   Refills:  0       CENTRUM SILVER per tablet        Dose:  1 tablet   Take 1 tablet by mouth daily.   Quantity:  100 tablet   Refills:  12       chlorthalidone 25 MG tablet   Commonly known as:  HYGROTON   Used for:  HTN (hypertension)        Dose:  25 mg   Take 1 tablet (25 mg) by mouth daily as needed   Quantity:  30 tablet   Refills:  0       lidocaine 5 % ointment   Commonly known as:  XYLOCAINE        Dose:  0.5 inch   Apply 0.5 g topically as needed for moderate pain Apply pea size amount to rectal area for pain   Quantity:  5 g   Refills:  0       lidocaine-prilocaine cream   Commonly known as:  EMLA   Used for:  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        Apply topically as needed for moderate pain Apply dollop size amount to port site 30-60 minutes prior to accessing.   Quantity:  30 g   Refills:  1       lisinopril 10 MG tablet   Commonly known as:  PRINIVIL/ZESTRIL   Used for:  HTN (hypertension)        Dose:  10 mg   Take 1 tablet (10 mg) by mouth daily   Quantity:  30 tablet   Refills:  0       LORazepam 0.5 MG tablet   Commonly known as:  ATIVAN   Used for:  Malignant neoplasm of hepatic flexure (H)        Dose:  0.5 mg   Take 1 tablet (0.5 mg) by mouth every 4 hours as needed (Anxiety, Nausea/Vomiting or Sleep)   Quantity:  30 tablet   Refills:  2       methylphenidate 5 MG tablet   Commonly known as:  RITALIN        Dose:  5 mg   Take 1 tablet (5 mg) by mouth 2 times daily   Quantity:  60 tablet   Refills:  0       ondansetron 8 MG tablet   Commonly known as:  ZOFRAN   Used for:  Malignant neoplasm of hepatic flexure (H)        Dose:  8 mg   Take 1  tablet (8 mg) by mouth every 8 hours as needed (Nausea/Vomiting)   Quantity:  10 tablet   Refills:  2       order for DME   Used for:  Malignant neoplasm of colon, unspecified part of colon (H), VTE (venous thromboembolism), PE (pulmonary thromboembolism) (H), Pain of left lower leg        Equipment being ordered: Wheelchair   Quantity:  1 Device   Refills:  0       oxyCODONE IR 5 MG tablet   Commonly known as:  ROXICODONE   Used for:  Malignant neoplasm of colon, unspecified part of colon (H)        Dose:  5-10 mg   Take 1-2 tablets (5-10 mg) by mouth every 4 hours as needed for pain maximum 12 tablet(s) per day   Quantity:  50 tablet   Refills:  0       Potassium Chloride ER 20 MEQ Tbcr   Used for:  Hypokalemia        Dose:  20 mEq   Take 1 tablet (20 mEq) by mouth daily   Quantity:  30 tablet   Refills:  0       prochlorperazine 10 MG tablet   Commonly known as:  COMPAZINE   Used for:  Malignant neoplasm of hepatic flexure (H)        Dose:  10 mg   Take 1 tablet (10 mg) by mouth every 6 hours as needed (Nausea/Vomiting)   Quantity:  30 tablet   Refills:  2       sennosides 8.6 MG tablet   Commonly known as:  SENOKOT        Dose:  1 tablet   Take 1 tablet by mouth 2 times daily as needed for constipation   Refills:  0       sertraline 50 MG tablet   Commonly known as:  ZOLOFT   Used for:  Malignant neoplasm of hepatic flexure (H)        Take 1 tablet daily.   Quantity:  30 tablet   Refills:  0       VICKS NYQUIL COLD & FLU 15-6. MG/15ML Liqd   Generic drug:  DM-Doxylamine-Acetaminophen        Dose:  15 mL   Take 15 mLs by mouth nightly as needed   Refills:  0       VITAMIN D (CHOLECALCIFEROL) PO        Dose:  1 tablet   Take 1 tablet by mouth daily (OTC: Pt unsure of strength)   Refills:  0            Where to get your medicines      These medications were sent to [facility] - [address]  [address] [place] [address]     Phone:  [phone]     fondaparinux  7.5MG/0.6ML injection                Protect others around you: Learn how to safely use, store and throw away your medicines at www.disposemymeds.org.             Medication List: This is a list of all your medications and when to take them. Check marks below indicate your daily home schedule. Keep this list as a reference.      Medications           Morning Afternoon Evening Bedtime As Needed    ACETAMINOPHEN PO   Take 500 mg by mouth every 4 hours as needed for pain   Next Dose Due:  As needed                                    albuterol 108 (90 BASE) MCG/ACT Inhaler   Commonly known as:  PROAIR HFA/PROVENTIL HFA/VENTOLIN HFA   Inhale 2 puffs into the lungs every 6 hours as needed for shortness of breath / dyspnea or wheezing   Next Dose Due:  As needed                                    CALCIUM-MAGNESIUM-ZINC PO   Take 1 tablet by mouth daily   Next Dose Due:  Per home schedule                                 CENTRUM SILVER per tablet   Take 1 tablet by mouth daily.   Next Dose Due:  Per home schedule                                 chlorthalidone 25 MG tablet   Commonly known as:  HYGROTON   Take 1 tablet (25 mg) by mouth daily as needed   Next Dose Due:  As needed                                    fondaparinux 7.5MG/0.6ML injection   Commonly known as:  ARIXTRA   Inject 0.6 mLs (7.5 mg) Subcutaneous every 24 hours   Last time this was given:  10 mg on 3/2/2018  9:44 AM   Next Dose Due:  3/3/18 morning                                    lidocaine 5 % ointment   Commonly known as:  XYLOCAINE   Apply 0.5 g topically as needed for moderate pain Apply pea size amount to rectal area for pain   Next Dose Due:  As needed                                    lidocaine-prilocaine cream   Commonly known as:  EMLA   Apply topically as needed for moderate pain Apply dollop size amount to port site 30-60 minutes prior to accessing.   Next Dose Due:  Prior to port                                 lisinopril 10 MG tablet    Commonly known as:  PRINIVIL/ZESTRIL   Take 1 tablet (10 mg) by mouth daily   Next Dose Due:  Per home schedule                                 LORazepam 0.5 MG tablet   Commonly known as:  ATIVAN   Take 1 tablet (0.5 mg) by mouth every 4 hours as needed (Anxiety, Nausea/Vomiting or Sleep)   Next Dose Due:  As needed                                    methylphenidate 5 MG tablet   Commonly known as:  RITALIN   Take 1 tablet (5 mg) by mouth 2 times daily   Last time this was given:  5 mg on 3/2/2018  8:24 AM   Next Dose Due:  3/2/18 evening                                       ondansetron 8 MG tablet   Commonly known as:  ZOFRAN   Take 1 tablet (8 mg) by mouth every 8 hours as needed (Nausea/Vomiting)   Next Dose Due:  As needed for nausea                                    order for DME   Equipment being ordered: Wheelchair                                oxyCODONE IR 5 MG tablet   Commonly known as:  ROXICODONE   Take 1-2 tablets (5-10 mg) by mouth every 4 hours as needed for pain maximum 12 tablet(s) per day   Next Dose Due:  As needed                                    Potassium Chloride ER 20 MEQ Tbcr   Take 1 tablet (20 mEq) by mouth daily   Next Dose Due:  Per home schedule                                 prochlorperazine 10 MG tablet   Commonly known as:  COMPAZINE   Take 1 tablet (10 mg) by mouth every 6 hours as needed (Nausea/Vomiting)   Next Dose Due:  As needed                                    sennosides 8.6 MG tablet   Commonly known as:  SENOKOT   Take 1 tablet by mouth 2 times daily as needed for constipation   Next Dose Due:  As needed                                    sertraline 50 MG tablet   Commonly known as:  ZOLOFT   Take 1 tablet daily.   Last time this was given:  50 mg on 3/2/2018  8:24 AM   Next Dose Due:  3/3/18 morning                                    VICKS NYQUIL COLD & FLU 15-6. MG/15ML Liqd   Take 15 mLs by mouth nightly as needed   Generic drug:   DM-Doxylamine-Acetaminophen   Next Dose Due:  As needed                                    VITAMIN D (CHOLECALCIFEROL) PO   Take 1 tablet by mouth daily (OTC: Pt unsure of strength)   Next Dose Due:  Per home schedule

## 2018-02-28 NOTE — PROGRESS NOTES
"Oncology Rooming Note    February 28, 2018 1:53 PM   Liv Sylvester is a 67 year old female who presents for:    Chief Complaint   Patient presents with     Oncology Clinic Visit     Colon Cancer, with Liver Met (Stage IV)     Initial Vitals: BP (!) 82/63 (BP Location: Left arm, Patient Position: Chair, Cuff Size: Adult Large)  Pulse 134  Temp 98.3  F (36.8  C) (Oral)  Resp 16  Wt 94.3 kg (208 lb)  SpO2 99%  BMI 29.84 kg/m2 Estimated body mass index is 29.84 kg/(m^2) as calculated from the following:    Height as of 2/23/18: 1.778 m (5' 10\").    Weight as of this encounter: 94.3 kg (208 lb). Body surface area is 2.16 meters squared.  No Pain (0) Comment: Data Unavailable   No LMP recorded. Patient has had a hysterectomy.  Allergies reviewed: Yes  Medications reviewed: Yes    Medications: Medication refills not needed today.  Pharmacy name entered into [facility]:    [facility] - [place], [place] - [address]  [facility] - [place], [place] - [address]    Clinical concerns: no     5 minutes for nursing intake (face to face time)          Juliana Stewart MA            "

## 2018-02-28 NOTE — PROVIDER NOTIFICATION
Patient lactic acid fired upon admission to unit, per admitting hospitalist, Dr Mills, no need for lactic acid lab at this time.

## 2018-02-28 NOTE — MR AVS SNAPSHOT
"              After Visit Summary   2/28/2018    Liv Sylvester    MRN: [id number]           Patient Information     Date Of Birth          1950        Visit Information        Provider Department      2/28/2018 4:25 PM Stephanie Awad LICSW [facility]        Today's Diagnoses     Counseling NOS(V65.40)    -  1       Follow-ups after your visit        Future tests that were ordered for you today     Open Standing Orders        Priority Remaining Interval Expires Ordered    Blood Morphology Pathologist Review Routine 1/1 AM DRAW  2/28/2018    CBC with platelets differential Routine 1/1 AM DRAW  2/28/2018    Reticulocyte Count Routine 1/1 AM DRAW  2/28/2018    Activity: Up ad eve Routine [id number] PRN  2/28/2018    Basic metabolic panel Routine 1/1 AM DRAW  2/28/2018    CBC with platelets Routine 1/1 AM DRAW  2/28/2018            Who to contact     If you have questions or need follow up information about today's clinic visit or your schedule please contact [facility] directly at [phone].  Normal or non-critical lab and imaging results will be communicated to you by [employer], letter or phone within 4 business days after the clinic has received the results. If you do not hear from us within 7 days, please contact the clinic through [employer] or phone. If you have a critical or abnormal lab result, we will notify you by phone as soon as possible.  Submit refill requests through [employer] or call your pharmacy and they will forward the refill request to us. Please allow 3 business days for your refill to be completed.          Additional Information About Your Visit        [employer] Information     [employer] lets you send messages to your doctor, view your test results, renew your prescriptions, schedule appointments and more. To sign up, go to [email or web address] . Click on \"Log in\" on the left side of the screen, which will take you to the Welcome page. Then click on \"Sign up Now\" " on the right side of the page.     You will be asked to enter the access code listed below, as well as some personal information. Please follow the directions to create your username and password.     Your access code is: [email or web address]  Expires: 2018  7:56 AM     Your access code will  in 90 days. If you need help or a new code, please call your [facility] or [phone].        Care EveryWhere ID     This is your Care EveryWhere ID. This could be used by other organizations to access your [facility] medical records  [id number]         Blood Pressure from Last 3 Encounters:   18 126/60   18 (!) 82/63   18 159/81    Weight from Last 3 Encounters:   18 94.3 kg (208 lb)   18 96.5 kg (212 lb 12.8 oz)   18 95.3 kg (210 lb)              Today, you had the following     No orders found for display         Today's Medication Changes      Notice     This visit is during an admission. Changes to the med list made in this visit will be reflected in the After Visit Summary of the admission.             Primary Care Provider Office Phone # Fax #    Amaya Bethea Howard [phone]       [facility] [address] [place] [address]        Equal Access to Services     [facility]: Hadii anthony kahn hadasho Somoises, waaxda luqadaha, qaybta kaalmada adeegyada, john cevallos . So [facility] [phone].    ATENCIÓN: Si habla español, tiene a schmitt disposición servicios gratuitos de asistencia lingüística. Llame al [phone].    We comply with applicable federal civil rights laws and [place] laws. We do not discriminate on the basis of race, color, national origin, age, disability, sex, sexual orientation, or gender identity.            Thank you!     Thank you for choosing [facility]  for your care. Our goal is always to provide you with excellent care. Hearing back from our patients is one way we can continue to improve our services.  Please take a few minutes to complete the written survey that you may receive in the mail after your visit with us. Thank you!             Your Updated Medication List - Protect others around you: Learn how to safely use, store and throw away your medicines at www.disposemymeds.org.      Notice     This visit is during an admission. Changes to the med list made in this visit will be reflected in the After Visit Summary of the admission.

## 2018-02-28 NOTE — PROGRESS NOTES
"Liv admitted to station 88 today for dehydration, vomiting.    I asked Liv why she hadn't called us, she stated a home care nurse was out and was going to call us for her.    Writer called [facility] home infusion and spoke with the nurse supervisor Esther about the Liv's condition and her home infusion visit Monday 2/26/18- we did not get a call updating us on her condition.                                                                                                                                                                                                                                                Esther read the nurse assessment from [facility] which included-  \" Patient in bed, appears ill, states she feels very sick, positive emesis during visit, vomited compazine up.\"  \"relief w/ativan after 45 minutes, positive abdominal discomfort. Charted blood pressure from that visit was 160-100 with a pulse of 110-120.\"                                                                                The plan with home infusion after she is discharged from the hospital will be a weekly phone update on thursdays from the visiting nurse.                                                                                                                                                                                                                                                                       250 pm: Call received from Jyoti/ Home Care nurse who assessed Liv today and was concerned about her condition.               Jyoti states she initially called [facility] and was then given the [facility] office number.               States blood pressure was 98/58 sitting, 88/60 standing.                 Liv did not appear herself , vomiting for days .                Writer explained to Jyoti that Liv was admitted to the hospital for dehydration.      Laina Mondragon RN        "

## 2018-02-28 NOTE — IP AVS SNAPSHOT
[address]    [place] [address]    Phone:  [phone]                                       After Visit Summary   2/28/2018    Liv Sylvester    MRN: [id number]           After Visit Summary Signature Page     I have received my discharge instructions, and my questions have been answered. I have discussed any challenges I see with this plan with the nurse or doctor.    ..........................................................................................................................................  Patient/Patient Representative Signature      ..........................................................................................................................................  Patient Representative Print Name and Relationship to Patient    ..................................................               ................................................  Date                                            Time    ..........................................................................................................................................  Reviewed by Signature/Title    ...................................................              ..............................................  Date                                                            Time

## 2018-02-28 NOTE — LETTER
2/28/2018         RE: Liv Sylvester  [address]   [place] [address]        Dear Colleague,    Thank you for referring your patient, Liv Sylvester, to the [facility]. Please see a copy of my visit note below.    [facility]  HEMATOLOGY ONCOLOGY    ONCOLOGY FOLLOWUP NOTE      DIAGNOSIS:    1- Metastatic colon cancer in a patient with history of stage 1A grade 3 endometrial carcinoma approximately around 2012.     On 09/29/2017, Liv Sylvester presented with progressive fatigue and right upper quadrant pain.  CT scan 09/29/2017 showed partially contracted bladder and findings suspicious for liver metastases.  There were multiple defined hypoechoic foci scoped scattered throughout the liver, the largest was in the right lobe.  CT was also showing enlargement of hepatic and retroperitoneal lymph nodes.  On 09/30/2017, she underwent a colonoscopy, which showed a fungating, infiltrative, ulcerated, partially obstructing mass at the hepatic flexure.  Pathology is consistent with moderately differentiated adenocarcinoma with normal mismatch repair protein expression.   2- Significant iron deficiency anemia.   3- VTE- malignancy related     TREATMENT:  10/11/2017 FOLFOX and Avastin.   1/24/2018 discontinued Avastin.      SUBJECTIVE:  The patient was seen as a followup today. She started to have severe nausea and vomiting since last Friday. Since Saturday she is not able to keep anything down. She is having regular bowel movements. No fever. Very fatigued today.     REVIEW OF SYSTEMS:  A complete review of systems was performed and found to be negative other than pertinent positives mentioned in history of present illness.      Past medical, social histories reviewed.     Meds- Reviewed.     PHYSICAL EXAMINATION:   VITAL SIGNS:BP (!) 82/63 (BP Location: Left arm, Patient Position: Chair, Cuff Size: Adult Large)  Pulse 134  Temp 98.3  F (36.8  C) (Oral)  Resp 16  Wt  94.3 kg (208 lb)  SpO2 99%  BMI 29.84 kg/m2  CONSTITUTIONAL: Very fatigued and distressed.   HEENT: Pupils are equal. Oropharynx is clear.   NECK: No cervical or supraclavicular lymphadenopathy.   RESPIRATORY: Clear bilaterally.   CARD/VASC: S1, S2, regular.   GI: Soft, nontender, nondistended, no hepatosplenomegaly.   MUSKULOSKELETAL: Warm, well perfused.   NEUROLOGIC: Alert, awake.   INTEGUMENT: No rash.   LYMPHATICS: Bilateral edema.   PSYCH: Mood and affect was normal.     LABORATORY DATA AND IMAGING REVIEWED DURING THIS VISIT:  Recent Labs   Lab Test  02/23/18   0920  02/21/18   1857   NA  140  140   POTASSIUM  3.6  3.3*   CHLORIDE  106  106   CO2  27  27   ANIONGAP  7  7   BUN  8  6*   CR  0.59  0.61   GLC  133*  89   JESÚS  9.3  9.2     Recent Labs   Lab Test  02/23/18   0920  02/21/18   1857 02/07/18   0915  01/24/18   0925   WBC  4.4  6.1  3.0*  3.7*   HGB  9.8*  9.8*  9.7*  10.5*   PLT  144*  105*  128*  214   MCV  96  95  93  91   NEUTROPHIL  66.7   --   44.3  49.6     Recent Labs   Lab Test  02/23/18   0920  02/21/18   1857  02/07/18   0915   BILITOTAL  0.5  0.7  0.5   ALKPHOS  111  112  87   ALT  44  53*  20   AST  50*  65*  24   ALBUMIN  3.0*  3.0*  2.9*         Results for orders placed or performed during the hospital encounter of 02/19/18   CT Chest/Abdomen/Pelvis w Contrast    Narrative    CT CHEST, ABDOMEN, AND PELVIS WITH CONTRAST  2/19/2018 10:15 AM     HISTORY:  Follow up malignant neoplasm of hepatic flexure (H).    COMPARISON: 12/15/2017, ultrasound 1/15/2018.    TECHNIQUE: Volumetric helical acquisition of CT images from the lung  apices through the symphysis pubis after the administration of 109 mL  Isovue-370 intravenous contrast. Radiation dose for this scan was  reduced using automated exposure control, adjustment of the mA and/or  kV according to patient size, or iterative reconstruction technique.    FINDINGS:   Chest: Right IJ Port-A-Cath with the tip at the atriocaval  junction.  Hypodensity seen around the Port-A-Cath catheter is unchanged. Thyroid  is normal. Heart and great vessels are within normal limits.  Atherosclerotic disease is seen throughout the aortic arch. No pleural  effusion, pericardial effusion or pneumothorax. There is low-density  along the anterior aspect of the right lower lobe pulmonary artery  (series 3, image 22), which may represent chronic pulmonary embolus.  This was not definitely visualized on the prior study as it was not  included on the field-of-view; however, on the study from 11/27/2017  there appears to be pulmonary emboli in right lower lobe pulmonary  arteries. 7 mm pulmonary nodule in the right lower lobe (series 4,  image 20), unchanged when compared to the CT from 11/27/2017. No new  pulmonary nodules.    Abdomen: Again noted multiple hypodense lesions are seen throughout  the liver, consistent with hepatic metastasis. At the hepatic dome  there is a mass measuring 5.8 x 6.6 cm, unchanged when measured in a  similar fashion. Hypodense mass along the lateral right hepatic lobe  decreased in size measuring 5.7 cm, previously 6.1 cm. Minimally  increased size of lesion in left hepatic lobe measuring 2.6 cm,  previously 2.3 cm. The spleen, adrenal glands, gallbladder and  pancreas show no focal abnormality. Hepatic and portal veins are  patent. Multiple cystic lesions are seen throughout the right kidney.  Left kidney is normal. No intrahepatic or extrahepatic biliary  dilatation. No intraperitoneal free air or free fluid.    Small and large bowel are normal in caliber without evidence of  obstruction. Colonic wall thickening at the level of the hepatic  flexure may represent patient's known primary (series 5, image 19).  The appendix is normal. Improved abdominal lymphadenopathy. For  example there is a lymph node in the anterior mesentery measuring 2.9  x 1.5 cm, previously 3.1 x 1.8 cm. There is a second measuring 1.5 x  2.0 cm, previously  1.6 x 2.3 cm. No abdominal aortic aneurysm. Bladder  is normal. Filling defect is noted in the left common femoral and  external iliac veins, consistent with DVT. This is unchanged when  compared to the ultrasound from 1/15/2018.    Bones: No suspicious bony lesions.      Impression    IMPRESSION:  1. Mixed response to therapy of multiple hepatic metastases.  2. Minimally improved abdominal lymphadenopathy.  3. Left common femoral and external iliac deep vein thrombosis,  unchanged.  4. Filling defect along the peripheral right lower lobe pulmonary  artery, suggesting chronic pulmonary embolus. This is more chronic in  appearance when compared to study from 11/27/2017.  5. No change in mass at the hepatic flexure.    SAUL FU DO       ECOG performance status 2     ASSESSMENT:    1- This is a 67-year-old lady with stage IV metastatic colon cancer with normal mismatch repair protein expression.  She has multiple liver metastases and metastasis in the abdominal lymph nodes.     She was started on palliative intent combination of FOLFOX with Avastin which is given every 2 weeks intravenously.      - Intractable nausea and vomiting which has resulted in dehydration and hypotension. We will start her on IV fluids. She was quite distressed. I have discussed her situation with the hospitalist on call and she is accepted for admission.   - CT scan 2/19/18 results were reviewed with the patient, I reviewed the images myself. She has generally stable disease indicating a positive response to treatment.   - Loss of appetite, related to malignancy, she wanted to know about medical cannabis, she would like to be enrolled in that.      2- She also has iron deficiency anemia due to chronic blood loss from her cancer.  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     3- VTE/ recurrent episodes of thrombosis. There was always a concern about compliance.   She is on Arixtra now and appears to be doing better symptomatically.      PLAN:   Admit to  "inpatient.     RADHA PIEDRA MD    2/28/2018        Oncology Rooming Note    February 28, 2018 1:53 PM   Liv Sylvester is a 67 year old female who presents for:    Chief Complaint   Patient presents with     Oncology Clinic Visit     Colon Cancer, with Liver Met (Stage IV)     Initial Vitals: BP (!) 82/63 (BP Location: Left arm, Patient Position: Chair, Cuff Size: Adult Large)  Pulse 134  Temp 98.3  F (36.8  C) (Oral)  Resp 16  Wt 94.3 kg (208 lb)  SpO2 99%  BMI 29.84 kg/m2 Estimated body mass index is 29.84 kg/(m^2) as calculated from the following:    Height as of 2/23/18: 1.778 m (5' 10\").    Weight as of this encounter: 94.3 kg (208 lb). Body surface area is 2.16 meters squared.  No Pain (0) Comment: Data Unavailable   No LMP recorded. Patient has had a hysterectomy.  Allergies reviewed: Yes  Medications reviewed: Yes    Medications: Medication refills not needed today.  Pharmacy name entered into [facility]:    [facility] - [place], [place] - [address] [facility] - [facility] [address] [facility]    Clinical concerns: no     5 minutes for nursing intake (face to face time)          Juliana Stewart MA              Again, thank you for allowing me to participate in the care of your patient.        Sincerely,        Radha Piedra MD    "

## 2018-02-28 NOTE — H&P
Admitted:     02/28/2018      PRIMARY CARE PHYSICIAN:  Amaya Howard MD      PRIMARY ONCOLOGIST:  Radha Piedra MD      CHIEF COMPLAINT:  Intractable nausea, vomiting.      HISTORY OF PRESENT ILLNESS:  Ms. Sylvester is a 67-year-old female with metastatic colon cancer and history of recurrent DVT, PE who was sent from oncology clinic for intractable nausea and vomiting.  She recently had chemotherapy with FOLFOX and Avastin 3 days ago and since then she has been having intractable nausea and vomiting, feels very weak and tired, feels dehydrated and has not been able to take any p.o. since Saturday.  She was seen by Dr. Piedra in the clinic, her blood pressure was noted to be low at 82/64.  She was ordered for a fluid bolus and the hospitalist requested admission for further evaluation.  She denies any fever, chills or rigors.  She denies any chest pain or shortness of breath.  No pain in abdomen.  She did have a small bowel movement this morning.  Denies any urinary symptoms.      REVIEW OF SYSTEMS:  A 10-point review of systems was done and was negative apart from those mentioned in the history of present illness.      PAST MEDICAL HISTORY:   1.  Recurrent DVT and PE.   2.  Metastatic colon cancer with mets to liver.   3.  Hypertension.   4.  Anxiety, depression.   5.  Chronic anemia.   6.  Ovarian cancer, status post DAPHNEY/BSO.      MEDICATIONS PRIOR TO ADMISSION:  Prescriptions Prior to Admission   Medication Sig Dispense Refill Last Dose     fondaparinux (ARIXTRA) 10 MG/0.8ML injection INJECT 0.8 MLS (10 MG) SUBCUTANEOUSLY ONCE DAILY. 90 Syringe 0 2/28/2018 at am     methylphenidate (RITALIN) 5 MG tablet Take 1 tablet (5 mg) by mouth 2 times daily 60 tablet 0 2/28/2018 at am     chlorthalidone (HYGROTON) 25 MG tablet Take 1 tablet (25 mg) by mouth daily as needed 30 tablet 0 prn     lisinopril (PRINIVIL/ZESTRIL) 10 MG tablet Take 1 tablet (10 mg) by mouth daily 30 tablet 0 2/28/2018 at am     Potassium Chloride ER 20  MEQ TBCR Take 1 tablet (20 mEq) by mouth daily 30 tablet 0 2/28/2018 at am     sertraline (ZOLOFT) 50 MG tablet Take 1 tablet daily. 30 tablet 0 2/28/2018 at am     oxyCODONE IR (ROXICODONE) 5 MG tablet Take 1-2 tablets (5-10 mg) by mouth every 4 hours as needed for pain maximum 12 tablet(s) per day 50 tablet 0 prn     DM-Doxylamine-Acetaminophen (VICKS NYQUIL COLD & FLU) 15-6. MG/15ML LIQD Take 15 mLs by mouth nightly as needed   prn     sennosides (SENOKOT) 8.6 MG tablet Take 1 tablet by mouth 2 times daily as needed for constipation   prn     lidocaine (XYLOCAINE) 5 % ointment Apply 0.5 g topically as needed for moderate pain Apply pea size amount to rectal area for pain 5 g 0 prn     LORazepam (ATIVAN) 0.5 MG tablet Take 1 tablet (0.5 mg) by mouth every 4 hours as needed (Anxiety, Nausea/Vomiting or Sleep) 30 tablet 2 prn     prochlorperazine (COMPAZINE) 10 MG tablet Take 1 tablet (10 mg) by mouth every 6 hours as needed (Nausea/Vomiting) 30 tablet 2 prn     ondansetron (ZOFRAN) 8 MG tablet Take 1 tablet (8 mg) by mouth every 8 hours as needed (Nausea/Vomiting) 10 tablet 2 prn     lidocaine-prilocaine (EMLA) cream Apply topically as needed for moderate pain Apply dollop size amount to port site 30-60 minutes prior to accessing. 30 g 1 prn     albuterol (PROAIR HFA/PROVENTIL HFA/VENTOLIN HFA) 108 (90 BASE) MCG/ACT Inhaler Inhale 2 puffs into the lungs every 6 hours as needed for shortness of breath / dyspnea or wheezing 1 Inhaler 3 prn     CALCIUM-MAGNESIUM-ZINC PO Take 1 tablet by mouth daily   2/28/2018 at am     VITAMIN D, CHOLECALCIFEROL, PO Take 1 tablet by mouth daily (OTC: Pt unsure of strength)   2/28/2018? at Unknown time     ACETAMINOPHEN PO Take 500 mg by mouth every 4 hours as needed for pain    prn     Multiple Vitamins-Minerals (CENTRUM SILVER) per tablet Take 1 tablet by mouth daily. 100 tablet 12 2/28/2018 at am     order for DME Equipment being ordered: Wheelchair 1 Device 0 Taking          ALLERGIES:  NO KNOWN DRUG ALLERGIES.      SOCIAL HISTORY:  Her granddaughter lives with her.  She quit smoking in 2005.  Denies alcohol or illicit drug use.      FAMILY HISTORY:  Her father passed away due to a CVA.  Mother passed away due to MI.  Family history otherwise not pertinent to current presentation.      PHYSICAL EXAMINATION:   GENERAL:  The patient is conscious, alert, oriented x 3, lying comfortably in bed in no apparent distress.   VITAL SIGNS:  Temperature 96.6, heart rate improved from 130s-105.  Blood pressure improved from 82//60, saturation 98% on room air.   HEENT:  Pupils are equal and reactive to light and accommodation.  Extraocular movements are intact.  Oral mucosa is slightly dry.   NECK:  Supple.  No JVD.   RESPIRATORY:  Lungs sound bilaterally clear to auscultation, no wheezes or crepitation.   CARDIOVASCULAR:  Normal S1-S2, regular rate and rhythm, no murmur.   ABDOMEN:  Soft, nontender, nondistended, no guarding, rigidity or rebound tenderness.  Bowel sounds are present.   LOWER EXTREMITIES:  With no edema.   NEUROLOGIC:  No focal neurological deficits noted.  Cranial nerves II-XII grossly intact.   PSYCHIATRIC:  Normal mood and affect.        LABORATORY AND IMAGING:  CMP mostly unremarkable except for alkaline phosphatase of 191, creatinine 0.98, glucose 81.  CBC with a marked leukocytosis, WBC of 40.9, hemoglobin of 11.4, platelet count is pending.      ASSESSMENT AND PLAN:  Ms. Sylvester is a 67-year-old female with a past medical history significant for metastatic colon cancer, chronic anemia, recurrent deep vein thrombosis, pulmonary embolism, hypertension, anxiety, depression and ovarian cancer who was sent from the oncology clinic for intractable nausea and vomiting.     1.  Intractable nausea, vomiting likely secondary to her recent chemotherapy:  She feels very weak, has not been able to tolerate any p.o. for past 3 days and has some unexplained leukocytosis, so will  admit her as an inpatient.  Her blood pressure responded well to an IV fluid bolus.  We will continue with normal saline at 100 mL per hour.  We will have p.r.n. nausea medications available.  We will have her on clear liquid diet and advance as tolerated.     2.  Marked leukocytosis, unclear etiology:  I did talk to Dr. Piedra.  It seems she did not receive any Neulasta after the chemotherapy.  Dr. Piedra suggested it could be a stress response.  She clinically does not look septic, no clear source for infection and her blood pressure responded very well to just 500 mL normal saline fluid bolus.  At this time I see no need for empiric antibiotics or further investigation.  We will repeat a CBC in a.m.  I have sent a peripheral smear.  If she starts having any fever, we will do a full infectious workup at that time including blood culture, UA and chest x-ray.     3.  History of recurrent DVT, PE:  We will resume her PTA fondaparinux when verified by pharmacy.     4.  Metastatic colon cancer with mets to liver and lung: She has been on FOLFOX and Avastin.  Recent CT abdomen from 2018 showed some response with improved abdominal lymphadenopathy.  Management will be per oncology.     5.  Hypertension:  We will hold off on her chlorthalidone and lisinopril due to a soft blood pressure and dehydration.  Will have hydralazine p.r.n.     6.  History of ovarian cancer, status post DAPHNEY/BSO.   7.  Anxiety, depression:   We will continue with her PTA Zoloft and Ativan when verified by pharmacy.   8.  DVT prophylaxis:  She will be on full-dose anticoagulation with fondaparinux for her DVT and PE.      CODE STATUS:  This was discussed and she wishes to be DNR/DNI.         OCTAVIA ROJO MD             D: 2018   T: 2018   MT: NIKIA      Name:     JESSICA MOYA   MRN:      [id number]        Account:      [id number]   :      1950        Admitted:     2018                   Document: [id number]

## 2018-02-28 NOTE — MR AVS SNAPSHOT
"              After Visit Summary   2/28/2018    Liv Sylvester    MRN: [id number]           Patient Information     Date Of Birth          1950        Visit Information        Provider Department      2/28/2018 1:30 PM Radha Piedra MD [facility]        Today's Diagnoses     Colon Cancer, with Liver Met (Stage IV)    -  1       Follow-ups after your visit        Your next 10 appointments already scheduled     Mar 07, 2018  9:15 AM CST   Return Visit with Radha Piedra MD   [facility] ([facility])    [facility]  [address] [facility] [address]  [facility] [id number] [phone]              Who to contact     If you have questions or need follow up information about today's clinic visit or your schedule please contact [facility] directly at [phone].  Normal or non-critical lab and imaging results will be communicated to you by MyChart, letter or phone within 4 business days after the clinic has received the results. If you do not hear from us within 7 days, please contact the clinic through MyChart or phone. If you have a critical or abnormal lab result, we will notify you by phone as soon as possible.  Submit refill requests through [employer] or call your pharmacy and they will forward the refill request to us. Please allow 3 business days for your refill to be completed.          Additional Information About Your Visit        MyChart Information     [employer] lets you send messages to your doctor, view your test results, renew your prescriptions, schedule appointments and more. To sign up, go to [email or web address] . Click on \"Log in\" on the left side of the screen, which will take you to the Welcome page. Then click on \"Sign up Now\" on the right side of the page.     You will be asked to enter the access code listed below, as well as some personal information. Please follow the directions to create your username and password.     Your " access code is: [email or web address]  Expires: 2018  7:56 AM     Your access code will  in 90 days. If you need help or a new code, please call your [facility] or [phone].        Care EveryWhere ID     This is your Care EveryWhere ID. This could be used by other organizations to access your [facility] medical records  [id number]        Your Vitals Were     Pulse Temperature Respirations Pulse Oximetry BMI (Body Mass Index)       134 98.3  F (36.8  C) (Oral) 16 99% 29.84 kg/m2        Blood Pressure from Last 3 Encounters:   18 149/69   18 (!) 82/63   18 159/81    Weight from Last 3 Encounters:   18 94.3 kg (208 lb)   18 96.5 kg (212 lb 12.8 oz)   18 95.3 kg (210 lb)              We Performed the Following     CBC with platelets differential     Comprehensive metabolic panel        Primary Care Provider Office Phone # Fax #    Amaya Bethea Anita [phone]       [facility] [address] [facility] [place] [address]        Equal Access to Services     [facility] : Hadii aad ku hadasho Soomaali, waaxda luqadaha, qaybta kaalmada adeegyada, waxay daliain haywaltn adeslime cevallos . So [facility] [phone].    ATENCIÓN: Si habla español, tiene a schmitt disposición servicios gratuitos de asistencia lingüística. Wiley al [phone].    We comply with applicable federal civil rights laws and [place] laws. We do not discriminate on the basis of race, color, national origin, age, disability, sex, sexual orientation, or gender identity.            Thank you!     Thank you for choosing [facility]  for your care. Our goal is always to provide you with excellent care. Hearing back from our patients is one way we can continue to improve our services. Please take a few minutes to complete the written survey that you may receive in the mail after your visit with us. Thank you!             Your Updated Medication List - Protect others around you: Learn how to safely  use, store and throw away your medicines at www.disposemymeds.org.          This list is accurate as of 2/28/18  3:32 PM.  Always use your most recent med list.                   Brand Name Dispense Instructions for use Diagnosis    ACETAMINOPHEN PO      Take 500 mg by mouth every 4 hours as needed for pain        albuterol 108 (90 BASE) MCG/ACT Inhaler    PROAIR HFA/PROVENTIL HFA/VENTOLIN HFA    1 Inhaler    Inhale 2 puffs into the lungs every 6 hours as needed for shortness of breath / dyspnea or wheezing    Reactive airway disease without complication, unspecified asthma severity, unspecified whether persistent       CALCIUM-MAGNESIUM-ZINC PO      Take 1 tablet by mouth daily        CENTRUM SILVER per tablet     100 tablet    Take 1 tablet by mouth daily.        chlorthalidone 25 MG tablet    HYGROTON    30 tablet    Take 1 tablet (25 mg) by mouth daily as needed    HTN (hypertension)       * fondaparinux 10 MG/0.8ML injection    ARIXTRA    90 Syringe    INJECT 0.8 MLS (10 MG) SUBCUTANEOUSLY ONCE DAILY.    PE (pulmonary thromboembolism) (H), DVT (deep vein thrombosis) in pregnancy (H)       * fondaparinux 7.5MG/0.6ML injection    ARIXTRA    90 Syringe    Inject 0.6 mLs (7.5 mg) Subcutaneous every 24 hours    PE (pulmonary thromboembolism) (H)       lidocaine 5 % ointment    XYLOCAINE    5 g    Apply 0.5 g topically as needed for moderate pain Apply pea size amount to rectal area for pain        lidocaine-prilocaine cream    EMLA    30 g    Apply topically as needed for moderate pain Apply dollop size amount to port site 30-60 minutes prior to accessing.    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       lisinopril 10 MG tablet    PRINIVIL/ZESTRIL    30 tablet    Take 1 tablet (10 mg) by mouth daily    HTN (hypertension)       LORazepam 0.5 MG tablet    ATIVAN    30 tablet    Take 1 tablet (0.5 mg) by mouth every 4 hours as needed (Anxiety, Nausea/Vomiting or Sleep)    Malignant neoplasm of  hepatic flexure (H)       methylphenidate 5 MG tablet    RITALIN    60 tablet    Take 1 tablet (5 mg) by mouth 2 times daily        ondansetron 8 MG tablet    ZOFRAN    10 tablet    Take 1 tablet (8 mg) by mouth every 8 hours as needed (Nausea/Vomiting)    Malignant neoplasm of hepatic flexure (H)       order for DME     1 Device    Equipment being ordered: Wheelchair    Malignant neoplasm of colon, unspecified part of colon (H), VTE (venous thromboembolism), PE (pulmonary thromboembolism) (H), Pain of left lower leg       oxyCODONE IR 5 MG tablet    ROXICODONE    50 tablet    Take 1-2 tablets (5-10 mg) by mouth every 4 hours as needed for pain maximum 12 tablet(s) per day    Malignant neoplasm of colon, unspecified part of colon (H)       Potassium Chloride ER 20 MEQ Tbcr     30 tablet    Take 1 tablet (20 mEq) by mouth daily    Hypokalemia       prochlorperazine 10 MG tablet    COMPAZINE    30 tablet    Take 1 tablet (10 mg) by mouth every 6 hours as needed (Nausea/Vomiting)    Malignant neoplasm of hepatic flexure (H)       sennosides 8.6 MG tablet    SENOKOT     Take 1 tablet by mouth 2 times daily as needed for constipation        sertraline 50 MG tablet    ZOLOFT    30 tablet    Take 1 tablet daily.    Malignant neoplasm of hepatic flexure (H)       VICKS NYQUIL COLD & FLU 15-6. MG/15ML Liqd   Generic drug:  DM-Doxylamine-Acetaminophen      Take 15 mLs by mouth nightly as needed        VITAMIN D (CHOLECALCIFEROL) PO      Take 1 tablet by mouth daily (OTC: Pt unsure of strength)        * Notice:  This list has 2 medication(s) that are the same as other medications prescribed for you. Read the directions carefully, and ask your doctor or other care provider to review them with you.

## 2018-02-28 NOTE — PHARMACY
2/28 Arixtra 10mg daily continued from home. Patient is now less than 100kg.  At 94kg, the recommended Arixtra dose is 7.5mg.  Dr. Mills wants Oncology to review tomorrow as the patient has already gotten her dose today. I left a sticky note.  Neema, BaldoD

## 2018-03-01 NOTE — PROGRESS NOTES
[facility]    Hospitalist Progress Note    Assessment & Plan   Liv Sylvester is a 67-year-old female with a past medical history significant for metastatic colon cancer, chronic anemia, recurrent deep vein thrombosis, pulmonary embolism, hypertension, anxiety, depression and ovarian cancer who was sent from the oncology clinic for intractable nausea and vomiting.     Intractable nausea, vomiting likely secondary to her recent chemotherapy  Patient sent in from oncology clinic due to intractable nausea and vomiting since her last chemotherapy treatment 3 prior.  She was treated with IVF and PRN anti-emetics with improvement  - Advance diet as tolerated   - Continue IVF  - Zofran PRN   - Hem/onc following    Marked leukocytosis, unclear etiology  WBC was 40.9 on presentation.  Admitting physician spoke to Dr. Piedra.  It seems she did not receive any Neulasta after the chemotherapy.  Dr. Piedra thought this could be a stress response.  No signs of infection at this time.  Repeat WBC today was 30.6.    - Peripheral smear pending     History of recurrent DVT/PE  - Continue PTA fondaparinux    Metastatic colon cancer with mets to liver and lung  She has been on FOLFOX and Avastin.  Recent CT abdomen from 02/19/2018 showed some response with improved abdominal lymphadenopathy.    Hypertension  - Holding Chlorthalidone and Lisinopril due to lower blood pressurs     Anxiety, depression  - Continue her PTA Zoloft and Ativan    # Pain Assessment:   Current Pain Score 3/1/2018 3/1/2018 2/28/2018   Patient currently in pain? denies denies denies   Pain score (0-10) - - -   Pain location - - -   Pain descriptors - - -   Liv vaz pain level was assessed and she currently denies pain.        DVT Prophylaxis: Fondaparinux  Code Status: DNR/DNI    Disposition: Expected discharge in 1-2 days once tolerating regular diet.    Jerry Strange, DO  Text Page (7am to 6pm)    Interval History   Patient seen and examined.   Nausea is improved and no vomiting since admission.  No complaints of pain at this time.      -Data reviewed today: I reviewed all new labs and imaging results over the last 24 hours. I personally reviewed no images or EKG's today.    Physical Exam   Temp: 98  F (36.7  C) Temp src: Oral BP: 125/58   Heart Rate: 90 Resp: 16 SpO2: 98 % O2 Device: None (Room air)    There were no vitals filed for this visit.  Vital Signs with Ranges  Temp:  [96.6  F (35.9  C)-98.4  F (36.9  C)] 98  F (36.7  C)  Heart Rate:  [] 90  Resp:  [14-16] 16  BP: (118-128)/(52-61) 125/58  SpO2:  [98 %] 98 %  I/O last 3 completed shifts:  In: 2777 [P.O.:590; I.V.:2187]  Out: 100 [Urine:100]    Constitutional: Awake, alert, cooperative, no apparent distress  Respiratory: Clear to auscultation bilaterally, no crackles or wheezing  Cardiovascular: Regular rate and rhythm, normal S1 and S2, and no murmur noted  GI: Normal bowel sounds, soft, non-distended, non-tender  Skin/Integumen: No rashes, no cyanosis  MSK: No edema     Medications     sodium chloride 100 mL/hr at 03/01/18 1226     - MEDICATION INSTRUCTIONS -         fondaparinux  10 mg Subcutaneous Daily     methylphenidate  5 mg Oral BID     potassium chloride SA  20 mEq Oral Daily     sertraline  50 mg Oral Daily       Data     Recent Labs  Lab 03/01/18  0530 02/28/18  1448 02/23/18  0920   WBC 30.6* 40.9* 4.4   HGB 9.6* 11.4* 9.8*   MCV 96 96 96    Platelets clumped, platelet count unavailable 144*    136 140   POTASSIUM 3.6 3.8 3.6   CHLORIDE 106 101 106   CO2 26 26 27   BUN 17 20 8   CR 0.71 0.98 0.59   ANIONGAP 7 9 7   JESÚS 8.4* 9.8 9.3   GLC 93 81 133*   ALBUMIN  --  3.4 3.0*   PROTTOTAL  --  8.8 7.2   BILITOTAL  --  0.5 0.5   ALKPHOS  --  191* 111   ALT  --  32 44   AST  --  41 50*       Imaging:   No results found for this or any previous visit (from the past 24 hour(s)).

## 2018-03-01 NOTE — PLAN OF CARE
Problem: Fluid Volume Deficit (Adult)  Goal: Identify Related Risk Factors and Signs and Symptoms  Related risk factors and signs and symptoms are identified upon initiation of Human Response Clinical Practice Guideline (CPG).  Outcome: No Change  Patient direct admission from clinic. A/Ox4. VSS ex soft BP. Denies pain. No nausea or emesis since admission to unit. 1L bolus given d/t dehydration/low BP. Right port with IVF infusing. Tolerating full liquid diet. Up SBA. Will continue to monitor.

## 2018-03-01 NOTE — PROGRESS NOTES
Oncology/Hematology progress Note  03/01/2018      DX: Metastatic colon cancer.  Nausea vomiting dehydration.  Iron deficiency.  VTE      Subjective  Patient reports improvement in her nausea. She tolerated small amount of food this morning.  She tolerated a few sips of water. She denies vomiting denies or abdominal pain.  Per patient , she did not require much of antinausea medication today she is laying down comfortably in bed.    She took a short walk in the room today.  She denies fever chills sweats denies cough or urinary symptoms        Review of Systems:  14 point ROS of systems including Constitutional, Eyes, Respiratory, Cardiovascular, Gastroenterology, Genitourinary, Integumentary, Muscularskeletal, Psychiatric were all negative except for pertinent positives noted in my HPI.      No current outpatient prescriptions on file.       Physical Examination:  General: The patient is a pleasant female in no acute distress.  /58 (BP Location: Left arm)  Temp 98  F (36.7  C) (Oral)  Resp 16  SpO2 98%  Wt Readings from Last 10 Encounters:   02/28/18 94.3 kg (208 lb)   02/23/18 96.5 kg (212 lb 12.8 oz)   02/21/18 95.3 kg (210 lb)   02/07/18 101.2 kg (223 lb)   02/07/18 101.2 kg (223 lb)   01/24/18 98.8 kg (217 lb 12.8 oz)   01/15/18 102.5 kg (226 lb)   01/11/18 101.2 kg (223 lb)   01/05/18 102.5 kg (226 lb)   12/27/17 101.6 kg (224 lb)     HEENT: EOMI, PERRL. Sclerae are anicteric. Oral mucosa is pink and moist with no lesions or thrush.   Lymph: Neck is supple with no lymphadenopathy in the cervical or supraclavicular areas.   Heart: Regular rate and rhythm.   Lungs: Clear to auscultation bilaterally.   GI: Bowel sounds present, soft, nontender with no palpable hepatosplenomegaly or masses.   Extremities: No lower extremity edema noted bilaterally.   Skin: No rashes, petechiae, or bruising noted on exposed skin.    Laboratory Data:  Results for orders placed or performed during the hospital encounter of  02/28/18 (from the past 24 hour(s))   Basic metabolic panel   Result Value Ref Range    Sodium 139 133 - 144 mmol/L    Potassium 3.6 3.4 - 5.3 mmol/L    Chloride 106 94 - 109 mmol/L    Carbon Dioxide 26 20 - 32 mmol/L    Anion Gap 7 3 - 14 mmol/L    Glucose 93 70 - 99 mg/dL    Urea Nitrogen 17 7 - 30 mg/dL    Creatinine 0.71 0.52 - 1.04 mg/dL    GFR Estimate 81 >60 mL/min/1.7m2    GFR Estimate If Black >90 >60 mL/min/1.7m2    Calcium 8.4 (L) 8.5 - 10.1 mg/dL   CBC with platelets differential   Result Value Ref Range    WBC 30.6 (H) 4.0 - 11.0 10e9/L    RBC Count 3.07 (L) 3.8 - 5.2 10e12/L    Hemoglobin 9.6 (L) 11.7 - 15.7 g/dL    Hematocrit 29.4 (L) 35.0 - 47.0 %    MCV 96 78 - 100 fl    MCH 31.3 26.5 - 33.0 pg    MCHC 32.7 31.5 - 36.5 g/dL    RDW 20.4 (H) 10.0 - 15.0 %    Platelet Count 164 150 - 450 10e9/L    Diff Method Automated Method     % Neutrophils 91.0 %    % Lymphocytes 5.4 %    % Monocytes 2.7 %    % Eosinophils 0.1 %    % Basophils 0.1 %    % Immature Granulocytes 0.7 %    Nucleated RBCs 0 0 /100    Absolute Neutrophil 27.8 (H) 1.6 - 8.3 10e9/L    Absolute Lymphocytes 1.7 0.8 - 5.3 10e9/L    Absolute Monocytes 0.8 0.0 - 1.3 10e9/L    Absolute Eosinophils 0.0 0.0 - 0.7 10e9/L    Absolute Basophils 0.0 0.0 - 0.2 10e9/L    Abs Immature Granulocytes 0.2 0 - 0.4 10e9/L    Absolute Nucleated RBC 0.0    Reticulocyte Count   Result Value Ref Range    % Retic 0.4 (L) 0.5 - 2.0 %    Absolute Retic 13.2 (L) 25 - 95 10e9/L         Assessment and Plan:      This is a 67-year-old female     Metastatic colon cancer  CT February 19, 2018 showed stable disease  She is on palliative chemotherapy FOLFOX with Avastin. -Last given on February 23.  The plan is to give chemotherapy every 2 weeks    Nausea vomiting-improved  Most likely secondary to the chemo treatment above.   Recommend to continue IV hydration and antiemetics as needed  Recommend- if okay per inpatient team, for the patient to stay 1 more day in the hospital  for hydration.  Patient is encouraged to increase p.o. intake today. Pt agrees     VTE  She will continue Arixtra    Iron deficiency anemia-  Related to GI bleed from a cancer.she denies blood in stool, denies bleeding.    She is status post Injectafer    leukocytosis, - trending down   unclear etiology: peripheral smear was ordered in ER yesterday.  Fortunately,  WBC is trending down.   She did receive  Neulasta after the chemotherapy.    Per Dr. Piedra ,might be due to stress response. She does not appears to be septic, no clear source for infection.  In the event of fever recommend complete fever workup.    -We will continue to see the patient    TITO Briones CNP  Hem/Onc   [facility]

## 2018-03-01 NOTE — PLAN OF CARE
Problem: Patient Care Overview  Goal: Plan of Care/Patient Progress Review  Outcome: No Change  [id number] Patient A/Ox4. VSS on RA. Patient denies pain, SOB, headache, numbness/tingling. Patient reports decrease in nausea and resolved later in evening and tolerating full liquid diet.  Patient reports feeling a lot better. IVF nacl 100ml/h. Up SBA. Will continue to monitor

## 2018-03-02 NOTE — TELEPHONE ENCOUNTER
Received call from Victor M from  [facility] requesting a call back from 's RN regarding Pt's home hydration.  Victor M can be reached at: [phone]

## 2018-03-02 NOTE — PROGRESS NOTES
[facility]    Hematology/Oncology Follow-up Note      Today's Date: 03/02/18  Date of Admission:  2/28/2018  Reason for Consult: metastatic colon cancer      ASSESSMENT/PLAN:  Liv Sylvester is a 67 year old female with metastatic cancer admitted with:    1) Nausea/vomiting: likely secondary to chemotherapy.  It has improved in the hospital with IV hydration and anti-emetics.  Liv is feeling much better and will be discharged home today.    2) History of VTE:  -she will continue Arixtra    3) Leukocytosis: It has trended down in the hospital.  It may be from stress response.  -will monitor CBC as outpatient    4) Iron-deficiency anemia: secondary to chronic blood loss from her cancer  -s/p Injectafer    5) Stage IV metastatic colon cancer: on palliative intent FOLFOX with Avastin.  Recent CT scan was stable.  -she will follow-up with Dr. Piedra next week in clinic        INTERIM HISTORY: Liv was seen in her room today.  She was sitting up in a chair.  She says that she feels much better today and is ready to go home.  She denies nausea/vomiting.         MEDICATIONS:  Current Facility-Administered Medications   Medication     heparin 100 UNIT/ML injection 5 mL     heparin lock flush 10 UNIT/ML injection 5-10 mL     sodium chloride 0.9% infusion     naloxone (NARCAN) injection 0.1-0.4 mg     melatonin tablet 1 mg     Patient is already receiving anticoagulation with heparin, enoxaparin (LOVENOX), warfarin (COUMADIN)  or other anticoagulant medication     acetaminophen (TYLENOL) tablet 650 mg     oxyCODONE-acetaminophen (PERCOCET) 5-325 MG per tablet 1-2 tablet     HYDROmorphone (PF) (DILAUDID) injection 0.3-0.5 mg     senna-docusate (SENOKOT-S;PERICOLACE) 8.6-50 MG per tablet 1 tablet    Or     senna-docusate (SENOKOT-S;PERICOLACE) 8.6-50 MG per tablet 2 tablet     magnesium hydroxide (MILK OF MAGNESIA) suspension 30 mL     bisacodyl (DULCOLAX) Suppository 10 mg     ondansetron (ZOFRAN-ODT) ODT  "tab 4 mg    Or     ondansetron (ZOFRAN) injection 4 mg     prochlorperazine (COMPAZINE) injection 5 mg    Or     prochlorperazine (COMPAZINE) tablet 5 mg    Or     prochlorperazine (COMPAZINE) Suppository 12.5 mg     albuterol (PROAIR HFA/PROVENTIL HFA/VENTOLIN HFA) Inhaler 2 puff     fondaparinux (ARIXTRA) injection 10 mg     LORazepam (ATIVAN) tablet 0.5 mg     methylphenidate (RITALIN) tablet 5 mg     potassium chloride SA (K-DUR/KLOR-CON M) CR tablet 20 mEq     sertraline (ZOLOFT) tablet 50 mg     Current Outpatient Prescriptions   Medication     fondaparinux (ARIXTRA) 7.5MG/0.6ML injection     methylphenidate (RITALIN) 5 MG tablet     chlorthalidone (HYGROTON) 25 MG tablet     lisinopril (PRINIVIL/ZESTRIL) 10 MG tablet     Potassium Chloride ER 20 MEQ TBCR     sertraline (ZOLOFT) 50 MG tablet     oxyCODONE IR (ROXICODONE) 5 MG tablet     DM-Doxylamine-Acetaminophen (VICKS NYQUIL COLD & FLU) 15-6. MG/15ML LIQD     sennosides (SENOKOT) 8.6 MG tablet     lidocaine (XYLOCAINE) 5 % ointment     LORazepam (ATIVAN) 0.5 MG tablet     prochlorperazine (COMPAZINE) 10 MG tablet     ondansetron (ZOFRAN) 8 MG tablet     lidocaine-prilocaine (EMLA) cream     albuterol (PROAIR HFA/PROVENTIL HFA/VENTOLIN HFA) 108 (90 BASE) MCG/ACT Inhaler     CALCIUM-MAGNESIUM-ZINC PO     VITAMIN D, CHOLECALCIFEROL, PO     ACETAMINOPHEN PO     Multiple Vitamins-Minerals (CENTRUM SILVER) per tablet     order for DME           ALLERGIES:  No Known Allergies      PHYSICAL EXAM:  Vital signs:  Temp: 97.4  F (36.3  C) Temp src: Oral BP: 149/69 Pulse: 80 Heart Rate: 88 Resp: 18 SpO2: 100 % O2 Device: None (Room air)        Estimated body mass index is 29.84 kg/(m^2) as calculated from the following:    Height as of 2/23/18: 1.778 m (5' 10\").    Weight as of an earlier encounter on 2/28/18: 94.3 kg (208 lb).    GENERAL/CONSTITUTIONAL: No acute distress. Sitting up in chair. Pleasant.  NEUROLOGIC: Alert, oriented, answers questions " appropriately.    LABS:  CBC RESULTS:   Recent Labs   Lab Test  03/01/18   0530   WBC  30.6*   RBC  3.07*   HGB  9.6*   HCT  29.4*   MCV  96   MCH  31.3   MCHC  32.7   RDW  20.4*   PLT  164       Recent Labs   Lab Test  03/01/18   0530  02/28/18   1448   NA  139  136   POTASSIUM  3.6  3.8   CHLORIDE  106  101   CO2  26  26   ANIONGAP  7  9   GLC  93  81   BUN  17  20   CR  0.71  0.98   JESÚS  8.4*  9.8           Marivel Roth MD  Hematology/Oncology  [facility]

## 2018-03-02 NOTE — DISCHARGE SUMMARY
[facility]    Discharge Summary  Hospitalist    Date of Admission:  2/28/2018  Date of Discharge:  3/2/2018 12:05 PM  Discharging Provider: Jerry Strange DO    Discharge Diagnoses   1. Intractable nausea/vomiting 2/2 chemotherapy  2. Leukocytosis  3. History of recurrent DVT/PE  4. Metastatic colon cancer with metastasis to the liver and lung   5. Hypertension   6. Anxiety  7. Depression     History of Present Illness   Liv Sylvester is an 67 year old female with metastatic colon cancer and history of recurrent DVT, PE who was sent from oncology clinic for intractable nausea and vomiting.  She recently had chemotherapy with FOLFOX and Avastin 3 days prior to admission and since then she has been having intractable nausea and vomiting, feels very weak and tired, feels dehydrated and has not been able to take any p.o. since Saturday.  She was seen by Dr. Piedra in the clinic, her blood pressure was noted to be low at 82/64.  She denied any fever, chills or rigors.  She denied any chest pain or shortness of breath.  No pain in abdomen.  She did have a small bowel movement the morning of admission.  Denied any urinary symptoms.     Hospital Course   Liv Sylvester was admitted on 2/28/2018.  The following problems were addressed during her hospitalization:    Patient was admitted from oncology clinic for intractable nausea and vomiting related to her chemotherapy.  She was treated with anti-emetics and given IVF with resolution of her symptoms by the following day.  We were able to advance her diet and she had no further vomiting after admission.  Of note, the patient has had some weight loss recently and pharmacy recommended decreasing her Fondaparinux dose and this was arranged for the patient at discharge.      Of note, the patient had marked leukocytosis on admission with a WBC of 40.  Hem/onc felt this was likely stress induced in combination with recent Neulasta.  On recheck the WBC was down to  30.  A peripheral smear was done and showed leukoerythroblastic blood with moderate neutrophilia but she had not evidence of infection on examination and was afebrile while here.     # Discharge Pain Plan:   - During her hospitalization, Liv experienced pain due to metastatic cancer.  The pain plan for discharge was discussed with Liv and the plan was created in a collaborative fashion.    - Chronic/continued opioids:    Jerry Strange, DO    Significant Results and Procedures   See below    Pending Results   No pending results  Unresulted Labs Ordered in the Past 30 Days of this Admission     No orders found from 12/30/2017 to 3/1/2018.          Code Status   DNR / DNI       Primary Care Physician   RIGOBERTO SEN    Physical Exam   Temp: 97.3  F (36.3  C) Temp src: Oral BP: 150/71 Pulse: 83 Heart Rate: 88 Resp: 18 SpO2: 100 % O2 Device: None (Room air)    There were no vitals filed for this visit.  Vital Signs with Ranges  Temp:  [97.3  F (36.3  C)-98.8  F (37.1  C)] 97.3  F (36.3  C)  Pulse:  [83] 83  Heart Rate:  [] 88  Resp:  [16-18] 18  BP: (125-159)/(58-76) 150/71  SpO2:  [97 %-100 %] 100 %  I/O last 3 completed shifts:  In: 2522 [P.O.:120; I.V.:2402]  Out: 250 [Urine:250]    Constitutional: Resting comfortably in bed.  NAD  Eyes: EOMI  ENT: Moist mucous membranes  Respiratory: Clear to auscultation.  No respiratory distress.  Cardiovascular: RRR.  No murmurs  GI: Bowel sounds present.  No tenderness     Discharge Disposition   Discharged to home  Condition at discharge: Stable    Consultations This Hospital Stay   HEMATOLOGY & ONCOLOGY IP CONSULT    Time Spent on this Encounter   IJerry, personally saw the patient today and spent less than or equal to 30 minutes discharging this patient.    Discharge Orders     Reason for your hospital stay   Nausea/vomiting     Follow-up and recommended labs and tests    Follow up with primary care provider, RIGOBERTO SEN, within 2-3 weeks, for  hospital follow- up. No follow up labs or test are needed.    Also should follow-up with hem/onc as planned     Activity   Your activity upon discharge: activity as tolerated     DNR/DNI     Diet   Follow this diet upon discharge: Orders Placed This Encounter     Advance Diet as Tolerated: Regular Diet Adult       Discharge Medications   Current Discharge Medication List      CONTINUE these medications which have CHANGED    Details   fondaparinux (ARIXTRA) 7.5MG/0.6ML injection Inject 0.6 mLs (7.5 mg) Subcutaneous every 24 hours  Qty: 90 Syringe, Refills: 0    Associated Diagnoses: PE (pulmonary thromboembolism) (H)         CONTINUE these medications which have NOT CHANGED    Details   methylphenidate (RITALIN) 5 MG tablet Take 1 tablet (5 mg) by mouth 2 times daily  Qty: 60 tablet, Refills: 0      chlorthalidone (HYGROTON) 25 MG tablet Take 1 tablet (25 mg) by mouth daily as needed  Qty: 30 tablet, Refills: 0    Comments: Any further refills of this med need to go through Pt's PCP Dr.Dana Howard.  Associated Diagnoses: HTN (hypertension)      lisinopril (PRINIVIL/ZESTRIL) 10 MG tablet Take 1 tablet (10 mg) by mouth daily  Qty: 30 tablet, Refills: 0    Comments: Any further refills of this med need to go through Pt's PCP Dr.Dana Howard.  Associated Diagnoses: HTN (hypertension)      Potassium Chloride ER 20 MEQ TBCR Take 1 tablet (20 mEq) by mouth daily  Qty: 30 tablet, Refills: 0    Comments: Routine refills of this should go through PCP Dr.Dana Howard.  Associated Diagnoses: Hypokalemia      sertraline (ZOLOFT) 50 MG tablet Take 1 tablet daily.  Qty: 30 tablet, Refills: 0    Comments: Any further refills of this med need to go through Pt's PCP Dr.Dana Howard.  Associated Diagnoses: Malignant neoplasm of hepatic flexure (H)      oxyCODONE IR (ROXICODONE) 5 MG tablet Take 1-2 tablets (5-10 mg) by mouth every 4 hours as needed for pain maximum 12 tablet(s) per day  Qty: 50 tablet, Refills: 0    Associated Diagnoses:  Malignant neoplasm of colon, unspecified part of colon (H)      DM-Doxylamine-Acetaminophen (VICKS NYQUIL COLD & FLU) 15-6. MG/15ML LIQD Take 15 mLs by mouth nightly as needed      sennosides (SENOKOT) 8.6 MG tablet Take 1 tablet by mouth 2 times daily as needed for constipation      lidocaine (XYLOCAINE) 5 % ointment Apply 0.5 g topically as needed for moderate pain Apply pea size amount to rectal area for pain  Qty: 5 g, Refills: 0      LORazepam (ATIVAN) 0.5 MG tablet Take 1 tablet (0.5 mg) by mouth every 4 hours as needed (Anxiety, Nausea/Vomiting or Sleep)  Qty: 30 tablet, Refills: 2    Associated Diagnoses: Malignant neoplasm of hepatic flexure (H)      prochlorperazine (COMPAZINE) 10 MG tablet Take 1 tablet (10 mg) by mouth every 6 hours as needed (Nausea/Vomiting)  Qty: 30 tablet, Refills: 2    Associated Diagnoses: Malignant neoplasm of hepatic flexure (H)      ondansetron (ZOFRAN) 8 MG tablet Take 1 tablet (8 mg) by mouth every 8 hours as needed (Nausea/Vomiting)  Qty: 10 tablet, Refills: 2    Associated Diagnoses: Malignant neoplasm of hepatic flexure (H)      lidocaine-prilocaine (EMLA) cream Apply topically as needed for moderate pain Apply dollop size amount to port site 30-60 minutes prior to accessing.  Qty: 30 g, Refills: 1    Associated Diagnoses: Malignant neoplasm of hepatic flexure (H); Port catheter in place      albuterol (PROAIR HFA/PROVENTIL HFA/VENTOLIN HFA) 108 (90 BASE) MCG/ACT Inhaler Inhale 2 puffs into the lungs every 6 hours as needed for shortness of breath / dyspnea or wheezing  Qty: 1 Inhaler, Refills: 3    Associated Diagnoses: Reactive airway disease without complication, unspecified asthma severity, unspecified whether persistent      CALCIUM-MAGNESIUM-ZINC PO Take 1 tablet by mouth daily      VITAMIN D, CHOLECALCIFEROL, PO Take 1 tablet by mouth daily (OTC: Pt unsure of strength)      ACETAMINOPHEN PO Take 500 mg by mouth every 4 hours as needed for pain       Multiple  Vitamins-Minerals (CENTRUM SILVER) per tablet Take 1 tablet by mouth daily.  Qty: 100 tablet, Refills: 12      order for DME Equipment being ordered: Wheelchair  Qty: 1 Device, Refills: 0    Associated Diagnoses: Malignant neoplasm of colon, unspecified part of colon (H); VTE (venous thromboembolism); PE (pulmonary thromboembolism) (H); Pain of left lower leg           Allergies   No Known Allergies  Data   Most Recent 3 CBC's:  Recent Labs   Lab Test  03/01/18   0530  02/28/18   1448  02/23/18   0920   WBC  30.6*  40.9*  4.4   HGB  9.6*  11.4*  9.8*   MCV  96  96  96   PLT  164  Platelets clumped, platelet count unavailable  144*      Most Recent 3 BMP's:  Recent Labs   Lab Test  03/01/18   0530  02/28/18   1448  02/23/18   0920   NA  139  136  140   POTASSIUM  3.6  3.8  3.6   CHLORIDE  106  101  106   CO2  26  26  27   BUN  17  20  8   CR  0.71  0.98  0.59   ANIONGAP  7  9  7   JESÚS  8.4*  9.8  9.3   GLC  93  81  133*     Most Recent 2 LFT's:  Recent Labs   Lab Test  02/28/18   1448  02/23/18   0920   AST  41  50*   ALT  32  44   ALKPHOS  191*  111   BILITOTAL  0.5  0.5     Most Recent INR's and Anticoagulation Dosing History:  Anticoagulation Dose History     Recent Dosing and Labs Latest Ref Rng & Units 9/29/2017 12/15/2017 1/5/2018 1/15/2018    INR 0.86 - 1.14 1.05 1.12 1.21(H) 1.13        Most Recent 3 Troponin's:  Recent Labs   Lab Test  10/20/17   1320  06/14/10   1207   TROPI  <0.015  <0.012     Most Recent Cholesterol Panel:  Recent Labs   Lab Test  05/13/13   0837   CHOL  162   LDL  115   HDL  28*   TRIG  95     Most Recent 6 Bacteria Isolates From Any Culture (See EPIC Reports for Culture Details):  Recent Labs   Lab Test  12/15/17   1538  12/15/17   1238   CULT  No growth  No growth     Most Recent TSH, T4 and A1c Labs:  Recent Labs   Lab Test  05/13/13   0837   TSH  0.87     Results for orders placed or performed during the hospital encounter of 02/19/18   CT Chest/Abdomen/Pelvis w Contrast    Narrative     CT CHEST, ABDOMEN, AND PELVIS WITH CONTRAST  2/19/2018 10:15 AM     HISTORY:  Follow up malignant neoplasm of hepatic flexure (H).    COMPARISON: 12/15/2017, ultrasound 1/15/2018.    TECHNIQUE: Volumetric helical acquisition of CT images from the lung  apices through the symphysis pubis after the administration of 109 mL  Isovue-370 intravenous contrast. Radiation dose for this scan was  reduced using automated exposure control, adjustment of the mA and/or  kV according to patient size, or iterative reconstruction technique.    FINDINGS:   Chest: Right IJ Port-A-Cath with the tip at the atriocaval junction.  Hypodensity seen around the Port-A-Cath catheter is unchanged. Thyroid  is normal. Heart and great vessels are within normal limits.  Atherosclerotic disease is seen throughout the aortic arch. No pleural  effusion, pericardial effusion or pneumothorax. There is low-density  along the anterior aspect of the right lower lobe pulmonary artery  (series 3, image 22), which may represent chronic pulmonary embolus.  This was not definitely visualized on the prior study as it was not  included on the field-of-view; however, on the study from 11/27/2017  there appears to be pulmonary emboli in right lower lobe pulmonary  arteries. 7 mm pulmonary nodule in the right lower lobe (series 4,  image 20), unchanged when compared to the CT from 11/27/2017. No new  pulmonary nodules.    Abdomen: Again noted multiple hypodense lesions are seen throughout  the liver, consistent with hepatic metastasis. At the hepatic dome  there is a mass measuring 5.8 x 6.6 cm, unchanged when measured in a  similar fashion. Hypodense mass along the lateral right hepatic lobe  decreased in size measuring 5.7 cm, previously 6.1 cm. Minimally  increased size of lesion in left hepatic lobe measuring 2.6 cm,  previously 2.3 cm. The spleen, adrenal glands, gallbladder and  pancreas show no focal abnormality. Hepatic and portal veins are  patent.  Multiple cystic lesions are seen throughout the right kidney.  Left kidney is normal. No intrahepatic or extrahepatic biliary  dilatation. No intraperitoneal free air or free fluid.    Small and large bowel are normal in caliber without evidence of  obstruction. Colonic wall thickening at the level of the hepatic  flexure may represent patient's known primary (series 5, image 19).  The appendix is normal. Improved abdominal lymphadenopathy. For  example there is a lymph node in the anterior mesentery measuring 2.9  x 1.5 cm, previously 3.1 x 1.8 cm. There is a second measuring 1.5 x  2.0 cm, previously 1.6 x 2.3 cm. No abdominal aortic aneurysm. Bladder  is normal. Filling defect is noted in the left common femoral and  external iliac veins, consistent with DVT. This is unchanged when  compared to the ultrasound from 1/15/2018.    Bones: No suspicious bony lesions.      Impression    IMPRESSION:  1. Mixed response to therapy of multiple hepatic metastases.  2. Minimally improved abdominal lymphadenopathy.  3. Left common femoral and external iliac deep vein thrombosis,  unchanged.  4. Filling defect along the peripheral right lower lobe pulmonary  artery, suggesting chronic pulmonary embolus. This is more chronic in  appearance when compared to study from 11/27/2017.  5. No change in mass at the hepatic flexure.    SAUL FU,

## 2018-03-02 NOTE — PLAN OF CARE
Problem: Fluid Volume Deficit (Adult)  Goal: Identify Related Risk Factors and Signs and Symptoms  Related risk factors and signs and symptoms are identified upon initiation of Human Response Clinical Practice Guideline (CPG).   Outcome: No Change   Patient A/Ox 3-4. VSS on RA. Patient denies pain. Minimal intermittent nausea, declines intervention - able to keep food/fluid down. Up SBA, ambulating in hallways with encouragement. Right port infusing NS @ 100ml/h. Ismael MURRAY home tomorrow with [facility]. Will continue to monitor.

## 2018-03-02 NOTE — TELEPHONE ENCOUNTER
I have sent a staff message with this update to Dr. Piedra to advise on follow up care.     Will plan to follow up with patient on Monday 3/5 for post discharge follow up. Debbie Dasilva

## 2018-03-02 NOTE — TELEPHONE ENCOUNTER
I received a return call from Victor M and will have [facility] see patient on Monday and call back with assessment of vitals and patient. It was decided patient had fluids, for the past 48 hours and no nausea prior to discharge today per Dr. Roth, and could go the weekend with out new fluid orders. Will evaluate on Monday fluid orders and call [facility] pharmacist. Debbie Dasilva

## 2018-03-02 NOTE — PROGRESS NOTES
Patient to discharge to home with resumption of [facility] for IV fluids and SN and homecare PT.  Dr. Piedra's office aware of admission and will contact the patient for follow up after Dr. Piedra is notified.  Did rx check for SQ injections per d/c rx covered benefit for d/c.    -Hand off to PCP/[facility]  -Email to [facility] and [facility] for resumption of care

## 2018-03-02 NOTE — PLAN OF CARE
Problem: Patient Care Overview  Goal: Plan of Care/Patient Progress Review  Outcome: Improving  Patient A/Ox 3-4. VSS on RA. Patient denies pain. And nausea, slept most of the shift decline to walk.  PT sepsis protocol arise this shift lactic drawn result normal no intervention at this time. Up SBA, to BR. Right port infusing NS @ 100ml/h. Plan to d/c home pending.

## 2018-03-02 NOTE — TELEPHONE ENCOUNTER
I  Received a call from Marisa on  [address] that pharmacy per weight change and dosing parameters want to decrease dose of Arixtra from 10mg to 7.5mg because patient has lost weight.     I returned call and provided the verbal ok to dose change per parameters of her weight change.     Patient expected to discharge today at noon. Debbie Dasilva

## 2018-03-05 NOTE — TELEPHONE ENCOUNTER
I received a call from [facility]'s  Sabrina  Who assessed patient today.    Sabrina stated that patient  Is doing well physical assessment includes no edema, pain, swelling or complaints b/p 120's /60's and HR .    She stated that the patient really gets dehydrated quickly after treatment and recommended IVF's daily as needed.     I sent a staff message to Dr. Piedra with the request for daily IVF's as needed for nausea , vomiting, and dehydration. Debbie Dasilva

## 2018-03-05 NOTE — TELEPHONE ENCOUNTER
"Date of Admission:  2/28/2018  Date of Discharge:  3/2/2018 12:05 PM  Discharging Provider: Jerry Strange,         Discharge Diagnoses      1. Intractable nausea/vomiting 2/2 chemotherapy  2. Leukocytosis  3. History of recurrent DVT/PE  4. Metastatic colon cancer with metastasis to the liver and lung   5. Hypertension   6. Anxiety  7. Depression       I called and spoke with patient post discharge regarding the above admission. Patient states she has been \"feeling really good\". She continues to not have much of an appetite especially for meat, but she eats anyway, and \"forces\" an ensure down. She is drinking lost of water and Gatorade over the weekend. She denies nausea or pain. She informed me that [facility] is coming out today. Patient would like a liter of fluids as needed every other day. I informed that I would as Dr. Piedra if he would approve that. Patient is scheduled for this Wednesday 3/7 and advised to call us she has uncontrolled nausea, pain, and or weakness.     Patient verbalized understanding and will be here for 1pm appointment with Dr. Piedra on 3/7. Debbie Dasilva    "

## 2018-03-06 NOTE — TELEPHONE ENCOUNTER
Received positive distress screen regarding patient's concern for ability to eat.  Called and spoke with patient. Patient states does not have appetite.  Patient states drinking 1 bottle Ensure per day, water, orange juice and Gatorade daily.  Will meet with patient on 3/15 for consult to review intake.  Patient verbalized understanding of plan.      Marcio Hernandez, RD, LD  Clinical Dietitian  [facility]  [phone] (direct)

## 2018-03-07 NOTE — PROGRESS NOTES
"Oncology Distress Screening Follow-up  Clinical Social Work  [facility]    Identified Concern and Score From Distress Screenin. How concerned are you about your ability to eat?   5           2. How concerned are you about unintended weight loss or your current weight?   0           3. How concerned are you about feeling depressed or very sad?   3           4. How concerned are you about feeling anxious or very scared?   5           5. How concerned are you about your spiritual wellbeing or sense of peace?   3           6. How concerned are you about work and home life issues that may be affected by your cancer?   10           7. How concerned are you about knowing what resources are available to help you?   5           You can also ask to be contacted by one of our Oncology Supportive Care professionals.    8. If you want to be contacted by one of our professionals, I can send a message to them right now.   Oncology Psych-  ologist;Oncolo-  gy Dietitian             Date of Distress Screening: 3/7/18    Intervention:  This clinician met with pt today for routine psychosocial check-in and emotional support and to respond to pt's positive distress screen. Pt reported that she is feeling much better physically since hospitalization, and is pleased to be home. Pt reports her mood today as \"okay,\" acknowledging that having grandchildren that came to visit her return home has been discouraging. Pt also reports today that not being able to work continues to make her worried about finances, pt reported that she had hoped to be able to return to work in 2018, but that she had learned from Dr. Piedra today that she will not be able to return to work for 2 months. 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 Pt reports that other source of great distress is that pt is in need of dentures, but reports " that she is unable to pay $2,400 for a top and bottom set. Pt reports that due to not having dentures, that she has been eating only soft food, which she believes is the reason why she has lost significant weight. Encouraged pt to also consider taking [employer] to [employer] services, as this has been a significant source for emotional support for pt in the past.     Pt reports today that she is planning to attend family reunion in [place] August 26th- Sept 1st of 2018. Engaged with pt in life review and offered life legacy project if of interest to pt. Pt reported that her grandchildren have done some recording of her, and that she will think more about how she would like to engage in some legacy work.    Education Provided:   Free and sliding-scale dentist in [facility] list  [employer] application    Follow-up Required:   1) This clinician will follow-up with pt surrounding access to dentures at next appointment  2) This clinician will continue to follow pt for ongoing psychosocial support as pt continues to adjust to treatment and realize its impacts.    Pt knows to contact this clinician in the case of psychosocial distress or need.     DEANNE Watkins, MaineGeneral [facility] CenterSW  Phone: [phone]  Pager: [phone]    Noemy Carty: M, T  *every other Thursday, 8am-4:30pm  Noemy Mcbride: W, F, *every other Thursday, 8am-4:30pm

## 2018-03-07 NOTE — LETTER
"    3/7/2018         RE: Liv Sylvester  [address]   [facility] [address]        Dear Colleague,    Thank you for referring your patient, Liv Sylvester, to the [facility]. Please see a copy of my visit note below.    Oncology Rooming Note    March 7, 2018 8:50 AM   Liv Sylvester is a 67 year old female who presents for:    Chief Complaint   Patient presents with     Oncology Clinic Visit     Colon Cancer, with Liver Met (Stage IV)     Initial Vitals: /79 (BP Location: Left arm, Patient Position: Sitting, Cuff Size: Adult Large)  Pulse 136  Temp 98.2  F (36.8  C) (Oral)  Resp 18  Wt 94.5 kg (208 lb 6.4 oz)  SpO2 100%  BMI 29.9 kg/m2 Estimated body mass index is 29.9 kg/(m^2) as calculated from the following:    Height as of 2/23/18: 1.778 m (5' 10\").    Weight as of this encounter: 94.5 kg (208 lb 6.4 oz). Body surface area is 2.16 meters squared.  No Pain (0) Comment: Data Unavailable   No LMP recorded. Patient has had a hysterectomy.  Allergies reviewed: Yes  Medications reviewed: Yes    Medications: Medication refills not needed today.  Pharmacy name entered into [facility]:    [facility] - [facility] [address] [facility] - [facility] [address] [facility]    Clinical concerns: None             4 minutes for nursing intake (face to face time)     Monserrat Alonso MA              [facility]  HEMATOLOGY ONCOLOGY    ONCOLOGY FOLLOWUP NOTE      DIAGNOSIS:    1- Metastatic colon cancer in a patient with history of stage 1A grade 3 endometrial carcinoma approximately around 2012.     On 09/29/2017, Liv Sylvester presented with progressive fatigue and right upper quadrant pain.  CT scan 09/29/2017 showed partially contracted bladder and findings suspicious for liver metastases.  There were multiple defined hypoechoic foci scoped scattered throughout the liver, the largest was in the " right lobe.  CT was also showing enlargement of hepatic and retroperitoneal lymph nodes.  On 09/30/2017, she underwent a colonoscopy, which showed a fungating, infiltrative, ulcerated, partially obstructing mass at the hepatic flexure.  Pathology is consistent with moderately differentiated adenocarcinoma with normal mismatch repair protein expression.   2- Significant iron deficiency anemia.   3- VTE- malignancy related     TREATMENT:  10/11/2017 FOLFOX and Avastin.   1/24/2018 discontinued Avastin.      SUBJECTIVE:  The patient was seen as a followup today. She is feeling better now. She is less fatigued. Oral intake is better.     REVIEW OF SYSTEMS:  A complete review of systems was performed and found to be negative other than pertinent positives mentioned in history of present illness.      Past medical, social histories reviewed.     Meds- Reviewed.     PHYSICAL EXAMINATION:   VITAL SIGNS:/79 (BP Location: Left arm, Patient Position: Sitting, Cuff Size: Adult Large)  Pulse 136  Temp 98.2  F (36.8  C) (Oral)  Resp 18  Wt 94.5 kg (208 lb 6.4 oz)  SpO2 100%  BMI 29.9 kg/m2  CONSTITUTIONAL: Appears tired.   HEENT: Pupils are equal. Oropharynx is clear.   NECK: No cervical or supraclavicular lymphadenopathy.   RESPIRATORY: Clear bilaterally.   CARD/VASC: S1, S2, regular.   GI: Soft, nontender, nondistended, no hepatosplenomegaly.   MUSKULOSKELETAL: Warm, well perfused.   NEUROLOGIC: Alert, awake.   INTEGUMENT: No rash.   LYMPHATICS: No edema.   PSYCH: Mood and affect was normal.     LABORATORY DATA AND IMAGING REVIEWED DURING THIS VISIT:  Recent Labs   Lab Test  03/01/18   0530  02/28/18   1448   NA  139  136   POTASSIUM  3.6  3.8   CHLORIDE  106  101   CO2  26  26   ANIONGAP  7  9   BUN  17  20   CR  0.71  0.98   GLC  93  81   JESÚS  8.4*  9.8     Recent Labs   Lab Test  03/01/18   0530  02/28/18   1448  02/23/18   0920   WBC  30.6*  40.9*  4.4   HGB  9.6*  11.4*  9.8*   PLT  164  Platelets clumped,  platelet count unavailable  144*   MCV  96  96  96   NEUTROPHIL  91.0  88.2  66.7     Recent Labs   Lab Test  02/28/18   1448  02/23/18   0920  02/21/18   1857   BILITOTAL  0.5  0.5  0.7   ALKPHOS  191*  111  112   ALT  32  44  53*   AST  41  50*  65*   ALBUMIN  3.4  3.0*  3.0*       ECOG performance status 2     ASSESSMENT:    1- This is a 67-year-old lady with stage IV metastatic colon cancer with normal mismatch repair protein expression.  She has multiple liver metastases and metastasis in the abdominal lymph nodes.     She was started on palliative intent combination of FOLFOX with Avastin which is given every 2 weeks intravenously.      CT scan 2/19/18 showed generally stable disease indicating a positive response to treatment.     - Labs were reviewed with the patient, leukocytosis is likely reactive, normocytic anemia due to chemotherapy. Monitor.     She was admitted to the hospital due to nausea vomiting and dehydration from chemotherapy. She has improved now. I will decrease the dose of Oxaliplatin to 60 mg/m2 from her next cycle.      2- She also has iron deficiency anemia due to chronic blood loss from her cancer.  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     3- VTE/ recurrent episodes of thrombosis.  She is on Arixtra.      PLAN:   FOLFOX next cycle on 3/14  RTC NP on 3/28  See me 4/11    RADHA PIEDRA MD    3/7/2018        Again, thank you for allowing me to participate in the care of your patient.        Sincerely,        Radha Piedra MD

## 2018-03-07 NOTE — PROGRESS NOTES
"Oncology Rooming Note    March 7, 2018 8:50 AM   Liv Sylvester is a 67 year old female who presents for:    Chief Complaint   Patient presents with     Oncology Clinic Visit     Colon Cancer, with Liver Met (Stage IV)     Initial Vitals: /79 (BP Location: Left arm, Patient Position: Sitting, Cuff Size: Adult Large)  Pulse 136  Temp 98.2  F (36.8  C) (Oral)  Resp 18  Wt 94.5 kg (208 lb 6.4 oz)  SpO2 100%  BMI 29.9 kg/m2 Estimated body mass index is 29.9 kg/(m^2) as calculated from the following:    Height as of 2/23/18: 1.778 m (5' 10\").    Weight as of this encounter: 94.5 kg (208 lb 6.4 oz). Body surface area is 2.16 meters squared.  No Pain (0) Comment: Data Unavailable   No LMP recorded. Patient has had a hysterectomy.  Allergies reviewed: Yes  Medications reviewed: Yes    Medications: Medication refills not needed today.  Pharmacy name entered into [facility]:    [facility] - [place], [place] - [address] [facility] - [facility] [place] [facility]    Clinical concerns: None             4 minutes for nursing intake (face to face time)     Monserrat Alonso MA            "

## 2018-03-07 NOTE — MR AVS SNAPSHOT
After Visit Summary   3/7/2018    Liv Sylvester    MRN: [id number]           Patient Information     Date Of Birth          1950        Visit Information        Provider Department      3/7/2018 11:14 AM Stephanie Awad LICSW [facility] [place]        Today's Diagnoses     Counseling NOS(V65.40)    -  1       Follow-ups after your visit        Your next 10 appointments already scheduled     Mar 14, 2018  8:30 AM CDT   Level 6 with SH INFUSION CHAIR 8   [facility] ([facility])    [place] [id number]  [place] [address]   [phone]            Mar 15, 2018  8:00 AM CDT   New Visit with Loren Hahn RD, LD   [facility] ([facility])    [place] [id number]  [place] [address]   [phone]            Mar 29, 2018  8:30 AM CDT   Level 6 with  INFUSION CHAIR 10   [facility] [place] [facility] ([facility])    [facility] [place] [id number]  [place] [address]   [phone]            Mar 29, 2018  9:00 AM CDT   Return Visit with TITO Briones CNP   [facility] [place] ([facility])    [facility] [place] [id number]  [place] [address] [phone]            Apr 12, 2018  8:30 AM CDT   Level 6 with  INFUSION CHAIR 12   [facility] [place] [facility] ([facility])    [facility] [place] [place]  [address]  [place] [address]   [phone]            Apr 12, 2018  9:15 AM CDT   Return Visit with Radha Piedra MD   [facility] [place] ([facility])    [facility] [place] [place]  [address]  [place] [address] [phone]              Who to contact     If you have questions or need follow up information about today's  "clinic visit or your schedule please contact [facility] directly at [phone].  Normal or non-critical lab and imaging results will be communicated to you by MyChart, letter or phone within 4 business days after the clinic has received the results. If you do not hear from us within 7 days, please contact the clinic through obopayhart or phone. If you have a critical or abnormal lab result, we will notify you by phone as soon as possible.  Submit refill requests through [employer] or call your pharmacy and they will forward the refill request to us. Please allow 3 business days for your refill to be completed.          Additional Information About Your Visit        MyChart Information     [employer] lets you send messages to your doctor, view your test results, renew your prescriptions, schedule appointments and more. To sign up, go to [email or web address] . Click on \"Log in\" on the left side of the screen, which will take you to the Welcome page. Then click on \"Sign up Now\" on the right side of the page.     You will be asked to enter the access code listed below, as well as some personal information. Please follow the directions to create your username and password.     Your access code is: [email or web address]  Expires: 2018  7:56 AM     Your access code will  in 90 days. If you need help or a new code, please call your [facility] clinic or [phone].        Care EveryWhere ID     This is your Care EveryWhere ID. This could be used by other organizations to access your [facility] medical records  [id number]         Blood Pressure from Last 3 Encounters:   18 124/79   18 149/69   18 (!) 82/63    Weight from Last 3 Encounters:   18 94.5 kg (208 lb 6.4 oz)   18 94.3 kg (208 lb)   18 96.5 kg (212 lb 12.8 oz)              Today, you had the following     No orders found for display       Primary Care Provider Office Phone # Fax #    Amaya Howard [phone] " [phone]       [facility] [address] [facility] [address]        Equal Access to Services     HAYDEE IVERSON : Hadii aad ku hadida Pierce, wavanessada luqdarlin, qaybta kamary annda ross, john daliain hayaaandrae benavidezslime tao mart maya. So [facility] [phone].    ATENCIÓN: Si habla español, tiene a schmitt disposición servicios gratuitos de asistencia lingüística. Wiley al [phone].    We comply with applicable federal civil rights laws and [place] laws. We do not discriminate on the basis of race, color, national origin, age, disability, sex, sexual orientation, or gender identity.            Thank you!     Thank you for choosing [facility]  for your care. Our goal is always to provide you with excellent care. Hearing back from our patients is one way we can continue to improve our services. Please take a few minutes to complete the written survey that you may receive in the mail after your visit with us. Thank you!             Your Updated Medication List - Protect others around you: Learn how to safely use, store and throw away your medicines at www.disposemymeds.org.          This list is accurate as of 3/7/18 11:51 AM.  Always use your most recent med list.                   Brand Name Dispense Instructions for use Diagnosis    ACETAMINOPHEN PO      Take 500 mg by mouth every 4 hours as needed for pain        albuterol 108 (90 BASE) MCG/ACT Inhaler    PROAIR HFA/PROVENTIL HFA/VENTOLIN HFA    1 Inhaler    Inhale 2 puffs into the lungs every 6 hours as needed for shortness of breath / dyspnea or wheezing    Reactive airway disease without complication, unspecified asthma severity, unspecified whether persistent       CALCIUM-MAGNESIUM-ZINC PO      Take 1 tablet by mouth daily        CENTRUM SILVER per tablet     100 tablet    Take 1 tablet by mouth daily.        chlorthalidone 25 MG tablet    HYGROTON    30 tablet    Take 1 tablet (25 mg) by mouth daily as needed    HTN (hypertension)        fondaparinux 7.5MG/0.6ML injection    ARIXTRA    90 Syringe    Inject 0.6 mLs (7.5 mg) Subcutaneous every 24 hours    PE (pulmonary thromboembolism) (H)       lidocaine 5 % ointment    XYLOCAINE    5 g    Apply 0.5 g topically as needed for moderate pain Apply pea size amount to rectal area for pain        lidocaine-prilocaine cream    EMLA    30 g    Apply topically as needed for moderate pain Apply dollop size amount to port site 30-60 minutes prior to accessing.    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       lisinopril 10 MG tablet    PRINIVIL/ZESTRIL    30 tablet    Take 1 tablet (10 mg) by mouth daily    HTN (hypertension)       LORazepam 0.5 MG tablet    ATIVAN    30 tablet    Take 1 tablet (0.5 mg) by mouth every 4 hours as needed (Anxiety, Nausea/Vomiting or Sleep)    Malignant neoplasm of hepatic flexure (H)       methylphenidate 5 MG tablet    RITALIN    60 tablet    Take 1 tablet (5 mg) by mouth 2 times daily        ondansetron 8 MG tablet    ZOFRAN    10 tablet    Take 1 tablet (8 mg) by mouth every 8 hours as needed (Nausea/Vomiting)    Malignant neoplasm of hepatic flexure (H)       order for DME     1 Device    Equipment being ordered: Wheelchair    Malignant neoplasm of colon, unspecified part of colon (H), VTE (venous thromboembolism), PE (pulmonary thromboembolism) (H), Pain of left lower leg       oxyCODONE IR 5 MG tablet    ROXICODONE    50 tablet    Take 1-2 tablets (5-10 mg) by mouth every 4 hours as needed for pain maximum 12 tablet(s) per day    Malignant neoplasm of colon, unspecified part of colon (H)       Potassium Chloride ER 20 MEQ Tbcr     30 tablet    Take 1 tablet (20 mEq) by mouth daily    Hypokalemia       prochlorperazine 10 MG tablet    COMPAZINE    30 tablet    Take 1 tablet (10 mg) by mouth every 6 hours as needed (Nausea/Vomiting)    Malignant neoplasm of hepatic flexure (H)       sennosides 8.6 MG tablet    SENOKOT     Take 1 tablet by mouth 2  times daily as needed for constipation        sertraline 50 MG tablet    ZOLOFT    30 tablet    Take 1 tablet daily.    Malignant neoplasm of hepatic flexure (H)       VICKS NYQUIL COLD & FLU 15-6. MG/15ML Liqd   Generic drug:  DM-Doxylamine-Acetaminophen      Take 15 mLs by mouth nightly as needed        VITAMIN D (CHOLECALCIFEROL) PO      Take 1 tablet by mouth daily (OTC: Pt unsure of strength)

## 2018-03-07 NOTE — PATIENT INSTRUCTIONS
FOLFOX next cycle on 3/14   Scheduled - Charissa  RTC NP on 3/28  Scheduled (per Dr. Piedra, ok to schedule 3/29, Chris not in 3/28)  See me 4/11   Scheduled - Charissa    Per patient, home infusion goes to her home to disconnect pump    AVS given to patient - Charissa

## 2018-03-07 NOTE — MR AVS SNAPSHOT
After Visit Summary   3/7/2018    Liv Sylvester    MRN: [id number]           Patient Information     Date Of Birth          1950        Visit Information        Provider Department      3/7/2018 9:15 AM Radha Piedra MD [facility]        Care Instructions    FOLFOX next cycle on 3/14  RTC NP on 3/28  See me 4/11          Follow-ups after your visit        Your next 10 appointments already scheduled     Mar 14, 2018  8:30 AM CDT   Level 6 with SH INFUSION CHAIR 8   [facility] ([facility])    [facility] [place] [id number]  [place] [address]   [phone]            Mar 15, 2018  8:00 AM CDT   New Visit with Loren Hahn RD, LD   [facility] ([facility])    [facility]  [address]  [place] [address]   [phone]            Mar 16, 2018 10:00 AM CDT   Level 1 with SH INFUSION CHAIR 16   [facility] ([facility])    [facility] [place] [id number]  [place] [address] [phone]            Mar 29, 2018  8:30 AM CDT   Level 6 with SH INFUSION CHAIR 10   [facility] ([facility])    [facility]  [address]  [place] [address]   [phone]            Apr 12, 2018  8:30 AM CDT   Level 6 with SH INFUSION CHAIR 12   [facility] ([facility])    [facility]  [address]  [place] [address]   [phone]            Apr 12, 2018  9:15 AM CDT   Return Visit with Radha Piedra MD   [facility] ([facility])    [facility]  [address]  [place] [address] [phone]              Who to contact     If you have questions or need follow  "up information about today's clinic visit or your schedule please contact [facility] directly at [phone].  Normal or non-critical lab and imaging results will be communicated to you by [employer] Medshart, letter or phone within 4 business days after the clinic has received the results. If you do not hear from us within 7 days, please contact the clinic through [employer] Med[employer] or phone. If you have a critical or abnormal lab result, we will notify you by phone as soon as possible.  Submit refill requests through [employer] or call your pharmacy and they will forward the refill request to us. Please allow 3 business days for your refill to be completed.          Additional Information About Your Visit        QoL Medshart Information     R[employer]OMERS lets you send messages to your doctor, view your test results, renew your prescriptions, schedule appointments and more. To sign up, go to [email or web address] . Click on \"Log in\" on the left side of the screen, which will take you to the Welcome page. Then click on \"Sign up Now\" on the right side of the page.     You will be asked to enter the access code listed below, as well as some personal information. Please follow the directions to create your username and password.     Your access code is: [email or web address]  Expires: 2018  7:56 AM     Your access code will  in 90 days. If you need help or a new code, please call your [facility] clinic or [phone].        Care EveryWhere ID     This is your Care EveryWhere ID. This could be used by other organizations to access your [facility] medical records  [id number]        Your Vitals Were     Pulse Temperature Respirations Pulse Oximetry BMI (Body Mass Index)       136 98.2  F (36.8  C) (Oral) 18 100% 29.9 kg/m2        Blood Pressure from Last 3 Encounters:   18 124/79   18 149/69   18 (!) 82/63    Weight from Last 3 Encounters:   18 94.5 kg (208 lb 6.4 oz)   18 94.3 kg (208 lb)   18 96.5 kg " (212 lb 12.8 oz)              Today, you had the following     No orders found for display       Primary Care Provider Office Phone # Fax #    Amaya Howard [phone]       [facility] [address] [facility] [address]        Equal Access to Services     HAYDEE IVERSON : Hadii anthony ku hadasho Soomaali, waaxda luqadaha, qaybta kaalmada adeegyada, waxay daliain hayeva millerdemondjoe maya. So [facility] [phone].    ATENCIÓN: Si habla español, tiene a schmitt disposición servicios gratuitos de asistencia lingüística. Wiley al [phone].    We comply with applicable federal civil rights laws and [place] laws. We do not discriminate on the basis of race, color, national origin, age, disability, sex, sexual orientation, or gender identity.            Thank you!     Thank you for choosing [facility]  for your care. Our goal is always to provide you with excellent care. Hearing back from our patients is one way we can continue to improve our services. Please take a few minutes to complete the written survey that you may receive in the mail after your visit with us. Thank you!             Your Updated Medication List - Protect others around you: Learn how to safely use, store and throw away your medicines at www.disposemymeds.org.          This list is accurate as of 3/7/18 10:09 AM.  Always use your most recent med list.                   Brand Name Dispense Instructions for use Diagnosis    ACETAMINOPHEN PO      Take 500 mg by mouth every 4 hours as needed for pain        albuterol 108 (90 BASE) MCG/ACT Inhaler    PROAIR HFA/PROVENTIL HFA/VENTOLIN HFA    1 Inhaler    Inhale 2 puffs into the lungs every 6 hours as needed for shortness of breath / dyspnea or wheezing    Reactive airway disease without complication, unspecified asthma severity, unspecified whether persistent       CALCIUM-MAGNESIUM-ZINC PO      Take 1 tablet by mouth daily        CENTRUM SILVER per tablet     100 tablet     Take 1 tablet by mouth daily.        chlorthalidone 25 MG tablet    HYGROTON    30 tablet    Take 1 tablet (25 mg) by mouth daily as needed    HTN (hypertension)       fondaparinux 7.5MG/0.6ML injection    ARIXTRA    90 Syringe    Inject 0.6 mLs (7.5 mg) Subcutaneous every 24 hours    PE (pulmonary thromboembolism) (H)       lidocaine 5 % ointment    XYLOCAINE    5 g    Apply 0.5 g topically as needed for moderate pain Apply pea size amount to rectal area for pain        lidocaine-prilocaine cream    EMLA    30 g    Apply topically as needed for moderate pain Apply dollop size amount to port site 30-60 minutes prior to accessing.    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       lisinopril 10 MG tablet    PRINIVIL/ZESTRIL    30 tablet    Take 1 tablet (10 mg) by mouth daily    HTN (hypertension)       LORazepam 0.5 MG tablet    ATIVAN    30 tablet    Take 1 tablet (0.5 mg) by mouth every 4 hours as needed (Anxiety, Nausea/Vomiting or Sleep)    Malignant neoplasm of hepatic flexure (H)       methylphenidate 5 MG tablet    RITALIN    60 tablet    Take 1 tablet (5 mg) by mouth 2 times daily        ondansetron 8 MG tablet    ZOFRAN    10 tablet    Take 1 tablet (8 mg) by mouth every 8 hours as needed (Nausea/Vomiting)    Malignant neoplasm of hepatic flexure (H)       order for DME     1 Device    Equipment being ordered: Wheelchair    Malignant neoplasm of colon, unspecified part of colon (H), VTE (venous thromboembolism), PE (pulmonary thromboembolism) (H), Pain of left lower leg       oxyCODONE IR 5 MG tablet    ROXICODONE    50 tablet    Take 1-2 tablets (5-10 mg) by mouth every 4 hours as needed for pain maximum 12 tablet(s) per day    Malignant neoplasm of colon, unspecified part of colon (H)       Potassium Chloride ER 20 MEQ Tbcr     30 tablet    Take 1 tablet (20 mEq) by mouth daily    Hypokalemia       prochlorperazine 10 MG tablet    COMPAZINE    30 tablet    Take 1 tablet (10 mg) by  mouth every 6 hours as needed (Nausea/Vomiting)    Malignant neoplasm of hepatic flexure (H)       sennosides 8.6 MG tablet    SENOKOT     Take 1 tablet by mouth 2 times daily as needed for constipation        sertraline 50 MG tablet    ZOLOFT    30 tablet    Take 1 tablet daily.    Malignant neoplasm of hepatic flexure (H)       VICKS NYQUIL COLD & FLU 15-6. MG/15ML Liqd   Generic drug:  DM-Doxylamine-Acetaminophen      Take 15 mLs by mouth nightly as needed        VITAMIN D (CHOLECALCIFEROL) PO      Take 1 tablet by mouth daily (OTC: Pt unsure of strength)

## 2018-03-07 NOTE — PROGRESS NOTES
[facility]  HEMATOLOGY ONCOLOGY    ONCOLOGY FOLLOWUP NOTE      DIAGNOSIS:    1- Metastatic colon cancer in a patient with history of stage 1A grade 3 endometrial carcinoma approximately around 2012.     On 09/29/2017, Liv Sylvester presented with progressive fatigue and right upper quadrant pain.  CT scan 09/29/2017 showed partially contracted bladder and findings suspicious for liver metastases.  There were multiple defined hypoechoic foci scoped scattered throughout the liver, the largest was in the right lobe.  CT was also showing enlargement of hepatic and retroperitoneal lymph nodes.  On 09/30/2017, she underwent a colonoscopy, which showed a fungating, infiltrative, ulcerated, partially obstructing mass at the hepatic flexure.  Pathology is consistent with moderately differentiated adenocarcinoma with normal mismatch repair protein expression.   2- Significant iron deficiency anemia.   3- VTE- malignancy related     TREATMENT:  10/11/2017 FOLFOX and Avastin.   1/24/2018 discontinued Avastin.      SUBJECTIVE:  The patient was seen as a followup today. She is feeling better now. She is less fatigued. Oral intake is better.     REVIEW OF SYSTEMS:  A complete review of systems was performed and found to be negative other than pertinent positives mentioned in history of present illness.      Past medical, social histories reviewed.     Meds- Reviewed.     PHYSICAL EXAMINATION:   VITAL SIGNS:/79 (BP Location: Left arm, Patient Position: Sitting, Cuff Size: Adult Large)  Pulse 136  Temp 98.2  F (36.8  C) (Oral)  Resp 18  Wt 94.5 kg (208 lb 6.4 oz)  SpO2 100%  BMI 29.9 kg/m2  CONSTITUTIONAL: Appears tired.   HEENT: Pupils are equal. Oropharynx is clear.   NECK: No cervical or supraclavicular lymphadenopathy.   RESPIRATORY: Clear bilaterally.   CARD/VASC: S1, S2, regular.   GI: Soft, nontender, nondistended, no hepatosplenomegaly.   MUSKULOSKELETAL: Warm, well perfused.   NEUROLOGIC:  Alert, awake.   INTEGUMENT: No rash.   LYMPHATICS: No edema.   PSYCH: Mood and affect was normal.     LABORATORY DATA AND IMAGING REVIEWED DURING THIS VISIT:  Recent Labs   Lab Test  03/01/18   0530  02/28/18   1448   NA  139  136   POTASSIUM  3.6  3.8   CHLORIDE  106  101   CO2  26  26   ANIONGAP  7  9   BUN  17  20   CR  0.71  0.98   GLC  93  81   JESÚS  8.4*  9.8     Recent Labs   Lab Test  03/01/18   0530  02/28/18   1448  02/23/18   0920   WBC  30.6*  40.9*  4.4   HGB  9.6*  11.4*  9.8*   PLT  164  Platelets clumped, platelet count unavailable  144*   MCV  96  96  96   NEUTROPHIL  91.0  88.2  66.7     Recent Labs   Lab Test  02/28/18   1448  02/23/18   0920  02/21/18   1857   BILITOTAL  0.5  0.5  0.7   ALKPHOS  191*  111  112   ALT  32  44  53*   AST  41  50*  65*   ALBUMIN  3.4  3.0*  3.0*       ECOG performance status 2     ASSESSMENT:    1- This is a 67-year-old lady with stage IV metastatic colon cancer with normal mismatch repair protein expression.  She has multiple liver metastases and metastasis in the abdominal lymph nodes.     She was started on palliative intent combination of FOLFOX with Avastin which is given every 2 weeks intravenously.      CT scan 2/19/18 showed generally stable disease indicating a positive response to treatment.     - Labs were reviewed with the patient, leukocytosis is likely reactive, normocytic anemia due to chemotherapy. Monitor.     She was admitted to the hospital due to nausea vomiting and dehydration from chemotherapy. She has improved now. I will decrease the dose of Oxaliplatin to 60 mg/m2 from her next cycle.      2- She also has iron deficiency anemia due to chronic blood loss from her cancer.  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     3- VTE/ recurrent episodes of thrombosis.  She is on Arixtra.      PLAN:   FOLFOX next cycle on 3/14  RTC NP on 3/28  See me 4/11    SIDRA NAILS MD    3/7/2018

## 2018-03-08 NOTE — TELEPHONE ENCOUNTER
Spoke with [facility] pharmacy about Liv's if fluid orders:  Per  they are to go as follows:    1 L NS 2-3 days after chemo pump disconnect then 1L fluid every other day.

## 2018-03-12 NOTE — PROGRESS NOTES
This is a recent snapshot of the patient's [facility] medical record.  For current drug dose and complete information and questions, call [phone] or In Basket pool, fv home infusion ([id number])  CSN Number:  [id number]

## 2018-03-13 NOTE — PROGRESS NOTES
This is a recent snapshot of the patient's [facility] medical record.  For current drug dose and complete information and questions, call [phone] or In Basket pool, fv home infusion ([id number])  CSN Number:  [id number]

## 2018-03-14 NOTE — MR AVS SNAPSHOT
After Visit Summary   3/14/2018    Liv Sylvester    MRN: [id number]           Patient Information     Date Of Birth          1950        Visit Information        Provider Department      3/14/2018 8:30 AM  INFUSION CHAIR 8 [facility]        Today's Diagnoses     Colon Cancer, with Liver Met (Stage IV)    -  1       Follow-ups after your visit        Follow-up notes from your care team     Return in 2 weeks (on 3/29/2018).      Your next 10 appointments already scheduled     Mar 15, 2018  8:00 AM CDT   New Visit with Loren Hahn RD, LD   [facility])    [facility]  [address]  [place] [address] [phone]            Mar 29, 2018  8:30 AM CDT   Level 6 with  INFUSION CHAIR 10   [facility] ([facility])    [facility]  [address]  [place] [address] [phone]            Mar 29, 2018  9:00 AM CDT   Return Visit with TITO Briones CNP   [facility])    [facility]  [address]  [place] [address] [phone]            Apr 12, 2018  8:30 AM CDT   Level 6 with  INFUSION CHAIR 12   [facility] ([facility])    [facility]  [address]  [place] [address] [phone]            Apr 12, 2018  9:15 AM CDT   Return Visit with Radha Piedra MD   [facility])    [facility]  [address]  [place] [address] [phone]              Who to contact     If you have questions or need follow up information about today's clinic visit or your schedule please contact [facility] directly at [phone].  Normal or  "non-critical lab and imaging results will be communicated to you by MyChart, letter or phone within 4 business days after the clinic has received the results. If you do not hear from us within 7 days, please contact the clinic through Xceliantt or phone. If you have a critical or abnormal lab result, we will notify you by phone as soon as possible.  Submit refill requests through [employer] or call your pharmacy and they will forward the refill request to us. Please allow 3 business days for your refill to be completed.          Additional Information About Your Visit        [employer]pex[employer]ak[employer]FIZZA Information     [employer] lets you send messages to your doctor, view your test results, renew your prescriptions, schedule appointments and more. To sign up, go to [email or web address] . Click on \"Log in\" on the left side of the screen, which will take you to the Welcome page. Then click on \"Sign up Now\" on the right side of the page.     You will be asked to enter the access code listed below, as well as some personal information. Please follow the directions to create your username and password.     Your access code is: [email or web address]  Expires: 2018  8:56 AM     Your access code will  in 90 days. If you need help or a new code, please call your [facility] clinic or [phone].        Care EveryWhere ID     This is your Care EveryWhere ID. This could be used by other organizations to access your [facility] medical records  [id number]        Your Vitals Were     Pulse Temperature Respirations Height BMI (Body Mass Index)       95 98.2  F (36.8  C) (Oral) 14 1.778 m (5' 10\") 29.41 kg/m2        Blood Pressure from Last 3 Encounters:   18 122/72   18 124/79   18 149/69    Weight from Last 3 Encounters:   18 93 kg (205 lb)   18 94.5 kg (208 lb 6.4 oz)   18 94.3 kg (208 lb)              We Performed the Following     CBC with platelets differential     Comprehensive metabolic panel        Primary Care " Provider Office Phone # Fax #    Amaya Howard [phone]       [facility] [address] [facility] [address]        Equal Access to Services     HAYDEE IVERSON : Hadii anthony ku elainao Socelyali, waaxda luqadaha, qaybta kaalmada adeegyada, john tao laAlexsandraeva maya. So [facility] [phone].    ATENCIÓN: Si habla español, tiene a schmitt disposición servicios gratuitos de asistencia lingüística. Wiley al [phone].    We comply with applicable federal civil rights laws and [place] laws. We do not discriminate on the basis of race, color, national origin, age, disability, sex, sexual orientation, or gender identity.            Thank you!     Thank you for choosing [facility]  for your care. Our goal is always to provide you with excellent care. Hearing back from our patients is one way we can continue to improve our services. Please take a few minutes to complete the written survey that you may receive in the mail after your visit with us. Thank you!             Your Updated Medication List - Protect others around you: Learn how to safely use, store and throw away your medicines at www.disposemymeds.org.          This list is accurate as of 3/14/18  1:57 PM.  Always use your most recent med list.                   Brand Name Dispense Instructions for use Diagnosis    ACETAMINOPHEN PO      Take 500 mg by mouth every 4 hours as needed for pain        albuterol 108 (90 BASE) MCG/ACT Inhaler    PROAIR HFA/PROVENTIL HFA/VENTOLIN HFA    1 Inhaler    Inhale 2 puffs into the lungs every 6 hours as needed for shortness of breath / dyspnea or wheezing    Reactive airway disease without complication, unspecified asthma severity, unspecified whether persistent       CALCIUM-MAGNESIUM-ZINC PO      Take 1 tablet by mouth daily        CENTRUM SILVER per tablet     100 tablet    Take 1 tablet by mouth daily.        chlorthalidone 25 MG tablet    HYGROTON    30 tablet     Take 1 tablet (25 mg) by mouth daily as needed    HTN (hypertension)       fondaparinux 7.5MG/0.6ML injection    ARIXTRA    90 Syringe    Inject 0.6 mLs (7.5 mg) Subcutaneous every 24 hours    PE (pulmonary thromboembolism) (H)       lidocaine 5 % ointment    XYLOCAINE    5 g    Apply 0.5 g topically as needed for moderate pain Apply pea size amount to rectal area for pain        lidocaine-prilocaine cream    EMLA    30 g    Apply topically as needed for moderate pain Apply dollop size amount to port site 30-60 minutes prior to accessing.    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       lisinopril 10 MG tablet    PRINIVIL/ZESTRIL    30 tablet    Take 1 tablet (10 mg) by mouth daily    HTN (hypertension)       LORazepam 0.5 MG tablet    ATIVAN    30 tablet    Take 1 tablet (0.5 mg) by mouth every 4 hours as needed (Anxiety, Nausea/Vomiting or Sleep)    Malignant neoplasm of hepatic flexure (H)       methylphenidate 5 MG tablet    RITALIN    60 tablet    Take 1 tablet (5 mg) by mouth 2 times daily        ondansetron 8 MG tablet    ZOFRAN    10 tablet    Take 1 tablet (8 mg) by mouth every 8 hours as needed (Nausea/Vomiting)    Malignant neoplasm of hepatic flexure (H)       order for DME     1 Device    Equipment being ordered: Wheelchair    Malignant neoplasm of colon, unspecified part of colon (H), VTE (venous thromboembolism), PE (pulmonary thromboembolism) (H), Pain of left lower leg       oxyCODONE IR 5 MG tablet    ROXICODONE    50 tablet    Take 1-2 tablets (5-10 mg) by mouth every 4 hours as needed for pain maximum 12 tablet(s) per day    Malignant neoplasm of colon, unspecified part of colon (H)       Potassium Chloride ER 20 MEQ Tbcr     30 tablet    Take 1 tablet (20 mEq) by mouth daily    Hypokalemia       prochlorperazine 10 MG tablet    COMPAZINE    30 tablet    Take 1 tablet (10 mg) by mouth every 6 hours as needed (Nausea/Vomiting)    Malignant neoplasm of hepatic flexure (H)        sennosides 8.6 MG tablet    SENOKOT     Take 1 tablet by mouth 2 times daily as needed for constipation        sertraline 50 MG tablet    ZOLOFT    30 tablet    Take 1 tablet daily.    Malignant neoplasm of hepatic flexure (H)       VICKS NYQUIL COLD & FLU 15-6. MG/15ML Liqd   Generic drug:  DM-Doxylamine-Acetaminophen      Take 15 mLs by mouth nightly as needed        VITAMIN D (CHOLECALCIFEROL) PO      Take 1 tablet by mouth daily (OTC: Pt unsure of strength)

## 2018-03-14 NOTE — PROGRESS NOTES
Infusion Nursing Note:  Liv Sylvester presents today for chemotherapy.    Patient seen by provider today: No   present during visit today: Not Applicable.    Note: N/A.    Intravenous Access:  Labs drawn without difficulty.  Implanted Port.    Treatment Conditions:  Lab Results   Component Value Date    HGB 9.9 03/14/2018     Lab Results   Component Value Date    WBC 7.3 03/14/2018      Lab Results   Component Value Date    ANEU 5.4 03/14/2018     Lab Results   Component Value Date     03/14/2018      Lab Results   Component Value Date     03/14/2018                   Lab Results   Component Value Date    POTASSIUM 3.4 03/14/2018           No results found for: MAG         Lab Results   Component Value Date    CR 0.59 03/14/2018                   Lab Results   Component Value Date    JESÚS 9.2 03/14/2018                Lab Results   Component Value Date    BILITOTAL 0.7 03/14/2018           Lab Results   Component Value Date    ALBUMIN 2.8 03/14/2018                    Lab Results   Component Value Date    ALT 20 03/14/2018           Lab Results   Component Value Date    AST 54 03/14/2018       Results reviewed, labs MET treatment parameters, ok to proceed with treatment.      Post Infusion Assessment:  Patient tolerated infusion without incident.  Blood return noted pre and post infusion.  Site patent and intact, free from redness, edema or discomfort.  No evidence of extravasations.  Your chemo Pump will infuse for 46 hours. If at any time the pump should malfunction or you have pump concerns, please contact [facility] after hours at [phone].You will need an appointment for the pump disconnect in our clinic or with [facility].for Friday 3- with  home infusion at 11am which was made pt will also get Neulasta as well.     .                                                  Discharge Plan:   Discharge instructions reviewed with: Patient.  Patient and/or  family verbalized understanding of discharge instructions and all questions answered.  Copy of AVS reviewed with patient and/or family.  Patient will return 3/29/2018 for next appointment.  Patient discharged in stable condition accompanied by: self.  Departure Mode: ambulatory with walker.    Carmenza Rayo RN

## 2018-03-15 NOTE — TELEPHONE ENCOUNTER
I received a call from Occupational therapy requesting a verbal ol to proceed with OT for  Home safety, equipment recommendation, and upper extremity exercises. Debbie Dasilva

## 2018-03-16 NOTE — TELEPHONE ENCOUNTER
I received a VM from Joyce of [facility] and provided patient's vitals of 108/62, 78, 18, 99.6.  She stated that patient had some generalized nausea but states the patient feel much better after this round of treatment then previous rounds. She is receiving a liter of NS today.    I will follow up on Monday or have Laina MEJIA to follow up on Tuesday as needed. Debbie Dasilva

## 2018-03-16 NOTE — PROGRESS NOTES
This is a recent snapshot of the patient's [facility] medical record.  For current drug dose and complete information and questions, call [phone] or In Basket pool, fv home infusion ([id number])  CSN Number:  [id number]

## 2018-03-16 NOTE — TELEPHONE ENCOUNTER
"I received a message from Natty with an update from 3/15 visit. Patient reported a fall on Tuesday and informed nurse that she did hit her head, but did not lose consciousness. Head was examined by nurse no apparent bruising, pain, or discoloration. Vitals  Were taken as a part of the routine visit 92/72, 72/56, 112/60 post PT exercises, and 92/62 with the heart rate in the 115's to 120's .Patient will be doing a 60 day trial of Life Alert starting today.    I called the patient and she verbalizes feeling great this morning.  I asked her questions regarding the fall and she stated \" I didn't get hurt it was on carpet\", I asked if she felt dizzy, confused, headache, or nausea and she denied any changes.     Patient reports drinking well which is water and gatorade mostly. Appetite is poor but that is not a change. A [facility] nurse will be out today to disconnect pump and give 1 liter of NS at 11:30 today. I have called [facility] requesting to speak wit the [facility] nurse is scheduled to provide services for patient, and it is  Joyce [facility] [phone].     I spoke with Joyce and she will give a liter of IVF's and call with patient's assessment and vitals. Debbie Dasilva    "

## 2018-03-19 NOTE — TELEPHONE ENCOUNTER
Please call home care RN to recertify  patient's home care orders and to receive her blood pressure readings. Call Anali back at [phone]. Joyce Betts RN,BSN,OCN

## 2018-03-19 NOTE — PROGRESS NOTES
This is a recent snapshot of the patient's [facility] medical record.  For current drug dose and complete information and questions, call [phone] or In Basket pool, fv home infusion ([id number])  CSN Number:  [id number]

## 2018-03-19 NOTE — TELEPHONE ENCOUNTER
I received a call from Sabrina MEJIA with [facility] she was out to see patient today and reports that patient is doing well. She received IVF's daily post treatment and now will go to every other day. Vitals 105/62, and heart rate of 98. She appears well hydrated with no complaints per Sabrina [facility] RN assessment.  Appetite continues to be poor, Sabrina MEJIA advised Boost & Ensure. Patient open to the suggestion and informed her that she would have someone pick some up for her.     Message to be forwarded to Laina MEJIA to follow as needed. Debbie Dasilva

## 2018-03-20 NOTE — PROGRESS NOTES
This is a recent snapshot of the patient's [facility] medical record.  For current drug dose and complete information and questions, call [phone] or In Basket pool, fv home infusion ([id number])  CSN Number:  [id number]

## 2018-03-23 NOTE — PROGRESS NOTES
This is a recent snapshot of the patient's [facility] medical record.  For current drug dose and complete information and questions, call [phone] or In Basket pool, fv home infusion ([id number])  CSN Number:  [id number]

## 2018-03-29 NOTE — MR AVS SNAPSHOT
After Visit Summary   3/29/2018    Liv Sylvester    MRN: [id number]           Patient Information     Date Of Birth          1950        Visit Information        Provider Department      3/29/2018 9:00 AM Chris Llamas APRN CNP [facility]        Today's Diagnoses     Reactive airway disease without complication, unspecified asthma severity, unspecified whether persistent          Care Instructions        Ok to proceed with Chemo today     Keep all future appointments     Increase KCL 20 meq po x 5 days and go back to daily again     Arrange IV hydration at home via home health                   Follow-ups after your visit        Your next 10 appointments already scheduled     Mar 29, 2018 10:00 AM CDT   New Visit with Loren Hahn RD, LD   [facility] ([facility])    [facility]  [address]  [place] [address]   [phone]            Apr 12, 2018  8:30 AM CDT   Level 6 with  INFUSION CHAIR 12   [facility] ([facility])    [facility]  [address]  [place] [address]   [phone]            Apr 12, 2018  9:15 AM CDT   Return Visit with Radha Piedra MD   [facility] ([facility])    [facility]  [address]  [place] [address]   [phone]              Who to contact     If you have questions or need follow up information about today's clinic visit or your schedule please contact [facility] directly at [phone].  Normal or non-critical lab and imaging results will be communicated to you by MyChart, letter or phone within 4 business days after the clinic has received the results. If you do not hear from us within 7 days, please contact the clinic through MyChart or phone. If you have a critical or abnormal lab result, we will notify you by  "phone as soon as possible.  Submit refill requests through TUBE or call your pharmacy and they will forward the refill request to us. Please allow 3 business days for your refill to be completed.          Additional Information About Your Visit        TUBE Information     TUBE lets you send messages to your doctor, view your test results, renew your prescriptions, schedule appointments and more. To sign up, go to [email or web address] . Click on \"Log in\" on the left side of the screen, which will take you to the Welcome page. Then click on \"Sign up Now\" on the right side of the page.     You will be asked to enter the access code listed below, as well as some personal information. Please follow the directions to create your username and password.     Your access code is: [email or web address]  Expires: 2018  8:56 AM     Your access code will  in 90 days. If you need help or a new code, please call your [place] clinic or [phone].        Care EveryWhere ID     This is your Care EveryWhere ID. This could be used by other organizations to access your [facility] [place] medical records  [id number]        Your Vitals Were     Pulse Temperature Respirations BMI (Body Mass Index)          98 97.9  F (36.6  C) (Oral) 18 29.99 kg/m2         Blood Pressure from Last 3 Encounters:   18 112/65   18 112/65   18 122/72    Weight from Last 3 Encounters:   18 94.8 kg (209 lb)   18 94.8 kg (209 lb)   18 93 kg (205 lb)              Today, you had the following     No orders found for display         Today's Medication Changes          These changes are accurate as of 3/29/18  9:47 AM.  If you have any questions, ask your nurse or doctor.               Stop taking these medicines if you haven't already. Please contact your care team if you have questions.     lisinopril 10 MG tablet   Commonly known as:  PRINIVIL/ZESTRIL   Stopped by:  Chris Llamas APRN CNP                Where to get your " medicines      These medications were sent to [id number] - [facility] [address] AT [facility] [address]  [address], [facility] [address]     Phone:  [phone]     albuterol 108 (90 BASE) MCG/ACT Inhaler                Primary Care Provider Office Phone # Fax #    Amaya Howard [phone]       [facility] [address] [facility] [address]        Equal Access to Services     [facility] USAF Medical CenterOMAYRA : Hadii aad ku hadasho Soomaali, waaxda luqadaha, qaybta kaalmada adeegyada, waxay daliain hayaan adeslime cevallos . So [facility] [phone].    ATENCIÓN: Si habla español, tiene a schmitt disposición servicios gratuitos de asistencia lingüística. [facility] [phone].    We comply with applicable federal civil rights laws and [place] laws. We do not discriminate on the basis of race, color, national origin, age, disability, sex, sexual orientation, or gender identity.            Thank you!     Thank you for choosing [facility]  for your care. Our goal is always to provide you with excellent care. Hearing back from our patients is one way we can continue to improve our services. Please take a few minutes to complete the written survey that you may receive in the mail after your visit with us. Thank you!             Your Updated Medication List - Protect others around you: Learn how to safely use, store and throw away your medicines at www.disposemymeds.org.          This list is accurate as of 3/29/18  9:47 AM.  Always use your most recent med list.                   Brand Name Dispense Instructions for use Diagnosis    ACETAMINOPHEN PO      Take 500 mg by mouth every 4 hours as needed for pain        albuterol 108 (90 BASE) MCG/ACT Inhaler    PROAIR HFA/PROVENTIL HFA/VENTOLIN HFA    1 Inhaler    Inhale 2 puffs into the lungs every 6 hours as needed for shortness of breath / dyspnea or wheezing    Reactive airway disease without  complication, unspecified asthma severity, unspecified whether persistent       CALCIUM-MAGNESIUM-ZINC PO      Take 1 tablet by mouth daily        CENTRUM SILVER per tablet     100 tablet    Take 1 tablet by mouth daily.        chlorthalidone 25 MG tablet    HYGROTON    30 tablet    Take 1 tablet (25 mg) by mouth daily as needed    HTN (hypertension)       fondaparinux 7.5MG/0.6ML injection    ARIXTRA    90 Syringe    Inject 0.6 mLs (7.5 mg) Subcutaneous every 24 hours    PE (pulmonary thromboembolism) (H)       lidocaine 5 % ointment    XYLOCAINE    5 g    Apply 0.5 g topically as needed for moderate pain Apply pea size amount to rectal area for pain        lidocaine-prilocaine cream    EMLA    30 g    Apply topically as needed for moderate pain Apply dollop size amount to port site 30-60 minutes prior to accessing.    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       LORazepam 0.5 MG tablet    ATIVAN    30 tablet    Take 1 tablet (0.5 mg) by mouth every 4 hours as needed (Anxiety, Nausea/Vomiting or Sleep)    Malignant neoplasm of hepatic flexure (H)       methylphenidate 5 MG tablet    RITALIN    60 tablet    Take 1 tablet (5 mg) by mouth 2 times daily        ondansetron 8 MG tablet    ZOFRAN    10 tablet    Take 1 tablet (8 mg) by mouth every 8 hours as needed (Nausea/Vomiting)    Malignant neoplasm of hepatic flexure (H)       order for DME     1 Device    Equipment being ordered: Wheelchair    Malignant neoplasm of colon, unspecified part of colon (H), VTE (venous thromboembolism), PE (pulmonary thromboembolism) (H), Pain of left lower leg       oxyCODONE IR 5 MG tablet    ROXICODONE    50 tablet    Take 1-2 tablets (5-10 mg) by mouth every 4 hours as needed for pain maximum 12 tablet(s) per day    Malignant neoplasm of colon, unspecified part of colon (H)       Potassium Chloride ER 20 MEQ Tbcr     30 tablet    Take 1 tablet (20 mEq) by mouth daily    Hypokalemia       prochlorperazine  10 MG tablet    COMPAZINE    30 tablet    Take 1 tablet (10 mg) by mouth every 6 hours as needed (Nausea/Vomiting)    Malignant neoplasm of hepatic flexure (H)       sennosides 8.6 MG tablet    SENOKOT     Take 1 tablet by mouth 2 times daily as needed for constipation        sertraline 50 MG tablet    ZOLOFT    30 tablet    TAKE 1 TABLET BY MOUTH EVERY DAY    Malignant neoplasm of hepatic flexure (H)       VICKS NYQUIL COLD & FLU 15-6. MG/15ML Liqd   Generic drug:  DM-Doxylamine-Acetaminophen      Take 15 mLs by mouth nightly as needed        VITAMIN D (CHOLECALCIFEROL) PO      Take 1 tablet by mouth daily (OTC: Pt unsure of strength)

## 2018-03-29 NOTE — MR AVS SNAPSHOT
After Visit Summary   3/29/2018    Liv Sylvester    MRN: [id number]           Patient Information     Date Of Birth          1950        Visit Information        Provider Department      3/29/2018 8:30 AM  INFUSION CHAIR 10 [facility]        Today's Diagnoses     Colon Cancer, with Liver Met (Stage IV)    -  1      Care Instructions    Low potassium: increase the dose of potassium to twice daily for 5 days and continue with daily dose after that    For questions or concerns [facility] at [phone]. During business hours you can also call our office [phone].          Follow-ups after your visit        Your next 10 appointments already scheduled     Apr 12, 2018  8:30 AM CDT   Level 6 with  INFUSION CHAIR 12   [facility] ([facility])    [facility]  [address]  [facility] [id number] [phone]            Apr 12, 2018  9:15 AM CDT   Return Visit with Radha Piedra MD   [facility] ([facility])    [facility]  [address]  [facility] [id number] [phone]              Who to contact     If you have questions or need follow up information about today's clinic visit or your schedule please contact [facility] directly at [phone].  Normal or non-critical lab and imaging results will be communicated to you by MyChart, letter or phone within 4 business days after the clinic has received the results. If you do not hear from us within 7 days, please contact the clinic through MyChart or phone. If you have a critical or abnormal lab result, we will notify you by phone as soon as possible.  Submit refill requests through Authy or call your pharmacy and they will forward the refill request to us. Please allow 3 business days for your refill to be completed.           "Additional Information About Your Visit        MyChart Information     SunPods lets you send messages to your doctor, view your test results, renew your prescriptions, schedule appointments and more. To sign up, go to [email or web address] . Click on \"Log in\" on the left side of the screen, which will take you to the Welcome page. Then click on \"Sign up Now\" on the right side of the page.     You will be asked to enter the access code listed below, as well as some personal information. Please follow the directions to create your username and password.     Your access code is: [email or web address]  Expires: 2018  8:56 AM     Your access code will  in 90 days. If you need help or a new code, please call your [place] clinic or [phone].        Care EveryWhere ID     This is your Care EveryWhere ID. This could be used by other organizations to access your [facility] medical records  [id number]        Your Vitals Were     Pulse Temperature Respirations BMI (Body Mass Index)          84 97.9  F (36.6  C) (Oral) 16 29.99 kg/m2         Blood Pressure from Last 3 Encounters:   18 112/65   18 126/73   18 122/72    Weight from Last 3 Encounters:   18 94.8 kg (209 lb)   18 94.8 kg (209 lb)   18 93 kg (205 lb)              We Performed the Following     CBC with platelets differential     Comprehensive metabolic panel          Today's Medication Changes          These changes are accurate as of 3/29/18 12:09 PM.  If you have any questions, ask your nurse or doctor.               Stop taking these medicines if you haven't already. Please contact your care team if you have questions.     lisinopril 10 MG tablet   Commonly known as:  PRINIVIL/ZESTRIL   Stopped by:  Chris Llamas APRN CNP                Where to get your medicines      [facility] - [place], [place] - [address] AT [facility]  [address], [facility] [address]     Phone:  [phone]     albuterol 108 (90 BASE) MCG/ACT Inhaler                Primary Care Provider Office Phone # Fax #    Amaya Howard [phone]       [facility] [address] [facility] [address]        Equal Access to Services     HAYDEE IVERSON : Hadii aad ku hadasho Soomaali, waaxda luqadaha, qaybta kaalmada adeegyada, waxay idiin hayaan adeeg khdemondjoe ladeltan . So [facility] [phone].    ATENCIÓN: Si habla español, tiene a schmitt disposición servicios gratuitos de asistencia lingüística. Dagobertoame al [phone].    We comply with applicable federal civil rights laws and [place] laws. We do not discriminate on the basis of race, color, national origin, age, disability, sex, sexual orientation, or gender identity.            Thank you!     Thank you for choosing [facility]  for your care. Our goal is always to provide you with excellent care. Hearing back from our patients is one way we can continue to improve our services. Please take a few minutes to complete the written survey that you may receive in the mail after your visit with us. Thank you!             Your Updated Medication List - Protect others around you: Learn how to safely use, store and throw away your medicines at www.disposemymeds.org.          This list is accurate as of 3/29/18 12:09 PM.  Always use your most recent med list.                   Brand Name Dispense Instructions for use Diagnosis    ACETAMINOPHEN PO      Take 500 mg by mouth every 4 hours as needed for pain        albuterol 108 (90 BASE) MCG/ACT Inhaler    PROAIR HFA/PROVENTIL HFA/VENTOLIN HFA    1 Inhaler    Inhale 2 puffs into the lungs every 6 hours as needed for shortness of breath / dyspnea or wheezing    Reactive airway disease without complication, unspecified asthma severity, unspecified whether persistent       CALCIUM-MAGNESIUM-ZINC PO      Take 1 tablet by mouth daily        CENTRUM  SILVER per tablet     100 tablet    Take 1 tablet by mouth daily.        chlorthalidone 25 MG tablet    HYGROTON    30 tablet    Take 1 tablet (25 mg) by mouth daily as needed    HTN (hypertension)       fondaparinux 7.5MG/0.6ML injection    ARIXTRA    90 Syringe    Inject 0.6 mLs (7.5 mg) Subcutaneous every 24 hours    PE (pulmonary thromboembolism) (H)       lidocaine 5 % ointment    XYLOCAINE    5 g    Apply 0.5 g topically as needed for moderate pain Apply pea size amount to rectal area for pain        lidocaine-prilocaine cream    EMLA    30 g    Apply topically as needed for moderate pain Apply dollop size amount to port site 30-60 minutes prior to accessing.    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       LORazepam 0.5 MG tablet    ATIVAN    30 tablet    Take 1 tablet (0.5 mg) by mouth every 4 hours as needed (Anxiety, Nausea/Vomiting or Sleep)    Malignant neoplasm of hepatic flexure (H)       methylphenidate 5 MG tablet    RITALIN    60 tablet    Take 1 tablet (5 mg) by mouth 2 times daily        ondansetron 8 MG tablet    ZOFRAN    10 tablet    Take 1 tablet (8 mg) by mouth every 8 hours as needed (Nausea/Vomiting)    Malignant neoplasm of hepatic flexure (H)       order for DME     1 Device    Equipment being ordered: Wheelchair    Malignant neoplasm of colon, unspecified part of colon (H), VTE (venous thromboembolism), PE (pulmonary thromboembolism) (H), Pain of left lower leg       oxyCODONE IR 5 MG tablet    ROXICODONE    50 tablet    Take 1-2 tablets (5-10 mg) by mouth every 4 hours as needed for pain maximum 12 tablet(s) per day    Malignant neoplasm of colon, unspecified part of colon (H)       Potassium Chloride ER 20 MEQ Tbcr     30 tablet    Take 1 tablet (20 mEq) by mouth daily    Hypokalemia       prochlorperazine 10 MG tablet    COMPAZINE    30 tablet    Take 1 tablet (10 mg) by mouth every 6 hours as needed (Nausea/Vomiting)    Malignant neoplasm of hepatic flexure  (H)       sennosides 8.6 MG tablet    SENOKOT     Take 1 tablet by mouth 2 times daily as needed for constipation        sertraline 50 MG tablet    ZOLOFT    30 tablet    TAKE 1 TABLET BY MOUTH EVERY DAY    Malignant neoplasm of hepatic flexure (H)       VICKS NYQUIL COLD & FLU 15-6. MG/15ML Liqd   Generic drug:  DM-Doxylamine-Acetaminophen      Take 15 mLs by mouth nightly as needed        VITAMIN D (CHOLECALCIFEROL) PO      Take 1 tablet by mouth daily (OTC: Pt unsure of strength)

## 2018-03-29 NOTE — PROGRESS NOTES
"Oncology Rooming Note    March 29, 2018 8:36 AM   Liv Sylvester is a 67 year old female who presents for:    Chief Complaint   Patient presents with     Oncology Clinic Visit     Colon Cancer, with Liver Met (Stage IV)     Initial Vitals: /65  Pulse 98  Temp 97.9  F (36.6  C) (Oral)  Resp 18  Wt 94.8 kg (209 lb)  BMI 29.99 kg/m2 Estimated body mass index is 29.99 kg/(m^2) as calculated from the following:    Height as of 3/14/18: 1.778 m (5' 10\").    Weight as of this encounter: 94.8 kg (209 lb). Body surface area is 2.16 meters squared.  Data Unavailable Comment: Data Unavailable   No LMP recorded. Patient has had a hysterectomy.  Allergies reviewed: Yes  Medications reviewed: Yes    Medications: Medication refills not needed today.  Pharmacy name entered into [employer]:    [facility] [place], [place] - [address] [facility] - [place], [place] - [address] [facility] - [place], [place] - [address] AT [facility] [address]    Clinical concerns: no      5 minutes for nursing intake (face to face time)          Juliana Stewart MA            "

## 2018-03-29 NOTE — PATIENT INSTRUCTIONS
Low potassium: increase the dose of potassium to twice daily for 5 days and continue with daily dose after that    For questions or concerns call home [facility] at [phone]. During business hours you can also call our office [phone].

## 2018-03-29 NOTE — TELEPHONE ENCOUNTER
Call received from [facility] RN that Liv's blood pressure upon arrival was 178/98 pulse 102 and temp of 101.7.    Liv had already taken some tylenol.    She was seen by JASON Perez today prior to chemo and vitals were 112/65 pulse 98 and temp 97.7.    Reviewed with Dr. Piedra who would like her to be seen in the ED.    [facility] home care RN rechecked bp and temp prior to leaving : 138/64 pulse 101 and temp 100.9.    Still advised that she be evaluated in ED since her temp is above 100.3 and she is being treated with chemotherapy.    She was agreeable to this plan and will have one of her children take her to the ED.    Will follow up tomorrow.

## 2018-03-29 NOTE — MR AVS SNAPSHOT
"                  MRN:[id number]                      After Visit Summary   3/29/2018    Liv Sylvester    MRN: [id number]           Visit Information        Provider Department      3/29/2018 10:00 AM Loren Mcgowan, RD, LD [facility]        Your next 10 appointments already scheduled     2018  8:30 AM CDT   Level 6 with  INFUSION CHAIR 12   [facility] ([facility])    [facility]  [address]  [place] [address]   [phone]            2018  9:15 AM CDT   Return Visit with Radha Piedra MD   [facility] ([facility])    [facility]  [address]  [facility] [address]   [phone]              MyChart Information     [employer] lets you send messages to your doctor, view your test results, renew your prescriptions, schedule appointments and more. To sign up, go to [email or web address] . Click on \"Log in\" on the left side of the screen, which will take you to the Welcome page. Then click on \"Sign up Now\" on the right side of the page.     You will be asked to enter the access code listed below, as well as some personal information. Please follow the directions to create your username and password.     Your access code is: [email or web address]  Expires: 2018  8:56 AM     Your access code will  in 90 days. If you need help or a new code, please call your [place] clinic or [phone].        Care EveryWhere ID     This is your Care EveryWhere ID. This could be used by other organizations to access your [place] medical records  [id number]        Equal Access to Services     HAYDEE IVERSON : Jeffry Pierce, wiley michael, bhumika wadeallaith hawkins, john maya. So [facility] [phone].    ATENCIÓN: Si habla español, tiene a schmitt disposición servicios gratuitos de asistencia lingüística. " Wiley hay [phone].    We comply with applicable federal civil rights laws and [place] laws. We do not discriminate on the basis of race, color, national origin, age, disability, sex, sexual orientation, or gender identity.

## 2018-03-29 NOTE — PATIENT INSTRUCTIONS
Ok to proceed with Chemo today     Keep all future appointments     Increase KCL 20 meq po x 5 days and go back to daily again     Arrange IV hydration at home via home health

## 2018-03-29 NOTE — PROGRESS NOTES
"CLINICAL NUTRITION SERVICES - ASSESSMENT NOTE      REASON FOR ASSESSMENT  Liv Sylvester is a 67 year old female seen by the dietitian for Medical Nutrition Therapy related to colon cancer referred by Dr. Piedra.  Patient accompanied by self.    NUTRITION HISTORY  Factors affecting nutrition intake include: decreased appetite, early satiety and taste aversions  Patient states has not been able to eat for weeks but in the past few days she has started to have her appetite return.  Patient has been able to tolerate watermelon, pineapple, orange sherbet and baked beans.  Patient is unable to eat meat and does eat fish on occasion.    24 hour diet recall:   B: grits/oatmeal  L: macaroni and cheese + baked beans  D: cottage cheese  Beverages: water; Gatorade    PHYSICAL FINDINGS  Observed  No nutrition-related physical findings observed-(patient has long sleeve and worley over her head)  Obtained from Chart/Interdisciplinary Team  None noted    ANTHROPOMETRICS  Height: 5'10\"  Weight:  209 lbs  BMI: 29.9 kg/m2  IBW: 150 lbs +/-10%  % IBW: 138%  Adjusted Weight: 74.4  Weight History: Patient with 8% weight loss in 2 months (225 lbs (1/2018)-208 lbs (3/2018).  Wt Readings from Last 5 Encounters:   03/29/18 94.8 kg (209 lb)   03/29/18 94.8 kg (209 lb)   03/14/18 93 kg (205 lb)   03/07/18 94.5 kg (208 lb 6.4 oz)   02/28/18 94.3 kg (208 lb)     Dosing Weight: 74.7 Kg    LABS  Labs reviewed    MEDICATIONS/VITAMINS/MINERALS  Medications reviewed    PROCEDURES WITH NUTRITIONAL IMPLICATIONS  Chemotherapy-FOLFOX (Arixtra-food and drug interactions with avocado and celery)    ASSESSED NUTRITION NEEDS  Estimated Energy Needs: 5368-6047 kcals (25-30 Kcal/Kg)  Justification: overweight  Estimated Protein Needs:  protein (1.2-1.5 g pro/Kg)  Justification: preservation of lean body mass  Estimated Fluid Needs: 5931-1069 mL (25-30 mL/kg)    ASSESSED MALNUTRITION STATUS  % Intake: </= 50% for >/= 1 month (severe malnutrition)  % Weight " Loss: > 7.5% in 3 months (severe malnutrition)  Subcutaneous Fat Loss: None observed  Muscle Loss: None observed  Fluid/Edema: None noted  Weight Status: Overweight BMI 25-29.9  Severe malnutrition in the context of chronic illness    NUTRITION DIAGNOSIS  Unintended weight loss related to decreased appetite and po intake as evidenced by 8% weight loss in 2 months     INTERVENTIONS  Recommendations/Nutrition Prescription  1. Aim for 2000 calories and 80-90 grams protein per day  2. Eat 5-6 small meals per day     Implementation:  -Assessed learning needs and learning preferences  -Nutrition education provided:  --Discussed ways to maximize kcal and protein intake. Discussed calorie and protein needs for maintenance of weight and nutrition status. Advised pt to aim for 2000 kcal and 80-90 g protein via 5-6 small frequent meals.   -Provided pt high kcal, high protein recipes to try (smoothies, mashed potatoes, muffins etc). -Reviewed high protein, high kcal snacks (adding dry milk powder to foods). -Encouraged pt to start consuming 2 ONS daily.      Pt verbalizes understanding of materials provided during consult by stating will increase protein intake   Patient Understanding: good  Expected Compliance: fair-good    Goals  1.  Aim for 5-6 small frequent meals  2.  Aim for 2000 kcal and 80-90 g protein/day    Follow-Up Plans:  Pt.encouraged to follow up with RD in 2-4 weeks  RD name and number provided    Time spent with patient: 30 minutes    Marcio Hernandez, RD, LD  [facility] Outpatient Dietitian  [phone] (office phone)

## 2018-03-29 NOTE — PROGRESS NOTES
"Infusion Nursing Note:  Liv Sylvester presents today for FOLFOX C12D1.    Patient seen by provider today: Yes: JASON Perez   present during visit today: Not Applicable.    Note: pt will increase potassium to BID for 5 days because of low potassium today.     Intravenous Access:  Implanted Port.    Treatment Conditions:  Lab Results   Component Value Date    HGB 9.9 03/29/2018     Lab Results   Component Value Date    WBC 5.6 03/29/2018      Lab Results   Component Value Date    ANEU 4.1 03/29/2018     Lab Results   Component Value Date     03/29/2018      Lab Results   Component Value Date     03/29/2018                   Lab Results   Component Value Date    POTASSIUM 3.2 03/29/2018           No results found for: MAG         Lab Results   Component Value Date    CR 0.60 03/29/2018                   Lab Results   Component Value Date    JESÚS 9.1 03/29/2018                Lab Results   Component Value Date    BILITOTAL 0.4 03/29/2018           Lab Results   Component Value Date    ALBUMIN 2.8 03/29/2018                    Lab Results   Component Value Date    ALT 25 03/29/2018           Lab Results   Component Value Date    AST 57 03/29/2018       Results reviewed, labs MET treatment parameters, ok to proceed with treatment.    Post Infusion Assessment:  Patient tolerated infusion without incident.  Blood return noted pre and post infusion.  Site patent and intact, free from redness, edema or discomfort.  No evidence of extravasations.    Prior to discharge: Port is secured in place with tegaderm and flushed with 10cc NS with positive blood return noted.  Continuous home infusion Dosi-Fuser pump connected.    All connectors secured in place and clamps taped open.    Pump started, \"running\" noted on display (CADD): Not Applicable.  Patient instructed to call our clinic or [facility] with any questions or concerns at home.  Patient verbalized understanding.    Patient set up for pump " disconnect at home with [facility] on 3/31/2018 at 1000.      Discharge Plan:   Discharge instructions reviewed with: Patient.  Patient and/or family verbalized understanding of discharge instructions and all questions answered.  Copy of AVS reviewed with patient and/or family.  Patient will return 4/12/2018 for next appointment.  Patient discharged in stable condition accompanied by: self.  Departure Mode: Ambulatory.    Tanya Seymour RN

## 2018-03-29 NOTE — LETTER
"    3/29/2018         RE: Liv Sylvester  [address]   [place] [facility] [place] [address]        Dear Colleague,    Thank you for referring your patient, Liv Sylvester, to the [facility]. Please see a copy of my visit note below.    Oncology Rooming Note    March 29, 2018 8:36 AM   Liv Sylvester is a 67 year old female who presents for:    Chief Complaint   Patient presents with     Oncology Clinic Visit     Colon Cancer, with Liver Met (Stage IV)     Initial Vitals: /65  Pulse 98  Temp 97.9  F (36.6  C) (Oral)  Resp 18  Wt 94.8 kg (209 lb)  BMI 29.99 kg/m2 Estimated body mass index is 29.99 kg/(m^2) as calculated from the following:    Height as of 3/14/18: 1.778 m (5' 10\").    Weight as of this encounter: 94.8 kg (209 lb). Body surface area is 2.16 meters squared.  Data Unavailable Comment: Data Unavailable   No LMP recorded. Patient has had a hysterectomy.  Allergies reviewed: Yes  Medications reviewed: Yes    Medications: Medication refills not needed today.  Pharmacy name entered into [employer]:    [facility] - [place], [place] - [address] [facility] - [place], [place] - [address] [facility] - [place], [place] - [address] AT [facility] OF [address]    Clinical concerns: no      5 minutes for nursing intake (face to face time)          Juliana Stewart MA              Oncology/Hematology Visit Note  Mar 29, 2018    Reason for Visit: follow up of metastatic colon cancer    History of Present Illness: Liv Sylvester is a 67 year old female with Metastatic colon cancer with history of stage 1A grade 3 endometrial carcinoma approximately around 2012 . She is currently undergoing treatment with palliative intent combination of FOLFOX with Avastin which is given every 2 weeks intravenously. CT scan 2/19/18 showed generally stable disease indicating a positive response to treatment.    Her " oxaliplatin dose was reduced to 60 mg/m  on March 14 due to nausea vomiting , cold sensation to fingers .  she comes in today for routine follow up prior to cycle .    Interval History:  Patient reports improvement in symptoms since oxaliplatin  chemo dose reduction.  Her appetite is improving.  Food taste better .She denies nausea vomiting or diarrhea.  Denies abdominal pain.  She drinks plenty of fluids.  She also reports that the cold sensation in fingers has improved a lot.  She denies fever chills or sweats, denies cough, shortness of breath.  Her energy has improved.  She takes short frequent walks.  She drove herself to the clinic today.  She takes Arixtra, she denies edema, denies bleeding.    Review of Systems:  14 point ROS of systems including Constitutional, Eyes, Respiratory, Cardiovascular, Gastroenterology, Genitourinary, Integumentary, Muscularskeletal, Psychiatric were all negative except for pertinent positives noted in my HPI.      Current Outpatient Prescriptions   Medication Sig Dispense Refill     albuterol (PROAIR HFA/PROVENTIL HFA/VENTOLIN HFA) 108 (90 BASE) MCG/ACT Inhaler Inhale 2 puffs into the lungs every 6 hours as needed for shortness of breath / dyspnea or wheezing 1 Inhaler 3     sertraline (ZOLOFT) 50 MG tablet TAKE 1 TABLET BY MOUTH EVERY DAY 30 tablet 0     fondaparinux (ARIXTRA) 7.5MG/0.6ML injection Inject 0.6 mLs (7.5 mg) Subcutaneous every 24 hours 90 Syringe 0     methylphenidate (RITALIN) 5 MG tablet Take 1 tablet (5 mg) by mouth 2 times daily 60 tablet 0     chlorthalidone (HYGROTON) 25 MG tablet Take 1 tablet (25 mg) by mouth daily as needed 30 tablet 0     Potassium Chloride ER 20 MEQ TBCR Take 1 tablet (20 mEq) by mouth daily 30 tablet 0     oxyCODONE IR (ROXICODONE) 5 MG tablet Take 1-2 tablets (5-10 mg) by mouth every 4 hours as needed for pain maximum 12 tablet(s) per day 50 tablet 0     order for DME Equipment being ordered: Wheelchair 1 Device 0      DM-Doxylamine-Acetaminophen (VICKS NYQUIL COLD & FLU) 15-6. MG/15ML LIQD Take 15 mLs by mouth nightly as needed       sennosides (SENOKOT) 8.6 MG tablet Take 1 tablet by mouth 2 times daily as needed for constipation       lidocaine (XYLOCAINE) 5 % ointment Apply 0.5 g topically as needed for moderate pain Apply pea size amount to rectal area for pain 5 g 0     LORazepam (ATIVAN) 0.5 MG tablet Take 1 tablet (0.5 mg) by mouth every 4 hours as needed (Anxiety, Nausea/Vomiting or Sleep) 30 tablet 2     prochlorperazine (COMPAZINE) 10 MG tablet Take 1 tablet (10 mg) by mouth every 6 hours as needed (Nausea/Vomiting) 30 tablet 2     ondansetron (ZOFRAN) 8 MG tablet Take 1 tablet (8 mg) by mouth every 8 hours as needed (Nausea/Vomiting) 10 tablet 2     lidocaine-prilocaine (EMLA) cream Apply topically as needed for moderate pain Apply dollop size amount to port site 30-60 minutes prior to accessing. 30 g 1     CALCIUM-MAGNESIUM-ZINC PO Take 1 tablet by mouth daily       VITAMIN D, CHOLECALCIFEROL, PO Take 1 tablet by mouth daily (OTC: Pt unsure of strength)       ACETAMINOPHEN PO Take 500 mg by mouth every 4 hours as needed for pain        Multiple Vitamins-Minerals (CENTRUM SILVER) per tablet Take 1 tablet by mouth daily. 100 tablet 12     [DISCONTINUED] albuterol (PROAIR HFA/PROVENTIL HFA/VENTOLIN HFA) 108 (90 BASE) MCG/ACT Inhaler Inhale 2 puffs into the lungs every 6 hours as needed for shortness of breath / dyspnea or wheezing 1 Inhaler 3       Physical Examination:    ECOG performance status 2    General: The patient is a pleasant female in no acute distress.  /65  Pulse 98  Temp 97.9  F (36.6  C) (Oral)  Resp 18  Wt 94.8 kg (209 lb)  BMI 29.99 kg/m2  Wt Readings from Last 10 Encounters:   03/29/18 94.8 kg (209 lb)   03/29/18 94.8 kg (209 lb)   03/14/18 93 kg (205 lb)   03/07/18 94.5 kg (208 lb 6.4 oz)   02/28/18 94.3 kg (208 lb)   02/23/18 96.5 kg (212 lb 12.8 oz)   02/21/18 95.3 kg (210 lb)    02/07/18 101.2 kg (223 lb)   02/07/18 101.2 kg (223 lb)   01/24/18 98.8 kg (217 lb 12.8 oz)     HEENT: EOMI, PERRL. Sclerae are anicteric. Oral mucosa is pink and moist with no lesions or thrush.   Lymph: Neck is supple with no lymphadenopathy in the cervical or supraclavicular areas.   Heart: Regular rate and rhythm.   Lungs: Clear to auscultation bilaterally.   GI: Bowel sounds present, soft, nontender with no palpable hepatosplenomegaly or masses.   Extremities: No lower extremity edema noted bilaterally.   Skin: No rashes, petechiae, or bruising noted on exposed skin.    Laboratory Data:  Results for orders placed or performed in visit on 03/29/18 (from the past 24 hour(s))   CBC with platelets differential   Result Value Ref Range    WBC 5.6 4.0 - 11.0 10e9/L    RBC Count 3.23 (L) 3.8 - 5.2 10e12/L    Hemoglobin 9.9 (L) 11.7 - 15.7 g/dL    Hematocrit 31.1 (L) 35.0 - 47.0 %    MCV 96 78 - 100 fl    MCH 30.7 26.5 - 33.0 pg    MCHC 31.8 31.5 - 36.5 g/dL    RDW 19.1 (H) 10.0 - 15.0 %    Platelet Count 232 150 - 450 10e9/L    Diff Method Automated Method     % Neutrophils 72.3 %    % Lymphocytes 19.6 %    % Monocytes 5.9 %    % Eosinophils 1.6 %    % Basophils 0.2 %    % Immature Granulocytes 0.4 %    Nucleated RBCs 0 0 /100    Absolute Neutrophil 4.1 1.6 - 8.3 10e9/L    Absolute Lymphocytes 1.1 0.8 - 5.3 10e9/L    Absolute Monocytes 0.3 0.0 - 1.3 10e9/L    Absolute Eosinophils 0.1 0.0 - 0.7 10e9/L    Absolute Basophils 0.0 0.0 - 0.2 10e9/L    Abs Immature Granulocytes 0.0 0 - 0.4 10e9/L    Absolute Nucleated RBC 0.0    Comprehensive metabolic panel   Result Value Ref Range    Sodium 138 133 - 144 mmol/L    Potassium 3.2 (L) 3.4 - 5.3 mmol/L    Chloride 102 94 - 109 mmol/L    Carbon Dioxide 27 20 - 32 mmol/L    Anion Gap 9 3 - 14 mmol/L    Glucose 128 (H) 70 - 99 mg/dL    Urea Nitrogen 8 7 - 30 mg/dL    Creatinine 0.60 0.52 - 1.04 mg/dL    GFR Estimate >90 >60 mL/min/1.7m2    GFR Estimate If Black >90 >60  mL/min/1.7m2    Calcium 9.1 8.5 - 10.1 mg/dL    Bilirubin Total 0.4 0.2 - 1.3 mg/dL    Albumin 2.8 (L) 3.4 - 5.0 g/dL    Protein Total 7.6 6.8 - 8.8 g/dL    Alkaline Phosphatase 155 (H) 40 - 150 U/L    ALT 25 0 - 50 U/L    AST 57 (H) 0 - 45 U/L         Assessment and Plan:    This is a 67-year-old female with    Colon cancer-stage IV with liver metastasis and abdominal lymph nodes.  She is currently undergoing treatment with palliative intent combination of FOLFOX with Avastin which is given every 2 weeks intravenously. CT scan 2/19/18 showed generally stable disease indicating a positive response to treatment.    Her oxaliplatin dose was reduced to 60 mg/m  on March 14 2018 due to nausea, vomiting . cold sensation to fingers .Patient reports significant improvement in symptoms,  Labs reviewed with the patient we will proceed with planned chemotherapy today.  We will continue with IV hydration at home every other day for 3 days.She has scheduled appointment with Dr. Piedra on April 11    Iron deficiency anemia secondary to chronic blood loss, cancer. Patient is status post Injectafer.  She denies bleeding.  Her hemoglobin is stable at 9.9    Hypokalemia-patient is only taking the potassium 20 meq daily.  She will increase the dose to twice daily ×5 days and continue with daily dose after that    VTE-she is on Arixtra.  According to patient she is compliant.  She denies bleeding.    Patient is asked to call in the event of fever chills, cough shortness of breath chest pain, abdominal pain nausea vomiting diarrhea, bleeding or any changes in health condition    TITO Briones CNP  Hem/Onc   [facility]               Again, thank you for allowing me to participate in the care of your patient.        Sincerely,        TITO Briones CNP

## 2018-03-29 NOTE — PROGRESS NOTES
Oncology/Hematology Visit Note  Mar 29, 2018    Reason for Visit: follow up of metastatic colon cancer    History of Present Illness: Liv Sylvester is a 67 year old female with Metastatic colon cancer with history of stage 1A grade 3 endometrial carcinoma approximately around 2012 . She is currently undergoing treatment with palliative intent combination of FOLFOX with Avastin which is given every 2 weeks intravenously. CT scan 2/19/18 showed generally stable disease indicating a positive response to treatment.   Her oxaliplatin dose was reduced to 60 mg/m  on March 14 due to nausea vomiting , cold sensation to fingers .  she comes in today for routine follow up prior to cycle .    Interval History:  Patient reports improvement in symptoms since oxaliplatin  chemo dose reduction.  Her appetite is improving.  Food taste better .She denies nausea vomiting or diarrhea.  Denies abdominal pain.  She drinks plenty of fluids.  She also reports that the cold sensation in fingers has improved a lot.  She denies fever chills or sweats, denies cough, shortness of breath.  Her energy has improved.  She takes short frequent walks.  She drove herself to the clinic today.  She takes Arixtra, she denies edema, denies bleeding.    Review of Systems:  14 point ROS of systems including Constitutional, Eyes, Respiratory, Cardiovascular, Gastroenterology, Genitourinary, Integumentary, Muscularskeletal, Psychiatric were all negative except for pertinent positives noted in my HPI.      Current Outpatient Prescriptions   Medication Sig Dispense Refill     albuterol (PROAIR HFA/PROVENTIL HFA/VENTOLIN HFA) 108 (90 BASE) MCG/ACT Inhaler Inhale 2 puffs into the lungs every 6 hours as needed for shortness of breath / dyspnea or wheezing 1 Inhaler 3     sertraline (ZOLOFT) 50 MG tablet TAKE 1 TABLET BY MOUTH EVERY DAY 30 tablet 0     fondaparinux (ARIXTRA) 7.5MG/0.6ML injection Inject 0.6 mLs (7.5 mg) Subcutaneous every 24 hours 90 Syringe 0      methylphenidate (RITALIN) 5 MG tablet Take 1 tablet (5 mg) by mouth 2 times daily 60 tablet 0     chlorthalidone (HYGROTON) 25 MG tablet Take 1 tablet (25 mg) by mouth daily as needed 30 tablet 0     Potassium Chloride ER 20 MEQ TBCR Take 1 tablet (20 mEq) by mouth daily 30 tablet 0     oxyCODONE IR (ROXICODONE) 5 MG tablet Take 1-2 tablets (5-10 mg) by mouth every 4 hours as needed for pain maximum 12 tablet(s) per day 50 tablet 0     order for DME Equipment being ordered: Wheelchair 1 Device 0     DM-Doxylamine-Acetaminophen (VICKS NYQUIL COLD & FLU) 15-6. MG/15ML LIQD Take 15 mLs by mouth nightly as needed       sennosides (SENOKOT) 8.6 MG tablet Take 1 tablet by mouth 2 times daily as needed for constipation       lidocaine (XYLOCAINE) 5 % ointment Apply 0.5 g topically as needed for moderate pain Apply pea size amount to rectal area for pain 5 g 0     LORazepam (ATIVAN) 0.5 MG tablet Take 1 tablet (0.5 mg) by mouth every 4 hours as needed (Anxiety, Nausea/Vomiting or Sleep) 30 tablet 2     prochlorperazine (COMPAZINE) 10 MG tablet Take 1 tablet (10 mg) by mouth every 6 hours as needed (Nausea/Vomiting) 30 tablet 2     ondansetron (ZOFRAN) 8 MG tablet Take 1 tablet (8 mg) by mouth every 8 hours as needed (Nausea/Vomiting) 10 tablet 2     lidocaine-prilocaine (EMLA) cream Apply topically as needed for moderate pain Apply dollop size amount to port site 30-60 minutes prior to accessing. 30 g 1     CALCIUM-MAGNESIUM-ZINC PO Take 1 tablet by mouth daily       VITAMIN D, CHOLECALCIFEROL, PO Take 1 tablet by mouth daily (OTC: Pt unsure of strength)       ACETAMINOPHEN PO Take 500 mg by mouth every 4 hours as needed for pain        Multiple Vitamins-Minerals (CENTRUM SILVER) per tablet Take 1 tablet by mouth daily. 100 tablet 12     [DISCONTINUED] albuterol (PROAIR HFA/PROVENTIL HFA/VENTOLIN HFA) 108 (90 BASE) MCG/ACT Inhaler Inhale 2 puffs into the lungs every 6 hours as needed for shortness of breath / dyspnea  or wheezing 1 Inhaler 3       Physical Examination:    ECOG performance status 2    General: The patient is a pleasant female in no acute distress.  /65  Pulse 98  Temp 97.9  F (36.6  C) (Oral)  Resp 18  Wt 94.8 kg (209 lb)  BMI 29.99 kg/m2  Wt Readings from Last 10 Encounters:   03/29/18 94.8 kg (209 lb)   03/29/18 94.8 kg (209 lb)   03/14/18 93 kg (205 lb)   03/07/18 94.5 kg (208 lb 6.4 oz)   02/28/18 94.3 kg (208 lb)   02/23/18 96.5 kg (212 lb 12.8 oz)   02/21/18 95.3 kg (210 lb)   02/07/18 101.2 kg (223 lb)   02/07/18 101.2 kg (223 lb)   01/24/18 98.8 kg (217 lb 12.8 oz)     HEENT: EOMI, PERRL. Sclerae are anicteric. Oral mucosa is pink and moist with no lesions or thrush.   Lymph: Neck is supple with no lymphadenopathy in the cervical or supraclavicular areas.   Heart: Regular rate and rhythm.   Lungs: Clear to auscultation bilaterally.   GI: Bowel sounds present, soft, nontender with no palpable hepatosplenomegaly or masses.   Extremities: No lower extremity edema noted bilaterally.   Skin: No rashes, petechiae, or bruising noted on exposed skin.    Laboratory Data:  Results for orders placed or performed in visit on 03/29/18 (from the past 24 hour(s))   CBC with platelets differential   Result Value Ref Range    WBC 5.6 4.0 - 11.0 10e9/L    RBC Count 3.23 (L) 3.8 - 5.2 10e12/L    Hemoglobin 9.9 (L) 11.7 - 15.7 g/dL    Hematocrit 31.1 (L) 35.0 - 47.0 %    MCV 96 78 - 100 fl    MCH 30.7 26.5 - 33.0 pg    MCHC 31.8 31.5 - 36.5 g/dL    RDW 19.1 (H) 10.0 - 15.0 %    Platelet Count 232 150 - 450 10e9/L    Diff Method Automated Method     % Neutrophils 72.3 %    % Lymphocytes 19.6 %    % Monocytes 5.9 %    % Eosinophils 1.6 %    % Basophils 0.2 %    % Immature Granulocytes 0.4 %    Nucleated RBCs 0 0 /100    Absolute Neutrophil 4.1 1.6 - 8.3 10e9/L    Absolute Lymphocytes 1.1 0.8 - 5.3 10e9/L    Absolute Monocytes 0.3 0.0 - 1.3 10e9/L    Absolute Eosinophils 0.1 0.0 - 0.7 10e9/L    Absolute Basophils 0.0  0.0 - 0.2 10e9/L    Abs Immature Granulocytes 0.0 0 - 0.4 10e9/L    Absolute Nucleated RBC 0.0    Comprehensive metabolic panel   Result Value Ref Range    Sodium 138 133 - 144 mmol/L    Potassium 3.2 (L) 3.4 - 5.3 mmol/L    Chloride 102 94 - 109 mmol/L    Carbon Dioxide 27 20 - 32 mmol/L    Anion Gap 9 3 - 14 mmol/L    Glucose 128 (H) 70 - 99 mg/dL    Urea Nitrogen 8 7 - 30 mg/dL    Creatinine 0.60 0.52 - 1.04 mg/dL    GFR Estimate >90 >60 mL/min/1.7m2    GFR Estimate If Black >90 >60 mL/min/1.7m2    Calcium 9.1 8.5 - 10.1 mg/dL    Bilirubin Total 0.4 0.2 - 1.3 mg/dL    Albumin 2.8 (L) 3.4 - 5.0 g/dL    Protein Total 7.6 6.8 - 8.8 g/dL    Alkaline Phosphatase 155 (H) 40 - 150 U/L    ALT 25 0 - 50 U/L    AST 57 (H) 0 - 45 U/L         Assessment and Plan:    This is a 67-year-old female with    Colon cancer-stage IV with liver metastasis and abdominal lymph nodes.  She is currently undergoing treatment with palliative intent combination of FOLFOX with Avastin which is given every 2 weeks intravenously. CT scan 2/19/18 showed generally stable disease indicating a positive response to treatment.   Her oxaliplatin dose was reduced to 60 mg/m  on March 14 2018 due to nausea, vomiting . cold sensation to fingers .Patient reports significant improvement in symptoms,  Labs reviewed with the patient we will proceed with planned chemotherapy today.  We will continue with IV hydration at home every other day for 3 days.She has scheduled appointment with Dr. Piedra on April 11    Iron deficiency anemia secondary to chronic blood loss, cancer. Patient is status post Injectafer.  She denies bleeding.  Her hemoglobin is stable at 9.9    Hypokalemia-patient is only taking the potassium 20 meq daily.  She will increase the dose to twice daily ×5 days and continue with daily dose after that    VTE-she is on Arixtra.  According to patient she is compliant.  She denies bleeding.    Patient is asked to call in the event of fever chills,  cough shortness of breath chest pain, abdominal pain nausea vomiting diarrhea, bleeding or any changes in health condition    TITO Briones CNP  Hem/Onc   [facility]

## 2018-03-30 NOTE — PROGRESS NOTES
This is a recent snapshot of the patient's [facility] medical record.  For current drug dose and complete information and questions, call [phone] or In Basket pool, fv home infusion ([id number])  CSN Number:  [id number]

## 2018-03-30 NOTE — TELEPHONE ENCOUNTER
"Followed up with Liv this morning as per our conversation last evening for her to go to the ED for her fever.  She stated that she did not because she had a few episodes of diarrhea and felt better.  She thinks it was food poisoning from something she had from [employer] earlier in the day.  She feels \"great\" this morning.  She states her temp is 98.6 and blood pressure was 127/53, hr 101.  She is eating and staying hydrated this morning and does not have any diarrhea.    Advised Liv that if she does spike a temp of 100.3 or higher over the weekend to go to the ED.  She verbalized understanding.    Writer called Sabrina RN at [facility] at [phone] to update her and let her know that Liv needs some home infusion supplies.  Sabrina will connect with her      "

## 2018-04-02 NOTE — PROGRESS NOTES
This is a recent snapshot of the patient's [facility] medical record.  For current drug dose and complete information and questions, call [phone] or In [facility] pool, fv home infusion ([id number])  CSN Number:  [id number]

## 2018-04-02 NOTE — PROGRESS NOTES
"I received a call from Radha with [phone] with an update that patient was a little nauseas this morning during OT. She developed hot flashes and her extremities were cold. She was \"fanning her self \" becoming very nauseas. Radha instructed patient to take her Zofran and the OT session for today was stopped.     Radha requested 2 more week's of OT. Verbal consent given.    Vitals 116/66, hr. 96, 02 99%.    Will follow up with patient later today and route message to Laina MEJIA. Debbie Dasilva    "

## 2018-04-02 NOTE — PROGRESS NOTES
This is a recent snapshot of the patient's [facility] medical record.  For current drug dose and complete information and questions, call [phone] or In Basket pool, fv home infusion ([id number])  CSN Number:  [id number]

## 2018-04-03 NOTE — PROGRESS NOTES
This is a recent snapshot of the patient's [facility] medical record.  For current drug dose and complete information and questions, call [phone] or In Basket pool, fv home infusion ([id number])  CSN Number:  [id number]

## 2018-04-06 NOTE — PROGRESS NOTES
This is a recent snapshot of the patient's [facility] medical record.  For current drug dose and complete information and questions, call [phone] or In Basket pool, fv home infusion ([id number])  CSN Number:  [id number]

## 2018-04-10 NOTE — PROGRESS NOTES
Spoke with Liv on 4/4/18. She stated she feels better, no nausea since that episode and she is aware of when to take zofran.  She states she is staying hydrated and eating small frequent meals.  No other concerns at this time.

## 2018-04-10 NOTE — PROGRESS NOTES
This is a recent snapshot of the patient's [facility] medical record.  For current drug dose and complete information and questions, call [phone] or In Basket pool, fv home infusion ([id number])  CSN Number:  [id number]

## 2018-04-12 NOTE — PROGRESS NOTES
Pt seen during infusion.  RD checked with patient regarding current intake.  Patient states po intake remains good but still does not have desire for meat.  Patient states eats chicken one time per week.  Patient consuming beans, eggs, cottage cheese and cheese for protein sources.  Patient feels she has been eating too much sherbet as states eats large portion so has stopped keeping sherbet in her home.  Patient does not have any nutrition concerns at this time.  Patient encouraged to contact RD if questions arise.  Patient verbalized understanding.  Patient has RD contact information.    Marcio Hernandez, RD, LD  Clinical Dietitian  [facility]  [phone] (direct)

## 2018-04-12 NOTE — PROGRESS NOTES
"Infusion Nursing Note:  Liv Sylvester presents today for C13D1 mod folfox.    Patient seen by provider today: Yes: Dr. Piedra   present during visit today: Not Applicable.    Note: N/A.    Intravenous Access:  Labs drawn without difficulty.  Implanted Port.    Treatment Conditions:  Lab Results   Component Value Date    HGB 9.6 04/12/2018     Lab Results   Component Value Date    WBC 8.5 04/12/2018      Lab Results   Component Value Date    ANEU 7.0 04/12/2018     Lab Results   Component Value Date     04/12/2018      Lab Results   Component Value Date     04/12/2018                   Lab Results   Component Value Date    POTASSIUM 3.6 04/12/2018           No results found for: MAG         Lab Results   Component Value Date    CR 0.62 04/12/2018                   Lab Results   Component Value Date    JESÚS 9.3 04/12/2018                Lab Results   Component Value Date    BILITOTAL 0.5 04/12/2018           Lab Results   Component Value Date    ALBUMIN 2.9 04/12/2018                    Lab Results   Component Value Date    ALT 26 04/12/2018           Lab Results   Component Value Date    AST 86 04/12/2018       Results reviewed, labs MET treatment parameters, ok to proceed with treatment.      Post Infusion Assessment:  Patient tolerated infusion without incident.  Blood return noted pre and post infusion.  Site patent and intact, free from redness, edema or discomfort.  No evidence of extravasations.    Prior to discharge: Port is secured in place with tegaderm and flushed with 10cc NS with positive blood return noted.  Continuous home infusion Dosi-Fuser pump connected.    All connectors secured in place and clamps taped open.    Pump started, \"running\" noted on display (CADD): Not Applicable.  Patient instructed to call our clinic or [facility] with any questions or concerns at home.  Patient verbalized understanding.    Patient set up for pump disconnect at home with [facility] on 4/14 at 9:00am. [facility] aware.            Discharge Plan:   Copy of AVS reviewed with patient and/or family.  Patient will return 4/26/18 for next appointment.  Patient discharged in stable condition accompanied by: self.  Departure Mode: Ambulatory with walker.    Luisito Guerra RN

## 2018-04-12 NOTE — MR AVS SNAPSHOT
After Visit Summary   4/12/2018    Liv Sylvester    MRN: [id number]           Patient Information     Date Of Birth          1950        Visit Information        Provider Department      4/12/2018 9:15 AM Radha Piedra MD [facility]        Today's Diagnoses     Normocytic anemia    -  1    Colon Cancer, with Liver Met (Stage IV)          Care Instructions    5 FU and leucovorin today and continue every 14 days. Oxaliplatin discontinued.   Scheduled - Charissa  Add CEA and iron studies to today's labs  RTC MD 4 weeks   Scheduled - Charissa  CT scan chest abdomen and pelvis before next visit   Scheduled - Charissa    AVS given to patient - Charissa          Follow-ups after your visit        Your next 10 appointments already scheduled     Apr 26, 2018  8:30 AM CDT   Level 6 with  INFUSION CHAIR 8   [facility] ([facility])    [facility]  [address] [facility] [address]  [facility] [address]   [phone]            May 07, 2018 10:00 AM CDT   (Arrive by 9:45 AM)   CT CHEST/ABDOMEN/PELVIS W CONTRAST with SHCT1   [facility] ([facility])    [address] [facility] [address]   [phone]           Please bring any scans or X-rays taken at other hospitals, if similar tests were done. Also bring a list of your medicines, including vitamins, minerals and over-the-counter drugs. It is safest to leave personal items at home.  Be sure to tell your doctor:   If you have any allergies.   If there s any chance you are pregnant.   If you are breastfeeding.  You may have contrast for this exam. To prepare:   Do not eat or drink for 2 hours before your exam. If you need to take medicine, you may take it with small sips of water. (We may ask you to take liquid medicine as well.)   The day before your exam, drink extra fluids at least six 8-ounce glasses (unless your doctor tells you to restrict your fluids).   You  will be given instructions on how to drink the contrast.  Patients over 70 or patients with diabetes or kidney problems:   If you haven t had a blood test (creatinine test) within the last 30 days, the Cardiologist/Radiologist may require you to get this test prior to your exam.  If you have diabetes:   Continue to take your metformin medication on the day of your exam  Please wear loose clothing, such as a sweat suit or jogging clothes. Avoid snaps, zippers and other metal. We may ask you to undress and put on a hospital gown.  If you have any questions, please call the Imaging Department where you will have your exam.            May 10, 2018  8:30 AM CDT   Level 6 with  INFUSION CHAIR 11   [facility] ([facility])    [facility]  [address]  [place] [address]   [phone]            May 10, 2018  9:00 AM CDT   Return Visit with Radha Piedra MD   [facility] ([facility])    [facility]  [address]  [place] [address]   [phone]              Future tests that were ordered for you today     Open Future Orders        Priority Expected Expires Ordered    CT Chest/Abdomen/Pelvis w Contrast Routine  4/12/2019 4/12/2018            Who to contact     If you have questions or need follow up information about today's clinic visit or your schedule please contact [facility] directly at [phone].  Normal or non-critical lab and imaging results will be communicated to you by tomoguideshart, letter or phone within 4 business days after the clinic has received the results. If you do not hear from us within 7 days, please contact the clinic through Digital Patht or phone. If you have a critical or abnormal lab result, we will notify you by phone as soon as possible.  Submit refill requests through Nutshell or call your pharmacy and they will forward the refill request to us.  "Please allow 3 business days for your refill to be completed.          Additional Information About Your Visit        MyChart Information     Star[employer] Software[employer] lets you send messages to your doctor, view your test results, renew your prescriptions, schedule appointments and more. To sign up, go to [email or web address] . Click on \"Log in\" on the left side of the screen, which will take you to the Welcome page. Then click on \"Sign up Now\" on the right side of the page.     You will be asked to enter the access code listed below, as well as some personal information. Please follow the directions to create your username and password.     Your access code is: [email or web address]  Expires: 2018 10:35 AM     Your access code will  in 90 days. If you need help or a new code, please call your [place] clinic or [phone].        Care EveryWhere ID     This is your [facility] EveryWhere ID. This could be used by other organizations to access your [place] medical records  [id number]        Your Vitals Were     Pulse Temperature Respirations Height Pulse Oximetry BMI (Body Mass Index)    124 98.2  F (36.8  C) (Oral) 24 1.778 m (5' 10\") 97% 29.36 kg/m2       Blood Pressure from Last 3 Encounters:   18 145/68   18 145/68   18 112/65    Weight from Last 3 Encounters:   18 92.8 kg (204 lb 9.6 oz)   18 92.8 kg (204 lb 9.6 oz)   18 94.8 kg (209 lb)              We Performed the Following     CEA     Ferritin     Iron and iron binding capacity     Transferrin        Primary Care Provider Office Phone # Fax #    Amaya Bethea Anita [phone]       [facility] [address] [place] [address]        Equal Access to Services     HAYDEE IVERSON : Jeffry Pierce, wiley michael, qakeerthi kacassidy hawkins, john maya. [facility] [phone].    ATENCIÓN: Si habla español, tiene a schmitt disposición servicios gratuitos de asistencia lingüística. Llame " al [phone].    We comply with applicable federal civil rights laws and [place] laws. We do not discriminate on the basis of race, color, national origin, age, disability, sex, sexual orientation, or gender identity.            Thank you!     Thank you for choosing [facility]  for your care. Our goal is always to provide you with excellent care. Hearing back from our patients is one way we can continue to improve our services. Please take a few minutes to complete the written survey that you may receive in the mail after your visit with us. Thank you!             Your Updated Medication List - Protect others around you: Learn how to safely use, store and throw away your medicines at www.disposemymeds.org.          This list is accurate as of 4/12/18 10:35 AM.  Always use your most recent med list.                   Brand Name Dispense Instructions for use Diagnosis    ACETAMINOPHEN PO      Take 500 mg by mouth every 4 hours as needed for pain        albuterol 108 (90 Base) MCG/ACT Inhaler    PROAIR HFA/PROVENTIL HFA/VENTOLIN HFA    1 Inhaler    Inhale 2 puffs into the lungs every 6 hours as needed for shortness of breath / dyspnea or wheezing    Reactive airway disease without complication, unspecified asthma severity, unspecified whether persistent       CALCIUM-MAGNESIUM-ZINC PO      Take 1 tablet by mouth daily        CENTRUM SILVER per tablet     100 tablet    Take 1 tablet by mouth daily.        chlorthalidone 25 MG tablet    HYGROTON    30 tablet    Take 1 tablet (25 mg) by mouth daily as needed    HTN (hypertension)       fondaparinux 7.5MG/0.6ML injection    ARIXTRA    90 Syringe    Inject 0.6 mLs (7.5 mg) Subcutaneous every 24 hours    PE (pulmonary thromboembolism) (H)       lidocaine 5 % ointment    XYLOCAINE    5 g    Apply 0.5 g topically as needed for moderate pain Apply pea size amount to rectal area for pain        lidocaine-prilocaine cream    EMLA    30 g    Apply topically as  needed for moderate pain Apply dollop size amount to port site 30-60 minutes prior to accessing.    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       LORazepam 0.5 MG tablet    ATIVAN    30 tablet    Take 1 tablet (0.5 mg) by mouth every 4 hours as needed (Anxiety, Nausea/Vomiting or Sleep)    Malignant neoplasm of hepatic flexure (H)       methylphenidate 5 MG tablet    RITALIN    60 tablet    Take 1 tablet (5 mg) by mouth 2 times daily        ondansetron 8 MG tablet    ZOFRAN    10 tablet    Take 1 tablet (8 mg) by mouth every 8 hours as needed (Nausea/Vomiting)    Malignant neoplasm of hepatic flexure (H)       order for DME     1 Device    Equipment being ordered: Wheelchair    Malignant neoplasm of colon, unspecified part of colon (H), VTE (venous thromboembolism), PE (pulmonary thromboembolism) (H), Pain of left lower leg       oxyCODONE IR 5 MG tablet    ROXICODONE    50 tablet    Take 1-2 tablets (5-10 mg) by mouth every 4 hours as needed for pain maximum 12 tablet(s) per day    Malignant neoplasm of colon, unspecified part of colon (H)       Potassium Chloride ER 20 MEQ Tbcr     30 tablet    Take 1 tablet (20 mEq) by mouth daily    Hypokalemia       prochlorperazine 10 MG tablet    COMPAZINE    30 tablet    Take 1 tablet (10 mg) by mouth every 6 hours as needed (Nausea/Vomiting)    Malignant neoplasm of hepatic flexure (H)       sennosides 8.6 MG tablet    SENOKOT     Take 1 tablet by mouth 2 times daily as needed for constipation        sertraline 50 MG tablet    ZOLOFT    30 tablet    TAKE 1 TABLET BY MOUTH EVERY DAY    Malignant neoplasm of hepatic flexure (H)       VICKS NYQUIL COLD & FLU 15-6. MG/15ML Liqd   Generic drug:  DM-Doxylamine-Acetaminophen      Take 15 mLs by mouth nightly as needed        VITAMIN D (CHOLECALCIFEROL) PO      Take 1 tablet by mouth daily (OTC: Pt unsure of strength)

## 2018-04-12 NOTE — LETTER
"    4/12/2018         RE: Liv Sylvester  [address]   [place] [facility] [address]        Dear Colleague,    Thank you for referring your patient, Liv Sylvester, to the [facility]. Please see a copy of my visit note below.    Oncology Rooming Note    April 12, 2018 9:04 AM   Liv Sylvester is a 67 year old female who presents for:    Chief Complaint   Patient presents with     Oncology Clinic Visit     Return-2 week follow up-Colon Cancer     Initial Vitals: /68 (BP Location: Right arm, Patient Position: Chair, Cuff Size: Adult Regular)  Pulse 124  Temp 98.2  F (36.8  C) (Oral)  Resp 24  Ht 1.778 m (5' 10\")  Wt 92.8 kg (204 lb 9.6 oz)  SpO2 97%  BMI 29.36 kg/m2 Estimated body mass index is 29.36 kg/(m^2) as calculated from the following:    Height as of this encounter: 1.778 m (5' 10\").    Weight as of this encounter: 92.8 kg (204 lb 9.6 oz). Body surface area is 2.14 meters squared.  No Pain (0) Comment: Data Unavailable   No LMP recorded. Patient has had a hysterectomy.  Allergies reviewed: Yes  Medications reviewed: Yes    Medications: Medication refills not needed today.  Pharmacy name entered into Glownet:    [facility] [place], [place] - [address] [facility] - [place], [place] - [address] [facility] - [place], [place] - [address] AT [facility] [address]    Clinical concerns: Pt had a sharp pain on the ride side,upper right ribes    5 minutes for nursing intake (face to face time)     Simin Lentz [facility]  HEMATOLOGY ONCOLOGY    ONCOLOGY FOLLOWUP NOTE      DIAGNOSIS:    1- Metastatic colon cancer in a patient with history of stage 1A grade 3 endometrial carcinoma approximately around 2012.     On 09/29/2017, Liv Sylvester presented with progressive fatigue and right upper quadrant pain.  CT scan 09/29/2017 showed " "partially contracted bladder and findings suspicious for liver metastases.  There were multiple defined hypoechoic foci scoped scattered throughout the liver, the largest was in the right lobe.  CT was also showing enlargement of hepatic and retroperitoneal lymph nodes.  On 09/30/2017, she underwent a colonoscopy, which showed a fungating, infiltrative, ulcerated, partially obstructing mass at the hepatic flexure.  Pathology is consistent with moderately differentiated adenocarcinoma with normal mismatch repair protein expression.   2- Significant iron deficiency anemia.   3- VTE- malignancy related     TREATMENT:  10/11/2017 FOLFOX and Avastin.   1/24/2018 discontinued Avastin.      SUBJECTIVE:  The patient was seen as a followup today. She appeared comfortable. She stated that her neuropathy is worsening. It is impairing her balance now. Oral intake is good.     REVIEW OF SYSTEMS:  A complete review of systems was performed and found to be negative other than pertinent positives mentioned in history of present illness.      Past medical, social histories reviewed.     Meds- Reviewed.     PHYSICAL EXAMINATION:   VITAL SIGNS:/68 (BP Location: Right arm, Patient Position: Chair, Cuff Size: Adult Regular)  Pulse 124  Temp 98.2  F (36.8  C) (Oral)  Resp 24  Ht 1.778 m (5' 10\")  Wt 92.8 kg (204 lb 9.6 oz)  SpO2 97%  BMI 29.36 kg/m2  CONSTITUTIONAL: Appears tired.   HEENT: Pupils are equal. Oropharynx is clear.   NECK: No cervical or supraclavicular lymphadenopathy.   RESPIRATORY: Clear bilaterally.   CARD/VASC: S1, S2, regular.   GI: Soft, nontender, nondistended, no hepatosplenomegaly.   MUSKULOSKELETAL: Warm, well perfused.   NEUROLOGIC: Alert, awake.   INTEGUMENT: No rash.   LYMPHATICS: No edema.   PSYCH: Mood and affect was normal.     LABORATORY DATA AND IMAGING REVIEWED DURING THIS VISIT:  Recent Labs   Lab Test  03/29/18   0825  03/14/18   0832   NA  138  134   POTASSIUM  3.2*  3.4   CHLORIDE  102  " 99   CO2  27  28   ANIONGAP  9  7   BUN  8  11   CR  0.60  0.59   GLC  128*  110*   JESÚS  9.1  9.2     Recent Labs   Lab Test  04/12/18   0830  03/29/18   0825  03/14/18   0832   WBC  8.5  5.6  7.3   HGB  9.6*  9.9*  9.9*   PLT  209  232  204   MCV  97  96  96   NEUTROPHIL  82.6  72.3  74.4     Recent Labs   Lab Test  03/29/18   0825  03/14/18   0832  02/28/18   1448   BILITOTAL  0.4  0.7  0.5   ALKPHOS  155*  130  191*   ALT  25  20  32   AST  57*  54*  41   ALBUMIN  2.8*  2.8*  3.4       ECOG performance status 2     ASSESSMENT:    1- This is a 67-year-old lady with stage IV metastatic colon cancer with normal mismatch repair protein expression.  She has multiple liver metastases and metastasis in the abdominal lymph nodes.     She was started on palliative intent combination of FOLFOX with Avastin which is given every 2 weeks intravenously.      CT scan 2/19/18 showed generally stable disease indicating a positive response to treatment.     - Labs were reviewed with the patient, normocytic anemia related to chemotherapy. I will recheck iron studies and tumor marker as well.   - Worsening neuropathy; hands and more in feet. It is making her walking difficulty. I think treatment change is needed. I will discontinue oxaliplatin. I will also order a restaging scan in 4 weeks.       2- She also has iron deficiency anemia due to chronic blood loss from her cancer.  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     3- VTE/ recurrent episodes of thrombosis.  She is on Arixtra.      PLAN:   5 FU and leucovorin today and continue every 14 days. Oxaliplatin discontinued.   Add CEA and iron studies to today's labs  RTC MD 4 weeks  CT scan chest abdomen and pelvis before next visit  RADHA PIEDRA MD    4/12/2018     Can we move her CT scan next week and see me the following week. Thanks    Again, thank you for allowing me to participate in the care of your patient.        Sincerely,        Radha Piedra MD

## 2018-04-12 NOTE — PROGRESS NOTES
"[facility]YSICIANS  HEMATOLOGY ONCOLOGY    ONCOLOGY FOLLOWUP NOTE      DIAGNOSIS:    1- Metastatic colon cancer in a patient with history of stage 1A grade 3 endometrial carcinoma approximately around 2012.     On 09/29/2017, Liv Sylvester presented with progressive fatigue and right upper quadrant pain.  CT scan 09/29/2017 showed partially contracted bladder and findings suspicious for liver metastases.  There were multiple defined hypoechoic foci scoped scattered throughout the liver, the largest was in the right lobe.  CT was also showing enlargement of hepatic and retroperitoneal lymph nodes.  On 09/30/2017, she underwent a colonoscopy, which showed a fungating, infiltrative, ulcerated, partially obstructing mass at the hepatic flexure.  Pathology is consistent with moderately differentiated adenocarcinoma with normal mismatch repair protein expression.   2- Significant iron deficiency anemia.   3- VTE- malignancy related     TREATMENT:  10/11/2017 FOLFOX and Avastin.   1/24/2018 discontinued Avastin.      SUBJECTIVE:  The patient was seen as a followup today. She appeared comfortable. She stated that her neuropathy is worsening. It is impairing her balance now. Oral intake is good.     REVIEW OF SYSTEMS:  A complete review of systems was performed and found to be negative other than pertinent positives mentioned in history of present illness.      Past medical, social histories reviewed.     Meds- Reviewed.     PHYSICAL EXAMINATION:   VITAL SIGNS:/68 (BP Location: Right arm, Patient Position: Chair, Cuff Size: Adult Regular)  Pulse 124  Temp 98.2  F (36.8  C) (Oral)  Resp 24  Ht 1.778 m (5' 10\")  Wt 92.8 kg (204 lb 9.6 oz)  SpO2 97%  BMI 29.36 kg/m2  CONSTITUTIONAL: Appears tired.   HEENT: Pupils are equal. Oropharynx is clear.   NECK: No cervical or supraclavicular lymphadenopathy.   RESPIRATORY: Clear bilaterally.   CARD/VASC: S1, S2, regular.   GI: Soft, nontender, nondistended, " no hepatosplenomegaly.   MUSKULOSKELETAL: Warm, well perfused.   NEUROLOGIC: Alert, awake.   INTEGUMENT: No rash.   LYMPHATICS: No edema.   PSYCH: Mood and affect was normal.     LABORATORY DATA AND IMAGING REVIEWED DURING THIS VISIT:  Recent Labs   Lab Test  03/29/18   0825  03/14/18   0832   NA  138  134   POTASSIUM  3.2*  3.4   CHLORIDE  102  99   CO2  27  28   ANIONGAP  9  7   BUN  8  11   CR  0.60  0.59   GLC  128*  110*   JESÚS  9.1  9.2     Recent Labs   Lab Test  04/12/18   0830  03/29/18   0825  03/14/18   0832   WBC  8.5  5.6  7.3   HGB  9.6*  9.9*  9.9*   PLT  209  232  204   MCV  97  96  96   NEUTROPHIL  82.6  72.3  74.4     Recent Labs   Lab Test  03/29/18   0825  03/14/18   0832  02/28/18   1448   BILITOTAL  0.4  0.7  0.5   ALKPHOS  155*  130  191*   ALT  25  20  32   AST  57*  54*  41   ALBUMIN  2.8*  2.8*  3.4       ECOG performance status 2     ASSESSMENT:    1- This is a 67-year-old lady with stage IV metastatic colon cancer with normal mismatch repair protein expression.  She has multiple liver metastases and metastasis in the abdominal lymph nodes.     She was started on palliative intent combination of FOLFOX with Avastin which is given every 2 weeks intravenously.      CT scan 2/19/18 showed generally stable disease indicating a positive response to treatment.     - Labs were reviewed with the patient, normocytic anemia related to chemotherapy. I will recheck iron studies and tumor marker as well.   - Worsening neuropathy; hands and more in feet. It is making her walking difficulty. I think treatment change is needed. I will discontinue oxaliplatin. I will also order a restaging scan in 4 weeks.       2- She also has iron deficiency anemia due to chronic blood loss from her cancer.  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     3- VTE/ recurrent episodes of thrombosis.  She is on Arixtra.      PLAN:   5 FU and leucovorin today and continue every 14 days. Oxaliplatin discontinued.   Add CEA and iron studies to  today's labs  RTC MD 4 weeks  CT scan chest abdomen and pelvis before next visit  SIDRA NAILS MD    4/12/2018

## 2018-04-12 NOTE — PROGRESS NOTES
This is a recent snapshot of the patient's [facility] medical record.  For current drug dose and complete information and questions, call [phone] or In Basket pool, [facility] infusion ([id number])  CSN Number:  [id number]

## 2018-04-12 NOTE — PROGRESS NOTES
"Oncology Rooming Note    April 12, 2018 9:04 AM   Liv Sylvester is a 67 year old female who presents for:    Chief Complaint   Patient presents with     Oncology Clinic Visit     Return-2 week follow up-Colon Cancer     Initial Vitals: /68 (BP Location: Right arm, Patient Position: Chair, Cuff Size: Adult Regular)  Pulse 124  Temp 98.2  F (36.8  C) (Oral)  Resp 24  Ht 1.778 m (5' 10\")  Wt 92.8 kg (204 lb 9.6 oz)  SpO2 97%  BMI 29.36 kg/m2 Estimated body mass index is 29.36 kg/(m^2) as calculated from the following:    Height as of this encounter: 1.778 m (5' 10\").    Weight as of this encounter: 92.8 kg (204 lb 9.6 oz). Body surface area is 2.14 meters squared.  No Pain (0) Comment: Data Unavailable   No LMP recorded. Patient has had a hysterectomy.  Allergies reviewed: Yes  Medications reviewed: Yes    Medications: Medication refills not needed today.  Pharmacy name entered into minicabit:    [facility] - [place], [place] - [address] [facility] - [place], [place] - [address] [facility] - [place], [place] - [address] AT [facility] OF [address]    Clinical concerns: Pt had a sharp pain on the ride side,upper right ribes    5 minutes for nursing intake (face to face time)     Simin Lentz CMA            "

## 2018-04-12 NOTE — MR AVS SNAPSHOT
After Visit Summary   4/12/2018    Liv Sylvester    MRN: [id number]           Patient Information     Date Of Birth          1950        Visit Information        Provider Department      4/12/2018 8:30 AM  INFUSION CHAIR 12 [facility]        Today's Diagnoses     Colon Cancer, with Liver Met (Stage IV)    -  1       Follow-ups after your visit        Follow-up notes from your care team     Return in 2 weeks (on 4/26/2018).      Your next 10 appointments already scheduled     Apr 26, 2018  8:30 AM CDT   Level 6 with  INFUSION CHAIR 8   [facility] ([facility])    [facility]  [address] [facility] [address]  [facility] [address]   [phone]            May 07, 2018 10:00 AM CDT   (Arrive by 9:45 AM)   CT CHEST/ABDOMEN/PELVIS W CONTRAST with SHCT1   [facility] CT ([facility])    [address] [facility] [address]   [phone]           Please bring any scans or X-rays taken at other hospitals, if similar tests were done. Also bring a list of your medicines, including vitamins, minerals and over-the-counter drugs. It is safest to leave personal items at home.  Be sure to tell your doctor:   If you have any allergies.   If there s any chance you are pregnant.   If you are breastfeeding.  You may have contrast for this exam. To prepare:   Do not eat or drink for 2 hours before your exam. If you need to take medicine, you may take it with small sips of water. (We may ask you to take liquid medicine as well.)   The day before your exam, drink extra fluids at least six 8-ounce glasses (unless your doctor tells you to restrict your fluids).   You will be given instructions on how to drink the contrast.  Patients over 70 or patients with diabetes or kidney problems:   If you haven t had a blood test (creatinine test) within the last 30 days, the Cardiologist/Radiologist  may require you to get this test prior to your exam.  If you have diabetes:   Continue to take your metformin medication on the day of your exam  Please wear loose clothing, such as a sweat suit or jogging clothes. Avoid snaps, zippers and other metal. We may ask you to undress and put on a hospital gown.  If you have any questions, please call the Imaging Department where you will have your exam.            May 10, 2018  8:30 AM CDT   Level 6 with  INFUSION CHAIR 11   [facility] ([facility]    [facility]  [address]  [place] [address]   [phone]            May 10, 2018  9:00 AM CDT   Return Visit with Radha Piedra MD   [facility])    [facility]  [address]  [place] [address]   [phone]              Future tests that were ordered for you today     Open Future Orders        Priority Expected Expires Ordered    CT Chest/Abdomen/Pelvis w Contrast Routine  4/12/2019 4/12/2018            Who to contact     If you have questions or need follow up information about today's clinic visit or your schedule please contact [facility] directly at [phone].  Normal or non-critical lab and imaging results will be communicated to you by [employer], letter or phone within 4 business days after the clinic has received the results. If you do not hear from us within 7 days, please contact the clinic through [employer] or phone. If you have a critical or abnormal lab result, we will notify you by phone as soon as possible.  Submit refill requests through [employer] or call your pharmacy and they will forward the refill request to us. Please allow 3 business days for your refill to be completed.          Additional Information About Your Visit        [employer] Information     [employer] lets you send messages to your doctor, view your test  "results, renew your prescriptions, schedule appointments and more. To sign up, go to [email or web address] . Click on \"Log in\" on the left side of the screen, which will take you to the Welcome page. Then click on \"Sign up Now\" on the right side of the page.     You will be asked to enter the access code listed below, as well as some personal information. Please follow the directions to create your username and password.     Your access code is: [email or web address]  Expires: 2018 10:35 AM     Your access code will  in 90 days. If you need help or a new code, please call your [facility] clinic or [phone].        Care EveryWhere ID     This is your [facility] EveryWhere ID. This could be used by other organizations to access your [facility] medical records  [id number]        Your Vitals Were     Pulse Temperature Respirations Height Pulse Oximetry BMI (Body Mass Index)    90 98.2  F (36.8  C) (Oral) 20 1.778 m (5' 10\") 97% 29.36 kg/m2       Blood Pressure from Last 3 Encounters:   18 145/68   18 92/61   18 112/65    Weight from Last 3 Encounters:   18 92.8 kg (204 lb 9.6 oz)   18 92.8 kg (204 lb 9.6 oz)   18 94.8 kg (209 lb)              We Performed the Following     CBC with platelets differential     Comprehensive metabolic panel        Primary Care Provider Office Phone # Fax #    Amaya Bethea Anita [phone]       [facility] [address] [place] [address]        Equal Access to Services     [facility] : Hadii anthony vallejo Somoises, wavanessada luqadaha, qaybta kaalmada ross, john cevallos . So [facility] [phone].    ATENCIÓN: Si habla español, tiene a schmitt disposición servicios gratuitos de asistencia lingüística. Llame al [phone].    We comply with applicable federal civil rights laws and [place] laws. We do not discriminate on the basis of race, color, national origin, age, disability, sex, sexual orientation, or " gender identity.            Thank you!     Thank you for choosing [facility]  for your care. Our goal is always to provide you with excellent care. Hearing back from our patients is one way we can continue to improve our services. Please take a few minutes to complete the written survey that you may receive in the mail after your visit with us. Thank you!             Your Updated Medication List - Protect others around you: Learn how to safely use, store and throw away your medicines at www.disposemymeds.org.          This list is accurate as of 4/12/18  1:04 PM.  Always use your most recent med list.                   Brand Name Dispense Instructions for use Diagnosis    ACETAMINOPHEN PO      Take 500 mg by mouth every 4 hours as needed for pain        albuterol 108 (90 Base) MCG/ACT Inhaler    PROAIR HFA/PROVENTIL HFA/VENTOLIN HFA    1 Inhaler    Inhale 2 puffs into the lungs every 6 hours as needed for shortness of breath / dyspnea or wheezing    Reactive airway disease without complication, unspecified asthma severity, unspecified whether persistent       CALCIUM-MAGNESIUM-ZINC PO      Take 1 tablet by mouth daily        CENTRUM SILVER per tablet     100 tablet    Take 1 tablet by mouth daily.        chlorthalidone 25 MG tablet    HYGROTON    30 tablet    Take 1 tablet (25 mg) by mouth daily as needed    HTN (hypertension)       fondaparinux 7.5MG/0.6ML injection    ARIXTRA    90 Syringe    Inject 0.6 mLs (7.5 mg) Subcutaneous every 24 hours    PE (pulmonary thromboembolism) (H)       lidocaine 5 % ointment    XYLOCAINE    5 g    Apply 0.5 g topically as needed for moderate pain Apply pea size amount to rectal area for pain        lidocaine-prilocaine cream    EMLA    30 g    Apply topically as needed for moderate pain Apply dollop size amount to port site 30-60 minutes prior to accessing.    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       LORazepam 0.5 MG  tablet    ATIVAN    30 tablet    Take 1 tablet (0.5 mg) by mouth every 4 hours as needed (Anxiety, Nausea/Vomiting or Sleep)    Malignant neoplasm of hepatic flexure (H)       methylphenidate 5 MG tablet    RITALIN    60 tablet    Take 1 tablet (5 mg) by mouth 2 times daily        ondansetron 8 MG tablet    ZOFRAN    10 tablet    Take 1 tablet (8 mg) by mouth every 8 hours as needed (Nausea/Vomiting)    Malignant neoplasm of hepatic flexure (H)       order for DME     1 Device    Equipment being ordered: Wheelchair    Malignant neoplasm of colon, unspecified part of colon (H), VTE (venous thromboembolism), PE (pulmonary thromboembolism) (H), Pain of left lower leg       oxyCODONE IR 5 MG tablet    ROXICODONE    50 tablet    Take 1-2 tablets (5-10 mg) by mouth every 4 hours as needed for pain maximum 12 tablet(s) per day    Malignant neoplasm of colon, unspecified part of colon (H)       Potassium Chloride ER 20 MEQ Tbcr     30 tablet    Take 1 tablet (20 mEq) by mouth daily    Hypokalemia       prochlorperazine 10 MG tablet    COMPAZINE    30 tablet    Take 1 tablet (10 mg) by mouth every 6 hours as needed (Nausea/Vomiting)    Malignant neoplasm of hepatic flexure (H)       sennosides 8.6 MG tablet    SENOKOT     Take 1 tablet by mouth 2 times daily as needed for constipation        sertraline 50 MG tablet    ZOLOFT    30 tablet    TAKE 1 TABLET BY MOUTH EVERY DAY    Malignant neoplasm of hepatic flexure (H)       VICKS NYQUIL COLD & FLU 15-6. MG/15ML Liqd   Generic drug:  DM-Doxylamine-Acetaminophen      Take 15 mLs by mouth nightly as needed        VITAMIN D (CHOLECALCIFEROL) PO      Take 1 tablet by mouth daily (OTC: Pt unsure of strength)

## 2018-04-12 NOTE — PATIENT INSTRUCTIONS
5 FU and leucovorin today and continue every 14 days. Oxaliplatin discontinued.   Scheduled - Charissa  Add CEA and iron studies to today's labs  RTC MD 4 weeks   Scheduled - Charissa  CT scan chest abdomen and pelvis before next visit   Scheduled - Charissa    AVS given to patient - Charissa

## 2018-04-13 NOTE — PROGRESS NOTES
This is a recent snapshot of the patient's [facility] medical record.  For current drug dose and complete information and questions, call [phone] or In Basket pool, fv home infusion ([id number])  CSN Number:  [id number]

## 2018-04-16 NOTE — TELEPHONE ENCOUNTER
I received a call from Radha of [facility] who was ding a home visit at the time of call wanting to report sever pain  Both in the feet and hands. The pain is described as tingling that goers from the feet to the knees and hands to the elbows 10/10 affecting the patient 's ability to walk. Today it is slightly better 8/10 and is requesting to be advised on pain management.    Verbal orders given for 2 home visits per week for 2 weeks and OT one time a week for 2 weeks. I provided the verbal ok.      I informed Liv that I would call Dr. Solis to advise. She requested any new script to go to [facility] in [place]. Debbie Dasilva

## 2018-04-16 NOTE — TELEPHONE ENCOUNTER
Dr. Piedra updated about pain and suggested that patient see NP here at our clinic to assess pain and advise.     I returned call to patient and she updated me that she had not been taking the Oxycodone IR and that she now reports her pain at 2/10. She has 11 more pills left and will need a new script that she would like to have brought downstairs to [facility] pharmacy to process.    Message routed with update. Debbie Dasilva

## 2018-04-17 NOTE — PROGRESS NOTES
This is a recent snapshot of the patient's [facility] medical record.  For current drug dose and complete information and questions, call [phone] or In Basket pool, fv home infusion ([id number])  CSN Number:  [id number]

## 2018-04-17 NOTE — PROGRESS NOTES
This is a recent snapshot of the patient's [facility] medical record.  For current drug dose and complete information and questions, call [phone] or In Basket pool, fv home infusion ([id number])  CSN Number:  [id number]

## 2018-04-19 NOTE — PROGRESS NOTES
This is a recent snapshot of the patient's [facility] medical record.  For current drug dose and complete information and questions, call [phone] or In Basket pool, fv home infusion ([id number])  CSN Number:  [id number]

## 2018-04-20 NOTE — PROGRESS NOTES
This is a recent snapshot of the patient's [facility] medical record.  For current drug dose and complete information and questions, call [phone] or In Basket pool, fv home infusion ([id number])  CSN Number:  [id number]

## 2018-04-20 NOTE — IP AVS SNAPSHOT
MRN:[id number]                      After Visit Summary   4/20/2018    Liv Sylvester    MRN: [id number]           Thank you!     Thank you for choosing [facility] for your care. Our goal is always to provide you with excellent care. Hearing back from our patients is one way we can continue to improve our services. Please take a few minutes to complete the written survey that you may receive in the mail after you visit with us. Thank you!        Patient Information     Date Of Birth          1950        Designated Caregiver       Most Recent Value    Caregiver    Will someone help with your care after discharge? yes    Name of designated caregiver granddaughter    Phone number of caregiver in chart    Caregiver address in chart      About your hospital stay     You were admitted on:  April 20, 2018 You last received care in the:  [place] [address] [facility]    You were discharged on:  April 28, 2018        Reason for your hospital stay       Admitted for elevated white cell count and some abdominal discomfort.  Discovered to have progression of deep venous thrombosis and progression of know colon cancer.  Hospitalization was prolonged due to one of two blood cultures with bacteria which eventually was thought by infectious disease to be a contaminant.  Hematology/oncology recommended a filter in the inferior vena cava due to blood clot in leg, which was placed 4/27/18.                  Who to Call     For medical emergencies, please call 911.  For non-urgent questions about your medical care, please call your primary care provider or clinic, [phone]          Attending Provider     Provider Specialty    Elie Gallego MD Emergency Medicine    Mary Ann Melara MD Internal Medicine       Primary Care Provider Office Phone # Fax #    Amaya Howard [phone]      After Care Instructions     Activity       Your activity upon discharge: activity as tolerated          "   Diet       Follow this diet upon discharge: Orders Placed This Encounter      Advance Diet as Tolerated: Regular Diet Adult; Regular Diet Adult                  Follow-up Appointments     Follow-up and recommended labs and tests        Follow-up with  at [facility] in the next available appointment for resumption of second line chemotherapy.            Follow-up and recommended labs and tests        Follow up with your hematology-oncology clinic in 1-2 weeks                  Your next 10 appointments already scheduled     May 10, 2018  8:30 AM CDT   Level 6 with  INFUSION CHAIR 11   [facility] ([facility])    [facility]  [address]  [place] [address]   [phone]            May 10, 2018  9:00 AM CDT   Return Visit with Radha Piedra MD   [facility] ([facility])    [facility]  [address]  [place] [address]   [phone]              Further instructions from your care team       Follow up with [facility].  Call to schedule appointment.    Pending Results     Date and Time Order Name Status Description    4/23/2018 0902 Blood culture Preliminary     4/20/2018 2054 Blood culture Preliminary             Statement of Approval     Ordered          04/28/18 1032  I have reviewed and agree with all the recommendations and orders detailed in this document.  EFFECTIVE NOW     Approved and electronically signed by:  Chito Aiken MD             Admission Information     Date & Time Provider Department Dept. Phone    4/20/2018 Mary Ann Melara MD [place] [address] [facility] [phone]      Your Vitals Were     Blood Pressure Pulse Temperature Respirations Height Weight    121/75 (BP Location: Left arm) 105 97.3  F (36.3  C) (Oral) 16 1.778 m (5' 10\") 93.6 kg (206 lb 6.4 oz)    Pulse Oximetry BMI (Body Mass Index)    " "            98% 29.62 kg/m2          [employer] Information     [employer] lets you send messages to your doctor, view your test results, renew your prescriptions, schedule appointments and more. To sign up, go to [email or web address] . Click on \"Log in\" on the left side of the screen, which will take you to the Welcome page. Then click on \"Sign up Now\" on the right side of the page.     You will be asked to enter the access code listed below, as well as some personal information. Please follow the directions to create your username and password.     Your access code is: [email or web address]  Expires: 2018 10:35 AM     Your access code will  in 90 days. If you need help or a new code, please call your [facility] clinic or [phone].        Care EveryWhere ID     This is your Care EveryWhere ID. This could be used by other organizations to access your [facility] medical records  [id number]        Equal Access to Services     HAYDEE IVERSON : Hadii anthony delaneyo Somoises, waaxda luqadaha, qaybta kaalmada adenigel, john cevallos . So [facility] [phone].    ATENCIÓN: Si habla español, tiene a schmitt disposición servicios gratuitos de asistencia lingüística. Wiley al [phone].    We comply with applicable federal civil rights laws and [place] laws. We do not discriminate on the basis of race, color, national origin, age, disability, sex, sexual orientation, or gender identity.               Review of your medicines      START taking        Dose / Directions    ferrous sulfate 325 (65 Fe) MG tablet   Commonly known as:  IRON   Used for:  Iron deficiency anemia due to chronic blood loss        Dose:  325 mg   Take 1 tablet (325 mg) by mouth daily   Quantity:  100 tablet   Refills:  1       metroNIDAZOLE 250 MG tablet   Commonly known as:  FLAGYL   Indication:  Possible post-op infection   Used for:  Leukocytosis, unspecified type        Dose:  250 mg   Take 1 tablet (250 mg) by mouth every 8 " hours for 2 days   Quantity:  6 tablet   Refills:  0         CONTINUE these medicines which may have CHANGED, or have new prescriptions. If we are uncertain of the size of tablets/capsules you have at home, strength may be listed as something that might have changed.        Dose / Directions    chlorthalidone 25 MG tablet   Commonly known as:  HYGROTON   This may have changed:  when to take this   Used for:  HTN (hypertension)        Dose:  25 mg   Take 1 tablet (25 mg) by mouth daily as needed   Quantity:  30 tablet   Refills:  0         CONTINUE these medicines which have NOT CHANGED        Dose / Directions    ACETAMINOPHEN PO        Dose:  500 mg   Take 500 mg by mouth every 4 hours as needed for pain   Refills:  0       albuterol 108 (90 Base) MCG/ACT Inhaler   Commonly known as:  PROAIR HFA/PROVENTIL HFA/VENTOLIN HFA   Used for:  Reactive airway disease without complication, unspecified asthma severity, unspecified whether persistent        Dose:  2 puff   Inhale 2 puffs into the lungs every 6 hours as needed for shortness of breath / dyspnea or wheezing   Quantity:  1 Inhaler   Refills:  3       CALCIUM-MAGNESIUM-ZINC PO        Dose:  1 tablet   Take 1 tablet by mouth daily   Refills:  0       CENTRUM SILVER per tablet        Dose:  1 tablet   Take 1 tablet by mouth daily.   Quantity:  100 tablet   Refills:  12       fondaparinux 7.5MG/0.6ML injection   Commonly known as:  ARIXTRA   Used for:  PE (pulmonary thromboembolism) (H)        Dose:  7.5 mg   Inject 0.6 mLs (7.5 mg) Subcutaneous every 24 hours   Quantity:  90 Syringe   Refills:  0       lidocaine 5 % ointment   Commonly known as:  XYLOCAINE        Dose:  0.5 inch   Apply 0.5 g topically as needed for moderate pain Apply pea size amount to rectal area for pain   Quantity:  5 g   Refills:  0       lidocaine-prilocaine cream   Commonly known as:  EMLA   Used for:  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        Apply topically as  needed for moderate pain Apply dollop size amount to port site 30-60 minutes prior to accessing.   Quantity:  30 g   Refills:  1       LORazepam 0.5 MG tablet   Commonly known as:  ATIVAN   Used for:  Malignant neoplasm of hepatic flexure (H)        Dose:  0.5 mg   Take 1 tablet (0.5 mg) by mouth every 4 hours as needed (Anxiety, Nausea/Vomiting or Sleep)   Quantity:  30 tablet   Refills:  2       methylphenidate 5 MG tablet   Commonly known as:  RITALIN        Dose:  5 mg   Take 1 tablet (5 mg) by mouth 2 times daily   Quantity:  60 tablet   Refills:  0       ondansetron 8 MG tablet   Commonly known as:  ZOFRAN   Used for:  Malignant neoplasm of hepatic flexure (H)        Dose:  8 mg   Take 1 tablet (8 mg) by mouth every 8 hours as needed (Nausea/Vomiting)   Quantity:  10 tablet   Refills:  2       order for DME   Used for:  Malignant neoplasm of colon, unspecified part of colon (H), VTE (venous thromboembolism), PE (pulmonary thromboembolism) (H), Pain of left lower leg        Equipment being ordered: Wheelchair   Quantity:  1 Device   Refills:  0       oxyCODONE IR 5 MG tablet   Commonly known as:  ROXICODONE   Used for:  Malignant neoplasm of colon, unspecified part of colon (H)        Dose:  5-10 mg   Take 1-2 tablets (5-10 mg) by mouth every 4 hours as needed for pain maximum 12 tablet(s) per day   Quantity:  50 tablet   Refills:  0       Potassium Chloride ER 20 MEQ Tbcr   Used for:  Hypokalemia        Dose:  20 mEq   Take 1 tablet (20 mEq) by mouth daily   Quantity:  30 tablet   Refills:  0       prochlorperazine 10 MG tablet   Commonly known as:  COMPAZINE   Used for:  Malignant neoplasm of hepatic flexure (H)        Dose:  10 mg   Take 1 tablet (10 mg) by mouth every 6 hours as needed (Nausea/Vomiting)   Quantity:  30 tablet   Refills:  2       sennosides 8.6 MG tablet   Commonly known as:  SENOKOT        Dose:  1 tablet   Take 1 tablet by mouth 2 times daily as needed for constipation   Refills:  0        sertraline 50 MG tablet   Commonly known as:  ZOLOFT   Used for:  Malignant neoplasm of hepatic flexure (H)        TAKE 1 TABLET BY MOUTH EVERY DAY   Quantity:  30 tablet   Refills:  0       VITAMIN D (CHOLECALCIFEROL) PO        Dose:  5000 Units   Take 5,000 Units by mouth daily (OTC: Pt unsure of strength)   Refills:  0            Where to get your medicines      These medications were sent to [facility] [address] [place] [address], [place] - [address] AT [facility]  [address], [facility] [address]     Phone:  [phone]     ferrous sulfate 325 (65 Fe) MG tablet    metroNIDAZOLE 250 MG tablet                Protect others around you: Learn how to safely use, store and throw away your medicines at www.disposemymeds.org.        ANTIBIOTIC INSTRUCTION     You've Been Prescribed an Antibiotic - Now What?  Your healthcare team thinks that you or your loved one might have an infection. Some infections can be treated with antibiotics, which are powerful, life-saving drugs. Like all medications, antibiotics have side effects and should only be used when necessary. There are some important things you should know about your antibiotic treatment.      Your healthcare team may run tests before you start taking an antibiotic.    Your team may take samples (e.g., from your blood, urine or other areas) to run tests to look for bacteria. These test can be important to determine if you need an antibiotic at all and, if you do, which antibiotic will work best.      Within a few days, your healthcare team might change or even stop your antibiotic.    Your team may start you on an antibiotic while they are working to find out what is making you sick.    Your team might change your antibiotic because test results show that a different antibiotic would be better to treat your infection.    In some cases, once your team has more information, they learn that you do not need an antibiotic at  all. They may find out that you don't have an infection, or that the antibiotic you're taking won't work against your infection. For example, an infection caused by a virus can't be treated with antibiotics. Staying on an antibiotic when you don't need it is more likely to be harmful than helpful.      You may experience side effects from your antibiotic.    Like all medications, antibiotics have side effects. Some of these can be serious.    Let you healthcare team know if you have any known allergies when you are admitted to the hospital.    One significant side effect of nearly all antibiotics is the risk of severe and sometimes deadly diarrhea caused by Clostridium difficile (C. Difficile). This occurs when a person takes antibiotics because some good germs are destroyed. Antibiotic use allows C. diificile to take over, putting patients at high risk for this serious infection.    As a patient or caregiver, it is important to understand your or your loved one's antibiotic treatment. It is especially important for caregivers to speak up when patients can't speak for themselves. Here are some important questions to ask your healthcare team.    What infection is this antibiotic treating and how do you know I have that infection?    What side effects might occur from this antibiotic?    How long will I need to take this antibiotic?    Is it safe to take this antibiotic with other medications or supplements (e.g., vitamins) that I am taking?     Are there any special directions I need to know about taking this antibiotic? For example, should I take it with food?    How will I be monitored to know whether my infection is responding to the antibiotic?    What tests may help to make sure the right antibiotic is prescribed for me?      Information provided by:  www.cdc.gov/getsmart  U.S. Department of Health and Human Services  Centers for disease Control and Prevention  National Center for Emerging and Zoonotic  Infectious Diseases  Division of Healthcare Quality Promotion             Medication List: This is a list of all your medications and when to take them. Check marks below indicate your daily home schedule. Keep this list as a reference.      Medications           Morning Afternoon Evening Bedtime As Needed    ACETAMINOPHEN PO   Take 500 mg by mouth every 4 hours as needed for pain                                   albuterol 108 (90 Base) MCG/ACT Inhaler   Commonly known as:  PROAIR HFA/PROVENTIL HFA/VENTOLIN HFA   Inhale 2 puffs into the lungs every 6 hours as needed for shortness of breath / dyspnea or wheezing                                   CALCIUM-MAGNESIUM-ZINC PO   Take 1 tablet by mouth daily   Last time this was given:  1 tablet on 4/28/2018  8:14 AM            4-29-18                         CENTRUM SILVER per tablet   Take 1 tablet by mouth daily.   Next Dose Due:  Resume pre-hospitalization schedule                                chlorthalidone 25 MG tablet   Commonly known as:  HYGROTON   Take 1 tablet (25 mg) by mouth daily as needed   Last time this was given:  25 mg on 4/24/2018  8:09 AM                                   ferrous sulfate 325 (65 Fe) MG tablet   Commonly known as:  IRON   Take 1 tablet (325 mg) by mouth daily   Last time this was given:  325 mg on 4/28/2018  8:14 AM            4-29-18                         fondaparinux 7.5MG/0.6ML injection   Commonly known as:  ARIXTRA   Inject 0.6 mLs (7.5 mg) Subcutaneous every 24 hours   Next Dose Due:  Resume pre-hospitalization schedule                                lidocaine 5 % ointment   Commonly known as:  XYLOCAINE   Apply 0.5 g topically as needed for moderate pain Apply pea size amount to rectal area for pain                                   lidocaine-prilocaine cream   Commonly known as:  EMLA   Apply topically as needed for moderate pain Apply dollop size amount to port site 30-60 minutes prior to accessing.                                    LORazepam 0.5 MG tablet   Commonly known as:  ATIVAN   Take 1 tablet (0.5 mg) by mouth every 4 hours as needed (Anxiety, Nausea/Vomiting or Sleep)                                   methylphenidate 5 MG tablet   Commonly known as:  RITALIN   Take 1 tablet (5 mg) by mouth 2 times daily   Last time this was given:  5 mg on 4/28/2018  8:14 AM                    4-28-18                 metroNIDAZOLE 250 MG tablet   Commonly known as:  FLAGYL   Take 1 tablet (250 mg) by mouth every 8 hours for 2 days   Last time this was given:  250 mg on 4/28/2018  5:45 AM                4-28-18  2:00 pm                     ondansetron 8 MG tablet   Commonly known as:  ZOFRAN   Take 1 tablet (8 mg) by mouth every 8 hours as needed (Nausea/Vomiting)   Last time this was given:  4 mg on 4/25/2018 11:36 AM                                   order for DME   Equipment being ordered: Wheelchair                                oxyCODONE IR 5 MG tablet   Commonly known as:  ROXICODONE   Take 1-2 tablets (5-10 mg) by mouth every 4 hours as needed for pain maximum 12 tablet(s) per day   Last time this was given:  5 mg on 4/25/2018  5:03 PM                                   Potassium Chloride ER 20 MEQ Tbcr   Take 1 tablet (20 mEq) by mouth daily   Next Dose Due:  Resume pre-hospitalization schedule                                prochlorperazine 10 MG tablet   Commonly known as:  COMPAZINE   Take 1 tablet (10 mg) by mouth every 6 hours as needed (Nausea/Vomiting)                                   sennosides 8.6 MG tablet   Commonly known as:  SENOKOT   Take 1 tablet by mouth 2 times daily as needed for constipation                                   sertraline 50 MG tablet   Commonly known as:  ZOLOFT   TAKE 1 TABLET BY MOUTH EVERY DAY   Last time this was given:  50 mg on 4/28/2018  8:14 AM            4-29-18                         VITAMIN D (CHOLECALCIFEROL) PO   Take 5,000 Units by mouth daily (OTC: Pt unsure of strength)   Next Dose  Due:  Resume pre-hospitalization schedule                                          More Information        Having Inferior Vena Cava (IVC) Filter Placement  An inferior vena cava (IVC) filter is a small device that can stop blood clots from going up into the lungs. The inferior vena cava is a large vein in the middle of your body. The device is put in during a short surgery.  What to tell your healthcare provider  Tell your healthcare provider about all the medicines you take. This includes over-the-counter medicines such as aspirin. Before the procedure, make sure to tell the medical team if you:    Have had any recent changes in your health, such as a fever    Are pregnant or might be pregnant    Have ever had a problem with anesthesia  Tests before your procedure  You may need some tests before the procedure. These are to look at your health. Your doctor can tell you more if you need tests.  Getting ready for your procedure  Talk with your healthcare provider about the type of IVC filter that you will have. Some filters can be taken out when your risk of DVT is lower. Others are meant to stay in your IVC permanently.  Talk with your healthcare provider about how to get ready for your procedure. You may need to stop taking some medicines ahead of time, such as blood thinners. If you smoke, you ll need to stop before your procedure. Talk with your healthcare provider if you need help to stop smoking. Do not eat or drink after midnight the night before your procedure. Arrange to have someone available to drive you home after your procedure.  On the day of your procedure  Talk with your healthcare provider about what to expect during your procedure. It usually takes about an hour. The procedure is done by an interventional radiologist and a team of specialized nurses. A typical procedure may go like this:    An IV will be put in your arm or hand before the procedure starts. You ll be given sedation through the IV  line. This will make you relaxed and sleepy during procedure.    Hair in the area of your procedure may be removed. The area may be numbed with a local anesthesia.    Your doctor will make a small incision in this region to access a major vein leading to the IVC.    A long thin tube (catheter) will be inserted into this vein.    Your doctor will use continuous X-rays (fluoroscopy) and move the tube up into the IVC. Contrast material may be sent through into the catheter to help show the IVC clearly on the X-rays.    Your doctor will release the filter into the IVC. Here the filter will expand and attach itself to the walls of your IVC.    Your doctor will remove the catheter    Your doctor will close the incision on your neck or groin and bandage it.  After your procedure  After the procedure, you will spend several hours in a recovery room. You may be sleepy and confused when you wake up. Your healthcare team will watch your vital signs, such as your heart rate and breathing. You ll be given pain medicine if you need it. You may have a headache or nausea, but these should go away quickly.  You may be able to go home the same day. Your healthcare provider will tell you more about what to expect. When you are ready to go home, you will need to have a family member or friend drive you.  Recovering at home  Follow all of your healthcare provider s instructions. This includes any advice about medicines, exercise, and wound care. You may have some pain after the procedure. You may notice a bruise where the catheter was inserted. You can take over-the-counter pain medicines if you need to. Get some rest and avoid strenuous exercise for at least 24 hours.  Follow-up care  Make sure to keep all of your follow-up appointments. You will need continued monitoring after your treatment. You may need follow-up imaging tests to make sure your filter is still in the correct place. If you have the type of IVC filter that can be  removed, you may have a similar procedure in the future to remove the device. This may be done after your risk of DVT has gone down. In some cases a removable filter is left in place. This may happen if scar tissue grows around the filter and it cannot be removed.  When to call your healthcare provider  Call your healthcare provider right away if you have any of these:    Coldness or numbness in one of your limbs    Bleeding at the site that doesn t stop with pressure    Swelling or pain at the incision site that gets worse    Fluid leaking from the incision site    Redness or warmth at the incision site    Fever    Chest pain    Headache or nausea that doesn t go away   Date Last Reviewed: 8/10/2015    9205-1053 [employer]. [address] [place] [address], [place] [id number]. All rights reserved. This information is not intended as a substitute for professional medical care. Always follow your healthcare professional's instructions.                Understanding Inferior Vena Cava (IVC) Filter Placement    An inferior vena cava (IVC) filter is a small device that can stop blood clots from going up into the lungs. The inferior vena cava is a large vein in the middle of your body. The device is put in during a short surgery.  What is DVT?  Veins are the blood vessels that bring oxygen-poor blood back to the heart. A deep vein thrombosis (DVT) is a blood clot that forms in a vein deep inside the body. In most cases, this clot forms inside one of the deep veins of the thigh or lower leg. DVT is a serious medical condition that can cause swelling, pain, and tenderness in your leg. In some cases, a deep clot in a leg vein can break free and stick in a vessel in the lung. This can cause a blockage in the vessel called a pulmonary embolism. Pulmonary embolism can cause severe shortness of breath and even sudden death.  Why IVC filter placement is done  An IVC filter is one method to help prevent pulmonary embolism.  Your inferior vena cava (IVC) is the major vein that brings oxygen-poor blood from the lower body back to the heart. The heart then pumps the blood to the lungs to  oxygen. An IVC filter is a small, wiry device. When the filter is placed in your IVC, the blood flows past the filter. The filter catches blood clots and stops them from moving up to the heart and lungs. This helps to prevent a pulmonary embolism.  It's important to understand that an IVC filter does not protect against DVT. You may still get a DVT. The filter helps to protect you from a life-threatening pulmonary embolism if you have a DVT.  You might need an IVC filter if you have a DVT or pulmonary embolism, or have had either of these in the past. Your healthcare provider may advise an IVC filter if:    You can't take blood thinner medicine    You took blood thinner medicine and it didn't work  How IVC filter placement is done  The procedure is done by an interventional radiologist or a vascular surgeon and a team of specialized nurses. Your healthcare provider will make a small cut (incision) in your groin. This is to reach a major vein leading to the IVC. Your healthcare provider will put a long thin tube (catheter) containing the filter into this vein. Your healthcare provider will use continuous X-rays (fluoroscopy) and move the tube up into the IVC. Your healthcare provider will release the filter into the IVC. The filter will expand and attach itself to the walls of your IVC.  Risks of IVC filter placement  All procedures have risks. The risks of this procedure include:    Infection    Too much bleeding    Allergic reaction    Damage to the blood vessel at the insertion site    Blockage of blood flow through the vena cava, which can cause leg swelling    A filter that travels to the heart or lungs, causing injury or death    A filter that pierces through the inferior vena cava, causing pain or damage to other organs    Problem with  placement of the filter    Continued risk of a blood clot that travels to the lungs    Kidney failure  Your risks may vary based on your overall health, the severity of your condition, and other factors. Ask your healthcare provider about which risks apply most to you.  Date Last Reviewed: 5/1/2016 2000-2017 [employer]. [address] [place] [address], [place] [address]. All rights reserved. This information is not intended as a substitute for professional medical care. Always follow your healthcare professional's instructions.

## 2018-04-20 NOTE — ED PROVIDER NOTES
History     Chief Complaint:  Chest Pain       HPI   Liv Sylvester is a 67 year old female, anticoagulated on heparin injections for PE and DVT and currently undergoing chemotherapy for colon cancer with liver metathesis who presents with intermittent chest pain over the last three days. These episodes last two minutes and are located in the middle of her chest. She also endorses a fluttery feeling, diaphoresis and shortness of breath which occur alongside these episodes that she only experiences at night. She states this does not feel like her previous blood clots. She has been eating, urinating and producing BMs regularly. She denies history of heart failure and heart attack. Patient denies nausea and dizziness.     Cardiac Risk Factors   Sex: Female   Tobacco: Former smoker  Hypertension: Positive  Diabetes: Negative  Hyperlipidemia: Negative  Family History: Negative    PE/DVT Risk Factors   Personal History: Positive  Recent Travel: Negative   Recent Surgery/Hospitalization: Negative  Tobacco: Former smoker  Family History: Negative  Hormone Use: Negative   Cancer: Positive  Trauma: Negative      Allergies:  No known drug allergies.    Medications:    Acetaminophen  Albuterol  Calcium  Hygroton  Nyquil  Arixtra  Xylocaine  Emla cream  Ativan   Ritalin  Centrum silver tabs  Zofran  Oxycodone  Potassium chloride  Compazine  Senokot  Zoloft  Vitamin D  Heparin injections     Past Medical History:    Colon cancer with liver metathesis  Flash pulmonary edema  Hemorrhoid   HTN  Obesity   Iron-deficiency anemia  PE  DVT    Past Surgical History:    Colonoscopy  Hysterectomy  Insert port vascular access    Family History:    Cerebrovascular disease  MI  Diabetes  HTN    Social History:  Marital Status:   Presents to the ED dropped off by her children.   Tobacco Use: Former smoker (Quit: 6/14/2005)  Alcohol Use: No  PCP: RIGOBERTO SEN     Review of Systems   Constitutional: Positive for diaphoresis.  "  Respiratory: Positive for shortness of breath.    Cardiovascular: Positive for chest pain and palpitations.   Gastrointestinal: Negative for constipation, diarrhea and nausea.   Genitourinary: Negative for dysuria, frequency and urgency.   Neurological: Negative for dizziness.   All other systems reviewed and are negative.      Physical Exam   First Vitals:  BP: 140/78  Heart Rate: 91  Temp: 97.9  F (36.6  C)  Resp: 16  Height: 177.8 cm (5' 10\")  Weight: 93 kg (205 lb)  SpO2: 94 %      Physical Exam   Constitutional: Heavy set black female, supine. No respiratory distress.   HENT: No signs of trauma.   Eyes: EOM are normal. Pupils are equal, round, and reactive to light.   Neck: Normal range of motion. No JVD present. No cervical adenopathy.  Cardiovascular: Regular rhythm.  Exam reveals no gallop and no friction rub.    No murmur heard. 1+ femoral pulses.   Pulmonary/Chest: Bilateral breath sounds normal. No wheezes, rhonchi or rales. Port right anterior chest.   Abdominal: Soft. Epigastric tenderness. No rebound or guarding. Midline suprapubic incision.   Musculoskeletal: No edema. No tenderness.   Lymphadenopathy: No lymphadenopathy.   Neurological: Alert and oriented to person, place, and time. Normal strength. Coordination normal.   Skin: Skin is warm and dry. No rash noted. No erythema.     Emergency Department Course   ECG:  @ 1743  Indication: Chest pain  Vent. Rate 88 bpm. ND interval 146 ms. QRS duration 88 ms. QT/QTc 364/440 ms. P-R-T axis 82 15 58.   Normal sinus rhythm. Normal ECG.     Read @ 1750 by Dr. Gallego.    Imaging:  Radiographic findings were communicated with the patient who voiced understanding of the findings.    CT Chest/Abdomen/Pelvis w Contrast   Final Result   IMPRESSION:   1. No evidence of acute pulmonary embolism. Lungs are clear. No new   infiltrate, consolidation or mass. Right major fissure lymph node is   stable.   2. Progression of patient's metastatic liver disease since " prior CT.   3. Progression of patient's suspected left common femoral vein   thrombus extending now into the left common iliac vein. Follow-up with   anticoagulation or IVC filter as needed for further treatment.   4. No bowel obstruction, diverticulitis or appendicitis.      SHANEKA PERES MD      XR Chest 2 Views   Final Result   IMPRESSION: Nothing acute and no interval change.      CARA BROWN MD          Laboratory:  CBC:  WBC 21.8, HGB 9.4,   CMP: Glucose 111, albumin 2.8, alkphos 255, otherwise WNL (Creatinine 0.63)  Troponin: <0.015  Lactic acid: 0.8  Blood cultures: Pending  UA: Clear yellow urine, moderate leukocyte esterase, WBC 5, RBC 2, squamous epithelial 5, mucous present, otherwise WNL  Urine culture: Pending    Interventions:  1823: Normal Saline, 1 liter, IV bolus   2124: Zosyn, 3.375 g, IV injection    Emergency Department Course:  The patient arrived in triage where vitals were measured and recorded.   The patient was then escorted back to the emergency department.   The patient's medical records were reviewed.  Nursing notes and vitals were reviewed.  1746: I performed an exam of the patient as documented above.  The above workup was undertaken.  2039: I rechecked the patient and discussed results.  Findings and plan explained to the patient who consents to admission.   2048: I discussed the patient with Dr. Melara of the hospitalist service, who will admit the patient to an oncology bed for further monitoring, evaluation, and treatment.    Impression & Plan      Medical Decision Making:  Liv Sylvester is a 67 year old female with a history of colon cancer with known mets to the liver, as well as PE and DVT who receives daily blood thinner injections, who presents to the ED because of 2-3 days of chest discomfort that she notes mostly in the evening when she lays down. She tends to feel it in her lower chest and epigastrium. She states she does not feel it now, although when I push on  her upper abdomen there is tenderness present. Patient is hemodynamically stable. She is not febrile. Her workup shows quite an elevated white count with a left shift. This seems to be new from last white count that was normal one week ago.  CT scan was obtained of chest/abd/pelvis which shows no new clots in the lung, no perforation or obstruction but her liver mets are progressing. Also the clot in her left common femoral vein is now extending up into the common iliac vein. Patient did have lactate and blood cultures done. Urine was obtained. Empirically, with a change in her white count and chemo, she is being started on IV antibiotics with Zosyn. For the discussion regarding the progression of her clot, anticoagulation will also need to be done.     Diagnosis:    ICD-10-CM    1. Abdominal pain, epigastric R10.13 Urine Culture Aerobic Bacterial     Urine Culture Aerobic Bacterial     Blood culture     Blood culture     Lactic acid     Procalcitonin   2. Malignant neoplasm of colon, unspecified part of colon (H) C18.9    3. Deep vein thrombosis (DVT) of femoral vein, unspecified chronicity, unspecified laterality (H) I82.419        Disposition:  Admitted to an oncology bed.       Tanya CASTILLO, am serving as a scribe on 4/20/2018 at 5:46 PM to personally document services performed by Dr. Gallego based on my observations and the provider's statements to me.    EMERGENCY DEPARTMENT       Elie Gallego MD  04/21/18 0124

## 2018-04-20 NOTE — TELEPHONE ENCOUNTER
Call received from home care RN stating that she is out there now and Liv mentioned to her some fluttering, chest pressure and some shortness of breath that has been intermittently happening since 4/18/18.  Advised that she be evaluated in the emergency room for this.  Home care RN with Liv at the time of conversation.

## 2018-04-20 NOTE — IP AVS SNAPSHOT
[address]    [place] [address]    Phone:  [phone]                                       After Visit Summary   4/20/2018    Liv Sylvester    MRN: [id number]           After Visit Summary Signature Page     I have received my discharge instructions, and my questions have been answered. I have discussed any challenges I see with this plan with the nurse or doctor.    ..........................................................................................................................................  Patient/Patient Representative Signature      ..........................................................................................................................................  Patient Representative Print Name and Relationship to Patient    ..................................................               ................................................  Date                                            Time    ..........................................................................................................................................  Reviewed by Signature/Title    ...................................................              ..............................................  Date                                                            Time

## 2018-04-20 NOTE — TELEPHONE ENCOUNTER
Message left for patient -  Per Dr Piedra her CT scheduled for May needs to be moved to next week before her chemo date 4-06-18

## 2018-04-20 NOTE — IP AVS SNAPSHOT
"[place] [address] ONCOLOGY: [phone]                                              INTERAGENCY TRANSFER FORM - PHYSICIAN ORDERS   2018                    Hospital Admission Date: 2018  JESSICA MOYA   : 1950  Sex: Female        Attending Provider: Mary Ann Melara MD     Allergies:  No Known Allergies    Infection:  None   Service:  HOSPITALIST    Ht:  1.778 m (5' 10\")   Wt:  93.6 kg (206 lb 6.4 oz)   Admission Wt:  93 kg (205 lb)    BMI:  29.62 kg/m 2   BSA:  2.15 m 2            Patient PCP Information     Provider PCP Type    RIGOBERTO SEN General      ED Clinical Impression     Diagnosis Description Comment Added By Time Added    Abdominal pain, epigastric [R10.13] Abdominal pain, epigastric [R10.13]  Elie Gallego MD 2018  8:54 PM    Malignant neoplasm of colon, unspecified part of colon (H) [C18.9] Malignant neoplasm of colon, unspecified part of colon (H) [C18.9]  Elie Gallego MD 2018  8:54 PM    Deep vein thrombosis (DVT) of femoral vein, unspecified chronicity, unspecified laterality (H) [I82.419] Deep vein thrombosis (DVT) of femoral vein, unspecified chronicity, unspecified laterality (H) [I82.419]  Elie Gallego MD 2018  8:55 PM      Hospital Problems as of 2018              Priority Class Noted POA    DVT (deep vein thrombosis) in pregnancy (H) Medium  2018 Yes      Non-Hospital Problems as of 2018              Priority Class Noted    Obesity Medium  2011    CARDIOVASCULAR SCREENING; LDL GOAL LESS THAN 160 Medium  2011    Advanced directives, counseling/discussion Medium  3/22/2013    HTN (hypertension) Medium  Unknown    Iron deficiency anemia Medium  2013    Liver masses Medium  2017    RUQ abdominal pain Medium  Unknown    Colon Cancer, with Liver Met (Stage IV) Medium  10/4/2017    Pulmonary embolism (H) Medium  10/20/2017    Portacath in place Medium  10/24/2017    PE (pulmonary " thromboembolism) (H) Medium  12/15/2017    Primary hypercoagulable state with Recurrent DVT/PE Medium  1/6/2018    Acute deep vein thrombosis (DVT) of left lower extremity (H) Medium  1/6/2018    Noncompliance with medication regimen Medium  1/6/2018    Recurrent deep vein thrombosis (DVT) (H) Medium  1/15/2018    Intractable nausea and vomiting Medium  2/28/2018      Code Status History     Date Active Date Inactive Code Status Order ID Comments User Context    4/28/2018  6:45 AM  DNR/DNI [id number]  Ivy Bradley MD Outpatient    4/20/2018 10:30 PM 4/28/2018  6:45 AM DNR/DNI [id number]  Mary Ann Melara MD Inpatient    3/2/2018  9:27 AM 4/20/2018 10:30 PM DNR/DNI [id number]  Jerry Strange DO Outpatient    2/28/2018  4:22 PM 3/2/2018  9:27 AM DNR/DNI [id number]  Maximus Mills MD Inpatient    1/17/2018  2:48 PM 2/28/2018  4:22 PM DNR/DNI [id number]  Kristy Sterling MD Outpatient    1/15/2018  5:48 PM 1/17/2018  2:48 PM DNR/DNI [id number]  Maximus Mills MD Inpatient    1/7/2018 12:40 PM 1/15/2018  5:48 PM DNR/DNI [id number]  Addison Galo MD Outpatient    1/5/2018  2:45 PM 1/7/2018 12:40 PM DNR/DNI [id number]  Eriberto Amaral PA-C Inpatient    12/18/2017 12:25 PM 1/5/2018  2:45 PM DNR/DNI [id number]  Carolyn Berman MD Outpatient    12/15/2017  5:14 PM 12/18/2017 12:25 PM DNR/DNI [id number]  Jerry Strange DO Inpatient    10/21/2017 11:27 AM 12/15/2017  5:14 PM DNR/DNI [id number]  Casandra Youngblood MD Outpatient    10/20/2017  5:27 PM 10/21/2017 11:27 AM DNR/DNI [id number]  Casandra Youngblood MD Inpatient    10/2/2017 11:44 AM 10/20/2017  5:27 PM DNR/DNI [id number]  Carolyn Berman MD Outpatient    9/29/2017  5:20 PM 10/2/2017 11:44 AM DNR/DNI [id number]  Mary Ann Olivares MD Inpatient         Medication Review      START taking        Dose / Directions Comments    ferrous sulfate 325 (65 Fe) MG tablet   Commonly known as:  IRON   Used for:  Iron  deficiency anemia due to chronic blood loss        Dose:  325 mg   Take 1 tablet (325 mg) by mouth daily   Quantity:  100 tablet   Refills:  1    Future refills by PCP Dr. RIGOBERTO HOWARD with phone number [phone].       metroNIDAZOLE 250 MG tablet   Commonly known as:  FLAGYL   Indication:  Possible post-op infection   Used for:  Leukocytosis, unspecified type        Dose:  250 mg   Take 1 tablet (250 mg) by mouth every 8 hours for 2 days   Quantity:  6 tablet   Refills:  0          CONTINUE these medications which may have CHANGED, or have new prescriptions. If we are uncertain of the size of tablets/capsules you have at home, strength may be listed as something that might have changed.        Dose / Directions Comments    chlorthalidone 25 MG tablet   Commonly known as:  HYGROTON   This may have changed:  when to take this   Used for:  HTN (hypertension)        Dose:  25 mg   Take 1 tablet (25 mg) by mouth daily as needed   Quantity:  30 tablet   Refills:  0    Any further refills of this med need to go through Pt's PCP Dr.Dana Howard.         CONTINUE these medications which have NOT CHANGED        Dose / Directions Comments    ACETAMINOPHEN PO        Dose:  500 mg   Take 500 mg by mouth every 4 hours as needed for pain   Refills:  0        albuterol 108 (90 Base) MCG/ACT Inhaler   Commonly known as:  PROAIR HFA/PROVENTIL HFA/VENTOLIN HFA   Used for:  Reactive airway disease without complication, unspecified asthma severity, unspecified whether persistent        Dose:  2 puff   Inhale 2 puffs into the lungs every 6 hours as needed for shortness of breath / dyspnea or wheezing   Quantity:  1 Inhaler   Refills:  3        CALCIUM-MAGNESIUM-ZINC PO        Dose:  1 tablet   Take 1 tablet by mouth daily   Refills:  0        CENTRUM SILVER per tablet        Dose:  1 tablet   Take 1 tablet by mouth daily.   Quantity:  100 tablet   Refills:  12        fondaparinux 7.5MG/0.6ML injection   Commonly known as:  ARIXTRA    Used for:  PE (pulmonary thromboembolism) (H)        Dose:  7.5 mg   Inject 0.6 mLs (7.5 mg) Subcutaneous every 24 hours   Quantity:  90 Syringe   Refills:  0        lidocaine 5 % ointment   Commonly known as:  XYLOCAINE        Dose:  0.5 inch   Apply 0.5 g topically as needed for moderate pain Apply pea size amount to rectal area for pain   Quantity:  5 g   Refills:  0        lidocaine-prilocaine cream   Commonly known as:  EMLA   Used for:  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        Apply topically as needed for moderate pain Apply dollop size amount to port site 30-60 minutes prior to accessing.   Quantity:  30 g   Refills:  1        LORazepam 0.5 MG tablet   Commonly known as:  ATIVAN   Used for:  Malignant neoplasm of hepatic flexure (H)        Dose:  0.5 mg   Take 1 tablet (0.5 mg) by mouth every 4 hours as needed (Anxiety, Nausea/Vomiting or Sleep)   Quantity:  30 tablet   Refills:  2        methylphenidate 5 MG tablet   Commonly known as:  RITALIN        Dose:  5 mg   Take 1 tablet (5 mg) by mouth 2 times daily   Quantity:  60 tablet   Refills:  0        ondansetron 8 MG tablet   Commonly known as:  ZOFRAN   Used for:  Malignant neoplasm of hepatic flexure (H)        Dose:  8 mg   Take 1 tablet (8 mg) by mouth every 8 hours as needed (Nausea/Vomiting)   Quantity:  10 tablet   Refills:  2        order for DME   Used for:  Malignant neoplasm of colon, unspecified part of colon (H), VTE (venous thromboembolism), PE (pulmonary thromboembolism) (H), Pain of left lower leg        Equipment being ordered: Wheelchair   Quantity:  1 Device   Refills:  0        oxyCODONE IR 5 MG tablet   Commonly known as:  ROXICODONE   Used for:  Malignant neoplasm of colon, unspecified part of colon (H)        Dose:  5-10 mg   Take 1-2 tablets (5-10 mg) by mouth every 4 hours as needed for pain maximum 12 tablet(s) per day   Quantity:  50 tablet   Refills:  0        Potassium Chloride ER 20 MEQ Tbcr    Used for:  Hypokalemia        Dose:  20 mEq   Take 1 tablet (20 mEq) by mouth daily   Quantity:  30 tablet   Refills:  0    Routine refills of this should go through PCP Dr.Dana Howard.       prochlorperazine 10 MG tablet   Commonly known as:  COMPAZINE   Used for:  Malignant neoplasm of hepatic flexure (H)        Dose:  10 mg   Take 1 tablet (10 mg) by mouth every 6 hours as needed (Nausea/Vomiting)   Quantity:  30 tablet   Refills:  2        sennosides 8.6 MG tablet   Commonly known as:  SENOKOT        Dose:  1 tablet   Take 1 tablet by mouth 2 times daily as needed for constipation   Refills:  0        sertraline 50 MG tablet   Commonly known as:  ZOLOFT   Used for:  Malignant neoplasm of hepatic flexure (H)        TAKE 1 TABLET BY MOUTH EVERY DAY   Quantity:  30 tablet   Refills:  0        VITAMIN D (CHOLECALCIFEROL) PO        Dose:  5000 Units   Take 5,000 Units by mouth daily (OTC: Pt unsure of strength)   Refills:  0                  Further instructions from your care team       Follow up with [facility].  Call to schedule appointment.    Summary of Visit     Reason for your hospital stay       Admitted for elevated white cell count and some abdominal discomfort.  Discovered to have progression of deep venous thrombosis and progression of know colon cancer.  Hospitalization was prolonged due to one of two blood cultures with bacteria which eventually was thought by infectious disease to be a contaminant.  Hematology/oncology recommended a filter in the inferior vena cava due to blood clot in leg, which was placed 4/27/18.             After Care     Activity       Your activity upon discharge: activity as tolerated       Diet       Follow this diet upon discharge: Orders Placed This Encounter      Advance Diet as Tolerated: Regular Diet Adult; Regular Diet Adult             Lab Orders     Basic metabolic panel                 Your next 10 appointments already scheduled     May 10, 2018   8:30 AM CDT   Level 6 with  INFUSION CHAIR 11   [facility] ([facility])    [facility]  [address]  [place] [address]   [phone]            May 10, 2018  9:00 AM CDT   Return Visit with Radha Piedra MD   [facility] ([facility])    [facility]  [address]  [place] [address]   [phone]              Follow-Up Appointment Instructions     Future Labs/Procedures    Follow-up and recommended labs and tests      Comments:    Follow-up with  at [facility] in the next available appointment for resumption of second line chemotherapy.    Follow-up and recommended labs and tests      Comments:    Follow up with your hematology-oncology clinic in 1-2 weeks      Follow-Up Appointment Instructions     Follow-up and recommended labs and tests        Follow-up with  at [facility] in the next available appointment for resumption of second line chemotherapy.       Follow-up and recommended labs and tests        Follow up with your hematology-oncology clinic in 1-2 weeks             Statement of Approval     Ordered          04/28/18 1032  I have reviewed and agree with all the recommendations and orders detailed in this document.  EFFECTIVE NOW     Approved and electronically signed by:  Chito Aiken MD

## 2018-04-21 NOTE — PROGRESS NOTES
Med Onc heme     Patient seen and examined , dictation to follow    CT shows progressive colon ca   No new PE but progressive clot despite anticoagulation     Agree she needs IVC filter and have ordered same .  We will keep her on IV heparin for now     Will follow       Nilda Bhardwaj MD  [phone]

## 2018-04-21 NOTE — PHARMACY-ADMISSION MEDICATION HISTORY
Admission medication history interview status for the 4/20/2018  admission is complete. See EPIC admission navigator for prior to admission medications     Medication history source reliability:Good    Actions taken by pharmacist (provider contacted, etc):None     Additional medication history information not noted on PTA med list :None    Medication reconciliation/reorder completed by provider prior to medication history? No    Time spent in this activity: 20min    Prior to Admission medications    Medication Sig Last Dose Taking? Auth Provider   ACETAMINOPHEN PO Take 500 mg by mouth every 4 hours as needed for pain  4/19/2018 at Unknown time Yes Unknown, Entered By History   CALCIUM-MAGNESIUM-ZINC PO Take 1 tablet by mouth daily 4/19/2018 at Unknown time Yes Unknown, Entered By History   chlorthalidone (HYGROTON) 25 MG tablet Take 1 tablet (25 mg) by mouth daily as needed  Patient taking differently: Take 25 mg by mouth daily  4/20/2018 at am Yes Radha Piedra MD   fondaparinux (ARIXTRA) 7.5MG/0.6ML injection Inject 0.6 mLs (7.5 mg) Subcutaneous every 24 hours 4/20/2018 at am Yes Jerry Strange DO   lidocaine (XYLOCAINE) 5 % ointment Apply 0.5 g topically as needed for moderate pain Apply pea size amount to rectal area for pain 4/19/2018 at Unknown time Yes Rene Aragon MD   methylphenidate (RITALIN) 5 MG tablet Take 1 tablet (5 mg) by mouth 2 times daily 4/19/2018 at Unknown time Yes Radha Piedra MD   Multiple Vitamins-Minerals (CENTRUM SILVER) per tablet Take 1 tablet by mouth daily. 4/19/2018 at Unknown time Yes Adrian Fonseca MD   oxyCODONE IR (ROXICODONE) 5 MG tablet Take 1-2 tablets (5-10 mg) by mouth every 4 hours as needed for pain maximum 12 tablet(s) per day 4/20/2018 at x1 Yes Chris Llamas APRN CNP   Potassium Chloride ER 20 MEQ TBCR Take 1 tablet (20 mEq) by mouth daily 4/19/2018 at Unknown time Yes Radha Piedra MD   sennosides (SENOKOT) 8.6 MG tablet Take 1 tablet by mouth 2 times  daily as needed for constipation prn at Unknown time Yes Unknown, Entered By History   sertraline (ZOLOFT) 50 MG tablet TAKE 1 TABLET BY MOUTH EVERY DAY 4/20/2018 at Unknown time Yes Radha Piedra MD   VITAMIN D, CHOLECALCIFEROL, PO Take 5,000 Units by mouth daily (OTC: Pt unsure of strength)  4/19/2018 at Unknown time Yes Unknown, Entered By History   albuterol (PROAIR HFA/PROVENTIL HFA/VENTOLIN HFA) 108 (90 BASE) MCG/ACT Inhaler Inhale 2 puffs into the lungs every 6 hours as needed for shortness of breath / dyspnea or wheezing prn  Chris Llamas APRN CNP   lidocaine-prilocaine (EMLA) cream Apply topically as needed for moderate pain Apply dollop size amount to port site 30-60 minutes prior to accessing. pprn  Radha Piedra MD   LORazepam (ATIVAN) 0.5 MG tablet Take 1 tablet (0.5 mg) by mouth every 4 hours as needed (Anxiety, Nausea/Vomiting or Sleep) prn  Radha Piedra MD   ondansetron (ZOFRAN) 8 MG tablet Take 1 tablet (8 mg) by mouth every 8 hours as needed (Nausea/Vomiting) prn  Radha Piedra MD   order for DME Equipment being ordered: Wheelchair   Radha Piedra MD   prochlorperazine (COMPAZINE) 10 MG tablet Take 1 tablet (10 mg) by mouth every 6 hours as needed (Nausea/Vomiting)   Radha Piedra MD

## 2018-04-21 NOTE — PROGRESS NOTES
[facility]    Hospitalist Progress Note    Date of Service (when I saw the patient): 04/21/2018    Assessment & Plan   Liv Sylvester is a 67 year old female who was admitted on 4/20/2018.  Liv Sylvester is a 67 year old female with hx of metastatic colon cancer on palliative chemo, chronic DVT on anticoagulation with fondaparinux, HTN, and depression/anxiety. She is a poor historian, but was referred to the ED by her primary clinic after reporting 3 day history of intermittent palpitations. On arrival, she also reported vague abdominal pain over the past month, worse over the past week. She has been found to have a progressive proximal LLE DVT despite anticoagulation, progression of colon cancer, and possible infection of unclear source.     Chronic proximal LLE DVT with progression  Has been maintained on fondaparinux after previously failing enoxaparin for DVT. CT chest/abd/pelvis on arrival showed progression of left common femoral DVT, now extending into the left common iliac vein.  - Hold fondaparinux and start heparin infusion  -Oncology consulted and are following  Ordered  IVC filter placement        Stage IV metastatic colon cancer  Diagnosed Sept 2017. Has known liver and abdominal lymph node metastases. She hs been on palliative chemotherapy with Dr. Piedra at  Oncology. CT chest/abd/pelvis on arrival showed progression of liver metastases.   - Patient wants to review imaging and discuss prognosis with  Oncology who has been consulted     Leukocytosis, possible infection of unclear source  Isolated leukocytosis to 21.8k present on arrival. CT chest/abd/pelvis did not reveal a source of infection. However, given her abdominal pain (which is likely due to cancer progression), immunocompromised state, and leukocytosis, she was empirically initiated on pip-tazo in the ED.  - Continue pip-tazo for now.  Elevated white cell count could be due to dehydration  WBC count improved to 15.8  today  - Procalcitonin elevated at 0.39  -Urine culture and blood cultures are negative to date  Will request ID consult regarding antibiotic recommendations     Chronic iron deficiency anemia  Hgb stable within baseline 9 range     Essential hypertension  [PTA: chlorthalidone prn]  - Hold chlorthalidone while on IV fluids     Peripheral neuropathy due to chemotherapy  Chronic and stable     Major recurrent depressive disorder with anxiety  Maintained on sertraline which has been continued     FEN: Regular diet, NS at 75 ml/h  DVT Prophylaxis: Heparin gtt  Code Status: DNR / DNI, confirmed with patient  # Pain Assessment:  Current Pain Score 4/21/2018   Patient currently in pain? denies   Pain score (0-10) -   Pain location -   Pain descriptors -   - Liv is experiencing pain due to cancer but currently denies pain . Pain management was discussed and the plan was created in a collaborative fashion.  Liv's response to the current recommendations: engaged  - Opioid regimen: PRN norco  - Response to opioid medications: Reduction of symptoms of pain   - Bowel regimen: PRN           Disposition: Expected discharge once wbc improved and she has been cleared by Oncology and ID      Casandra Youngblood MD  [phone] (P)      Interval History   Patient was nauseated this morning otherwise no new complaints.  Pain well controlled    -Data reviewed today: I reviewed all new labs and imaging results over the last 24 hours. I personally reviewed no images or EKG's today.    Physical Exam   Temp: 98.8  F (37.1  C) Temp src: Oral BP: 141/73 Pulse: 89 Heart Rate: 72 Resp: 18 SpO2: 98 % O2 Device: None (Room air)    Vitals:    04/20/18 1744   Weight: 93 kg (205 lb)     Vital Signs with Ranges  Temp:  [96.8  F (36  C)-98.8  F (37.1  C)] 98.8  F (37.1  C)  Pulse:  [89] 89  Heart Rate:  [72-91] 72  Resp:  [12-18] 18  BP: (140-174)/() 141/73  SpO2:  [94 %-98 %] 98 %  I/O last 3 completed shifts:  In: 1498 [P.O.:200; I.V.:1298]  Out: 1  [Emesis/NG output:1]    Constitutional: Awake, alert, cooperative, no apparent distress  Respiratory: Clear to auscultation bilaterally, no crackles or wheezing  Cardiovascular: Regular rate and rhythm, normal S1 and S2, and no murmur noted  GI: Normal bowel sounds, soft, non-distended, non-tender  Skin/Integumen: No rashes, no cyanosis, no edema  Other:     Medications     HEParin 1,400 Units/hr (04/21/18 0903)     - MEDICATION INSTRUCTIONS -       sodium chloride 75 mL/hr at 04/21/18 0151       Calcium-Magnesium-Zinc  1 tablet Oral Daily     methylphenidate  5 mg Oral BID     piperacillin-tazobactam  3.375 g Intravenous Q6H     sertraline  50 mg Oral Daily     sodium chloride (PF)  3 mL Intracatheter Q8H       Data     Recent Labs  Lab 04/21/18  0655 04/20/18  1748   WBC 15.9* 21.8*   HGB 8.6* 9.4*   MCV 96 97    229    141   POTASSIUM 3.5 3.5   CHLORIDE 106 106   CO2 29 27   BUN 7 9   CR 0.59 0.63   ANIONGAP 6 8   JESÚS 9.0 9.7   GLC 93 111*   ALBUMIN  --  2.8*   PROTTOTAL  --  7.4   BILITOTAL  --  0.2   ALKPHOS  --  255*   ALT  --  18   AST  --  36   TROPI  --  <0.015       Recent Results (from the past 24 hour(s))   XR Chest 2 Views    Narrative    CHEST TWO VIEWS   4/20/2018 6:13 PM     HISTORY: Chest pain.     COMPARISON: 12/15/2017.    FINDINGS: Port-A-Cath tip in the SVC. Heart size normal. Lungs clear.  Hypertrophic changes thoracic spine. No interval change.      Impression    IMPRESSION: Nothing acute and no interval change.    CARA BROWN MD   CT Chest/Abdomen/Pelvis w Contrast    Narrative    CT CHEST/ABDOMEN/PELVIS WITH CONTRAST 4/20/2018 7:39 PM     HISTORY: Epigastric pain and high WBC, known pulmonary embolism and  liver mets, rule out new pulmonary embolism.      COMPARISON: CT chest, abdomen, pelvis 2/19/2018.    TECHNIQUE: Thin section axial images are performed from the thoracic  inlet to the lung bases utilizing 103 mL of Isovue 370 IV contrast  without adverse event. Coronal  reformatted images are also generated.  Axial images from the lung bases to the symphysis are performed with  additional coronal reformatted images. Radiation dose for this scan  was reduced using automated exposure control, adjustment of the mA  and/or kV according to patient size, or iterative reconstruction  technique.    FINDINGS:     Chest probable right major fissure lymph node is again noted on series  5, image 51. Bibasilar atelectasis is noted. No new or enlarging lung  lesions. No infiltrate or consolidation. No pleural or pericardial  fluid. Heart is normal in size. Esophagus is unremarkable. Thyroid  gland appears normal. No enlarged axillary or intrathoracic lymph  nodes. Patient has an incidental aberrant right subclavian artery.  This is a normal anatomic variant. Right chest port appears in good  position with catheter tip in the right atrium. Thoracic aorta is  otherwise within normal limits with scattered calcified plaque.  Probable chronic changes of prior pulmonary embolism are noted in a  right lower lobe pulmonary artery such as on series 4, image 63. This  is unchanged compared to prior study. No new evidence of pulmonary  embolism.    Abdomen: Multiple metastatic liver lesions are present, increased in  size since prior exam suggesting interval progression of patient's  disease. The spleen, gallbladder, pancreas, adrenal glands and kidneys  are unremarkable. Right renal cysts are stable. No hydronephrosis or  obvious renal calculi. Aorta demonstrates scattered calcified plaque  without aneurysm or dissection. No enlarged retroperitoneal lymph  nodes. A few prominent erwin hepatis nodes are noted but these appear  stable. The bowel is normal in caliber without obstruction,  diverticulitis or appendicitis. No ascites or peritoneal nodularity.    Pelvis: The bladder is decompressed but otherwise unremarkable. The  rectum is within normal limits. The uterus is not identified. No  adnexal mass.  Persistent filling defect left common femoral vein is  noted. This extends up into the external iliac artery and has  progressed into the mid to distal common iliac artery. No IVC thrombus  is evident. Bone window examination demonstrates degenerative spine  changes.      Impression    IMPRESSION:  1. No evidence of acute pulmonary embolism. Lungs are clear. No new  infiltrate, consolidation or mass. Right major fissure lymph node is  stable.  2. Progression of patient's metastatic liver disease since prior CT.  3. Progression of patient's suspected left common femoral vein  thrombus extending now into the left common iliac vein. Follow-up with  anticoagulation or IVC filter as needed for further treatment.  4. No bowel obstruction, diverticulitis or appendicitis.    SHANEKA PERES MD

## 2018-04-21 NOTE — PLAN OF CARE
Problem: Patient Care Overview  Goal: Plan of Care/Patient Progress Review  Outcome: No Change  VSS. A&Ox4. Denies pain. Hep gtt at 14mL/hr. Next hep 10a in AM. Emesis this AM after eating but resolved and has not returned. Up with SBA and walker to BR. Plan for IVC filter placement tomorrow. NPO at 0000 and INR check in AM. IR dept will stop heparin gtt when pt arrives to them tomorrow per discussion with IR this afternoon. D/c plan ending progress. Continue to monitor.

## 2018-04-21 NOTE — H&P
[facility]    History and Physical  Hospitalist       Date of Admission:  4/20/2018  Date of Service (when I saw the patient): 04/20/18    Assessment & Plan   Liv Sylvester is a 67 year old female with hx of metastatic colon cancer on palliative chemo, chronic DVT on anticoagulation with fondaparinux, HTN, and depression/anxiety. She is a poor historian, but was referred to the ED by her primary clinic after reporting 3 day history of intermittent palpitations. On arrival, she also reported vague abdominal pain over the past month, worse over the past week. She has been found to have a progressive proximal LLE DVT despite anticoagulation, progression of colon cancer, and possible infection of unclear source.    Chronic proximal LLE DVT with progression  Has been maintained on fondaparinux after previously failing enoxaparin for DVT. CT chest/abd/pelvis on arrival showed progression of left common femoral DVT, now extending into the left common iliac vein.  - Hold fondaparinux and start heparin infusion  - Will defer further anticoagulation and consideration of IVC filter to  Oncology who has been consulted    Stage IV metastatic colon cancer  Diagnosed Sept 2017. Has known liver and abdominal lymph node metastases. She hs been on palliative chemotherapy with Dr. Piedra at  Oncology. CT chest/abd/pelvis on arrival showed progression of liver metastases.   - Patient wants to review imaging and discuss prognosis with  Oncology who has been consulted    Leukocytosis, possible infection of unclear source  Isolated leukocytosis to 21.8k present on arrival. CT chest/abd/pelvis did not reveal a source of infection. However, given her abdominal pain (which is likely due to cancer progression), immunocompromised state, and leukocytosis, she was empirically initiated on pip-tazo in the ED.  - Continue pip-tazo for now, would have low threshold to discontinue  - Procalcitonin ordered  - 4/20 blood cultures  "x2 ordered and pending    Chronic iron deficiency anemia  Hgb stable within baseline 9 range    Essential hypertension  [PTA: chlorthalidone prn]  - Hold chlorthalidone while on IV fluids    Peripheral neuropathy due to chemotherapy  Chronic and stable    Major recurrent depressive disorder with anxiety  Maintained on sertraline which has been continued    FEN: Regular diet, NS at 75 ml/h  DVT Prophylaxis: Heparin gtt  Code Status: DNR / DNI, confirmed with patient    Disposition: Expected discharge once wbc improved and she has been cleared by Oncology    Mary Ann Melara    Primary Care Physician   RIGOBERTO SEN    Chief Complaint   Palpitations, abdominal pain    History is obtained from the patient and medical records    History of Present Illness   Liv Sylvester is a 67 year old female with hx of metastatic colon cancer on palliative chemo, chronic DVT on anticoagulation with fondaparinux, HTN, and depression/anxiety who presents with palpitations and abdominal pain. The patient is a poor historian. She reports that she has been experiencing intermittent palpitations at rest over the past 3 nights. Episodes last about 2 minutes at a time. She told her home care RN who called her primary clinic, and it was recommended that she go to the ED. She denies palpitations currently. She denies chest pain, shortness of breath, dizziness/lightheadedness, syncope, or recent falls. She denies PND, orthopnea, or LE edema. On review of systems, she reports diffuse epigastric pain over the past few months which has become vaguely worse over the past week. She denies nausea, vomiting, or diarrhea. She denies black or bloody stools, hematemesis, or coffee ground emesis. She denies changes in appearance or smell of her urine. She reports her hands and feet are always \"frozen,\" but denies new fevers/chills.     In the ED, she was found to have an elevated white count to over 18,000, CT chest/abd/pelvis showed progression of " metastatic liver disease as well as progression of left common femoral vein thrombus now extending into the left common iliac vein. Request for admission was made for infectious work-up and for further management of progressive cancer and progressive DVT.     Past Medical History    I have reviewed this patient's medical history and updated the medical record  Past Medical History:   Diagnosis Date     Cancer (H)     colon     Chronic deep vein thrombosis (DVT) (H)     On chronic anticoagulation with fondaparinux     Depression with anxiety      Flash pulmonary edema (H) 4/2013    With concurrent severe HTN, complicating DAPHNEY/BSO     Hemorrhoid      HTN (hypertension)      Iron deficiency anemia      Obesity        Past Surgical History   I have reviewed this patient's surgical history and updated the medical record  Past Surgical History:   Procedure Laterality Date     COLONOSCOPY N/A 9/30/2017    Procedure: COMBINED COLONOSCOPY, SINGLE OR MULTIPLE BIOPSY/POLYPECTOMY BY BIOPSY;  colonoscopy;  Surgeon: Kevin Celis MD;  Location:  GI     HYSTERECTOMY, DAPHNEY  4/2013    with BSO     INSERT PORT VASCULAR ACCESS N/A 10/9/2017    Procedure: INSERT PORT VASCULAR ACCESS;  PORT PLACEMENT (MAC);  Surgeon: Rigoberto Nolen MD;  Location: [facility]       Prior to Admission Medications   Prior to Admission Medications   Prescriptions Last Dose Informant Patient Reported? Taking?   ACETAMINOPHEN PO  Self Yes No   Sig: Take 500 mg by mouth every 4 hours as needed for pain    CALCIUM-MAGNESIUM-ZINC PO  Self Yes No   Sig: Take 1 tablet by mouth daily   DM-Doxylamine-Acetaminophen (VICKS NYQUIL COLD & FLU) 15-6. MG/15ML LIQD  Self Yes No   Sig: Take 15 mLs by mouth nightly as needed   LORazepam (ATIVAN) 0.5 MG tablet  Pharmacy No No   Sig: Take 1 tablet (0.5 mg) by mouth every 4 hours as needed (Anxiety, Nausea/Vomiting or Sleep)   Multiple Vitamins-Minerals (CENTRUM SILVER) per tablet  Self Yes No   Sig: Take 1  tablet by mouth daily.   Potassium Chloride ER 20 MEQ TBCR   No No   Sig: Take 1 tablet (20 mEq) by mouth daily   VITAMIN D, CHOLECALCIFEROL, PO  Self Yes No   Sig: Take 1 tablet by mouth daily (OTC: Pt unsure of strength)   albuterol (PROAIR HFA/PROVENTIL HFA/VENTOLIN HFA) 108 (90 BASE) MCG/ACT Inhaler   No No   Sig: Inhale 2 puffs into the lungs every 6 hours as needed for shortness of breath / dyspnea or wheezing   chlorthalidone (HYGROTON) 25 MG tablet   No No   Sig: Take 1 tablet (25 mg) by mouth daily as needed   fondaparinux (ARIXTRA) 7.5MG/0.6ML injection   No No   Sig: Inject 0.6 mLs (7.5 mg) Subcutaneous every 24 hours   lidocaine (XYLOCAINE) 5 % ointment  Pharmacy No No   Sig: Apply 0.5 g topically as needed for moderate pain Apply pea size amount to rectal area for pain   lidocaine-prilocaine (EMLA) cream  Pharmacy No No   Sig: Apply topically as needed for moderate pain Apply dollop size amount to port site 30-60 minutes prior to accessing.   methylphenidate (RITALIN) 5 MG tablet   Yes No   Sig: Take 1 tablet (5 mg) by mouth 2 times daily   ondansetron (ZOFRAN) 8 MG tablet  Pharmacy No No   Sig: Take 1 tablet (8 mg) by mouth every 8 hours as needed (Nausea/Vomiting)   order for DME   No No   Sig: Equipment being ordered: Wheelchair   oxyCODONE IR (ROXICODONE) 5 MG tablet   No No   Sig: Take 1-2 tablets (5-10 mg) by mouth every 4 hours as needed for pain maximum 12 tablet(s) per day   potassium chloride SA (K-DUR/KLOR-CON M) 20 MEQ CR tablet   No No   Sig: TAKE 1 TABLET BY MOUTH EVERY DAY   prochlorperazine (COMPAZINE) 10 MG tablet  Pharmacy No No   Sig: Take 1 tablet (10 mg) by mouth every 6 hours as needed (Nausea/Vomiting)   sennosides (SENOKOT) 8.6 MG tablet  Self Yes No   Sig: Take 1 tablet by mouth 2 times daily as needed for constipation   sertraline (ZOLOFT) 50 MG tablet   No No   Sig: TAKE 1 TABLET BY MOUTH EVERY DAY      Facility-Administered Medications: None     Allergies   No Known  Allergies    Social History   Quit smoking 15 years ago. She denies alcohol use. She lives with her granddaughter who assists with cares, and ambulates with a walker.    Family History   I have reviewed this patient's family history  Family History   Problem Relation Age of Onset     CEREBROVASCULAR DISEASE Father       age 66     HEART DISEASE Mother       of MI, age 72     DIABETES Mother      Hypertension Mother        Review of Systems   The 10 point Review of Systems is negative other than noted in the HPI    Physical Exam   Temp: 97.9  F (36.6  C) Temp src: Oral BP: 174/90   Heart Rate: 86 Resp: 12 SpO2: 94 %      Vital Signs with Ranges  Temp:  [97.9  F (36.6  C)] 97.9  F (36.6  C)  Heart Rate:  [86-91] 86  Resp:  [12-16] 12  BP: (140-174)/(78-90) 174/90  SpO2:  [94 %] 94 %  205 lbs 0 oz    Constitutional: Appears comfortable, NAD  HEENT: NC/AT, sclera white, conjunctiva clear, EOMI, MMM. Edentulous  Respiratory: Breathing non-labored. Lungs CTAB - no wheezes/crackles/rhonchi  Cardiovascular: Heart borderline tachycardic, no m/r/g. No pedal edema.   GI: +BS. Abd soft/NT  Lymph/Hematologic: No cervical LAD  Skin: Warm and dry. No rash.  Musculoskeletal: Normal muscle bulk and tone  Neurologic: Alert and appropriate. BRADFORD  Psychiatric: Calm and cooperative    Data   Data reviewed today:  I personally reviewed the chest x-ray image(s) showing no acute infiltrates and the CT chest/abd/pelvis image(s) showing no PE or infiltrates, progression of metastatic liver disease, and progression of left common femoral vein DVT.    Recent Labs  Lab 18  1748   WBC 21.8*   HGB 9.4*   MCV 97         POTASSIUM 3.5   CHLORIDE 106   CO2 27   BUN 9   CR 0.63   ANIONGAP 8   JESÚS 9.7   *   ALBUMIN 2.8*   PROTTOTAL 7.4   BILITOTAL 0.2   ALKPHOS 255*   ALT 18   AST 36   TROPI <0.015       Recent Results (from the past 24 hour(s))   XR Chest 2 Views    Narrative    CHEST TWO VIEWS   2018 6:13 PM      HISTORY: Chest pain.     COMPARISON: 12/15/2017.    FINDINGS: Port-A-Cath tip in the SVC. Heart size normal. Lungs clear.  Hypertrophic changes thoracic spine. No interval change.      Impression    IMPRESSION: Nothing acute and no interval change.    CARA BROWN MD   CT Chest/Abdomen/Pelvis w Contrast    Narrative    CT CHEST/ABDOMEN/PELVIS WITH CONTRAST 4/20/2018 7:39 PM     HISTORY: Epigastric pain and high WBC, known pulmonary embolism and  liver mets, rule out new pulmonary embolism.      COMPARISON: CT chest, abdomen, pelvis 2/19/2018.    TECHNIQUE: Thin section axial images are performed from the thoracic  inlet to the lung bases utilizing 103 mL of Isovue 370 IV contrast  without adverse event. Coronal reformatted images are also generated.  Axial images from the lung bases to the symphysis are performed with  additional coronal reformatted images. Radiation dose for this scan  was reduced using automated exposure control, adjustment of the mA  and/or kV according to patient size, or iterative reconstruction  technique.    FINDINGS:     Chest probable right major fissure lymph node is again noted on series  5, image 51. Bibasilar atelectasis is noted. No new or enlarging lung  lesions. No infiltrate or consolidation. No pleural or pericardial  fluid. Heart is normal in size. Esophagus is unremarkable. Thyroid  gland appears normal. No enlarged axillary or intrathoracic lymph  nodes. Patient has an incidental aberrant right subclavian artery.  This is a normal anatomic variant. Right chest port appears in good  position with catheter tip in the right atrium. Thoracic aorta is  otherwise within normal limits with scattered calcified plaque.  Probable chronic changes of prior pulmonary embolism are noted in a  right lower lobe pulmonary artery such as on series 4, image 63. This  is unchanged compared to prior study. No new evidence of pulmonary  embolism.    Abdomen: Multiple metastatic liver lesions  are present, increased in  size since prior exam suggesting interval progression of patient's  disease. The spleen, gallbladder, pancreas, adrenal glands and kidneys  are unremarkable. Right renal cysts are stable. No hydronephrosis or  obvious renal calculi. Aorta demonstrates scattered calcified plaque  without aneurysm or dissection. No enlarged retroperitoneal lymph  nodes. A few prominent erwin hepatis nodes are noted but these appear  stable. The bowel is normal in caliber without obstruction,  diverticulitis or appendicitis. No ascites or peritoneal nodularity.    Pelvis: The bladder is decompressed but otherwise unremarkable. The  rectum is within normal limits. The uterus is not identified. No  adnexal mass. Persistent filling defect left common femoral vein is  noted. This extends up into the external iliac artery and has  progressed into the mid to distal common iliac artery. No IVC thrombus  is evident. Bone window examination demonstrates degenerative spine  changes.      Impression    IMPRESSION:  1. No evidence of acute pulmonary embolism. Lungs are clear. No new  infiltrate, consolidation or mass. Right major fissure lymph node is  stable.  2. Progression of patient's metastatic liver disease since prior CT.  3. Progression of patient's suspected left common femoral vein  thrombus extending now into the left common iliac vein. Follow-up with  anticoagulation or IVC filter as needed for further treatment.  4. No bowel obstruction, diverticulitis or appendicitis.    SHANEKA PERES MD

## 2018-04-21 NOTE — ED NOTES
"[facility]  ED Nurse Handoff Report    ED Chief complaint: Chest Pain (refered to ED for intermitint chest pain)      ED Diagnosis:   Final diagnoses:   None       Code Status: DNR / DNI    Allergies: No Known Allergies    Activity level - Baseline/Home:  Stand with Assist    Activity Level - Current:   Stand with Assist     Needed?: No    Isolation: Np  Infection:     Bariatric?: No    Vital Signs:   Vitals:    04/20/18 1744 04/20/18 1941   BP: 140/78 174/90   Resp: 16 12   Temp: 97.9  F (36.6  C)    TempSrc: Oral    SpO2: 94%    Weight: 93 kg (205 lb)    Height: 1.778 m (5' 10\")        Cardiac Rhythm: ,        Pain level: 0-10 Pain Scale: 7 (Intermittent chest pain)    Is this patient confused?: No     Patient Report: Initial Complaint: Pt presents to the ED with short intermittent episodes of chest pain with SOB for the last 3 days.  Focused Assessment: Pt complains of chest pain and SOB rated 7/10 and lasting 2 minutes at a time for the last 3 days.  Pt has extensive medical history including stage 4 colon cancer with mets to the liver.  Pt states she feels SOB with activty, lungs CTA.  Pt in SR on cardiac monitor.  Pt alert and oriented x4.  Able to stand with walker and standby assist.  Pt is currently undergoing chemotherapy and presented with an accessed right sided port-a-cath.  The writer changed the dressing on this port-a-cath and the pt reports the needle was placed by a home infusion nurse Yesterday.  Tests Performed: CBC, Trop, CMP, UA/UC, abdominal and chest CT.   Abnormal Results: White count 21.6, Ct results read as follows:  1. No evidence of acute pulmonary embolism. Lungs are clear. No new  infiltrate, consolidation or mass. Right major fissure lymph node is  stable.  2. Progression of patient's metastatic liver disease since prior CT.  3. Progression of patient's suspected left common femoral vein  thrombus extending now into the left common iliac vein. Follow-up " with  anticoagulation or IVC filter as needed for further treatment.  4. No bowel obstruction, diverticulitis or appendicitis.  Treatments provided: Accessed port-a-cath placed on TKO normal saline.  Pt given 3.375 grams IV zosyn.    Family Comments: None    OBS brochure/video discussed/provided to patient: N/A    ED Medications:   Medications   sodium chloride 0.9% infusion ( Intravenous New Bag 4/20/18 1823)   Saline Flush - CT (97 mLs Intravenous Given 4/20/18 1922)   iopamidol (ISOVUE-370) solution 103 mL (103 mLs Intravenous Given 4/20/18 1922)       Drips infusing?:  No    For the majority of the shift this patient was Green.   Interventions performed were NA.    Severe Sepsis OR Septic Shock Diagnosis Present: No      ED NURSE PHONE NUMBER: [phone]

## 2018-04-21 NOTE — PLAN OF CARE
Problem: Patient Care Overview  Goal: Plan of Care/Patient Progress Review  Outcome: No Change  A&O x4, pleasant. Baseline numbness/tingling in hands and feet d/t chemo. No c/o pain on shift. Does have PRN Oxycodone if needed. VSS. Skin very dry/flaky otherwise intact. Voiding in bathroom. BS active. Regular diet. LLE DVT; on continuous Heparin drip at 14 mL/hr. Recheck Hep10A this AM.  Antibiotics. On NS @ 75 mL/hr. Port in place on Right upper chest. SBA; does use a walker @ home. No walker in room yet. Oncology consulted. Discharge pending. Continue to monitor.

## 2018-04-22 NOTE — PHARMACY-VANCOMYCIN DOSING SERVICE
Pharmacy Vancomycin Initial Note  Date of Service 2018  Patient's  1950  67 year old, female    Indication: Bacteremia    Current estimated CrCl = Estimated Creatinine Clearance: 94.5 mL/min (based on Cr of 0.7).    Creatinine for last 3 days  2018:  5:48 PM Creatinine 0.63 mg/dL  2018:  6:55 AM Creatinine 0.59 mg/dL  2018:  6:56 AM Creatinine 0.70 mg/dL    Recent Vancomycin Level(s) for last 3 days  No results found for requested labs within last 72 hours.      Vancomycin IV Administrations (past 72 hours)      No vancomycin orders with administrations in past 72 hours.                Nephrotoxins and other renal medications (Future)    Start     Dose/Rate Route Frequency Ordered Stop    18 1000  vancomycin (VANCOCIN) 2,000 mg in sodium chloride 0.9 % 500 mL intermittent infusion      2,000 mg  over 2 Hours Intravenous EVERY 12 HOURS 18 0956      18 0300  piperacillin-tazobactam (ZOSYN) 3.375 g vial to attach to  mL bag     Comments:  Pharmacy can adjust dose based on renal function.    3.375 g  over 30 Minutes Intravenous EVERY 6 HOURS 18 2230            Contrast Orders - past 72 hours (72h ago through future)    Start     Dose/Rate Route Frequency Ordered Stop    18 1913  iopamidol (ISOVUE-370) solution 103 mL      103 mL Intravenous ONCE 18                Plan:  1.  Start vancomycin  2000 mg IV q12h.   2.  Goal Trough Level: 15-20 mg/L   3.  Pharmacy will check trough levels as appropriate in 1-3 Days.    4. Serum creatinine levels will be ordered daily for the first week of therapy and at least twice weekly for subsequent weeks.    5. [place] method utilized to dose vancomycin therapy: Method 1    Angie Killian (Kelly)

## 2018-04-22 NOTE — PLAN OF CARE
Problem: Patient Care Overview  Goal: Plan of Care/Patient Progress Review  Outcome: No Change  VSS. Mild nausea. Zofran given x1. Blood culture positive this AM from port (see notes); so IVC filter cancelled, cultures redrawn and vanco ordered. Culture was then later showing negative, so it is being retested. Vanco cancelled for now. Echo done (see results; cyn quevedo MD). Hep gtt infusing at 13mLh/hr. Needs hep10a check at 1545. Up with SBA in room. Pt resting between cares. Plan pending clinical progress. ID following. Continue to monitor.

## 2018-04-22 NOTE — OP NOTE
Procedure Date: 04/21/2018      ONCOLOGY CONSULTATION       REASON FOR CONSULTATION:   Liv Sylvester is a 67-year-old patient we have been asked to see for an Oncology consultation by Dr. Melara.  The patient has a diagnosis of metastatic colon cancer and history of blood clotting.  We have been asked to help with her hospitalization and management.        HISTORY OF PRESENTING COMPLAINT:  Liv Sylvester is a 67-year-old patient who sees Dr. Piedra in Oncology outpatient.  She has a diagnosis of colon cancer and has been on treatment with initially FOLFOX.  Because she developed quite severe neuropathy her oxaliplatin was discontinued and now she continues on 5-FU and leucovorin.  She has known liver metastasis.  She also has a history of PE and DVT and initially was on treatment with enoxaparin, failed enoxaparin and then was maintained on fondaparinux.  She is on injections at home.  She was admitted to the Emergency Room last evening with some vague chest discomfort and also some shortness of breath.  She had initially a chest x-ray done which showed nothing acute and then had CT scan of her chest, abdomen and pelvis done.  CT of the chest showed no acute pulmonary embolism.  There is some evidence of an old chronic pulmonary embolism.  The abdomen showed multiple metastatic liver lesions which are not increased compared to her prior exam in February.  In addition there is left common femoral vein thrombosis which is now extending into the left common iliac vein which is a progression for her despite anticoagulation.  We have been asked to see her to help with management of the cancer and also management of her anticoagulation.        PAST MEDICAL HISTORY:     1.  History of lower limb DVT with no progression.   2.  History of stage IV metastatic colon cancer with now progression.   3.  History of chronic iron-deficiency anemia.     4. History of essential hypertension.     5.  History of grade III peripheral  neuropathy due to previous chemotherapy.  She has some balance problems with that and has been getting physical therapy.  One of her chemotherapy drugs has been stopped.   6.  History of recurrent depressive disorder with anxiety.        MEDICATIONS AND ALLERGIES:  OUTLINED IN THE NURSING RECORDS.        PAST SURGICAL HISTORY:  History of hysterectomy and bilateral salpingo-oophorectomy, history of colonoscopy, history of port access.        SOCIAL HISTORY:  The patient lives with her granddaughter.  Her granddaughter is present at her consultation today.  Granddaughter makes most of the meals and cares for the patient.  The patient ambulates with a walker mostly because of her balance issues.  She is a nonsmoker.  Denies any alcohol use.        FAMILY HISTORY:  There is a history of stroke in her father history.  Heart disease, diabetes and hypertension in her mother.  No history of colon cancer.        COMPREHENSIVE REVIEW OF SYSTEMS:    RESPIRATORY SYMPTOMS:  Remarkable for some shortness of breath on exertion.  She currently has no chest pain, no hemoptysis.     CARDIOVASCULAR:  She had some heart fluttering on admission which is now gone.     GI:  She has fairly variable appetite depending on side effects of chemotherapy.  She has normal bowel movements.   MUSCULOSKELETAL:  No symptomatology that is worrisome.    NEUROLOGICAL:  No symptomatology that is worrisome.     :  No symptomatology that is worrisome.     The rest of her 14-point comprehensive review of systems is otherwise unremarkable.      O/E   VSS  Barby clear   Neck no masses   No LNs   Chest clear   Heart normal   Abd soft non tender   BS present   Legs neg   Skin no rashes   Lymphatic neg   PNS   Grossly intact   Alert and orientated     DATA REVIEW:  CT chest, abdomen and pelvis as outlined above.        LABORATORY DATA:  White cell count 21.8, hemoglobin 9.4, platelets 229,000, ANC 17.  Creatinine 0.63, alkaline phosphatase elevated 255, albumin  2.8.        Last chemotherapy was given on 2018.  She gets treatment every 2 weeks.        IMPRESSION:  This is a 67-year-old patient with metastatic colon cancer to liver.  She has had some recent progression on 5-FU leucovorin and will probably need to change chemotherapy at this point.  Her main side effect from chemotherapy was from oxaliplatin she had previously with grade III peripheral neuropathy which has affected her balance.  Her main issue at present is progressive DVT despite anticoagulation.  Her fondaparinux has been stopped.  She has started on heparin infusion.  She will need to have an IVC filter put in.  I have discussed this with her today.  We have discussed the risk of having a clot migrate up into her lungs if she does not have an IVC filter put in.  For right now I would keep her on IV heparin and we can make a decision on anticoagulation once her IVC filter is in.  With regard to progressive disease from colon cancer and her liver, she will need to see Dr. Piedra as an outpatient to consider further palliative chemotherapy of a different nature.  Hypercoagulability is from her progressing cancer most likely.  All of the above has been discussed with the patient and her family today.  I have ordered an IVC filter.        We will follow her hospital course while she is here.         BRIA HOFFMAN MD             D: 2018   T: 2018   MT: EVELYN      Name:     JESSICA MOYA   MRN:      [id number]        Account:        [id number]   :      1950           Procedure Date: 2018      Document: [id number]

## 2018-04-22 NOTE — PROVIDER NOTIFICATION
MD Notification    Notified Person: MD    Notified Person Name:  Jeison    Notification Date/Time: 4/22/18 1120     Notification Interaction: Spoke with over phone    Purpose of Notification: BC now coming through negative; being retested by lab. Wondering if vanco should be continued?    Orders Received: Discontinue vancomycin.     Comments: Also, updated Dr Sanchez. Order to draw blood cultures.

## 2018-04-22 NOTE — PROGRESS NOTES
Med onc     Patient seen and examined   Findings discussed   Has seen ID for pos blood cxs , on abs     Progressive cancer in liver discussed , she will meet with dr Piedra to discuss different Rx     Progressive DVT despite anticoag   Will get IVC filter today , continue heparin for now       O/E   VSS    No distress  Chest clear   Heart normal   Abd soft non tender   Legs neg   Neuro   Normal   Skin no rashes       IMP  Met colon ca   Progressive DVT  Pos Blood cxs     Will get IVC filter today   Cont abs     Will address Rx of cancer with Dr Tadeo Bhardwaj MD

## 2018-04-22 NOTE — PROGRESS NOTES
[facility]    Hospitalist Progress Note    Assessment & Plan   Liv Sylvester is a 67 year old female with hx of metastatic colon cancer on palliative chemo, chronic DVT on anticoagulation with fondaparinux, HTN, and depression/anxiety. She is a poor historian, but was referred to the ED by her primary clinic after reporting 3 day history of intermittent palpitations. On arrival, she also reported vague abdominal pain over the past month, worse over the past week. She has been found to have a progressive proximal LLE DVT despite anticoagulation, progression of colon cancer, and now possibly (see below) found to be bacteremic.      Chronic proximal LLE DVT with progression  Has been maintained on fondaparinux after previously failing enoxaparin for DVT. CT chest/abd/pelvis on arrival showed progression of left common femoral DVT, now extending into the left common iliac vein.  - Hold fondaparinux and started heparin infusion  - Oncology consulted and are following. Ordered  IVC filter placement - however, with possible new bacteremia, will cancel for 4/22 and reconsider for later this week.     Stage IV metastatic colon cancer  Diagnosed Sept 2017. Has known liver and abdominal lymph node metastases. She has been on palliative chemotherapy with Dr. Piedra at  Oncology. CT chest/abd/pelvis on arrival showed progression of liver metastases.   - Oncology following.      Leukocytosis, bacteremia.  Isolated leukocytosis to 21.8k present on arrival. CT chest/abd/pelvis did not reveal a source of infection. However, given her abdominal pain (which is likely due to cancer progression), immunocompromised state, and leukocytosis, she was empirically initiated on pip-tazo in the ED. BC on 4/22 grew GPC in clusters. Started on vanc 3/22. However, received a call from the lab saying that the cultures were negative now with the confirmatory tests. ID notified, wanted to discontinue vancomycin. Ordered repeat BC for  here.  - Continue pip-tazo  - Procalcitonin elevated at 0.39  - echo to look for vegetations. May need port removed.  - will request ID consult       Chronic iron deficiency anemia  Hgb stable within baseline 9 range      Essential hypertension  [PTA: chlorthalidone prn]  - Hold chlorthalidone while on IV fluids      Peripheral neuropathy due to chemotherapy  Chronic and stable      Major recurrent depressive disorder with anxiety  Maintained on sertraline which has been continued      FEN: Regular diet, NS at 75 ml/h  DVT Prophylaxis: Heparin gtt  Code Status: DNR / DNI, confirmed with patient  # Pain Assessment:  Current Pain Score 4/21/2018   Patient currently in pain? denies   Pain score (0-10) -   Pain location -   Pain descriptors -   - Liv is experiencing pain due to cancer but currently denies pain . Pain management was discussed and the plan was created in a collaborative fashion.  Liv's response to the current recommendations: engaged  - Opioid regimen: PRN norco  - Response to opioid medications: Reduction of symptoms of pain   - Bowel regimen: PRN     Disposition: Expected discharge once no longer bacteremic. May need weeks of antibiotics.     Fiorella Rodríguez MD   Text Page (7am to 6pm)    Interval History   Positive blood culture from port from 4/20 2058 growing gram +cocci in clusters. Canceled IR procedure 2/2 bacteremia.    -Data reviewed today: I reviewed all new labs and imaging results over the last 24 hours. I personally reviewed no images or EKG's today.    Physical Exam   Temp: 98.2  F (36.8  C) Temp src: Oral BP: 143/63 Pulse: 86 Heart Rate: 86 Resp: 16 SpO2: 99 % O2 Device: None (Room air)    Vitals:    04/20/18 1744 04/22/18 0645   Weight: 93 kg (205 lb) 94.6 kg (208 lb 8 oz)     Vital Signs with Ranges  Temp:  [98.2  F (36.8  C)-98.8  F (37.1  C)] 98.2  F (36.8  C)  Pulse:  [81-89] 86  Heart Rate:  [86] 86  Resp:  [16-18] 16  BP: (138-161)/(63-81) 143/63  SpO2:  [92 %-99 %] 99 %  I/O last 3  completed shifts:  In: 2543 [P.O.:200; I.V.:2343]  Out: 1 [Emesis/NG output:1]    Constitutional: Awake, alert, cooperative, no apparent distress  Respiratory: Clear to auscultation bilaterally, no crackles or wheezing  Cardiovascular: Regular rate and rhythm, normal S1 and S2, and no murmur noted  GI: Normal bowel sounds, soft, non-distended, non-tender  Skin/Integumen: No rashes, no cyanosis, no edema    Medications     HEParin 1,400 Units/hr (04/22/18 0057)     - MEDICATION INSTRUCTIONS -       sodium chloride 75 mL/hr at 04/22/18 0652       Calcium-Magnesium-Zinc  1 tablet Oral Daily     methylphenidate  5 mg Oral BID     piperacillin-tazobactam  3.375 g Intravenous Q6H     sertraline  50 mg Oral Daily     sodium chloride (PF)  3 mL Intracatheter Q8H       Data     Recent Labs  Lab 04/22/18  0656 04/22/18  0401 04/21/18  0655 04/20/18  1748   WBC 14.3*  --  15.9* 21.8*   HGB 8.8*  --  8.6* 9.4*   MCV 96  --  96 97     --  197 229   INR  --  1.21*  --   --      --  141 141   POTASSIUM 3.5  --  3.5 3.5   CHLORIDE 106  --  106 106   CO2 28  --  29 27   BUN 5*  --  7 9   CR 0.70  --  0.59 0.63   ANIONGAP 7  --  6 8   JESÚS 8.6  --  9.0 9.7   GLC 94  --  93 111*   ALBUMIN  --   --   --  2.8*   PROTTOTAL  --   --   --  7.4   BILITOTAL  --   --   --  0.2   ALKPHOS  --   --   --  255*   ALT  --   --   --  18   AST  --   --   --  36   TROPI  --   --   --  <0.015       Imaging:   No results found for this or any previous visit (from the past 24 hour(s)).

## 2018-04-22 NOTE — PLAN OF CARE
Problem: Patient Care Overview  Goal: Plan of Care/Patient Progress Review  Outcome: No Change  Patient A&O x4, very pleasant. Has baseline numbness and tingling in hands and feet from chemo. No c/o pain on shift. VSS. Voiding in bathroom. Has been NPO since 0000 for IVC filter to be placed today at noon. No c/o nausea on shift. Is on a continuous Heparin drip at 14 mL/hr and on NS at 75 mL/hr. Both running through port in right upper chest. Hep10A to be drawn again this AM. SBA. Discharge pending. Continue to monitor.

## 2018-04-22 NOTE — PROVIDER NOTIFICATION
MD Notification    Notified Person: MD    Notified Person Name: Dr Rodríguez    Notification Date/Time: 4/22/18 0815    Notification Interaction: Spoke via phone    Purpose of Notification: [facility] called with positive blood culture from port from 4/20 2058 growing gram +cocci in clusters    Orders Received: MD will likely order further antibiotics. ID already consulted.     Comments:

## 2018-04-22 NOTE — PLAN OF CARE
Problem: Patient Care Overview  Goal: Plan of Care/Patient Progress Review  Outcome: No Change  Pt is alert and oriented. No c/o pain on shift. C/o nausea- gave Zofran x1 with relief. VSS. Voiding in bathroom, calls appropriately. Tolerating regular diet, but poor appetite -NPO at midnight. LLE DVT; on continuous Heparin drip at 14 mL/hr. INR check at 4am. IV Zofran. Port infusing NS @ 75 mL/hr. Port in place on Right upper chest. SBA, ambulates well in room. WBC 15.9 Hgb 8.6 Baseline numbness/tingling in hands & feet from chemo. Plan is for IVC filter placement tomorrow, DC date pending.

## 2018-04-22 NOTE — CONSULTS
[facility]    Infectious Disease Consultation     Date of Admission:  4/20/2018  Date of Consult (When I saw the patient): 04/22/18    Assessment & Plan   Liv Sylvester is a 67 year old female who was admitted on 4/20/2018.     Impression:  1. 67 y.o patient with metastatic colon cancer.   2. On palliative chemo, thru port on the right chest, which does not look infected.   3. Admitted with vague symptoms, found to have leucocytosis.   4. 1/2 blood cultures positive for GPC in clusters pending ID.   5. Currently on zosyn.   6. Progressive DVT.     Recommendations:   Follow up on the blood cul, if only 1/2 positive high probability of contamination.   Leucocytosis probably multifactorial given pathologies, does not endorse much of infection symptoms.       Vilma Mchugh MD    Reason for Consult   Reason for consult: I was asked by Dr. oRdríguez  to evaluate this patient for positive blood culture and leucocytosis.    Primary Care Physician   RIGOBERTO SEN    Chief Complaint   Palpitations,     History is obtained from the patient and medical records    History of Present Illness   iLv Sylvester is a 67 year old female with  hx of metastatic colon cancer on palliative chemo, chronic DVT on anticoagulation with fondaparinux, HTN, and depression/anxiety. Referred to the ED by her primary clinic after reporting 3 day history of intermittent palpitations. On arrival, she also reported vague abdominal pain over the past month, worse over the past week. She has been found to have a progressive proximal LLE DVT despite anticoagulation, progression of colon cancer.     Past Medical History   I have reviewed this patient's medical history and updated it with pertinent information if needed.   Past Medical History:   Diagnosis Date     Cancer (H)     colon     Chronic deep vein thrombosis (DVT) (H)     On chronic anticoagulation with fondaparinux     Depression with anxiety      Flash pulmonary edema (H) 4/2013     With concurrent severe HTN, complicating DAPHNEY/BSO     Hemorrhoid      HTN (hypertension)      Iron deficiency anemia      Obesity        Past Surgical History   I have reviewed this patient's surgical history and updated it with pertinent information if needed.  Past Surgical History:   Procedure Laterality Date     COLONOSCOPY N/A 9/30/2017    Procedure: COMBINED COLONOSCOPY, SINGLE OR MULTIPLE BIOPSY/POLYPECTOMY BY BIOPSY;  colonoscopy;  Surgeon: Kevin Celis MD;  Location:  GI     HYSTERECTOMY, DAPHNEY  4/2013    with BSO     INSERT PORT VASCULAR ACCESS N/A 10/9/2017    Procedure: INSERT PORT VASCULAR ACCESS;  PORT PLACEMENT (MAC);  Surgeon: Rigoberto Nolen MD;  Location: [facility]       Prior to Admission Medications   Prior to Admission Medications   Prescriptions Last Dose Informant Patient Reported? Taking?   ACETAMINOPHEN PO 4/19/2018 at Unknown time Self Yes Yes   Sig: Take 500 mg by mouth every 4 hours as needed for pain    CALCIUM-MAGNESIUM-ZINC PO 4/19/2018 at Unknown time Self Yes Yes   Sig: Take 1 tablet by mouth daily   LORazepam (ATIVAN) 0.5 MG tablet prn Pharmacy No No   Sig: Take 1 tablet (0.5 mg) by mouth every 4 hours as needed (Anxiety, Nausea/Vomiting or Sleep)   Multiple Vitamins-Minerals (CENTRUM SILVER) per tablet 4/19/2018 at Unknown time Self Yes Yes   Sig: Take 1 tablet by mouth daily.   Potassium Chloride ER 20 MEQ TBCR 4/19/2018 at Unknown time  No Yes   Sig: Take 1 tablet (20 mEq) by mouth daily   VITAMIN D, CHOLECALCIFEROL, PO 4/19/2018 at Unknown time Self Yes Yes   Sig: Take 5,000 Units by mouth daily (OTC: Pt unsure of strength)    albuterol (PROAIR HFA/PROVENTIL HFA/VENTOLIN HFA) 108 (90 BASE) MCG/ACT Inhaler prn  No No   Sig: Inhale 2 puffs into the lungs every 6 hours as needed for shortness of breath / dyspnea or wheezing   chlorthalidone (HYGROTON) 25 MG tablet 4/20/2018 at am  No Yes   Sig: Take 1 tablet (25 mg) by mouth daily as needed   Patient taking differently:  Take 25 mg by mouth daily    fondaparinux (ARIXTRA) 7.5MG/0.6ML injection 4/20/2018 at am  No Yes   Sig: Inject 0.6 mLs (7.5 mg) Subcutaneous every 24 hours   lidocaine (XYLOCAINE) 5 % ointment 4/19/2018 at Unknown time Pharmacy No Yes   Sig: Apply 0.5 g topically as needed for moderate pain Apply pea size amount to rectal area for pain   lidocaine-prilocaine (EMLA) cream pprn Pharmacy No No   Sig: Apply topically as needed for moderate pain Apply dollop size amount to port site 30-60 minutes prior to accessing.   methylphenidate (RITALIN) 5 MG tablet 4/19/2018 at Unknown time  Yes Yes   Sig: Take 1 tablet (5 mg) by mouth 2 times daily   ondansetron (ZOFRAN) 8 MG tablet prn Pharmacy No No   Sig: Take 1 tablet (8 mg) by mouth every 8 hours as needed (Nausea/Vomiting)   order for DME   No No   Sig: Equipment being ordered: Wheelchair   oxyCODONE IR (ROXICODONE) 5 MG tablet 4/20/2018 at x1  No Yes   Sig: Take 1-2 tablets (5-10 mg) by mouth every 4 hours as needed for pain maximum 12 tablet(s) per day   prochlorperazine (COMPAZINE) 10 MG tablet  Pharmacy No No   Sig: Take 1 tablet (10 mg) by mouth every 6 hours as needed (Nausea/Vomiting)   sennosides (SENOKOT) 8.6 MG tablet prn at Unknown time Self Yes Yes   Sig: Take 1 tablet by mouth 2 times daily as needed for constipation   sertraline (ZOLOFT) 50 MG tablet 4/20/2018 at Unknown time  No Yes   Sig: TAKE 1 TABLET BY MOUTH EVERY DAY      Facility-Administered Medications: None     Allergies   No Known Allergies    Immunization History   Immunization History   Administered Date(s) Administered     TDAP Vaccine (Adacel) 12/01/2010       Social History   I have reviewed this patient's social history and updated it with pertinent information if needed. Liv Pinason  reports that she quit smoking about 12 years ago. She has never used smokeless tobacco. She reports that she does not drink alcohol or use illicit drugs.    Family History   I have reviewed this patient's  family history and updated it with pertinent information if needed.   Family History   Problem Relation Age of Onset     CEREBROVASCULAR DISEASE Father       age 66     HEART DISEASE Mother       of MI, age 72     DIABETES Mother      Hypertension Mother        Review of Systems   The 10 point Review of Systems is negative other than noted in the HPI or here.     Physical Exam   Temp: 98.2  F (36.8  C) Temp src: Oral BP: 143/63 Pulse: 86 Heart Rate: 86 Resp: 16 SpO2: 99 % O2 Device: None (Room air)    Vital Signs with Ranges  Temp:  [98.2  F (36.8  C)-98.8  F (37.1  C)] 98.2  F (36.8  C)  Pulse:  [81-89] 86  Heart Rate:  [86] 86  Resp:  [16-18] 16  BP: (138-161)/(63-81) 143/63  SpO2:  [92 %-99 %] 99 %  208 lbs 8 oz    GENERAL APPEARANCE:  alert and no distress  EYES: Eyes grossly normal to inspection, PERRL and conjunctivae and sclerae normal  HENT: ear canals and TM's normal and nose and mouth without ulcers or lesions  NECK: no adenopathy, no asymmetry, masses, or scars and thyroid normal to palpation  RESP: lungs clear to auscultation - no rales, rhonchi or wheezes  CV: regular rates and rhythm, normal S1 S2, no S3 or S4 and no murmur, click or rub  LYMPHATICS: normal ant/post cervical and supraclavicular nodes  ABDOMEN: soft, nontender, without hepatosplenomegaly or masses and bowel sounds normal  MS: extremities normal- no gross deformities noted  SKIN: no suspicious lesions or rashes      Data   Lab Results   Component Value Date    WBC 14.3 (H) 2018    HGB 8.8 (L) 2018    HCT 27.6 (L) 2018     2018     2018    POTASSIUM 3.5 2018    CHLORIDE 106 2018    CO2 28 2018    BUN 5 (L) 2018    CR 0.70 2018    GLC 94 2018    DD 8.3 (H) 10/20/2017    TROPI <0.015 2018    AST 36 2018    ALT 18 2018    ALKPHOS 255 (H) 2018    BILITOTAL 0.2 2018    INR 1.21 (H) 2018       Recent Labs  Lab  04/20/18 2126 04/20/18 2058 04/20/18 1954   CULT No growth after 2 days Cultured on the 2nd day of incubation:Gram positive cocci in clusters*  Critical Value/Significant Value called to and read back byAmber Scheierl R.N. on SH88 at 7:57am on 04.22.18 JT. No growth  Canceled, Test creditedDuplicate requestSEE [id number]     Recent Labs   Lab Test  04/20/18   2126  04/20/18   2058  04/20/18   1954  12/15/17   1538  12/15/17   1238   CULT  No growth after 2 days  Cultured on the 2nd day of incubation:  Gram positive cocci in clusters  *  Critical Value/Significant Value called to and read back by  Amber Scheierl R.N. on SH88 at 7:57am on 04.22.18 JT.    No growth  Canceled, Test credited  Duplicate request  SEE [id number]    No growth  No growth

## 2018-04-23 NOTE — PLAN OF CARE
Problem: Patient Care Overview  Goal: Plan of Care/Patient Progress Review  Outcome: No Change  Pt alert and oriented on regular diet, poor appetite. SBA; does use a walker @ home. Alarm off, calls appropriately. Skin intact, no wounds but dry. VSS, but slight HTN. Fired lactic protocol- came back WDL. C/o R epigastric pain, gave oxycodone x1. Baseline numbness/tingling in hands and feet r/t chemo. Sats well on RA. R port infusing Heparin drip 13mL/Hr running and NS @ 75. LLE DVT, IVC filter placement cancelled today r/t possible infection. Plan to DC when WBC improves.

## 2018-04-23 NOTE — PROGRESS NOTES
[facility]    Hospitalist Progress Note    Date of Service (when I saw the patient): 04/23/2018    Assessment & Plan   Liv Sylvester is a 67 year old female who was admitted on 4/20/2018.  Pasty history of metastatic colon cancer on palliative chemo, chronic DVT on anticoagulation with fondaparinux, HTN, and depression/anxiety. She is a poor historian, but was referred to the ED by her primary clinic after reporting 3 day history of intermittent palpitations. On arrival, she also reported vague abdominal pain over the past month, worse over the past week. She has been found to have a progressive proximal LLE DVT despite anticoagulation, progression of colon cancer, and possible infection of unclear source.         Chronic proximal LLE DVT with progression  Has been maintained on fondaparinux after previously failing enoxaparin for DVT. CT chest/abd/pelvis on arrival showed progression of left common femoral DVT, now extending into the left common iliac vein.  - fondaparinux on hold; continue heparin gtt  - IVC filter placement on hold pending follow up of blood cultures  - Onc recs appreciated     Stage IV metastatic colon cancer  Diagnosed Sept 2017 with known liver and abdominal lymph node metastases on palliative chemotherapy with Dr. Piedra at  Oncology. CT chest/abd/pelvis on arrival showed progression of liver metastases.   - will follow up with Dr. Piedra to discuss alternative treatment regimen     Leukocytosis, possible infection of unclear source  Isolated leukocytosis to 21.8k present on arrival. CT chest/abd/pelvis did not reveal a source of infection. However, given her abdominal pain (which is likely due to cancer progression), immunocompromised state, and leukocytosis, she was empirically initiated on pip-tazo.  Procalcitonin elevated at 0.39.  UA and CXR unremarkable.    - 1/2 admission blood cultures with GPC (identification pending); repeat cultures 4/22 ngtd  - TTE 4/22 shows aortic  valve with small non-mobile structure, likely calcification but cannot rule out vegetation; will order daily blood culture for now  - continue zosyn; ID recs appreciated  - afebrile, leukocytosis trending down     Chronic iron deficiency anemia  Hgb stable within baseline 9 range     Essential hypertension  [PTA: chlorthalidone prn]  - stop IVF, resume PTA chlorthalidone (and K supplement) 4/23     Peripheral neuropathy due to chemotherapy  Chronic and stable     Major recurrent depressive disorder with anxiety  Maintained on sertraline which has been continued     DVT Prophylaxis: Heparin gtt  Code Status: DNR / DNI  # Pain Assessment:  Current Pain Score 4/23/2018   Patient currently in pain? denies   Pain score (0-10) -   Pain location -   Pain descriptors -   - Liv is experiencing pain due to cancer but currently denies pain . Pain management was discussed and the plan was created in a collaborative fashion.  Liv's response to the current recommendations: engaged  - Opioid regimen: PRN norco  - Response to opioid medications: Reduction of symptoms of pain   - Bowel regimen: PRN     Disposition: Expected discharge once wbc improved and she has been cleared by Oncology and ID      Zoltan Rojas MD        Interval History   Some intermittent nausea, ongoing abdominal pain but now resolved after pain medication.  No recurrence of palpitations since admission, instructed to inform nurse of any recurrence.  Denies fever/chills, dyspnea, cough.  Has some mild right shoulder pain that is worse with movement, no associated with abdominal discomfort.    -Data reviewed today: I reviewed all new labs and imaging results over the last 24 hours. I personally reviewed no images or EKG's today.    Physical Exam   Temp: 99.2  F (37.3  C) Temp src: Oral BP: 142/72 Pulse: 105 Heart Rate: 93 Resp: 16 SpO2: 99 % O2 Device: None (Room air)    Vitals:    04/20/18 1744 04/22/18 0645   Weight: 93 kg (205 lb) 94.6 kg (208 lb 8 oz)      Vital Signs with Ranges  Temp:  [96.1  F (35.6  C)-99.2  F (37.3  C)] 99.2  F (37.3  C)  Pulse:  [] 105  Heart Rate:  [87-93] 93  Resp:  [16-18] 16  BP: (137-159)/(68-79) 142/72  SpO2:  [98 %-99 %] 99 %  I/O last 3 completed shifts:  In: 2178 [I.V.:2178]  Out: -     Constitutional: Well developed, well nourished female in no acute distress  Respiratory: Clear to auscultation bilaterally, no crackles or wheezing  Cardiovascular: Regular rate and rhythm, normal S1 and S2, and no murmur noted  GI: Normal bowel sounds, soft, non-distended, non-tender  Skin/Integumen:   Other: Cranial nerves grossly intact, alert and appropriate    Medications     HEParin 1,300 Units/hr (04/23/18 0249)     - MEDICATION INSTRUCTIONS -       sodium chloride 75 mL/hr at 04/23/18 0249       Calcium-Magnesium-Zinc  1 tablet Oral Daily     methylphenidate  5 mg Oral BID     piperacillin-tazobactam  3.375 g Intravenous Q6H     sertraline  50 mg Oral Daily     sodium chloride (PF)  3 mL Intracatheter Q8H       Data     Recent Labs  Lab 04/23/18  0657 04/22/18  0656 04/22/18  0401 04/21/18  0655 04/20/18  1748   WBC 13.0* 14.3*  --  15.9* 21.8*   HGB 8.6* 8.8*  --  8.6* 9.4*   MCV 96 96  --  96 97    192  --  197 229   INR  --   --  1.21*  --   --    NA  --  141  --  141 141   POTASSIUM  --  3.5  --  3.5 3.5   CHLORIDE  --  106  --  106 106   CO2  --  28  --  29 27   BUN  --  5*  --  7 9   CR 0.62 0.70  --  0.59 0.63   ANIONGAP  --  7  --  6 8   JESÚS  --  8.6  --  9.0 9.7   GLC  --  94  --  93 111*   ALBUMIN  --   --   --   --  2.8*   PROTTOTAL  --   --   --   --  7.4   BILITOTAL  --   --   --   --  0.2   ALKPHOS  --   --   --   --  255*   ALT  --   --   --   --  18   AST  --   --   --   --  36   TROPI  --   --   --   --  <0.015       No results found for this or any previous visit (from the past 24 hour(s)).

## 2018-04-23 NOTE — PLAN OF CARE
Problem: Patient Care Overview  Goal: Plan of Care/Patient Progress Review  Outcome: No Change  Tmax 99.2. All other VSS. No pain or nausea today. Appetite fair. Sleeping between cares. Hep gtt infusing at 13mL/hr. Next hep 10a check in AM. Neuropathy in hands and feet. PIV placed in addition to port access (vanco and heparin not compatible). Up with SBA in room. IVF d/c'ed today. Incontinent of large amount of urine this afternoon (started diuretic). ID recommending possible MARIALUISA; cyn quevedo MD. Plan pending infectious work up. Continue to monitor.

## 2018-04-23 NOTE — PLAN OF CARE
Problem: Patient Care Overview  Goal: Plan of Care/Patient Progress Review  Outcome: No Change  Patient A&Ox4, pleasant. No c/o pain on shift. VSS. Baseline numbness/tingling in hands and feet d/t chemo. No c/o nausea. Last BM 4/20; senna given on 4/22. No results yet. Continuous Heparin at 13 mL/hr. Next Hep10A to be drawn this AM. NS at 75 mL/hr infusing through port as well. PIV was kinked and came out of hand. NO PIV in at this time. BC pending from 4/22. SBA, ambulated to bathroom. Discharge pending. Continue to monitor.

## 2018-04-23 NOTE — PROGRESS NOTES
[facility]    Infectious Disease Progress Note    Date of Service (when I saw the patient): 04/23/2018     Assessment & Plan   Liv Sylvester is a 67 year old female who was admitted on 4/20/2018.     Impression:  1. 67 y.o patient with metastatic colon cancer.   2. On palliative chemo, thru port on the right chest, which does not look infected.   3. Admitted with vague symptoms, found to have leucocytosis.   4. 1/2 blood cultures positive for GPC in clusters pending ID.   5. Currently on zosyn.   6. Progressive DVT.  7. ? Vegetation on the TTE     Recommendations:   Follow up on the blood cul, if only 1/2 positive high probability of contamination. But given the TTE which is positive for possible vegetation, a MARIALUISA should be done.   Leucocytosis probably multifactorial given pathologies, does not endorse much of infection symptoms.   Stop zosyn, start vanco.          Vilma Mchugh MD    Interval History   t max of 99. 2   TTE raises a possibility of vegetation.     Physical Exam   Temp: 99.2  F (37.3  C) Temp src: Oral BP: 142/72 Pulse: 105 Heart Rate: 93 Resp: 16 SpO2: 99 % O2 Device: None (Room air)    Vitals:    04/20/18 1744 04/22/18 0645   Weight: 93 kg (205 lb) 94.6 kg (208 lb 8 oz)     Vital Signs with Ranges  Temp:  [96.1  F (35.6  C)-99.2  F (37.3  C)] 99.2  F (37.3  C)  Pulse:  [] 105  Heart Rate:  [87-93] 93  Resp:  [16-18] 16  BP: (137-159)/(68-79) 142/72  SpO2:  [98 %-99 %] 99 %    Constitutional: Awake, alert, cooperative, no apparent distress  Lungs: Clear to auscultation bilaterally, no crackles or wheezing  Cardiovascular: Regular rate and rhythm, normal S1 and S2, and no murmur noted  Abdomen: Normal bowel sounds, soft, non-distended, non-tender  Skin: No rashes, no cyanosis, no edema  Other:    Medications     HEParin 1,300 Units/hr (04/23/18 7349)     - MEDICATION INSTRUCTIONS -         Calcium-Magnesium-Zinc  1 tablet Oral Daily     chlorthalidone  25 mg Oral Daily      methylphenidate  5 mg Oral BID     piperacillin-tazobactam  3.375 g Intravenous Q6H     potassium chloride  20 mEq Oral Daily     sertraline  50 mg Oral Daily     sodium chloride (PF)  3 mL Intracatheter Q8H       Data   All microbiology laboratory data reviewed.  Recent Labs   Lab Test  04/23/18   0657  04/22/18   0656  04/21/18   0655   WBC  13.0*  14.3*  15.9*   HGB  8.6*  8.8*  8.6*   HCT  27.6*  27.6*  27.1*   MCV  96  96  96   PLT  189  192  197     Recent Labs   Lab Test  04/23/18   0657  04/22/18   0656  04/21/18   0655   CR  0.62  0.70  0.59     No lab results found.  Recent Labs   Lab Test  04/22/18   1158  04/22/18   1145  04/20/18   2126  04/20/18   2058  04/20/18   1954  12/15/17   1538  12/15/17   1238   CULT  No growth after 15 hours  No growth after 15 hours  No growth after 3 days  Cultured on the 2nd day of incubation:  Gram positive cocci in clusters  *  Critical Value/Significant Value called to and read back by  Amber Scheierl R.N. on [facility] at 7:57am on 04.22.18 JT.    (Note)  NEGATIVE for the following: Staphylococcus spp., Staph aureus, Staph  epidermidis, Staph lugdunensis, Streptococcus spp., Strep pneumoniae,  Strep pyogenes, Strep agalactiae, Strep anginosus group, Enterococcus  faecalis, Enterococcus faecium, and Listeria spp. by [employer]  multiplex nucleic acid test. Final identification and antimicrobial  susceptibility testing will be verified by standard methods.    Critical Value/Significant Value called to and read back by Amber Scheierl RN ([id number]) on 04.22.18 10:48 AM by DW.          No growth  Canceled, Test credited  Duplicate request  SEE [id number]    No growth  No growth

## 2018-04-23 NOTE — PROGRESS NOTES
This is a recent snapshot of the patient's [facility] medical record.  For current drug dose and complete information and questions, call [phone] or In Basket pool, [facility] home infusion ([id number])  CSN Number:  [id number]

## 2018-04-24 NOTE — PROGRESS NOTES
Patient for MARIALUISA with sedation. Procedure completed without problems. Vital signs stable throughout. Post procedure orders by Dr Rahman. Report called to JACKIE Miranda on station [id number].

## 2018-04-24 NOTE — CONSULTS
"PROGRESS NOTE:  04/23/2018      SUBJECTIVE:    Ms. Sylvester is a 67-year-old female with metastatic colon cancer diagnosed in 09/2017. Patient is on palliative chemotherapy. Last treatment was on 04/12/2018 with 5-FU and leucovorin.      Patient also has recurrent thrombosis.  She has been on fondaparinux as outpatient.      Patient presented to the emergency room on 04/20/2018 as she was not feeling good.  She was feeling weak.  She also had \"funny feeling in her chest\".  It was not a pain.  She felt something was fluttering inside the heart.  With these complaints, she presented to the ER on 04/20/2018 and had multiple investigations done.   -CBC revealed leukocytosis and anemia.    -Chemistry panel revealed mildly elevated alkaline phosphatase.    -CT chest, abdomen and pelvis was done.  There was no pulmonary embolism.  No lung infiltrate.  There is progression of metastatic disease in the liver.  It also revealed worsening of DVT.  Left common femoral vein thrombosis is now extending into left common iliac vein.      Patient's blood culture has come back positive for gram-positive cocci in cluster.  Patient is on IV antibiotics.  ID is following. They are suspecting endocarditis. Patient had an echocardiogram done.  Aortic valve has small echo-dense nonmobile structure.  Small vegetation cannot be ruled out.  MARIALUISA is pending.      I met with the patient. She is feeling better.  She still has fatigue.  No headache.  Some dizziness.  No chest pain.  No shortness of breath.  No abdominal pain.  No vomiting.  No pain in the lower extremities.  No bleeding.      PHYSICAL EXAMINATION:   GENERAL:  She is alert, oriented x 3.   VITAL SIGNS:  Reviewed.    The rest of the systems not examined.      LABORATORY DATA:  Reviewed.      ASSESSMENT:   1.  A 67-year-old female with metastatic colon cancer which is progressing.   2.  Recurrent thrombosis.  Deep vein thrombosis is progressing.   3.  Bacteremia with gram-positive " cocci.  Could be secondary to endocarditis.   4.  Leukocytosis secondary to infection, improving.   5.  Anemia.      PLAN:   1.  I discussed with her regarding recurrent thrombosis. Patient is currently on heparin, which will be continued.     Patient has failed multiple different anti-coagulant.  Plan is for IVC filter.  Because of bacteremia, this has been postponed.  For now, she will continue on heparin drip.  Once bacteremia has cleared, she can get an IVC filter placed.  After that heparin can be transitioned to NOAC or fondaparinux.     2.  Patient has metastatic colon cancer.  It is progressing.  Dr. Piedra, her primary oncologist, will discuss regarding further treatment plan.     3. Patient is anemic.  This is due to multiple factors including metastatic colon cancer, chemotherapy and iron deficiency anemia.  She will be transfused if hemoglobin goes below 7.0. Will get some labs.    4.  For bacteremia, she will be continued on IV antibiotics as per Infectious Disease.     5.  She had a few questions, which were all answered.  We will continue to follow.      Total time spent 25 minutes, more than 50% of the time spent in counseling and coordination of care.         HUNTER TORRES MD             D: 2018   T: 2018   MT: NIKIA      Name:     JESSICA MOYA   MRN:      [id number]        Account:       [id number]   :      1950           Consult Date:  2018      Document: [id number]

## 2018-04-24 NOTE — PROGRESS NOTES
[facility]  Patient is currently open to home care services with [facility]. The patient is currently receiving PT/OT and HHA services. [facility]  and team have been notified of patient admission. [facility] liaison will continue to follow patient during stay. If appropriate provide orders to resume home care at time of discharge.

## 2018-04-24 NOTE — PROGRESS NOTES
[facility]    Hospitalist Progress Note    Assessment & Plan   Liv Sylvester is a 67 year old female who was admitted on 4/20/2018.     Liv Sylvester is a 67 year old female who was admitted on 4/20/2018.  Pasty history of metastatic colon cancer on palliative chemo, chronic DVT on anticoagulation with fondaparinux, HTN, and depression/anxiety. She is a poor historian, but was referred to the ED by her primary clinic after reporting 3 day history of intermittent palpitations. On arrival, she also reported vague abdominal pain over the past month, worse over the past week. She has been found to have a progressive proximal LLE DVT despite anticoagulation, progression of colon cancer, and possible infection of unclear source.            Chronic proximal LLE DVT with progression  Has been maintained on fondaparinux after previously failing enoxaparin for DVT. CT chest/abd/pelvis on arrival showed progression of left common femoral DVT, now extending into the left common iliac vein.  - fondaparinux on hold; continue heparin gtt  - IVC filter placement on hold pending follow up of blood cultures        Stage IV metastatic colon cancer  Diagnosed Sept 2017 with known liver and abdominal lymph node metastases on palliative chemotherapy with Dr. Piedra at  Oncology. CT chest/abd/pelvis on arrival showed progression of liver metastases.   - Discussed with Dr. Allen today      Leukocytosis, possible infection of unclear source  Isolated leukocytosis to 21.8k present on arrival. CT chest/abd/pelvis did not reveal a source of infection. However, given her abdominal pain (which is likely due to cancer progression), immunocompromised state, and leukocytosis, she was empirically initiated on pip-tazo.  Procalcitonin elevated at 0.39.  UA and CXR unremarkable.    - 1/2 admission blood cultures with GPC (identification pending); repeat cultures 4/22 ngtd  - TTE 4/22 shows aortic valve with small non-mobile structure,  likely calcification but cannot rule out vegetation; will order daily blood culture for now  - continue zosyn; ID recs appreciated  - afebrile, leukocytosis trending down  -if infection controlled, then IVC filter and oral anticoagulant      Chronic iron deficiency anemia  Hgb stable within baseline 9 range      Essential hypertension  [PTA: chlorthalidone prn]  - stop IVF, resume PTA chlorthalidone (and K supplement) 4/23      Peripheral neuropathy due to chemotherapy  Chronic and stable      Major recurrent depressive disorder with anxiety  Maintained on sertraline which has been continued            # Pain Assessment:  Current Pain Score 4/24/2018   Patient currently in pain? denies   Pain score (0-10) -   Pain location -   Pain descriptors -       DVT Prophylaxis: heparin gtt  Code Status: DNR/DNI    Disposition: Expected discharge unclear, possibly 2-3 days    Text Page (7am - 6pm)  Ivy Bradley MD    Interval History   No new issues    -Data reviewed today: I reviewed all new labs and imaging results over the last 24 hours. I personally reviewed None today    Physical Exam   Temp: 97.5  F (36.4  C) Temp src: Axillary BP: 148/70   Heart Rate: 94 Resp: 16 SpO2: 98 % O2 Device: None (Room air)    Vitals:    04/20/18 1744 04/22/18 0645   Weight: 93 kg (205 lb) 94.6 kg (208 lb 8 oz)     Vital Signs with Ranges  Temp:  [97.5  F (36.4  C)-100.2  F (37.9  C)] 97.5  F (36.4  C)  Heart Rate:  [] 94  Resp:  [16-18] 16  BP: (129-166)/(53-88) 148/70  SpO2:  [97 %-100 %] 98 %  I/O last 3 completed shifts:  In: 200 [P.O.:200]  Out: -     Constitutional: alert   Respiratory: clear to auscultation, no wheezing or rhonchi   Cardiovascular: regular rate and rhythm without murmer or rub   GI:positive bowel sounds, non-tender   Skin/Integumen: no rashes   Other:        Medications     HEParin 1,300 Units/hr (04/24/18 0714)     - MEDICATION INSTRUCTIONS -         Calcium-Magnesium-Zinc  1 tablet Oral Daily      chlorthalidone  25 mg Oral Daily     methylphenidate  5 mg Oral BID     potassium chloride  20 mEq Oral Daily     sertraline  50 mg Oral Daily     sodium chloride (PF)  3 mL Intracatheter Q8H     vancomycin (VANCOCIN) IV  2,000 mg Intravenous Q12H       Data     Recent Labs  Lab 04/24/18  0632 04/23/18  0657 04/22/18  0656 04/22/18  0401 04/21/18  0655 04/20/18  1748   WBC 12.5* 13.0* 14.3*  --  15.9* 21.8*   HGB 8.6* 8.6* 8.8*  --  8.6* 9.4*   MCV 95 96 96  --  96 97    189 192  --  197 229   INR  --   --   --  1.21*  --   --    NA  --   --  141  --  141 141   POTASSIUM  --   --  3.5  --  3.5 3.5   CHLORIDE  --   --  106  --  106 106   CO2  --   --  28  --  29 27   BUN  --   --  5*  --  7 9   CR  --  0.62 0.70  --  0.59 0.63   ANIONGAP  --   --  7  --  6 8   JESÚS  --   --  8.6  --  9.0 9.7   GLC  --   --  94  --  93 111*   ALBUMIN  --   --   --   --   --  2.8*   PROTTOTAL  --   --   --   --   --  7.4   BILITOTAL  --   --   --   --   --  0.2   ALKPHOS  --   --   --   --   --  255*   ALT  --   --   --   --   --  18   AST  --   --   --   --   --  36   TROPI  --   --   --   --   --  <0.015       No results found for this or any previous visit (from the past 24 hour(s)).

## 2018-04-24 NOTE — PLAN OF CARE
Problem: Patient Care Overview  Goal: Plan of Care/Patient Progress Review  Outcome: No Change  No events from 4634-4055. Denies discomfort, nausea, dyspnea. Heparin drip infusing overnight at 13 mL/hr. Hep 10a to be checked with am labs. Port infusing with good blood return. Incontinent of urine x 1. Family at bedside during the evening was supportive. Bed alarm for safety.

## 2018-04-24 NOTE — PROGRESS NOTES
Returned to room 8813 in medically stable condition per cart and transport aide. Sleepy but stable.

## 2018-04-24 NOTE — PLAN OF CARE
Problem: Patient Care Overview  Goal: Plan of Care/Patient Progress Review  Outcome: No Change  A&Ox4. VSS. Afebrile. Pt c/o R side breast/rib cage pain PRN oxy given x1 with relief. R chest port infusing continuous heparin drip, recheck 10a in AM. L PIV SL. Continues with IV vanco. Up with SBAx1. Baseline neuropathy r/t chemo. BC pending. Voiding via BR. Will continue to monitor.

## 2018-04-24 NOTE — PLAN OF CARE
Problem: Patient Care Overview  Goal: Plan of Care/Patient Progress Review  Outcome: No Change  A/o x 4.  Up ind in room.  Hep gtt @ 13ml/hr. Next Hep10A recheck tomorrow AM.  Afebrile, VSS.  Down at MARIALUISA at this d/t possible vegetation.  On vanco, zosyn restarted. Port infusing w/ to right chest, PIV on RUE - also using d/t IV incompatibility.  C/o 3/10 RUQ pain, relief with prn 5mg Oxycodone. Plan is for IVC once bacteremia clears.

## 2018-04-24 NOTE — PROGRESS NOTES
[facility]    Infectious Disease Progress Note    Date of Service (when I saw the patient): 04/24/2018     Assessment & Plan   Liv Sylvester is a 67 year old female who was admitted on 4/20/2018.     Impression:  1. 67 y.o patient with metastatic colon cancer.   2. On palliative chemo, thru port on the right chest, which does not look infected.   3. Admitted with vague symptoms, found to have leucocytosis.   4. 1/2 blood cultures positive for GPC in clusters pending ID.   5. Currently on zosyn.   6. Progressive DVT.  7. ? Vegetation on the TTE     Recommendations:   Follow up on the blood cul, if only 1/2 positive high probability of contamination. But given the TTE which is positive for possible vegetation, a MARIALUISA should be done.   Leucocytosis probably multifactorial given pathologies, does not endorse much of infection symptoms.   Continue on  Vanco, restart zosyn as isolate is an anaerobe, FOLLOW UP on the MARIALUISA and the ID and ELVIS.          Vilma Mchugh MD    Interval History   t max of 99. 2   TTE raises a possibility of vegetation.     Physical Exam   Temp: 98.9  F (37.2  C) Temp src: Oral BP: 153/80   Heart Rate: 84 Resp: 16 SpO2: 98 % O2 Device: None (Room air)    Vitals:    04/20/18 1744 04/22/18 0645   Weight: 93 kg (205 lb) 94.6 kg (208 lb 8 oz)     Vital Signs with Ranges  Temp:  [97.5  F (36.4  C)-100.2  F (37.9  C)] 98.9  F (37.2  C)  Heart Rate:  [] 84  Resp:  [16-18] 16  BP: (129-166)/(53-88) 153/80  SpO2:  [97 %-100 %] 98 %    Constitutional: Awake, alert, cooperative, no apparent distress  Lungs: Clear to auscultation bilaterally, no crackles or wheezing  Cardiovascular: Regular rate and rhythm, normal S1 and S2, and no murmur noted  Abdomen: Normal bowel sounds, soft, non-distended, non-tender  Skin: No rashes, no cyanosis, no edema  Other:    Medications     HEParin 1,300 Units/hr (04/24/18 0956)     - MEDICATION INSTRUCTIONS -         Calcium-Magnesium-Zinc  1 tablet Oral  Daily     chlorthalidone  25 mg Oral Daily     methylphenidate  5 mg Oral BID     piperacillin-tazobactam  3.375 g Intravenous Q6H     potassium chloride  20 mEq Oral Daily     sertraline  50 mg Oral Daily     sodium chloride (PF)  3 mL Intracatheter Q8H     vancomycin (VANCOCIN) IV  2,000 mg Intravenous Q12H       Data   All microbiology laboratory data reviewed.  Recent Labs   Lab Test  04/24/18   0632  04/23/18   0657  04/22/18   0656   WBC  12.5*  13.0*  14.3*   HGB  8.6*  8.6*  8.8*   HCT  26.9*  27.6*  27.6*   MCV  95  96  96   PLT  183  189  192     Recent Labs   Lab Test  04/24/18   0632  04/23/18   0657  04/22/18   0656   CR  0.51*  0.62  0.70     No lab results found.  Recent Labs   Lab Test  04/23/18   1015  04/22/18   1158  04/22/18   1145  04/20/18   2126  04/20/18   2058  04/20/18   1954  12/15/17   1538  12/15/17   1238   CULT  No growth after 12 hours  No growth after 2 days  No growth after 2 days  No growth after 4 days  Cultured on the 2nd day of incubation:  Gram positive cocci in clusters  Referred to anaerobes for identification  *  Critical Value/Significant Value called to and read back by  Amber Scheierl R.N. on 88 at 7:57am on 04.22.18 JT.    (Note)  NEGATIVE for the following: Staphylococcus spp., Staph aureus, Staph  epidermidis, Staph lugdunensis, Streptococcus spp., Strep pneumoniae,  Strep pyogenes, Strep agalactiae, Strep anginosus group, Enterococcus  faecalis, Enterococcus faecium, and Listeria spp. by [employer]  multiplex nucleic acid test. Final identification and antimicrobial  susceptibility testing will be verified by standard methods.    Critical Value/Significant Value called to and read back by Amber Scheierl RN ([email or web address]) on 04.22.18 10:48 AM by NESTOR.          No growth  Canceled, Test credited  Duplicate request  SEE [id number]    No growth  No growth

## 2018-04-25 NOTE — PROGRESS NOTES
[facility]    Infectious Disease Progress Note    Date of Service (when I saw the patient): 04/25/2018     Assessment & Plan   Liv Sylvester is a 67 year old female who was admitted on 4/20/2018.     Impression:  1. 67 y.o patient with metastatic colon cancer.   2. On palliative chemo, thru port on the right chest, which does not look infected.   3. Admitted with vague symptoms, found to have leucocytosis.   4. 1/2 blood cultures positive for GPC in clusters pending ID.   5. Currently on zosyn.   6. Progressive DVT.  7. ? Vegetation on the TTE     Recommendations:   Follow up on the blood cul, if only 1/2 positive high probability of contamination. But given the TTE which is positive for possible vegetation, a MARIALUISA done negative for vegetation.   Stoped zosyn even though GPC organism is an anaerobe, wait for ELVIS, continue zosyn alone.           Vilma Mchugh MD    Interval History   t max of 99. 2   TTE raises a possibility of vegetation.     Physical Exam   Temp: 97.7  F (36.5  C) Temp src: Oral BP: 141/56   Heart Rate: 89 Resp: 16 SpO2: 98 % O2 Device: None (Room air) Oxygen Delivery: 2 LPM  Vitals:    04/20/18 1744 04/22/18 0645 04/25/18 0723   Weight: 93 kg (205 lb) 94.6 kg (208 lb 8 oz) 97.8 kg (215 lb 11.2 oz)     Vital Signs with Ranges  Temp:  [95.9  F (35.5  C)-98.4  F (36.9  C)] 97.7  F (36.5  C)  Heart Rate:  [] 89  Resp:  [11-17] 16  BP: (117-152)/(54-84) 141/56  SpO2:  [95 %-100 %] 98 %    Constitutional: Awake, alert, cooperative, no apparent distress  Lungs: Clear to auscultation bilaterally, no crackles or wheezing  Cardiovascular: Regular rate and rhythm, normal S1 and S2, and no murmur noted  Abdomen: Normal bowel sounds, soft, non-distended, non-tender  Skin: No rashes, no cyanosis, no edema  Other:    Medications     HEParin 1,300 Units/hr (04/25/18 0308)     - MEDICATION INSTRUCTIONS -       sodium chloride 50 mL/hr at 04/25/18 0828       amLODIPine  5 mg Oral Daily      Calcium-Magnesium-Zinc  1 tablet Oral Daily     ferrous sulfate  325 mg Oral Daily     methylphenidate  5 mg Oral BID     piperacillin-tazobactam  3.375 g Intravenous Q6H     sertraline  50 mg Oral Daily     sodium chloride (PF)  3 mL Intracatheter Q8H       Data   All microbiology laboratory data reviewed.  Recent Labs   Lab Test  04/25/18   0600  04/24/18   0632  04/23/18   0657   WBC  9.6  12.5*  13.0*   HGB  8.6*  8.6*  8.6*   HCT  26.8*  26.9*  27.6*   MCV  95  95  96   PLT  185  183  189     Recent Labs   Lab Test  04/25/18   0600  04/24/18   0632  04/23/18   0657   CR  1.15*  0.51*  0.62     No lab results found.  Recent Labs   Lab Test  04/23/18   1015  04/22/18   1158  04/22/18   1145  04/20/18   2126  04/20/18   2058  04/20/18   1954  12/15/17   1538  12/15/17   1238   CULT  No growth after 2 days  No growth after 3 days  No growth after 3 days  No growth after 5 days  Cultured on the 2nd day of incubation:  Gram positive cocci in clusters  Referred to anaerobes for identification  *  Critical Value/Significant Value called to and read back by  Amber Scheierl R.N. on SH88 at 7:57am on 04.22.18 JT.    (Note)  NEGATIVE for the following: Staphylococcus spp., Staph aureus, Staph  epidermidis, Staph lugdunensis, Streptococcus spp., Strep pneumoniae,  Strep pyogenes, Strep agalactiae, Strep anginosus group, Enterococcus  faecalis, Enterococcus faecium, and Listeria spp. by [employer]  multiplex nucleic acid test. Final identification and antimicrobial  susceptibility testing will be verified by standard methods.    Critical Value/Significant Value called to and read back by Amber Scheierl RN ([email or web address]) on 04.22.18 10:48 AM by NESTOR.          No growth  Canceled, Test credited  Duplicate request  SEE [id number]    No growth  No growth

## 2018-04-25 NOTE — PROGRESS NOTES
[facility]    Hospitalist Progress Note    Assessment & Plan         Liv Sylvester is a 67 year old female who was admitted on 4/20/2018.  Pasty history of metastatic colon cancer on palliative chemo, chronic DVT on anticoagulation with fondaparinux, HTN, and depression/anxiety. She is a poor historian, but was referred to the ED by her primary clinic after reporting 3 day history of intermittent palpitations. On arrival, she also reported vague abdominal pain over the past month, worse over the past week. She has been found to have a progressive proximal LLE DVT despite anticoagulation, progression of colon cancer, and possible infection of unclear source.              Chronic proximal LLE DVT with progression  Has been maintained on fondaparinux after previously failing enoxaparin for DVT. CT chest/abd/pelvis on arrival showed progression of left common femoral DVT, now extending into the left common iliac vein.  - fondaparinux on hold; continue heparin gtt  - IVC filter placement on hold pending follow up of blood cultures         Stage IV metastatic colon cancer  Diagnosed Sept 2017 with known liver and abdominal lymph node metastases on palliative chemotherapy with Dr. Piedra at  Oncology. CT chest/abd/pelvis on arrival showed progression of liver metastases.   - Discussed with Dr. Allen 4/24       ARF  -creatinine today 1.15<0.51<0.62  -most likely due to vanco  -will gently hydrate and recheck in am  -will also check UA and urine eos      Leukocytosis, possible infection of unclear source  Isolated leukocytosis to 21.8k present on arrival. CT chest/abd/pelvis did not reveal a source of infection. However, given her abdominal pain (which is likely due to cancer progression), immunocompromised state, and leukocytosis, she was empirically initiated on pip-tazo.  Procalcitonin elevated at 0.39.  UA and CXR unremarkable.    - 1/2 admission blood cultures with GPC (identification pending); repeat  cultures 4/22 ngtd  - TTE 4/22 shows aortic valve with small non-mobile structure, likely calcification but cannot rule out vegetation  -MARIALUISA 4/24 did not show any vegetations  - continue zosyn; ID recs appreciated  - vanco being dosed by pharmacy, vanco level high 4/25 at 0040 (27.5}  -if infection controlled, then IVC filter and restart fondaparinux per Dr. Allen  -WBC today 9.6<12.5<13.0  -BC 4/23, 4/22 x 2 NGTD  -BC 4/20 GPC clusters, neg for staph aureus, staph epi, strep and enterococcus faecalis  -positive BC now referred for anerobe identification       Chronic iron deficiency anemia  Hgb stable within baseline 9 range      Essential hypertension  [PTA: chlorthalidone prn]  - had  resumed PTA chlorthalidone (and K supplement) 4/23  -due to renal issues will hold chlorthalidone and K today 4/25  -add potassium to today's labs  -BP have have been high, 150's systolic, will start amlodipine 5 mg po daily      Peripheral neuropathy due to chemotherapy  Chronic and stable      Major recurrent depressive disorder with anxiety  Maintained on sertraline which has been continued      # Pain Assessment:  Current Pain Score 4/25/2018   Patient currently in pain? denies   Pain score (0-10) -   Pain location -   Pain descriptors -       DVT Prophylaxis:heparin gtt  Code Status: DNR/DNI    Disposition: Expected discharge in several days after infection cleared and IVC filter placed    Text Page (7am - 6pm)  Ivy Bradley MD    Interval History   No new problems    -Data reviewed today: I reviewed all new labs and imaging results over the last 24 hours. I personally reviewed no images or EKG's today.    Physical Exam   Temp: 98.4  F (36.9  C) Temp src: Oral BP: 141/67   Heart Rate: 94 Resp: 16 SpO2: 100 % O2 Device: None (Room air) Oxygen Delivery: 2 LPM  Vitals:    04/20/18 1744 04/22/18 0645   Weight: 93 kg (205 lb) 94.6 kg (208 lb 8 oz)     Vital Signs with Ranges  Temp:  [95.9  F (35.5  C)-98.9  F (37.2  C)]  98.4  F (36.9  C)  Heart Rate:  [] 94  Resp:  [11-17] 16  BP: (117-153)/(54-84) 141/67  SpO2:  [95 %-100 %] 100 %  I/O last 3 completed shifts:  In: 120 [P.O.:120]  Out: -     Constitutional: alert   Respiratory: clear to auscultation, no wheezing or rhonchi   Cardiovascular: regular rate and rhythm without murmer or rub   GI:positive bowel sounds, non-tender   Skin/Integumen: no rashes    Other:        Medications     HEParin 1,300 Units/hr (04/25/18 0308)     - MEDICATION INSTRUCTIONS -         Calcium-Magnesium-Zinc  1 tablet Oral Daily     chlorthalidone  25 mg Oral Daily     ferrous sulfate  325 mg Oral Daily     methylphenidate  5 mg Oral BID     piperacillin-tazobactam  3.375 g Intravenous Q6H     potassium chloride  20 mEq Oral Daily     sertraline  50 mg Oral Daily     sodium chloride (PF)  3 mL Intracatheter Q8H     vancomycin place weaver - receiving intermittent dosing  1 each Does not apply See Admin Instructions       Data     Recent Labs  Lab 04/25/18  0600 04/24/18  0632 04/23/18  0657 04/22/18  0656 04/22/18  0401 04/21/18  0655 04/20/18  1748   WBC 9.6 12.5* 13.0* 14.3*  --  15.9* 21.8*   HGB 8.6* 8.6* 8.6* 8.8*  --  8.6* 9.4*   MCV 95 95 96 96  --  96 97    183 189 192  --  197 229   INR  --   --   --   --  1.21*  --   --    NA  --   --   --  141  --  141 141   POTASSIUM  --  3.5  --  3.5  --  3.5 3.5   CHLORIDE  --   --   --  106  --  106 106   CO2  --   --   --  28  --  29 27   BUN  --   --   --  5*  --  7 9   CR 1.15* 0.51* 0.62 0.70  --  0.59 0.63   ANIONGAP  --   --   --  7  --  6 8   JESÚS  --   --   --  8.6  --  9.0 9.7   GLC  --   --   --  94  --  93 111*   ALBUMIN  --   --   --   --   --   --  2.8*   PROTTOTAL  --   --   --   --   --   --  7.4   BILITOTAL  --   --   --   --   --   --  0.2   ALKPHOS  --   --   --   --   --   --  255*   ALT  --   --   --   --   --   --  18   AST  --   --   --   --   --   --  36   TROPI  --   --   --   --   --   --  <0.015       No results found  for this or any previous visit (from the past 24 hour(s)).

## 2018-04-25 NOTE — PLAN OF CARE
Problem: Patient Care Overview  Goal: Plan of Care/Patient Progress Review  Outcome: Improving  Pt A/O.  VSS.  Up with SBA d/t sedation from procedure.  NPO until 7pm after MARIALUISA, now regular diet.  C/o rt rib cage pain with relief with oxycodone x1; throat lozenges given for sore throat from MARIALUISA.  Hep gtt infusing through port.  Continue to monitor.

## 2018-04-25 NOTE — PLAN OF CARE
Problem: Patient Care Overview  Goal: Plan of Care/Patient Progress Review  Outcome: No Change  A & O x 4, VSS, no c/o pain overnight. Hep gtt running @ 13 mL/hr, rechk this AM. Vanco held overnight d/t increased vanco level, pharmacy to dose next infusion. Up ind to BR. IVC filter on hold while cultures pending.

## 2018-04-25 NOTE — PROGRESS NOTES
"[facility]  HEMATOLOGY ONCOLOGY    ONCOLOGY FOLLOWUP NOTE       DIAGNOSIS:    1- Metastatic colon cancer in a patient with history of stage 1A grade 3 endometrial carcinoma approximately around 2012.     On 09/29/2017, Liv Sylvester presented with progressive fatigue and right upper quadrant pain.  CT scan 09/29/2017 showed partially contracted bladder and findings suspicious for liver metastases.  There were multiple defined hypoechoic foci scoped scattered throughout the liver, the largest was in the right lobe.  CT was also showing enlargement of hepatic and retroperitoneal lymph nodes.  On 09/30/2017, she underwent a colonoscopy, which showed a fungating, infiltrative, ulcerated, partially obstructing mass at the hepatic flexure.  Pathology is consistent with moderately differentiated adenocarcinoma with normal mismatch repair protein expression.   2- Significant iron deficiency anemia.   3- VTE- malignancy related    Currently admitted with VTE, Bacteremia, progressive DVT.       TREATMENT:  10/11/2017 FOLFOX and Avastin.   1/24/2018 discontinued Avastin.       SUBJECTIVE:  The patient was seen in her room. She was feeling better but still not very mobile. Her daughter was at bedside.      REVIEW OF SYSTEMS:  A complete review of systems was performed and found to be negative other than pertinent positives mentioned in history of present illness.       Past medical, social histories reviewed.      Meds- Reviewed.    PHYSICAL EXAMINATION:   VITAL SIGNS:/71 (BP Location: Left arm)  Pulse 105  Temp 97  F (36.1  C) (Oral)  Resp 16  Ht 1.778 m (5' 10\")  Wt 97.8 kg (215 lb 11.2 oz)  SpO2 98%  BMI 30.95 kg/m2  CONSTITUTIONAL: Appears tired.   NEUROLOGIC: Alert, awake.   PSYCH: Appears stressed.     LABORATORY DATA AND IMAGING REVIEWED DURING THIS VISIT:  Recent Labs   Lab Test  04/25/18   0600  04/24/18   0632   04/22/18   0656  04/21/18   0655   NA   --    --    --   141  141 "   POTASSIUM  3.6  3.5   --   3.5  3.5   CHLORIDE   --    --    --   106  106   CO2   --    --    --   28  29   ANIONGAP   --    --    --   7  6   BUN   --    --    --   5*  7   CR  1.15*  0.51*   < >  0.70  0.59   GLC   --    --    --   94  93   JESÚS   --    --    --   8.6  9.0   MAG   --   2.1   --    --   2.0    < > = values in this interval not displayed.     Recent Labs   Lab Test  04/25/18   0600  04/24/18   0632  04/23/18   0657   04/20/18   1748  04/12/18   0830  03/29/18   0825   WBC  9.6  12.5*  13.0*   < >  21.8*  8.5  5.6   HGB  8.6*  8.6*  8.6*   < >  9.4*  9.6*  9.9*   PLT  185  183  189   < >  229  209  232   MCV  95  95  96   < >  97  97  96   NEUTROPHIL   --    --    --    --   80.6  82.6  72.3    < > = values in this interval not displayed.     Recent Labs   Lab Test  04/20/18   1748  04/12/18   0830  03/29/18   0825   BILITOTAL  0.2  0.5  0.4   ALKPHOS  255*  179*  155*   ALT  18  26  25   AST  36  86*  57*   ALBUMIN  2.8*  2.9*  2.8*     CT Chest/Abdomen/Pelvis w Contrast    Narrative    CT CHEST/ABDOMEN/PELVIS WITH CONTRAST 4/20/2018 7:39 PM     HISTORY: Epigastric pain and high WBC, known pulmonary embolism and  liver mets, rule out new pulmonary embolism.      COMPARISON: CT chest, abdomen, pelvis 2/19/2018.    TECHNIQUE: Thin section axial images are performed from the thoracic  inlet to the lung bases utilizing 103 mL of Isovue 370 IV contrast  without adverse event. Coronal reformatted images are also generated.  Axial images from the lung bases to the symphysis are performed with  additional coronal reformatted images. Radiation dose for this scan  was reduced using automated exposure control, adjustment of the mA  and/or kV according to patient size, or iterative reconstruction  technique.    FINDINGS:     Chest probable right major fissure lymph node is again noted on series  5, image 51. Bibasilar atelectasis is noted. No new or enlarging lung  lesions. No infiltrate or consolidation. No  pleural or pericardial  fluid. Heart is normal in size. Esophagus is unremarkable. Thyroid  gland appears normal. No enlarged axillary or intrathoracic lymph  nodes. Patient has an incidental aberrant right subclavian artery.  This is a normal anatomic variant. Right chest port appears in good  position with catheter tip in the right atrium. Thoracic aorta is  otherwise within normal limits with scattered calcified plaque.  Probable chronic changes of prior pulmonary embolism are noted in a  right lower lobe pulmonary artery such as on series 4, image 63. This  is unchanged compared to prior study. No new evidence of pulmonary  embolism.    Abdomen: Multiple metastatic liver lesions are present, increased in  size since prior exam suggesting interval progression of patient's  disease. The spleen, gallbladder, pancreas, adrenal glands and kidneys  are unremarkable. Right renal cysts are stable. No hydronephrosis or  obvious renal calculi. Aorta demonstrates scattered calcified plaque  without aneurysm or dissection. No enlarged retroperitoneal lymph  nodes. A few prominent erwin hepatis nodes are noted but these appear  stable. The bowel is normal in caliber without obstruction,  diverticulitis or appendicitis. No ascites or peritoneal nodularity.    Pelvis: The bladder is decompressed but otherwise unremarkable. The  rectum is within normal limits. The uterus is not identified. No  adnexal mass. Persistent filling defect left common femoral vein is  noted. This extends up into the external iliac artery and has  progressed into the mid to distal common iliac artery. No IVC thrombus  is evident. Bone window examination demonstrates degenerative spine  changes.      Impression    IMPRESSION:  1. No evidence of acute pulmonary embolism. Lungs are clear. No new  infiltrate, consolidation or mass. Right major fissure lymph node is  stable.  2. Progression of patient's metastatic liver disease since prior CT.  3.  Progression of patient's suspected left common femoral vein  thrombus extending now into the left common iliac vein. Follow-up with  anticoagulation or IVC filter as needed for further treatment.  4. No bowel obstruction, diverticulitis or appendicitis.    SHANEKA PERES MD     ASSESSMENT AND RECOMMENDATIONS:    This is a 67-year-old lady with stage IV metastatic colon cancer with normal mismatch repair protein expression.  She has multiple liver metastases and metastasis in the abdominal lymph nodes.   VTE/ recurrent episodes of thrombosis.    - Agree with ID input for bacteremia. She should continue heparin until we are able to get IVC filter placed.   - Progressive stage IV colon cancer, we will start second line chemotherapy after her discharge.      SIDRA NAILS MD    4/25/2018

## 2018-04-25 NOTE — PROVIDER NOTIFICATION
Received critical vanco level, 27.5 and pharmacy doses. Called pharmacist, was told to hold 0115 dose and they will re-dose ongoing vanco.

## 2018-04-25 NOTE — PLAN OF CARE
Problem: Patient Care Overview  Goal: Plan of Care/Patient Progress Review  Outcome: No Change   AOx4. BP slightly elevated, started on new BP meds, other VSS. No c/o pain. Baseline numbness/tinging in hands and feed. C/o nausea, zofran given x1, effective. Regular diet, fair appetite. R Port, continuous Heparin drip @ 13ml/hr, hep 10A draw 4/26, NS@50. L PIV Sl, zosyn, vanco d/c. Creat:1.15, started on fluids to decrease. UA collected: see results. Plan pending. Continue to monitor.

## 2018-04-25 NOTE — PHARMACY-VANCOMYCIN DOSING SERVICE
Pharmacy Vancomycin Note  Date of Service 2018  Patient's  10/1950   67 year old, female    Indication: Bacteremia  Goal Trough Level: 15-20 mg/L  Day of Therapy: 3  Current Vancomycin regimen:  2000 mg IV q12h    Current estimated CrCl = Estimated Creatinine Clearance: 129.7 mL/min (based on Cr of 0.51).    Creatinine for last 3 days  2018:  6:56 AM Creatinine 0.70 mg/dL  2018:  6:57 AM Creatinine 0.62 mg/dL  2018:  6:32 AM Creatinine 0.51 mg/dL    Recent Vancomycin Levels (past 3 days)  2018: 12:40 AM Vancomycin Level 27.5 mg/L    Vancomycin IV Administrations (past 72 hours)                   vancomycin (VANCOCIN) 2,000 mg in sodium chloride 0.9 % 500 mL intermittent infusion (mg) 2,000 mg New Bag 18 1312     2,000 mg New Bag  0103     2,000 mg New Bag 18 1439                Nephrotoxins and other renal medications (Future)    Start     Dose/Rate Route Frequency Ordered Stop    18 0439  vancomycin place weaver - receiving intermittent dosing      1 each Does not apply SEE ADMIN INSTRUCTIONS 18 0439      18 1030  piperacillin-tazobactam (ZOSYN) 3.375 g vial to attach to  mL bag     Comments:  Pharmacy can adjust dose based on renal function.    3.375 g  over 30 Minutes Intravenous EVERY 6 HOURS 18 1018               Contrast Orders - past 72 hours     None          Interpretation of levels and current regimen:  Trough level is  Supratherapeutic    Has serum creatinine changed > 50% in last 72 hours: No    Urine output:      Renal Function: Stable    Plan:  1.  Hold for now.  Will reevaluate level later today  2.  Pharmacy will check trough levels as appropriate in 1-3 Days.    3. Serum creatinine levels will be ordered daily for the first week of therapy and at least twice weekly for subsequent weeks.      Leo Chamorro        .

## 2018-04-25 NOTE — CONSULTS
PROGRESS NOTE:  04/24/2018      SUBJECTIVE:  Ms. Sylvester is a 67-year-old female with metastatic colon cancer on palliative chemotherapy with 5-FU . She also has recurrent thrombosis and had been on fondaparinux. Patient presented to the ER as she was not feeling good.  She felt weak and also had some funny feeling in her chest.  CT scan did not reveal any pulmonary embolism.  It revealed progression of cancer in the liver.  It also revealed progression of her DVT.      Blood culture is positive for gram-positive cocci in clusters.  ID is following.  She is on IV vancomycin and Zosyn.  To rule out endocarditis, she had a MARIALUISA done today.  There is no vegetation.        For thrombosis, she is on IV heparin.  Plan is to place an IVC filter once her infection has cleared.      Patient feels better today.  She is not in any pain.  No headache.  No chest pain.  No shortness of breath.  No vomiting.  Appetite is better.  No fever or chills today.      LABORATORY DATA:  Reviewed.      ASSESSMENT:   1.  A 67-year-old female with metastatic colon cancer which is progressing.   2.  Recurrent deep venous thrombosis.  Deep venous thrombosis is progressing.   3.  Bacteremia from gram-positive cocci.   4.  Leukocytosis secondary to infection.  It is improving.   5.  Anemia secondary to malignancy, chemotherapy and iron deficiency.      PLAN:   1.  Patient clinically is stable.  She feels better.  As her infection is being controlled, she is feeling better.  She will be continued on antibiotics as per Infectious Disease.   2.  Patient has progressive DVT.  Heparin will be continued.  Once bacteremia has cleared, IVC filter will be placed. After that, she can be started on NOAC or fondaparinux.   3.  Patient has anemia.  She has iron deficiency.  Because of bacteremia, I will avoid IV iron.  I will start her on oral iron.   4.  She has metastatic colon cancer which is progressing.  Once infection has cleared up, chemotherapy will  be resumed.  Regimen will be changed because of progression of disease.   5.  She had a few questions, which were all answered.  We will continue to follow her.         HUNTER TORRES MD             D: 2018   T: 2018   MT: BELLO      Name:     JESSICA MOYA   MRN:      [id number]        Account:       [id number]   :      1950           Consult Date:  2018      Document: [id number]

## 2018-04-25 NOTE — PROGRESS NOTES
[facility] Progress Note  [facility]    Met with pt, per her extended length of stay in the hospital.  Pt was lying in bed, alert, when I arrived.  Pt and I talked about her current health challenges, and hopes for continued recovery.  She was waiting for her granddaughter (whom she raised) to arrive with food for her, as she explained to me that she doesn't have much of an appetite due to the chemotherapy.  Pt continues to hope and believe that she will be healed, and she asked me to pray for her that the healing will come.  Pt added her own prayers for the staff here and the road ahead.  Provided conversation, support and prayer.   team available per additional request or extended stay.                                                                                                                                                 Keli Tang M.A.  Staff   Pager [phone]  Phone [phone]

## 2018-04-26 NOTE — PLAN OF CARE
Problem: Patient Care Overview  Goal: Plan of Care/Patient Progress Review  Outcome: Improving  Pt with LGF. Upper abd pain, oxycodone very effective. Cam ue/le neuropathy, painful.  Up to BR indep. Hep gtt continues at 1300u/hr, recheck hep10a in am.  Norvasc added, bp improved slightly.  Cont iv zosyn for now, w/u for infection underway per ID.

## 2018-04-26 NOTE — PROGRESS NOTES
[facility]    Infectious Disease Progress Note    Date of Service (when I saw the patient): 04/26/2018     Assessment & Plan   Liv Sylvester is a 67 year old female who was admitted on 4/20/2018.     Impression:  1. 67 y.o patient with metastatic colon cancer.   2. On palliative chemo, thru port on the right chest, which does not look infected.   3. Admitted with vague symptoms, found to have leucocytosis.   4. 1/2 blood cultures positive for GPC in clusters pending ID.   5. Currently on zosyn.   6. Progressive DVT.  7. ? Vegetation on the TTE     Recommendations:   Follow up on the blood cul, if only 1/2 positive high probability of contamination. But given the TTE which is positive for possible vegetation, a MARIALUISA done negative for vegetation.   Stoped zosyn even though GPC organism is an anaerobe, wait for ELVIS, continue zosyn alone.           Vilma Mchugh MD    Interval History   t max of 99. 2   TTE raises a possibility of vegetation.     Physical Exam   Temp: 98.7  F (37.1  C) Temp src: Oral BP: 153/77   Heart Rate: 88 Resp: 16 SpO2: 99 % O2 Device: None (Room air)    Vitals:    04/22/18 0645 04/25/18 0723 04/26/18 0528   Weight: 94.6 kg (208 lb 8 oz) 97.8 kg (215 lb 11.2 oz) 93.1 kg (205 lb 3.2 oz)     Vital Signs with Ranges  Temp:  [97  F (36.1  C)-99.2  F (37.3  C)] 98.7  F (37.1  C)  Heart Rate:  [85-97] 88  Resp:  [16] 16  BP: (147-155)/(63-77) 153/77  SpO2:  [96 %-100 %] 99 %    Constitutional: Awake, alert, cooperative, no apparent distress  Lungs: Clear to auscultation bilaterally, no crackles or wheezing  Cardiovascular: Regular rate and rhythm, normal S1 and S2, and no murmur noted  Abdomen: Normal bowel sounds, soft, non-distended, non-tender  Skin: No rashes, no cyanosis, no edema  Other:    Medications     HEParin 1,300 Units/hr (04/25/18 7174)     - MEDICATION INSTRUCTIONS -       sodium chloride 50 mL/hr at 04/26/18 0544       amLODIPine  5 mg Oral Daily     Calcium-Magnesium-Zinc   1 tablet Oral Daily     ferrous sulfate  325 mg Oral Daily     methylphenidate  5 mg Oral BID     piperacillin-tazobactam  3.375 g Intravenous Q6H     sertraline  50 mg Oral Daily     sodium chloride (PF)  3 mL Intracatheter Q8H       Data   All microbiology laboratory data reviewed.  Recent Labs   Lab Test  04/26/18   0600  04/25/18   0600  04/24/18   0632   WBC  8.4  9.6  12.5*   HGB  8.3*  8.6*  8.6*   HCT  26.1*  26.8*  26.9*   MCV  95  95  95   PLT  204  185  183     Recent Labs   Lab Test  04/26/18   0600  04/25/18   0600  04/24/18   0632   CR  1.59*  1.15*  0.51*     No lab results found.  Recent Labs   Lab Test  04/23/18   1015  04/22/18   1158  04/22/18   1145  04/20/18   2126  04/20/18   2058  04/20/18   1954  12/15/17   1538  12/15/17   1238   CULT  No growth after 3 days  No growth after 4 days  No growth after 4 days  No growth  Cultured on the 2nd day of incubation:  Gram positive cocci in clusters  Referred to anaerobes for identification  *  Critical Value/Significant Value called to and read back by  Amber Scheierl R.N. on SH88 at 7:57am on 04.22.18 JT.    (Note)  NEGATIVE for the following: Staphylococcus spp., Staph aureus, Staph  epidermidis, Staph lugdunensis, Streptococcus spp., Strep pneumoniae,  Strep pyogenes, Strep agalactiae, Strep anginosus group, Enterococcus  faecalis, Enterococcus faecium, and Listeria spp. by [employer]  multiplex nucleic acid test. Final identification and antimicrobial  susceptibility testing will be verified by standard methods.    Critical Value/Significant Value called to and read back by Amber Scheierl RN ([id number]) on 04.22.18 10:48 AM by NESTOR.          No growth  Canceled, Test credited  Duplicate request  SEE [id number]    No growth  No growth

## 2018-04-26 NOTE — PROGRESS NOTES
"[facility]  HEMATOLOGY ONCOLOGY    ONCOLOGY FOLLOWUP NOTE       DIAGNOSIS:    1- Metastatic colon cancer in a patient with history of stage 1A grade 3 endometrial carcinoma approximately around 2012.     On 09/29/2017, Liv Sylvester presented with progressive fatigue and right upper quadrant pain.  CT scan 09/29/2017 showed partially contracted bladder and findings suspicious for liver metastases.  There were multiple defined hypoechoic foci scoped scattered throughout the liver, the largest was in the right lobe.  CT was also showing enlargement of hepatic and retroperitoneal lymph nodes.  On 09/30/2017, she underwent a colonoscopy, which showed a fungating, infiltrative, ulcerated, partially obstructing mass at the hepatic flexure.  Pathology is consistent with moderately differentiated adenocarcinoma with normal mismatch repair protein expression.   2- Significant iron deficiency anemia.   3- VTE- malignancy related    Currently admitted with VTE, Bacteremia, progressive DVT.       TREATMENT:  10/11/2017 FOLFOX and Avastin.   1/24/2018 discontinued Avastin and later was maintained on 5FU Leucovorin       SUBJECTIVE:  The patient was seen in her room. She was feeling better and sitting on a chair. Her oral intake has improved.      REVIEW OF SYSTEMS:  A complete review of systems was performed and found to be negative other than pertinent positives mentioned in history of present illness.       Past medical, social histories reviewed.      Meds- Reviewed.    PHYSICAL EXAMINATION:   VITAL SIGNS:/68  Pulse 105  Temp 97.5  F (36.4  C) (Oral)  Resp 16  Ht 1.778 m (5' 10\")  Wt 93.1 kg (205 lb 3.2 oz)  SpO2 97%  BMI 29.44 kg/m2  CONSTITUTIONAL: Appears tired.   NEUROLOGIC: Alert, awake.   PSYCH: Appears stressed.     LABORATORY DATA AND IMAGING REVIEWED DURING THIS VISIT:  Recent Labs   Lab Test  04/26/18   0600  04/25/18   0600  04/24/18   0632   04/22/18   0656  04/21/18   0655 "   NA  142   --    --    --   141  141   POTASSIUM  3.4  3.6  3.5   --   3.5  3.5   CHLORIDE  109   --    --    --   106  106   CO2  27   --    --    --   28  29   ANIONGAP  6   --    --    --   7  6   BUN  8   --    --    --   5*  7   CR  1.59*  1.15*  0.51*   < >  0.70  0.59   GLC  102*   --    --    --   94  93   JESÚS  9.0   --    --    --   8.6  9.0   MAG   --    --   2.1   --    --   2.0    < > = values in this interval not displayed.     Recent Labs   Lab Test  04/26/18   0600  04/25/18   0600  04/24/18   0632   04/20/18   1748  04/12/18   0830  03/29/18   0825   WBC  8.4  9.6  12.5*   < >  21.8*  8.5  5.6   HGB  8.3*  8.6*  8.6*   < >  9.4*  9.6*  9.9*   PLT  204  185  183   < >  229  209  232   MCV  95  95  95   < >  97  97  96   NEUTROPHIL   --    --    --    --   80.6  82.6  72.3    < > = values in this interval not displayed.     Recent Labs   Lab Test  04/20/18 1748 04/12/18   0830  03/29/18   0825   BILITOTAL  0.2  0.5  0.4   ALKPHOS  255*  179*  155*   ALT  18  26  25   AST  36  86*  57*   ALBUMIN  2.8*  2.9*  2.8*     ASSESSMENT AND RECOMMENDATIONS:    This is a 67-year-old lady with stage IV metastatic colon cancer with normal mismatch repair protein expression.  She has multiple liver metastases and metastasis in the abdominal lymph nodes.   VTE/ recurrent episodes of thrombosis.  Currently admitted for positive blood cultures, progressive thrombosis and also evidence of disease progression.   - Labs were reviewed- elevated creatinine due to acute kidney compromise. Normocytic anemia related to malignancy.   - She should continue heparin until we are able to get IVC filter placed.   - Progressive stage IV colon cancer, we will start second line chemotherapy after her discharge.      SIDRA NAILS MD    4/26/2018

## 2018-04-26 NOTE — PLAN OF CARE
Problem: Patient Care Overview  Goal: Plan of Care/Patient Progress Review  Outcome: No Change  A&Ox4. BP elevated, other VSS. No c/o pain. No c/o N/V.  Regular diet, fair appetite. Port on R chest infusing heparin drip @ 13. Next hep 10A AM 4/27. NS @ 50. R PIV SL, abx. Creat 1.59, continue IV fluids. Independently ambulating, calls appropriately. D/C pending IVC placement. Continue to monitor.

## 2018-04-26 NOTE — PROGRESS NOTES
[facility] [place]    Hospitalist Progress Note    Assessment & Plan   Liv Sylvester is a 67 year old female who was admitted on 4/20/2018.           Liv Sylvester is a 67 year old female who was admitted on 4/20/2018.  Pasty history of metastatic colon cancer on palliative chemo, chronic DVT on anticoagulation with fondaparinux, HTN, and depression/anxiety. She is a poor historian, but was referred to the ED by her primary clinic after reporting 3 day history of intermittent palpitations. On arrival, she also reported vague abdominal pain over the past month, worse over the past week. She has been found to have a progressive proximal LLE DVT despite anticoagulation, progression of colon cancer, and possible infection of unclear source.              Chronic proximal LLE DVT with progression  Has been maintained on fondaparinux after previously failing enoxaparin for DVT. CT chest/abd/pelvis on arrival showed progression of left common femoral DVT, now extending into the left common iliac vein.  - fondaparinux on hold; continue heparin gtt  - IVC filter placement on hold pending follow up of blood cultures          Stage IV metastatic colon cancer  Diagnosed Sept 2017 with known liver and abdominal lymph node metastases on palliative chemotherapy with Dr. Piedra at  Oncology. CT chest/abd/pelvis on arrival showed progression of liver metastases.   - Discussed with Dr. Allen 4/24         ARF  -creatinine today 1.59<1.15<0.51<0.62  -most likely due to vanco, had high level on 4/25  -continue gentle hydrate and recheck in am  - urine eos neg 4/25, UA neg 4/25  -recheck in am      Leukocytosis, possible infection of unclear source  Isolated leukocytosis to 21.8k present on arrival. CT chest/abd/pelvis did not reveal a source of infection. However, given her abdominal pain (which is likely due to cancer progression), immunocompromised state, and leukocytosis, she was empirically initiated on pip-tazo.   Procalcitonin elevated at 0.39.  UA and CXR unremarkable.    - 1/2 admission blood cultures with GPC (identification pending); repeat cultures 4/22 ngtd  - TTE 4/22 shows aortic valve with small non-mobile structure, likely calcification but cannot rule out vegetation  -MARIALUISA 4/24 did not show any vegetations  - currently only on zosyn  - vanco stopped by ID, vanco level high 4/25 at 0040 (27.5}  -if infection controlled, then IVC filter and restart fondaparinux per Dr. Allen  -WBC today 8.4<9.6<12.5<13.0  -BC 4/23, 4/22 x 2 NGTD  -BC 4/20 GPC clusters, neg for staph aureus, staph epi, strep and enterococcus faecalis  -positive BC now referred for anerobe identification   -ID feels most consistent with contamination, but await ID      Chronic iron deficiency anemia  Hgb stable within baseline 9 range      Essential hypertension  [PTA: chlorthalidone prn]  - had  resumed PTA chlorthalidone (and K supplement) 4/23  -due to renal issues will hold chlorthalidone and K today 4/25  -add potassium to today's labs  -4/25-BP have have been high, 150's systolic, started amlodipine 5 mg qd      Peripheral neuropathy due to chemotherapy  Chronic and stable      Major recurrent depressive disorder with anxiety  Maintained on sertraline which has been continued        # Pain Assessment:  No pain    DVT Prophylaxis: heparin  gtt  Code Status: DNR/DNI    Disposition: Expected discharge in next 3-4 days    Text Page (7am - 6pm)  Ivy Bradley MD    Interval History    No new complaints    -Data reviewed today: I reviewed all new labs and imaging results over the last 24 hours. I personally reviewed none    Physical Exam   Temp: 98.1  F (36.7  C) Temp src: Oral BP: 155/71   Heart Rate: 85 Resp: 16 SpO2: 96 % O2 Device: None (Room air)    Vitals:    04/22/18 0645 04/25/18 0723 04/26/18 0528   Weight: 94.6 kg (208 lb 8 oz) 97.8 kg (215 lb 11.2 oz) 93.1 kg (205 lb 3.2 oz)     Vital Signs with Ranges  Temp:  [97  F (36.1  C)-99.2   F (37.3  C)] 98.1  F (36.7  C)  Heart Rate:  [85-97] 85  Resp:  [16] 16  BP: (141-155)/(56-71) 155/71  SpO2:  [96 %-100 %] 96 %  I/O last 3 completed shifts:  In: 1050 [I.V.:1050]  Out: 300 [Urine:300]    Constitutional: alert   Respiratory: clear to auscultation, no wheezing or rhonchi  Cardiovascular: regular rate and rhythm without murmer or rub  GI:positive bowel sounds, non-tender  Skin/Integumen: no rashes  Other:        Medications     HEParin 1,300 Units/hr (04/25/18 7198)     - MEDICATION INSTRUCTIONS -       sodium chloride 50 mL/hr at 04/26/18 0544       amLODIPine  5 mg Oral Daily     Calcium-Magnesium-Zinc  1 tablet Oral Daily     ferrous sulfate  325 mg Oral Daily     methylphenidate  5 mg Oral BID     piperacillin-tazobactam  3.375 g Intravenous Q6H     sertraline  50 mg Oral Daily     sodium chloride (PF)  3 mL Intracatheter Q8H       Data     Recent Labs  Lab 04/26/18  0600 04/25/18  0600 04/24/18  0632  04/22/18  0656 04/22/18  0401 04/21/18  0655 04/20/18  1748   WBC 8.4 9.6 12.5*  < > 14.3*  --  15.9* 21.8*   HGB 8.3* 8.6* 8.6*  < > 8.8*  --  8.6* 9.4*   MCV 95 95 95  < > 96  --  96 97    185 183  < > 192  --  197 229   INR  --   --   --   --   --  1.21*  --   --      --   --   --  141  --  141 141   POTASSIUM 3.4 3.6 3.5  --  3.5  --  3.5 3.5   CHLORIDE 109  --   --   --  106  --  106 106   CO2 27  --   --   --  28  --  29 27   BUN 8  --   --   --  5*  --  7 9   CR 1.59* 1.15* 0.51*  < > 0.70  --  0.59 0.63   ANIONGAP 6  --   --   --  7  --  6 8   JESÚS 9.0  --   --   --  8.6  --  9.0 9.7   *  --   --   --  94  --  93 111*   ALBUMIN  --   --   --   --   --   --   --  2.8*   PROTTOTAL  --   --   --   --   --   --   --  7.4   BILITOTAL  --   --   --   --   --   --   --  0.2   ALKPHOS  --   --   --   --   --   --   --  255*   ALT  --   --   --   --   --   --   --  18   AST  --   --   --   --   --   --   --  36   TROPI  --   --   --   --   --   --   --  <0.015   < > = values in  this interval not displayed.    No results found for this or any previous visit (from the past 24 hour(s)).

## 2018-04-26 NOTE — PLAN OF CARE
Problem: Patient Care Overview  Goal: Plan of Care/Patient Progress Review  Outcome: No Change   A & O x 4, VSS, no c/o pain. Heparin drip @ 13 mL/hr, rechk in AM.  IV zosyn. Baseline numbness and tingling in hands and feet 2nd to chemo. Up ind in room. Plan for IVC filter placed, waiting on cultures.

## 2018-04-26 NOTE — PROGRESS NOTES
"BRIEF NUTRITION ASSESSMENT      REASON FOR ASSESSMENT:  LOS    CURRENT DIET AND INTAKE:  Diet:  Regular              Chart reviewed  Pt currently sleeping - did not disturb  Per review of Room Service orders, pt hasn't ordered a meal the past few days  Note that she has food in her room - chart indicates family has brought food in for pt  Fair appetite noted    Note that pt met with RD on 4/12/18, in the cancer clinic - no nutrition concerns identified  (see note in Chart Review for details)    ANTHROPOMETRICS:  Height: 5'10\"  Weight: (4/26) 93.1 kg  BMI: 29.4 kg/m2  IBW:  68.2  Weight Status: Overweight BMI 25-29.9  %IBW: 136%  Weight History: wt appears relatively stable past few months  Wt Readings from Last 10 Encounters:   04/26/18 93.1 kg (205 lb 3.2 oz)   04/12/18 92.8 kg (204 lb 9.6 oz)   04/12/18 92.8 kg (204 lb 9.6 oz)   03/29/18 94.8 kg (209 lb)   03/29/18 94.8 kg (209 lb)   03/14/18 93 kg (205 lb)   03/07/18 94.5 kg (208 lb 6.4 oz)   02/28/18 94.3 kg (208 lb)   02/23/18 96.5 kg (212 lb 12.8 oz)   02/21/18 95.3 kg (210 lb)         LABS:  Labs noted    MALNUTRITION:  Patient does not meet two of the following criteria necessary for diagnosing malnutrition: significant weight loss, reduced intake, subcutaneous fat loss, muscle loss or fluid retention    NUTRITION INTERVENTION:  Nutrition Diagnosis:  No nutrition diagnosis at this time.    Implementation:  Nutrition Education ---> Per Provider order if indicated  Encourage po intake     FOLLOW UP/MONITORING:   Will re-evaluate in 7 - 10 days, or sooner, if re-consulted.          "

## 2018-04-27 NOTE — PROGRESS NOTES
[facility]    Infectious Disease Progress Note    Date of Service (when I saw the patient): 04/27/2018     Assessment & Plan   Liv Sylvester is a 67 year old female who was admitted on 4/20/2018.     Impression:  1. 67 y.o patient with metastatic colon cancer.   2. On palliative chemo, thru port on the right chest, which does not look infected.   3. Admitted with vague symptoms, found to have leucocytosis.   4. 1/2 blood cultures positive for GPC in clusters pending ID.   5. Currently on zosyn.   6. Progressive DVT.  7. ? Vegetation on the TTE, MARIALUISA negative.      Recommendations:   Follow up on the blood cul, if only 1/2 positive high probability of contamination. But given the TTE which is positive for possible vegetation, a MARIALUISA done negative for vegetation.   GPC organism is an anaerobe, could not be identified any further. Not clear to me is a true infection given only 1/2 positive cultures, MARIALUISA negative. WBC responded to the antibiotics.   Already on 8 days of IV antibiotics, switch to flagyl to finish 10 days total.     Signing off call with ques.          Vilma Mchugh MD    Interval History   t max of 99. 2   TTE raises a possibility of vegetation.     Physical Exam   Temp: 98.7  F (37.1  C) Temp src: Oral BP: 165/81   Heart Rate: 87 Resp: 16 SpO2: 96 % O2 Device: None (Room air)    Vitals:    04/25/18 0723 04/26/18 0528 04/27/18 0544   Weight: 97.8 kg (215 lb 11.2 oz) 93.1 kg (205 lb 3.2 oz) 93.6 kg (206 lb 6.4 oz)     Vital Signs with Ranges  Temp:  [97.5  F (36.4  C)-99.1  F (37.3  C)] 98.7  F (37.1  C)  Heart Rate:  [65-98] 87  Resp:  [16] 16  BP: (120-165)/(55-81) 165/81  SpO2:  [96 %-99 %] 96 %    Constitutional: Awake, alert, cooperative, no apparent distress  Lungs: Clear to auscultation bilaterally, no crackles or wheezing  Cardiovascular: Regular rate and rhythm, normal S1 and S2, and no murmur noted  Abdomen: Normal bowel sounds, soft, non-distended, non-tender  Skin: No rashes, no  cyanosis, no edema  Other:    Medications     HEParin 1,300 Units/hr (04/26/18 2000)     - MEDICATION INSTRUCTIONS -       sodium chloride 50 mL/hr at 04/27/18 0007       amLODIPine  5 mg Oral Daily     Calcium-Magnesium-Zinc  1 tablet Oral Daily     ferrous sulfate  325 mg Oral Daily     methylphenidate  5 mg Oral BID     piperacillin-tazobactam  3.375 g Intravenous Q6H     sertraline  50 mg Oral Daily     sodium chloride (PF)  3 mL Intracatheter Q8H       Data   All microbiology laboratory data reviewed.  Recent Labs   Lab Test  04/26/18   0600  04/25/18   0600  04/24/18   0632   WBC  8.4  9.6  12.5*   HGB  8.3*  8.6*  8.6*   HCT  26.1*  26.8*  26.9*   MCV  95  95  95   PLT  204  185  183     Recent Labs   Lab Test  04/27/18   0740  04/26/18   0600  04/25/18   0600   CR  1.58*  1.59*  1.15*     No lab results found.  Recent Labs   Lab Test  04/23/18   1015  04/22/18   1158  04/22/18   1145  04/20/18   2126  04/20/18   2058  04/20/18   1954  12/15/17   1538  12/15/17   1238   CULT  No growth after 4 days  No growth after 5 days  No growth after 5 days  No growth  Cultured on the 2nd day of incubation:  Anaerobic gram positive cocci  Referred to anaerobes for identification  *  Critical Value/Significant Value called to and read back by  Amber Scheierl R.N. on 88 at 7:57am on 04.22.18 JT.    (Note)  NEGATIVE for the following: Staphylococcus spp., Staph aureus, Staph  epidermidis, Staph lugdunensis, Streptococcus spp., Strep pneumoniae,  Strep pyogenes, Strep agalactiae, Strep anginosus group, Enterococcus  faecalis, Enterococcus faecium, and Listeria spp. by [employer]  multiplex nucleic acid test. Final identification and antimicrobial  susceptibility testing will be verified by standard methods.    Critical Value/Significant Value called to and read back by Amber Scheierl RN ([email or web address]) on 04.22.18 10:48 AM by DW.          No growth  Canceled, Test credited  Duplicate request  SEE [id number]    No growth  No growth

## 2018-04-27 NOTE — PLAN OF CARE
Problem: Patient Care Overview  Goal: Plan of Care/Patient Progress Review  Outcome: Improving   Pt A&O, up independent in room.  VSS.  Calls appropriately. No c/o pain.  LS clear, BS present.  Neuropathy to hands and feet.  Port R chest infusing Heparin 13mL/hr.  Hep 10A redraw tomorrow AM.  IV abx intermittent to L PIV.  Ambulated halls x1 this shift.

## 2018-04-27 NOTE — PLAN OF CARE
Problem: Patient Care Overview  Goal: Plan of Care/Patient Progress Review  Outcome: Improving   PT AOx4 on RA  VSS denies  pain. Baseline numbness/tinging in hands and feet. Pt on Regular diet, voiding up in the room independently  R Port, continuous Heparin drip @ 13ml/hr, hep 10A schedule for am lab , NS@50. L PIV for abx  Plan to D/c pending. Continue to monitor

## 2018-04-27 NOTE — PROGRESS NOTES
[facility]    Hospitalist Progress Note    Assessment & Plan   Liv Sylvester is a 67 year old female who was admitted on 4/20/2018.     67 year old w/ PMH metastatic colon CA on palliative chemo, chronic DVT on anticoag with fondaparinux, HTN, depression/anxiety.  Referred to ER by PMD after 3 days of intermittant palpitations.  On arrival she reported vague abd pain over the last month, worse over the last week.  Found to have progressive prox LLE DVT despite anticoag, progression of colon ca and possible inf of unclear source.    Chronic prox LLE DVT with progression  -maintained on fondaparinux after previously failing lovenox for DVT  -CT chest/abd/pelvis on admit showed progression of Lcommon femoral DVT, now extending into left common iliac vein  -currently on heparin gtt, fonadaparinox on hold  -all week waiting for her to be cleared of infection to place IVC filter  -today, 4/27/18, Dr. Jeison almaguer's placement of IVC filter as 1/2 BC pos for unk org, most likely contaminant.  -IR unable to do IVC filter today, IR plans to do first thing in am  -[facility] onc recommends IVC filter and the resumption of fondaparinox    Addendum: patient had IVC filter placed at 7pm, 4/27/2018    Stage IV metastatic colon ca  -diagnosed  Sept 2017 with known liver and abd LN mets on palliative chemo with Dr. Piedra of [facility]  -CT of chest/abd/pelvis on admit showed progression of ca  -plan to follow-up in onc clinic on discharge to plan next chemo    ARF  -creat 1.58<1.59<1.115<0.51  -worsening renal fn most likely due to vano, as had high level on 4/25  -currently on gentle hydration  -recheck in am  -urine eos neg 4/25, UA neg 4/25    Leukocytosis   -on admit wbc 21.8  -no obvious source of inf  -BC 1/2 probable contaminant per ID  -lab unable to identify, but now has sensitivites, sensitive to flagyl, plan 3 more days  -Had TTE and then a MARIALUISA which did not show vegetations  -zosyn stopped, had total of 8 days of iv  abx, switched to flagyl 4/27 for 3 more days to complete 10 day course    Anemia  -stable, baseline 9 range    HTN  BP were high earlier this admit, started amlodipine  -consider inc dose    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 sleeping: patient not able to self report   Pain score (0-10) -   Pain location -   Pain descriptors -   continue current pain managment    DVT Prophylaxis: heparin gtt  Code Status: DNR/DNI    Disposition: hopefully tomorrow once IVC filter placed    Text Page (7am - 6pm)  Ivy Bradley MD    Interval History   No new problems    -Data reviewed today: I reviewed all new labs and imaging results over the last 24 hours. I personally reviewed none    Physical Exam   Temp: 98.7  F (37.1  C) Temp src: Oral BP: 165/81   Heart Rate: 87 Resp: 16 SpO2: 96 % O2 Device: None (Room air)    Vitals:    04/25/18 0723 04/26/18 0528 04/27/18 0544   Weight: 97.8 kg (215 lb 11.2 oz) 93.1 kg (205 lb 3.2 oz) 93.6 kg (206 lb 6.4 oz)     Vital Signs with Ranges  Temp:  [97.5  F (36.4  C)-99.1  F (37.3  C)] 98.7  F (37.1  C)  Heart Rate:  [65-98] 87  Resp:  [16] 16  BP: (120-165)/(55-81) 165/81  SpO2:  [96 %-99 %] 96 %  I/O last 3 completed shifts:  In: 428 [I.V.:428]  Out: -     Constitutional: alert   Respiratory: clear to auscultation, no wheezing or rhonchi   Cardiovascular: regular rate and rhythm without murmer or rub   GI:positive bowel sounds, non-tender   Skin/Integumen: no rashes   Other:        Medications     HEParin 1,300 Units/hr (04/26/18 2000)     - MEDICATION INSTRUCTIONS -       sodium chloride 50 mL/hr at 04/27/18 0007       amLODIPine  5 mg Oral Daily     Calcium-Magnesium-Zinc  1 tablet Oral Daily     ferrous sulfate  325 mg Oral Daily     methylphenidate  5 mg Oral BID     metroNIDAZOLE  250 mg Oral TID     sertraline  50 mg Oral Daily      sodium chloride (PF)  3 mL Intracatheter Q8H       Data     Recent Labs  Lab 04/27/18  0740 04/26/18  0600 04/25/18  0600 04/24/18  0632  04/22/18  0656 04/22/18  0401  04/20/18  1748   WBC  --  8.4 9.6 12.5*  < > 14.3*  --   < > 21.8*   HGB  --  8.3* 8.6* 8.6*  < > 8.8*  --   < > 9.4*   MCV  --  95 95 95  < > 96  --   < > 97   PLT  --  204 185 183  < > 192  --   < > 229   INR  --   --   --   --   --   --  1.21*  --   --     142  --   --   --  141  --   < > 141   POTASSIUM 3.4 3.4 3.6 3.5  --  3.5  --   < > 3.5   CHLORIDE 111* 109  --   --   --  106  --   < > 106   CO2 23 27  --   --   --  28  --   < > 27   BUN 8 8  --   --   --  5*  --   < > 9   CR 1.58* 1.59* 1.15* 0.51*  < > 0.70  --   < > 0.63   ANIONGAP 8 6  --   --   --  7  --   < > 8   JESÚS 9.1 9.0  --   --   --  8.6  --   < > 9.7   * 102*  --   --   --  94  --   < > 111*   ALBUMIN  --   --   --   --   --   --   --   --  2.8*   PROTTOTAL  --   --   --   --   --   --   --   --  7.4   BILITOTAL  --   --   --   --   --   --   --   --  0.2   ALKPHOS  --   --   --   --   --   --   --   --  255*   ALT  --   --   --   --   --   --   --   --  18   AST  --   --   --   --   --   --   --   --  36   TROPI  --   --   --   --   --   --   --   --  <0.015   < > = values in this interval not displayed.    No results found for this or any previous visit (from the past 24 hour(s)).

## 2018-04-27 NOTE — PROGRESS NOTES
"Oncology/Hematology progress note  April 27, 2018    Diagnosis  Metastatic colon cancer  Fatigue  VTE-malignancy related  Iron deficiency anemia      Interval History:  Patient is resting comfortably in bed she denies shortness of breath denies pain denies nausea vomiting diarrhea she denies bleeding    Review of Systems:  14 point ROS of systems including Constitutional, Eyes, Respiratory, Cardiovascular, Gastroenterology, Genitourinary, Integumentary, Muscularskeletal, Psychiatric were all negative except for pertinent positives noted in my HPI.      No current outpatient prescriptions on file.       Physical Examination:  General: The patient is a pleasant female in no acute distress.  /81 (BP Location: Left arm)  Pulse 105  Temp 98.7  F (37.1  C) (Oral)  Resp 16  Ht 1.778 m (5' 10\")  Wt 93.6 kg (206 lb 6.4 oz)  SpO2 96%  BMI 29.62 kg/m2  HEENT: EOMI, PERRL. Sclerae are anicteric. Oral mucosa is pink and moist with no lesions or thrush.   Lymph: Neck is supple with no lymphadenopathy in the cervical or supraclavicular areas.   Heart: Regular rate and rhythm.   Lungs: Clear to auscultation bilaterally.   GI: Bowel sounds present, soft, nontender with no palpable hepatosplenomegaly or masses.   Extremities: No lower extremity edema noted bilaterally.   Skin: No rashes, petechiae, or bruising noted on exposed skin.    Laboratory Data:    Results for JESSICA MOYA (MRN [id number]) as of 4/27/2018 11:07   Ref. Range 4/27/2018 07:40   Sodium Latest Ref Range: 133 - 144 mmol/L 142   Potassium Latest Ref Range: 3.4 - 5.3 mmol/L 3.4   Chloride Latest Ref Range: 94 - 109 mmol/L 111 (H)   Carbon Dioxide Latest Ref Range: 20 - 32 mmol/L 23   Urea Nitrogen Latest Ref Range: 7 - 30 mg/dL 8   Creatinine Latest Ref Range: 0.52 - 1.04 mg/dL 1.58 (H)   GFR Estimate Latest Ref Range: >60 mL/min/1.7m2 32 (L)   GFR Estimate If Black Latest Ref Range: >60 mL/min/1.7m2 39 (L)   Calcium Latest Ref Range: 8.5 - 10.1 mg/dL " 9.1   Anion Gap Latest Ref Range: 3 - 14 mmol/L 8   Glucose Latest Ref Range: 70 - 99 mg/dL 124 (H)   Heparin 10A Level Latest Units: IU/mL 0.44       Assessment and Plan:    This is a 67-year-old female with    Patient admitted for positive blood cultures thrombosis and disease progression    Metastatic colon cancer  With multiple liver and abdominal lymph nodes metastasis  She has progressive colon cancer she  was treated with with FOLFOX and Avastin and then later maintained on 5-FU leucovorin  Plan for second line chemotherapy after discharge  She will see Dr. Piedra after discharge    VTE-recurrent episodes  Continue heparin drip until IVC filter is placed     blood cultures per infectious disease highly probability of contamination  -Continue with recommendations per infectious disease    TITO Briones CNP  Hem/Onc   [facility]

## 2018-04-27 NOTE — PROGRESS NOTES
Interventional Radiology Pre-Procedure Sedation Assessment   Time of Assessment: 4:16 PM    Expected Level: Moderate Sedation    Indication: Sedation is required for the following type of Procedure: Venogram, temporary vena cava filter placement    Sedation and procedural consent: Risks, benefits and alternatives were discussed with Patient, Dr Melara    PO Intake: Appropriately NPO for procedure    ASA Class: Class 2 - MILD SYSTEMIC DISEASE, NO ACUTE PROBLEMS, NO FUNCTIONAL LIMITATIONS.    Mallampati: Grade 2:  Soft palate, base of uvula, tonsillar pillars, and portion of posterior pharyngeal wall visible    Lungs: Lungs Clear with good breath sounds bilaterally    Heart: Normal heart sounds with tachycardia    History and physical reviewed and no updates needed. I have reviewed the lab findings, diagnostic data, medications, and the plan for sedation. I have determined this patient to be an appropriate candidate for the planned sedation and procedure and have reassessed the patient IMMEDIATELY PRIOR to sedation and procedure.    Gabrielle Negron, TITO CNP

## 2018-04-28 NOTE — PLAN OF CARE
Problem: Patient Care Overview  Goal: Plan of Care/Patient Progress Review  Outcome: No Change  A&O, calls appropriately. Up independently. Denies pain. Loose stool sent to lab to r/o C Diff on eves, came back neg. IVC filter placed last night, tolerated well, dressing is CDI. Port infusing, hep gtt running at 13mL/hr, hep 10A recheck this AM. Plan for discharge home today, continue to monitor.

## 2018-04-28 NOTE — PROGRESS NOTES
SWS PROGRESS NOTE:     Pt is planning to DC home today and has been followed by [facility]. Writer sent notification to both [facility] to inform them of pt's DC home today. Bed side nurse has been updated as well.     DEANNE Griffith, [facility] [phone]

## 2018-04-28 NOTE — PROGRESS NOTES
Discharge instructions reviewed with pt.  IV in left arm removed.  Port heplocked and left in place since pt receives IV fluids every other day at home.  Pt dressed and left unit at 1130 via wheelchair to awaiting private transportation.  Denied pain at time of discharge.  Message left on pt cell phone at 1348 to take the anticoagulant, fondaparinux now.

## 2018-04-28 NOTE — IR NOTE
Interventional Radiology Intra-procedural Nursing Note    Patient Name: Liv Sylvester  Medical Record Number: [id number]  Today's Date: April 27, 2018    Start Time: 1842  End of procedure time: 1904  Procedure: IVC filter placement  Report given to: JACKIE Salcido on [address]  Time pt departs:  1918      Other Notes: right neck CDI and soft. 2x2 gauze and tegaderm. VSS. Pt does not complain of pain at this time. Pt remains on 2 L NC. Heparin stopped and dr padilla putting in orders to hold for 1 hr. To restart at 2015.    DENIZ ROSALES

## 2018-04-28 NOTE — DISCHARGE SUMMARY
[facility]    Discharge Summary  Hospitalist    Date of Admission:  4/20/2018  Date of Discharge:  4/28/2018  Discharging Provider: Chito Aiken MD    Discharge Diagnoses   Chronic LLE DVT with progression, s/p IVC filter  Gram positive bacteremia, likely contaminant  Sepsis, unclear source    History of Present Illness   Liv Sylvester is a 67 year old femalew/ PMH metastatic colon CA on palliative chemo, chronic DVT on anticoag with fondaparinux, HTN, depression/anxiety who was admitted on 4/20/2018, FTH progressive prox LLE DVT despite anticoag, progression of colon cancer. For this she had an IVC filter placed on 4/27. Her blood cultures grew 1/2 bottles gram positive bacteria which was unidentified, and she will complete a empiric course of antibiotics.     Chronic prox LLE DVT with progression  S/p IVC filter  Prior to admission on fondaparinux after previously failing lovenox for DVT. On admission, CT chest/abd/pelvis on admit showed progression of L common femoral DVT, now extending into left common iliac vein. Her IVC filter was delayed due to possible infection, but placed on 4/27/2018.   - Hem Onc consulted, and recommends resuming fondaparinux at discharge     Acute renal failure, suspect drug reaction, possibly acute interstitial nephritis: Baseline Cr 0.51, developed ARF this admission which was suspected to be due to Vanco as she had a high vanco level on 4/25. Creatinine improving to 1.43 at discharge. Urine eos neg 4/25, UA neg 4/25   - Recheck BMP in 3 days, f/u with PCP or HemOnc     Gram positive bacteremia  Sepsis unclear source  WBC 21.8k on admission. Blood cultures 1/2 bottles growing gram positive cocci in clusters, probably contamination per infectious disease. Lab unable to identify, but now has sensitivites, sensitive to flagyl. Had TTE and then a MARIALUISA which did not show vegetations. Zosyn stopped, had total of 8 days of iv abx, switched to flagyl 4/27 for 3 more  days  - Continue flagyl to complete 10 day course of abx ending 4/30.     Anemia: Stable, baseline 9 range. Ferritin 2377  - Iron started at discharge     HTN: BP elevated this admission. Amlodipine briefly trialed, but will be held at discharge for outpt management.  - F/u with blood pressure at PCP/hem onc     Stage IV metastatic colon cancer: Diagnosed  Sept 2017 with known liver and abd LN mets on palliative chemo with Dr. Piedra of  onc. CT of chest/abd/pelvis on admit showed progression of ca  - F/u with hemOnc for further chemo    Hospital Course   Liv Sylvester was admitted on 4/20/2018.  The following problems were addressed during her hospitalization:    Active Problems:    DVT (deep vein thrombosis) in pregnancy (H)      Chito Aiken MD    Significant Results and Procedures   IVC filter 4/27    Pending Results   These results will be followed up by PCP or HemOnc  Unresulted Labs Ordered in the Past 30 Days of this Admission     Date and Time Order Name Status Description    4/23/2018 0902 Blood culture Preliminary     4/20/2018 2054 Blood culture Preliminary           Code Status   DNR / DNI       Primary Care Physician   RIGOBERTO SEN    # Discharge Pain Plan:   - Patient currently has NO PAIN and is not being prescribed pain medications on discharge.      Physical Exam   Temp: 98.6  F (37  C) Temp src: Oral BP: 146/71   Heart Rate: 89 Resp: 16 SpO2: 98 % O2 Device: None (Room air)    Vitals:    04/25/18 0723 04/26/18 0528 04/27/18 0544   Weight: 97.8 kg (215 lb 11.2 oz) 93.1 kg (205 lb 3.2 oz) 93.6 kg (206 lb 6.4 oz)     Vital Signs with Ranges  Temp:  [97.3  F (36.3  C)-98.6  F (37  C)] 98.6  F (37  C)  Heart Rate:  [] 89  Resp:  [15-23] 16  BP: (121-179)/() 146/71  SpO2:  [95 %-100 %] 98 %       Constitutional: Female in NAD  Eyes: PERRL, nonicteric, normal ocular movements  HEENT: Normocephalic, atraumatic, oral mucosa moist  Respiratory: CTAB, no wheezing or  crackles  Cardiovascular: RRR, normal S1/2, no m/r/g, port noted  GI: No organomegaly, normoactive bowel sounds, nontender, nondistended  Skin: No rashes or scars  Musculoskeletal: Normal strength in UE and LE, moves all extremities  Neurologic: A&Ox3  Psychiatric: Appropriate affect and mood    Discharge Disposition   Discharged to home  Condition at discharge: Stable    Consultations This Hospital Stay   HEMATOLOGY & ONCOLOGY IP CONSULT  PHARMACY TO DOSE HEPARIN  INFECTIOUS DISEASES IP CONSULT  PHARMACY TO DOSE VANCO  INFECTIOUS DISEASES IP CONSULT  PHARMACY TO DOSE VANCO  PHARMACY TO DOSE VANCO  PHARMACY TO DOSE VANCO  PHARMACY IP CONSULT    Time Spent on this Encounter   I, Chito Aiken, personally saw the patient today and spent greater than 30 minutes discharging this patient.    Discharge Orders     Basic metabolic panel     Reason for your hospital stay   Admitted for elevated white cell count and some abdominal discomfort.  Discovered to have progression of deep venous thrombosis and progression of know colon cancer.  Hospitalization was prolonged due to one of two blood cultures with bacteria which eventually was thought by infectious disease to be a contaminant.  Hematology/oncology recommended a filter in the inferior vena cava due to blood clot in leg, which was placed 4/27/18.     Follow-up and recommended labs and tests    Follow-up with  at [facility] in the next available appointment for resumption of second line chemotherapy.     Follow-up and recommended labs and tests    Follow up with your hematology-oncology clinic in 1-2 weeks     Activity   Your activity upon discharge: activity as tolerated     DNR/DNI     Diet   Follow this diet upon discharge: Orders Placed This Encounter     Advance Diet as Tolerated: Regular Diet Adult; Regular Diet Adult       Discharge Medications   Current Discharge Medication List      START taking these medications    Details   ferrous sulfate  (IRON) 325 (65 Fe) MG tablet Take 1 tablet (325 mg) by mouth daily  Qty: 100 tablet, Refills: 1    Comments: Future refills by PCP Dr. RIGOBERTO HOWARD with phone number [phone].  Associated Diagnoses: Iron deficiency anemia due to chronic blood loss      metroNIDAZOLE (FLAGYL) 250 MG tablet Take 1 tablet (250 mg) by mouth every 8 hours for 2 days  Qty: 6 tablet, Refills: 0    Associated Diagnoses: Leukocytosis, unspecified type         CONTINUE these medications which have NOT CHANGED    Details   ACETAMINOPHEN PO Take 500 mg by mouth every 4 hours as needed for pain       CALCIUM-MAGNESIUM-ZINC PO Take 1 tablet by mouth daily      chlorthalidone (HYGROTON) 25 MG tablet Take 1 tablet (25 mg) by mouth daily as needed  Qty: 30 tablet, Refills: 0    Comments: Any further refills of this med need to go through Pt's PCP Dr.Dana Howard.  Associated Diagnoses: HTN (hypertension)      fondaparinux (ARIXTRA) 7.5MG/0.6ML injection Inject 0.6 mLs (7.5 mg) Subcutaneous every 24 hours  Qty: 90 Syringe, Refills: 0    Associated Diagnoses: PE (pulmonary thromboembolism) (H)      lidocaine (XYLOCAINE) 5 % ointment Apply 0.5 g topically as needed for moderate pain Apply pea size amount to rectal area for pain  Qty: 5 g, Refills: 0      methylphenidate (RITALIN) 5 MG tablet Take 1 tablet (5 mg) by mouth 2 times daily  Qty: 60 tablet, Refills: 0      Multiple Vitamins-Minerals (CENTRUM SILVER) per tablet Take 1 tablet by mouth daily.  Qty: 100 tablet, Refills: 12      oxyCODONE IR (ROXICODONE) 5 MG tablet Take 1-2 tablets (5-10 mg) by mouth every 4 hours as needed for pain maximum 12 tablet(s) per day  Qty: 50 tablet, Refills: 0    Associated Diagnoses: Malignant neoplasm of colon, unspecified part of colon (H)      Potassium Chloride ER 20 MEQ TBCR Take 1 tablet (20 mEq) by mouth daily  Qty: 30 tablet, Refills: 0    Comments: Routine refills of this should go through PCP Dr.Dana Howard.  Associated Diagnoses: Hypokalemia       sennosides (SENOKOT) 8.6 MG tablet Take 1 tablet by mouth 2 times daily as needed for constipation      sertraline (ZOLOFT) 50 MG tablet TAKE 1 TABLET BY MOUTH EVERY DAY  Qty: 30 tablet, Refills: 0    Associated Diagnoses: Malignant neoplasm of hepatic flexure (H)      VITAMIN D, CHOLECALCIFEROL, PO Take 5,000 Units by mouth daily (OTC: Pt unsure of strength)       albuterol (PROAIR HFA/PROVENTIL HFA/VENTOLIN HFA) 108 (90 BASE) MCG/ACT Inhaler Inhale 2 puffs into the lungs every 6 hours as needed for shortness of breath / dyspnea or wheezing  Qty: 1 Inhaler, Refills: 3    Associated Diagnoses: Reactive airway disease without complication, unspecified asthma severity, unspecified whether persistent      lidocaine-prilocaine (EMLA) cream Apply topically as needed for moderate pain Apply dollop size amount to port site 30-60 minutes prior to accessing.  Qty: 30 g, Refills: 1    Associated Diagnoses: Malignant neoplasm of hepatic flexure (H); Port catheter in place      LORazepam (ATIVAN) 0.5 MG tablet Take 1 tablet (0.5 mg) by mouth every 4 hours as needed (Anxiety, Nausea/Vomiting or Sleep)  Qty: 30 tablet, Refills: 2    Associated Diagnoses: Malignant neoplasm of hepatic flexure (H)      ondansetron (ZOFRAN) 8 MG tablet Take 1 tablet (8 mg) by mouth every 8 hours as needed (Nausea/Vomiting)  Qty: 10 tablet, Refills: 2    Associated Diagnoses: Malignant neoplasm of hepatic flexure (H)      order for DME Equipment being ordered: Wheelchair  Qty: 1 Device, Refills: 0    Associated Diagnoses: Malignant neoplasm of colon, unspecified part of colon (H); VTE (venous thromboembolism); PE (pulmonary thromboembolism) (H); Pain of left lower leg      prochlorperazine (COMPAZINE) 10 MG tablet Take 1 tablet (10 mg) by mouth every 6 hours as needed (Nausea/Vomiting)  Qty: 30 tablet, Refills: 2    Associated Diagnoses: Malignant neoplasm of hepatic flexure (H)           Allergies   No Known Allergies  Data   Most Recent 3  CBC's:  Recent Labs   Lab Test  04/26/18   0600  04/25/18   0600  04/24/18   0632   WBC  8.4  9.6  12.5*   HGB  8.3*  8.6*  8.6*   MCV  95  95  95   PLT  204  185  183      Most Recent 3 BMP's:  Recent Labs   Lab Test  04/28/18   0555  04/27/18   0740  04/26/18   0600   NA  142  142  142   POTASSIUM  3.2*  3.4  3.4   CHLORIDE  110*  111*  109   CO2  26  23  27   BUN  8  8  8   CR  1.43*  1.58*  1.59*   ANIONGAP  6  8  6   JESÚS  8.9  9.1  9.0   GLC  94  124*  102*     Most Recent 2 LFT's:  Recent Labs   Lab Test  04/20/18   1748  04/12/18   0830   AST  36  86*   ALT  18  26   ALKPHOS  255*  179*   BILITOTAL  0.2  0.5     Most Recent INR's and Anticoagulation Dosing History:  Anticoagulation Dose History     Recent Dosing and Labs Latest Ref Rng & Units 9/29/2017 12/15/2017 1/5/2018 1/15/2018 4/22/2018    INR 0.86 - 1.14 1.05 1.12 1.21(H) 1.13 1.21(H)        Most Recent 3 Troponin's:  Recent Labs   Lab Test  04/20/18   1748  10/20/17   1320  06/14/10   1207   TROPI  <0.015  <0.015  <0.012     Most Recent Cholesterol Panel:  Recent Labs   Lab Test  05/13/13   0837   CHOL  162   LDL  115   HDL  28*   TRIG  95     Most Recent 6 Bacteria Isolates From Any Culture (See EPIC Reports for Culture Details):  Recent Labs   Lab Test  04/23/18   1015  04/22/18   1158  04/22/18   1145  04/20/18   2126  04/20/18   2058  04/20/18 1954   CULT  No growth after 5 days  No growth  No growth  No growth  Cultured on the 2nd day of incubation:  Anaerobic gram positive cocci  Referred to anaerobes for identification  *  Critical Value/Significant Value called to and read back by  Amber Scheierl R.N. on SH88 at 7:57am on 04.22.18 JT.    (Note)  NEGATIVE for the following: Staphylococcus spp., Staph aureus, Staph  epidermidis, Staph lugdunensis, Streptococcus spp., Strep pneumoniae,  Strep pyogenes, Strep agalactiae, Strep anginosus group, Enterococcus  faecalis, Enterococcus faecium, and Listeria spp. by [employer] nucleic acid  test. Final identification and antimicrobial  susceptibility testing will be verified by standard methods.    Critical Value/Significant Value called to and read back by Amber Scheierl RN ([id number]) on 04.22.18 10:48 AM by NESTOR.          No growth  Canceled, Test credited  Duplicate request  SEE [id number]       Most Recent TSH, T4 and A1c Labs:  Recent Labs   Lab Test  05/13/13   0837   TSH  0.87     Results for orders placed or performed during the hospital encounter of 04/20/18   XR Chest 2 Views    Narrative    CHEST TWO VIEWS   4/20/2018 6:13 PM     HISTORY: Chest pain.     COMPARISON: 12/15/2017.    FINDINGS: Port-A-Cath tip in the SVC. Heart size normal. Lungs clear.  Hypertrophic changes thoracic spine. No interval change.      Impression    IMPRESSION: Nothing acute and no interval change.    CARA BROWN MD   CT Chest/Abdomen/Pelvis w Contrast    Narrative    CT CHEST/ABDOMEN/PELVIS WITH CONTRAST 4/20/2018 7:39 PM     HISTORY: Epigastric pain and high WBC, known pulmonary embolism and  liver mets, rule out new pulmonary embolism.      COMPARISON: CT chest, abdomen, pelvis 2/19/2018.    TECHNIQUE: Thin section axial images are performed from the thoracic  inlet to the lung bases utilizing 103 mL of Isovue 370 IV contrast  without adverse event. Coronal reformatted images are also generated.  Axial images from the lung bases to the symphysis are performed with  additional coronal reformatted images. Radiation dose for this scan  was reduced using automated exposure control, adjustment of the mA  and/or kV according to patient size, or iterative reconstruction  technique.    FINDINGS:     Chest probable right major fissure lymph node is again noted on series  5, image 51. Bibasilar atelectasis is noted. No new or enlarging lung  lesions. No infiltrate or consolidation. No pleural or pericardial  fluid. Heart is normal in size. Esophagus is unremarkable. Thyroid  gland appears normal. No enlarged axillary or  intrathoracic lymph  nodes. Patient has an incidental aberrant right subclavian artery.  This is a normal anatomic variant. Right chest port appears in good  position with catheter tip in the right atrium. Thoracic aorta is  otherwise within normal limits with scattered calcified plaque.  Probable chronic changes of prior pulmonary embolism are noted in a  right lower lobe pulmonary artery such as on series 4, image 63. This  is unchanged compared to prior study. No new evidence of pulmonary  embolism.    Abdomen: Multiple metastatic liver lesions are present, increased in  size since prior exam suggesting interval progression of patient's  disease. The spleen, gallbladder, pancreas, adrenal glands and kidneys  are unremarkable. Right renal cysts are stable. No hydronephrosis or  obvious renal calculi. Aorta demonstrates scattered calcified plaque  without aneurysm or dissection. No enlarged retroperitoneal lymph  nodes. A few prominent erwin hepatis nodes are noted but these appear  stable. The bowel is normal in caliber without obstruction,  diverticulitis or appendicitis. No ascites or peritoneal nodularity.    Pelvis: The bladder is decompressed but otherwise unremarkable. The  rectum is within normal limits. The uterus is not identified. No  adnexal mass. Persistent filling defect left common femoral vein is  noted. This extends up into the external iliac artery and has  progressed into the mid to distal common iliac artery. No IVC thrombus  is evident. Bone window examination demonstrates degenerative spine  changes.      Impression    IMPRESSION:  1. No evidence of acute pulmonary embolism. Lungs are clear. No new  infiltrate, consolidation or mass. Right major fissure lymph node is  stable.  2. Progression of patient's metastatic liver disease since prior CT.  3. Progression of patient's suspected left common femoral vein  thrombus extending now into the left common iliac vein. Follow-up  "with  anticoagulation or IVC filter as needed for further treatment.  4. No bowel obstruction, diverticulitis or appendicitis.    SHANEKA PERES MD   IR IVC Filter Placement    Narrative    PROCEDURE(S):  1. Inferior venacavogram  2. Infrarenal placement of a Ekaterina temporary IVC filter via the right  internal jugular vein     DATE OF PROCEDURE: 4/27/2018    MEDICATIONS: 7 mL 1% Lidocaine SQ, Versed 2 mg IV, Fentanyl 100 mcg IV    CONTRAST: 30 mL Isovue 300 IV    FLUOROSCOPY TIME: 1.3 minutes, (25 mGy).    COMPLICATIONS: None    CLINICAL HISTORY/INDICATION: 67-year-old female with extensive deep  vein thrombosis.    PROCEDURES AND FINDINGS:  Following a discussion of the risks, benefits, indications, and  alternatives to treatment, informed consent was obtained. The patient  was brought to the interventional radiology suite and placed supine on  the table. The right lower neck and chest was prepped and draped in a  routine sterile fashion. A timeout was performed per universal  protocol policy to ensure correct patient, site, and procedure to be  performed.      Local anesthesia was obtained with 1% Lidocaine. Ultrasound was used  to evaluate and localize a patent right internal jugular vein and an  ultrasound image was archived. Using direct ultrasound guidance, a 21  G micropuncture needle was utilized to access the right internal  jugular vein and a 0.018 wire was advanced into the IVC. Exchange was  then made for a  micropuncture sheath, through which a 0.035\" Presley  wire was advanced into the inferior vena cava. Exchange was then made  for a  5 French sheath and a flush catheter was advanced into the  inferior vena cava to the iliac confluence. An inferior venacavogram  was performed which demonstrates conventional caval anatomy without  duplication of the IVC or the renal veins. The inferior vena cava is  normal in caliber and no thrombus or web is identified.    The flush catheter was removed over a wire, and a " filter delivery  sheath was advanced below the renal veins. The filter was advanced to  the end of the sheath and the sheath was retracted deploying the IVC  filter infrarenally.        The sheath was removed and manual compression was held at venotomy  until hemostasis was achieved.     Throughout the procedure, the patient was monitored by a radiology  nurse for cardiac rhythm and oxygen saturation which remained stable.  Total sedation time was 23 min. The patient tolerated the procedure  well and left interventional radiology in stable condition.      Impression    IMPRESSION:  1. Successful placement of a Ekaterina temporary infrarenal inferior vena  cava filter, as detailed above.  2. Conventional caval anatomy.        SAUL FU DO

## 2018-04-28 NOTE — PLAN OF CARE
Problem: VTE, DVT and PE (Adult)  Goal: Signs and Symptoms of Listed Potential Problems Will be Absent, Minimized or Managed (VTE, DVT and PE)  Signs and symptoms of listed potential problems will be absent, minimized or managed by discharge/transition of care (reference VTE, DVT and PE (Adult) CPG).   Outcome: Improving  Oriented x4, very pleasant.  VSS but hypertensive.  Baseline neuropathy in hands and feet.  IV heparin infusing at 13 ml/hr.  IV fluids.  IV zosyn stopped and po flagyl started today.  Port in right chest reaxcessed.  Denied pain.  Ambulated to bathroom independently.  Loose, green BM sent to lab to rule out c. Diff.  Fair appetite.  NPO this afternoon for IVC filter placement.  Family at bedside.  Pt hopeful to discharge to home tomorrow.  Nursing will continue to monitor.

## 2018-05-01 NOTE — PROGRESS NOTES
This is a recent snapshot of the patient's [facility] medical record.  For current drug dose and complete information and questions, call [phone] or In [facility] pool, [facility]v home infusion ([id number])  CSN Number:  [id number]

## 2018-05-01 NOTE — TELEPHONE ENCOUNTER
Hospital discharge follow up call:  Admission 4/20- Discharge 4/28/18.  Attempted to call number listed, no answer, mailbox is full.  Will attempt again tomorrow.

## 2018-05-02 NOTE — TELEPHONE ENCOUNTER
Sabrina() [phone] called today after her visit with Liv today.  She stated she spoke in depth with the patient about her current situation, her safety at home, the reality of her diagnosis.  Hospice was brought up and Liv seemed open to hearing more about it, She mentioned being open to a nursing home.  Sabrina states they are aware of her appointment tomorrow.  Will call and update Sabrina after visit and place hospice order after she's seen by Dr. Piedra.  Sabrina- [phone]

## 2018-05-02 NOTE — TELEPHONE ENCOUNTER
Writer spoke with Jyoti/Noemy PT-ot ([phone]) who just left Liv's residence. (phone call 5/1/18 at 345pm)  She mentions concerns about 1 mentioned fall since Liv's been home.  Profound weakness and notes a cognitive decline.  She mentioned that her antibiotic bottle was full. A daughter Joyce who is there to be her caretaker has left her alone, this is when she fell.    Concerns were mentioned to Chris, added on for an appointment Thursday 5/3/18.  Called Liv's phone and spoke with a granddaughter Tariq regarding the appointment who stated Liv was sleeping.

## 2018-05-03 NOTE — TELEPHONE ENCOUNTER
Spoke with the grand daughter who states she can get her grandmother here for an appointment today with the help of her mother who is at the apartment now.  Will add her on to Chris's schedule.    Liv proceeded to call back and state that she feels like today will not work because her feet hurt and agreed to an appointment tomorrow at 130pm with Chris.    Messaged Charissa at  to add her on.    Notified Sabrina, Home care  that she has an appointment tomorrow, Sabrina will plan on being out there at 1230pm tomorrow to help them.

## 2018-05-03 NOTE — TELEPHONE ENCOUNTER
Liv's grand daughter called the  this morning stating that Liv is weak and they feel like they can not safely get her down the stairs into the car.  Attempted to call Liv's number with no answer. Message left.    Spoke with Dr. Piedra who would like a welfare check on her.  Called [place] [employer].  Called emergency contact Tariq/Grand daughter as update.    0845am: [place] police returned call- Liv checked out ok, declined any additional help.

## 2018-05-04 NOTE — MR AVS SNAPSHOT
After Visit Summary   5/4/2018    Liv Sylvester    MRN: [id number]           Patient Information     Date Of Birth          1950        Visit Information        Provider Department      5/4/2018 1:30 PM Chris Llamas APRN CNP [facility]        Today's Diagnoses     Colon Cancer, with Liver Met (Stage IV)    -  1      Care Instructions         - pt refuses labs today   -  to see pt today  Stephanie contacted/wilfred  -Keep appointment  with Dr Piedra next week Already scheduled/wilfred          Follow-ups after your visit        Your next 10 appointments already scheduled     May 10, 2018  8:30 AM CDT   Level 6 with  INFUSION CHAIR 11   [facility] ([facility])    [facility]  [address]  [facility] [address] [phone]            May 10, 2018  9:00 AM CDT   Return Visit with Radha Piedra MD   [facility] ([facility])    [facility]  [address]  [facility] [address] [phone]              Who to contact     If you have questions or need follow up information about today's clinic visit or your schedule please contact [facility] directly at [phone].  Normal or non-critical lab and imaging results will be communicated to you by [employer], letter or phone within 4 business days after the clinic has received the results. If you do not hear from us within 7 days, please contact the clinic through MyChart or phone. If you have a critical or abnormal lab result, we will notify you by phone as soon as possible.  Submit refill requests through [employer] or call your pharmacy and they will forward the refill request to us. Please allow 3 business days for your refill to be completed.          Additional Information About Your Visit        [employer] Information     [employer] lets you send messages to your doctor, view your test  "results, renew your prescriptions, schedule appointments and more. To sign up, go to [email or web address] . Click on \"Log in\" on the left side of the screen, which will take you to the Welcome page. Then click on \"Sign up Now\" on the right side of the page.     You will be asked to enter the access code listed below, as well as some personal information. Please follow the directions to create your username and password.     Your access code is: [email or web address]  Expires: 2018 10:35 AM     Your access code will  in 90 days. If you need help or a new code, please call your [place] clinic or [phone].        Care EveryWhere ID     This is your [facility] EveryWhere ID. This could be used by other organizations to access your [place] medical records  [id number]        Your Vitals Were     Pulse Temperature Respirations Pulse Oximetry BMI (Body Mass Index)       110 99  F (37.2  C) (Oral) 16 98% 29.13 kg/m2        Blood Pressure from Last 3 Encounters:   18 123/64   18 146/71   18 145/68    Weight from Last 3 Encounters:   18 92.1 kg (203 lb)   18 93.6 kg (206 lb 6.4 oz)   18 92.8 kg (204 lb 9.6 oz)                 Today's Medication Changes          These changes are accurate as of 18 11:59 PM.  If you have any questions, ask your nurse or doctor.               These medicines have changed or have updated prescriptions.        Dose/Directions    chlorthalidone 25 MG tablet   Commonly known as:  HYGROTON   This may have changed:  when to take this   Used for:  HTN (hypertension)        Dose:  25 mg   Take 1 tablet (25 mg) by mouth daily as needed   Quantity:  30 tablet   Refills:  0                Primary Care Provider Office Phone # Fax #    Amaya Bethea Anita [phone]       [facility] [address] [place] [address]        Equal Access to Services     HAYDEE IVERSON AH: wiley Carreon, john greenberg " odalis benavidezslime vogel'aan ah. So [facility] [phone].    ATENCIÓN: Si marleny zhong, tiene a schmitt disposición servicios gratuitos de asistencia lingüística. Wiley al [phone].    We comply with applicable federal civil rights laws and [place] laws. We do not discriminate on the basis of race, color, national origin, age, disability, sex, sexual orientation, or gender identity.            Thank you!     Thank you for choosing [facility]  for your care. Our goal is always to provide you with excellent care. Hearing back from our patients is one way we can continue to improve our services. Please take a few minutes to complete the written survey that you may receive in the mail after your visit with us. Thank you!             Your Updated Medication List - Protect others around you: Learn how to safely use, store and throw away your medicines at www.disposemymeds.org.          This list is accurate as of 5/4/18 11:59 PM.  Always use your most recent med list.                   Brand Name Dispense Instructions for use Diagnosis    ACETAMINOPHEN PO      Take 500 mg by mouth every 4 hours as needed for pain        albuterol 108 (90 Base) MCG/ACT Inhaler    PROAIR HFA/PROVENTIL HFA/VENTOLIN HFA    1 Inhaler    Inhale 2 puffs into the lungs every 6 hours as needed for shortness of breath / dyspnea or wheezing    Reactive airway disease without complication, unspecified asthma severity, unspecified whether persistent       CALCIUM-MAGNESIUM-ZINC PO      Take 1 tablet by mouth daily        CENTRUM SILVER per tablet     100 tablet    Take 1 tablet by mouth daily.        chlorthalidone 25 MG tablet    HYGROTON    30 tablet    Take 1 tablet (25 mg) by mouth daily as needed    HTN (hypertension)       ferrous sulfate 325 (65 Fe) MG tablet    IRON    100 tablet    Take 1 tablet (325 mg) by mouth daily    Iron deficiency anemia due to chronic blood loss       fondaparinux 7.5MG/0.6ML injection    ARIXTRA    90  Syringe    Inject 0.6 mLs (7.5 mg) Subcutaneous every 24 hours    PE (pulmonary thromboembolism) (H)       lidocaine 5 % ointment    XYLOCAINE    5 g    Apply 0.5 g topically as needed for moderate pain Apply pea size amount to rectal area for pain        lidocaine-prilocaine cream    EMLA    30 g    Apply topically as needed for moderate pain Apply dollop size amount to port site 30-60 minutes prior to accessing.    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       LORazepam 0.5 MG tablet    ATIVAN    30 tablet    Take 1 tablet (0.5 mg) by mouth every 4 hours as needed (Anxiety, Nausea/Vomiting or Sleep)    Malignant neoplasm of hepatic flexure (H)       methylphenidate 5 MG tablet    RITALIN    60 tablet    Take 1 tablet (5 mg) by mouth 2 times daily        ondansetron 8 MG tablet    ZOFRAN    10 tablet    Take 1 tablet (8 mg) by mouth every 8 hours as needed (Nausea/Vomiting)    Malignant neoplasm of hepatic flexure (H)       order for DME     1 Device    Equipment being ordered: Wheelchair    Malignant neoplasm of colon, unspecified part of colon (H), VTE (venous thromboembolism), PE (pulmonary thromboembolism) (H), Pain of left lower leg       oxyCODONE IR 5 MG tablet    ROXICODONE    50 tablet    Take 1-2 tablets (5-10 mg) by mouth every 4 hours as needed for pain maximum 12 tablet(s) per day    Malignant neoplasm of colon, unspecified part of colon (H)       Potassium Chloride ER 20 MEQ Tbcr     30 tablet    Take 1 tablet (20 mEq) by mouth daily    Hypokalemia       prochlorperazine 10 MG tablet    COMPAZINE    30 tablet    Take 1 tablet (10 mg) by mouth every 6 hours as needed (Nausea/Vomiting)    Malignant neoplasm of hepatic flexure (H)       sennosides 8.6 MG tablet    SENOKOT     Take 1 tablet by mouth 2 times daily as needed for constipation        sertraline 50 MG tablet    ZOLOFT    30 tablet    TAKE 1 TABLET BY MOUTH EVERY DAY    Malignant neoplasm of hepatic flexure (H)        VITAMIN D (CHOLECALCIFEROL) PO      Take 5,000 Units by mouth daily (OTC: Pt unsure of strength)

## 2018-05-04 NOTE — MR AVS SNAPSHOT
After Visit Summary   5/4/2018    Liv Sylvester    MRN: [id number]           Patient Information     Date Of Birth          1950        Visit Information        Provider Department      5/4/2018 3:27 PM Stephanie Awad LICSW [facility]        Today's Diagnoses     Counseling NOS(V65.40)    -  1       Follow-ups after your visit        Your next 10 appointments already scheduled     May 10, 2018  8:30 AM CDT   Level 6 with  INFUSION CHAIR 11   [facility])    [facility]  [address]  [place] [address]   [phone]            May 10, 2018  9:00 AM CDT   Return Visit with Radha Piedra MD   [facility])    [facility]  [address]  [place] [address] [phone]              Future tests that were ordered for you today     Open Future Orders        Priority Expected Expires Ordered    CBC with platelets differential Routine 5/4/2018 5/4/2019 5/4/2018    Comprehensive metabolic panel (BMP + Alb, Alk Phos, ALT, AST, Total. Bili, TP) Routine 5/4/2018 5/4/2019 5/4/2018            Who to contact     If you have questions or need follow up information about today's clinic visit or your schedule please contact [facility] directly at [phone].  Normal or non-critical lab and imaging results will be communicated to you by MyChart, letter or phone within 4 business days after the clinic has received the results. If you do not hear from us within 7 days, please contact the clinic through MyChart or phone. If you have a critical or abnormal lab result, we will notify you by phone as soon as possible.  Submit refill requests through [employer] or call your pharmacy and they will forward the refill request to us. Please allow 3 business days for your refill to be completed.          Additional Information  "About Your Visit        [employer] Information     [employer] lets you send messages to your doctor, view your test results, renew your prescriptions, schedule appointments and more. To sign up, go to [email or web address] . Click on \"Log in\" on the left side of the screen, which will take you to the Welcome page. Then click on \"Sign up Now\" on the right side of the page.     You will be asked to enter the access code listed below, as well as some personal information. Please follow the directions to create your username and password.     Your access code is: [email or web address]  Expires: 2018 10:35 AM     Your access code will  in 90 days. If you need help or a new code, please call your [place] clinic or [phone].        Care EveryWhere ID     This is your Care EveryWhere ID. This could be used by other organizations to access your [place] medical records  [id number]         Blood Pressure from Last 3 Encounters:   18 123/64   18 146/71   18 145/68    Weight from Last 3 Encounters:   18 92.1 kg (203 lb)   18 93.6 kg (206 lb 6.4 oz)   18 92.8 kg (204 lb 9.6 oz)              Today, you had the following     No orders found for display         Today's Medication Changes          These changes are accurate as of 18  4:14 PM.  If you have any questions, ask your nurse or doctor.               These medicines have changed or have updated prescriptions.        Dose/Directions    chlorthalidone 25 MG tablet   Commonly known as:  HYGROTON   This may have changed:  when to take this   Used for:  HTN (hypertension)        Dose:  25 mg   Take 1 tablet (25 mg) by mouth daily as needed   Quantity:  30 tablet   Refills:  0                Primary Care Provider Office Phone # Fax #    Amaya Howard [phone]       [facility] [address] [facility] [address]        Equal Access to Services     HAYDEE IVERSON AH: wiley Carreon, " john greenberg ah. So [facility] [phone].    ATENCIÓN: Si marleny zhong, tiene a schmitt disposición servicios gratuitos de asistencia lingüística. Wiley al [phone].    We comply with applicable federal civil rights laws and [place] laws. We do not discriminate on the basis of race, color, national origin, age, disability, sex, sexual orientation, or gender identity.            Thank you!     Thank you for choosing [facility]  for your care. Our goal is always to provide you with excellent care. Hearing back from our patients is one way we can continue to improve our services. Please take a few minutes to complete the written survey that you may receive in the mail after your visit with us. Thank you!             Your Updated Medication List - Protect others around you: Learn how to safely use, store and throw away your medicines at www.disposemymeds.org.          This list is accurate as of 5/4/18  4:14 PM.  Always use your most recent med list.                   Brand Name Dispense Instructions for use Diagnosis    ACETAMINOPHEN PO      Take 500 mg by mouth every 4 hours as needed for pain        albuterol 108 (90 Base) MCG/ACT Inhaler    PROAIR HFA/PROVENTIL HFA/VENTOLIN HFA    1 Inhaler    Inhale 2 puffs into the lungs every 6 hours as needed for shortness of breath / dyspnea or wheezing    Reactive airway disease without complication, unspecified asthma severity, unspecified whether persistent       CALCIUM-MAGNESIUM-ZINC PO      Take 1 tablet by mouth daily        CENTRUM SILVER per tablet     100 tablet    Take 1 tablet by mouth daily.        chlorthalidone 25 MG tablet    HYGROTON    30 tablet    Take 1 tablet (25 mg) by mouth daily as needed    HTN (hypertension)       ferrous sulfate 325 (65 Fe) MG tablet    IRON    100 tablet    Take 1 tablet (325 mg) by mouth daily    Iron deficiency anemia due to chronic blood loss       fondaparinux  7.5MG/0.6ML injection    ARIXTRA    90 Syringe    Inject 0.6 mLs (7.5 mg) Subcutaneous every 24 hours    PE (pulmonary thromboembolism) (H)       lidocaine 5 % ointment    XYLOCAINE    5 g    Apply 0.5 g topically as needed for moderate pain Apply pea size amount to rectal area for pain        lidocaine-prilocaine cream    EMLA    30 g    Apply topically as needed for moderate pain Apply dollop size amount to port site 30-60 minutes prior to accessing.    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       LORazepam 0.5 MG tablet    ATIVAN    30 tablet    Take 1 tablet (0.5 mg) by mouth every 4 hours as needed (Anxiety, Nausea/Vomiting or Sleep)    Malignant neoplasm of hepatic flexure (H)       methylphenidate 5 MG tablet    RITALIN    60 tablet    Take 1 tablet (5 mg) by mouth 2 times daily        ondansetron 8 MG tablet    ZOFRAN    10 tablet    Take 1 tablet (8 mg) by mouth every 8 hours as needed (Nausea/Vomiting)    Malignant neoplasm of hepatic flexure (H)       order for DME     1 Device    Equipment being ordered: Wheelchair    Malignant neoplasm of colon, unspecified part of colon (H), VTE (venous thromboembolism), PE (pulmonary thromboembolism) (H), Pain of left lower leg       oxyCODONE IR 5 MG tablet    ROXICODONE    50 tablet    Take 1-2 tablets (5-10 mg) by mouth every 4 hours as needed for pain maximum 12 tablet(s) per day    Malignant neoplasm of colon, unspecified part of colon (H)       Potassium Chloride ER 20 MEQ Tbcr     30 tablet    Take 1 tablet (20 mEq) by mouth daily    Hypokalemia       prochlorperazine 10 MG tablet    COMPAZINE    30 tablet    Take 1 tablet (10 mg) by mouth every 6 hours as needed (Nausea/Vomiting)    Malignant neoplasm of hepatic flexure (H)       sennosides 8.6 MG tablet    SENOKOT     Take 1 tablet by mouth 2 times daily as needed for constipation        sertraline 50 MG tablet    ZOLOFT    30 tablet    TAKE 1 TABLET BY MOUTH EVERY DAY    Malignant  neoplasm of hepatic flexure (H)       VITAMIN D (CHOLECALCIFEROL) PO      Take 5,000 Units by mouth daily (OTC: Pt unsure of strength)

## 2018-05-04 NOTE — PATIENT INSTRUCTIONS
- pt refuses labs today   -  to see pt today  Stephanie contacted/wilfred  -Keep appointment  with Dr Piedra next week Already scheduled/wilfred

## 2018-05-04 NOTE — TELEPHONE ENCOUNTER
I received a call from Sabrina [facility] [phone] with an update. She verbalized havnig a more private conversation with patient today. Patient expressed that she is unsafe in her current environment and she is often left alone and has had to void in her bed and lay in soiled sheets. She is an assist of 2 and feels unsafe with being left alone so much. The home dynamics and environment causes the patient anxiety and depression/     Sabrina said the patient is requesting an immediate transfer to a nursing facility where she can be safe and enjoy a peaceful end of life. Her wishes are to stop treatment.    I informed Chris Hall and Stephanie DIEZ of the above information.    Routed to Laina HURD, and Dr. Piedra as an FYI.

## 2018-05-04 NOTE — LETTER
"    5/4/2018         RE: Liv Sylvester  [address]   [facility] [address]        Dear Colleague,    Thank you for referring your patient, Liv Sylvester, to the [facility]. Please see a copy of my visit note below.    Oncology Rooming Note    May 4, 2018 1:37 PM   Liv Sylvester is a 67 year old female who presents for:    Chief Complaint   Patient presents with     Oncology Clinic Visit     Colon Cancer, with Liver Met (Stage IV)     Initial Vitals: /64 (BP Location: Left arm, Patient Position: Sitting, Cuff Size: Adult Large)  Pulse 110  Temp 99  F (37.2  C) (Oral)  Resp 16  Wt 92.1 kg (203 lb)  SpO2 98%  BMI 29.13 kg/m2 Estimated body mass index is 29.13 kg/(m^2) as calculated from the following:    Height as of 4/20/18: 1.778 m (5' 10\").    Weight as of this encounter: 92.1 kg (203 lb). Body surface area is 2.13 meters squared.  No Pain (0) Comment: Data Unavailable   No LMP recorded. Patient has had a hysterectomy.  Allergies reviewed: Yes  Medications reviewed: Yes    Medications: Medication refills not needed today.  MEDICATION REFILL NEEDED ON OXYCODONE 5 MG  Pharmacy name entered into EPIC:  Paper prescription      Clinical concerns: None                     4 minutes for nursing intake (face to face time)     Monserrat Alonso MA              Oncology/Hematology Visit Note  May 4, 2018    Reason for Visit: follow up of metastatic colon cancer    History of Present Illness: Liv Sylvester is a 67 year old female with metastatic colon cancer she was treated with FOLFOX and Avastin in October 2017.  January 2018 Avastin was discontinued and she was on maintenance with 5-FU and leucovorin  She had a recent CT scan on February 20 2018th shows progression of the disease.  She has been hospitalized multiple times for recurrent episodes of thrombosis.  Recently , IVC filter is placed  Continues with Arixtra      Interval History:  Patient is aware that there is progression " of the disease.  She would like to meet with Dr. Piedra to discuss further treatment options.  She is not happy where she is staying right now.  And would like to see  to discuss.   She denies fever chills or sweats, she denies cough or shortness of breath denies nausea vomiting or diarrhea  She is on Arixtra and denies bleeding  Patient continues to have neuropathy in her fingers and toes.      Review of Systems:  14 point ROS of systems including Constitutional, Eyes, Respiratory, Cardiovascular, Gastroenterology, Genitourinary, Integumentary, Muscularskeletal, Psychiatric were all negative except for pertinent positives noted in my HPI.      Current Outpatient Prescriptions   Medication Sig Dispense Refill     ACETAMINOPHEN PO Take 500 mg by mouth every 4 hours as needed for pain        albuterol (PROAIR HFA/PROVENTIL HFA/VENTOLIN HFA) 108 (90 BASE) MCG/ACT Inhaler Inhale 2 puffs into the lungs every 6 hours as needed for shortness of breath / dyspnea or wheezing 1 Inhaler 3     CALCIUM-MAGNESIUM-ZINC PO Take 1 tablet by mouth daily       chlorthalidone (HYGROTON) 25 MG tablet Take 1 tablet (25 mg) by mouth daily as needed (Patient taking differently: Take 25 mg by mouth daily ) 30 tablet 0     ferrous sulfate (IRON) 325 (65 Fe) MG tablet Take 1 tablet (325 mg) by mouth daily 100 tablet 1     fondaparinux (ARIXTRA) 7.5MG/0.6ML injection Inject 0.6 mLs (7.5 mg) Subcutaneous every 24 hours 90 Syringe 0     lidocaine (XYLOCAINE) 5 % ointment Apply 0.5 g topically as needed for moderate pain Apply pea size amount to rectal area for pain 5 g 0     lidocaine-prilocaine (EMLA) cream Apply topically as needed for moderate pain Apply dollop size amount to port site 30-60 minutes prior to accessing. 30 g 1     LORazepam (ATIVAN) 0.5 MG tablet Take 1 tablet (0.5 mg) by mouth every 4 hours as needed (Anxiety, Nausea/Vomiting or Sleep) 30 tablet 2     methylphenidate (RITALIN) 5 MG tablet Take 1 tablet (5 mg) by  mouth 2 times daily 60 tablet 0     Multiple Vitamins-Minerals (CENTRUM SILVER) per tablet Take 1 tablet by mouth daily. 100 tablet 12     ondansetron (ZOFRAN) 8 MG tablet Take 1 tablet (8 mg) by mouth every 8 hours as needed (Nausea/Vomiting) 10 tablet 2     order for DME Equipment being ordered: Wheelchair 1 Device 0     oxyCODONE IR (ROXICODONE) 5 MG tablet Take 1-2 tablets (5-10 mg) by mouth every 4 hours as needed for pain maximum 12 tablet(s) per day 50 tablet 0     Potassium Chloride ER 20 MEQ TBCR Take 1 tablet (20 mEq) by mouth daily 30 tablet 0     prochlorperazine (COMPAZINE) 10 MG tablet Take 1 tablet (10 mg) by mouth every 6 hours as needed (Nausea/Vomiting) 30 tablet 2     sennosides (SENOKOT) 8.6 MG tablet Take 1 tablet by mouth 2 times daily as needed for constipation       sertraline (ZOLOFT) 50 MG tablet TAKE 1 TABLET BY MOUTH EVERY DAY 30 tablet 0     VITAMIN D, CHOLECALCIFEROL, PO Take 5,000 Units by mouth daily (OTC: Pt unsure of strength)          Physical Examination:  General: The patient is a pleasant female in no acute distress.  /64 (BP Location: Left arm, Patient Position: Sitting, Cuff Size: Adult Large)  Pulse 110  Temp 99  F (37.2  C) (Oral)  Resp 16  Wt 92.1 kg (203 lb)  SpO2 98%  BMI 29.13 kg/m2  HEENT: EOMI, PERRL. Sclerae are anicteric. Oral mucosa is pink and moist with no lesions or thrush.   Lymph: Neck is supple with no lymphadenopathy in the cervical or supraclavicular areas.   Heart: Regular rate and rhythm.   Lungs: Clear to auscultation bilaterally.   GI: Bowel sounds present, soft, nontender with no palpable hepatosplenomegaly or masses.   Extremities: No lower extremity edema noted bilaterally.   Skin: No rashes, petechiae, or bruising noted on exposed skin.    Laboratory Data:    -Patient refused labs to be done today    Assessment and Plan:  This is a 67-year-old female with    Metastatic colon cancer with liver and abdominal lymph node metastases. treated  with FOLFOX and Avastin in October 2017.  January 2018 Avastin was discontinued and she was on maintenance with 5-FU and leucovorin  CT scan from April 20, 2018 shows progression of the disease.  She will need a second line of chemotherapy.  She has appointment with Dr. 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  She reports mild improvement since the chemo was discontinued from her regimen on April 12.  I discussed Neurontin with the patient.  She is not ready to start it yet      Social issues-patient has a very complex social situation  - will see the patient today      TITO Briones CNP  Hem/Onc   [facility]               Again, thank you for allowing me to participate in the care of your patient.        Sincerely,        TITO Briones CNP

## 2018-05-04 NOTE — PROGRESS NOTES
"Oncology Rooming Note    May 4, 2018 1:37 PM   Liv Sylvester is a 67 year old female who presents for:    Chief Complaint   Patient presents with     Oncology Clinic Visit     Colon Cancer, with Liver Met (Stage IV)     Initial Vitals: /64 (BP Location: Left arm, Patient Position: Sitting, Cuff Size: Adult Large)  Pulse 110  Temp 99  F (37.2  C) (Oral)  Resp 16  Wt 92.1 kg (203 lb)  SpO2 98%  BMI 29.13 kg/m2 Estimated body mass index is 29.13 kg/(m^2) as calculated from the following:    Height as of 4/20/18: 1.778 m (5' 10\").    Weight as of this encounter: 92.1 kg (203 lb). Body surface area is 2.13 meters squared.  No Pain (0) Comment: Data Unavailable   No LMP recorded. Patient has had a hysterectomy.  Allergies reviewed: Yes  Medications reviewed: Yes    Medications: Medication refills not needed today.  MEDICATION REFILL NEEDED ON OXYCODONE 5 MG  Pharmacy name entered into EPIC:  Paper prescription      Clinical concerns: None                     4 minutes for nursing intake (face to face time)     Monserrat Alonso MA            "

## 2018-05-04 NOTE — PROGRESS NOTES
"Social Work Progress Note      Data/Intervention:  Patient Name:  Liv Sylvester  /Age:  1950 (67 year old)    Reason for Follow-Up:  Liv comes to clinic today for scheduled visit with Chris Llamas, this clinician met with Liv and granddaughter (Dameon: [phone], [email or web address]) prior to visit for education and support surrounding options     Intervention:   This clinician conducted evaluation today of Liv's care needs in home and had larger discussion about steps that would need to be taken to get into nursing home, hospice residence, or to enhance support in home.   ADLs/IADLs:  Grooming: Full assist  Dressing: Partial assist. Pt reports that she is able to dress her top, but that she requires assistance dressing her bottom. Liv reports that she is unable to do buttons due to neuropathy  Eating: Partial assist. Liv reports that she is unable to hold water that is more than 8oz. Liv also reports that she drops food due to neuropathy. Diet suppressed due to treatment  Bed Mobility: Full assist. Liv reports that she needs complete assistance to get into and out of bed.   Transferring: Full assist. Liv reports that she sometimes needs 2 person assist  Toileting: Partial assist. Liv reports that she needs help getting on and off toilet, but is able to clean self  Frequency of surveillance: Granddaughter reports that she feels that pt requires assistance every 1-2 hours which is more than granddaughter is able to provide as she works    Liv reports that she has had 5 calls in past year, 2 calls in past week. Liv report that she is independent with oral medications but needs assistance with IV and shots.     Liv reports that she does not feel that home environment as is continues to be safe environment for her to live in. She acknowledges that she is aware of prognosis, and that \"end of life\" is coming, but wonders \"if chemo is worth it.\" When this clinician asked pt her thoughts about " "chemotherapy, Liv had mixed feelings, acknowledging that she wants to \"focus on comfort\" and at other times in conversation wants to \"do more chemo if it would help.\" Liv reports that her greatest goal is to remain at home with granddaughter for as long as possible, and reports today that she does not want to go to nursing home but would prefer to see what assistance she could gain in home. Liv reported that if she were to go to nursing home, she would want to have her own room and be in [facility], or [place]. Granddaughter reported that she would be interested in receiving list to start to look at facilities to choose which one pt would like to stay in.     This clinician assisted pt in contacting [facility] and helped pt to submit a referral for [place] Cho[place] which would support pt going on Alternative Care Program or Elderly Waiver based on income change (pt now making $1,305). Brian ([facility]) reported that pt would likely be eligible for PCA services for self care, homemaking, grocery delivery, and lifeline through waiver. Brian unable to give determination on how much assistance pt would be granted during week, and that pt's granddaughter would be contacted in next 1-3 weeks to scheduled home evaluation. This clinician provided family with list of private duty home support for family to pay for additional assistance in home prior to assessment.     Granddaughter and pt reported that having larger conversation with pt's son about taking care of 14-year-old granddaughter was priority for this evening. Pt reported that if son will not take granddaughter into his home this weekend that she would be open to calling the [facility] with this clinician on Monday.     Plan:  1) This clinician will email granddaughter list of local nursing homes in preferred locations and continue discussion with family surrounding next steps. If family changes mind and feels that " home is no longer best option for support with enhansed services in [place], this clinician will complete PAS online to facilitate transition to nursing home.   2) This clinician recommends that pt have larger discussion with Dr. Piedra surrounding goals of care and if chemotherapy continues to be approach that she would like to take or if she would prefer to focus more on enhanced comfort at home  3) This clinician will call pt 5/7/18 to follow-up regarding discussion with son and ability to connect with private pay support before county waiver begins.    Please call or page if needs or concerns arise.     DEANNE Watkins, LICSW  Direct Phone: [phone]  Pager: [phone]

## 2018-05-07 NOTE — PROGRESS NOTES
Oncology/Hematology Visit Note  May 4, 2018    Reason for Visit: follow up of metastatic colon cancer    History of Present Illness: Liv Sylvester is a 67 year old female with metastatic colon cancer she was treated with FOLFOX and Avastin in October 2017.  January 2018 Avastin was discontinued and she was on maintenance with 5-FU and leucovorin  She had a recent CT scan on February 20 2018th shows progression of the disease.  She has been hospitalized multiple times for recurrent episodes of thrombosis.  Recently , IVC filter is placed  Continues with Arixtra      Interval History:  Patient is aware that there is progression of the disease.  She would like to meet with Dr. Piedra to discuss further treatment options.  She is not happy where she is staying right now.  And would like to see  to discuss.   She denies fever chills or sweats, she denies cough or shortness of breath denies nausea vomiting or diarrhea  She is on Arixtra and denies bleeding  Patient continues to have neuropathy in her fingers and toes.      Review of Systems:  14 point ROS of systems including Constitutional, Eyes, Respiratory, Cardiovascular, Gastroenterology, Genitourinary, Integumentary, Muscularskeletal, Psychiatric were all negative except for pertinent positives noted in my HPI.      Current Outpatient Prescriptions   Medication Sig Dispense Refill     ACETAMINOPHEN PO Take 500 mg by mouth every 4 hours as needed for pain        albuterol (PROAIR HFA/PROVENTIL HFA/VENTOLIN HFA) 108 (90 BASE) MCG/ACT Inhaler Inhale 2 puffs into the lungs every 6 hours as needed for shortness of breath / dyspnea or wheezing 1 Inhaler 3     CALCIUM-MAGNESIUM-ZINC PO Take 1 tablet by mouth daily       chlorthalidone (HYGROTON) 25 MG tablet Take 1 tablet (25 mg) by mouth daily as needed (Patient taking differently: Take 25 mg by mouth daily ) 30 tablet 0     ferrous sulfate (IRON) 325 (65 Fe) MG tablet Take 1 tablet (325 mg) by mouth daily 100  tablet 1     fondaparinux (ARIXTRA) 7.5MG/0.6ML injection Inject 0.6 mLs (7.5 mg) Subcutaneous every 24 hours 90 Syringe 0     lidocaine (XYLOCAINE) 5 % ointment Apply 0.5 g topically as needed for moderate pain Apply pea size amount to rectal area for pain 5 g 0     lidocaine-prilocaine (EMLA) cream Apply topically as needed for moderate pain Apply dollop size amount to port site 30-60 minutes prior to accessing. 30 g 1     LORazepam (ATIVAN) 0.5 MG tablet Take 1 tablet (0.5 mg) by mouth every 4 hours as needed (Anxiety, Nausea/Vomiting or Sleep) 30 tablet 2     methylphenidate (RITALIN) 5 MG tablet Take 1 tablet (5 mg) by mouth 2 times daily 60 tablet 0     Multiple Vitamins-Minerals (CENTRUM SILVER) per tablet Take 1 tablet by mouth daily. 100 tablet 12     ondansetron (ZOFRAN) 8 MG tablet Take 1 tablet (8 mg) by mouth every 8 hours as needed (Nausea/Vomiting) 10 tablet 2     order for DME Equipment being ordered: Wheelchair 1 Device 0     oxyCODONE IR (ROXICODONE) 5 MG tablet Take 1-2 tablets (5-10 mg) by mouth every 4 hours as needed for pain maximum 12 tablet(s) per day 50 tablet 0     Potassium Chloride ER 20 MEQ TBCR Take 1 tablet (20 mEq) by mouth daily 30 tablet 0     prochlorperazine (COMPAZINE) 10 MG tablet Take 1 tablet (10 mg) by mouth every 6 hours as needed (Nausea/Vomiting) 30 tablet 2     sennosides (SENOKOT) 8.6 MG tablet Take 1 tablet by mouth 2 times daily as needed for constipation       sertraline (ZOLOFT) 50 MG tablet TAKE 1 TABLET BY MOUTH EVERY DAY 30 tablet 0     VITAMIN D, CHOLECALCIFEROL, PO Take 5,000 Units by mouth daily (OTC: Pt unsure of strength)          Physical Examination:  General: The patient is a pleasant female in no acute distress.  /64 (BP Location: Left arm, Patient Position: Sitting, Cuff Size: Adult Large)  Pulse 110  Temp 99  F (37.2  C) (Oral)  Resp 16  Wt 92.1 kg (203 lb)  SpO2 98%  BMI 29.13 kg/m2  HEENT: EOMI, PERRL. Sclerae are anicteric. Oral mucosa  is pink and moist with no lesions or thrush.   Lymph: Neck is supple with no lymphadenopathy in the cervical or supraclavicular areas.   Heart: Regular rate and rhythm.   Lungs: Clear to auscultation bilaterally.   GI: Bowel sounds present, soft, nontender with no palpable hepatosplenomegaly or masses.   Extremities: No lower extremity edema noted bilaterally.   Skin: No rashes, petechiae, or bruising noted on exposed skin.    Laboratory Data:    -Patient refused labs to be done today    Assessment and Plan:  This is a 67-year-old female with    Metastatic colon cancer with liver and abdominal lymph node metastases. treated with FOLFOX and Avastin in October 2017.  January 2018 Avastin was discontinued and she was on maintenance with 5-FU and leucovorin  CT scan from April 20, 2018 shows progression of the disease.  She will need a second line of chemotherapy.  She has appointment with Dr. 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  She reports mild improvement since the chemo was discontinued from her regimen on April 12.  I discussed Neurontin with the patient.  She is not ready to start it yet      Social issues-patient has a very complex social situation  - will see the patient today      TITO Briones CNP  Hem/Onc   [facility]

## 2018-05-07 NOTE — PROGRESS NOTES
This is a recent snapshot of the patient's [facility] medical record.  For current drug dose and complete information and questions, call [phone] or In [facility] pool, fv home infusion ([id number])  CSN Number:  [id number]

## 2018-05-07 NOTE — TELEPHONE ENCOUNTER
Received a phone call from Sabrina NEWMAN home infusion RN stating that patient still has not received a refill of Oxycodone that she needs. She stated that a call was placed last Friday. Liv would like it filled at [facility] on [address]. Please call patient and home care RN Sabrina back at [phone] when script has been sent. Sabrina stated that she also has more information regarding patient. Joyce Betts RN,BSN,OCN

## 2018-05-07 NOTE — TELEPHONE ENCOUNTER
Patient has a script Oxycodone that was prescribed previously and filled downstairs at the [facility] pharmacy.     I have called and LVM that Oxycodone is ready to be picked up at our [facility] pharmacy explaining that I can not call in a script of Oxycodone to a pharmacy.    Debbie Dasilva

## 2018-05-07 NOTE — TELEPHONE ENCOUNTER
"I received a call from Sabrina Mckeon [phone] with an update and requesting pain medication Oxycodone to be filled for patient.    Patient upon visit this morning was doing well, but had  Terrible RUQ pain this weekend and did not have her pain medication oxycodone. This morning one of her daughters gave her a pain pill , not sure what it was , but it did provided relief.    Oxycodone has been processed downstairs at the [facility] pharmacy.    Sabrina also updated that after talking with patitent today that patient verbalized to her that she only wants chemotherapy if it helps her pain. Her goal of care is to have her pain managed. Sabrina states that Liv like and appreciates honest \"madeline discussions\". The concerns is that with family around it may influence her decision making.    Message forwarded to Laina MOBLEY And  to be updated.   "

## 2018-05-08 NOTE — TELEPHONE ENCOUNTER
RNANI, Laina Mondragon and this clinician attempted to reach Liv by phone today with goal of larger discussion of home support. LVM requesting return call when able. Will attempt to call again later this afternoon to follow-up. This clinician sent local TCU list to rhonaughter.     DEANNE Watkins, LICSW  Phone: [phone]  Pager: [phone]    [facility]: M, T  *every other Thursday, 8am-4:30pm  [facility]: W, F, *every other Thursday, 8am-4:30pm

## 2018-05-08 NOTE — PROGRESS NOTES
This is a recent snapshot of the patient's [facility] medical record.  For current drug dose and complete information and questions, call [phone] or In [facility] pool, fv home infusion ([id number])  CSN Number:  [id number]

## 2018-05-09 NOTE — TELEPHONE ENCOUNTER
"Jyoti with [facility] pt called for an update on Liv-  She was seen today, states she had a horrible weekend with pain but picked up her medication Monday and is currently rating her pain a 3/10 over her Liver.   Liv is stressed by some of her family members and has \"fired\" her daughter Joyce as her PCA.  She has new insurance we will update tomorrow, she is aware of her appointment.    Jyoti states Liv will need to be explained to exactly what hospice is.        "

## 2018-05-09 NOTE — TELEPHONE ENCOUNTER
Made 2nd attempt to reach Liv by phone today to discuss additional support in home. No answer, LVM. If no return call by end of day, this clinician will contact pt's granddaughter and HCP.     Please page in the case of psychosocial distress or concern.     DEANNE Watkins, LICSW  Phone: [phone]  Pager: [phone]    [facility]: M, T  *every other Thursday, 8am-4:30pm  [facility]: W, F, *every other Thursday, 8am-4:30pm

## 2018-05-10 NOTE — LETTER
"    5/10/2018         RE: Liv Sylvester  [address]   [facility] [address]        Dear Colleague,    Thank you for referring your patient, Liv Sylvester, to the [facility]. Please see a copy of my visit note below.    Oncology Rooming Note    May 10, 2018 8:58 AM   Liv Sylvester is a 67 year old female who presents for:    Chief Complaint   Patient presents with     Oncology Clinic Visit     Initial Vitals: /70  Pulse 101  Temp 98  F (36.7  C) (Oral)  Resp 16  Ht 1.778 m (5' 10\")  Wt 90 kg (198 lb 8 oz)  SpO2 100%  BMI 28.48 kg/m2 Estimated body mass index is 28.48 kg/(m^2) as calculated from the following:    Height as of this encounter: 1.778 m (5' 10\").    Weight as of this encounter: 90 kg (198 lb 8 oz). Body surface area is 2.11 meters squared.  Mild Pain (2) Comment: right side abdomen, left shoulder   No LMP recorded. Patient has had a hysterectomy.  Allergies reviewed: Yes  Medications reviewed: Yes    Medications: Medication refills not needed today.  Pharmacy name entered into B[facility]:    [facility] - [place], [place] - [address] [facility] - [place], [place] - [address] [id number] - [place], [place] - [address] AT [facility] [address]    Clinical concerns: None     2 minutes for nursing intake (face to face time)     Merle Lopez [facility] PHYSICIANS  HEMATOLOGY ONCOLOGY    ONCOLOGY FOLLOWUP NOTE      DIAGNOSIS:    1- Metastatic colon cancer in a patient with history of stage 1A grade 3 endometrial carcinoma approximately around 2012.     On 09/29/2017, Liv Sylvester presented with progressive fatigue and right upper quadrant pain.  CT scan 09/29/2017 showed partially contracted bladder and findings suspicious for liver metastases.  There were multiple defined hypoechoic foci scoped scattered throughout the liver, " "the largest was in the right lobe.  CT was also showing enlargement of hepatic and retroperitoneal lymph nodes.  On 09/30/2017, she underwent a colonoscopy, which showed a fungating, infiltrative, ulcerated, partially obstructing mass at the hepatic flexure.  Pathology is consistent with moderately differentiated adenocarcinoma with normal mismatch repair protein expression.   2- Significant iron deficiency anemia.   3- VTE- malignancy related     TREATMENT:  10/11/2017 FOLFOX and Avastin.   1/24/2018 discontinued Avastin.      SUBJECTIVE:  The patient was seen as a followup today. She was admitted to the hospital with multiple complications. She has been discharged, still very deconditioned. Today we had a deatiled discussion about further plan of care.     REVIEW OF SYSTEMS:  A complete review of systems was performed and found to be negative other than pertinent positives mentioned in history of present illness.      Past medical, social histories reviewed.     Meds- Reviewed.     PHYSICAL EXAMINATION:   VITAL SIGNS:/70  Pulse 101  Temp 98  F (36.7  C) (Oral)  Resp 16  Ht 1.778 m (5' 10\")  Wt 90 kg (198 lb 8 oz)  SpO2 100%  BMI 28.48 kg/m2  CONSTITUTIONAL: Appears tired.   HEENT: Pupils are equal. Oropharynx is clear.   NECK: No cervical or supraclavicular lymphadenopathy.   RESPIRATORY: Clear bilaterally.   CARD/VASC: S1, S2, regular.   GI: Soft, nontender, nondistended, no hepatosplenomegaly.   MUSKULOSKELETAL: Warm, well perfused.   NEUROLOGIC: Alert, awake.   INTEGUMENT: No rash.   LYMPHATICS: No edema.   PSYCH: Anxious     LABORATORY DATA AND IMAGING REVIEWED DURING THIS VISIT:  Recent Labs   Lab Test  04/28/18   0555  04/27/18   0740   04/24/18   0632   04/21/18   0655   NA  142  142   < >   --    < >  141   POTASSIUM  3.2*  3.4   < >  3.5   < >  3.5   CHLORIDE  110*  111*   < >   --    < >  106   CO2  26  23   < >   --    < >  29   ANIONGAP  6  8   < >   --    < >  6   BUN  8  8   < >   --    " < >  7   CR  1.43*  1.58*   < >  0.51*   < >  0.59   GLC  94  124*   < >   --    < >  93   JESÚS  8.9  9.1   < >   --    < >  9.0   MAG   --    --    --   2.1   --   2.0    < > = values in this interval not displayed.     Recent Labs   Lab Test  05/10/18   0845  04/26/18   0600  04/25/18   0600   04/20/18   1748  04/12/18   0830   WBC  9.0  8.4  9.6   < >  21.8*  8.5   HGB  8.4*  8.3*  8.6*   < >  9.4*  9.6*   PLT  342  204  185   < >  229  209   MCV  93  95  95   < >  97  97   NEUTROPHIL  79.4   --    --    --   80.6  82.6    < > = values in this interval not displayed.     Recent Labs   Lab Test  04/20/18   1748  04/12/18   0830  03/29/18   0825   BILITOTAL  0.2  0.5  0.4   ALKPHOS  255*  179*  155*   ALT  18  26  25   AST  36  86*  57*   ALBUMIN  2.8*  2.9*  2.8*         Results for orders placed or performed during the hospital encounter of 04/20/18   XR Chest 2 Views    Narrative    CHEST TWO VIEWS   4/20/2018 6:13 PM     HISTORY: Chest pain.     COMPARISON: 12/15/2017.    FINDINGS: Port-A-Cath tip in the SVC. Heart size normal. Lungs clear.  Hypertrophic changes thoracic spine. No interval change.      Impression    IMPRESSION: Nothing acute and no interval change.    CARA BROWN MD   CT Chest/Abdomen/Pelvis w Contrast    Narrative    CT CHEST/ABDOMEN/PELVIS WITH CONTRAST 4/20/2018 7:39 PM     HISTORY: Epigastric pain and high WBC, known pulmonary embolism and  liver mets, rule out new pulmonary embolism.      COMPARISON: CT chest, abdomen, pelvis 2/19/2018.    TECHNIQUE: Thin section axial images are performed from the thoracic  inlet to the lung bases utilizing 103 mL of Isovue 370 IV contrast  without adverse event. Coronal reformatted images are also generated.  Axial images from the lung bases to the symphysis are performed with  additional coronal reformatted images. Radiation dose for this scan  was reduced using automated exposure control, adjustment of the mA  and/or kV according to patient size,  or iterative reconstruction  technique.    FINDINGS:     Chest probable right major fissure lymph node is again noted on series  5, image 51. Bibasilar atelectasis is noted. No new or enlarging lung  lesions. No infiltrate or consolidation. No pleural or pericardial  fluid. Heart is normal in size. Esophagus is unremarkable. Thyroid  gland appears normal. No enlarged axillary or intrathoracic lymph  nodes. Patient has an incidental aberrant right subclavian artery.  This is a normal anatomic variant. Right chest port appears in good  position with catheter tip in the right atrium. Thoracic aorta is  otherwise within normal limits with scattered calcified plaque.  Probable chronic changes of prior pulmonary embolism are noted in a  right lower lobe pulmonary artery such as on series 4, image 63. This  is unchanged compared to prior study. No new evidence of pulmonary  embolism.    Abdomen: Multiple metastatic liver lesions are present, increased in  size since prior exam suggesting interval progression of patient's  disease. The spleen, gallbladder, pancreas, adrenal glands and kidneys  are unremarkable. Right renal cysts are stable. No hydronephrosis or  obvious renal calculi. Aorta demonstrates scattered calcified plaque  without aneurysm or dissection. No enlarged retroperitoneal lymph  nodes. A few prominent erwin hepatis nodes are noted but these appear  stable. The bowel is normal in caliber without obstruction,  diverticulitis or appendicitis. No ascites or peritoneal nodularity.    Pelvis: The bladder is decompressed but otherwise unremarkable. The  rectum is within normal limits. The uterus is not identified. No  adnexal mass. Persistent filling defect left common femoral vein is  noted. This extends up into the external iliac artery and has  progressed into the mid to distal common iliac artery. No IVC thrombus  is evident. Bone window examination demonstrates degenerative spine  changes.      Impression     "IMPRESSION:  1. No evidence of acute pulmonary embolism. Lungs are clear. No new  infiltrate, consolidation or mass. Right major fissure lymph node is  stable.  2. Progression of patient's metastatic liver disease since prior CT.  3. Progression of patient's suspected left common femoral vein  thrombus extending now into the left common iliac vein. Follow-up with  anticoagulation or IVC filter as needed for further treatment.  4. No bowel obstruction, diverticulitis or appendicitis.    SHANEKA PERES MD   IR IVC Filter Placement    Narrative    PROCEDURE(S):  1. Inferior venacavogram  2. Infrarenal placement of a McDowell temporary IVC filter via the right  internal jugular vein     DATE OF PROCEDURE: 4/27/2018    MEDICATIONS: 7 mL 1% Lidocaine SQ, Versed 2 mg IV, Fentanyl 100 mcg IV    CONTRAST: 30 mL Isovue 300 IV    FLUOROSCOPY TIME: 1.3 minutes, (25 mGy).    COMPLICATIONS: None    CLINICAL HISTORY/INDICATION: 67-year-old female with extensive deep  vein thrombosis.    PROCEDURES AND FINDINGS:  Following a discussion of the risks, benefits, indications, and  alternatives to treatment, informed consent was obtained. The patient  was brought to the interventional radiology suite and placed supine on  the table. The right lower neck and chest was prepped and draped in a  routine sterile fashion. A timeout was performed per universal  protocol policy to ensure correct patient, site, and procedure to be  performed.      Local anesthesia was obtained with 1% Lidocaine. Ultrasound was used  to evaluate and localize a patent right internal jugular vein and an  ultrasound image was archived. Using direct ultrasound guidance, a 21  G micropuncture needle was utilized to access the right internal  jugular vein and a 0.018 wire was advanced into the IVC. Exchange was  then made for a  micropuncture sheath, through which a 0.035\" Presley  wire was advanced into the inferior vena cava. Exchange was then made  for a  5 French sheath " and a flush catheter was advanced into the  inferior vena cava to the iliac confluence. An inferior venacavogram  was performed which demonstrates conventional caval anatomy without  duplication of the IVC or the renal veins. The inferior vena cava is  normal in caliber and no thrombus or web is identified.    The flush catheter was removed over a wire, and a filter delivery  sheath was advanced below the renal veins. The filter was advanced to  the end of the sheath and the sheath was retracted deploying the IVC  filter infrarenally.        The sheath was removed and manual compression was held at venotomy  until hemostasis was achieved.     Throughout the procedure, the patient was monitored by a radiology  nurse for cardiac rhythm and oxygen saturation which remained stable.  Total sedation time was 23 min. The patient tolerated the procedure  well and left interventional radiology in stable condition.      Impression    IMPRESSION:  1. Successful placement of a Solano temporary infrarenal inferior vena  cava filter, as detailed above.  2. Conventional caval anatomy.        SAUL FU DO       ECOG performance status 3     ASSESSMENT:    1- This is a 67-year-old lady with stage IV metastatic colon cancer with normal mismatch repair protein expression.  She has multiple liver metastases and metastasis in the abdominal lymph nodes.     She was started on palliative intent combination of FOLFOX with Avastin which is given every 2 weeks intravenously.      - Multiple complications including progression of thrombosis despite being on anticoagulation. S/P IVC filter now.  - CT scan 4/20/18 is showing disease progression. She has poor social support system which impairs her ability to comply with complex cancer related treatment.   We discussed that her life expectancy is likely less than six months regardless if she is on cancer directed treatment. Due to persistent decline in performance status I don't think  that the likelihood of symptom palliation and prolongation of survival is realistic. We discussed the option of enrolling in hospice. She agrees to this.      PLAN:   Enroll in hospice.   RADHA PIEDRA MD    5/10/2018      Again, thank you for allowing me to participate in the care of your patient.        Sincerely,        Radha Piedra MD

## 2018-05-10 NOTE — MR AVS SNAPSHOT
After Visit Summary   5/10/2018    Liv Sylvester    MRN: [id number]           Patient Information     Date Of Birth          1950        Visit Information        Provider Department      5/10/2018 9:00 AM Radha Piedra MD [facility]        Today's Diagnoses     Colon Cancer, with Liver Met (Stage IV)    -  1      Care Instructions    Refer to hospice    1331:- Spoke with Mary Ann at [facility] and they have the referral and will connect via phone with Liv today.  Laina Mondragon RN          Follow-ups after your visit        Additional Services     HOSPICE REFERRAL       **Order classes of: [facility], [facility] and [facility] will route to the Home Care and Hospice Referral Pool.  Home Care or Hospice will then contact the patient to schedule their appointment.**    Hospice eligibility overview: prognosis of 6 months or less, end stage disease, patient goals are comfort only, patient is not seeking curative treatment.    If you do not hear from Hospice, or you would like to call to schedule, please call the referring place of service that your provider has listed below.  ______________________________________________________________________    Your provider has referred you to: hospice    Anticipated discharge date 5/10/2018  . Homecare to begin within 48 hours of discharge.  PLEASE Schedule Hospice consult for 24 - 48 hours.  Please call if there is need for a variance to this timeline.    REASON FOR REFERRAL: Hospice Diagnosis: stage IV colon cancer    ADDITIONAL SERVICES NEEDED: please assess    OTHER PERTINENT INFORMATION: Patient was last seen by provider on 5/10/2018   for colon cancer.  Factors that indicate prognosis of less than 6 months:  Functional decline: multiple complications    Current Outpatient Prescriptions:  ACETAMINOPHEN PO, Take 500 mg by mouth every 4 hours as needed for pain , Disp: , Rfl:   albuterol (PROAIR HFA/PROVENTIL HFA/VENTOLIN HFA) 108 (90  BASE) MCG/ACT Inhaler, Inhale 2 puffs into the lungs every 6 hours as needed for shortness of breath / dyspnea or wheezing, Disp: 1 Inhaler, Rfl: 3  CALCIUM-MAGNESIUM-ZINC PO, Take 1 tablet by mouth daily, Disp: , Rfl:   chlorthalidone (HYGROTON) 25 MG tablet, Take 1 tablet (25 mg) by mouth daily as needed (Patient taking differently: Take 25 mg by mouth daily ), Disp: 30 tablet, Rfl: 0  ferrous sulfate (IRON) 325 (65 Fe) MG tablet, Take 1 tablet (325 mg) by mouth daily, Disp: 100 tablet, Rfl: 1  fondaparinux (ARIXTRA) 7.5MG/0.6ML injection, Inject 0.6 mLs (7.5 mg) Subcutaneous every 24 hours, Disp: 90 Syringe, Rfl: 0  lidocaine (XYLOCAINE) 5 % ointment, Apply 0.5 g topically as needed for moderate pain Apply pea size amount to rectal area for pain, Disp: 5 g, Rfl: 0  lidocaine-prilocaine (EMLA) cream, Apply topically as needed for moderate pain Apply dollop size amount to port site 30-60 minutes prior to accessing., Disp: 30 g, Rfl: 1  LORazepam (ATIVAN) 0.5 MG tablet, Take 1 tablet (0.5 mg) by mouth every 4 hours as needed (Anxiety, Nausea/Vomiting or Sleep), Disp: 30 tablet, Rfl: 2  methylphenidate (RITALIN) 5 MG tablet, Take 1 tablet (5 mg) by mouth 2 times daily, Disp: 60 tablet, Rfl: 0  Multiple Vitamins-Minerals (CENTRUM SILVER) per tablet, Take 1 tablet by mouth daily., Disp: 100 tablet, Rfl: 12  ondansetron (ZOFRAN) 8 MG tablet, Take 1 tablet (8 mg) by mouth every 8 hours as needed (Nausea/Vomiting), Disp: 10 tablet, Rfl: 2  order for DME, Equipment being ordered: Wheelchair, Disp: 1 Device, Rfl: 0  oxyCODONE IR (ROXICODONE) 5 MG tablet, Take 1-2 tablets (5-10 mg) by mouth every 4 hours as needed for pain maximum 12 tablet(s) per day, Disp: 50 tablet, Rfl: 0  Potassium Chloride ER 20 MEQ TBCR, Take 1 tablet (20 mEq) by mouth daily, Disp: 30 tablet, Rfl: 0  prochlorperazine (COMPAZINE) 10 MG tablet, Take 1 tablet (10 mg) by mouth every 6 hours as needed (Nausea/Vomiting), Disp: 30 tablet, Rfl: 2  sennosides  (SENOKOT) 8.6 MG tablet, Take 1 tablet by mouth 2 times daily as needed for constipation, Disp: , Rfl:   sertraline (ZOLOFT) 50 MG tablet, TAKE 1 TABLET BY MOUTH EVERY DAY, Disp: 30 tablet, Rfl: 0  VITAMIN D, CHOLECALCIFEROL, PO, Take 5,000 Units by mouth daily (OTC: Pt unsure of strength) , Disp: , Rfl:       Patient Active Problem List:     Obesity     CARDIOVASCULAR SCREENING; LDL GOAL LESS THAN 160     Advanced directives, counseling/discussion     HTN (hypertension)     Iron deficiency anemia     Liver masses     RUQ abdominal pain     Colon Cancer, with Liver Met (Stage IV)     Pulmonary embolism (H)     Portacath in place     PE (pulmonary thromboembolism) (H)     DVT (deep vein thrombosis) in pregnancy (H)     Primary hypercoagulable state with Recurrent DVT/PE     Acute deep vein thrombosis (DVT) of left lower extremity (H)     Noncompliance with medication regimen     Recurrent deep vein thrombosis (DVT) (H)     Intractable nausea and vomiting    This patient is under my care, and I have initiated the establishment of the plan of care. This patient will be followed by a physician who will periodically review the plan of care.    Physician/Provider to provide follow up care: Amaya Howard    [facility] certified Physician at time of discharge: 5/10/2018    Please be aware that coverage of these services is subject to the terms and limitations of your health insurance plan.  Call member services at your health plan with any benefit or coverage questions.                  Who to contact     If you have questions or need follow up information about today's clinic visit or your schedule please contact [facility] directly at [phone].  Normal or non-critical lab and imaging results will be communicated to you by MyChart, letter or phone within 4 business days after the clinic has received the results. If you do not hear from us within 7 days, please contact the clinic through [employer] or  "phone. If you have a critical or abnormal lab result, we will notify you by phone as soon as possible.  Submit refill requests through [employer] or call your pharmacy and they will forward the refill request to us. Please allow 3 business days for your refill to be completed.          Additional Information About Your Visit        Doormen.hart Information     SightCall lets you send messages to your doctor, view your test results, renew your prescriptions, schedule appointments and more. To sign up, go to [email or web address] . Click on \"Log in\" on the left side of the screen, which will take you to the Welcome page. Then click on \"Sign up Now\" on the right side of the page.     You will be asked to enter the access code listed below, as well as some personal information. Please follow the directions to create your username and password.     Your access code is: [email or web address]  Expires: 2018 10:35 AM     Your access code will  in 90 days. If you need help or a new code, please call your [facility] clinic or [phone].        Care EveryWhere ID     This is your Care EveryWhere ID. This could be used by other organizations to access your [facility] medical records  [id number]        Your Vitals Were     Pulse Temperature Respirations Height Pulse Oximetry BMI (Body Mass Index)    101 98  F (36.7  C) (Oral) 16 1.778 m (5' 10\") 100% 28.48 kg/m2       Blood Pressure from Last 3 Encounters:   No data found for BP    Weight from Last 3 Encounters:   No data found for Wt              Today, you had the following     No orders found for display         Today's Medication Changes          These changes are accurate as of 5/10/18 11:59 PM.  If you have any questions, ask your nurse or doctor.               These medicines have changed or have updated prescriptions.        Dose/Directions    chlorthalidone 25 MG tablet   Commonly known as:  HYGROTON   This may have changed:  when to take this   Used for:  HTN (hypertension) "        Dose:  25 mg   Take 1 tablet (25 mg) by mouth daily as needed   Quantity:  30 tablet   Refills:  0                Primary Care Provider Office Phone # Fax #    Amaya oHward [phone]       [facility] [address] [facility] [address]        Equal Access to Services     HAYDEE IVERSNO : Hadii aad ku hadasho Soomaali, waaxda luqadaha, qaybta kaalmada adeegyada, john jacobo elishaandrae millerdemondjoe maya. So [facility] [phone].    ATENCIÓN: Si habla español, tiene a schmitt disposición servicios gratuitos de asistencia lingüística. [facility] [phone].    We comply with applicable federal civil rights laws and [place] laws. We do not discriminate on the basis of race, color, national origin, age, disability, sex, sexual orientation, or gender identity.            Thank you!     Thank you for choosing [facility]  for your care. Our goal is always to provide you with excellent care. Hearing back from our patients is one way we can continue to improve our services. Please take a few minutes to complete the written survey that you may receive in the mail after your visit with us. Thank you!             Your Updated Medication List - Protect others around you: Learn how to safely use, store and throw away your medicines at www.disposemymeds.org.          This list is accurate as of 5/10/18 11:59 PM.  Always use your most recent med list.                   Brand Name Dispense Instructions for use Diagnosis    ACETAMINOPHEN PO      Take 500 mg by mouth every 4 hours as needed for pain        albuterol 108 (90 Base) MCG/ACT Inhaler    PROAIR HFA/PROVENTIL HFA/VENTOLIN HFA    1 Inhaler    Inhale 2 puffs into the lungs every 6 hours as needed for shortness of breath / dyspnea or wheezing    Reactive airway disease without complication, unspecified asthma severity, unspecified whether persistent       CALCIUM-MAGNESIUM-ZINC PO      Take 1 tablet by mouth daily        CENTRUM SILVER per  tablet     100 tablet    Take 1 tablet by mouth daily.        chlorthalidone 25 MG tablet    HYGROTON    30 tablet    Take 1 tablet (25 mg) by mouth daily as needed    HTN (hypertension)       ferrous sulfate 325 (65 Fe) MG tablet    IRON    100 tablet    Take 1 tablet (325 mg) by mouth daily    Iron deficiency anemia due to chronic blood loss       fondaparinux 7.5MG/0.6ML injection    ARIXTRA    90 Syringe    Inject 0.6 mLs (7.5 mg) Subcutaneous every 24 hours    PE (pulmonary thromboembolism) (H)       lidocaine 5 % ointment    XYLOCAINE    5 g    Apply 0.5 g topically as needed for moderate pain Apply pea size amount to rectal area for pain        lidocaine-prilocaine cream    EMLA    30 g    Apply topically as needed for moderate pain Apply dollop size amount to port site 30-60 minutes prior to accessing.    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       LORazepam 0.5 MG tablet    ATIVAN    30 tablet    Take 1 tablet (0.5 mg) by mouth every 4 hours as needed (Anxiety, Nausea/Vomiting or Sleep)    Malignant neoplasm of hepatic flexure (H)       methylphenidate 5 MG tablet    RITALIN    60 tablet    Take 1 tablet (5 mg) by mouth 2 times daily        ondansetron 8 MG tablet    ZOFRAN    10 tablet    Take 1 tablet (8 mg) by mouth every 8 hours as needed (Nausea/Vomiting)    Malignant neoplasm of hepatic flexure (H)       order for DME     1 Device    Equipment being ordered: Wheelchair    Malignant neoplasm of colon, unspecified part of colon (H), VTE (venous thromboembolism), PE (pulmonary thromboembolism) (H), Pain of left lower leg       oxyCODONE IR 5 MG tablet    ROXICODONE    50 tablet    Take 1-2 tablets (5-10 mg) by mouth every 4 hours as needed for pain maximum 12 tablet(s) per day    Malignant neoplasm of colon, unspecified part of colon (H)       Potassium Chloride ER 20 MEQ Tbcr     30 tablet    Take 1 tablet (20 mEq) by mouth daily    Hypokalemia       prochlorperazine 10 MG  tablet    COMPAZINE    30 tablet    Take 1 tablet (10 mg) by mouth every 6 hours as needed (Nausea/Vomiting)    Malignant neoplasm of hepatic flexure (H)       sennosides 8.6 MG tablet    SENOKOT     Take 1 tablet by mouth 2 times daily as needed for constipation        sertraline 50 MG tablet    ZOLOFT    30 tablet    TAKE 1 TABLET BY MOUTH EVERY DAY    Malignant neoplasm of hepatic flexure (H)       VITAMIN D (CHOLECALCIFEROL) PO      Take 5,000 Units by mouth daily (OTC: Pt unsure of strength)

## 2018-05-10 NOTE — PROGRESS NOTES
"[facility]  HEMATOLOGY ONCOLOGY    ONCOLOGY FOLLOWUP NOTE      DIAGNOSIS:    1- Metastatic colon cancer in a patient with history of stage 1A grade 3 endometrial carcinoma approximately around 2012.     On 09/29/2017, Liv Sylvester presented with progressive fatigue and right upper quadrant pain.  CT scan 09/29/2017 showed partially contracted bladder and findings suspicious for liver metastases.  There were multiple defined hypoechoic foci scoped scattered throughout the liver, the largest was in the right lobe.  CT was also showing enlargement of hepatic and retroperitoneal lymph nodes.  On 09/30/2017, she underwent a colonoscopy, which showed a fungating, infiltrative, ulcerated, partially obstructing mass at the hepatic flexure.  Pathology is consistent with moderately differentiated adenocarcinoma with normal mismatch repair protein expression.   2- Significant iron deficiency anemia.   3- VTE- malignancy related     TREATMENT:  10/11/2017 FOLFOX and Avastin.   1/24/2018 discontinued Avastin.      SUBJECTIVE:  The patient was seen as a followup today. She was admitted to the hospital with multiple complications. She has been discharged, still very deconditioned. Today we had a deatiled discussion about further plan of care.     REVIEW OF SYSTEMS:  A complete review of systems was performed and found to be negative other than pertinent positives mentioned in history of present illness.      Past medical, social histories reviewed.     Meds- Reviewed.     PHYSICAL EXAMINATION:   VITAL SIGNS:/70  Pulse 101  Temp 98  F (36.7  C) (Oral)  Resp 16  Ht 1.778 m (5' 10\")  Wt 90 kg (198 lb 8 oz)  SpO2 100%  BMI 28.48 kg/m2  CONSTITUTIONAL: Appears tired.   HEENT: Pupils are equal. Oropharynx is clear.   NECK: No cervical or supraclavicular lymphadenopathy.   RESPIRATORY: Clear bilaterally.   CARD/VASC: S1, S2, regular.   GI: Soft, nontender, nondistended, no hepatosplenomegaly. "   MUSKULOSKELETAL: Warm, well perfused.   NEUROLOGIC: Alert, awake.   INTEGUMENT: No rash.   LYMPHATICS: No edema.   PSYCH: Anxious     LABORATORY DATA AND IMAGING REVIEWED DURING THIS VISIT:  Recent Labs   Lab Test  04/28/18   0555  04/27/18   0740   04/24/18   0632   04/21/18   0655   NA  142  142   < >   --    < >  141   POTASSIUM  3.2*  3.4   < >  3.5   < >  3.5   CHLORIDE  110*  111*   < >   --    < >  106   CO2  26  23   < >   --    < >  29   ANIONGAP  6  8   < >   --    < >  6   BUN  8  8   < >   --    < >  7   CR  1.43*  1.58*   < >  0.51*   < >  0.59   GLC  94  124*   < >   --    < >  93   JESÚS  8.9  9.1   < >   --    < >  9.0   MAG   --    --    --   2.1   --   2.0    < > = values in this interval not displayed.     Recent Labs   Lab Test  05/10/18   0845  04/26/18   0600  04/25/18   0600   04/20/18   1748  04/12/18   0830   WBC  9.0  8.4  9.6   < >  21.8*  8.5   HGB  8.4*  8.3*  8.6*   < >  9.4*  9.6*   PLT  342  204  185   < >  229  209   MCV  93  95  95   < >  97  97   NEUTROPHIL  79.4   --    --    --   80.6  82.6    < > = values in this interval not displayed.     Recent Labs   Lab Test  04/20/18   1748  04/12/18   0830  03/29/18   0825   BILITOTAL  0.2  0.5  0.4   ALKPHOS  255*  179*  155*   ALT  18  26  25   AST  36  86*  57*   ALBUMIN  2.8*  2.9*  2.8*         Results for orders placed or performed during the hospital encounter of 04/20/18   XR Chest 2 Views    Narrative    CHEST TWO VIEWS   4/20/2018 6:13 PM     HISTORY: Chest pain.     COMPARISON: 12/15/2017.    FINDINGS: Port-A-Cath tip in the SVC. Heart size normal. Lungs clear.  Hypertrophic changes thoracic spine. No interval change.      Impression    IMPRESSION: Nothing acute and no interval change.    CARA BROWN MD   CT Chest/Abdomen/Pelvis w Contrast    Narrative    CT CHEST/ABDOMEN/PELVIS WITH CONTRAST 4/20/2018 7:39 PM     HISTORY: Epigastric pain and high WBC, known pulmonary embolism and  liver mets, rule out new pulmonary  embolism.      COMPARISON: CT chest, abdomen, pelvis 2/19/2018.    TECHNIQUE: Thin section axial images are performed from the thoracic  inlet to the lung bases utilizing 103 mL of Isovue 370 IV contrast  without adverse event. Coronal reformatted images are also generated.  Axial images from the lung bases to the symphysis are performed with  additional coronal reformatted images. Radiation dose for this scan  was reduced using automated exposure control, adjustment of the mA  and/or kV according to patient size, or iterative reconstruction  technique.    FINDINGS:     Chest probable right major fissure lymph node is again noted on series  5, image 51. Bibasilar atelectasis is noted. No new or enlarging lung  lesions. No infiltrate or consolidation. No pleural or pericardial  fluid. Heart is normal in size. Esophagus is unremarkable. Thyroid  gland appears normal. No enlarged axillary or intrathoracic lymph  nodes. Patient has an incidental aberrant right subclavian artery.  This is a normal anatomic variant. Right chest port appears in good  position with catheter tip in the right atrium. Thoracic aorta is  otherwise within normal limits with scattered calcified plaque.  Probable chronic changes of prior pulmonary embolism are noted in a  right lower lobe pulmonary artery such as on series 4, image 63. This  is unchanged compared to prior study. No new evidence of pulmonary  embolism.    Abdomen: Multiple metastatic liver lesions are present, increased in  size since prior exam suggesting interval progression of patient's  disease. The spleen, gallbladder, pancreas, adrenal glands and kidneys  are unremarkable. Right renal cysts are stable. No hydronephrosis or  obvious renal calculi. Aorta demonstrates scattered calcified plaque  without aneurysm or dissection. No enlarged retroperitoneal lymph  nodes. A few prominent erwin hepatis nodes are noted but these appear  stable. The bowel is normal in caliber without  obstruction,  diverticulitis or appendicitis. No ascites or peritoneal nodularity.    Pelvis: The bladder is decompressed but otherwise unremarkable. The  rectum is within normal limits. The uterus is not identified. No  adnexal mass. Persistent filling defect left common femoral vein is  noted. This extends up into the external iliac artery and has  progressed into the mid to distal common iliac artery. No IVC thrombus  is evident. Bone window examination demonstrates degenerative spine  changes.      Impression    IMPRESSION:  1. No evidence of acute pulmonary embolism. Lungs are clear. No new  infiltrate, consolidation or mass. Right major fissure lymph node is  stable.  2. Progression of patient's metastatic liver disease since prior CT.  3. Progression of patient's suspected left common femoral vein  thrombus extending now into the left common iliac vein. Follow-up with  anticoagulation or IVC filter as needed for further treatment.  4. No bowel obstruction, diverticulitis or appendicitis.    SHANEKA PERES MD   IR IVC Filter Placement    Narrative    PROCEDURE(S):  1. Inferior venacavogram  2. Infrarenal placement of a [place] temporary IVC filter via the right  internal jugular vein     DATE OF PROCEDURE: 4/27/2018    MEDICATIONS: 7 mL 1% Lidocaine SQ, Versed 2 mg IV, Fentanyl 100 mcg IV    CONTRAST: 30 mL Isovue 300 IV    FLUOROSCOPY TIME: 1.3 minutes, (25 mGy).    COMPLICATIONS: None    CLINICAL HISTORY/INDICATION: 67-year-old female with extensive deep  vein thrombosis.    PROCEDURES AND FINDINGS:  Following a discussion of the risks, benefits, indications, and  alternatives to treatment, informed consent was obtained. The patient  was brought to the interventional radiology suite and placed supine on  the table. The right lower neck and chest was prepped and draped in a  routine sterile fashion. A timeout was performed per universal  protocol policy to ensure correct patient, site, and procedure to  "be  performed.      Local anesthesia was obtained with 1% Lidocaine. Ultrasound was used  to evaluate and localize a patent right internal jugular vein and an  ultrasound image was archived. Using direct ultrasound guidance, a 21  G micropuncture needle was utilized to access the right internal  jugular vein and a 0.018 wire was advanced into the IVC. Exchange was  then made for a  micropuncture sheath, through which a 0.035\" Presley  wire was advanced into the inferior vena cava. Exchange was then made  for a  5 [place] sheath and a flush catheter was advanced into the  inferior vena cava to the iliac confluence. An inferior venacavogram  was performed which demonstrates conventional caval anatomy without  duplication of the IVC or the renal veins. The inferior vena cava is  normal in caliber and no thrombus or web is identified.    The flush catheter was removed over a wire, and a filter delivery  sheath was advanced below the renal veins. The filter was advanced to  the end of the sheath and the sheath was retracted deploying the IVC  filter infrarenally.        The sheath was removed and manual compression was held at venotomy  until hemostasis was achieved.     Throughout the procedure, the patient was monitored by a radiology  nurse for cardiac rhythm and oxygen saturation which remained stable.  Total sedation time was 23 min. The patient tolerated the procedure  well and left interventional radiology in stable condition.      Impression    IMPRESSION:  1. Successful placement of a [place] temporary infrarenal inferior vena  cava filter, as detailed above.  2. Conventional caval anatomy.        SAUL FU DO       ECOG performance status 3     ASSESSMENT:    1- This is a 67-year-old lady with stage IV metastatic colon cancer with normal mismatch repair protein expression.  She has multiple liver metastases and metastasis in the abdominal lymph nodes.     She was started on palliative intent combination " of FOLFOX with Avastin which is given every 2 weeks intravenously.      - Multiple complications including progression of thrombosis despite being on anticoagulation. S/P IVC filter now.  - CT scan 4/20/18 is showing disease progression. She has poor social support system which impairs her ability to comply with complex cancer related treatment.   We discussed that her life expectancy is likely less than six months regardless if she is on cancer directed treatment. Due to persistent decline in performance status I don't think that the likelihood of symptom palliation and prolongation of survival is realistic. We discussed the option of enrolling in hospice. She agrees to this.      PLAN:   Enroll in hospice.   SIDRA NAILS MD    5/10/2018

## 2018-05-10 NOTE — PROGRESS NOTES
"Oncology Rooming Note    May 10, 2018 8:58 AM   Liv Sylvester is a 67 year old female who presents for:    Chief Complaint   Patient presents with     Oncology Clinic Visit     Initial Vitals: /70  Pulse 101  Temp 98  F (36.7  C) (Oral)  Resp 16  Ht 1.778 m (5' 10\")  Wt 90 kg (198 lb 8 oz)  SpO2 100%  BMI 28.48 kg/m2 Estimated body mass index is 28.48 kg/(m^2) as calculated from the following:    Height as of this encounter: 1.778 m (5' 10\").    Weight as of this encounter: 90 kg (198 lb 8 oz). Body surface area is 2.11 meters squared.  Mild Pain (2) Comment: right side abdomen, left shoulder   No LMP recorded. Patient has had a hysterectomy.  Allergies reviewed: Yes  Medications reviewed: Yes    Medications: Medication refills not needed today.  Pharmacy name entered into Androcial:    [facility] - [place], [place] - [address] [facility] - [place], [place] - [address] [facility] [address] [place], [place] - [address] AT [facility] OF [address]    Clinical concerns: None     2 minutes for nursing intake (face to face time)     Merle Lopez CMA              "

## 2018-05-10 NOTE — MR AVS SNAPSHOT
"              After Visit Summary   5/10/2018    Liv Sylvester    MRN: [id number]           Patient Information     Date Of Birth          1950        Visit Information        Provider Department      5/10/2018 4:41 PM Stephanie Awad LICSW [facility]        Today's Diagnoses     Counseling NOS(V65.40)    -  1       Follow-ups after your visit        Who to contact     If you have questions or need follow up information about today's clinic visit or your schedule please contact [facility] directly at [phone].  Normal or non-critical lab and imaging results will be communicated to you by Floor64hart, letter or phone within 4 business days after the clinic has received the results. If you do not hear from us within 7 days, please contact the clinic through [employer] or phone. If you have a critical or abnormal lab result, we will notify you by phone as soon as possible.  Submit refill requests through BeMe Intimates or call your pharmacy and they will forward the refill request to us. Please allow 3 business days for your refill to be completed.          Additional Information About Your Visit        MyChart Information     BeMe Intimates lets you send messages to your doctor, view your test results, renew your prescriptions, schedule appointments and more. To sign up, go to [email or web address] . Click on \"Log in\" on the left side of the screen, which will take you to the Welcome page. Then click on \"Sign up Now\" on the right side of the page.     You will be asked to enter the access code listed below, as well as some personal information. Please follow the directions to create your username and password.     Your access code is: [email or web address]  Expires: 2018 10:35 AM     Your access code will  in 90 days. If you need help or a new code, please call your [place] clinic or [phone].        Care EveryWhere ID     This is your Care EveryWhere ID. This could be used by other " organizations to access your [place] medical records  [id number]         Blood Pressure from Last 3 Encounters:   05/10/18 122/70   05/10/18 122/70   05/04/18 123/64    Weight from Last 3 Encounters:   05/10/18 90 kg (198 lb 8 oz)   05/10/18 90 kg (198 lb 8 oz)   05/04/18 92.1 kg (203 lb)              Today, you had the following     No orders found for display         Today's Medication Changes          These changes are accurate as of 5/10/18  4:46 PM.  If you have any questions, ask your nurse or doctor.               These medicines have changed or have updated prescriptions.        Dose/Directions    chlorthalidone 25 MG tablet   Commonly known as:  HYGROTON   This may have changed:  when to take this   Used for:  HTN (hypertension)        Dose:  25 mg   Take 1 tablet (25 mg) by mouth daily as needed   Quantity:  30 tablet   Refills:  0                Primary Care Provider Office Phone # Fax #    Amaya Bethea Howard [phone]       [place] [address]        Equal Access to Services     [facility]: Hadii anthony vallejo Somoises, waaxda luqadaha, qaybta kaalmamayur hawkins, john cevallos . So [facility] [phone].    ATENCIÓN: Si habla español, tiene a schmitt disposición servicios gratuitos de asistencia lingüística. Llame al [phone].    We comply with applicable federal civil rights laws and [place] laws. We do not discriminate on the basis of race, color, national origin, age, disability, sex, sexual orientation, or gender identity.            Thank you!     Thank you for choosing [facility]  for your care. Our goal is always to provide you with excellent care. Hearing back from our patients is one way we can continue to improve our services. Please take a few minutes to complete the written survey that you may receive in the mail after your visit with us. Thank you!             Your Updated Medication List - Protect  others around you: Learn how to safely use, store and throw away your medicines at www.disposemymeds.org.          This list is accurate as of 5/10/18  4:46 PM.  Always use your most recent med list.                   Brand Name Dispense Instructions for use Diagnosis    ACETAMINOPHEN PO      Take 500 mg by mouth every 4 hours as needed for pain        albuterol 108 (90 Base) MCG/ACT Inhaler    PROAIR HFA/PROVENTIL HFA/VENTOLIN HFA    1 Inhaler    Inhale 2 puffs into the lungs every 6 hours as needed for shortness of breath / dyspnea or wheezing    Reactive airway disease without complication, unspecified asthma severity, unspecified whether persistent       CALCIUM-MAGNESIUM-ZINC PO      Take 1 tablet by mouth daily        CENTRUM SILVER per tablet     100 tablet    Take 1 tablet by mouth daily.        chlorthalidone 25 MG tablet    HYGROTON    30 tablet    Take 1 tablet (25 mg) by mouth daily as needed    HTN (hypertension)       ferrous sulfate 325 (65 Fe) MG tablet    IRON    100 tablet    Take 1 tablet (325 mg) by mouth daily    Iron deficiency anemia due to chronic blood loss       fondaparinux 7.5MG/0.6ML injection    ARIXTRA    90 Syringe    Inject 0.6 mLs (7.5 mg) Subcutaneous every 24 hours    PE (pulmonary thromboembolism) (H)       lidocaine 5 % ointment    XYLOCAINE    5 g    Apply 0.5 g topically as needed for moderate pain Apply pea size amount to rectal area for pain        lidocaine-prilocaine cream    EMLA    30 g    Apply topically as needed for moderate pain Apply dollop size amount to port site 30-60 minutes prior to accessing.    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       LORazepam 0.5 MG tablet    ATIVAN    30 tablet    Take 1 tablet (0.5 mg) by mouth every 4 hours as needed (Anxiety, Nausea/Vomiting or Sleep)    Malignant neoplasm of hepatic flexure (H)       methylphenidate 5 MG tablet    RITALIN    60 tablet    Take 1 tablet (5 mg) by mouth 2 times daily         ondansetron 8 MG tablet    ZOFRAN    10 tablet    Take 1 tablet (8 mg) by mouth every 8 hours as needed (Nausea/Vomiting)    Malignant neoplasm of hepatic flexure (H)       order for DME     1 Device    Equipment being ordered: Wheelchair    Malignant neoplasm of colon, unspecified part of colon (H), VTE (venous thromboembolism), PE (pulmonary thromboembolism) (H), Pain of left lower leg       oxyCODONE IR 5 MG tablet    ROXICODONE    50 tablet    Take 1-2 tablets (5-10 mg) by mouth every 4 hours as needed for pain maximum 12 tablet(s) per day    Malignant neoplasm of colon, unspecified part of colon (H)       Potassium Chloride ER 20 MEQ Tbcr     30 tablet    Take 1 tablet (20 mEq) by mouth daily    Hypokalemia       prochlorperazine 10 MG tablet    COMPAZINE    30 tablet    Take 1 tablet (10 mg) by mouth every 6 hours as needed (Nausea/Vomiting)    Malignant neoplasm of hepatic flexure (H)       sennosides 8.6 MG tablet    SENOKOT     Take 1 tablet by mouth 2 times daily as needed for constipation        sertraline 50 MG tablet    ZOLOFT    30 tablet    TAKE 1 TABLET BY MOUTH EVERY DAY    Malignant neoplasm of hepatic flexure (H)       VITAMIN D (CHOLECALCIFEROL) PO      Take 5,000 Units by mouth daily (OTC: Pt unsure of strength)

## 2018-05-10 NOTE — MR AVS SNAPSHOT
"              After Visit Summary   5/10/2018    Liv Sylvester    MRN: [id number]           Patient Information     Date Of Birth          1950        Visit Information        Provider Department      5/10/2018 8:30 AM  INFUSION CHAIR [facility]        Today's Diagnoses     Colon Cancer, with Liver Met (Stage IV)    -  1       Follow-ups after your visit        Who to contact     If you have questions or need follow up information about today's clinic visit or your schedule please contact [facility] directly at [phone].  Normal or non-critical lab and imaging results will be communicated to you by Endorse For A Causehart, letter or phone within 4 business days after the clinic has received the results. If you do not hear from us within 7 days, please contact the clinic through QUIQt or phone. If you have a critical or abnormal lab result, we will notify you by phone as soon as possible.  Submit refill requests through Umbel or call your pharmacy and they will forward the refill request to us. Please allow 3 business days for your refill to be completed.          Additional Information About Your Visit        MyChart Information     Umbel lets you send messages to your doctor, view your test results, renew your prescriptions, schedule appointments and more. To sign up, go to [email or web address] . Click on \"Log in\" on the left side of the screen, which will take you to the Welcome page. Then click on \"Sign up Now\" on the right side of the page.     You will be asked to enter the access code listed below, as well as some personal information. Please follow the directions to create your username and password.     Your access code is: [email or web address]  Expires: 2018 10:35 AM     Your access code will  in 90 days. If you need help or a new code, please call your [place] clinic or [phone].        Care EveryWhere ID     This is your " "Care EveryWhere ID. This could be used by other organizations to access your [place] medical records  [id number]        Your Vitals Were     Pulse Temperature Respirations Height Pulse Oximetry BMI (Body Mass Index)    101 98  F (36.7  C) (Oral) 16 1.778 m (5' 10\") 100% 28.48 kg/m2       Blood Pressure from Last 3 Encounters:   05/10/18 122/70   05/10/18 122/70   05/04/18 123/64    Weight from Last 3 Encounters:   05/10/18 90 kg (198 lb 8 oz)   05/10/18 90 kg (198 lb 8 oz)   05/04/18 92.1 kg (203 lb)              We Performed the Following     CBC with platelets differential     CEA     Comprehensive metabolic panel          Today's Medication Changes          These changes are accurate as of 5/10/18  9:50 AM.  If you have any questions, ask your nurse or doctor.               These medicines have changed or have updated prescriptions.        Dose/Directions    chlorthalidone 25 MG tablet   Commonly known as:  HYGROTON   This may have changed:  when to take this   Used for:  HTN (hypertension)        Dose:  25 mg   Take 1 tablet (25 mg) by mouth daily as needed   Quantity:  30 tablet   Refills:  0                Primary Care Provider Office Phone # Fax #    Amaya Howard [phone]       [address] [address]        Equal Access to Services     HAYDEE IVERSON AH: Jeffry kahn hadasho Soomaali, waaxda luqadaha, qaybta kaalmada adenigel, john maya. So [facility] [phone].    ATENCIÓN: Si habla español, tiene a schmitt disposición servicios gratuitos de asistencia lingüística. Llame al [phone].    We comply with applicable federal civil rights laws and [place] laws. We do not discriminate on the basis of race, color, national origin, age, disability, sex, sexual orientation, or gender identity.            Thank you!     Thank you for choosing [facility] AND [facility]  for your care. Our goal is always to provide you with " excellent care. Hearing back from our patients is one way we can continue to improve our services. Please take a few minutes to complete the written survey that you may receive in the mail after your visit with us. Thank you!             Your Updated Medication List - Protect others around you: Learn how to safely use, store and throw away your medicines at www.disposemymeds.org.          This list is accurate as of 5/10/18  9:50 AM.  Always use your most recent med list.                   Brand Name Dispense Instructions for use Diagnosis    ACETAMINOPHEN PO      Take 500 mg by mouth every 4 hours as needed for pain        albuterol 108 (90 Base) MCG/ACT Inhaler    PROAIR HFA/PROVENTIL HFA/VENTOLIN HFA    1 Inhaler    Inhale 2 puffs into the lungs every 6 hours as needed for shortness of breath / dyspnea or wheezing    Reactive airway disease without complication, unspecified asthma severity, unspecified whether persistent       CALCIUM-MAGNESIUM-ZINC PO      Take 1 tablet by mouth daily        CENTRUM SILVER per tablet     100 tablet    Take 1 tablet by mouth daily.        chlorthalidone 25 MG tablet    HYGROTON    30 tablet    Take 1 tablet (25 mg) by mouth daily as needed    HTN (hypertension)       ferrous sulfate 325 (65 Fe) MG tablet    IRON    100 tablet    Take 1 tablet (325 mg) by mouth daily    Iron deficiency anemia due to chronic blood loss       fondaparinux 7.5MG/0.6ML injection    ARIXTRA    90 Syringe    Inject 0.6 mLs (7.5 mg) Subcutaneous every 24 hours    PE (pulmonary thromboembolism) (H)       lidocaine 5 % ointment    XYLOCAINE    5 g    Apply 0.5 g topically as needed for moderate pain Apply pea size amount to rectal area for pain        lidocaine-prilocaine cream    EMLA    30 g    Apply topically as needed for moderate pain Apply dollop size amount to port site 30-60 minutes prior to accessing.    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       LORazepam 0.5 MG  tablet    ATIVAN    30 tablet    Take 1 tablet (0.5 mg) by mouth every 4 hours as needed (Anxiety, Nausea/Vomiting or Sleep)    Malignant neoplasm of hepatic flexure (H)       methylphenidate 5 MG tablet    RITALIN    60 tablet    Take 1 tablet (5 mg) by mouth 2 times daily        ondansetron 8 MG tablet    ZOFRAN    10 tablet    Take 1 tablet (8 mg) by mouth every 8 hours as needed (Nausea/Vomiting)    Malignant neoplasm of hepatic flexure (H)       order for DME     1 Device    Equipment being ordered: Wheelchair    Malignant neoplasm of colon, unspecified part of colon (H), VTE (venous thromboembolism), PE (pulmonary thromboembolism) (H), Pain of left lower leg       oxyCODONE IR 5 MG tablet    ROXICODONE    50 tablet    Take 1-2 tablets (5-10 mg) by mouth every 4 hours as needed for pain maximum 12 tablet(s) per day    Malignant neoplasm of colon, unspecified part of colon (H)       Potassium Chloride ER 20 MEQ Tbcr     30 tablet    Take 1 tablet (20 mEq) by mouth daily    Hypokalemia       prochlorperazine 10 MG tablet    COMPAZINE    30 tablet    Take 1 tablet (10 mg) by mouth every 6 hours as needed (Nausea/Vomiting)    Malignant neoplasm of hepatic flexure (H)       sennosides 8.6 MG tablet    SENOKOT     Take 1 tablet by mouth 2 times daily as needed for constipation        sertraline 50 MG tablet    ZOLOFT    30 tablet    TAKE 1 TABLET BY MOUTH EVERY DAY    Malignant neoplasm of hepatic flexure (H)       VITAMIN D (CHOLECALCIFEROL) PO      Take 5,000 Units by mouth daily (OTC: Pt unsure of strength)

## 2018-05-10 NOTE — PROGRESS NOTES
Social Work Progress Note      Data/Intervention:  Patient Name:  Liv Sylvester  /Age:  1950 (67 year old)    Reason for Follow-Up:  Liv comes to clinic today for scheduled visit with Dr. Piedra. This clinician met with pt after visit for additional education and support surrounding transition to hospice.     Intervention:   Liv reported today that she continues to feel comfortable with idea of transitioning to hospice, she reports that she understands that receiving chemotherapy could do more harm. Liv reports that she continues to have mixed feelings about her ability to have hospice in home vs. In a facility due to complex family dynamics. Liv agreeable to home hospice referral today, and ongoing discussion with hospice team about location for hospice care. TCU for LTC might be cost prohibitive for Liv, and she has some interest in [facility].     Resources Provided:  Hospice residence list    Plan:  1) This clinician provided clinical hand off to hospice intake, providing additional information regarding pt's home life and hopes for location of services. Hospice reported that they would contact this clinician with date they are able to visit pt and names of care team.  2) This clinician will follow-up with pt to see how her transition to hospice has gone. Will continue to be available for ongoing emotional support as needed.     Please call or page if needs or concerns arise.     DEANNE Watkins, [facility]  Direct Phone: [phone]  Pager: [phone]

## 2018-05-10 NOTE — PATIENT INSTRUCTIONS
Refer to hospice    1331:- Spoke with Mary Ann at [facility] and they have the referral and will connect via phone with Liv today.  Laina Mondragon RN

## 2018-05-10 NOTE — PROGRESS NOTES
.Infusion Nursing Note:  Liv Sylvester presents today for D1C14 Modified Folfox.    Patient seen by provider today: Yes:    present during visit today: Not Applicable.    Note: Feeling good today but some pain RUQ..    Intravenous Access:  Labs drawn without difficulty.  Implanted Port.    Treatment Conditions:  Lab Results   Component Value Date    HGB 8.4 05/10/2018     Lab Results   Component Value Date    WBC 9.0 05/10/2018      Lab Results   Component Value Date    ANEU 7.1 05/10/2018     Lab Results   Component Value Date     05/10/2018      Lab Results   Component Value Date     05/10/2018                   Lab Results   Component Value Date    POTASSIUM 3.2 05/10/2018           Lab Results   Component Value Date    MAG 2.1 04/24/2018            Lab Results   Component Value Date    CR 0.88 05/10/2018                   Lab Results   Component Value Date    JESÚS 9.3 05/10/2018                Lab Results   Component Value Date    BILITOTAL 0.4 05/10/2018           Lab Results   Component Value Date    ALBUMIN 2.3 05/10/2018                    Lab Results   Component Value Date    ALT 38 05/10/2018           Lab Results   Component Value Date     05/10/2018       Patient had discussion with  and did not return to infusion. Decision to stop treatment today. I spoke with patient after this and she states she is at peace with her decision..      Post Infusion Assessment:  Site patent and intact, free from redness, edema or discomfort.  No evidence of extravasations.  Access discontinued per protocol.  Clinic deaccessed her port.    Discharge Plan:   Discharge instructions reviewed with: Patient.  Patient and/or family verbalized understanding of discharge instructions and all questions answered.  Copy of AVS reviewed with patient and/or family.  Patient will return NONE needed for next appointment.  Patient discharged in stable condition accompanied by:  granddaughter.  Departure Mode: Wheelchair.    Jaleesa Anne RN

## 2018-05-11 NOTE — TELEPHONE ENCOUNTER
8:04 am   JACKIE Ryan with [facility] has an appointment to see Liv at 9:30 am.  Shelby needs to know if Dr. Piedra will follow the patient?  Please call Shelby back before 9:00 am at [phone]  Monserrat Alonso MA

## 2018-05-11 NOTE — TELEPHONE ENCOUNTER
Noted. I returned call to Angelica MEJIA and provided the verbal order that Dr. Piedra can be following the patient while she is in hospice.    Nothing further at this time. Debbie Dasilva

## 2018-05-14 NOTE — TELEPHONE ENCOUNTER
This clinician called pt for psychosocial check-in and support surrounding transition to hospice services. Pt sleeping at time of this clinician's call, LM for pt's grandson to have pt call this clinician when available. This clinician contacted pt's RN and LVM requesting clinical update and return call when able. This clinician will attempt pt again in next 3-5 business days unless pt contacts this clinician prior.     DEANNE Watkins, [facility]  Phone: [phone]  Pager: [phone]    [facility]: M, T  *every other Thursday, 8am-4:30pm  [facility]: W, F, *every other Thursday, 8am-4:30pm

## 2018-05-17 NOTE — PROGRESS NOTES
This is a recent snapshot of the patient's [facility] medical record.  For current drug dose and complete information and questions, call [phone] or In Basket pool, [facility] home infusion ([id number])  CSN Number:  [id number]

## 2018-06-04 NOTE — PROGRESS NOTES
This is a recent snapshot of the patient's [facility] medical record.  For current drug dose and complete information and questions, call [phone] or In Basket pool, fv home infusion ([id number])  CSN Number:  [id number]

## 2018-06-08 NOTE — TELEPHONE ENCOUNTER
This clinician received clinical update from Hospice SW that pt's prognosis is days and that she is actively dying. This clinician called and spoke with pt's granddaughter offering emotional support and supportive listening. Family appears to be coping as well as can be expected, enjoying time together with massage and telling stories. Family aware of ongoing SW support available in clinic, and knows to reach out in case of psychosocial distress or need.     DEANNE Watkins, [facility]  Phone: [phone]  Pager: [phone]    [facility]: M, T  *every other Thursday, 8am-4:30pm  [facility]: W, F, *every other Thursday, 8am-4:30pm

## 2018-06-13 NOTE — TELEPHONE ENCOUNTER
This clinician received call from pt's hospice social worker that pt passed away at home in morning of 6/12/18. [facility] had been contacted by family at 10:00am and had hospice RN visiting in afternoon for additional family support. Medical chart updated, oncology team made aware.     DEANNE Watkins, LICSW  Phone: [phone]  Pager: [phone]    [facility]: M, T  *every other Thursday, 8am-4:30pm  [facility]: W, F, *every other Thursday, 8am-4:30pm

## 2019-06-17 PROBLEM — Z95.828 PORT-A-CATH IN PLACE: Status: ACTIVE | Noted: 2017-01-01

## 2019-12-23 NOTE — TELEPHONE ENCOUNTER
Please have her start metoprolol 25 mg BID for high blood pressure. Thanks   Anesthesia Volume In Cc: 1

## 2021-04-07 NOTE — PATIENT INSTRUCTIONS
1.  FOLFOX with Avastin every 2 weeks with neulasta support.  Chemo today.   2.  Return to clinic 4 weeks with Infusion Clinic appointment.   3-  Replace electrolytes per protocol today.Potassium chloride 20 meq twice daily on regular basis.   4- Labs today- CEA. /done  5- Discontinue Xarelto. Start Lovenox 1.5 mg/kg SQ daily from tomorrow.   6- B/LE US today/done      Addendum to plan after us results are back:  1.) Lovenox today in clinic    
none

## (undated) DEVICE — SU MONOCRYL 4-0 PS-2 18" UND Y496G

## (undated) DEVICE — GLOVE PROTEXIS W/NEU-THERA 7.5  2D73TE75

## (undated) DEVICE — PACK MINOR SBA15MIFSE

## (undated) DEVICE — LINEN TOWEL PACK X5 5464

## (undated) DEVICE — SOL NACL 0.9% INJ 250ML BAG 2B1322Q

## (undated) DEVICE — SU PROLENE 2-0 CT-2 30" 8411H

## (undated) DEVICE — SYR 10ML LL W/O NDL

## (undated) DEVICE — GLOVE GAMMEX DERMAPRENE ULTRA SZ 8 LF 8516

## (undated) DEVICE — ESU ELEC BLADE 2.75" COATED/INSULATED E1455

## (undated) DEVICE — DRAPE LEGGINGS 8421

## (undated) DEVICE — DRAPE LAP TRANSVERSE 29421

## (undated) DEVICE — SOL NACL 0.9% IRRIG 1000ML BOTTLE 07138-09

## (undated) DEVICE — SYR 10ML SLIP TIP W/O NDL

## (undated) DEVICE — SYR 03ML LL W/O NDL 309657

## (undated) DEVICE — GOWN IMPERVIOUS SPECIALTY XLG/XLONG 32474

## (undated) DEVICE — PREP CHLORAPREP W/ORANGE TINT 10.5ML 260715

## (undated) DEVICE — SU VICRYL 3-0 SH 27" J316H

## (undated) DEVICE — DRAPE COVER C-ARM SEAMLESS SNAP-KAP 03-KP26 LATEX FREE

## (undated) DEVICE — SUCTION CANISTER MEDIVAC LINER 3000ML W/LID 65651-530

## (undated) DEVICE — COVER ULTRASOUND PROBE 6X48" PC1290

## (undated) DEVICE — DECANTER BAG 2002S

## (undated) DEVICE — NDL 19GA 1.5"

## (undated) DEVICE — COVER CAMERA ENDOVIDEO

## (undated) DEVICE — SU DERMABOND MINI DHVM12

## (undated) RX ORDER — PROPOFOL 10 MG/ML
INJECTION, EMULSION INTRAVENOUS
Status: DISPENSED
Start: 2017-01-01

## (undated) RX ORDER — ONDANSETRON 2 MG/ML
INJECTION INTRAMUSCULAR; INTRAVENOUS
Status: DISPENSED
Start: 2017-01-01

## (undated) RX ORDER — HEPARIN SODIUM (PORCINE) LOCK FLUSH IV SOLN 100 UNIT/ML 100 UNIT/ML
SOLUTION INTRAVENOUS
Status: DISPENSED
Start: 2018-01-01

## (undated) RX ORDER — HEPARIN SODIUM (PORCINE) LOCK FLUSH IV SOLN 100 UNIT/ML 100 UNIT/ML
SOLUTION INTRAVENOUS
Status: DISPENSED
Start: 2017-01-01

## (undated) RX ORDER — FENTANYL CITRATE 50 UG/ML
INJECTION, SOLUTION INTRAMUSCULAR; INTRAVENOUS
Status: DISPENSED
Start: 2017-01-01

## (undated) RX ORDER — DEXAMETHASONE SODIUM PHOSPHATE 4 MG/ML
INJECTION, SOLUTION INTRA-ARTICULAR; INTRALESIONAL; INTRAMUSCULAR; INTRAVENOUS; SOFT TISSUE
Status: DISPENSED
Start: 2017-01-01

## (undated) RX ORDER — CEFAZOLIN SODIUM 1 G/3ML
INJECTION, POWDER, FOR SOLUTION INTRAMUSCULAR; INTRAVENOUS
Status: DISPENSED
Start: 2017-01-01